# Patient Record
Sex: FEMALE | Race: BLACK OR AFRICAN AMERICAN | NOT HISPANIC OR LATINO | Employment: OTHER | URBAN - METROPOLITAN AREA
[De-identification: names, ages, dates, MRNs, and addresses within clinical notes are randomized per-mention and may not be internally consistent; named-entity substitution may affect disease eponyms.]

---

## 2023-12-26 ENCOUNTER — APPOINTMENT (OUTPATIENT)
Dept: CT IMAGING | Facility: HOSPITAL | Age: 83
DRG: 884 | End: 2023-12-26
Payer: MEDICARE

## 2023-12-26 ENCOUNTER — APPOINTMENT (OUTPATIENT)
Dept: GENERAL RADIOLOGY | Facility: HOSPITAL | Age: 83
DRG: 884 | End: 2023-12-26
Payer: MEDICARE

## 2023-12-26 ENCOUNTER — HOSPITAL ENCOUNTER (INPATIENT)
Facility: HOSPITAL | Age: 83
LOS: 28 days | Discharge: NURSING, REHAB OR LONG-TERM CARE | DRG: 884 | End: 2024-02-01
Attending: EMERGENCY MEDICINE | Admitting: FAMILY MEDICINE
Payer: MEDICARE

## 2023-12-26 DIAGNOSIS — I87.2 VENOUS INSUFFICIENCY: ICD-10-CM

## 2023-12-26 DIAGNOSIS — L12.0 BULLOUS PEMPHIGOID: ICD-10-CM

## 2023-12-26 DIAGNOSIS — R13.10 DYSPHAGIA, UNSPECIFIED TYPE: ICD-10-CM

## 2023-12-26 DIAGNOSIS — N39.0 ACUTE UTI: ICD-10-CM

## 2023-12-26 DIAGNOSIS — R53.1 GENERALIZED WEAKNESS: Primary | ICD-10-CM

## 2023-12-26 LAB
ALBUMIN SERPL-MCNC: 2.6 G/DL (ref 3.5–5.2)
ALBUMIN/GLOB SERPL: 1.1 G/DL
ALP SERPL-CCNC: 174 U/L (ref 39–117)
ALT SERPL W P-5'-P-CCNC: 31 U/L (ref 1–33)
ANION GAP SERPL CALCULATED.3IONS-SCNC: 8 MMOL/L (ref 5–15)
AST SERPL-CCNC: 36 U/L (ref 1–32)
BASOPHILS # BLD AUTO: 0.02 10*3/MM3 (ref 0–0.2)
BASOPHILS NFR BLD AUTO: 0.3 % (ref 0–1.5)
BILIRUB SERPL-MCNC: 0.7 MG/DL (ref 0–1.2)
BUN SERPL-MCNC: 21 MG/DL (ref 8–23)
BUN/CREAT SERPL: 26.3 (ref 7–25)
CALCIUM SPEC-SCNC: 7.9 MG/DL (ref 8.6–10.5)
CHLORIDE SERPL-SCNC: 109 MMOL/L (ref 98–107)
CO2 SERPL-SCNC: 26 MMOL/L (ref 22–29)
CREAT SERPL-MCNC: 0.8 MG/DL (ref 0.57–1)
DEPRECATED RDW RBC AUTO: 60.8 FL (ref 37–54)
EGFRCR SERPLBLD CKD-EPI 2021: 74.1 ML/MIN/1.73
EOSINOPHIL # BLD AUTO: 0.21 10*3/MM3 (ref 0–0.4)
EOSINOPHIL NFR BLD AUTO: 2.7 % (ref 0.3–6.2)
ERYTHROCYTE [DISTWIDTH] IN BLOOD BY AUTOMATED COUNT: 18.1 % (ref 12.3–15.4)
GLOBULIN UR ELPH-MCNC: 2.3 GM/DL
GLUCOSE SERPL-MCNC: 81 MG/DL (ref 65–99)
HCT VFR BLD AUTO: 31.3 % (ref 34–46.6)
HGB BLD-MCNC: 10.3 G/DL (ref 12–15.9)
HOLD SPECIMEN: NORMAL
HOLD SPECIMEN: NORMAL
IMM GRANULOCYTES # BLD AUTO: 0.07 10*3/MM3 (ref 0–0.05)
IMM GRANULOCYTES NFR BLD AUTO: 0.9 % (ref 0–0.5)
LIPASE SERPL-CCNC: 119 U/L (ref 13–60)
LYMPHOCYTES # BLD AUTO: 0.92 10*3/MM3 (ref 0.7–3.1)
LYMPHOCYTES NFR BLD AUTO: 11.8 % (ref 19.6–45.3)
MCH RBC QN AUTO: 31.2 PG (ref 26.6–33)
MCHC RBC AUTO-ENTMCNC: 32.9 G/DL (ref 31.5–35.7)
MCV RBC AUTO: 94.8 FL (ref 79–97)
MONOCYTES # BLD AUTO: 0.34 10*3/MM3 (ref 0.1–0.9)
MONOCYTES NFR BLD AUTO: 4.4 % (ref 5–12)
NEUTROPHILS NFR BLD AUTO: 6.22 10*3/MM3 (ref 1.7–7)
NEUTROPHILS NFR BLD AUTO: 79.9 % (ref 42.7–76)
NRBC BLD AUTO-RTO: 0.8 /100 WBC (ref 0–0.2)
NT-PROBNP SERPL-MCNC: 1494 PG/ML (ref 0–1800)
PLATELET # BLD AUTO: 159 10*3/MM3 (ref 140–450)
PMV BLD AUTO: 9.4 FL (ref 6–12)
POTASSIUM SERPL-SCNC: 4.3 MMOL/L (ref 3.5–5.2)
PROT SERPL-MCNC: 4.9 G/DL (ref 6–8.5)
RBC # BLD AUTO: 3.3 10*6/MM3 (ref 3.77–5.28)
SODIUM SERPL-SCNC: 143 MMOL/L (ref 136–145)
TROPONIN T SERPL HS-MCNC: 69 NG/L
WBC NRBC COR # BLD AUTO: 7.78 10*3/MM3 (ref 3.4–10.8)
WHOLE BLOOD HOLD COAG: NORMAL
WHOLE BLOOD HOLD SPECIMEN: NORMAL

## 2023-12-26 PROCEDURE — 85652 RBC SED RATE AUTOMATED: CPT | Performed by: NURSE PRACTITIONER

## 2023-12-26 PROCEDURE — 71045 X-RAY EXAM CHEST 1 VIEW: CPT

## 2023-12-26 PROCEDURE — 83605 ASSAY OF LACTIC ACID: CPT | Performed by: PHYSICIAN ASSISTANT

## 2023-12-26 PROCEDURE — 75635 CT ANGIO ABDOMINAL ARTERIES: CPT

## 2023-12-26 PROCEDURE — 82746 ASSAY OF FOLIC ACID SERUM: CPT | Performed by: NURSE PRACTITIONER

## 2023-12-26 PROCEDURE — 82728 ASSAY OF FERRITIN: CPT | Performed by: NURSE PRACTITIONER

## 2023-12-26 PROCEDURE — 99285 EMERGENCY DEPT VISIT HI MDM: CPT

## 2023-12-26 PROCEDURE — 84484 ASSAY OF TROPONIN QUANT: CPT | Performed by: EMERGENCY MEDICINE

## 2023-12-26 PROCEDURE — 80053 COMPREHEN METABOLIC PANEL: CPT | Performed by: EMERGENCY MEDICINE

## 2023-12-26 PROCEDURE — 93005 ELECTROCARDIOGRAM TRACING: CPT

## 2023-12-26 PROCEDURE — 25510000001 IOPAMIDOL PER 1 ML: Performed by: EMERGENCY MEDICINE

## 2023-12-26 PROCEDURE — 85025 COMPLETE CBC W/AUTO DIFF WBC: CPT

## 2023-12-26 PROCEDURE — 84145 PROCALCITONIN (PCT): CPT | Performed by: PHYSICIAN ASSISTANT

## 2023-12-26 PROCEDURE — 82607 VITAMIN B-12: CPT | Performed by: NURSE PRACTITIONER

## 2023-12-26 PROCEDURE — 83690 ASSAY OF LIPASE: CPT | Performed by: EMERGENCY MEDICINE

## 2023-12-26 PROCEDURE — P9612 CATHETERIZE FOR URINE SPEC: HCPCS

## 2023-12-26 PROCEDURE — 83880 ASSAY OF NATRIURETIC PEPTIDE: CPT | Performed by: EMERGENCY MEDICINE

## 2023-12-26 PROCEDURE — 93005 ELECTROCARDIOGRAM TRACING: CPT | Performed by: EMERGENCY MEDICINE

## 2023-12-26 PROCEDURE — 36415 COLL VENOUS BLD VENIPUNCTURE: CPT

## 2023-12-26 PROCEDURE — 85045 AUTOMATED RETICULOCYTE COUNT: CPT | Performed by: NURSE PRACTITIONER

## 2023-12-26 PROCEDURE — 84466 ASSAY OF TRANSFERRIN: CPT | Performed by: NURSE PRACTITIONER

## 2023-12-26 PROCEDURE — 83540 ASSAY OF IRON: CPT | Performed by: NURSE PRACTITIONER

## 2023-12-26 PROCEDURE — 86140 C-REACTIVE PROTEIN: CPT | Performed by: NURSE PRACTITIONER

## 2023-12-26 PROCEDURE — 84443 ASSAY THYROID STIM HORMONE: CPT | Performed by: NURSE PRACTITIONER

## 2023-12-26 RX ORDER — METOPROLOL TARTRATE 100 MG/1
100 TABLET ORAL 2 TIMES DAILY
COMMUNITY
End: 2024-02-01 | Stop reason: HOSPADM

## 2023-12-26 RX ORDER — SODIUM CHLORIDE 0.9 % (FLUSH) 0.9 %
10 SYRINGE (ML) INJECTION AS NEEDED
Status: DISCONTINUED | OUTPATIENT
Start: 2023-12-26 | End: 2023-12-28

## 2023-12-26 RX ORDER — AMIODARONE HYDROCHLORIDE 200 MG/1
200 TABLET ORAL DAILY
COMMUNITY
End: 2024-02-01 | Stop reason: HOSPADM

## 2023-12-26 RX ORDER — INSULIN GLARGINE 100 [IU]/ML
10 INJECTION, SOLUTION SUBCUTANEOUS DAILY
COMMUNITY
End: 2024-02-01 | Stop reason: HOSPADM

## 2023-12-26 RX ORDER — ATORVASTATIN CALCIUM 80 MG/1
80 TABLET, FILM COATED ORAL DAILY
COMMUNITY
End: 2024-02-01 | Stop reason: HOSPADM

## 2023-12-26 RX ADMIN — IOPAMIDOL 124 ML: 755 INJECTION, SOLUTION INTRAVENOUS at 23:54

## 2023-12-26 NOTE — LETTER
[facility] CASE MAN  Terri[address] SARABJIT [facility] [id number]  [phone]        January 7, 2024      Patient: Omar Mendoza  YOB: 1940  Date of Visit: 12/26/2023      Inpatient referral at [facility].    Attending: Nikia LONDON  Certifying: Dr. Francisca Louis  Referring: Dr. Mark Gutierrez    Thank you,        Mallory Castillo RN

## 2023-12-26 NOTE — LETTER
EMS Transport Request  For use at [facility], [facility], [place], [place], and [place] only   Patient Name: Omar Mendoza : 1940   Weight:99 kg (218 lb 4.1 oz) Pick-up Location: [facility] BLS/ALS: BLS/ALS: BLS   Insurance: [employer] MEDICARE REPLACEMENT Auth End Date: 2024   Pre-Cert #: D/C Summary complete:    Destination: Other [place]   Contact Precautions: None   Equipment (O2, Fluids, etc.): O2, settings Room air   Arrive By Date/Time: 2024 Stretcher/WC: Stretcher   CM Requesting: Hugo Sanchez RN Ext: [phone]   Notes/Medical Necessity: Impaired mobility, endurance, gait and balance     ______________________________________________________________________    *Only 2 patient bags OR 1 carry-on size bag are permitted.  Wheelchairs and walkers CANNOT transported with the patient. Acknowledge: Yes

## 2023-12-26 NOTE — Clinical Note
Level of Care: Telemetry [5]   Diagnosis: UTI (urinary tract infection) [835244]   Admitting Physician: MAGED GIANG [[id number]]   Attending Physician: MAGED GIANG [[id number]]   Bed Request Comments: tele

## 2023-12-27 ENCOUNTER — APPOINTMENT (OUTPATIENT)
Dept: CARDIOLOGY | Facility: HOSPITAL | Age: 83
DRG: 884 | End: 2023-12-27
Payer: MEDICARE

## 2023-12-27 PROBLEM — R60.0 EDEMA OF LOWER EXTREMITY: Status: ACTIVE | Noted: 2023-10-09

## 2023-12-27 PROBLEM — R41.0 DISORIENTATION: Status: ACTIVE | Noted: 2023-12-27

## 2023-12-27 PROBLEM — K75.89 CHOLESTATIC HEPATITIS: Status: ACTIVE | Noted: 2023-08-28

## 2023-12-27 PROBLEM — I10 ESSENTIAL HYPERTENSION: Status: ACTIVE | Noted: 2022-06-21

## 2023-12-27 PROBLEM — E11.10 TYPE 2 DIABETES MELLITUS WITH KETOACIDOSIS, WITHOUT LONG-TERM CURRENT USE OF INSULIN: Status: ACTIVE | Noted: 2023-12-27

## 2023-12-27 PROBLEM — E43 SEVERE MALNUTRITION: Status: ACTIVE | Noted: 2023-12-27

## 2023-12-27 PROBLEM — R33.8 ACUTE URINARY RETENTION: Status: ACTIVE | Noted: 2023-12-27

## 2023-12-27 PROBLEM — K59.00 ACUTE CONSTIPATION: Status: ACTIVE | Noted: 2023-12-27

## 2023-12-27 PROBLEM — L89.300 PRESSURE INJURY OF BUTTOCK, UNSTAGEABLE: Status: ACTIVE | Noted: 2023-12-27

## 2023-12-27 PROBLEM — E78.5 DYSLIPIDEMIA: Status: ACTIVE | Noted: 2022-06-21

## 2023-12-27 PROBLEM — N81.4 UTERINE PROLAPSE: Status: ACTIVE | Noted: 2023-12-27

## 2023-12-27 PROBLEM — J90 PLEURAL EFFUSION: Status: ACTIVE | Noted: 2023-12-27

## 2023-12-27 PROBLEM — R00.1 BRADYCARDIA: Status: ACTIVE | Noted: 2023-12-27

## 2023-12-27 PROBLEM — L12.0 BULLOUS PEMPHIGOID: Status: ACTIVE | Noted: 2023-09-26

## 2023-12-27 PROBLEM — N39.0 UTI (URINARY TRACT INFECTION): Status: ACTIVE | Noted: 2023-12-27

## 2023-12-27 LAB
ALBUMIN SERPL-MCNC: 2.5 G/DL (ref 3.5–5.2)
ALBUMIN/GLOB SERPL: 1.4 G/DL
ALP SERPL-CCNC: 151 U/L (ref 39–117)
ALT SERPL W P-5'-P-CCNC: 28 U/L (ref 1–33)
ANION GAP SERPL CALCULATED.3IONS-SCNC: 8 MMOL/L (ref 5–15)
AST SERPL-CCNC: 28 U/L (ref 1–32)
BACTERIA UR QL AUTO: ABNORMAL /HPF
BASOPHILS # BLD AUTO: 0.01 10*3/MM3 (ref 0–0.2)
BASOPHILS NFR BLD AUTO: 0.1 % (ref 0–1.5)
BH CV ECHO LEFT VENTRICLE GLOBAL LONGITUDINAL STRAIN: -20.8 %
BH CV ECHO MEAS - AI P1/2T: 683.9 MSEC
BH CV ECHO MEAS - AO MAX PG: 7.2 MMHG
BH CV ECHO MEAS - AO MEAN PG: 4 MMHG
BH CV ECHO MEAS - AO ROOT DIAM: 3.3 CM
BH CV ECHO MEAS - AO V2 MAX: 134 CM/SEC
BH CV ECHO MEAS - AO V2 VTI: 30.9 CM
BH CV ECHO MEAS - AVA(I,D): 2.5 CM2
BH CV ECHO MEAS - EDV(CUBED): 35.9 ML
BH CV ECHO MEAS - EDV(MOD-SP2): 54.9 ML
BH CV ECHO MEAS - EDV(MOD-SP4): 60.8 ML
BH CV ECHO MEAS - EF(MOD-BP): 57 %
BH CV ECHO MEAS - EF(MOD-SP2): 51.2 %
BH CV ECHO MEAS - EF(MOD-SP4): 61.3 %
BH CV ECHO MEAS - ESV(CUBED): 13.8 ML
BH CV ECHO MEAS - ESV(MOD-SP2): 26.8 ML
BH CV ECHO MEAS - ESV(MOD-SP4): 23.5 ML
BH CV ECHO MEAS - FS: 27.3 %
BH CV ECHO MEAS - IVS/LVPW: 1 CM
BH CV ECHO MEAS - IVSD: 1.4 CM
BH CV ECHO MEAS - LA DIMENSION: 3.7 CM
BH CV ECHO MEAS - LAT PEAK E' VEL: 9.3 CM/SEC
BH CV ECHO MEAS - LV MASS(C)D: 159.5 GRAMS
BH CV ECHO MEAS - LV MAX PG: 6.8 MMHG
BH CV ECHO MEAS - LV MEAN PG: 3 MMHG
BH CV ECHO MEAS - LV V1 MAX: 130 CM/SEC
BH CV ECHO MEAS - LV V1 VTI: 25 CM
BH CV ECHO MEAS - LVIDD: 3.3 CM
BH CV ECHO MEAS - LVIDS: 2.4 CM
BH CV ECHO MEAS - LVOT AREA: 3.1 CM2
BH CV ECHO MEAS - LVOT DIAM: 2 CM
BH CV ECHO MEAS - LVPWD: 1.4 CM
BH CV ECHO MEAS - MED PEAK E' VEL: 5.2 CM/SEC
BH CV ECHO MEAS - MV A MAX VEL: 81.8 CM/SEC
BH CV ECHO MEAS - MV DEC SLOPE: 182 CM/SEC2
BH CV ECHO MEAS - MV DEC TIME: 0.32 SEC
BH CV ECHO MEAS - MV E MAX VEL: 56.6 CM/SEC
BH CV ECHO MEAS - MV E/A: 0.69
BH CV ECHO MEAS - MV MAX PG: 3.6 MMHG
BH CV ECHO MEAS - MV MEAN PG: 1 MMHG
BH CV ECHO MEAS - MV P1/2T: 92.1 MSEC
BH CV ECHO MEAS - MV V2 VTI: 26.7 CM
BH CV ECHO MEAS - MVA(P1/2T): 2.39 CM2
BH CV ECHO MEAS - MVA(VTI): 2.9 CM2
BH CV ECHO MEAS - PA ACC TIME: 0.11 SEC
BH CV ECHO MEAS - PA V2 MAX: 115 CM/SEC
BH CV ECHO MEAS - PI END-D VEL: 141 CM/SEC
BH CV ECHO MEAS - RAP SYSTOLE: 3 MMHG
BH CV ECHO MEAS - RVSP: 36 MMHG
BH CV ECHO MEAS - SV(LVOT): 78.5 ML
BH CV ECHO MEAS - SV(MOD-SP2): 28.1 ML
BH CV ECHO MEAS - SV(MOD-SP4): 37.3 ML
BH CV ECHO MEAS - TAPSE (>1.6): 1.7 CM
BH CV ECHO MEAS - TR MAX PG: 32.7 MMHG
BH CV ECHO MEAS - TR MAX VEL: 277.5 CM/SEC
BH CV ECHO MEASUREMENTS AVERAGE E/E' RATIO: 7.81
BH CV VAS BP RIGHT ARM: NORMAL MMHG
BH CV XLRA - RV BASE: 3.3 CM
BH CV XLRA - RV LENGTH: 4.6 CM
BH CV XLRA - RV MID: 2.6 CM
BH CV XLRA - TDI S': 11.9 CM/SEC
BILIRUB SERPL-MCNC: 0.6 MG/DL (ref 0–1.2)
BILIRUB UR QL STRIP: NEGATIVE
BUN SERPL-MCNC: 18 MG/DL (ref 8–23)
BUN/CREAT SERPL: 28.1 (ref 7–25)
CA-I SERPL ISE-MCNC: 1.16 MMOL/L (ref 1.12–1.32)
CALCIUM SPEC-SCNC: 7.6 MG/DL (ref 8.6–10.5)
CHLORIDE SERPL-SCNC: 106 MMOL/L (ref 98–107)
CLARITY UR: ABNORMAL
CO2 SERPL-SCNC: 26 MMOL/L (ref 22–29)
COLOR UR: ABNORMAL
CREAT SERPL-MCNC: 0.64 MG/DL (ref 0.57–1)
CRP SERPL-MCNC: 1.87 MG/DL (ref 0–0.5)
D-LACTATE SERPL-SCNC: 1.4 MMOL/L (ref 0.5–2)
D-LACTATE SERPL-SCNC: 1.4 MMOL/L (ref 0.5–2)
D-LACTATE SERPL-SCNC: 2.1 MMOL/L (ref 0.5–2)
DEPRECATED RDW RBC AUTO: 58.6 FL (ref 37–54)
EGFRCR SERPLBLD CKD-EPI 2021: 88.9 ML/MIN/1.73
EOSINOPHIL # BLD AUTO: 0.19 10*3/MM3 (ref 0–0.4)
EOSINOPHIL NFR BLD AUTO: 2.6 % (ref 0.3–6.2)
ERYTHROCYTE [DISTWIDTH] IN BLOOD BY AUTOMATED COUNT: 18.5 % (ref 12.3–15.4)
ERYTHROCYTE [SEDIMENTATION RATE] IN BLOOD: 17 MM/HR (ref 0–30)
FERRITIN SERPL-MCNC: 462.2 NG/ML (ref 13–150)
FOLATE SERPL-MCNC: 10.3 NG/ML (ref 4.78–24.2)
GEN 5 2HR TROPONIN T REFLEX: 67 NG/L
GLOBULIN UR ELPH-MCNC: 1.8 GM/DL
GLUCOSE BLDC GLUCOMTR-MCNC: 128 MG/DL (ref 70–130)
GLUCOSE BLDC GLUCOMTR-MCNC: 220 MG/DL (ref 70–130)
GLUCOSE BLDC GLUCOMTR-MCNC: 236 MG/DL (ref 70–130)
GLUCOSE BLDC GLUCOMTR-MCNC: 51 MG/DL (ref 70–130)
GLUCOSE BLDC GLUCOMTR-MCNC: 95 MG/DL (ref 70–130)
GLUCOSE SERPL-MCNC: 151 MG/DL (ref 65–99)
GLUCOSE UR STRIP-MCNC: ABNORMAL MG/DL
HBA1C MFR BLD: 12.1 % (ref 4.8–5.6)
HCT VFR BLD AUTO: 31.4 % (ref 34–46.6)
HGB BLD-MCNC: 10.5 G/DL (ref 12–15.9)
HGB UR QL STRIP.AUTO: ABNORMAL
HOLD SPECIMEN: NORMAL
HYALINE CASTS UR QL AUTO: ABNORMAL /LPF
IMM GRANULOCYTES # BLD AUTO: 0.04 10*3/MM3 (ref 0–0.05)
IMM GRANULOCYTES NFR BLD AUTO: 0.6 % (ref 0–0.5)
IRON 24H UR-MRATE: 53 MCG/DL (ref 37–145)
IRON 24H UR-MRATE: 53 MCG/DL (ref 37–145)
IRON SATN MFR SERPL: 28 % (ref 20–50)
KETONES UR QL STRIP: NEGATIVE
LEFT ATRIUM VOLUME INDEX: 11.8 ML/M2
LEUKOCYTE ESTERASE UR QL STRIP.AUTO: ABNORMAL
LYMPHOCYTES # BLD AUTO: 0.57 10*3/MM3 (ref 0.7–3.1)
LYMPHOCYTES NFR BLD AUTO: 7.9 % (ref 19.6–45.3)
MAGNESIUM SERPL-MCNC: 1.7 MG/DL (ref 1.6–2.4)
MCH RBC QN AUTO: 30.7 PG (ref 26.6–33)
MCHC RBC AUTO-ENTMCNC: 33.4 G/DL (ref 31.5–35.7)
MCV RBC AUTO: 91.8 FL (ref 79–97)
MONOCYTES # BLD AUTO: 0.15 10*3/MM3 (ref 0.1–0.9)
MONOCYTES NFR BLD AUTO: 2.1 % (ref 5–12)
NEUTROPHILS NFR BLD AUTO: 6.29 10*3/MM3 (ref 1.7–7)
NEUTROPHILS NFR BLD AUTO: 86.7 % (ref 42.7–76)
NITRITE UR QL STRIP: NEGATIVE
NRBC BLD AUTO-RTO: 0.6 /100 WBC (ref 0–0.2)
PH UR STRIP.AUTO: 5.5 [PH] (ref 5–8)
PHOSPHATE SERPL-MCNC: 3.5 MG/DL (ref 2.5–4.5)
PLATELET # BLD AUTO: 137 10*3/MM3 (ref 140–450)
PMV BLD AUTO: 9.2 FL (ref 6–12)
POTASSIUM SERPL-SCNC: 4.1 MMOL/L (ref 3.5–5.2)
PROCALCITONIN SERPL-MCNC: 0.1 NG/ML (ref 0–0.25)
PROT SERPL-MCNC: 4.3 G/DL (ref 6–8.5)
PROT UR QL STRIP: ABNORMAL
QT INTERVAL: 502 MS
QTC INTERVAL: 462 MS
RBC # BLD AUTO: 3.42 10*6/MM3 (ref 3.77–5.28)
RBC # UR STRIP: ABNORMAL /HPF
REF LAB TEST METHOD: ABNORMAL
RETICS # AUTO: 0.14 10*6/MM3 (ref 0.02–0.13)
RETICS/RBC NFR AUTO: 4.13 % (ref 0.7–1.9)
SODIUM SERPL-SCNC: 140 MMOL/L (ref 136–145)
SP GR UR STRIP: >=1.03 (ref 1–1.03)
SQUAMOUS #/AREA URNS HPF: ABNORMAL /HPF
TIBC SERPL-MCNC: 188 MCG/DL (ref 298–536)
TRANSFERRIN SERPL-MCNC: 126 MG/DL (ref 200–360)
TROPONIN T DELTA: -2 NG/L
TSH SERPL DL<=0.05 MIU/L-ACNC: 3.96 UIU/ML (ref 0.27–4.2)
UROBILINOGEN UR QL STRIP: ABNORMAL
VIT B12 BLD-MCNC: 1253 PG/ML (ref 211–946)
WBC # UR STRIP: ABNORMAL /HPF
WBC NRBC COR # BLD AUTO: 7.25 10*3/MM3 (ref 3.4–10.8)
YEAST URNS QL MICRO: ABNORMAL /HPF

## 2023-12-27 PROCEDURE — 81001 URINALYSIS AUTO W/SCOPE: CPT | Performed by: PHYSICIAN ASSISTANT

## 2023-12-27 PROCEDURE — 87086 URINE CULTURE/COLONY COUNT: CPT | Performed by: NURSE PRACTITIONER

## 2023-12-27 PROCEDURE — 63710000001 CASTOR OIL-BALSAM PERU OINTMENT 56.7 G TUBE: Performed by: INTERNAL MEDICINE

## 2023-12-27 PROCEDURE — 82948 REAGENT STRIP/BLOOD GLUCOSE: CPT

## 2023-12-27 PROCEDURE — G0378 HOSPITAL OBSERVATION PER HR: HCPCS

## 2023-12-27 PROCEDURE — 97166 OT EVAL MOD COMPLEX 45 MIN: CPT

## 2023-12-27 PROCEDURE — 25010000002 HEPARIN (PORCINE) PER 1000 UNITS: Performed by: NURSE PRACTITIONER

## 2023-12-27 PROCEDURE — 83036 HEMOGLOBIN GLYCOSYLATED A1C: CPT | Performed by: NURSE PRACTITIONER

## 2023-12-27 PROCEDURE — 83735 ASSAY OF MAGNESIUM: CPT | Performed by: NURSE PRACTITIONER

## 2023-12-27 PROCEDURE — 97162 PT EVAL MOD COMPLEX 30 MIN: CPT

## 2023-12-27 PROCEDURE — 93356 MYOCRD STRAIN IMG SPCKL TRCK: CPT | Performed by: INTERNAL MEDICINE

## 2023-12-27 PROCEDURE — 85025 COMPLETE CBC W/AUTO DIFF WBC: CPT | Performed by: NURSE PRACTITIONER

## 2023-12-27 PROCEDURE — 82330 ASSAY OF CALCIUM: CPT | Performed by: NURSE PRACTITIONER

## 2023-12-27 PROCEDURE — 84100 ASSAY OF PHOSPHORUS: CPT | Performed by: NURSE PRACTITIONER

## 2023-12-27 PROCEDURE — C1751 CATH, INF, PER/CENT/MIDLINE: HCPCS

## 2023-12-27 PROCEDURE — 93306 TTE W/DOPPLER COMPLETE: CPT | Performed by: INTERNAL MEDICINE

## 2023-12-27 PROCEDURE — 63710000001 INSULIN LISPRO (HUMAN) PER 5 UNITS: Performed by: NURSE PRACTITIONER

## 2023-12-27 PROCEDURE — A9270 NON-COVERED ITEM OR SERVICE: HCPCS | Performed by: NURSE PRACTITIONER

## 2023-12-27 PROCEDURE — 63710000001 HYDROXYZINE 25 MG TABLET: Performed by: INTERNAL MEDICINE

## 2023-12-27 PROCEDURE — 93306 TTE W/DOPPLER COMPLETE: CPT

## 2023-12-27 PROCEDURE — 93356 MYOCRD STRAIN IMG SPCKL TRCK: CPT

## 2023-12-27 PROCEDURE — 63710000001 ASPIRIN 81 MG CHEWABLE TABLET: Performed by: NURSE PRACTITIONER

## 2023-12-27 PROCEDURE — 87040 BLOOD CULTURE FOR BACTERIA: CPT | Performed by: NURSE PRACTITIONER

## 2023-12-27 PROCEDURE — 80053 COMPREHEN METABOLIC PANEL: CPT | Performed by: NURSE PRACTITIONER

## 2023-12-27 PROCEDURE — 63710000001 ATORVASTATIN 40 MG TABLET: Performed by: NURSE PRACTITIONER

## 2023-12-27 PROCEDURE — 83605 ASSAY OF LACTIC ACID: CPT | Performed by: NURSE PRACTITIONER

## 2023-12-27 PROCEDURE — 63710000001 SENNOSIDES-DOCUSATE 8.6-50 MG TABLET: Performed by: NURSE PRACTITIONER

## 2023-12-27 PROCEDURE — C1894 INTRO/SHEATH, NON-LASER: HCPCS

## 2023-12-27 PROCEDURE — 25010000002 CEFTRIAXONE PER 250 MG: Performed by: NURSE PRACTITIONER

## 2023-12-27 PROCEDURE — 99223 1ST HOSP IP/OBS HIGH 75: CPT | Performed by: INTERNAL MEDICINE

## 2023-12-27 PROCEDURE — A9270 NON-COVERED ITEM OR SERVICE: HCPCS | Performed by: INTERNAL MEDICINE

## 2023-12-27 PROCEDURE — 83605 ASSAY OF LACTIC ACID: CPT | Performed by: PHYSICIAN ASSISTANT

## 2023-12-27 RX ORDER — HEPARIN SODIUM 5000 [USP'U]/ML
5000 INJECTION, SOLUTION INTRAVENOUS; SUBCUTANEOUS EVERY 12 HOURS SCHEDULED
Status: DISCONTINUED | OUTPATIENT
Start: 2023-12-27 | End: 2024-01-07

## 2023-12-27 RX ORDER — INSULIN LISPRO 100 [IU]/ML
2-9 INJECTION, SOLUTION INTRAVENOUS; SUBCUTANEOUS
Status: DISCONTINUED | OUTPATIENT
Start: 2023-12-27 | End: 2024-01-07

## 2023-12-27 RX ORDER — SODIUM CHLORIDE 0.9 % (FLUSH) 0.9 %
10 SYRINGE (ML) INJECTION AS NEEDED
Status: DISCONTINUED | OUTPATIENT
Start: 2023-12-27 | End: 2024-02-01 | Stop reason: HOSPADM

## 2023-12-27 RX ORDER — DEXTROSE MONOHYDRATE 25 G/50ML
25 INJECTION, SOLUTION INTRAVENOUS
Status: DISCONTINUED | OUTPATIENT
Start: 2023-12-27 | End: 2024-02-01 | Stop reason: HOSPADM

## 2023-12-27 RX ORDER — ACETAMINOPHEN 650 MG/1
650 SUPPOSITORY RECTAL EVERY 4 HOURS PRN
Status: DISCONTINUED | OUTPATIENT
Start: 2023-12-27 | End: 2024-02-01 | Stop reason: HOSPADM

## 2023-12-27 RX ORDER — ATORVASTATIN CALCIUM 40 MG/1
80 TABLET, FILM COATED ORAL DAILY
Status: DISCONTINUED | OUTPATIENT
Start: 2023-12-27 | End: 2024-01-03 | Stop reason: SDUPTHER

## 2023-12-27 RX ORDER — METOPROLOL TARTRATE 50 MG/1
50 TABLET, FILM COATED ORAL 2 TIMES DAILY
Status: DISCONTINUED | OUTPATIENT
Start: 2023-12-27 | End: 2023-12-31

## 2023-12-27 RX ORDER — SODIUM CHLORIDE 0.9 % (FLUSH) 0.9 %
20 SYRINGE (ML) INJECTION AS NEEDED
Status: DISCONTINUED | OUTPATIENT
Start: 2023-12-27 | End: 2023-12-28

## 2023-12-27 RX ORDER — ASPIRIN 81 MG/1
81 TABLET, CHEWABLE ORAL DAILY
Status: DISCONTINUED | OUTPATIENT
Start: 2023-12-27 | End: 2024-01-07

## 2023-12-27 RX ORDER — SODIUM CHLORIDE 0.9 % (FLUSH) 0.9 %
10 SYRINGE (ML) INJECTION EVERY 12 HOURS SCHEDULED
Status: DISCONTINUED | OUTPATIENT
Start: 2023-12-27 | End: 2023-12-28

## 2023-12-27 RX ORDER — SODIUM CHLORIDE 0.9 % (FLUSH) 0.9 %
10 SYRINGE (ML) INJECTION EVERY 12 HOURS SCHEDULED
Status: DISCONTINUED | OUTPATIENT
Start: 2023-12-27 | End: 2024-01-17

## 2023-12-27 RX ORDER — SODIUM CHLORIDE 9 MG/ML
100 INJECTION, SOLUTION INTRAVENOUS CONTINUOUS
Status: CANCELLED | OUTPATIENT
Start: 2023-12-27 | End: 2023-12-27

## 2023-12-27 RX ORDER — ACETAMINOPHEN 325 MG/1
650 TABLET ORAL EVERY 4 HOURS PRN
Status: DISCONTINUED | OUTPATIENT
Start: 2023-12-27 | End: 2024-02-01 | Stop reason: HOSPADM

## 2023-12-27 RX ORDER — SODIUM CHLORIDE 0.9 % (FLUSH) 0.9 %
10 SYRINGE (ML) INJECTION AS NEEDED
Status: DISCONTINUED | OUTPATIENT
Start: 2023-12-27 | End: 2023-12-28

## 2023-12-27 RX ORDER — AMOXICILLIN 250 MG
2 CAPSULE ORAL 2 TIMES DAILY
Status: DISCONTINUED | OUTPATIENT
Start: 2023-12-27 | End: 2023-12-29

## 2023-12-27 RX ORDER — ACETAMINOPHEN 160 MG/5ML
650 SOLUTION ORAL EVERY 4 HOURS PRN
Status: DISCONTINUED | OUTPATIENT
Start: 2023-12-27 | End: 2024-02-01 | Stop reason: HOSPADM

## 2023-12-27 RX ORDER — NITROGLYCERIN 0.4 MG/1
0.4 TABLET SUBLINGUAL
Status: DISCONTINUED | OUTPATIENT
Start: 2023-12-27 | End: 2024-02-01 | Stop reason: HOSPADM

## 2023-12-27 RX ORDER — ONDANSETRON 2 MG/ML
4 INJECTION INTRAMUSCULAR; INTRAVENOUS EVERY 6 HOURS PRN
Status: DISCONTINUED | OUTPATIENT
Start: 2023-12-27 | End: 2024-02-01 | Stop reason: HOSPADM

## 2023-12-27 RX ORDER — NICOTINE POLACRILEX 4 MG
15 LOZENGE BUCCAL
Status: DISCONTINUED | OUTPATIENT
Start: 2023-12-27 | End: 2024-02-01 | Stop reason: HOSPADM

## 2023-12-27 RX ORDER — SPIRONOLACTONE 50 MG/1
50 TABLET, FILM COATED ORAL DAILY
COMMUNITY
Start: 2023-10-09 | End: 2024-02-01 | Stop reason: HOSPADM

## 2023-12-27 RX ORDER — BISACODYL 5 MG/1
5 TABLET, DELAYED RELEASE ORAL DAILY PRN
Status: DISCONTINUED | OUTPATIENT
Start: 2023-12-27 | End: 2023-12-29

## 2023-12-27 RX ORDER — SODIUM CHLORIDE 9 MG/ML
40 INJECTION, SOLUTION INTRAVENOUS AS NEEDED
Status: DISCONTINUED | OUTPATIENT
Start: 2023-12-27 | End: 2024-01-09

## 2023-12-27 RX ORDER — ASPIRIN 81 MG/1
81 TABLET, CHEWABLE ORAL DAILY
COMMUNITY
End: 2024-02-01 | Stop reason: HOSPADM

## 2023-12-27 RX ORDER — CASTOR OIL AND BALSAM, PERU 788; 87 MG/G; MG/G
1 OINTMENT TOPICAL EVERY 12 HOURS SCHEDULED
Status: DISCONTINUED | OUTPATIENT
Start: 2023-12-27 | End: 2024-01-07

## 2023-12-27 RX ORDER — HYDROXYZINE HYDROCHLORIDE 25 MG/1
25 TABLET, FILM COATED ORAL 3 TIMES DAILY PRN
Status: DISCONTINUED | OUTPATIENT
Start: 2023-12-27 | End: 2024-01-30

## 2023-12-27 RX ORDER — POLYETHYLENE GLYCOL 3350 17 G/17G
17 POWDER, FOR SOLUTION ORAL DAILY PRN
Status: DISCONTINUED | OUTPATIENT
Start: 2023-12-27 | End: 2023-12-29

## 2023-12-27 RX ORDER — IBUPROFEN 600 MG/1
1 TABLET ORAL
Status: DISCONTINUED | OUTPATIENT
Start: 2023-12-27 | End: 2024-02-01 | Stop reason: HOSPADM

## 2023-12-27 RX ORDER — TRIAMCINOLONE ACETONIDE 1 MG/G
1 CREAM TOPICAL 2 TIMES DAILY
COMMUNITY
End: 2024-02-01 | Stop reason: HOSPADM

## 2023-12-27 RX ORDER — BISACODYL 10 MG
10 SUPPOSITORY, RECTAL RECTAL DAILY PRN
Status: DISCONTINUED | OUTPATIENT
Start: 2023-12-27 | End: 2023-12-29

## 2023-12-27 RX ORDER — AMLODIPINE BESYLATE 5 MG/1
5 TABLET ORAL DAILY
COMMUNITY
Start: 2023-09-05 | End: 2024-02-01 | Stop reason: HOSPADM

## 2023-12-27 RX ADMIN — Medication 10 ML: at 21:40

## 2023-12-27 RX ADMIN — HYDROXYZINE HYDROCHLORIDE 25 MG: 25 TABLET, FILM COATED ORAL at 12:25

## 2023-12-27 RX ADMIN — SODIUM CHLORIDE 1000 MG: 900 INJECTION INTRAVENOUS at 04:34

## 2023-12-27 RX ADMIN — DEXTROSE MONOHYDRATE 25 G: 25 INJECTION, SOLUTION INTRAVENOUS at 08:21

## 2023-12-27 RX ADMIN — SENNOSIDES AND DOCUSATE SODIUM 2 TABLET: 8.6; 5 TABLET ORAL at 08:34

## 2023-12-27 RX ADMIN — INSULIN LISPRO 4 UNITS: 100 INJECTION, SOLUTION INTRAVENOUS; SUBCUTANEOUS at 21:32

## 2023-12-27 RX ADMIN — ASPIRIN 81 MG: 81 TABLET, CHEWABLE ORAL at 08:34

## 2023-12-27 RX ADMIN — INSULIN LISPRO 3 UNITS: 100 INJECTION, SOLUTION INTRAVENOUS; SUBCUTANEOUS at 17:18

## 2023-12-27 RX ADMIN — HEPARIN SODIUM 5000 UNITS: 5000 INJECTION INTRAVENOUS; SUBCUTANEOUS at 21:33

## 2023-12-27 RX ADMIN — CASTOR OIL AND BALSAM, PERU 1 APPLICATION: 788; 87 OINTMENT TOPICAL at 21:32

## 2023-12-27 RX ADMIN — ATORVASTATIN CALCIUM 80 MG: 40 TABLET, FILM COATED ORAL at 08:34

## 2023-12-27 RX ADMIN — DOXYCYCLINE 100 MG: 100 INJECTION, POWDER, LYOPHILIZED, FOR SOLUTION INTRAVENOUS at 21:40

## 2023-12-27 RX ADMIN — HEPARIN SODIUM 5000 UNITS: 5000 INJECTION INTRAVENOUS; SUBCUTANEOUS at 08:34

## 2023-12-27 RX ADMIN — SENNOSIDES AND DOCUSATE SODIUM 2 TABLET: 8.6; 5 TABLET ORAL at 21:35

## 2023-12-27 NOTE — THERAPY EVALUATION
Patient Name: Omar Mendoza  : 1940    MRN: [id number]                              Today's Date: 2023       Admit Date: 2023    Visit Dx:     ICD-10-CM ICD-9-CM   1. Generalized weakness  R53.1 780.79   2. Acute UTI  N39.0 599.0   3. Bullous pemphigoid  L12.0 694.5   4. Venous insufficiency  I87.2 459.81     Patient Active Problem List   Diagnosis    UTI (urinary tract infection)    Bullous pemphigoid    Essential hypertension    Dyslipidemia    Edema of lower extremity    Cholestatic hepatitis    Acute constipation    Acute urinary retention    Uterine prolapse    Bradycardia    Pressure injury of buttock, unstageable    Type 2 diabetes mellitus with ketoacidosis, without long-term current use of insulin    Disorientation    Pleural effusion    Severe malnutrition     Past Medical History:   Diagnosis Date    CAD (coronary artery disease)     Dementia     Diabetes mellitus     Hyperlipemia     Hypertension      History reviewed. No pertinent surgical history.   General Information       Row Name 23 1118          Physical Therapy Time and Intention    Document Type evaluation  -MIA     Mode of Treatment physical therapy  -       Row Name 23 1118          General Information    Patient Profile Reviewed yes  -KE     Prior Level of Function independent:;all household mobility;community mobility;ADL's  Prior to Thanksgiving pt ind for mobility w/ SPC and ADLs. Per family pt experienced a functional decline since then and has been a facility resident of [facility] rehab facility.  -MIA     Existing Precautions/Restrictions fall;other (see comments)  poor skin integrity  -MIA     Barriers to Rehab medically complex;previous functional deficit;cognitive status  -       Row Name 23 1118          Living Environment    People in Home spouse  -       Row Name 23 1118          Home Main Entrance    Number of Stairs, Main Entrance one  ramp has been ordered  -       Row Name  12/27/23 1118          Stairs Within Home, Primary    Number of Stairs, Within Home, Primary twelve  -KE     Stair Railings, Within Home, Primary railing on right side (ascending)  -KE     Stairs Comment, Within Home, Primary steps to basement, pt not required to access  -       Row Name 12/27/23 1118          Cognition    Orientation Status (Cognition) oriented to;person;verbal cues/prompts needed for orientation;time;disoriented to;place  -       Row Name 12/27/23 1118          Safety Issues, Functional Mobility    Safety Issues Affecting Function (Mobility) ability to follow commands;awareness of need for assistance;friction/shear risk;insight into deficits/self-awareness;problem-solving;safety precaution awareness;safety precautions follow-through/compliance;sequencing abilities  -KE     Impairments Affecting Function (Mobility) balance;cognition;endurance/activity tolerance;postural/trunk control;strength  -KE     Cognitive Impairments, Mobility Safety/Performance attention;awareness, need for assistance;insight into deficits/self-awareness;problem-solving/reasoning;safety precaution awareness;safety precaution follow-through;sequencing abilities  -KE               User Key  (r) = Recorded By, (t) = Taken By, (c) = Cosigned By      Initials Name Provider Type    Petty Beatty, PT Physical Therapist                   Mobility       Row Name 12/27/23 1123          Bed Mobility    Bed Mobility supine-sit;sit-supine;rolling right  -KE     Supine-Sit [place] (Bed Mobility) maximum assist (25% patient effort);2 person assist  -KE     Sit-Supine [place] (Bed Mobility) dependent (less than 25% patient effort);2 person assist  -KE     Assistive Device (Bed Mobility) bed rails;draw sheet  -     Comment, (Bed Mobility) Pt requiring increased time and effort, VCs for sequencing  -KE       Row Name 12/27/23 1123          Transfers    Comment, (Transfers) x1 STS from EOB, attempted bed-chair transfer  however deemed unsafe d/t patient's poor command following and weakness; Pt taking steps toward HOB, demo difficulty weight shifting and unable to fully clear feet to take steps. pt w/ difficulty achieving full upright posture  -KE       Row Name 12/27/23 1123          Sit-Stand Transfer    Sit-Stand [place] (Transfers) moderate assist (50% patient effort);2 person assist  -KE     Assistive Device (Sit-Stand Transfers) walker, front-wheeled  -KE     Comment, (Sit-Stand Transfer) x1 STS from EOB w/ ModAx2. Pt demo poor UE placement, VCs for correction. tactile cues req for completing stand, Pt slow with initiation of STS  -KE               User Key  (r) = Recorded By, (t) = Taken By, (c) = Cosigned By      Initials Name Provider Type    Petty Beatty, PT Physical Therapist                   Obj/Interventions       Row Name 12/27/23 1129          Range of Motion Comprehensive    General Range of Motion bilateral lower extremity ROM WFL  -       Row Name 12/27/23 1129          Strength Comprehensive (MMT)    General Manual Muscle Testing (MMT) Assessment lower extremity strength deficits identified  -KE     Comment, General Manual Muscle Testing (MMT) Assessment R Hip flexion 2/5, R ankle DF and knee extensors grossly 3/5; L LE grossly 3/5  -KE       Row Name 12/27/23 1129          Balance    Balance Assessment sitting static balance;sitting dynamic balance;sit to stand dynamic balance;standing static balance;standing dynamic balance  -KE     Static Sitting Balance minimal assist  -KE     Dynamic Sitting Balance minimal assist  -KE     Position, Sitting Balance unsupported;sitting edge of bed  -KE     Sit to Stand Dynamic Balance moderate assist;2-person assist  -KE     Static Standing Balance minimal assist;2-person assist  -KE     Dynamic Standing Balance moderate assist;2-person assist  -KE     Position/Device Used, Standing Balance supported;walker, front-wheeled  -KE     Balance Interventions  sitting;standing;sit to stand;supported;static;dynamic  -KE     Comment, Balance No overt LOB  -KE       Row Name 12/27/23 1129          Sensory Assessment (Somatosensory)    Sensory Assessment (Somatosensory) unable/difficult to assess  poor command following  -KE               User Key  (r) = Recorded By, (t) = Taken By, (c) = Cosigned By      Initials Name Provider Type    Petty Beatty, PT Physical Therapist                   Goals/Plan       Row Name 12/27/23 1138          Bed Mobility Goal 1 (PT)    Activity/Assistive Device (Bed Mobility Goal 1, PT) sit to supine/supine to sit  -KE     [place] Level/Cues Needed (Bed Mobility Goal 1, PT) moderate assist (50-74% patient effort)  -KE     Time Frame (Bed Mobility Goal 1, PT) long term goal (LTG);10 days  -KE     Progress/Outcomes (Bed Mobility Goal 1, PT) new goal  -       Row Name 12/27/23 1138          Transfer Goal 1 (PT)    Activity/Assistive Device (Transfer Goal 1, PT) bed-to-chair/chair-to-bed  -KE     [place] Level/Cues Needed (Transfer Goal 1, PT) minimum assist (75% or more patient effort)  -KE     Time Frame (Transfer Goal 1, PT) long term goal (LTG);10 days  -KE     Progress/Outcome (Transfer Goal 1, PT) new goal  -       Row Name 12/27/23 1138          Gait Training Goal 1 (PT)    Activity/Assistive Device (Gait Training Goal 1, PT) gait (walking locomotion);assistive device use  -KE     [place] Level (Gait Training Goal 1, PT) minimum assist (75% or more patient effort)  -KE     Distance (Gait Training Goal 1, PT) 25  -KE     Time Frame (Gait Training Goal 1, PT) long term goal (LTG);10 days  -KE     Progress/Outcome (Gait Training Goal 1, PT) new goal  -       Row Name 12/27/23 1138          Therapy Assessment/Plan (PT)    Planned Therapy Interventions (PT) balance training;bed mobility training;gait training;home exercise program;patient/family education;neuromuscular re-education;postural re-education;ROM (range of  motion);stair training;strengthening;stretching;transfer training  -               User Key  (r) = Recorded By, (t) = Taken By, (c) = Cosigned By      Initials Name Provider Type    Petty Beatty, PT Physical Therapist                   Clinical Impression       Row Name 12/27/23 1133          Pain    Pretreatment Pain Rating 0/10 - no pain  -KE     Posttreatment Pain Rating 0/10 - no pain  -KE     Pain Intervention(s) Ambulation/increased activity;Repositioned  -       Row Name 12/27/23 1133          Plan of Care Review    Plan of Care Reviewed With patient;family  -     Outcome Evaluation Pt presents with decreased endurance, balance deficits, and generalized weakness leading to impairments in functional mobility below baseline. Req ModAx2 for steps to HOB w/ FWW. Pt will benefit from skilled IP PT to address impairments and return to PLOF. PT rec SNF at d/c for best functional outcome  -       Row Name 12/27/23 1133          Therapy Assessment/Plan (PT)    Rehab Potential (PT) good, to achieve stated therapy goals  -     Criteria for Skilled Interventions Met (PT) yes;meets criteria;skilled treatment is necessary  -     Therapy Frequency (PT) daily  -       Row Name 12/27/23 1133          Vital Signs    Pre Systolic BP Rehab 109  -KE     Pre Treatment Diastolic BP 63  -KE     Pretreatment Heart Rate (beats/min) 52  -KE     Posttreatment Heart Rate (beats/min) 51  -KE     Pre SpO2 (%) 98  -KE     O2 Delivery Pre Treatment room air  -KE     O2 Delivery Intra Treatment room air  -KE     Post SpO2 (%) 96  -KE     O2 Delivery Post Treatment room air  -KE     Pre Patient Position Supine  -KE     Intra Patient Position Standing  -KE     Post Patient Position Supine  -       Row Name 12/27/23 1133          Positioning and Restraints    Pre-Treatment Position in bed  -KE     Post Treatment Position bed  -KE     In Bed notified nsg;supine;call light within reach;encouraged to call for assist;exit  alarm on;with family/caregiver;side rails up x2  -KE               User Key  (r) = Recorded By, (t) = Taken By, (c) = Cosigned By      Initials Name Provider Type    Petty Beatty, PT Physical Therapist                   Outcome Measures       Row Name 12/27/23 1139 12/27/23 0609       How much help from another person do you currently need...    Turning from your back to your side while in flat bed without using bedrails? 2  -KE 1  -SB    Moving from lying on back to sitting on the side of a flat bed without bedrails? 1  -KE 1  -SB    Moving to and from a bed to a chair (including a wheelchair)? 1  -KE 1  -SB    Standing up from a chair using your arms (e.g., wheelchair, bedside chair)? 1  -KE 1  -SB    Climbing 3-5 steps with a railing? 1  -KE 1  -SB    To walk in hospital room? 1  -KE 1  -SB    AM-PAC 6 Clicks Score (PT) 7  -KE 6  -SB    Highest Level of Mobility Goal 2 --> Bed activities/dependent transfer  -KE 2 --> Bed activities/dependent transfer  -SB      Row Name 12/27/23 1139 12/27/23 1134       Functional Assessment    Outcome Measure Options AM-PAC 6 Clicks Basic Mobility (PT)  -KE AM-PAC 6 Clicks Daily Activity (OT)  -AC              User Key  (r) = Recorded By, (t) = Taken By, (c) = Cosigned By      Initials Name Provider Type    Sepideh oPlanco, OT Occupational Therapist    Joyce Soto, RN Registered Nurse    Petty Beatty, PT Physical Therapist                                 Physical Therapy Education       Title: PT OT SLP Therapies (In Progress)       Topic: Physical Therapy (In Progress)       Point: Mobility training (In Progress)       Learning Progress Summary             Patient Acceptance, E, NR by MIA at 12/27/2023 1045                         Point: Home exercise program (Not Started)       Learner Progress:  Not documented in this visit.              Point: Body mechanics (In Progress)       Learning Progress Summary             Patient Acceptance, E, NR by MIA at  12/27/2023 1045                         Point: Precautions (In Progress)       Learning Progress Summary             Patient Acceptance, E, NR by MIA at 12/27/2023 1045                                         User Key       Initials Effective Dates Name Provider Type Discipline    MIA 11/16/23 -  Petty Mckoy, PT Physical Therapist PT                  PT Recommendation and Plan  Planned Therapy Interventions (PT): balance training, bed mobility training, gait training, home exercise program, patient/family education, neuromuscular re-education, postural re-education, ROM (range of motion), stair training, strengthening, stretching, transfer training  Plan of Care Reviewed With: patient, family  Outcome Evaluation: Pt presents with decreased endurance, balance deficits, and generalized weakness leading to impairments in functional mobility below baseline. Req ModAx2 for steps to HOB w/ FWW. Pt will benefit from skilled IP PT to address impairments and return to PLOF. PT rec SNF at d/c for best functional outcome     Time Calculation:   PT Evaluation Complexity  History, PT Evaluation Complexity: 3 or more personal factors and/or comorbidities  Examination of Body Systems (PT Eval Complexity): total of 3 or more elements  Clinical Presentation (PT Evaluation Complexity): evolving  Clinical Decision Making (PT Evaluation Complexity): moderate complexity  Overall Complexity (PT Evaluation Complexity): moderate complexity     PT Charges       Row Name 12/27/23 1141             Time Calculation    Start Time 1045  -KE      PT Received On 12/27/23  -KE      PT - Next Appointment --  -KE      PT Goal Re-Cert Due Date 01/06/24  -KE         Untimed Charges    PT Eval/Re-eval Minutes 52  -KE         Total Minutes    Untimed Charges Total Minutes 52  -KE       Total Minutes 52  -KE                User Key  (r) = Recorded By, (t) = Taken By, (c) = Cosigned By      Initials Name Provider Type    Petty Beatty, PT Physical  Therapist                  Therapy Charges for Today       Code Description Service Date Service Provider Modifiers Qty    [id number] HC PT EVAL MOD COMPLEXITY 4 12/27/2023 Petty Mckoy, PT GP 1            PT G-Codes  Outcome Measure Options: AM-PAC 6 Clicks Basic Mobility (PT)  AM-PAC 6 Clicks Score (PT): 7  PT Discharge Summary  Anticipated Discharge Disposition (PT): skilled nursing facility    Petty Mckoy PT  12/27/2023

## 2023-12-27 NOTE — PLAN OF CARE
Goal Outcome Evaluation:  Plan of Care Reviewed With: patient, family           Outcome Evaluation: Pt presents below baseline with self care/mobility d/t lethargy, decr command following, weakness, decr balance and activity tolerance.  Pt dep LBD, max/dep x2  bed mobility,  mod A x 2 STS  with RW with difficulty achieving full upright position.  OT will follow to advance pt toward PLOF.  Recommend SNF upon d/c.      Anticipated Discharge Disposition (OT): skilled nursing facility

## 2023-12-27 NOTE — NURSING NOTE
"Reason for Wound, Ostomy and Continence (WOC) Nursing Consultation: \"  buttocks; left heel blister   \"  Patient just finished eating breakfast.  Family/support person present.     Wounds noted by WOC: all POA:    Wound Assessment  Wound Type: Pressure Injury Stage 2  Location: right heel  [id number]: 5x7cm  Wound Bed: serous filled blister   Periwound Skin: intact, edematous, another small blood blister on anterior foot <1cm in size  Drainage Characteristics/Odor: none  Drainage Amount: none  Pain: No   Care provided: cleansed with betadine, applied heel boots  Notes: will order modified compression wraps-no arterial occlusion found on CT with runoff  Wound Image:       Wound Assessment  Wound Type: Evolving deep tissue injury  Location: left trochanter  Measurements: 6x3.5cm  Wound Bed: non-blanchable, dry, pink, purple, and red  Wound Edges: Open  Periwound Skin: intact and dry   Drainage Characteristics/Odor: none  Drainage Amount: none  Pain: No   Care provided: cleansed, applied multilayer xeroform  Notes: Will order venelex and foam padding q12  Wound Image:       Wound Assessment  Wound Type: Evolving deep tissue pressure injury  Location: sacral area  Measurements: 8x5cm  Wound Bed: non-blanchable, black eschar, dry, pink, and purple-firm area in center where dark purple area prensent  Wound Edges: Irregular and Jagged  Periwound Skin: intact   Drainage Characteristics/Odor: none  Drainage Amount: none  Pain: Yes   Care provided: cleansed, applied multilayer xeroform  Notes: Will order venelex and foam padding q12  Wound Image:       Wound Assessment  Wound Type: abrasion vs deep tissue pressure injury  Location: left flank  Measurements: 1.5x4cm  Wound Bed: non-blanchable and maroon/purple  Wound Edges: Irregular  Periwound Skin: intact   Drainage Characteristics/Odor: none  Drainage Amount: none  Pain: No   Care provided: leave open to air  Notes: not on bony prominence, unsure origin  Wound Image: "       Recommendation(s) for management of wound:   -Refer to wound care orders for specific instructions on how to treat/manage wound.  -will order modified compression wraps for edematous BLE, should help heal right foot blister quicker  -venelex to left trochanter and sacrum wounds  -Practice pressure injury prevention protocol.    Most recent Chato Scale score:  Sensory Perception: 4-->no impairment  Moisture: 3-->occasionally moist  Activity: 1-->bedfast  Mobility: 1-->completely immobile  Nutrition: 3-->adequate  Friction and Shear: 2-->potential problem  Chato Score: 14 (12/27/23 0410)     Specialty support surface: [employer]   -due to severity and number of pressure injuries    Pressure Injury Prevention Protocol (initiate for Chato Score of 18 or less):   *Keep skin dry, turn q 2 hr, keep heels elevated and offloaded with offloading heel boots.    *Apply z-guard to bottom and bony prominences daily and as needed with incontinence episodes.  *Follow C.A.R.E protocol if medical devices (Bipap, loza, Ng tube, etc) are being used.  *Reduce layers under patient (one sheet as drawsheet and two incontinence pads) to allow waffle or MARÍA to improve microclimate   *Clean skin gently with no-rinse PH-balanced cleanser and soft, disposable cloth (barrier wipes-blue pack).   *Raise knee-gatch before elevating HOB to reduce shearing      WOC Team will continue to follow.  Please re-consult if the wound(s) worsens.

## 2023-12-27 NOTE — ED NOTES
Omar Mendoza    Nursing Report ED to Floor:  Mental status: Alert / confused, a/o x1, normal baseline  Ambulatory status: Non ambulatory  Oxygen Therapy:  Room Air  Cardiac Rhythm: Sinus Ernie  Admitted from: ED  Safety Concerns:  Fall, Infection,Skin break down  Social Issues: na  ED Room #:  12    ED Nurse Phone Extension - [phone] or may call [phone].      HPI:   Chief Complaint   Patient presents with    Foot Swelling     Pt arrived via EMS for bilateral feet swelling, blister noted to bottom of right foot       Past Medical History:  Past Medical History:   Diagnosis Date    CAD (coronary artery disease)     Dementia     Diabetes mellitus     Hyperlipemia     Hypertension         Past Surgical History:  History reviewed. No pertinent surgical history.     Admitting Doctor:   Julee Rogers DO    Consulting Provider(s):  Consults       No orders found for last 30 day(s).             Admitting Diagnosis:   The primary encounter diagnosis was Generalized weakness. Diagnoses of Acute UTI and Bullous pemphigoid were also pertinent to this visit.    Most Recent Vitals:   Vitals:    12/27/23 0130 12/27/23 0200 12/27/23 0230 12/27/23 0300   BP: 101/59 104/64 107/58 120/62   Pulse: (!) 49 (!) 49 (!) 48 (!) 48   Resp: 14 14 14 14   Temp:       SpO2: 96% 90% 96% 100%   Weight:       Height:           Active LDAs/IV Access:   Lines, Drains & Airways       Active LDAs       Name Placement date Placement time Site Days    Peripheral IV 12/26/23 2131 Anterior;Right;Upper Arm 12/26/23 2131  Arm  less than 1    External Urinary Catheter 12/27/23  2336  --  less than 1                    Labs (abnormal labs have a star):   Labs Reviewed   TROPONIN - Abnormal; Notable for the following components:       Result Value    HS Troponin T 69 (*)     All other components within normal limits    Narrative:     High Sensitive Troponin T Reference Range:  <14.0 ng/L- Negative Female for AMI  <22.0 ng/L- Negative Male for AMI  >=14 - Abnormal  Female indicating possible myocardial injury.  >=22 - Abnormal Male indicating possible myocardial injury.   Clinicians would have to utilize clinical acumen, EKG, Troponin, and serial changes to determine if it is an Acute Myocardial Infarction or myocardial injury due to an underlying chronic condition.        COMPREHENSIVE METABOLIC PANEL - Abnormal; Notable for the following components:    Chloride 109 (*)     Calcium 7.9 (*)     Total Protein 4.9 (*)     Albumin 2.6 (*)     AST (SGOT) 36 (*)     Alkaline Phosphatase 174 (*)     BUN/Creatinine Ratio 26.3 (*)     All other components within normal limits    Narrative:     GFR Normal >60  Chronic Kidney Disease <60  Kidney Failure <15    The GFR formula is only valid for adults with stable renal function between ages 18 and 70.   LIPASE - Abnormal; Notable for the following components:    Lipase 119 (*)     All other components within normal limits   CBC WITH AUTO DIFFERENTIAL - Abnormal; Notable for the following components:    RBC 3.30 (*)     Hemoglobin 10.3 (*)     Hematocrit 31.3 (*)     RDW 18.1 (*)     RDW-SD 60.8 (*)     Neutrophil % 79.9 (*)     Lymphocyte % 11.8 (*)     Monocyte % 4.4 (*)     Immature Grans % 0.9 (*)     Immature Grans, Absolute 0.07 (*)     nRBC 0.8 (*)     All other components within normal limits   HIGH SENSITIVITIY TROPONIN T 2HR - Abnormal; Notable for the following components:    HS Troponin T 67 (*)     All other components within normal limits    Narrative:     High Sensitive Troponin T Reference Range:  <14.0 ng/L- Negative Female for AMI  <22.0 ng/L- Negative Male for AMI  >=14 - Abnormal Female indicating possible myocardial injury.  >=22 - Abnormal Male indicating possible myocardial injury.   Clinicians would have to utilize clinical acumen, EKG, Troponin, and serial changes to determine if it is an Acute Myocardial Infarction or myocardial injury due to an underlying chronic condition.        URINALYSIS W/ MICROSCOPIC IF  INDICATED (NO CULTURE) - Abnormal; Notable for the following components:    Color, UA Orange (*)     Appearance, UA Cloudy (*)     Glucose,  mg/dL (Trace) (*)     Blood, UA Large (3+) (*)     Protein,  mg/dL (2+) (*)     Leuk Esterase, UA Moderate (2+) (*)     All other components within normal limits   LACTIC ACID, PLASMA - Abnormal; Notable for the following components:    Lactate 2.1 (*)     All other components within normal limits   URINALYSIS, MICROSCOPIC ONLY - Abnormal; Notable for the following components:    RBC, UA Too Numerous to Count (*)     WBC, UA Too Numerous to Count (*)     All other components within normal limits   C-REACTIVE PROTEIN - Abnormal; Notable for the following components:    C-Reactive Protein 1.87 (*)     All other components within normal limits   BNP (IN-HOUSE) - Normal    Narrative:     This assay is used as an aid in the diagnosis of individuals suspected of having heart failure. It can be used as an aid in the diagnosis of acute decompensated heart failure (ADHF) in patients presenting with signs and symptoms of ADHF to the emergency department (ED). In addition, NT-proBNP of <300 pg/mL indicates ADHF is not likely.    Age Range Result Interpretation  NT-proBNP Concentration (pg/mL:      <50             Positive            >450                   Gray                 300-450                    Negative             <300    50-75           Positive            >900                  Gray                300-900                  Negative            <300      >75             Positive            >1800                  Gray                300-1800                  Negative            <300   PROCALCITONIN - Normal    Narrative:     As a Marker for Sepsis (Non-Neonates):    1. <0.5 ng/mL represents a low risk of severe sepsis and/or septic shock.  2. >2 ng/mL represents a high risk of severe sepsis and/or septic shock.    As a Marker for Lower Respiratory Tract Infections that  "require antibiotic therapy:    PCT on Admission    Antibiotic Therapy       6-12 Hrs later    >0.5                Strongly Recommended  >0.25 - <0.5        Recommended   0.1 - 0.25          Discouraged              Remeasure/reassess PCT  <0.1                Strongly Discouraged     Remeasure/reassess PCT    As 28 day mortality risk marker: \"Change in Procalcitonin Result\" (>80% or <=80%) if Day 0 (or Day 1) and Day 4 values are available. Refer to [email or web address]    Change in PCT <=80%  A decrease of PCT levels below or equal to 80% defines a positive change in PCT test result representing a higher risk for 28-day all-cause mortality of patients diagnosed with severe sepsis for septic shock.    Change in PCT >80%  A decrease of PCT levels of more than 80% defines a negative change in PCT result representing a lower risk for 28-day all-cause mortality of patients diagnosed with severe sepsis or septic shock.      LACTIC ACID, PLASMA - Normal   SEDIMENTATION RATE - Normal   BLOOD CULTURE   BLOOD CULTURE   URINE CULTURE   RAINBOW DRAW    Narrative:     The following orders were created for panel order [facility] Draw.  Procedure                               Abnormality         Status                     ---------                               -----------         ------                     Green Top (Gel)[[id number]]                                  Final result               Lavender Top[[id number]]                                     Final result               Gold Top - SST[[id number]]                                   Final result               Goetz Top[[id number]]                                         Final result               Light Blue Top[[id number]]                                   Final result                 Please view results for these tests on the individual orders.   LACTIC ACID, REFLEX   URINALYSIS W/ CULTURE IF INDICATED   CBC AND DIFFERENTIAL    Narrative:     The following orders " were created for panel order CBC & Differential.  Procedure                               Abnormality         Status                     ---------                               -----------         ------                     CBC Auto Differential[[id number]]        Abnormal            Final result                 Please view results for these tests on the individual orders.   GREEN TOP   LAVENDER TOP   GOLD TOP - SST   GRAY TOP   LIGHT BLUE TOP       Meds Given in ED:   Medications   sodium chloride 0.9 % flush 10 mL (has no administration in time range)   cefTRIAXone (ROCEPHIN) 1,000 mg in sodium chloride 0.9 % 100 mL IVPB (1,000 mg Intravenous New Bag 12/27/23 2372)   doxycycline (VIBRAMYCIN) 100 mg in sodium chloride 0.9 % 100 mL IVPB (has no administration in time range)   iopamidol (ISOVUE-370) 76 % injection 124 mL (124 mL Intravenous Given 12/26/23 9089)

## 2023-12-27 NOTE — ED PROVIDER NOTES
EMERGENCY DEPARTMENT ENCOUNTER    Pt Name: Omar Mendoza  MRN: [id number]  Pt :   1940  Room Number:    Date of encounter:  2023  PCP: Varun Pa MD  ED Provider: LIMA Yang    Historian: Patient    HPI:  Chief Complaint: Generalized weakness, bilateral lower extremity edema    Context: Omar Mendoza is a 83 y.o. female who presents to the ED accompanied by family with complaints of bilateral lower extremity edema and generalized weakness.  Family reports patient has history of bullous pemphigoid and has recent admission to [facility].  Patient was admitted to [facility] as a result of diabetic ketoacidosis secondary to steroid use for treatment of her bullous pemphigoid.  DKA resolved and patient was discharged to [facility] in [facility].  Per family they were extremely unhappy with the care provided at this facility stating that the only thing patient received was food and that there was no active care plan or rehab.  Family reports patient had a Schultz catheter in place and that several times family was concerned about a urinary tract infection and this was not addressed.  Prior to admission to the hospital in [place] it was noted by family that patient was active and ambulatory and able to complete her normal activities of daily living.  Because of the lack of care provided at [facility] in [place] family signed patient out today and she presents to the ER for further evaluation.  HPI     REVIEW OF SYSTEMS  A chief complaint appropriate review of systems was completed and is negative except as noted in the HPI.     PAST MEDICAL HISTORY  Past Medical History:   Diagnosis Date    CAD (coronary artery disease)     Dementia     Diabetes mellitus     Hyperlipemia     Hypertension        PAST SURGICAL HISTORY  History reviewed. No pertinent surgical history.    FAMILY HISTORY  History reviewed. No pertinent family history.    SOCIAL  HISTORY  Social History     Socioeconomic History    Marital status:    Tobacco Use    Smoking status: Never    Smokeless tobacco: Never   Substance and Sexual Activity    Alcohol use: Never       ALLERGIES  Patient has no known allergies.    PHYSICAL EXAM  Physical Exam  Vitals and nursing note reviewed.   Constitutional:       General: She is not in acute distress.     Appearance: Normal appearance. She is ill-appearing.   HENT:      Head: Normocephalic and atraumatic.      Nose: Nose normal.      Mouth/Throat:      Mouth: Mucous membranes are dry.   Eyes:      Extraocular Movements: Extraocular movements intact.   Cardiovascular:      Rate and Rhythm: Bradycardia present.      Pulses: Normal pulses.   Pulmonary:      Effort: Pulmonary effort is normal.   Abdominal:      General: There is no distension.      Palpations: Abdomen is soft.      Tenderness: There is no abdominal tenderness.   Musculoskeletal:         General: Normal range of motion.      Cervical back: Normal range of motion and neck supple.      Right lower leg: Edema present.      Left lower leg: Edema present.      Right ankle: Swelling present.      Left ankle: Swelling present.      Comments: There is significant bilateral lower extremity edema.  Additionally observed are differences in temperature on bilateral lower extremities with feet being cooler than patient legs as she moved proximally.  There is also discoloration present from below the calf to toes.   Skin:     General: Skin is warm and dry.      Capillary Refill: Capillary refill takes 2 to 3 seconds.   Neurological:      General: No focal deficit present.      Mental Status: She is alert.       LAB RESULTS  Results for orders placed or performed during the hospital encounter of 12/26/23   High Sensitivity Troponin T    Specimen: Blood   Result Value Ref Range    HS Troponin T 69 (C) <14 ng/L   Comprehensive Metabolic Panel    Specimen: Blood   Result Value Ref Range    Glucose  81 65 - 99 mg/dL    BUN 21 8 - 23 mg/dL    Creatinine 0.80 0.57 - 1.00 mg/dL    Sodium 143 136 - 145 mmol/L    Potassium 4.3 3.5 - 5.2 mmol/L    Chloride 109 (H) 98 - 107 mmol/L    CO2 26.0 22.0 - 29.0 mmol/L    Calcium 7.9 (L) 8.6 - 10.5 mg/dL    Total Protein 4.9 (L) 6.0 - 8.5 g/dL    Albumin 2.6 (L) 3.5 - 5.2 g/dL    ALT (SGPT) 31 1 - 33 U/L    AST (SGOT) 36 (H) 1 - 32 U/L    Alkaline Phosphatase 174 (H) 39 - 117 U/L    Total Bilirubin 0.7 0.0 - 1.2 mg/dL    Globulin 2.3 gm/dL    A/G Ratio 1.1 g/dL    BUN/Creatinine Ratio 26.3 (H) 7.0 - 25.0    Anion Gap 8.0 5.0 - 15.0 mmol/L    eGFR 74.1 >60.0 mL/min/1.73   Lipase    Specimen: Blood   Result Value Ref Range    Lipase 119 (H) 13 - 60 U/L   BNP    Specimen: Blood   Result Value Ref Range    proBNP 1,494.0 0.0 - 1,800.0 pg/mL   CBC Auto Differential    Specimen: Blood   Result Value Ref Range    WBC 7.78 3.40 - 10.80 10*3/mm3    RBC 3.30 (L) 3.77 - 5.28 10*6/mm3    Hemoglobin 10.3 (L) 12.0 - 15.9 g/dL    Hematocrit 31.3 (L) 34.0 - 46.6 %    MCV 94.8 79.0 - 97.0 fL    MCH 31.2 26.6 - 33.0 pg    MCHC 32.9 31.5 - 35.7 g/dL    RDW 18.1 (H) 12.3 - 15.4 %    RDW-SD 60.8 (H) 37.0 - 54.0 fl    MPV 9.4 6.0 - 12.0 fL    Platelets 159 140 - 450 10*3/mm3    Neutrophil % 79.9 (H) 42.7 - 76.0 %    Lymphocyte % 11.8 (L) 19.6 - 45.3 %    Monocyte % 4.4 (L) 5.0 - 12.0 %    Eosinophil % 2.7 0.3 - 6.2 %    Basophil % 0.3 0.0 - 1.5 %    Immature Grans % 0.9 (H) 0.0 - 0.5 %    Neutrophils, Absolute 6.22 1.70 - 7.00 10*3/mm3    Lymphocytes, Absolute 0.92 0.70 - 3.10 10*3/mm3    Monocytes, Absolute 0.34 0.10 - 0.90 10*3/mm3    Eosinophils, Absolute 0.21 0.00 - 0.40 10*3/mm3    Basophils, Absolute 0.02 0.00 - 0.20 10*3/mm3    Immature Grans, Absolute 0.07 (H) 0.00 - 0.05 10*3/mm3    nRBC 0.8 (H) 0.0 - 0.2 /100 WBC   High Sensitivity Troponin T 2Hr    Specimen: Blood   Result Value Ref Range    HS Troponin T 67 (C) <14 ng/L    Troponin T Delta -2 >=-4 - <+4 ng/L   Urinalysis With  Microscopic If Indicated (No Culture) - Urine, Catheter    Specimen: Urine, Catheter   Result Value Ref Range    Color, UA Orange (A) Yellow, Straw    Appearance, UA Cloudy (A) Clear    pH, UA 5.5 5.0 - 8.0    Specific Gravity, UA >=1.030 1.001 - 1.030    Glucose,  mg/dL (Trace) (A) Negative    Ketones, UA Negative Negative    Bilirubin, UA Negative Negative    Blood, UA Large (3+) (A) Negative    Protein,  mg/dL (2+) (A) Negative    Leuk Esterase, UA Moderate (2+) (A) Negative    Nitrite, UA Negative Negative    Urobilinogen, UA 1.0 E.U./dL 0.2 - 1.0 E.U./dL   Procalcitonin    Specimen: Blood   Result Value Ref Range    Procalcitonin 0.10 0.00 - 0.25 ng/mL   Lactic Acid, Plasma    Specimen: Blood   Result Value Ref Range    Lactate 2.1 (C) 0.5 - 2.0 mmol/L   Urinalysis, Microscopic Only - Urine, Catheter    Specimen: Urine, Catheter   Result Value Ref Range    RBC, UA Too Numerous to Count (A) None Seen, 0-2 /HPF    WBC, UA Too Numerous to Count (A) None Seen, 0-2 /HPF    Bacteria, UA None Seen None Seen, Trace /HPF    Squamous Epithelial Cells, UA 0-2 None Seen, 0-2 /HPF    Yeast, UA Moderate/2+ Yeast None Seen /HPF    Hyaline Casts, UA 7-12 0 - 6 /LPF    Methodology Manual Light Microscopy    Lactic Acid, Plasma    Specimen: Blood   Result Value Ref Range    Lactate 1.4 0.5 - 2.0 mmol/L   Sedimentation Rate    Specimen: Blood   Result Value Ref Range    Sed Rate 17 0 - 30 mm/hr   C-reactive Protein    Specimen: Blood   Result Value Ref Range    C-Reactive Protein 1.87 (H) 0.00 - 0.50 mg/dL   ECG 12 Lead ED Triage Standing Order; Chest Pain   Result Value Ref Range    QT Interval 502 ms    QTC Interval 462 ms   Green Top (Gel)   Result Value Ref Range    Extra Tube Hold for add-ons.    Lavender Top   Result Value Ref Range    Extra Tube hold for add-on    Gold Top - SST   Result Value Ref Range    Extra Tube Hold for add-ons.    Gray Top   Result Value Ref Range    Extra Tube Hold for add-ons.    Light  Blue Top   Result Value Ref Range    Extra Tube Hold for add-ons.        If labs were ordered, I independently reviewed the results and considered them in treating the patient.    RADIOLOGY  CT Angio Abdominal Aorta Bilateral Iliofem Runoff   Final Result   Impression:   No acute abnormality. No evidence of arterial occlusion or significant stenosis. There is delayed runoff on the left perhaps related to venous stasis changes. Please see above discussion for other findings. There is diffuse anasarca and lower extremity    edema.            Electronically Signed: Oni Quesada MD     12/27/2023 1:18 AM EST     Workstation ID: [id number]      XR Chest 1 View   Final Result   Impression:   Bibasilar airspace disease left greater than right which may relate to atelectasis and/or pneumonia with small left pleural effusion.            Electronically Signed: Cyril Reina MD     12/26/2023 9:38 PM EST     Workstation ID: [id number]        [x] Radiologist's Report Reviewed:  I ordered and independently interpreted the above noted radiographic studies.  See radiologist's dictation for official interpretation.      PROCEDURES    Procedures    ECG 12 Lead ED Triage Standing Order; Chest Pain   Preliminary Result   Test Reason : ED Triage Standing Order~   Blood Pressure :   */*   mmHG   Vent. Rate :  51 BPM     Atrial Rate :  51 BPM      P-R Int : 136 ms          QRS Dur : 142 ms       QT Int : 502 ms       P-R-T Axes :  16 -14  -3 degrees      QTc Int : 462 ms      Sinus bradycardia   Left bundle branch block   Abnormal ECG   No previous ECGs available      Referred By: EDMD           Confirmed By:       ECG 12 Lead ED Triage Standing Order; Chest Pain    (Results Pending)       MEDICATIONS GIVEN IN ER    Medications   sodium chloride 0.9 % flush 10 mL (has no administration in time range)   cefTRIAXone (ROCEPHIN) 1,000 mg in sodium chloride 0.9 % 100 mL IVPB (0 mg Intravenous Stopped 12/27/23 9997)   doxycycline (VIBRAMYCIN) 100  mg in sodium chloride 0.9 % 100 mL IVPB (has no administration in time range)   iopamidol (ISOVUE-370) 76 % injection 124 mL (124 mL Intravenous Given 12/26/23 6208)       MEDICAL DECISION MAKING, PROGRESS, and CONSULTS   Medical Decision Making  Problems Addressed:  Acute UTI: complicated acute illness or injury  Bullous pemphigoid: complicated acute illness or injury  Generalized weakness: complicated acute illness or injury    Amount and/or Complexity of Data Reviewed  Labs: ordered.  Radiology: ordered.  ECG/medicine tests: ordered.    Risk  Prescription drug management.  Decision regarding hospitalization.        All labs have been independently reviewed by me.  All radiology studies have been interpreted by me and the radiologist dictating the report.  All EKG's have been independently interpreted by me as well as and overseeing attending physician.    [] Discussed with radiology regarding test interpretation:    Discussion below represents my analysis of pertinent findings related to patient's condition, differential diagnosis, treatment plan and final disposition.    Differential diagnosis:  The differential diagnosis associated with the patient's presentation includes: Infection,  inflammation, ACS, sepsis, viral syndrome, electrolyte abnormality, venous insufficiency, arterial insufficiency, exacerbation of bullous pemphigoid    Additional sources  Discussed/ obtained information from independent historians:   [] Spouse  [] Parent  [x] Family member  [] Friend  [] EMS   [] Other:  External (non-ED) record review:   [] Inpatient record:   [x] Office record: Patient will be office visits with primary care demonstrating constipation, hypertension, hyperlipidemia and bullous pemphigoid   [] Outpatient record:   [] Prior Outpatient labs:   [] Prior Outpatient radiology:   [] Primary Care record:   [] Outside ED record:   [] Other:   Patient's care impacted by:   [x] Diabetes  [x] Hypertension  [x]  Hyperlipidemia  [] Hypothyroidism   [] Coronary Artery Disease   [] COPD   [] Cancer   [] Obesity  [] GERD   [] Tobacco Abuse   [] Substance Abuse    [] Anxiety   [] Depression   [x] Other: Bullous pemphigoid  Care significantly affected by Social Determinants of Health (housing and economic circumstances, unemployment)    [] Yes     [x] No   If yes, Patient's care significantly limited by  Social Determinants of Health including:   [] Inadequate housing   [] Low income   [] Alcoholism and drug addiction in family   [] Problems related to primary support group   [] Unemployment   [] Problems related to employment   [] Other Social Determinants of Health:     Shared decision making:  I reviewed workup performed in ED including labs and imaging. Based on findings, recommendation made for admission. Patient is agreeable to plan of care and hospital admission.      Orders placed during this visit:  Orders Placed This Encounter   Procedures    Blood Culture - Blood,    Blood Culture - Blood,    Urine Culture - Urine,    XR Chest 1 View    CT Angio Abdominal Aorta Bilateral Iliofem Runoff    [place] Draw    High Sensitivity Troponin T    Comprehensive Metabolic Panel    Lipase    BNP    CBC Auto Differential    High Sensitivity Troponin T 2Hr    Urinalysis With Microscopic If Indicated (No Culture) - Urine, Catheter    Procalcitonin    Lactic Acid, Plasma    Urinalysis, Microscopic Only - Urine, Clean Catch    STAT Lactic Acid, Reflex    Lactic Acid, Plasma    Sedimentation Rate    C-reactive Protein    Urinalysis With Culture If Indicated -    NPO Diet NPO Type: Strict NPO    Undress & Gown    Continuous Pulse Oximetry    Code Status and Medical Interventions:    Oxygen Therapy- Nasal Cannula; Titrate 1-6 LPM Per SpO2; 90 - 95%    ECG 12 Lead ED Triage Standing Order; Chest Pain    ECG 12 Lead ED Triage Standing Order; Chest Pain    Adult Transthoracic Echo Complete W/ Cont if Necessary Per Protocol    Insert Peripheral  IV    Initiate Observation Status    ED Bed Request    CBC & Differential    Green Top (Gel)    Lavender Top    Gold Top - SST    Gray Top    Light Blue Top     ED Course:    ED Course as of 12/27/23 0546   Wed Dec 27, 2023   0541 In summary this is a nontoxic ill-appearing 83-year-old female who presents to the ER from rehab after being treated for DKA.  Also with history of bullous pemphigoid with bilateral lower lower extremity edema, differences in color and temperature of lower extremities.  Patient has Schultz catheter though family was not totally sure why this was placed and this was not addressed during her stay in the hospital or in rehab.  She has not had a voiding trial.  Evidence of urinary tract infection on urinalysis.  Lactate within normal limits.  Sed rate within normal limits.  CRP elevated.  Patient with history of coronary artery disease with elevated troponin, no significant delta, no complaints of chest pain.  EKG without evidence of acute ischemic changes.  Chest x-ray personally interpreted myself with official radiology review demonstrated bibasilar airspace disease left greater than right which may relate to atelectasis and/or pneumonia with small left pleural effusion.  With presentation of patient's lower extremities CT angio of the abdominal aorta with bilateral iliofemoral runoff was completed that showed No acute abnormality. No evidence of arterial occlusion or significant stenosis. There is delayed runoff on the left perhaps related to venous stasis changes. There is diffuse anasarca and lower extremity   edema.  Workup reviewed with patient and family at bedside with recommendation made for admission to hospital medicine.  Hospital medicine agreeable to accept patient for admission. [JG]      ED Course User Index  [JG] Wilfredo Carl PA            DIAGNOSIS  Final diagnoses:   Generalized weakness   Acute UTI   Bullous pemphigoid   Venous insufficiency       DISPOSITION    ED  Disposition       ED Disposition   Decision to Admit    Condition   --    Comment   Level of Care: Telemetry [5]   Diagnosis: UTI (urinary tract infection) [482242]   Admitting Physician: MAGED GIANG [[id number]]   Attending Physician: MAGED GIANG [[id number]]   Bed Request Comments: tele                 Please note that portions of this document were completed with voice recognition software.        Wilfredo Carl PA  12/27/23 0546

## 2023-12-27 NOTE — PLAN OF CARE
Goal Outcome Evaluation:  Plan of Care Reviewed With: patient, family           Outcome Evaluation: Pt presents with decreased endurance, balance deficits, and generalized weakness leading to impairments in functional mobility below baseline. Req ModAx2 for steps to HOB w/ FWW. Pt will benefit from skilled IP PT to address impairments and return to PLOF. PT rec SNF at d/c for best functional outcome      Anticipated Discharge Disposition (PT): skilled nursing facility

## 2023-12-27 NOTE — CASE MANAGEMENT/SOCIAL WORK
Discharge Planning Assessment  [facility]     Patient Name: Omar Mendoza  MRN: [id number]  Today's Date: 12/27/2023    Admit Date: 12/26/2023    Plan: rehab   Discharge Needs Assessment       Row Name 12/27/23 0937       Living Environment    People in Home spouse    Name(s) of People in Home Mal    Current Living Arrangements home    Primary Care Provided by spouse/significant other;child(roshan)       Transition Planning    Patient/Family Anticipates Transition to inpatient rehabilitation facility       Discharge Needs Assessment    Equipment Currently Used at Home commode;shower chair    Concerns to be Addressed discharge planning;basic needs                   Discharge Plan       Row Name 12/27/23 0939       Plan    Plan rehab    Patient/Family in Agreement with Plan yes    Plan Comments I spoke with the son of this patient on the phone. He stated that this patient lives in [facility] with her . She needs assistance with all activities of daily living currently. She came from [facility] [place] rehab facility [facility]. She is essentially bedbound, per the son, for the last couple of weeks. PT and OT have been consulted. The son would like for her to go to a rehab facility upon discharge in the [facility]. She will need transport set up. Case management will continue to follow her plan of care and assist with any discharge planning needs.    Final Discharge Disposition Code 03 - skilled nursing facility (SNF)                  Continued Care and Services - Admitted Since 12/26/2023    Coordination has not been started for this encounter.       Expected Discharge Date and Time       Expected Discharge Date Expected Discharge Time    Dec 29, 2023            Demographic Summary       Row Name 12/27/23 0932       General Information    General Information Comments I confirmed that Varun Pa is Ms Mendoza's PCP and she has [employer] Medicare insurance                   Functional Status       Row Name  12/27/23 0934       Functional Status, IADL    Medications completely dependent    Meal Preparation completely dependent    Housekeeping completely dependent    Laundry completely dependent    Shopping completely dependent                   Psychosocial    No documentation.                  Abuse/Neglect    No documentation.                  Legal    No documentation.                  Substance Abuse    No documentation.                  Patient Forms    No documentation.                     Eleonora John RN

## 2023-12-27 NOTE — CONSULTS
Malnutrition Severity Assessment    Patient Name:  Omar Mendoza  YOB: 1940  MRN: [id number]  Admit Date:  12/26/2023    Patient meets criteria for : Severe Malnutrition    Comments:  Pt meets criteria for severe malnutrition in the context of acute illness indicated by po intake </=50% EEN x >/=5 days, moderate muscle wasting and subcutaneous fat loss.     Malnutrition Severity Assessment  Malnutrition Type: Acute Disease or Injury - Related Malnutrition  Malnutrition Type (last 8 hours)       Malnutrition Severity Assessment       Row Name 12/27/23 0807       Malnutrition Severity Assessment    Malnutrition Type Acute Disease or Injury - Related Malnutrition      Row Name 12/27/23 0807       Insufficient Energy Intake     Insufficient Energy Intake Findings Severe    Insufficient Energy Intake  < or equal to 50% of est. energy requirement for > or equal to 5d)      Row Name 12/27/23 0807       Muscle Loss    Loss of Muscle Mass Findings Moderate    Orthodox Region Moderate - slight depression    Clavicle Bone Region Moderate - some protrusion in females, visible in males    Acromion Bone Region Moderate - acromion may slightly protrude    Scapular Bone Region Moderate - mild depression, bones may show slightly    Dorsal Hand Region Moderate - slight depression    Posterior Calf Region Moderate - some roundness, slight firmness      Row Name 12/27/23 0807       Fat Loss    Subcutaneous Fat Loss Findings Moderate    Orbital Region  Moderate -  somewhat hollowness, slightly dark circles    Upper Arm Region Moderate - some fat tissue, not ample      Row Name 12/27/23 0807       Criteria Met (Must meet criteria for severity in at least 2 of these categories: M Wasting, Fat Loss, Fluid, Secondary Signs, Wt. Status, Intake)    Patient meets criteria for  Severe Malnutrition                    Electronically signed by:  Mallory Santoro, MS,RD,LD  12/27/23 08:08 EST

## 2023-12-27 NOTE — H&P
[facility] Medicine Services  HISTORY AND PHYSICAL    Patient Name: Omar Mendoza  : 1940  MRN: [id number]  Primary Care Physician: Varun Pa MD  Date of admission: 2023    Subjective   Subjective     Chief Complaint:  Generalized weakness, skin lesions    HPI:  Omar Mendoza is a 83 y.o. female with hx of CAD, HTN, HLD, T2DM, acute urinary retention (has loza in place), acute constipation (no BM since ), bullous pemphigoid (s/p dupixent 10/23, prednisone / doxycycline 2023), uterine prolapse, ongoing confusion - thought to have dementia but no formal dx and not on medications for dementia who presents to the ED for evaluation of generalized weakness and new bullous pemphigoid blister on right heel. Pt is unable to provide HPI d/t mental status changes / confusion. Daughter is present and assisting with HPI. She makes it known that she has been unhappy with the care provided at [facility] rehab and feels like her mother was neglected there.She brings her to [facility] ED for evaluation of the right heel lesion and altered mental status / generalized weakness.     Review of Systems   Unable to perform ROS: Mental status change        Personal History     Past Medical History:   Diagnosis Date    CAD (coronary artery disease)     Dementia     Diabetes mellitus     Hyperlipemia     Hypertension              History reviewed. No pertinent surgical history.    Family History:  family history is not on file.     Social History:  reports that she has never smoked. She has never used smokeless tobacco. She reports that she does not drink alcohol.  Social History     Social History Narrative    Not on file       Medications:  Insulin Syringe-Needle U-100, amLODIPine, amiodarone, aspirin, atorvastatin, insulin glargine, metoprolol tartrate, nystatin, spironolactone, and triamcinolone    No Known Allergies    Objective   Objective     Vital Signs:   Temp:  [98 °F (36.7 °C)] 98  °F (36.7 °C)  Heart Rate:  [48-53] 48  Resp:  [14-16] 14  BP: (101-120)/(58-71) 120/62    Physical Exam  Constitutional:       General: She is sleeping. She is not in acute distress.     Appearance: She is well-developed. She is ill-appearing. She is not toxic-appearing.   HENT:      Head: Normocephalic and atraumatic.      Nose: Nose normal.      Mouth/Throat:      Mouth: Mucous membranes are dry.      Pharynx: Oropharynx is clear.   Eyes:      Extraocular Movements: Extraocular movements intact.      Conjunctiva/sclera: Conjunctivae normal.      Pupils: Pupils are equal, round, and reactive to light.   Cardiovascular:      Rate and Rhythm: Regular rhythm. Bradycardia present.      Pulses: Normal pulses.      Heart sounds: Normal heart sounds. No murmur heard.  Pulmonary:      Effort: Pulmonary effort is normal.      Breath sounds: Normal breath sounds.   Abdominal:      General: Bowel sounds are normal. There is no distension.      Palpations: Abdomen is soft.      Tenderness: There is no abdominal tenderness. There is no guarding.   Musculoskeletal:         General: Normal range of motion.      Cervical back: Normal range of motion and neck supple.      Right lower le+ Pitting Edema present.      Left lower le+ Pitting Edema present.   Skin:     General: Skin is warm and dry.      Capillary Refill: Capillary refill takes less than 2 seconds.      Comments: See photos:   1. Right heel blister  2. Buttocks pressure ulcer   Neurological:      Mental Status: She is oriented to person, place, and time.      Cranial Nerves: No cranial nerve deficit.   Psychiatric:         Mood and Affect: Mood normal.         Behavior: Behavior normal. Behavior is cooperative.          Right heel      2. Buttocks    Verbal permission to take and place photos in chart obtained from pt daughter who is at bedside.    Result Review:  I have personally reviewed the results from the time of this admission to 2023 04:57 EST and  agree with these findings:  [x]  Laboratory list / accordion  []  Microbiology  [x]  Radiology  [x]  EKG/Telemetry   []  Cardiology/Vascular   []  Pathology  []  Old records  []  Other:  Most notable findings include:     LAB RESULTS:      Lab 12/27/23  0431 12/26/23 2336 12/26/23 2131   WBC  --   --  7.78   HEMOGLOBIN  --   --  10.3*   HEMATOCRIT  --   --  31.3*   PLATELETS  --   --  159   NEUTROS ABS  --   --  6.22   IMMATURE GRANS (ABS)  --   --  0.07*   LYMPHS ABS  --   --  0.92   MONOS ABS  --   --  0.34   EOS ABS  --   --  0.21   MCV  --   --  94.8   CRP  --  1.87*  --    PROCALCITONIN  --   --  0.10   LACTATE 1.4  --  2.1*         Lab 12/26/23 2131   SODIUM 143   POTASSIUM 4.3   CHLORIDE 109*   CO2 26.0   ANION GAP 8.0   BUN 21   CREATININE 0.80   EGFR 74.1   GLUCOSE 81   CALCIUM 7.9*         Lab 12/26/23 2131   TOTAL PROTEIN 4.9*   ALBUMIN 2.6*   GLOBULIN 2.3   ALT (SGPT) 31   AST (SGOT) 36*   BILIRUBIN 0.7   ALK PHOS 174*   LIPASE 119*         Lab 12/26/23 2336 12/26/23 2131   PROBNP  --  1,494.0   HSTROP T 67* 69*                 Brief Urine Lab Results  (Last result in the past 365 days)        Color   Clarity   Blood   Leuk Est   Nitrite   Protein   CREAT   Urine HCG        12/27/23 0040 Orange   Cloudy   Large (3+)   Moderate (2+)   Negative   100 mg/dL (2+)                 Microbiology Results (last 10 days)       ** No results found for the last 240 hours. **            CT Angio Abdominal Aorta Bilateral Iliofem Runoff    Result Date: 12/27/2023  CT ANGIO ABDOMINAL AORTA BILAT ILIOFEM RUNOFF Date of Exam: 12/26/2023 11:36 PM EST Indication: Rash, temperature difference in lower extremities, swelling, include phase as well. Comparison: None available. Technique: CTA of the abdomen, pelvis and both lower extremities was performed after the uneventful intravenous administration of 125 mL Isovue-370. Reconstructed coronal and sagittal images were also obtained. In addition, a 3-D volume rendered  image was created for interpretation. Automated exposure control and iterative reconstruction methods were used. Findings: Non--- vascular: There is a small right pleural effusion. There is bilateral basilar atelectasis. There is cholelithiasis. The arterial enhanced images of the liver, right adrenal gland, bilateral kidneys, pancreas and spleen are normal. There is adrenal adenoma on the left. There is a moderate stool burden with a moderate amount of stool in the rectosigmoid possibly from fecal impaction. There is anasarca. Vascular: There is tortuosity of the thoracic aorta. There is no aneurysmal dilatation or aortic stenosis. The bilateral common iliac, internal and external neck arteries are widely patent. There is a normal right-sided runoff with three-vessel to the ankle. On the left side, there is delayed runoff but the delayed images demonstrate a normal three-vessel runoff to the left ankle despite the slight delay which is of uncertain etiology. Venous stasis would be in the differential for the sluggish flow on the left. There is diffuse lower extremity edema.     Impression: Impression: No acute abnormality. No evidence of arterial occlusion or significant stenosis. There is delayed runoff on the left perhaps related to venous stasis changes. Please see above discussion for other findings. There is diffuse anasarca and lower extremity edema. Electronically Signed: Oni Quesada MD  12/27/2023 1:18 AM EST  Workstation ID: [id number]    XR Chest 1 View    Result Date: 12/26/2023  XR CHEST 1 VW Date of Exam: 12/26/2023 9:10 PM EST Indication: Chest Pain Triage Protocol Comparison: None available. Findings: Patient is rotated to the left. There is bibasilar airspace disease left greater than right with small left pleural effusion. Negative for pneumothorax. Heart size within normal limits for technique. Osseous structures grossly intact.     Impression: Impression: Bibasilar airspace disease left greater  than right which may relate to atelectasis and/or pneumonia with small left pleural effusion. Electronically Signed: Cyril Reina MD  12/26/2023 9:38 PM EST  Workstation ID: [id number]         Assessment & Plan   Assessment & Plan       UTI (urinary tract infection)    Bullous pemphigoid    Essential hypertension    Dyslipidemia    Edema of lower extremity    Acute constipation    Acute urinary retention    Uterine prolapse    Bradycardia    Pressure injury of buttock, unstageable    Type 2 diabetes mellitus with ketoacidosis, without long-term current use of insulin    Disorientation    Pleural effusion    UTI  - start rocephin  - blood cultures x 2  - lactic acid 2.1, repeat pending  - add urine culture   - hx of urinary retention w/ loza in place, d/c loza and bladder scan w/ acute urinary retention standing orders    BLE edema  Decreased pulses LLE  - CTA abd showing small right pleural effusion, bilateral basilar atelectasis  - CTA w/ runoff showing normal right sided runoff; left side delayed runoff possibly r/t venous stasis changes, no evidence of arterial occlusion or significant stenosis  - LLE cool to touch compared to RLE, decreased pulses  - neurovascular checks  - ECHO in am    L pleural effusion  - chest xray w/ bibasilar airspace dz L>R which may r/t atelectasis and or pneumonia w/ small L pleural effusion  - pt receiving rocephin (UTI) and doxycycline (Bullous pemphigoid) which would cover concern for pneumonia  - blood cx's pending  - add mrsa pcr nares  - add s.pneumo and legionella urinary ag  - pulmonary toilet / IS  - procal, esr, crp pending; initial lactic 2.1 w/ repeat 1.4  -- crp 1.87, procal 0.10    Bullous pemphigoid  - s/p dupixent injection 10/2023  - s/p skin bx by dermatology and doxycycline/prednisone  - prednisone stopped d/t elevated glucose, dtr reports some confusion between hospital d/c and admission to rehab (steroids weaned and stopped in hospital then restarted on admission  to rehab)  - has new right heel blister that came up 12/26  - per up to date recommendations, start doxycycline 100 mg BID, hold on steroids for now d/t concern for hyperglycemia     Pressure ulcer, buttocks  - daughter thought this was r/t the bullous pemphigoid, looks more like pressure ulcer  - [facility] consult for evaluation    Disorientation  - daughter concern for altered mental status, has been gradual progression and worse recently  - concern for dementia, has not had neuro evaluation d/t difficulty getting her to appointments w/ recent acute illnesses  - consider neuro evaluation inpatient vs outpatient if mental status not improved  - neuro checks  - check tsh, b12, folate, vitamin d  - PT/OT consult, pt has not been mobile since prior to admission to [facility] 12/14    Acute constipation / urinary retention  - daughter reporting patient has not had BM since d/c from [facility] on 12/19, she was given something to help her use the bathroom prior to d/c but has not gone since  - CTA abd in the ED tonight showing there is a moderate stool burden w/ moderate amt of stool in the rectosigmoid possibly fecal impaction  - soap suds enema x1  - start bowel regimen  - urinary retention likely r/t constipation / uterine prolapse    Uterine prolapse  - significant visible prolapse, daughter asking what can be done for this, possible pessary?  - consider gynecological consult inpatient vs outpatient    Bradycardia  - pt on both amiodarone and metoprolol, dtr reports amiodarone is new since hospitalization at [facility], says pt was started on IV drip for her heart rate, unclear if she had a-fib   - obtain records from [facility]  - currently HR 49, sinus viral  - hold amiodarone  - decrease metoprolol to 50 mg BID w/ hold parameters    T2DM  - has been on levemir since admission to [facility] initially for DKA dtr thinks d/t steroid tx  - hold levemir as pt sleeping, not taking in good PO intake  - fsbg  w/ moderate dose ssi  - check A1c  - may need to add levemir or increase coverage based on glucose w/ if prednisone needed    Anemia  - anemia studies  - occult stool  - compared to labs in EMR, on 12/22/23 H/H 10.4/22.5, platelets 177    L adrenal adenoma  - incidental finding on CTA abdomen       DVT prophylaxis:  Heparin SC BID    CODE STATUS:    Code Status (Patient has no pulse and is not breathing): CPR (Attempt to Resuscitate)  Medical Interventions (Patient has pulse or is breathing): Full Support      Expected Discharge    Expected discharge date/ time has not been documented.      This note has been completed as part of a split-shared workflow.     Signature: Electronically signed by LITA Rivas, 12/27/23, 4:37 AM EST    Total time: 80 minutes        Attending   Admission Attestation       I have performed an independent face-to-face diagnostic evaluation including performing an independent physical examination.  The documented plan of care above was reviewed and developed with the advanced practice clinician (APC).  I have updated the HPI as appropriate.    Brief HPI    This is an 83-year-old female patient with a PMH significant for bullous pemphigoid, CAD, HTN, HLD, diabetes mellitus type 2, acute urinary retention with indwelling Schultz catheter, constipation, uterine prolapse who comes to the ED due to weakness.  Family at bedside provides HPI.  Patient was discharged from [facility] around 2 weeks ago she was treated for steroid induced DKA.  She has been at rehab.  Family was concerned that rehab was not getting therapies and was having progressive decline.  They got the patient from rehab today and brought her to the ED.    Attending Physical Exam:  Temp:  [98 °F (36.7 °C)] 98 °F (36.7 °C)  Heart Rate:  [46-53] 46  Resp:  [1-16] 1  BP: (101-120)/(58-79) 119/69    Constitutional: Asleep, arousable  Eyes: Eyes closed  HENT: NCAT, mucous membranes moist  Neck: Supple, no thyromegaly,  no lymphadenopathy, trachea midline  Respiratory: Clear to auscultation bilaterally, nonlabored respirations   Cardiovascular: RRR, no murmurs, rubs, or gallops, palpable pedal pulses bilaterally  Gastrointestinal: Positive bowel sounds, soft, nontender, nondistended  Musculoskeletal: 3+ pitting bilateral ankle edema, no clubbing or cyanosis to extremities  Psychiatric: Sleepy  Neurologic: Not oriented to person place or time, speech minimal  Skin: Bullous lesions to right foot pictured above, decubitus skin breakdown pictured above      Assessment and Plan:    See assessment and plan documented by APC above and updated/edited by me as appropriate.    Julee Rogers, DO  12/27/23

## 2023-12-27 NOTE — THERAPY EVALUATION
Patient Name: Omar Mendoza  : 1940    MRN: [id number]                              Today's Date: 2023       Admit Date: 2023    Visit Dx:     ICD-10-CM ICD-9-CM   1. Generalized weakness  R53.1 780.79   2. Acute UTI  N39.0 599.0   3. Bullous pemphigoid  L12.0 694.5   4. Venous insufficiency  I87.2 459.81     Patient Active Problem List   Diagnosis    UTI (urinary tract infection)    Bullous pemphigoid    Essential hypertension    Dyslipidemia    Edema of lower extremity    Cholestatic hepatitis    Acute constipation    Acute urinary retention    Uterine prolapse    Bradycardia    Pressure injury of buttock, unstageable    Type 2 diabetes mellitus with ketoacidosis, without long-term current use of insulin    Disorientation    Pleural effusion    Severe malnutrition     Past Medical History:   Diagnosis Date    CAD (coronary artery disease)     Dementia     Diabetes mellitus     Hyperlipemia     Hypertension      History reviewed. No pertinent surgical history.   General Information       Row Name 23 1044          OT Time and Intention    Document Type evaluation  -AC     Mode of Treatment occupational therapy  -       Row Name 23 1044          General Information    Patient Profile Reviewed yes  -AC     Prior Level of Function independent:;all household mobility;ADL's;driving  ind prior to Nov, used SPC to ambulate  -     Existing Precautions/Restrictions fall  confused  -     Barriers to Rehab medically complex  -       Row Name 23 1044          Occupational Profile    Environmental Supports and Barriers (Occupational Profile) SPC, RW, w/c, tub shower with shower seat  -       Row Name 23 1044          Living Environment    People in Home spouse  -       Row Name 23 1044          Home Main Entrance    Number of Stairs, Main Entrance other (see comments);one  entry ramp ordered  -       Row Name 23 1044          Stairs Within Home, Primary     Stairs, Within Home, Primary basement - does not need to access  -     Number of Stairs, Within Home, Primary twelve  -AC     Stair Railings, Within Home, Primary railing on right side (ascending)  -       Row Name 12/27/23 1044          Cognition    Orientation Status (Cognition) oriented to;person;verbal cues/prompts needed for orientation;time;disoriented to;place  -       Row Name 12/27/23 1044          Safety Issues, Functional Mobility    Safety Issues Affecting Function (Mobility) ability to follow commands;at risk behavior observed;awareness of need for assistance;insight into deficits/self-awareness;judgment;positioning of assistive device;problem-solving;safety precautions follow-through/compliance;sequencing abilities  -     Impairments Affecting Function (Mobility) balance;cognition;endurance/activity tolerance;postural/trunk control;strength  -     Cognitive Impairments, Mobility Safety/Performance attention;awareness, need for assistance;insight into deficits/self-awareness;judgment;problem-solving/reasoning;safety precaution awareness;safety precaution follow-through;sequencing abilities  -               User Key  (r) = Recorded By, (t) = Taken By, (c) = Cosigned By      Initials Name Provider Type     Sepideh Feldman OT Occupational Therapist                     Mobility/ADL's       Row Name 12/27/23 1124          Bed Mobility    Bed Mobility supine-sit;sit-supine  -     Supine-Sit Cobb (Bed Mobility) verbal cues;nonverbal cues (demo/gesture);maximum assist (25% patient effort);2 person assist  -     Sit-Supine Cobb (Bed Mobility) verbal cues;nonverbal cues (demo/gesture);maximum assist (25% patient effort);2 person assist  -AC     Bed Mobility, Safety Issues decreased use of arms for pushing/pulling;decreased use of legs for bridging/pushing;impaired trunk control for bed mobility  -     Assistive Device (Bed Mobility) bed rails;draw sheet;head of bed elevated   -       Row Name 12/27/23 1124          Transfers    Transfers sit-stand transfer  -       Row Name 12/27/23 1124          Sit-Stand Transfer    Sit-Stand Barry (Transfers) maximum assist (25% patient effort);2 person assist  -     Assistive Device (Sit-Stand Transfers) walker, front-wheeled  -AC     Comment, (Sit-Stand Transfer) VCs for hand placement, upright posture - unable to achieve full upright posture  -       Row Name 12/27/23 1124          Functional Mobility    Functional Mobility- Ind. Level verbal cues required;nonverbal cues required (demo/gesture);moderate assist (50% patient effort);2 person assist required  -     Functional Mobility- Device walker, front-wheeled  -AC     Functional Mobility-Distance (Feet) 2  -     Functional Mobility- Safety Issues step length decreased;weight-shifting ability decreased  -     Functional Mobility- Comment pt unable to pick feet up - scooted feet across the floor, increased time, effort and encouragement  -       Row Name 12/27/23 1124          Activities of Daily Living    BADL Assessment/Intervention lower body dressing  -       Row Name 12/27/23 1124          Lower Body Dressing Assessment/Training    Barry Level (Lower Body Dressing) doff;don;socks;dependent (less than 25% patient effort)  -     Position (Lower Body Dressing) supine  -               User Key  (r) = Recorded By, (t) = Taken By, (c) = Cosigned By      Initials Name Provider Type     Sepideh Feldman, OT Occupational Therapist                   Obj/Interventions       Row Name 12/27/23 1127          Sensory Assessment (Somatosensory)    Sensory Assessment (Somatosensory) unable/difficult to assess  -[facility] Name 12/27/23 1127          Vision Assessment/Intervention    Visual Impairment/Limitations corrective lenses full-time  able to id # fingers, pt lethargic and eyes closed at times  -       Row Name 12/27/23 1127          Range of Motion Comprehensive     General Range of Motion bilateral upper extremity ROM WNL  -       Row Name 12/27/23 1127          Strength Comprehensive (MMT)    Comment, General Manual Muscle Testing (MMT) Assessment BUE grossly 3+/5  -AC               User Key  (r) = Recorded By, (t) = Taken By, (c) = Cosigned By      Initials Name Provider Type    AC Sepideh Feldman, OT Occupational Therapist                   Goals/Plan       Row Name 12/27/23 1133          Bed Mobility Goal 1 (OT)    Activity/Assistive Device (Bed Mobility Goal 1, OT) supine to sit;sit to supine  -AC     Gregg Level/Cues Needed (Bed Mobility Goal 1, OT) verbal cues required;nonverbal cues (demo/gesture) required;moderate assist (50-74% patient effort)  -AC     Time Frame (Bed Mobility Goal 1, OT) by discharge  -AC     Progress/Outcomes (Bed Mobility Goal 1, OT) new goal;goal ongoing  -       Row Name 12/27/23 1133          Transfer Goal 1 (OT)    Activity/Assistive Device (Transfer Goal 1, OT) bed-to-chair/chair-to-bed;toilet;walker, rolling  -AC     Gregg Level/Cues Needed (Transfer Goal 1, OT) verbal cues required;nonverbal cues (demo/gesture) required;moderate assist (50-74% patient effort)  -AC     Time Frame (Transfer Goal 1, OT) by discharge  -AC     Progress/Outcome (Transfer Goal 1, OT) new goal;goal ongoing  -       Row Name 12/27/23 1133          Grooming Goal 1 (OT)    Activity/Device (Grooming Goal 1, OT) oral care;wash face, hands  -AC     Gregg (Grooming Goal 1, OT) verbal cues required;nonverbal cues (demo/gesture) required;minimum assist (75% or more patient effort)  -AC     Time Frame (Grooming Goal 1, OT) by discharge  -AC     Progress/Outcome (Grooming Goal 1, OT) new goal;goal ongoing  -       Row Name 12/27/23 1133          Self-Feeding Goal 1 (OT)    Activity/Device (Self-Feeding Goal 1, OT) self-feeding skills, all  -AC     Gregg Level/Cues Needed (Self-Feeding Goal 1, OT) verbal cues required;minimum assist (75%  or more patient effort)  -     Time Frame (Self-Feeding Goal 1, OT) by discharge  -     Progress/Outcomes (Self-Feeding Goal 1, OT) new goal;goal ongoing  -       Row Name 12/27/23 1133          Therapy Assessment/Plan (OT)    Planned Therapy Interventions (OT) activity tolerance training;BADL retraining;functional balance retraining;occupation/activity based interventions;patient/caregiver education/training;strengthening exercise;transfer/mobility retraining  -               User Key  (r) = Recorded By, (t) = Taken By, (c) = Cosigned By      Initials Name Provider Type    AC Sepideh Feldman, AR[place]OLD Occupational Therapist                   Clinical Impression       Row Name 12/27/23 1044          Pain Assessment    Pretreatment Pain Rating 0/10 - no pain  -     Posttreatment Pain Rating 0/10 - no pain  -       Row Name 12/27/23 1044          Plan of Care Review    Plan of Care Reviewed With patient;family  -     Outcome Evaluation Pt presents below baseline with self care/mobility d/t lethargy, decr command following, weakness, decr balance and activity tolerance.  Pt dep LBD, max/dep x2  bed mobility,  mod A x 2 STS  with RW with difficulty achieving full upright position.  OT will follow to advance pt toward PLOF.  Recommend SNF upon d/c.  -       Row Name 12/27/23 1044          Therapy Assessment/Plan (OT)    Rehab Potential (OT) good, to achieve stated therapy goals  -     Criteria for Skilled Therapeutic Interventions Met (OT) yes;skilled treatment is necessary  -     Therapy Frequency (OT) daily  -       Row Name 12/27/23 1044          Therapy Plan Review/Discharge Plan (OT)    Anticipated Discharge Disposition (OT) skilled nursing facility  -       Row Name 12/27/23 1044          Vital Signs    Pre Systolic BP Rehab 109  -AC     Pre Treatment Diastolic BP 63  -AC     Pretreatment Heart Rate (beats/min) 50  -AC     Posttreatment Heart Rate (beats/min) 52  -AC     Pre SpO2 (%) 96  -AC     O2  Delivery Pre Treatment room air  -AC     O2 Delivery Intra Treatment room air  -AC     Post SpO2 (%) 99  -AC     O2 Delivery Post Treatment room air  -AC     Pre Patient Position Supine  -AC     Post Patient Position Supine  -AC       Row Name 12/27/23 1044          Positioning and Restraints    Pre-Treatment Position in bed  -AC     Post Treatment Position bed  -AC     In Bed notified nsg;supine;call light within reach;encouraged to call for assist;exit alarm on;with family/caregiver;waffle boots/both  -AC               User Key  (r) = Recorded By, (t) = Taken By, (c) = Cosigned By      Initials Name Provider Type    AC Sepideh Feldman, OT Occupational Therapist                   Outcome Measures       Row Name 12/27/23 1134          How much help from another is currently needed...    Putting on and taking off regular lower body clothing? 1  -AC     Bathing (including washing, rinsing, and drying) 2  -AC     Putting on and taking off regular upper body clothing 2  -AC     Taking care of personal grooming (such as brushing teeth) 2  -AC     Eating meals 2  -AC       Row Name 12/27/23 1139 12/27/23 0609       How much help from another person do you currently need...    Turning from your back to your side while in flat bed without using bedrails? 2  -KE 1  -SB    Moving from lying on back to sitting on the side of a flat bed without bedrails? 1  -KE 1  -SB    Moving to and from a bed to a chair (including a wheelchair)? 1  -KE 1  -SB    Standing up from a chair using your arms (e.g., wheelchair, bedside chair)? 1  -KE 1  -SB    Climbing 3-5 steps with a railing? 1  -KE 1  -SB    To walk in hospital room? 1  -KE 1  -SB    AM-PAC 6 Clicks Score (PT) 7  -KE 6  -SB    Highest Level of Mobility Goal 2 --> Bed activities/dependent transfer  -KE 2 --> Bed activities/dependent transfer  -SB      Row Name 12/27/23 1139 12/27/23 1134       Functional Assessment    Outcome Measure Options AM-PAC 6 Clicks Basic Mobility (PT)   -MIA AM-PAC 6 Clicks Daily Activity (OT)  -              User Key  (r) = Recorded By, (t) = Taken By, (c) = Cosigned By      Initials Name Provider Type     Sepideh Feldman OT Occupational Therapist    Joyce Soto, RN Registered Nurse    Petty Beatty, PT Physical Therapist                    Occupational Therapy Education       Title: PT OT SLP Therapies (In Progress)       Topic: Occupational Therapy (In Progress)       Point: ADL training (In Progress)       Description:   Instruct learner(s) on proper safety adaptation and remediation techniques during self care or transfers.   Instruct in proper use of assistive devices.                  Learning Progress Summary             Patient Acceptance, E, NR by  at 12/27/2023 4327                         Point: Home exercise program (Not Started)       Description:   Instruct learner(s) on appropriate technique for monitoring, assisting and/or progressing therapeutic exercises/activities.                  Learner Progress:  Not documented in this visit.              Point: Precautions (Not Started)       Description:   Instruct learner(s) on prescribed precautions during self-care and functional transfers.                  Learner Progress:  Not documented in this visit.              Point: Body mechanics (Not Started)       Description:   Instruct learner(s) on proper positioning and spine alignment during self-care, functional mobility activities and/or exercises.                  Learner Progress:  Not documented in this visit.                              User Key       Initials Effective Dates Name Provider Type Discipline     02/03/23 -  Sepideh Feldman OT Occupational Therapist OT                  OT Recommendation and Plan  Planned Therapy Interventions (OT): activity tolerance training, BADL retraining, functional balance retraining, occupation/activity based interventions, patient/caregiver education/training, strengthening exercise,  transfer/mobility retraining  Therapy Frequency (OT): daily  Plan of Care Review  Plan of Care Reviewed With: patient, family  Outcome Evaluation: Pt presents below baseline with self care/mobility d/t lethargy, decr command following, weakness, decr balance and activity tolerance.  Pt dep LBD, max/dep x2  bed mobility,  mod A x 2 STS  with RW with difficulty achieving full upright position.  OT will follow to advance pt toward PLOF.  Recommend SNF upon d/c.     Time Calculation:   Evaluation Complexity (OT)  Review Occupational Profile/Medical/Therapy History Complexity: expanded/moderate complexity  Assessment, Occupational Performance/Identification of Deficit Complexity: 3-5 performance deficits  Clinical Decision Making Complexity (OT): detailed assessment/moderate complexity  Overall Complexity of Evaluation (OT): moderate complexity     Time Calculation- OT       Row Name 12/27/23 1044             Time Calculation- OT    OT Start Time 1044  -AC      OT Received On 12/27/23  -AC      OT Goal Re-Cert Due Date 01/06/24  -AC         Untimed Charges    OT Eval/Re-eval Minutes 50  -AC         Total Minutes    Untimed Charges Total Minutes 50  -AC       Total Minutes 50  -AC                User Key  (r) = Recorded By, (t) = Taken By, (c) = Cosigned By      Initials Name Provider Type    AC Sepideh Feldman, OT Occupational Therapist                  Therapy Charges for Today       Code Description Service Date Service Provider Modifiers Qty    87174880071 HC OT EVAL MOD COMPLEXITY 4 12/27/2023 Sepideh Feldman OT GO 1                 Sepideh Feldman OT  12/27/2023

## 2023-12-27 NOTE — CONSULTS
Clinical Nutrition   Nutrition Support Assessment  Reason for Visit: Consult/MSA      Patient Name: Omar Mendoza  YOB: 1940  MRN: [id number]  Date of Encounter: 12/27/23 08:01 EST  Admission date: 12/26/2023    Comments:  Pt meets criteria for severe malnutrition in the context of acute illness indicated by po intake </=50% EEN x >/=5 days, moderate muscle wasting and subcutaneous fat loss.     Ordered Boost GC BID; encourage intake and assist w/feeding if needed.     Nutrition Assessment   Admission Diagnosis:  UTI (urinary tract infection) [N39.0]      Problem List:    UTI (urinary tract infection)    Bullous pemphigoid    Essential hypertension    Dyslipidemia    Edema of lower extremity    Acute constipation    Acute urinary retention    Uterine prolapse    Bradycardia    Pressure injury of buttock, unstageable    Type 2 diabetes mellitus with ketoacidosis, without long-term current use of insulin    Disorientation    Pleural effusion        PMH:   She  has a past medical history of CAD (coronary artery disease), Dementia, Diabetes mellitus, Hyperlipemia, and Hypertension.    PSH:  She  has no past surgical history on file.    Applicable Nutrition Concerns:   Skin:  Oral:  GI:    Applicable Interval History:         Reported/Observed/Food/Nutrition Related History:     Pt unable to contribute to nutrition hx at time of visit, able to obtain from pt's dtr at bedside. Pt has had <25% of usual intake x 7 days while at rehab-states she was served several starchy items that she was not able to eat 2/2 T2DM (w/hx of DKA per dtr). Dtr states suspects pt has overall decline in po intake since 12/8, states normally pt is independent and does her own cooking but has not recently. Pt PAX=[id number], dtr suspects current wt in the 160s, will need measured wt to verify. Pt does not like Premier Protein but may be willing to try Boost, will send. No difficulty chewing/swallowing, pt feeds self and  "JESSEFA.     Anthropometrics     Flowsheet Rows      Flowsheet Row First Filed Value   Admission Height 170.2 cm (67\") Documented at 12/26/2023 2059   Admission Weight 81.6 kg (180 lb) Documented at 12/26/2023 2059          Height: Height: 170.2 cm (67\")  Last Filed Weight: Weight: 81.6 kg (180 lb) (12/26/23 2059)  Method: Weight Method: Estimated  BMI: BMI (Calculated): 28.2  BMI classification: Overweight: 25.0-29.9kg/m2   IBW:  62kg    UBW:  180lbs per dtr report  Weight change:   limited wt data available    Nutrition Focused Physical Exam     Date:     12/27    Patient meets criteria for malnutrition diagnosis, see MSA note.    Current Nutrition Prescription   PO: Diet: Cardiac Diets, Diabetic Diets; Healthy Heart (2-3 Na+); Consistent Carbohydrate; Texture: Regular Texture (IDDSI 7); Fluid Consistency: Thin (IDDSI 0)  Oral Nutrition Supplement:   Intake: Insufficient data    Nutrition Diagnosis   Date:  12/27            Updated:    Problem Malnutrition acute severe   Etiology Clinical status, uncontrolled DM, recent rehab stay   Signs/Symptoms Po intake </=50% EEN x >/=5 days, moderate muscle wasting and subcutaneous fat loss.    Status:     Goal:   General: Nutrition to support treatment  PO: Establish PO, Increase intake  EN/PN: N/A    Nutrition Intervention      Follow treatment progress, Care plan reviewed, Encourage intake, Supplement provided    Boost GC (carla) BID w/meals    Monitoring/Evaluation:   Per protocol, PO intake, Supplement intake, Weight      Mallory Santoro, MS,RD,LD  Time Spent: 30  "

## 2023-12-28 LAB
ANION GAP SERPL CALCULATED.3IONS-SCNC: 7 MMOL/L (ref 5–15)
BACTERIA SPEC AEROBE CULT: ABNORMAL
BASOPHILS # BLD AUTO: 0.01 10*3/MM3 (ref 0–0.2)
BASOPHILS # BLD AUTO: 0.01 10*3/MM3 (ref 0–0.2)
BASOPHILS NFR BLD AUTO: 0.2 % (ref 0–1.5)
BASOPHILS NFR BLD AUTO: 0.2 % (ref 0–1.5)
BUN SERPL-MCNC: 17 MG/DL (ref 8–23)
BUN/CREAT SERPL: 28.8 (ref 7–25)
CALCIUM SPEC-SCNC: 7.6 MG/DL (ref 8.6–10.5)
CHLORIDE SERPL-SCNC: 111 MMOL/L (ref 98–107)
CO2 SERPL-SCNC: 25 MMOL/L (ref 22–29)
CREAT SERPL-MCNC: 0.59 MG/DL (ref 0.57–1)
DEPRECATED RDW RBC AUTO: 59.4 FL (ref 37–54)
DEPRECATED RDW RBC AUTO: 59.7 FL (ref 37–54)
EGFRCR SERPLBLD CKD-EPI 2021: 90.7 ML/MIN/1.73
EOSINOPHIL # BLD AUTO: 0.3 10*3/MM3 (ref 0–0.4)
EOSINOPHIL # BLD AUTO: 0.38 10*3/MM3 (ref 0–0.4)
EOSINOPHIL NFR BLD AUTO: 4.9 % (ref 0.3–6.2)
EOSINOPHIL NFR BLD AUTO: 6 % (ref 0.3–6.2)
ERYTHROCYTE [DISTWIDTH] IN BLOOD BY AUTOMATED COUNT: 18.1 % (ref 12.3–15.4)
ERYTHROCYTE [DISTWIDTH] IN BLOOD BY AUTOMATED COUNT: 18.2 % (ref 12.3–15.4)
GLUCOSE BLDC GLUCOMTR-MCNC: 167 MG/DL (ref 70–130)
GLUCOSE BLDC GLUCOMTR-MCNC: 184 MG/DL (ref 70–130)
GLUCOSE BLDC GLUCOMTR-MCNC: 218 MG/DL (ref 70–130)
GLUCOSE BLDC GLUCOMTR-MCNC: 64 MG/DL (ref 70–130)
GLUCOSE BLDC GLUCOMTR-MCNC: 65 MG/DL (ref 70–130)
GLUCOSE BLDC GLUCOMTR-MCNC: 74 MG/DL (ref 70–130)
GLUCOSE SERPL-MCNC: 173 MG/DL (ref 65–99)
HCT VFR BLD AUTO: 27.1 % (ref 34–46.6)
HCT VFR BLD AUTO: 27.6 % (ref 34–46.6)
HGB BLD-MCNC: 9 G/DL (ref 12–15.9)
HGB BLD-MCNC: 9.1 G/DL (ref 12–15.9)
IMM GRANULOCYTES # BLD AUTO: 0.03 10*3/MM3 (ref 0–0.05)
IMM GRANULOCYTES # BLD AUTO: 0.1 10*3/MM3 (ref 0–0.05)
IMM GRANULOCYTES NFR BLD AUTO: 0.5 % (ref 0–0.5)
IMM GRANULOCYTES NFR BLD AUTO: 1.6 % (ref 0–0.5)
LYMPHOCYTES # BLD AUTO: 0.5 10*3/MM3 (ref 0.7–3.1)
LYMPHOCYTES # BLD AUTO: 0.53 10*3/MM3 (ref 0.7–3.1)
LYMPHOCYTES NFR BLD AUTO: 7.9 % (ref 19.6–45.3)
LYMPHOCYTES NFR BLD AUTO: 8.6 % (ref 19.6–45.3)
MCH RBC QN AUTO: 30.8 PG (ref 26.6–33)
MCH RBC QN AUTO: 31.1 PG (ref 26.6–33)
MCHC RBC AUTO-ENTMCNC: 33 G/DL (ref 31.5–35.7)
MCHC RBC AUTO-ENTMCNC: 33.2 G/DL (ref 31.5–35.7)
MCV RBC AUTO: 92.8 FL (ref 79–97)
MCV RBC AUTO: 94.2 FL (ref 79–97)
MONOCYTES # BLD AUTO: 0.17 10*3/MM3 (ref 0.1–0.9)
MONOCYTES # BLD AUTO: 0.18 10*3/MM3 (ref 0.1–0.9)
MONOCYTES NFR BLD AUTO: 2.8 % (ref 5–12)
MONOCYTES NFR BLD AUTO: 2.9 % (ref 5–12)
NEUTROPHILS NFR BLD AUTO: 5.03 10*3/MM3 (ref 1.7–7)
NEUTROPHILS NFR BLD AUTO: 5.21 10*3/MM3 (ref 1.7–7)
NEUTROPHILS NFR BLD AUTO: 81.9 % (ref 42.7–76)
NEUTROPHILS NFR BLD AUTO: 82.5 % (ref 42.7–76)
NRBC BLD AUTO-RTO: 0.5 /100 WBC (ref 0–0.2)
NRBC BLD AUTO-RTO: 0.7 /100 WBC (ref 0–0.2)
NT-PROBNP SERPL-MCNC: 1039 PG/ML (ref 0–1800)
PLATELET # BLD AUTO: 128 10*3/MM3 (ref 140–450)
PLATELET # BLD AUTO: 134 10*3/MM3 (ref 140–450)
PMV BLD AUTO: 9.1 FL (ref 6–12)
PMV BLD AUTO: 9.5 FL (ref 6–12)
POTASSIUM SERPL-SCNC: 3.9 MMOL/L (ref 3.5–5.2)
RBC # BLD AUTO: 2.92 10*6/MM3 (ref 3.77–5.28)
RBC # BLD AUTO: 2.93 10*6/MM3 (ref 3.77–5.28)
SODIUM SERPL-SCNC: 143 MMOL/L (ref 136–145)
WBC NRBC COR # BLD AUTO: 6.14 10*3/MM3 (ref 3.4–10.8)
WBC NRBC COR # BLD AUTO: 6.31 10*3/MM3 (ref 3.4–10.8)

## 2023-12-28 PROCEDURE — 25010000002 CEFTRIAXONE PER 250 MG: Performed by: NURSE PRACTITIONER

## 2023-12-28 PROCEDURE — 63710000001 METOPROLOL TARTRATE 50 MG TABLET: Performed by: NURSE PRACTITIONER

## 2023-12-28 PROCEDURE — A9270 NON-COVERED ITEM OR SERVICE: HCPCS | Performed by: NURSE PRACTITIONER

## 2023-12-28 PROCEDURE — 85025 COMPLETE CBC W/AUTO DIFF WBC: CPT | Performed by: INTERNAL MEDICINE

## 2023-12-28 PROCEDURE — 63710000001 SENNOSIDES-DOCUSATE 8.6-50 MG TABLET: Performed by: NURSE PRACTITIONER

## 2023-12-28 PROCEDURE — 63710000001 ASPIRIN 81 MG CHEWABLE TABLET: Performed by: NURSE PRACTITIONER

## 2023-12-28 PROCEDURE — 25010000002 HEPARIN (PORCINE) PER 1000 UNITS: Performed by: NURSE PRACTITIONER

## 2023-12-28 PROCEDURE — 63710000001 INSULIN LISPRO (HUMAN) PER 5 UNITS: Performed by: NURSE PRACTITIONER

## 2023-12-28 PROCEDURE — G0378 HOSPITAL OBSERVATION PER HR: HCPCS

## 2023-12-28 PROCEDURE — 63710000001 DOXYCYCLINE 100 MG CAPSULE: Performed by: INTERNAL MEDICINE

## 2023-12-28 PROCEDURE — A9270 NON-COVERED ITEM OR SERVICE: HCPCS | Performed by: INTERNAL MEDICINE

## 2023-12-28 PROCEDURE — 97164 PT RE-EVAL EST PLAN CARE: CPT

## 2023-12-28 PROCEDURE — 83880 ASSAY OF NATRIURETIC PEPTIDE: CPT | Performed by: INTERNAL MEDICINE

## 2023-12-28 PROCEDURE — 29581 APPL MULTLAYER CMPRN SYS LEG: CPT

## 2023-12-28 PROCEDURE — 99232 SBSQ HOSP IP/OBS MODERATE 35: CPT | Performed by: NURSE PRACTITIONER

## 2023-12-28 PROCEDURE — 82948 REAGENT STRIP/BLOOD GLUCOSE: CPT

## 2023-12-28 PROCEDURE — 80048 BASIC METABOLIC PNL TOTAL CA: CPT | Performed by: INTERNAL MEDICINE

## 2023-12-28 PROCEDURE — 63710000001 ATORVASTATIN 40 MG TABLET: Performed by: NURSE PRACTITIONER

## 2023-12-28 RX ORDER — DOXYCYCLINE 100 MG/1
100 CAPSULE ORAL EVERY 12 HOURS SCHEDULED
Status: COMPLETED | OUTPATIENT
Start: 2023-12-28 | End: 2024-01-02

## 2023-12-28 RX ADMIN — SENNOSIDES AND DOCUSATE SODIUM 2 TABLET: 8.6; 5 TABLET ORAL at 20:53

## 2023-12-28 RX ADMIN — SODIUM CHLORIDE 1000 MG: 900 INJECTION INTRAVENOUS at 05:18

## 2023-12-28 RX ADMIN — METOPROLOL TARTRATE 50 MG: 50 TABLET ORAL at 08:36

## 2023-12-28 RX ADMIN — CASTOR OIL AND BALSAM, PERU 1 APPLICATION: 788; 87 OINTMENT TOPICAL at 23:33

## 2023-12-28 RX ADMIN — ASPIRIN 81 MG: 81 TABLET, CHEWABLE ORAL at 08:36

## 2023-12-28 RX ADMIN — Medication 10 ML: at 08:37

## 2023-12-28 RX ADMIN — INSULIN LISPRO 2 UNITS: 100 INJECTION, SOLUTION INTRAVENOUS; SUBCUTANEOUS at 13:08

## 2023-12-28 RX ADMIN — CASTOR OIL AND BALSAM, PERU 1 APPLICATION: 788; 87 OINTMENT TOPICAL at 08:36

## 2023-12-28 RX ADMIN — HEPARIN SODIUM 5000 UNITS: 5000 INJECTION INTRAVENOUS; SUBCUTANEOUS at 08:36

## 2023-12-28 RX ADMIN — DOXYCYCLINE 100 MG: 100 INJECTION, POWDER, LYOPHILIZED, FOR SOLUTION INTRAVENOUS at 05:18

## 2023-12-28 RX ADMIN — INSULIN LISPRO 4 UNITS: 100 INJECTION, SOLUTION INTRAVENOUS; SUBCUTANEOUS at 18:15

## 2023-12-28 RX ADMIN — INSULIN LISPRO 2 UNITS: 100 INJECTION, SOLUTION INTRAVENOUS; SUBCUTANEOUS at 20:52

## 2023-12-28 RX ADMIN — SENNOSIDES AND DOCUSATE SODIUM 2 TABLET: 8.6; 5 TABLET ORAL at 08:36

## 2023-12-28 RX ADMIN — ATORVASTATIN CALCIUM 80 MG: 40 TABLET, FILM COATED ORAL at 08:36

## 2023-12-28 RX ADMIN — METOPROLOL TARTRATE 50 MG: 50 TABLET ORAL at 20:53

## 2023-12-28 RX ADMIN — DOXYCYCLINE 100 MG: 100 CAPSULE ORAL at 20:53

## 2023-12-28 RX ADMIN — Medication 10 ML: at 23:34

## 2023-12-28 RX ADMIN — HEPARIN SODIUM 5000 UNITS: 5000 INJECTION INTRAVENOUS; SUBCUTANEOUS at 20:52

## 2023-12-28 NOTE — PLAN OF CARE
Goal Outcome Evaluation:  Plan of Care Reviewed With: patient, family        Progress: no change  Outcome Evaluation: PT wound care initial evaluation complete. Pt presents with complex RLE blister likely related to dx of bullous pemphigoid and moderate BLE edema. Pt's with large and intact R medial/posterior heel serous filled blister which will likely need to be drained in the next 1-2 treatments d/t the size and amount of serous fluid within the blister. PT applied BLE MLW for light compression (very light compression to the LLE d/t cold foot/toes, however no arterial occlusion noted on CTA). PT plans to f/u with MLW changes in 2-3 days with potential draining of blister if no improvement.

## 2023-12-28 NOTE — THERAPY RE-EVALUATION
Acute Care - Wound/Debridement Initial Evaluation  [facility]     Patient Name: Omar Mendoza  : 1940  MRN: [id number]  Today's Date: 2023                Admit Date: 2023    Visit Dx:    ICD-10-CM ICD-9-CM   1. Generalized weakness  R53.1 780.79   2. Acute UTI  N39.0 599.0   3. Bullous pemphigoid  L12.0 694.5   4. Venous insufficiency  I87.2 459.81     R medial/posterior heel      BLE      Patient Active Problem List   Diagnosis    UTI (urinary tract infection)    Bullous pemphigoid    Essential hypertension    Dyslipidemia    Edema of lower extremity    Cholestatic hepatitis    Acute constipation    Acute urinary retention    Uterine prolapse    Bradycardia    Pressure injury of buttock, unstageable    Type 2 diabetes mellitus with ketoacidosis, without long-term current use of insulin    Disorientation    Pleural effusion    Severe malnutrition        Past Medical History:   Diagnosis Date    CAD (coronary artery disease)     Dementia     Diabetes mellitus     Hyperlipemia     Hypertension         History reviewed. No pertinent surgical history.        Wound 23 2218 Right posterior heel Blisters (Active)   Wound Image   23 1307   Dressing Appearance open to air 23 1307   Base other (see comments) 23 1307   Periwound intact;dry;warm;edematous;swelling;other (see comments) 23 1307   Periwound Temperature warm 23 1307   Periwound Skin Turgor soft 23 1307   Edges other (see comments) 23 1307   Wound Length (cm) 7.5 cm 23 1307   Wound Width (cm) 8.5 cm 23 1307   Wound Surface Area (cm^2) 63.75 cm^2 23 1307   Drainage Amount none 23 1307   Care, Wound cleansed with;soap and water 23 1307   Dressing Care dressing applied;foam;multi-layer wrap 23 1307   Periwound Care cleansed with pH balanced cleanser;dry periwound area maintained 23 1307       Wound 23 0411 Bilateral coccyx Pressure Injury (Active)    Dressing Appearance dry;intact 12/28/23 1200   Closure Adhesive bandage 12/27/23 2121   Base moist;pink;red 12/27/23 2121   Periwound dry 12/27/23 2121   Care, Wound barrier applied 12/27/23 2121   Dressing Care dressing changed 12/28/23 0800       Wound 12/27/23 1035 Left greater trochanter Blisters (Active)   Dressing Appearance dried drainage;intact 12/28/23 1200      Lymphedema       Row Name 12/28/23 1500             Lymphedema Edema Assessment    Ptting Edema Category By severity  -      Pitting Edema Moderate  -         Skin Changes/Observations    Location/Assessment Lower Extremity  -      Lower Extremity Conditions bilateral:;intact;clean;dry;shiny;fragile  -      Lower Extremity Color/Pigment bilateral:;blanchable;erythema  -         Lymphedema Pulses/Capillary Refill    Lymphedema Pulses/Capillary Refill lower extremity pulses;capillary refill  -      Dorsalis Pedis Pulse right:;+2 normal;left:;+1 diminished  -      Capillary Refill lower extremity capillary refill  -      Lower Extremity Capillary Refill right:;left:;less than 3 seconds  -         Lymphedema Measurements    Measurement Type(s) Quick Girth  -      Quick Girth Areas Lower extremities  -         LLE Quick Girth (cm)    Mid foot 23.5 cm  -LH      Smallest ankle 24.2 cm  -      Largest calf 36 cm  -LH         RLE Quick Girth (cm)    Mid foot 22.8 cm  -LH      Smallest ankle 23 cm  -LH      Largest calf 33.8 cm  -      RLE Quick Girth Total 79.6  -         Compression/Skin Care    Compression/Skin Care skin care;wrapping location  -      Skin Care washed/dried;lotion applied  -      Wrapping Location lower extremity  -      Wrapping Location LE bilateral:;foot to knee  -      Wrapping Comments RLE qwick x2, cast padding to secure, BLE size 4/5 compressogrips applied doubled and overlapping for gradient compression.  -                User Key  (r) = Recorded By, (t) = Taken By, (c) = Cosigned By       Initials Name Provider Type     Donovan Love, PT Physical Therapist                    WOUND DEBRIDEMENT                     PT Assessment (last 12 hours)       PT Evaluation and Treatment       Row Name 12/28/23 1307          Physical Therapy Time and Intention    Subjective Information complains of;fatigue;weakness;swelling;pain  -     Document Type evaluation;wound care  -     Mode of Treatment physical therapy;individual therapy  -       Row Name 12/28/23 1307          General Information    Patient Profile Reviewed yes  -     Patient Observations alert;cooperative;agree to therapy  -     Pertinent History of Current Functional Problem Pt with history of bullous pemphigoid with recurring BLE blisters and severe BLE edema.  -     Risks Reviewed patient and family:;increased discomfort  -     Benefits Reviewed patient and family:;increase knowledge;improve skin integrity;improve function  -     Barriers to Rehab medically complex  -       Row Name 12/28/23 1307          Pain    Pain Intervention(s) Repositioned;Elevated  -     Additional Documentation Pain Scale: FACES Pre/Post-Treatment (Group)  -       Row Name 12/28/23 1307          Pain Scale: FACES Pre/Post-Treatment    Pain: FACES Scale, Pretreatment 0-->no hurt  -     Posttreatment Pain Rating 4-->hurts little more  -     Pain Location - Side/Orientation Right  -     Pain Location medial  -     Pain Location - foot  -       Row Name 12/28/23 1307          Cognition    Affect/Mental Status (Cognition) WFL  -     Orientation Status (Cognition) oriented to;person;place  -       Row Name 12/28/23 1307          Wound 12/26/23 2218 Right posterior heel Blisters    Wound - Properties Group Placement Date: 12/26/23  -DN Placement Time: 2218  -DN Present on Original Admission: Y  -DN Side: Right  -DN Orientation: posterior  -DN Location: heel  -DN Primary Wound Type: Blisters  -DN    Wound Image Images linked: 1  -      Dressing Appearance open to air  -     Base other (see comments)  Large, intact serous filled blister  -     Periwound intact;dry;warm;edematous;swelling;other (see comments)  fragile periwound tissue  -     Periwound Temperature warm  -     Periwound Skin Turgor soft  -LH     Edges other (see comments)  intact blister  -LH     Wound Length (cm) 7.5 cm  -LH     Wound Width (cm) 8.5 cm  -     Wound Depth (cm) --  intact blister  -LH     Wound Surface Area (cm^2) 63.75 cm^2  -LH     Drainage Amount none  -     Care, Wound cleansed with;soap and water  -     Dressing Care dressing applied;foam;multi-layer wrap  qwick pad x2, cast padding, MLW  -LH     Periwound Care cleansed with pH balanced cleanser;dry periwound area maintained  -LH     Retired Wound - Properties Group Placement Date: 12/26/23  -DN Placement Time: 2218  -DN Present on Original Admission: Y  -DN Side: Right  -DN Orientation: posterior  -DN Location: heel  -DN Primary Wound Type: Blisters  -DN    Retired Wound - Properties Group Date first assessed: 12/26/23  -DN Time first assessed: 2218  -DN Present on Original Admission: Y  -DN Side: Right  -DN Location: heel  -DN Primary Wound Type: Blisters  -DN      Row Name             Wound 12/27/23 0411 Bilateral coccyx Pressure Injury    Wound - Properties Group Placement Date: 12/27/23  -DN Placement Time: 0411  -DN Present on Original Admission: Y  -DN Side: Bilateral  -DN Location: coccyx  -DN Primary Wound Type: Pressure inj  -DN    Retired Wound - Properties Group Placement Date: 12/27/23  -DN Placement Time: 0411  -DN Present on Original Admission: Y  -DN Side: Bilateral  -DN Location: coccyx  -DN Primary Wound Type: Pressure inj  -DN    Retired Wound - Properties Group Date first assessed: 12/27/23  -DN Time first assessed: 0411  -DN Present on Original Admission: Y  -DN Side: Bilateral  -DN Location: coccyx  -DN Primary Wound Type: Pressure inj  -DN      Row Name             Wound  12/27/23 1035 Left greater trochanter Blisters    Wound - Properties Group Placement Date: 12/27/23  - Placement Time: 1035  -MC Side: Left  -MC Location: greater trochanter  -MC Primary Wound Type: Blisters  -MC    Retired Wound - Properties Group Placement Date: 12/27/23  - Placement Time: 1035  -MC Side: Left  -MC Location: greater trochanter  -MC Primary Wound Type: Blisters  -MC    Retired Wound - Properties Group Date first assessed: 12/27/23  - Time first assessed: 1035  -MC Side: Left  -MC Location: greater trochanter  -MC Primary Wound Type: Blisters  -MC      Row Name 12/28/23 1307          Coping    Observed Emotional State calm;cooperative  -     Verbalized Emotional State acceptance  -     Trust Relationship/Rapport care explained;questions answered  -     Family/Support Persons family  -     Involvement in Care at bedside;attentive to patient  -       Row Name 12/28/23 1307          Plan of Care Review    Plan of Care Reviewed With patient;family  -     Progress no change  -     Outcome Evaluation PT wound care initial evaluation complete. Pt presents with complex RLE blister likely related to dx of bullous pemphigoid and moderate BLE edema. Pt's with large and intact R medial/posterior heel serous filled blister which will likely need to be drained in the next 1-2 treatments d/t the size and amount of serous fluid within the blister. PT applied BLE MLW for light compression (very light compression to the LLE d/t cold foot/toes, however no arterial occlusion noted on CTA). PT plans to f/u with MLW changes in 2-3 days with potential draining of blister if no improvement.  -       Row Name 12/28/23 1304          Positioning and Restraints    Pre-Treatment Position in bed  -     Post Treatment Position bed  -     In Bed supine;call light within reach;encouraged to call for assist  -       Row Name 12/28/23 1306          Therapy Assessment/Plan (PT)    Patient/Family Therapy  Goals Statement (PT) Reduce swelling and heal blister.  -     PT Diagnosis (PT) BLE edema, RLE blister  -     Rehab Potential (PT) good, to achieve stated therapy goals  -     Criteria for Skilled Interventions Met (PT) yes;meets criteria;skilled treatment is necessary  -       Row Name 12/28/23 4537          Therapy Plan Review/Discharge Plan (PT)    Therapy Plan Review (PT) evaluation/treatment results reviewed;care plan/treatment goals reviewed;risks/benefits reviewed;current/potential barriers reviewed;participants voiced agreement with care plan;participants included;patient;son  -       Row Name 12/28/23 1307          Physical Therapy Goals    Wound Care Goal Selection (PT) wound care, PT goal 1;wound care, PT goal 2  -       Row Name 12/28/23 8594          Wound Care Goal 1 (PT)    Wound Care Goal 1 (PT) Demonstrate no remaining fluid within R heel blister to allow for wound healing.  -     Time Frame (Wound Care Goal 1, PT) long term goal (LTG);10 days  -       Row Name 12/28/23 2836          Wound Care Goal 2 (PT)    Wound Care Goal 2 (PT) Demonstrate minimal remaining BLE edema to improve wound healing potential and skin integrity.  -     Time Frame (Wound Care Goal 2, PT) long term goal (LTG);10 days  -               User Key  (r) = Recorded By, (t) = Taken By, (c) = Cosigned By      Initials Name Provider Type    Eva Singleton, RN Registered Nurse     Donovan Love, PT Physical Therapist    Peter Espinoza, RN Registered Nurse                  Physical Therapy Education       Title: PT OT SLP Therapies (In Progress)       Topic: Physical Therapy (In Progress)       Point: Mobility training (In Progress)       Learning Progress Summary             Patient Acceptance, E, NR by MIA at 12/27/2023 1045                         Point: Home exercise program (Not Started)       Learner Progress:  Not documented in this visit.              Point: Body mechanics (In Progress)        Learning Progress Summary             Patient Acceptance, E, NR by MIA at 12/27/2023 1045                         Point: Precautions (In Progress)       Learning Progress Summary             Patient Acceptance, E, NR by MIA at 12/27/2023 1045                                         User Key       Initials Effective Dates Name Provider Type Discipline     11/16/23 -  Petty Mckoy, PT Physical Therapist PT                    Recommendation and Plan  Anticipated Discharge Disposition (PT): skilled nursing facility  Planned Therapy Interventions (PT): wound care, patient/family education  Therapy Frequency (PT): daily  Plan of Care Reviewed With: patient, family   Progress: no change       Progress: no change  Outcome Evaluation: PT wound care initial evaluation complete. Pt presents with complex RLE blister likely related to dx of bullous pemphigoid and moderate BLE edema. Pt's with large and intact R medial/posterior heel serous filled blister which will likely need to be drained in the next 1-2 treatments d/t the size and amount of serous fluid within the blister. PT applied BLE MLW for light compression (very light compression to the LLE d/t cold foot/toes, however no arterial occlusion noted on CTA). PT plans to f/u with MLW changes in 2-3 days with potential draining of blister if no improvement.  Plan of Care Reviewed With: patient, family            Time Calculation   PT Charges       Row Name 12/28/23 1545             Time Calculation    Start Time 1307  -LH      PT Received On --  -      PT Goal Re-Cert Due Date 01/16/23  -         Untimed Charges    PT Eval/Re-eval Minutes 42  -LH      Wound Care 93869 Multilayer comp below knee  -LH      [id number] comp below knee 30  -LH         Total Minutes    Untimed Charges Total Minutes 72  -LH       Total Minutes 72  -LH                User Key  (r) = Recorded By, (t) = Taken By, (c) = Cosigned By      Initials Name Provider Type     Donovan Love  PT Physical Therapist                      Therapy Charges for Today       Code Description Service Date Service Provider Modifiers Qty    86076350725 HC PT RE-EVAL ESTABLISHED PLAN 2 12/28/2023 Donovan Love, PT GP 1    [id number] HC PT MULTI LAYER COMP SYS BELOW KNEE 12/28/2023 Donovan Love, PT GP 1              PT G-Codes  Outcome Measure Options: AM-PAC 6 Clicks Basic Mobility (PT)  AM-PAC 6 Clicks Score (PT): 7       Donovan Love, PT  12/28/2023

## 2023-12-28 NOTE — DISCHARGE PLACEMENT REQUEST
"Omar Mendoza (83 y.o. Female)   To [facility]  From [facility]        Date of Birth   1940    Social Security Number       Address   [address] [address]    Home Phone   [phone]    MRN   [id number]       Tenriism   None    Marital Status                               Admission Date   12/26/23    Admission Type   Emergency    Admitting Provider   Trinh Mendoza MD    Attending Provider   Trinh Mendoza MD    Department, Room/Bed   [address], S576/1       Discharge Date       Discharge Disposition       Discharge Destination                                 Attending Provider: Trinh Mendoza MD    Allergies: No Known Allergies    Isolation: None   Infection: None   Code Status: CPR    Ht: 170.2 cm (67.01\")   Wt: 98.5 kg (217 lb 1.6 oz)    Admission Cmt: None   Principal Problem: UTI (urinary tract infection) [N39.0]                   Active Insurance as of 12/26/2023       Primary Coverage       Payor Plan Insurance Group Employer/Plan Group    ANTHEM MEDICARE REPLACEMENT ANTHEM MEDICARE ADVANTAGE [id number]       Payor Plan Address Payor Plan Phone Number Payor Plan Fax Number Effective Dates    [address] [phone]  1/1/2022 - None Entered    [facility] [address]         Subscriber Name Subscriber Birth Date Member ID       OMAR MENDOZA 1940 [id number]                     Emergency Contacts        (Rel.) Home Phone Work Phone Mobile Phone    TRENTJAIRON (POA)DESTIN (Son) [phone] -- [phone]    KODYDINO WAGNER (Daughter) [phone] -- [phone]    DALLAS MENDOZA (Spouse) [phone] -- [phone]              Insurance Information                  [employer] MEDICARE REPLACEMENT/[employer] MEDICARE ADVANTAGE Phone: [phone]    Subscriber: Omar Mendoza Subscriber#: [id number]    Group#: [id number] Precert#: --             History & Physical        Julee Rogers DO at 12/27/23 0410        "       [facility] Medicine Services  HISTORY AND PHYSICAL    Patient Name: Omar Mendoza  : 1940  MRN: [id number]  Primary Care Physician: Varun Pa MD  Date of admission: 2023    Subjective  Subjective     Chief Complaint:  Generalized weakness, skin lesions    HPI:  Omar Mendoza is a 83 y.o. female with hx of CAD, HTN, HLD, T2DM, acute urinary retention (has loza in place), acute constipation (no BM since ), bullous pemphigoid (s/p dupixent 10/23, prednisone / doxycycline 2023), uterine prolapse, ongoing confusion - thought to have dementia but no formal dx and not on medications for dementia who presents to the ED for evaluation of generalized weakness and new bullous pemphigoid blister on right heel. Pt is unable to provide HPI d/t mental status changes / confusion. Daughter is present and assisting with HPI. She makes it known that she has been unhappy with the care provided at [facility] rehab and feels like her mother was neglected there.She brings her to [facility] ED for evaluation of the right heel lesion and altered mental status / generalized weakness.     Review of Systems   Unable to perform ROS: Mental status change        Personal History     Past Medical History:   Diagnosis Date    CAD (coronary artery disease)     Dementia     Diabetes mellitus     Hyperlipemia     Hypertension              History reviewed. No pertinent surgical history.    Family History:  family history is not on file.     Social History:  reports that she has never smoked. She has never used smokeless tobacco. She reports that she does not drink alcohol.  Social History     Social History Narrative    Not on file       Medications:  Insulin Syringe-Needle U-100, amLODIPine, amiodarone, aspirin, atorvastatin, insulin glargine, metoprolol tartrate, nystatin, spironolactone, and triamcinolone    No Known Allergies    Objective  Objective     Vital Signs:   Temp:  [98 °F (36.7 °C)] 98  °F (36.7 °C)  Heart Rate:  [48-53] 48  Resp:  [14-16] 14  BP: (101-120)/(58-71) 120/62    Physical Exam  Constitutional:       General: She is sleeping. She is not in acute distress.     Appearance: She is well-developed. She is ill-appearing. She is not toxic-appearing.   HENT:      Head: Normocephalic and atraumatic.      Nose: Nose normal.      Mouth/Throat:      Mouth: Mucous membranes are dry.      Pharynx: Oropharynx is clear.   Eyes:      Extraocular Movements: Extraocular movements intact.      Conjunctiva/sclera: Conjunctivae normal.      Pupils: Pupils are equal, round, and reactive to light.   Cardiovascular:      Rate and Rhythm: Regular rhythm. Bradycardia present.      Pulses: Normal pulses.      Heart sounds: Normal heart sounds. No murmur heard.  Pulmonary:      Effort: Pulmonary effort is normal.      Breath sounds: Normal breath sounds.   Abdominal:      General: Bowel sounds are normal. There is no distension.      Palpations: Abdomen is soft.      Tenderness: There is no abdominal tenderness. There is no guarding.   Musculoskeletal:         General: Normal range of motion.      Cervical back: Normal range of motion and neck supple.      Right lower le+ Pitting Edema present.      Left lower le+ Pitting Edema present.   Skin:     General: Skin is warm and dry.      Capillary Refill: Capillary refill takes less than 2 seconds.      Comments: See photos:   1. Right heel blister  2. Buttocks pressure ulcer   Neurological:      Mental Status: She is oriented to person, place, and time.      Cranial Nerves: No cranial nerve deficit.   Psychiatric:         Mood and Affect: Mood normal.         Behavior: Behavior normal. Behavior is cooperative.          Right heel      2. Buttocks    Verbal permission to take and place photos in chart obtained from pt daughter who is at bedside.    Result Review:  I have personally reviewed the results from the time of this admission to 2023 04:57 EST and  agree with these findings:  [x]  Laboratory list / accordion  []  Microbiology  [x]  Radiology  [x]  EKG/Telemetry   []  Cardiology/Vascular   []  Pathology  []  Old records  []  Other:  Most notable findings include:     LAB RESULTS:      Lab 12/27/23  0431 12/26/23 2336 12/26/23 2131   WBC  --   --  7.78   HEMOGLOBIN  --   --  10.3*   HEMATOCRIT  --   --  31.3*   PLATELETS  --   --  159   NEUTROS ABS  --   --  6.22   IMMATURE GRANS (ABS)  --   --  0.07*   LYMPHS ABS  --   --  0.92   MONOS ABS  --   --  0.34   EOS ABS  --   --  0.21   MCV  --   --  94.8   CRP  --  1.87*  --    PROCALCITONIN  --   --  0.10   LACTATE 1.4  --  2.1*         Lab 12/26/23 2131   SODIUM 143   POTASSIUM 4.3   CHLORIDE 109*   CO2 26.0   ANION GAP 8.0   BUN 21   CREATININE 0.80   EGFR 74.1   GLUCOSE 81   CALCIUM 7.9*         Lab 12/26/23 2131   TOTAL PROTEIN 4.9*   ALBUMIN 2.6*   GLOBULIN 2.3   ALT (SGPT) 31   AST (SGOT) 36*   BILIRUBIN 0.7   ALK PHOS 174*   LIPASE 119*         Lab 12/26/23 2336 12/26/23 2131   PROBNP  --  1,494.0   HSTROP T 67* 69*                 Brief Urine Lab Results  (Last result in the past 365 days)        Color   Clarity   Blood   Leuk Est   Nitrite   Protein   CREAT   Urine HCG        12/27/23 0040 Orange   Cloudy   Large (3+)   Moderate (2+)   Negative   100 mg/dL (2+)                 Microbiology Results (last 10 days)       ** No results found for the last 240 hours. **            CT Angio Abdominal Aorta Bilateral Iliofem Runoff    Result Date: 12/27/2023  CT ANGIO ABDOMINAL AORTA BILAT ILIOFEM RUNOFF Date of Exam: 12/26/2023 11:36 PM EST Indication: Rash, temperature difference in lower extremities, swelling, include phase as well. Comparison: None available. Technique: CTA of the abdomen, pelvis and both lower extremities was performed after the uneventful intravenous administration of 125 mL Isovue-370. Reconstructed coronal and sagittal images were also obtained. In addition, a 3-D volume rendered  image was created for interpretation. Automated exposure control and iterative reconstruction methods were used. Findings: Non--- vascular: There is a small right pleural effusion. There is bilateral basilar atelectasis. There is cholelithiasis. The arterial enhanced images of the liver, right adrenal gland, bilateral kidneys, pancreas and spleen are normal. There is adrenal adenoma on the left. There is a moderate stool burden with a moderate amount of stool in the rectosigmoid possibly from fecal impaction. There is anasarca. Vascular: There is tortuosity of the thoracic aorta. There is no aneurysmal dilatation or aortic stenosis. The bilateral common iliac, internal and external neck arteries are widely patent. There is a normal right-sided runoff with three-vessel to the ankle. On the left side, there is delayed runoff but the delayed images demonstrate a normal three-vessel runoff to the left ankle despite the slight delay which is of uncertain etiology. Venous stasis would be in the differential for the sluggish flow on the left. There is diffuse lower extremity edema.     Impression: Impression: No acute abnormality. No evidence of arterial occlusion or significant stenosis. There is delayed runoff on the left perhaps related to venous stasis changes. Please see above discussion for other findings. There is diffuse anasarca and lower extremity edema. Electronically Signed: Oni Quesada MD  12/27/2023 1:18 AM EST  Workstation ID: [id number]    XR Chest 1 View    Result Date: 12/26/2023  XR CHEST 1 VW Date of Exam: 12/26/2023 9:10 PM EST Indication: Chest Pain Triage Protocol Comparison: None available. Findings: Patient is rotated to the left. There is bibasilar airspace disease left greater than right with small left pleural effusion. Negative for pneumothorax. Heart size within normal limits for technique. Osseous structures grossly intact.     Impression: Impression: Bibasilar airspace disease left greater  than right which may relate to atelectasis and/or pneumonia with small left pleural effusion. Electronically Signed: Cyril Reina MD  12/26/2023 9:38 PM EST  Workstation ID: [id number]         Assessment & Plan  Assessment & Plan       UTI (urinary tract infection)    Bullous pemphigoid    Essential hypertension    Dyslipidemia    Edema of lower extremity    Acute constipation    Acute urinary retention    Uterine prolapse    Bradycardia    Pressure injury of buttock, unstageable    Type 2 diabetes mellitus with ketoacidosis, without long-term current use of insulin    Disorientation    Pleural effusion    UTI  - start rocephin  - blood cultures x 2  - lactic acid 2.1, repeat pending  - add urine culture   - hx of urinary retention w/ loza in place, d/c loza and bladder scan w/ acute urinary retention standing orders    BLE edema  Decreased pulses LLE  - CTA abd showing small right pleural effusion, bilateral basilar atelectasis  - CTA w/ runoff showing normal right sided runoff; left side delayed runoff possibly r/t venous stasis changes, no evidence of arterial occlusion or significant stenosis  - LLE cool to touch compared to RLE, decreased pulses  - neurovascular checks  - ECHO in am    L pleural effusion  - chest xray w/ bibasilar airspace dz L>R which may r/t atelectasis and or pneumonia w/ small L pleural effusion  - pt receiving rocephin (UTI) and doxycycline (Bullous pemphigoid) which would cover concern for pneumonia  - blood cx's pending  - add mrsa pcr nares  - add s.pneumo and legionella urinary ag  - pulmonary toilet / IS  - procal, esr, crp pending; initial lactic 2.1 w/ repeat 1.4  -- crp 1.87, procal 0.10    Bullous pemphigoid  - s/p dupixent injection 10/2023  - s/p skin bx by dermatology and doxycycline/prednisone  - prednisone stopped d/t elevated glucose, dtr reports some confusion between hospital d/c and admission to rehab (steroids weaned and stopped in hospital then restarted on admission to  rehab)  - has new right heel blister that came up 12/26  - per up to date recommendations, start doxycycline 100 mg BID, hold on steroids for now d/t concern for hyperglycemia     Pressure ulcer, buttocks  - daughter thought this was r/t the bullous pemphigoid, looks more like pressure ulcer  - [facility] consult for evaluation    Disorientation  - daughter concern for altered mental status, has been gradual progression and worse recently  - concern for dementia, has not had neuro evaluation d/t difficulty getting her to appointments w/ recent acute illnesses  - consider neuro evaluation inpatient vs outpatient if mental status not improved  - neuro checks  - check tsh, b12, folate, vitamin d  - PT/OT consult, pt has not been mobile since prior to admission to [facility] 12/14    Acute constipation / urinary retention  - daughter reporting patient has not had BM since d/c from [facility] on 12/19, she was given something to help her use the bathroom prior to d/c but has not gone since  - CTA abd in the ED tonight showing there is a moderate stool burden w/ moderate amt of stool in the rectosigmoid possibly fecal impaction  - soap suds enema x1  - start bowel regimen  - urinary retention likely r/t constipation / uterine prolapse    Uterine prolapse  - significant visible prolapse, daughter asking what can be done for this, possible pessary?  - consider gynecological consult inpatient vs outpatient    Bradycardia  - pt on both amiodarone and metoprolol, dtr reports amiodarone is new since hospitalization at [facility], says pt was started on IV drip for her heart rate, unclear if she had a-fib   - obtain records from [facility]  - currently HR 49, sinus viral  - hold amiodarone  - decrease metoprolol to 50 mg BID w/ hold parameters    T2DM  - has been on levemir since admission to [facility] initially for DKA dtr thinks d/t steroid tx  - hold levemir as pt sleeping, not taking in good PO intake  - fsbg w/  moderate dose ssi  - check A1c  - may need to add levemir or increase coverage based on glucose w/ if prednisone needed    Anemia  - anemia studies  - occult stool  - compared to labs in EMR, on 12/22/23 H/H 10.4/22.5, platelets 177    L adrenal adenoma  - incidental finding on CTA abdomen       DVT prophylaxis:  Heparin SC BID    CODE STATUS:    Code Status (Patient has no pulse and is not breathing): CPR (Attempt to Resuscitate)  Medical Interventions (Patient has pulse or is breathing): Full Support      Expected Discharge    Expected discharge date/ time has not been documented.      This note has been completed as part of a split-shared workflow.     Signature: Electronically signed by LITA Rivas, 12/27/23, 4:37 AM EST    Total time: 80 minutes        Attending   Admission Attestation       I have performed an independent face-to-face diagnostic evaluation including performing an independent physical examination.  The documented plan of care above was reviewed and developed with the advanced practice clinician (APC).  I have updated the HPI as appropriate.    Brief HPI    This is an 83-year-old female patient with a PMH significant for bullous pemphigoid, CAD, HTN, HLD, diabetes mellitus type 2, acute urinary retention with indwelling Schultz catheter, constipation, uterine prolapse who comes to the ED due to weakness.  Family at bedside provides HPI.  Patient was discharged from [facility] around 2 weeks ago she was treated for steroid induced DKA.  She has been at rehab.  Family was concerned that rehab was not getting therapies and was having progressive decline.  They got the patient from rehab today and brought her to the ED.    Attending Physical Exam:  Temp:  [98 °F (36.7 °C)] 98 °F (36.7 °C)  Heart Rate:  [46-53] 46  Resp:  [1-16] 1  BP: (101-120)/(58-79) 119/69    Constitutional: Asleep, arousable  Eyes: Eyes closed  HENT: NCAT, mucous membranes moist  Neck: Supple, no thyromegaly, no  lymphadenopathy, trachea midline  Respiratory: Clear to auscultation bilaterally, nonlabored respirations   Cardiovascular: RRR, no murmurs, rubs, or gallops, palpable pedal pulses bilaterally  Gastrointestinal: Positive bowel sounds, soft, nontender, nondistended  Musculoskeletal: 3+ pitting bilateral ankle edema, no clubbing or cyanosis to extremities  Psychiatric: Sleepy  Neurologic: Not oriented to person place or time, speech minimal  Skin: Bullous lesions to right foot pictured above, decubitus skin breakdown pictured above      Assessment and Plan:    See assessment and plan documented by APC above and updated/edited by me as appropriate.    Julee Rogers DO  23                    Electronically signed by Julee Rogers DO at 23 0557          Physician Progress Notes (most recent note)        Frantz Martin APRN at 23 1106              Psychiatric Medicine Services  PROGRESS NOTE    Patient Name: Omar Mendoza  : 1940  MRN: [id number]    Date of Admission: 2023  Primary Care Physician: Varun Pa MD    Subjective   Subjective     CC:  Follow up weakness, right foot blister     HPI:  Patient seen resting in bed in no apparent distress. No acute events overnight per nursing. Remains pleasantly confused. No new complaints at this time.     Objective   Objective     Vital Signs:   Temp:  [96.9 °F (36.1 °C)-98.5 °F (36.9 °C)] 97.6 °F (36.4 °C)  Heart Rate:  [54-69] 67  Resp:  [16-18] 18  BP: (105-121)/(58-76) 121/76     Physical Exam:  Constitutional: No acute distress, awake, alert, chronically ill appearing   HENT: NCAT, mucous membranes moist  Respiratory: Clear to auscultation bilaterally, respiratory effort normal   Cardiovascular: RRR, no murmurs, cap refill brisk   Gastrointestinal: Positive bowel sounds, soft, nontender, nondistended  Musculoskeletal: 1+ BLE edema, right foot dressing in place   Psychiatric: flat affect,  cooperative  Neurologic: Oriented x 2, moves all extremities, speech clear  Skin: warm, dry, no visible rash     Results Reviewed:  LAB RESULTS:      Lab 12/27/23  1404 12/27/23  1403 12/27/23  0431 12/26/23 2336 12/26/23 2131   WBC  --  7.25  --   --  7.78   HEMOGLOBIN  --  10.5*  --   --  10.3*   HEMATOCRIT  --  31.4*  --   --  31.3*   PLATELETS  --  137*  --   --  159   NEUTROS ABS  --  6.29  --   --  6.22   IMMATURE GRANS (ABS)  --  0.04  --   --  0.07*   LYMPHS ABS  --  0.57*  --   --  0.92   MONOS ABS  --  0.15  --   --  0.34   EOS ABS  --  0.19  --   --  0.21   MCV  --  91.8  --   --  94.8   SED RATE  --   --   --   --  17   CRP  --   --   --  1.87*  --    PROCALCITONIN  --   --   --   --  0.10   LACTATE 1.4  --  1.4  --  2.1*         Lab 12/27/23  1404 12/27/23  1403 12/26/23 2336 12/26/23 2131   SODIUM  --  140  --  143   POTASSIUM  --  4.1  --  4.3   CHLORIDE  --  106  --  109*   CO2  --  26.0  --  26.0   ANION GAP  --  8.0  --  8.0   BUN  --  18  --  21   CREATININE  --  0.64  --  0.80   EGFR  --  88.9  --  74.1   GLUCOSE  --  151*  --  81   CALCIUM  --  7.6*  --  7.9*   IONIZED CALCIUM 1.16  --   --   --    MAGNESIUM  --  1.7  --   --    PHOSPHORUS  --  3.5  --   --    HEMOGLOBIN A1C  --  12.10*  --   --    TSH  --   --  3.960  --          Lab 12/27/23  1403 12/26/23 2131   TOTAL PROTEIN 4.3* 4.9*   ALBUMIN 2.5* 2.6*   GLOBULIN 1.8 2.3   ALT (SGPT) 28 31   AST (SGOT) 28 36*   BILIRUBIN 0.6 0.7   ALK PHOS 151* 174*   LIPASE  --  119*         Lab 12/26/23  2336 12/26/23 2131   PROBNP  --  1,494.0   HSTROP T 67* 69*             Lab 12/26/23  2336 12/26/23 2131   IRON 53  53  --    IRON SATURATION (TSAT) 28  --    TIBC 188*  --    TRANSFERRIN 126*  --    FERRITIN 462.20*  --    FOLATE  --  10.30   VITAMIN B 12  --  1,253*         Brief Urine Lab Results  (Last result in the past 365 days)        Color   Clarity   Blood   Leuk Est   Nitrite   Protein   CREAT   Urine HCG        12/27/23 0040 Orange    Cloudy   Large (3+)   Moderate (2+)   Negative   100 mg/dL (2+)                   Microbiology Results Abnormal       Procedure Component Value - Date/Time    Blood Culture - Blood, Arm, Right [[id number]]  (Normal) Collected: 12/27/23 0431    Lab Status: Preliminary result Specimen: Blood from Arm, Right Updated: 12/28/23 0515     Blood Culture No growth at 24 hours            Adult Transthoracic Echo Complete W/ Cont if Necessary Per Protocol    Result Date: 12/27/2023    Left ventricular systolic function is normal. Calculated left ventricular EF = 57% Left ventricular ejection fraction appears to be 56 - 60%.   Left ventricular wall thickness is consistent with mild to moderate concentric hypertrophy.   Left ventricular diastolic function was normal.   Estimated right ventricular systolic pressure from tricuspid regurgitation is mildly elevated (35-45 mmHg).   There is a trivial pericardial effusion.   Mild aortic valve regurgitation.     CT Angio Abdominal Aorta Bilateral Iliofem Runoff    Result Date: 12/27/2023  CT ANGIO ABDOMINAL AORTA BILAT ILIOFEM RUNOFF Date of Exam: 12/26/2023 11:36 PM EST Indication: Rash, temperature difference in lower extremities, swelling, include phase as well. Comparison: None available. Technique: CTA of the abdomen, pelvis and both lower extremities was performed after the uneventful intravenous administration of 125 mL Isovue-370. Reconstructed coronal and sagittal images were also obtained. In addition, a 3-D volume rendered image was created for interpretation. Automated exposure control and iterative reconstruction methods were used. Findings: Non--- vascular: There is a small right pleural effusion. There is bilateral basilar atelectasis. There is cholelithiasis. The arterial enhanced images of the liver, right adrenal gland, bilateral kidneys, pancreas and spleen are normal. There is adrenal adenoma on the left. There is a moderate stool burden with a moderate amount of  stool in the rectosigmoid possibly from fecal impaction. There is anasarca. Vascular: There is tortuosity of the thoracic aorta. There is no aneurysmal dilatation or aortic stenosis. The bilateral common iliac, internal and external neck arteries are widely patent. There is a normal right-sided runoff with three-vessel to the ankle. On the left side, there is delayed runoff but the delayed images demonstrate a normal three-vessel runoff to the left ankle despite the slight delay which is of uncertain etiology. Venous stasis would be in the differential for the sluggish flow on the left. There is diffuse lower extremity edema.     Impression: Impression: No acute abnormality. No evidence of arterial occlusion or significant stenosis. There is delayed runoff on the left perhaps related to venous stasis changes. Please see above discussion for other findings. There is diffuse anasarca and lower extremity edema. Electronically Signed: Oni Quesada MD  12/27/2023 1:18 AM EST  Workstation ID: [id number]    XR Chest 1 View    Result Date: 12/26/2023  XR CHEST 1 VW Date of Exam: 12/26/2023 9:10 PM EST Indication: Chest Pain Triage Protocol Comparison: None available. Findings: Patient is rotated to the left. There is bibasilar airspace disease left greater than right with small left pleural effusion. Negative for pneumothorax. Heart size within normal limits for technique. Osseous structures grossly intact.     Impression: Impression: Bibasilar airspace disease left greater than right which may relate to atelectasis and/or pneumonia with small left pleural effusion. Electronically Signed: Cyril Reina MD  12/26/2023 9:38 PM EST  Workstation ID: [id number]     Results for orders placed during the hospital encounter of 12/26/23    Adult Transthoracic Echo Complete W/ Cont if Necessary Per Protocol    Interpretation Summary    Left ventricular systolic function is normal. Calculated left ventricular EF = 57% Left ventricular  ejection fraction appears to be 56 - 60%.    Left ventricular wall thickness is consistent with mild to moderate concentric hypertrophy.    Left ventricular diastolic function was normal.    Estimated right ventricular systolic pressure from tricuspid regurgitation is mildly elevated (35-45 mmHg).    There is a trivial pericardial effusion.    Mild aortic valve regurgitation.      Current medications:  Scheduled Meds:aspirin, 81 mg, Oral, Daily  atorvastatin, 80 mg, Oral, Daily  castor oil-balsam peru, 1 application , Topical, Q12H  cefTRIAXone, 1,000 mg, Intravenous, Q24H  doxycycline, 100 mg, Intravenous, Q12H  heparin (porcine), 5,000 Units, Subcutaneous, Q12H  insulin lispro, 2-9 Units, Subcutaneous, 4x Daily With Meals & Nightly  metoprolol tartrate, 50 mg, Oral, BID  senna-docusate sodium, 2 tablet, Oral, BID  sodium chloride, 10 mL, Intravenous, Q12H      Continuous Infusions:   PRN Meds:.  acetaminophen **OR** acetaminophen **OR** acetaminophen    senna-docusate sodium **AND** polyethylene glycol **AND** bisacodyl **AND** bisacodyl    Calcium Replacement - Follow Nurse / BPA Driven Protocol    dextrose    dextrose    glucagon (human recombinant)    hydrOXYzine    Magnesium Standard Dose Replacement - Follow Nurse / BPA Driven Protocol    nitroglycerin    ondansetron    Phosphorus Replacement - Follow Nurse / BPA Driven Protocol    Potassium Replacement - Follow Nurse / BPA Driven Protocol    sodium chloride    sodium chloride    Assessment & Plan   Assessment & Plan     Active Hospital Problems    Diagnosis  POA    **UTI (urinary tract infection) [N39.0]  Yes    Acute constipation [K59.00]  Unknown    Acute urinary retention [R33.8]  Unknown    Uterine prolapse [N81.4]  Unknown    Bradycardia [R00.1]  Unknown    Pressure injury of buttock, unstageable [L89.300]  Unknown    Type 2 diabetes mellitus with ketoacidosis, without long-term current use of insulin [E11.10]  Unknown    Disorientation [R41.0]  Unknown     Pleural effusion [J90]  Unknown    Severe malnutrition [E43]  Yes    Edema of lower extremity [R60.0]  Yes    Bullous pemphigoid [L12.0]  Yes    Dyslipidemia [E78.5]  Yes    Essential hypertension [I10]  Yes      Resolved Hospital Problems   No resolved problems to display.        Brief Hospital Course to date:  Omar Mendoza is a 83 y.o. female  with history of type 2 diabetes, hypertension, history of uterine prolapse, urinary retention with Schultz catheter in place, history of bullous pemphigoid  (s/p Dupixent 10/2023, prednisone/doxycycline 11/2023) CAD, hyperlipidemia, presumed dementia who presented to the ED with generalized weakness and near bullous pemphigoid blister on the heel.      This patient's problems and plans were partially entered by my partner and updated as appropriate by me 12/28/23.    Acute metabolic encephalopathy, superimposed on suspected dementia  Acute UTI  Generalized weakness  -Follow-up blood and urine cultures, continue IV Rocephin  -Follow-up TSH, B12, folate and vitamin D  -PT/OT  recommended SNF  -CM following for placement      Poor venous access  -PICC in place    Sinus bradycardia  - pt on both amiodarone and metoprolol, dtr reports amiodarone is new since hospitalization at [place].  - hold amiodarone, decreased metoprolol to 50 mg BID w/ hold parameters  -HR currently improved      BLE edema  Decreased pulses LLE  - CTA abd showing small right pleural effusion, bilateral basilar atelectasis  - CTA w/ runoff showing normal right sided runoff; left side delayed runoff possibly r/t venous stasis changes, no evidence of arterial occlusion or significant stenosis  - LLE cool to touch compared to RLE, decreased pulses  - neurovascular checks  -Echo revealed EF 57% with normal diastolic function      Uncontrolled Type 2 diabetes  -Hgb A1C 12.10 on 12/27  -Continue SSI     Bilateral pleural effusion  L>R  Bibasilar airspace disease versus pneumonia  -Patient currently on room air  with good O2 sats  -Follow-up MRSA PCR, cultures, urinary strep and Legionella antigens  -Procalcitonin is low, lactate has improved  -Continue antibiotics, currently on Rocephin and doxycycline Day 2   -Currently on RA      Constipation  Urinary retention  -Last bowel movement reported to be   -Continue aggressive bowel regimen, soapsuds enema x 1  -chronic loza catheter in place     Uterine prolapse  -Gyn consulted      Bullous pemphigoid  -Has developed new blister on right heel  -Continue doxycycline  -Wound care following      Pressure ulcer  -Wound care following      Anemia  -Iron 53, iron sat 28  -Hgb down to 9.1 this AM   -CBC in AM      L adrenal adenoma  - incidental finding on CTA abdomen      Severe malnutrition    Expected Discharge Location and Transportation: SNF pending   Expected Discharge   Expected Discharge Date: 2024; Expected Discharge Time:      DVT prophylaxis:  Medical DVT prophylaxis orders are present.     AM-PAC 6 Clicks Score (PT): 7 (23 [id number])    CODE STATUS:   Code Status and Medical Interventions:   Ordered at: 23 0410     Code Status (Patient has no pulse and is not breathing):    CPR (Attempt to Resuscitate)     Medical Interventions (Patient has pulse or is breathing):    Full Support       LITA Uribe  23      Electronically signed by Frantz Martin APRN at 23 1306          Physical Therapy Notes (most recent note)        Petty Mckoy, PT at 23 1045  Version 2 of 2         Patient Name: Omar Mendoza  : 1940    MRN: [id number]                              Today's Date: 2023       Admit Date: 2023    Visit Dx:     ICD-10-CM ICD-9-CM   1. Generalized weakness  R53.1 780.79   2. Acute UTI  N39.0 599.0   3. Bullous pemphigoid  L12.0 694.5   4. Venous insufficiency  I87.2 459.81     Patient Active Problem List   Diagnosis    UTI (urinary tract infection)    Bullous pemphigoid    Essential hypertension     Dyslipidemia    Edema of lower extremity    Cholestatic hepatitis    Acute constipation    Acute urinary retention    Uterine prolapse    Bradycardia    Pressure injury of buttock, unstageable    Type 2 diabetes mellitus with ketoacidosis, without long-term current use of insulin    Disorientation    Pleural effusion    Severe malnutrition     Past Medical History:   Diagnosis Date    CAD (coronary artery disease)     Dementia     Diabetes mellitus     Hyperlipemia     Hypertension      History reviewed. No pertinent surgical history.   General Information       Row Name 12/27/23 1118          Physical Therapy Time and Intention    Document Type evaluation  -     Mode of Treatment physical therapy  -       Row Name 12/27/23 1118          General Information    Patient Profile Reviewed yes  -KE     Prior Level of Function independent:;all household mobility;community mobility;ADL's  Prior to Thanksgiving pt ind for mobility w/ SPC and ADLs. Per family pt experienced a functional decline since then and has been a facility resident of [facility] rehab facility.  -MIA     Existing Precautions/Restrictions fall;other (see comments)  poor skin integrity  -MIA     Barriers to Rehab medically complex;previous functional deficit;cognitive status  -[facility] Name 12/27/23 1118          Living Environment    People in Home spouse  -[facility] Name 12/27/23 [facility]8          Home Main Entrance    Number of Stairs, Main Entrance one  ramp has been ordered  -[facility] Name 12/27/23 1118          Stairs Within Home, Primary    Number of Stairs, Within Home, Primary twelve  -     Stair Railings, Within Home, Primary railing on right side (ascending)  -     Stairs Comment, Within Home, Primary steps to basement, pt not required to access  -       Row Name 12/27/23 1118          Cognition    Orientation Status (Cognition) oriented to;person;verbal cues/prompts needed for orientation;time;disoriented to;place  -[facility]  Name 12/27/23 1118          Safety Issues, Functional Mobility    Safety Issues Affecting Function (Mobility) ability to follow commands;awareness of need for assistance;friction/shear risk;insight into deficits/self-awareness;problem-solving;safety precaution awareness;safety precautions follow-through/compliance;sequencing abilities  -KE     Impairments Affecting Function (Mobility) balance;cognition;endurance/activity tolerance;postural/trunk control;strength  -KE     Cognitive Impairments, Mobility Safety/Performance attention;awareness, need for assistance;insight into deficits/self-awareness;problem-solving/reasoning;safety precaution awareness;safety precaution follow-through;sequencing abilities  -KE               User Key  (r) = Recorded By, (t) = Taken By, (c) = Cosigned By      Initials Name Provider Type    Petty Beatty, PT Physical Therapist                   Mobility       Row Name 12/27/23 1123          Bed Mobility    Bed Mobility supine-sit;sit-supine;rolling right  -KE     Supine-Sit [place] (Bed Mobility) maximum assist (25% patient effort);2 person assist  -KE     Sit-Supine [place] (Bed Mobility) dependent (less than 25% patient effort);2 person assist  -KE     Assistive Device (Bed Mobility) bed rails;draw sheet  -MIA     Comment, (Bed Mobility) Pt requiring increased time and effort, VCs for sequencing  -KE       Row Name 12/27/23 1123          Transfers    Comment, (Transfers) x1 STS from EOB, attempted bed-chair transfer however deemed unsafe d/t patient's poor command following and weakness; Pt taking steps toward HOB, demo difficulty weight shifting and unable to fully clear feet to take steps. pt w/ difficulty achieving full upright posture  -KE       Row Name 12/27/23 1123          Sit-Stand Transfer    Sit-Stand [place] (Transfers) moderate assist (50% patient effort);2 person assist  -KE     Assistive Device (Sit-Stand Transfers) walker, front-wheeled  -KE      Comment, (Sit-Stand Transfer) x1 STS from EOB w/ ModAx2. Pt demo poor UE placement, VCs for correction. tactile cues req for completing stand, Pt slow with initiation of STS  -KE               User Key  (r) = Recorded By, (t) = Taken By, (c) = Cosigned By      Initials Name Provider Type    Petty Beatty PT Physical Therapist                   Obj/Interventions       Row Name 12/27/23 1129          Range of Motion Comprehensive    General Range of Motion bilateral lower extremity ROM WFL  -KE       Row Name 12/27/23 1129          Strength Comprehensive (MMT)    General Manual Muscle Testing (MMT) Assessment lower extremity strength deficits identified  -KE     Comment, General Manual Muscle Testing (MMT) Assessment R Hip flexion 2/5, R ankle DF and knee extensors grossly 3/5; L LE grossly 3/5  -KE       Row Name 12/27/23 1129          Balance    Balance Assessment sitting static balance;sitting dynamic balance;sit to stand dynamic balance;standing static balance;standing dynamic balance  -KE     Static Sitting Balance minimal assist  -KE     Dynamic Sitting Balance minimal assist  -KE     Position, Sitting Balance unsupported;sitting edge of bed  -KE     Sit to Stand Dynamic Balance moderate assist;2-person assist  -KE     Static Standing Balance minimal assist;2-person assist  -KE     Dynamic Standing Balance moderate assist;2-person assist  -KE     Position/Device Used, Standing Balance supported;walker, front-wheeled  -KE     Balance Interventions sitting;standing;sit to stand;supported;static;dynamic  -KE     Comment, Balance No overt LOB  -KE       Row Name 12/27/23 1129          Sensory Assessment (Somatosensory)    Sensory Assessment (Somatosensory) unable/difficult to assess  poor command following  -KE               User Key  (r) = Recorded By, (t) = Taken By, (c) = Cosigned By      Initials Name Provider Type    Petty Beatty PT Physical Therapist                   Goals/Plan       Row Name  12/27/23 1138          Bed Mobility Goal 1 (PT)    Activity/Assistive Device (Bed Mobility Goal 1, PT) sit to supine/supine to sit  -KE     [place] Level/Cues Needed (Bed Mobility Goal 1, PT) moderate assist (50-74% patient effort)  -KE     Time Frame (Bed Mobility Goal 1, PT) long term goal (LTG);10 days  -KE     Progress/Outcomes (Bed Mobility Goal 1, PT) new goal  -KE       Row Name 12/27/23 1138          Transfer Goal 1 (PT)    Activity/Assistive Device (Transfer Goal 1, PT) bed-to-chair/chair-to-bed  -KE     [place] Level/Cues Needed (Transfer Goal 1, PT) minimum assist (75% or more patient effort)  -KE     Time Frame (Transfer Goal 1, PT) long term goal (LTG);10 days  -KE     Progress/Outcome (Transfer Goal 1, PT) new goal  -KE       Row Name 12/27/23 1138          Gait Training Goal 1 (PT)    Activity/Assistive Device (Gait Training Goal 1, PT) gait (walking locomotion);assistive device use  -KE     [place] Level (Gait Training Goal 1, PT) minimum assist (75% or more patient effort)  -KE     Distance (Gait Training Goal 1, PT) 25  -KE     Time Frame (Gait Training Goal 1, PT) long term goal (LTG);10 days  -KE     Progress/Outcome (Gait Training Goal 1, PT) new goal  -       Row Name 12/27/23 1138          Therapy Assessment/Plan (PT)    Planned Therapy Interventions (PT) balance training;bed mobility training;gait training;home exercise program;patient/family education;neuromuscular re-education;postural re-education;ROM (range of motion);stair training;strengthening;stretching;transfer training  -KE               User Key  (r) = Recorded By, (t) = Taken By, (c) = Cosigned By      Initials Name Provider Type    Petty Beatty, PT Physical Therapist                   Clinical Impression       Row Name 12/27/23 1133          Pain    Pretreatment Pain Rating 0/10 - no pain  -KE     Posttreatment Pain Rating 0/10 - no pain  -KE     Pain Intervention(s) Ambulation/increased  activity;Repositioned  -KE       Row Name 12/27/23 1133          Plan of Care Review    Plan of Care Reviewed With patient;family  -     Outcome Evaluation Pt presents with decreased endurance, balance deficits, and generalized weakness leading to impairments in functional mobility below baseline. Req ModAx2 for steps to HOB w/ FWW. Pt will benefit from skilled IP PT to address impairments and return to PLOF. PT rec SNF at d/c for best functional outcome  -       Row Name 12/27/23 1133          Therapy Assessment/Plan (PT)    Rehab Potential (PT) good, to achieve stated therapy goals  -     Criteria for Skilled Interventions Met (PT) yes;meets criteria;skilled treatment is necessary  -KE     Therapy Frequency (PT) daily  -KE       Row Name 12/27/23 1133          Vital Signs    Pre Systolic BP Rehab 109  -KE     Pre Treatment Diastolic BP 63  -KE     Pretreatment Heart Rate (beats/min) 52  -KE     Posttreatment Heart Rate (beats/min) 51  -KE     Pre SpO2 (%) 98  -KE     O2 Delivery Pre Treatment room air  -KE     O2 Delivery Intra Treatment room air  -KE     Post SpO2 (%) 96  -KE     O2 Delivery Post Treatment room air  -KE     Pre Patient Position Supine  -KE     Intra Patient Position Standing  -KE     Post Patient Position Supine  -KE       Row Name 12/27/23 1133          Positioning and Restraints    Pre-Treatment Position in bed  -KE     Post Treatment Position bed  -KE     In Bed notified nsg;supine;call light within reach;encouraged to call for assist;exit alarm on;with family/caregiver;side rails up x2  -KE               User Key  (r) = Recorded By, (t) = Taken By, (c) = Cosigned By      Initials Name Provider Type    Petty Beatty, PT Physical Therapist                   Outcome Measures       Row Name 12/27/23 1139 12/27/23 0609       How much help from another person do you currently need...    Turning from your back to your side while in flat bed without using bedrails? 2  -KE 1  -SB    Moving  from lying on back to sitting on the side of a flat bed without bedrails? 1  -KE 1  -SB    Moving to and from a bed to a chair (including a wheelchair)? 1  -KE 1  -SB    Standing up from a chair using your arms (e.g., wheelchair, bedside chair)? 1  -KE 1  -SB    Climbing 3-5 steps with a railing? 1  -KE 1  -SB    To walk in hospital room? 1  -KE 1  -SB    AM-PAC 6 Clicks Score (PT) 7  -KE 6  -SB    Highest Level of Mobility Goal 2 --> Bed activities/dependent transfer  -KE 2 --> Bed activities/dependent transfer  -SB      Row Name 12/27/23 1139 12/27/23 1134       Functional Assessment    Outcome Measure Options AM-PAC 6 Clicks Basic Mobility (PT)  -KE AM-PAC 6 Clicks Daily Activity (OT)  -AC              User Key  (r) = Recorded By, (t) = Taken By, (c) = Cosigned By      Initials Name Provider Type    AC Sepideh Feldman OT Occupational Therapist    Joyce Soto, RN Registered Nurse    Petty Beatty, PT Physical Therapist                                 Physical Therapy Education       Title: PT OT SLP Therapies (In Progress)       Topic: Physical Therapy (In Progress)       Point: Mobility training (In Progress)       Learning Progress Summary             Patient Acceptance, E, NR by  at 12/27/2023 1045                         Point: Home exercise program (Not Started)       Learner Progress:  Not documented in this visit.              Point: Body mechanics (In Progress)       Learning Progress Summary             Patient Acceptance, E, NR by  at 12/27/2023 1045                         Point: Precautions (In Progress)       Learning Progress Summary             Patient Acceptance, E, NR by  at 12/27/2023 1045                                         User Key       Initials Effective Dates Name Provider Type Discipline     11/16/23 -  Petty Mckoy, CHELO Physical Therapist PT                  PT Recommendation and Plan  Planned Therapy Interventions (PT): balance training, bed mobility training,  gait training, home exercise program, patient/family education, neuromuscular re-education, postural re-education, ROM (range of motion), stair training, strengthening, stretching, transfer training  Plan of Care Reviewed With: patient, family  Outcome Evaluation: Pt presents with decreased endurance, balance deficits, and generalized weakness leading to impairments in functional mobility below baseline. Req ModAx2 for steps to HOB w/ FWW. Pt will benefit from skilled IP PT to address impairments and return to PLOF. PT rec SNF at d/c for best functional outcome     Time Calculation:   PT Evaluation Complexity  History, PT Evaluation Complexity: 3 or more personal factors and/or comorbidities  Examination of Body Systems (PT Eval Complexity): total of 3 or more elements  Clinical Presentation (PT Evaluation Complexity): evolving  Clinical Decision Making (PT Evaluation Complexity): moderate complexity  Overall Complexity (PT Evaluation Complexity): moderate complexity     PT Charges       Row Name 12/27/23 1141             Time Calculation    Start Time 1045  -KE      PT Received On 12/27/23  -KE      PT - Next Appointment --  -KE      PT Goal Re-Cert Due Date 01/06/24  -KE         Untimed Charges    PT Eval/Re-eval Minutes 52  -KE         Total Minutes    Untimed Charges Total Minutes 52  -KE       Total Minutes 52  -KE                User Key  (r) = Recorded By, (t) = Taken By, (c) = Cosigned By      Initials Name Provider Type    Petty Beatty PT Physical Therapist                  Therapy Charges for Today       Code Description Service Date Service Provider Modifiers Qty    [id number] HC PT EVAL MOD COMPLEXITY 4 12/27/2023 Petty Mckoy PT GP 1            PT G-Codes  Outcome Measure Options: AM-PAC 6 Clicks Basic Mobility (PT)  AM-PAC 6 Clicks Score (PT): 7  PT Discharge Summary  Anticipated Discharge Disposition (PT): skilled nursing facility    Petty Mckoy PT  12/27/2023      Electronically  signed by Petty Mckoy, PT at 23 1151       Petty Mckoy, PT at 23 1045  Version 1 of 2         Patient Name: Omar Mendoza  : 1940    MRN: [id number]                              Today's Date: 2023       Admit Date: 2023    Visit Dx:     ICD-10-CM ICD-9-CM   1. Generalized weakness  R53.1 780.79   2. Acute UTI  N39.0 599.0   3. Bullous pemphigoid  L12.0 694.5   4. Venous insufficiency  I87.2 459.81     Patient Active Problem List   Diagnosis    UTI (urinary tract infection)    Bullous pemphigoid    Essential hypertension    Dyslipidemia    Edema of lower extremity    Cholestatic hepatitis    Acute constipation    Acute urinary retention    Uterine prolapse    Bradycardia    Pressure injury of buttock, unstageable    Type 2 diabetes mellitus with ketoacidosis, without long-term current use of insulin    Disorientation    Pleural effusion    Severe malnutrition     Past Medical History:   Diagnosis Date    CAD (coronary artery disease)     Dementia     Diabetes mellitus     Hyperlipemia     Hypertension      History reviewed. No pertinent surgical history.   General Information       Row Name 23 1118          Physical Therapy Time and Intention    Document Type evaluation  -KE     Mode of Treatment physical therapy  -       Row Name 23 1118          General Information    Patient Profile Reviewed yes  -KE     Prior Level of Function independent:;all household mobility;community mobility;ADL's  Prior to Thanksgiving pt ind for mobility w/ SPC and ADLs. Per family pt experienced a functional decline since then and has been a facility resident of [facility] rehab facility.  -KE     Existing Precautions/Restrictions fall;other (see comments)  poor skin integrity  -KE     Barriers to Rehab medically complex;previous functional deficit;cognitive status  -KE       Row Name 23 1118          Living Environment    People in Home spouse  -MIA       Row Name 23 1118           Home Main Entrance    Number of Stairs, Main Entrance one  ramp has been ordered  -       Row Name 12/27/23 1118          Stairs Within Home, Primary    Number of Stairs, Within Home, Primary twelve  -KE     Stair Railings, Within Home, Primary railing on right side (ascending)  -KE     Stairs Comment, Within Home, Primary steps to basement, pt not required to access  -       Row Name 12/27/23 1118          Cognition    Orientation Status (Cognition) oriented to;person;verbal cues/prompts needed for orientation;time;disoriented to;place  -       Row Name 12/27/23 1118          Safety Issues, Functional Mobility    Safety Issues Affecting Function (Mobility) ability to follow commands;awareness of need for assistance;friction/shear risk;insight into deficits/self-awareness;problem-solving;safety precaution awareness;safety precautions follow-through/compliance;sequencing abilities  -KE     Impairments Affecting Function (Mobility) balance;cognition;endurance/activity tolerance;postural/trunk control;strength  -KE     Cognitive Impairments, Mobility Safety/Performance attention;awareness, need for assistance;insight into deficits/self-awareness;problem-solving/reasoning;safety precaution awareness;safety precaution follow-through;sequencing abilities  -KE               User Key  (r) = Recorded By, (t) = Taken By, (c) = Cosigned By      Initials Name Provider Type    Petty Beatty, PT Physical Therapist                   Mobility       Row Name 12/27/23 1123          Bed Mobility    Bed Mobility supine-sit;sit-supine;rolling right  -KE     Supine-Sit [place] (Bed Mobility) maximum assist (25% patient effort);2 person assist  -KE     Sit-Supine [place] (Bed Mobility) dependent (less than 25% patient effort);2 person assist  -KE     Assistive Device (Bed Mobility) bed rails;draw sheet  -KE     Comment, (Bed Mobility) Pt requiring increased time and effort, VCs for sequencing  -       Row  Name 12/27/23 1123          Transfers    Comment, (Transfers) x1 STS from EOB, attempted bed-chair transfer however deemed unsafe d/t patient's poor command following and weakness; Pt taking steps toward HOB, demo difficulty weight shifting and unable to fully clear feet to take steps. pt w/ difficulty achieving full upright posture  -KE       Row Name 12/27/23 1123          Sit-Stand Transfer    Sit-Stand [place] (Transfers) moderate assist (50% patient effort);2 person assist  -KE     Assistive Device (Sit-Stand Transfers) walker, front-wheeled  -KE     Comment, (Sit-Stand Transfer) x1 STS from EOB w/ ModAx2. Pt demo poor UE placement, VCs for correction. tactile cues req for completing stand, Pt slow with initiation of STS  -KE               User Key  (r) = Recorded By, (t) = Taken By, (c) = Cosigned By      Initials Name Provider Type    Petty Beatty, PT Physical Therapist                   Obj/Interventions       Row Name 12/27/23 1129          Range of Motion Comprehensive    General Range of Motion bilateral lower extremity ROM WFL  -KE       Row Name 12/27/23 1129          Strength Comprehensive (MMT)    General Manual Muscle Testing (MMT) Assessment lower extremity strength deficits identified  -KE     Comment, General Manual Muscle Testing (MMT) Assessment R Hip flexion 2/5, R ankle DF and knee extensors grossly 3/5; L LE grossly 3/5  -KE       Row Name 12/27/23 1129          Balance    Balance Assessment sitting static balance;sitting dynamic balance;sit to stand dynamic balance;standing static balance;standing dynamic balance  -KE     Static Sitting Balance minimal assist  -KE     Dynamic Sitting Balance minimal assist  -KE     Position, Sitting Balance unsupported;sitting edge of bed  -KE     Sit to Stand Dynamic Balance moderate assist;2-person assist  -KE     Static Standing Balance minimal assist;2-person assist  -KE     Dynamic Standing Balance moderate assist;2-person assist  -KE      Position/Device Used, Standing Balance supported;walker, front-wheeled  -KE     Balance Interventions sitting;standing;sit to stand;supported;static;dynamic  -KE     Comment, Balance No overt LOB  -       Row Name 12/27/23 1129          Sensory Assessment (Somatosensory)    Sensory Assessment (Somatosensory) unable/difficult to assess  poor command following  -KE               User Key  (r) = Recorded By, (t) = Taken By, (c) = Cosigned By      Initials Name Provider Type    Petty Beatty, PT Physical Therapist                   Goals/Plan       Row Name 12/27/23 1138          Bed Mobility Goal 1 (PT)    Activity/Assistive Device (Bed Mobility Goal 1, PT) sit to supine/supine to sit  -KE     [place] Level/Cues Needed (Bed Mobility Goal 1, PT) moderate assist (50-74% patient effort)  -KE     Time Frame (Bed Mobility Goal 1, PT) long term goal (LTG);10 days  -KE     Progress/Outcomes (Bed Mobility Goal 1, PT) new goal  -       Row Name 12/27/23 1138          Transfer Goal 1 (PT)    Activity/Assistive Device (Transfer Goal 1, PT) bed-to-chair/chair-to-bed  -KE     [place] Level/Cues Needed (Transfer Goal 1, PT) minimum assist (75% or more patient effort)  -KE     Time Frame (Transfer Goal 1, PT) long term goal (LTG);10 days  -KE     Progress/Outcome (Transfer Goal 1, PT) new goal  -       Row Name 12/27/23 1138          Gait Training Goal 1 (PT)    Activity/Assistive Device (Gait Training Goal 1, PT) gait (walking locomotion);assistive device use  -KE     [place] Level (Gait Training Goal 1, PT) minimum assist (75% or more patient effort)  -KE     Distance (Gait Training Goal 1, PT) 25  -KE     Time Frame (Gait Training Goal 1, PT) long term goal (LTG);10 days  -KE     Progress/Outcome (Gait Training Goal 1, PT) new goal  -       Row Name 12/27/23 1138          Therapy Assessment/Plan (PT)    Planned Therapy Interventions (PT) balance training;bed mobility training;gait training;home  exercise program;patient/family education;neuromuscular re-education;postural re-education;ROM (range of motion);stair training;strengthening;stretching;transfer training  -               User Key  (r) = Recorded By, (t) = Taken By, (c) = Cosigned By      Initials Name Provider Type    Petty Beatty, PT Physical Therapist                   Clinical Impression       Row Name 12/27/23 1133          Pain    Pretreatment Pain Rating 0/10 - no pain  -KE     Posttreatment Pain Rating 0/10 - no pain  -KE     Pain Intervention(s) Ambulation/increased activity;Repositioned  -       Row Name 12/27/23 1133          Plan of Care Review    Plan of Care Reviewed With patient;family  -     Outcome Evaluation Pt presents with decreased endurance, balance deficits, and generalized weakness leading to impairments in functional mobility below baseline. Req ModAx2 for steps to HOB w/ FWW. Pt will benefit from skilled IP PT to address impairments and return to PLOF. PT rec SNF at d/c for best functional outcome  -       Row Name 12/27/23 1133          Therapy Assessment/Plan (PT)    Rehab Potential (PT) good, to achieve stated therapy goals  -     Criteria for Skilled Interventions Met (PT) yes;meets criteria;skilled treatment is necessary  -     Therapy Frequency (PT) daily  -       Row Name 12/27/23 1133          Vital Signs    Pre Systolic BP Rehab 109  -KE     Pre Treatment Diastolic BP 63  -KE     Pretreatment Heart Rate (beats/min) 52  -KE     Posttreatment Heart Rate (beats/min) 51  -KE     Pre SpO2 (%) 98  -KE     O2 Delivery Pre Treatment room air  -KE     O2 Delivery Intra Treatment room air  -KE     Post SpO2 (%) 96  -KE     O2 Delivery Post Treatment room air  -KE     Pre Patient Position Supine  -KE     Intra Patient Position Standing  -KE     Post Patient Position Supine  -KE       Row Name 12/27/23 1133          Positioning and Restraints    Pre-Treatment Position in bed  -KE     Post Treatment  Position bed  -KE     In Bed notified nsg;supine;call light within reach;encouraged to call for assist;exit alarm on;with family/caregiver;side rails up x2  -KE               User Key  (r) = Recorded By, (t) = Taken By, (c) = Cosigned By      Initials Name Provider Type    Petty Beatty, CHELO Physical Therapist                   Outcome Measures       Row Name 12/27/23 1139 12/27/23 0609       How much help from another person do you currently need...    Turning from your back to your side while in flat bed without using bedrails? 2  -KE 1  -SB    Moving from lying on back to sitting on the side of a flat bed without bedrails? 1  -KE 1  -SB    Moving to and from a bed to a chair (including a wheelchair)? 1  -KE 1  -SB    Standing up from a chair using your arms (e.g., wheelchair, bedside chair)? 1  -KE 1  -SB    Climbing 3-5 steps with a railing? 1  -KE 1  -SB    To walk in hospital room? 1  -KE 1  -SB    AM-PAC 6 Clicks Score (PT) 7  -KE 6  -SB    Highest Level of Mobility Goal 2 --> Bed activities/dependent transfer  -KE 2 --> Bed activities/dependent transfer  -SB      Row Name 12/27/23 1139 12/27/23 1134       Functional Assessment    Outcome Measure Options AM-PAC 6 Clicks Basic Mobility (PT)  -KE AM-PAC 6 Clicks Daily Activity (OT)  -AC              User Key  (r) = Recorded By, (t) = Taken By, (c) = Cosigned By      Initials Name Provider Type    Sepideh Polanco OT Occupational Therapist    Joyce Soto, RN Registered Nurse    Petty Beatty, PT Physical Therapist                                 Physical Therapy Education       Title: PT OT SLP Therapies (In Progress)       Topic: Physical Therapy (In Progress)       Point: Mobility training (In Progress)       Learning Progress Summary             Patient Acceptance, E, NR by MIA at 12/27/2023 1045                         Point: Home exercise program (Not Started)       Learner Progress:  Not documented in this visit.              Point: Body  mechanics (In Progress)       Learning Progress Summary             Patient Acceptance, E, NR by MIA at 12/27/2023 1045                         Point: Precautions (In Progress)       Learning Progress Summary             Patient Acceptance, E, NR by MIA at 12/27/2023 1045                                         User Key       Initials Effective Dates Name Provider Type Discipline     11/16/23 -  Petty Mckoy, PT Physical Therapist PT                  PT Recommendation and Plan  Planned Therapy Interventions (PT): balance training, bed mobility training, gait training, home exercise program, patient/family education, neuromuscular re-education, postural re-education, ROM (range of motion), stair training, strengthening, stretching, transfer training  Plan of Care Reviewed With: patient, family  Outcome Evaluation: Pt presents with decreased endurance, balance deficits, and generalized weakness leading to impairments in functional mobility below baseline. Req ModAx2 for steps to HOB w/ FWW. Pt will benefit from skilled IP PT to address impairments and return to PLOF. PT rec SNF at d/c for best functional outcome     Time Calculation:   PT Evaluation Complexity  History, PT Evaluation Complexity: 3 or more personal factors and/or comorbidities  Examination of Body Systems (PT Eval Complexity): total of 3 or more elements  Clinical Presentation (PT Evaluation Complexity): evolving  Clinical Decision Making (PT Evaluation Complexity): moderate complexity  Overall Complexity (PT Evaluation Complexity): moderate complexity     PT Charges       Row Name 12/27/23 1141             Time Calculation    Start Time 1045  -KE      PT Received On 12/27/23  -KE      PT - Next Appointment 01/06/24  -KE         Untimed Charges    PT Eval/Re-eval Minutes 52  -KE         Total Minutes    Untimed Charges Total Minutes 52  -KE       Total Minutes 52  -KE                User Key  (r) = Recorded By, (t) = Taken By, (c) = Cosigned By       Initials Name Provider Type    Petty Beatty, PT Physical Therapist                  Therapy Charges for Today       Code Description Service Date Service Provider Modifiers Qty    56877393205 HC PT EVAL MOD COMPLEXITY 4 2023 Petty Mckoy, PT GP 1            PT G-Lizet  Outcome Measure Options: AM-PAC 6 Clicks Basic Mobility (PT)  AM-PAC 6 Clicks Score (PT): 7  PT Discharge Summary  Anticipated Discharge Disposition (PT): skilled nursing facility    Petty Mckoy PT  2023      Electronically signed by Petty Mckoy, PT at 23 1144          Occupational Therapy Notes (most recent note)        Sepideh Feldman, OT at 23 1044          Patient Name: Omar Mendoza  : 1940    MRN: [id number]                              Today's Date: 2023       Admit Date: 2023    Visit Dx:     ICD-10-CM ICD-9-CM   1. Generalized weakness  R53.1 780.79   2. Acute UTI  N39.0 599.0   3. Bullous pemphigoid  L12.0 694.5   4. Venous insufficiency  I87.2 459.81     Patient Active Problem List   Diagnosis    UTI (urinary tract infection)    Bullous pemphigoid    Essential hypertension    Dyslipidemia    Edema of lower extremity    Cholestatic hepatitis    Acute constipation    Acute urinary retention    Uterine prolapse    Bradycardia    Pressure injury of buttock, unstageable    Type 2 diabetes mellitus with ketoacidosis, without long-term current use of insulin    Disorientation    Pleural effusion    Severe malnutrition     Past Medical History:   Diagnosis Date    CAD (coronary artery disease)     Dementia     Diabetes mellitus     Hyperlipemia     Hypertension      History reviewed. No pertinent surgical history.   General Information       Row Name 23 1044          OT Time and Intention    Document Type evaluation  -AC     Mode of Treatment occupational therapy  -AC       Row Name 23 1044          General Information    Patient Profile Reviewed yes  -AC     Prior  Level of Function independent:;all household mobility;ADL's;driving  ind prior to Nov, used SPC to ambulate  -     Existing Precautions/Restrictions fall  confused  -     Barriers to Rehab medically complex  -       Row Name 12/27/23 1044          Occupational Profile    Environmental Supports and Barriers (Occupational Profile) SPC, RW, w/c, tub shower with shower seat  -       Row Name 12/27/23 1044          Living Environment    People in Home spouse  -       Row Name 12/27/23 1044          Home Main Entrance    Number of Stairs, Main Entrance other (see comments);one  entry ramp ordered  -       Row Name 12/27/23 1044          Stairs Within Home, Primary    Stairs, Within Home, Primary basement - does not need to access  -     Number of Stairs, Within Home, Primary twelve  -     Stair Railings, Within Home, Primary railing on right side (ascending)  -       Row Name 12/27/23 1044          Cognition    Orientation Status (Cognition) oriented to;person;verbal cues/prompts needed for orientation;time;disoriented to;place  -       Row Name 12/27/23 1044          Safety Issues, Functional Mobility    Safety Issues Affecting Function (Mobility) ability to follow commands;at risk behavior observed;awareness of need for assistance;insight into deficits/self-awareness;judgment;positioning of assistive device;problem-solving;safety precautions follow-through/compliance;sequencing abilities  -     Impairments Affecting Function (Mobility) balance;cognition;endurance/activity tolerance;postural/trunk control;strength  -     Cognitive Impairments, Mobility Safety/Performance attention;awareness, need for assistance;insight into deficits/self-awareness;judgment;problem-solving/reasoning;safety precaution awareness;safety precaution follow-through;sequencing abilities  -               User Key  (r) = Recorded By, (t) = Taken By, (c) = Cosigned By      Initials Name Provider Type    Sepideh Polanco  OT Occupational Therapist                     Mobility/ADL's       Row Name 12/27/23 1124          Bed Mobility    Bed Mobility supine-sit;sit-supine  -     Supine-Sit [place] (Bed Mobility) verbal cues;nonverbal cues (demo/gesture);maximum assist (25% patient effort);2 person assist  -     Sit-Supine [place] (Bed Mobility) verbal cues;nonverbal cues (demo/gesture);maximum assist (25% patient effort);2 person assist  -     Bed Mobility, Safety Issues decreased use of arms for pushing/pulling;decreased use of legs for bridging/pushing;impaired trunk control for bed mobility  -     Assistive Device (Bed Mobility) bed rails;draw sheet;head of bed elevated  -       Row Name 12/27/23 1124          Transfers    Transfers sit-stand transfer  -       Row Name 12/27/23 [facility]4          Sit-Stand Transfer    Sit-Stand [place] (Transfers) maximum assist (25% patient effort);2 person assist  -     Assistive Device (Sit-Stand Transfers) walker, front-wheeled  -     Comment, (Sit-Stand Transfer) VCs for hand placement, upright posture - unable to achieve full upright posture  -       Row Name 12/27/23 [facility]4          Functional Mobility    Functional Mobility- Ind. Level verbal cues required;nonverbal cues required (demo/gesture);moderate assist (50% patient effort);2 person assist required  -     Functional Mobility- Device walker, front-wheeled  -     Functional Mobility-Distance (Feet) 2  -     Functional Mobility- Safety Issues step length decreased;weight-shifting ability decreased  -     Functional Mobility- Comment pt unable to pick feet up - scooted feet across the floor, increased time, effort and encouragement  -       Row Name 12/27/23 [facility]4          Activities of Daily Living    BADL Assessment/Intervention lower body dressing  -       Row Name 12/27/23 [facility]4          Lower Body Dressing Assessment/Training    [place] Level (Lower Body Dressing) doff;don;socks;dependent (less  than 25% patient effort)  -AC     Position (Lower Body Dressing) supine  -               User Key  (r) = Recorded By, (t) = Taken By, (c) = Cosigned By      Initials Name Provider Type    Sepideh Polanco, OT Occupational Therapist                   Obj/Interventions       Row Name 12/27/23 1127          Sensory Assessment (Somatosensory)    Sensory Assessment (Somatosensory) unable/difficult to assess  -       Row Name 12/27/23 1127          Vision Assessment/Intervention    Visual Impairment/Limitations corrective lenses full-time  able to id # fingers, pt lethargic and eyes closed at times  -       Row Name 12/27/23 1127          Range of Motion Comprehensive    General Range of Motion bilateral upper extremity ROM WNL  -       Row Name 12/27/23 1127          Strength Comprehensive (MMT)    Comment, General Manual Muscle Testing (MMT) Assessment BUE grossly 3+/5  -               User Key  (r) = Recorded By, (t) = Taken By, (c) = Cosigned By      Initials Name Provider Type    Sepideh Polanco, OT Occupational Therapist                   Goals/Plan       Row Name 12/27/23 1133          Bed Mobility Goal 1 (OT)    Activity/Assistive Device (Bed Mobility Goal 1, OT) supine to sit;sit to supine  -AC     [place] Level/Cues Needed (Bed Mobility Goal 1, OT) verbal cues required;nonverbal cues (demo/gesture) required;moderate assist (50-74% patient effort)  -AC     Time Frame (Bed Mobility Goal 1, OT) by discharge  -AC     Progress/Outcomes (Bed Mobility Goal 1, OT) new goal;goal ongoing  -       Row Name 12/27/23 1133          Transfer Goal 1 (OT)    Activity/Assistive Device (Transfer Goal 1, OT) bed-to-chair/chair-to-bed;toilet;walker, rolling  -AC     [place] Level/Cues Needed (Transfer Goal 1, OT) verbal cues required;nonverbal cues (demo/gesture) required;moderate assist (50-74% patient effort)  -AC     Time Frame (Transfer Goal 1, OT) by discharge  -AC     Progress/Outcome (Transfer Goal 1,  OT) new goal;goal ongoing  -       Row Name 12/27/23 1133          Grooming Goal 1 (OT)    Activity/Device (Grooming Goal 1, OT) oral care;wash face, hands  -AC     [place] (Grooming Goal 1, OT) verbal cues required;nonverbal cues (demo/gesture) required;minimum assist (75% or more patient effort)  -AC     Time Frame (Grooming Goal 1, OT) by discharge  -AC     Progress/Outcome (Grooming Goal 1, OT) new goal;goal ongoing  -       Row Name 12/27/23 1133          Self-Feeding Goal 1 (OT)    Activity/Device (Self-Feeding Goal 1, OT) self-feeding skills, all  -AC     [place] Level/Cues Needed (Self-Feeding Goal 1, OT) verbal cues required;minimum assist (75% or more patient effort)  -AC     Time Frame (Self-Feeding Goal 1, OT) by discharge  -AC     Progress/Outcomes (Self-Feeding Goal 1, OT) new goal;goal ongoing  -       Row Name 12/27/23 1133          Therapy Assessment/Plan (OT)    Planned Therapy Interventions (OT) activity tolerance training;BADL retraining;functional balance retraining;occupation/activity based interventions;patient/caregiver education/training;strengthening exercise;transfer/mobility retraining  -               User Key  (r) = Recorded By, (t) = Taken By, (c) = Cosigned By      Initials Name Provider Type    AC Sepideh Feldman, OT Occupational Therapist                   Clinical Impression       Row Name 12/27/23 1040          Pain Assessment    Pretreatment Pain Rating 0/10 - no pain  -     Posttreatment Pain Rating 0/10 - no pain  -       Row Name 12/27/23 1044          Plan of Care Review    Plan of Care Reviewed With patient;family  -     Outcome Evaluation Pt presents below baseline with self care/mobility d/t lethargy, decr command following, weakness, decr balance and activity tolerance.  Pt dep LBD, max/dep x2  bed mobility,  mod A x 2 STS  with RW with difficulty achieving full upright position.  OT will follow to advance pt toward PLOF.  Recommend SNF upon d/c.   -       Row Name 12/27/23 1044          Therapy Assessment/Plan (OT)    Rehab Potential (OT) good, to achieve stated therapy goals  -     Criteria for Skilled Therapeutic Interventions Met (OT) yes;skilled treatment is necessary  -     Therapy Frequency (OT) daily  -       Row Name 12/27/23 1044          Therapy Plan Review/Discharge Plan (OT)    Anticipated Discharge Disposition (OT) [facility] nursing facility  -       Row Name 12/27/23 1044          Vital Signs    Pre Systolic BP Rehab 109  -AC     Pre Treatment Diastolic BP 63  -AC     Pretreatment Heart Rate (beats/min) 50  -AC     Posttreatment Heart Rate (beats/min) 52  -AC     Pre SpO2 (%) 96  -AC     O2 Delivery Pre Treatment room air  -AC     O2 Delivery Intra Treatment room air  -AC     Post SpO2 (%) 99  -AC     O2 Delivery Post Treatment room air  -AC     Pre Patient Position Supine  -AC     Post Patient Position Supine  -AC       Row Name 12/27/23 1044          Positioning and Restraints    Pre-Treatment Position in bed  -AC     Post Treatment Position bed  -AC     In Bed notified nsg;supine;call light within reach;encouraged to call for assist;exit alarm on;with family/caregiver;waffle boots/both  -AC               User Key  (r) = Recorded By, (t) = Taken By, (c) = Cosigned By      Initials Name Provider Type    AC Sepideh Feldman, OT Occupational Therapist                   Outcome Measures       Row Name 12/27/23 1134          How much help from another is currently needed...    Putting on and taking off regular lower body clothing? 1  -AC     Bathing (including washing, rinsing, and drying) 2  -AC     Putting on and taking off regular upper body clothing 2  -AC     Taking care of personal grooming (such as brushing teeth) 2  -AC     Eating meals 2  -AC       Row Name 12/27/23 1139 12/27/23 0609       How much help from another person do you currently need...    Turning from your back to your side while in flat bed without using bedrails? 2   -KE 1  -SB    Moving from lying on back to sitting on the side of a flat bed without bedrails? 1  -KE 1  -SB    Moving to and from a bed to a chair (including a wheelchair)? 1  -KE 1  -SB    Standing up from a chair using your arms (e.g., wheelchair, bedside chair)? 1  -KE 1  -SB    Climbing 3-5 steps with a railing? 1  -KE 1  -SB    To walk in hospital room? 1  -KE 1  -SB    AM-PAC 6 Clicks Score (PT) 7  -KE 6  -SB    Highest Level of Mobility Goal 2 --> Bed activities/dependent transfer  -KE 2 --> Bed activities/dependent transfer  -SB      Row Name 12/27/23 1139 12/27/23 1134       Functional Assessment    Outcome Measure Options AM-PAC 6 Clicks Basic Mobility (PT)  -KE AM-PAC 6 Clicks Daily Activity (OT)  -AC              User Key  (r) = Recorded By, (t) = Taken By, (c) = Cosigned By      Initials Name Provider Type    Sepideh Polanco OT Occupational Therapist    Joyce Soto, RN Registered Nurse    Petty Beatty, PT Physical Therapist                    Occupational Therapy Education       Title: PT OT SLP Therapies (In Progress)       Topic: Occupational Therapy (In Progress)       Point: ADL training (In Progress)       Description:   Instruct learner(s) on proper safety adaptation and remediation techniques during self care or transfers.   Instruct in proper use of assistive devices.                  Learning Progress Summary             Patient Acceptance, E, NR by  at 12/27/2023 1135                         Point: Home exercise program (Not Started)       Description:   Instruct learner(s) on appropriate technique for monitoring, assisting and/or progressing therapeutic exercises/activities.                  Learner Progress:  Not documented in this visit.              Point: Precautions (Not Started)       Description:   Instruct learner(s) on prescribed precautions during self-care and functional transfers.                  Learner Progress:  Not documented in this visit.               Point: Body mechanics (Not Started)       Description:   Instruct learner(s) on proper positioning and spine alignment during self-care, functional mobility activities and/or exercises.                  Learner Progress:  Not documented in this visit.                              User Key       Initials Effective Dates Name Provider Type Discipline     02/03/23 -  Sepideh Feldman, OT Occupational Therapist OT                  OT Recommendation and Plan  Planned Therapy Interventions (OT): activity tolerance training, BADL retraining, functional balance retraining, occupation/activity based interventions, patient/caregiver education/training, strengthening exercise, transfer/mobility retraining  Therapy Frequency (OT): daily  Plan of Care Review  Plan of Care Reviewed With: patient, family  Outcome Evaluation: Pt presents below baseline with self care/mobility d/t lethargy, decr command following, weakness, decr balance and activity tolerance.  Pt dep LBD, max/dep x2  bed mobility,  mod A x 2 STS  with RW with difficulty achieving full upright position.  OT will follow to advance pt toward PLOF.  Recommend SNF upon d/c.     Time Calculation:   Evaluation Complexity (OT)  Review Occupational Profile/Medical/Therapy History Complexity: expanded/moderate complexity  Assessment, Occupational Performance/Identification of Deficit Complexity: 3-5 performance deficits  Clinical Decision Making Complexity (OT): detailed assessment/moderate complexity  Overall Complexity of Evaluation (OT): moderate complexity     Time Calculation- OT       Row Name 12/27/23 1044             Time Calculation- OT    OT Start Time 1044  -AC      OT Received On 12/27/23  -AC      OT Goal Re-Cert Due Date 01/06/24  -AC         Untimed Charges    OT Eval/Re-eval Minutes 50  -AC         Total Minutes    Untimed Charges Total Minutes 50  -AC       Total Minutes 50  -AC                User Key  (r) = Recorded By, (t) = Taken By, (c) = Cosigned By       Initials Name Provider Type    AC Sepideh Feldman OT Occupational Therapist                  Therapy Charges for Today       Code Description Service Date Service Provider Modifiers Qty    [id number] HC OT EVAL MOD COMPLEXITY 4 12/27/2023 Sepideh Feldman OT GO 1                 Sepideh SARABIA. ARNOLD Feldman  12/27/2023    Electronically signed by Sepideh Feldman OT at 12/27/23 1141

## 2023-12-28 NOTE — CASE MANAGEMENT/SOCIAL WORK
Continued Stay Note  [facility]     Patient Name: Omar Mendoza  MRN: [id number]  Today's Date: 12/28/2023    Admit Date: 12/26/2023    Plan: rehab   Discharge Plan       Row Name 12/28/23 1134       Plan    Plan rehab    Patient/Family in Agreement with Plan yes    Plan Comments I spoke with the son of this patient regarding PT and OT recommending rehab. He stated that he does not want her to return to [facility] rehab of [place], but wants a rehab in [place]. He asked that CM make a referral to [facility], which I did. He also stated that he and the family will be visiting other rehabs in [place] today, and CM will call him tomorrow, Friday, to get more names for referrals. She will need transport arranged. CM will continue to follow.    Final Discharge Disposition Code 03 - skilled nursing facility (SNF)                   Discharge Codes    No documentation.                 Expected Discharge Date and Time       Expected Discharge Date Expected Discharge Time    Jan 1, 2024               Eleonora John RN

## 2023-12-28 NOTE — CONSULTS
"[facility]  GYN History and Physical    Chief Complaint   Patient presents with    Foot Swelling     Pt arrived via EMS for bilateral feet swelling, blister noted to bottom of right foot       Subjective     Patient is a 83 y.o. female  with history of type 2 diabetes, hypertension, urinary retention with Loza catheter in place, history of bullous pemphigoid, CAD, hyperlipidemia, and presumed dementia  admitted for weakness and bullous pemphigoid blister on R heel.      GYN consulted for uterine prolapse.  She denies being treated for prolapse in the past.  She denies a history of prolapse.  Patient denies feeling and pressure or bulge in her vagina.  She has not noticed anything \"hanging\" outside of her vagina.  She denies vaginal bleeding.  She denies vaginal discharge.  She reports a history of 2 vaginal deliveries of \"big babies.\"  She does not have a current GYN.      She endorses 3 weeks of urinary incontinence.  States it is constant. No dysuria or hematuria.  Per notes, patient with urinary retention and has had loza catheter in place.                 Past OB History: .   x 2.         OB History   No obstetric history on file.       Past Medical History: Past Medical History:   Diagnosis Date    CAD (coronary artery disease)     Dementia     Diabetes mellitus     Hyperlipemia     Hypertension       Past Surgical History History reviewed. No pertinent surgical history.   Family History: History reviewed. No pertinent family history.   Social History:  reports that she has never smoked. She has never used smokeless tobacco.   reports no history of alcohol use.   has no history on file for drug use.        General ROS: Pertinent items are noted in HPI, all other systems reviewed and negative    Objective       Vital Signs Range for the last 24 hours  Temperature: Temp:  [97.2 °F (36.2 °C)-98.5 °F (36.9 °C)] 97.2 °F (36.2 °C)   Temp Source: Temp src: Axillary   BP: BP: ()/(58-76) 95/63 "   Pulse: Heart Rate:  [57-69] 67   Respirations: Resp:  [16-18] 18   SPO2: SpO2:  [92 %-97 %] 96 %   O2 Amount (l/min):     O2 Devices Device (Oxygen Therapy): room air   Weight: Weight:  [81.6 kg (179 lb 14.3 oz)-98.5 kg (217 lb 1.6 oz)] 98.5 kg (217 lb 1.6 oz)               Physical Examination:   General appearance - awake and in no acute distress, resting comfortably in bed  Mental status - alert, oriented to person, place, but not time  Chest - no deformities, symmetric air entry  Abdomen - soft, nontender, nondistended, no masses or organomegaly  Pelvic - normal labia majora and minora.  Uterine procidentia.  No excoriations on cervix.  With gentle pressure, the uterus was replaced into the vaginal canal.  Patient tolerated well.  Exam chaperoned by her RN.          Laboratory Results:   Results from last 7 days   Lab Units 12/28/23  1239   WBC 10*3/mm3 6.31  6.14   HEMOGLOBIN g/dL 9.1*  9.0*   HEMATOCRIT % 27.6*  27.1*   PLATELETS 10*3/mm3 134*  128*               Assessment & Plan       UTI (urinary tract infection)    Bullous pemphigoid    Essential hypertension    Dyslipidemia    Edema of lower extremity    Acute constipation    Acute urinary retention    Uterine prolapse    Bradycardia    Pressure injury of buttock, unstageable    Type 2 diabetes mellitus with ketoacidosis, without long-term current use of insulin    Disorientation    Pleural effusion    Severe malnutrition        Assessment:  1.  Uterine procidentia: with gentle pressure, uterus was returned to vaginal canal.     Plan:  1.  Discussed treatment options for uterine procidentia including pessary vs colpocleisis.  R/B/A of both options reviewed with patient.  She states she does not want any intervention.  Discussed risks of bleeding and pain with untreated procidentia.  This could also be the cause of her urinary retention.  She should follow up with GYN as an outpatient to again discuss these options.  Can call [facility] at [phone] to  schedule.   2. Plan of care has been reviewed with patient.  Risks, benefits of treatment plan have been discussed. All questions have been answered.    3. Remainder of cares per primary team    GYN will sign off at this time.  Please call with any further questions.  Thank you for your consult.         Yoselin Zhou MD  12/28/2023  13:05 EST

## 2023-12-28 NOTE — PROGRESS NOTES
[facility] Medicine Services  PROGRESS NOTE    Patient Name: Omar Mendoza  : 1940  MRN: [id number]    Date of Admission: 2023  Primary Care Physician: Varun Pa MD    Subjective   Subjective     CC:  Follow up weakness, right foot blister     HPI:  Patient seen resting in bed in no apparent distress. No acute events overnight per nursing. Remains pleasantly confused. No new complaints at this time.     Objective   Objective     Vital Signs:   Temp:  [96.9 °F (36.1 °C)-98.5 °F (36.9 °C)] 97.6 °F (36.4 °C)  Heart Rate:  [54-69] 67  Resp:  [16-18] 18  BP: (105-121)/(58-76) 121/76     Physical Exam:  Constitutional: No acute distress, awake, alert, chronically ill appearing   HENT: NCAT, mucous membranes moist  Respiratory: Clear to auscultation bilaterally, respiratory effort normal   Cardiovascular: RRR, no murmurs, cap refill brisk   Gastrointestinal: Positive bowel sounds, soft, nontender, nondistended  Musculoskeletal: 1+ BLE edema, right foot dressing in place   Psychiatric: flat affect, cooperative  Neurologic: Oriented x 2, moves all extremities, speech clear  Skin: warm, dry, no visible rash     Results Reviewed:  LAB RESULTS:      Lab 23  1404 23  1403 23  0431 23  2336 23  2131   WBC  --  7.25  --   --  7.78   HEMOGLOBIN  --  10.5*  --   --  10.3*   HEMATOCRIT  --  31.4*  --   --  31.3*   PLATELETS  --  137*  --   --  159   NEUTROS ABS  --  6.29  --   --  6.22   IMMATURE GRANS (ABS)  --  0.04  --   --  0.07*   LYMPHS ABS  --  0.57*  --   --  0.92   MONOS ABS  --  0.15  --   --  0.34   EOS ABS  --  0.19  --   --  0.21   MCV  --  91.8  --   --  94.8   SED RATE  --   --   --   --  17   CRP  --   --   --  1.87*  --    PROCALCITONIN  --   --   --   --  0.10   LACTATE 1.4  --  1.4  --  2.1*         Lab 23  1404 23  1403 23  2336 23  9981   SODIUM  --  140  --  143   POTASSIUM  --  4.1  --  4.3   CHLORIDE  --   106  --  109*   CO2  --  26.0  --  26.0   ANION GAP  --  8.0  --  8.0   BUN  --  18  --  21   CREATININE  --  0.64  --  0.80   EGFR  --  88.9  --  74.1   GLUCOSE  --  151*  --  81   CALCIUM  --  7.6*  --  7.9*   IONIZED CALCIUM 1.16  --   --   --    MAGNESIUM  --  1.7  --   --    PHOSPHORUS  --  3.5  --   --    HEMOGLOBIN A1C  --  12.10*  --   --    TSH  --   --  3.960  --          Lab 12/27/23  1403 12/26/23  2131   TOTAL PROTEIN 4.3* 4.9*   ALBUMIN 2.5* 2.6*   GLOBULIN 1.8 2.3   ALT (SGPT) 28 31   AST (SGOT) 28 36*   BILIRUBIN 0.6 0.7   ALK PHOS 151* 174*   LIPASE  --  119*         Lab 12/26/23  2336 12/26/23 2131   PROBNP  --  1,494.0   HSTROP T 67* 69*             Lab 12/26/23  2336 12/26/23 2131   IRON 53  53  --    IRON SATURATION (TSAT) 28  --    TIBC 188*  --    TRANSFERRIN 126*  --    FERRITIN 462.20*  --    FOLATE  --  10.30   VITAMIN B 12  --  1,253*         Brief Urine Lab Results  (Last result in the past 365 days)        Color   Clarity   Blood   Leuk Est   Nitrite   Protein   CREAT   Urine HCG        12/27/23 0040 Orange   Cloudy   Large (3+)   Moderate (2+)   Negative   100 mg/dL (2+)                   Microbiology Results Abnormal       Procedure Component Value - Date/Time    Blood Culture - Blood, Arm, Right [[id number]]  (Normal) Collected: 12/27/23 0431    Lab Status: Preliminary result Specimen: Blood from Arm, Right Updated: 12/28/23 5257     Blood Culture No growth at 24 hours            Adult Transthoracic Echo Complete W/ Cont if Necessary Per Protocol    Result Date: 12/27/2023    Left ventricular systolic function is normal. Calculated left ventricular EF = 57% Left ventricular ejection fraction appears to be 56 - 60%.   Left ventricular wall thickness is consistent with mild to moderate concentric hypertrophy.   Left ventricular diastolic function was normal.   Estimated right ventricular systolic pressure from tricuspid regurgitation is mildly elevated (35-45 mmHg).   There is a  trivial pericardial effusion.   Mild aortic valve regurgitation.     CT Angio Abdominal Aorta Bilateral Iliofem Runoff    Result Date: 12/27/2023  CT ANGIO ABDOMINAL AORTA BILAT ILIOFEM RUNOFF Date of Exam: 12/26/2023 11:36 PM EST Indication: Rash, temperature difference in lower extremities, swelling, include phase as well. Comparison: None available. Technique: CTA of the abdomen, pelvis and both lower extremities was performed after the uneventful intravenous administration of 125 mL Isovue-370. Reconstructed coronal and sagittal images were also obtained. In addition, a 3-D volume rendered image was created for interpretation. Automated exposure control and iterative reconstruction methods were used. Findings: Non--- vascular: There is a small right pleural effusion. There is bilateral basilar atelectasis. There is cholelithiasis. The arterial enhanced images of the liver, right adrenal gland, bilateral kidneys, pancreas and spleen are normal. There is adrenal adenoma on the left. There is a moderate stool burden with a moderate amount of stool in the rectosigmoid possibly from fecal impaction. There is anasarca. Vascular: There is tortuosity of the thoracic aorta. There is no aneurysmal dilatation or aortic stenosis. The bilateral common iliac, internal and external neck arteries are widely patent. There is a normal right-sided runoff with three-vessel to the ankle. On the left side, there is delayed runoff but the delayed images demonstrate a normal three-vessel runoff to the left ankle despite the slight delay which is of uncertain etiology. Venous stasis would be in the differential for the sluggish flow on the left. There is diffuse lower extremity edema.     Impression: Impression: No acute abnormality. No evidence of arterial occlusion or significant stenosis. There is delayed runoff on the left perhaps related to venous stasis changes. Please see above discussion for other findings. There is diffuse  anasarca and lower extremity edema. Electronically Signed: Oni Quesada MD  12/27/2023 1:18 AM EST  Workstation ID: [id number]    XR Chest 1 View    Result Date: 12/26/2023  XR CHEST 1 VW Date of Exam: 12/26/2023 9:10 PM EST Indication: Chest Pain Triage Protocol Comparison: None available. Findings: Patient is rotated to the left. There is bibasilar airspace disease left greater than right with small left pleural effusion. Negative for pneumothorax. Heart size within normal limits for technique. Osseous structures grossly intact.     Impression: Impression: Bibasilar airspace disease left greater than right which may relate to atelectasis and/or pneumonia with small left pleural effusion. Electronically Signed: Cyril Reina MD  12/26/2023 9:38 PM EST  Workstation ID: [id number]     Results for orders placed during the hospital encounter of 12/26/23    Adult Transthoracic Echo Complete W/ Cont if Necessary Per Protocol    Interpretation Summary    Left ventricular systolic function is normal. Calculated left ventricular EF = 57% Left ventricular ejection fraction appears to be 56 - 60%.    Left ventricular wall thickness is consistent with mild to moderate concentric hypertrophy.    Left ventricular diastolic function was normal.    Estimated right ventricular systolic pressure from tricuspid regurgitation is mildly elevated (35-45 mmHg).    There is a trivial pericardial effusion.    Mild aortic valve regurgitation.      Current medications:  Scheduled Meds:aspirin, 81 mg, Oral, Daily  atorvastatin, 80 mg, Oral, Daily  castor oil-balsam peru, 1 application , Topical, Q12H  cefTRIAXone, 1,000 mg, Intravenous, Q24H  doxycycline, 100 mg, Intravenous, Q12H  heparin (porcine), 5,000 Units, Subcutaneous, Q12H  insulin lispro, 2-9 Units, Subcutaneous, 4x Daily With Meals & Nightly  metoprolol tartrate, 50 mg, Oral, BID  senna-docusate sodium, 2 tablet, Oral, BID  sodium chloride, 10 mL, Intravenous, Q12H      Continuous  Infusions:   PRN Meds:.  acetaminophen **OR** acetaminophen **OR** acetaminophen    senna-docusate sodium **AND** polyethylene glycol **AND** bisacodyl **AND** bisacodyl    Calcium Replacement - Follow Nurse / BPA Driven Protocol    dextrose    dextrose    glucagon (human recombinant)    hydrOXYzine    Magnesium Standard Dose Replacement - Follow Nurse / BPA Driven Protocol    nitroglycerin    ondansetron    Phosphorus Replacement - Follow Nurse / BPA Driven Protocol    Potassium Replacement - Follow Nurse / BPA Driven Protocol    sodium chloride    sodium chloride    Assessment & Plan   Assessment & Plan     Active Hospital Problems    Diagnosis  POA    **UTI (urinary tract infection) [N39.0]  Yes    Acute constipation [K59.00]  Unknown    Acute urinary retention [R33.8]  Unknown    Uterine prolapse [N81.4]  Unknown    Bradycardia [R00.1]  Unknown    Pressure injury of buttock, unstageable [L89.300]  Unknown    Type 2 diabetes mellitus with ketoacidosis, without long-term current use of insulin [E11.10]  Unknown    Disorientation [R41.0]  Unknown    Pleural effusion [J90]  Unknown    Severe malnutrition [E43]  Yes    Edema of lower extremity [R60.0]  Yes    Bullous pemphigoid [L12.0]  Yes    Dyslipidemia [E78.5]  Yes    Essential hypertension [I10]  Yes      Resolved Hospital Problems   No resolved problems to display.        Brief Hospital Course to date:  Omar Mendoza is a 83 y.o. female  with history of type 2 diabetes, hypertension, history of uterine prolapse, urinary retention with Schultz catheter in place, history of bullous pemphigoid  (s/p Dupixent 10/2023, prednisone/doxycycline 11/2023) CAD, hyperlipidemia, presumed dementia who presented to the ED with generalized weakness and near bullous pemphigoid blister on the heel.      This patient's problems and plans were partially entered by my partner and updated as appropriate by me 12/28/23.    Acute metabolic encephalopathy, superimposed on suspected  dementia  Acute UTI  Generalized weakness  -Follow-up blood and urine cultures, continue IV Rocephin  -Follow-up TSH, B12, folate and vitamin D  -PT/OT  recommended SNF  -CM following for placement      Poor venous access  -PICC in place    Sinus bradycardia  - pt on both amiodarone and metoprolol, dtr reports amiodarone is new since hospitalization at [place].  - hold amiodarone, decreased metoprolol to 50 mg BID w/ hold parameters  -HR currently improved      BLE edema  Decreased pulses LLE  - CTA abd showing small right pleural effusion, bilateral basilar atelectasis  - CTA w/ runoff showing normal right sided runoff; left side delayed runoff possibly r/t venous stasis changes, no evidence of arterial occlusion or significant stenosis  - LLE cool to touch compared to RLE, decreased pulses  - neurovascular checks  -Echo revealed EF 57% with normal diastolic function      Uncontrolled Type 2 diabetes  -Hgb A1C 12.10 on 12/27  -Continue SSI     Bilateral pleural effusion  L>R  Bibasilar airspace disease versus pneumonia  -Patient currently on room air with good O2 sats  -Follow-up MRSA PCR, cultures, urinary strep and Legionella antigens  -Procalcitonin is low, lactate has improved  -Continue antibiotics, currently on Rocephin and doxycycline Day 2   -Currently on RA      Constipation  Urinary retention  -Last bowel movement reported to be 12/19  -Continue aggressive bowel regimen, soapsuds enema x 1  -chronic loza catheter in place     Uterine prolapse  -Gyn consulted      Bullous pemphigoid  -Has developed new blister on right heel  -Continue doxycycline  -Wound care following      Pressure ulcer  -Wound care following      Anemia  -Iron 53, iron sat 28  -Hgb down to 9.1 this AM   -CBC in AM      L adrenal adenoma  - incidental finding on CTA abdomen      Severe malnutrition    Expected Discharge Location and Transportation: SNF pending   Expected Discharge   Expected Discharge Date: 1/1/2024; Expected  Discharge Time:      DVT prophylaxis:  Medical DVT prophylaxis orders are present.     AM-PAC 6 Clicks Score (PT): 7 (12/27/23 4956)    CODE STATUS:   Code Status and Medical Interventions:   Ordered at: 12/27/23 0410     Code Status (Patient has no pulse and is not breathing):    CPR (Attempt to Resuscitate)     Medical Interventions (Patient has pulse or is breathing):    Full Support       Frantz Martin, LITA  12/28/23

## 2023-12-28 NOTE — CASE MANAGEMENT/SOCIAL WORK
Continued Stay Note  [facility]     Patient Name: Omar Mendoza  MRN: [id number]  Today's Date: 12/28/2023    Admit Date: 12/26/2023    Plan: rehab   Discharge Plan       Row Name 12/28/23 1514       Plan    Plan rehab    Patient/Family in Agreement with Plan yes    Plan Comments I spoke with the daughter and son of this patient on the phone regarding rehab referrals. They have asked the CM to make referrals to [facility] and [facility], which I did. She will need transport set up at discharge. CM will continue to follow.    Final Discharge Disposition Code 03 - skilled nursing facility (SNF)                   Discharge Codes    No documentation.                 Expected Discharge Date and Time       Expected Discharge Date Expected Discharge Time    Jan 1, 2024               Eleonora John RN

## 2023-12-29 LAB
ANION GAP SERPL CALCULATED.3IONS-SCNC: 6 MMOL/L (ref 5–15)
BUN SERPL-MCNC: 15 MG/DL (ref 8–23)
BUN/CREAT SERPL: 28.3 (ref 7–25)
CALCIUM SPEC-SCNC: 7.6 MG/DL (ref 8.6–10.5)
CHLORIDE SERPL-SCNC: 113 MMOL/L (ref 98–107)
CO2 SERPL-SCNC: 26 MMOL/L (ref 22–29)
CREAT SERPL-MCNC: 0.53 MG/DL (ref 0.57–1)
DEPRECATED RDW RBC AUTO: 59.5 FL (ref 37–54)
EGFRCR SERPLBLD CKD-EPI 2021: 93 ML/MIN/1.73
ERYTHROCYTE [DISTWIDTH] IN BLOOD BY AUTOMATED COUNT: 17.9 % (ref 12.3–15.4)
GLUCOSE BLDC GLUCOMTR-MCNC: 133 MG/DL (ref 70–130)
GLUCOSE BLDC GLUCOMTR-MCNC: 134 MG/DL (ref 70–130)
GLUCOSE BLDC GLUCOMTR-MCNC: 163 MG/DL (ref 70–130)
GLUCOSE BLDC GLUCOMTR-MCNC: 88 MG/DL (ref 70–130)
GLUCOSE SERPL-MCNC: 72 MG/DL (ref 65–99)
HCT VFR BLD AUTO: 25.5 % (ref 34–46.6)
HEMOCCULT STL QL: NEGATIVE
HGB BLD-MCNC: 8.6 G/DL (ref 12–15.9)
MAGNESIUM SERPL-MCNC: 1.6 MG/DL (ref 1.6–2.4)
MCH RBC QN AUTO: 31.5 PG (ref 26.6–33)
MCHC RBC AUTO-ENTMCNC: 33.7 G/DL (ref 31.5–35.7)
MCV RBC AUTO: 93.4 FL (ref 79–97)
PLATELET # BLD AUTO: 120 10*3/MM3 (ref 140–450)
PMV BLD AUTO: 9.2 FL (ref 6–12)
POTASSIUM SERPL-SCNC: 3.8 MMOL/L (ref 3.5–5.2)
PROCALCITONIN SERPL-MCNC: 0.11 NG/ML (ref 0–0.25)
RBC # BLD AUTO: 2.73 10*6/MM3 (ref 3.77–5.28)
SODIUM SERPL-SCNC: 145 MMOL/L (ref 136–145)
WBC NRBC COR # BLD AUTO: 6.17 10*3/MM3 (ref 3.4–10.8)

## 2023-12-29 PROCEDURE — A9270 NON-COVERED ITEM OR SERVICE: HCPCS | Performed by: NURSE PRACTITIONER

## 2023-12-29 PROCEDURE — 63710000001 MAGNESIUM HYDROXIDE 400 MG/5ML SUSPENSION: Performed by: NURSE PRACTITIONER

## 2023-12-29 PROCEDURE — 63710000001 METOPROLOL TARTRATE 50 MG TABLET: Performed by: NURSE PRACTITIONER

## 2023-12-29 PROCEDURE — 25010000002 HEPARIN (PORCINE) PER 1000 UNITS: Performed by: NURSE PRACTITIONER

## 2023-12-29 PROCEDURE — 80048 BASIC METABOLIC PNL TOTAL CA: CPT | Performed by: NURSE PRACTITIONER

## 2023-12-29 PROCEDURE — 63710000001 INSULIN LISPRO (HUMAN) PER 5 UNITS: Performed by: NURSE PRACTITIONER

## 2023-12-29 PROCEDURE — G0378 HOSPITAL OBSERVATION PER HR: HCPCS

## 2023-12-29 PROCEDURE — 63710000001 SENNOSIDES-DOCUSATE 8.6-50 MG TABLET: Performed by: NURSE PRACTITIONER

## 2023-12-29 PROCEDURE — 25010000002 ONDANSETRON PER 1 MG: Performed by: NURSE PRACTITIONER

## 2023-12-29 PROCEDURE — 82948 REAGENT STRIP/BLOOD GLUCOSE: CPT

## 2023-12-29 PROCEDURE — 83735 ASSAY OF MAGNESIUM: CPT | Performed by: NURSE PRACTITIONER

## 2023-12-29 PROCEDURE — 84145 PROCALCITONIN (PCT): CPT

## 2023-12-29 PROCEDURE — 99232 SBSQ HOSP IP/OBS MODERATE 35: CPT | Performed by: NURSE PRACTITIONER

## 2023-12-29 PROCEDURE — 85027 COMPLETE CBC AUTOMATED: CPT | Performed by: NURSE PRACTITIONER

## 2023-12-29 PROCEDURE — A9270 NON-COVERED ITEM OR SERVICE: HCPCS | Performed by: INTERNAL MEDICINE

## 2023-12-29 PROCEDURE — 82272 OCCULT BLD FECES 1-3 TESTS: CPT | Performed by: NURSE PRACTITIONER

## 2023-12-29 PROCEDURE — 25010000002 CEFTRIAXONE PER 250 MG: Performed by: NURSE PRACTITIONER

## 2023-12-29 PROCEDURE — 63710000001 POLYETHYLENE GLYCOL 17 G PACK: Performed by: NURSE PRACTITIONER

## 2023-12-29 PROCEDURE — 63710000001 BISACODYL 10 MG SUPPOSITORY: Performed by: NURSE PRACTITIONER

## 2023-12-29 PROCEDURE — 97530 THERAPEUTIC ACTIVITIES: CPT

## 2023-12-29 PROCEDURE — 63710000001 DOXYCYCLINE 100 MG CAPSULE: Performed by: INTERNAL MEDICINE

## 2023-12-29 RX ORDER — POLYETHYLENE GLYCOL 3350 17 G/17G
17 POWDER, FOR SOLUTION ORAL DAILY
Status: DISCONTINUED | OUTPATIENT
Start: 2023-12-29 | End: 2023-12-31

## 2023-12-29 RX ORDER — BISACODYL 10 MG
10 SUPPOSITORY, RECTAL RECTAL DAILY PRN
Status: DISCONTINUED | OUTPATIENT
Start: 2023-12-29 | End: 2023-12-31

## 2023-12-29 RX ORDER — AMOXICILLIN 250 MG
2 CAPSULE ORAL 2 TIMES DAILY
Status: DISCONTINUED | OUTPATIENT
Start: 2023-12-29 | End: 2023-12-31

## 2023-12-29 RX ORDER — BISACODYL 5 MG/1
5 TABLET, DELAYED RELEASE ORAL DAILY PRN
Status: DISCONTINUED | OUTPATIENT
Start: 2023-12-29 | End: 2023-12-31

## 2023-12-29 RX ORDER — BISACODYL 10 MG
10 SUPPOSITORY, RECTAL RECTAL ONCE
Status: COMPLETED | OUTPATIENT
Start: 2023-12-29 | End: 2023-12-29

## 2023-12-29 RX ADMIN — BISACODYL 10 MG: 10 SUPPOSITORY RECTAL at 16:37

## 2023-12-29 RX ADMIN — ATORVASTATIN CALCIUM 80 MG: 40 TABLET, FILM COATED ORAL at 09:43

## 2023-12-29 RX ADMIN — INSULIN LISPRO 2 UNITS: 100 INJECTION, SOLUTION INTRAVENOUS; SUBCUTANEOUS at 17:39

## 2023-12-29 RX ADMIN — DOXYCYCLINE 100 MG: 100 CAPSULE ORAL at 09:43

## 2023-12-29 RX ADMIN — METOPROLOL TARTRATE 50 MG: 50 TABLET ORAL at 09:43

## 2023-12-29 RX ADMIN — DOXYCYCLINE 100 MG: 100 CAPSULE ORAL at 20:18

## 2023-12-29 RX ADMIN — MAGNESIUM HYDROXIDE 10 ML: 400 SUSPENSION ORAL at 16:37

## 2023-12-29 RX ADMIN — HEPARIN SODIUM 5000 UNITS: 5000 INJECTION INTRAVENOUS; SUBCUTANEOUS at 09:43

## 2023-12-29 RX ADMIN — SENNOSIDES AND DOCUSATE SODIUM 2 TABLET: 8.6; 5 TABLET ORAL at 09:43

## 2023-12-29 RX ADMIN — SODIUM CHLORIDE 1000 MG: 900 INJECTION INTRAVENOUS at 05:02

## 2023-12-29 RX ADMIN — ASPIRIN 81 MG: 81 TABLET, CHEWABLE ORAL at 09:43

## 2023-12-29 RX ADMIN — METOPROLOL TARTRATE 50 MG: 50 TABLET ORAL at 20:18

## 2023-12-29 RX ADMIN — CASTOR OIL AND BALSAM, PERU 1 APPLICATION: 788; 87 OINTMENT TOPICAL at 20:16

## 2023-12-29 RX ADMIN — HEPARIN SODIUM 5000 UNITS: 5000 INJECTION INTRAVENOUS; SUBCUTANEOUS at 20:19

## 2023-12-29 RX ADMIN — CASTOR OIL AND BALSAM, PERU 1 APPLICATION: 788; 87 OINTMENT TOPICAL at 09:44

## 2023-12-29 RX ADMIN — Medication 10 ML: at 20:19

## 2023-12-29 RX ADMIN — SENNOSIDES AND DOCUSATE SODIUM 2 TABLET: 8.6; 5 TABLET ORAL at 20:18

## 2023-12-29 RX ADMIN — POLYETHYLENE GLYCOL 3350 17 G: 17 POWDER, FOR SOLUTION ORAL at 16:37

## 2023-12-29 RX ADMIN — ONDANSETRON HYDROCHLORIDE 4 MG: 2 SOLUTION INTRAMUSCULAR; INTRAVENOUS at 20:27

## 2023-12-29 NOTE — PLAN OF CARE
Goal Outcome Evaluation:  Plan of Care Reviewed With: patient, family           Outcome Evaluation: Pt demo improved foot clearance with steps to HOB this session. Pt continues to be demo decreased activity tolerance, weakness, and balance deficits below baseline. Plan to continue progressing POC as tolerated.      Anticipated Discharge Disposition (PT): skilled nursing facility

## 2023-12-29 NOTE — CASE MANAGEMENT/SOCIAL WORK
Continued Stay Note  [facility]     Patient Name: Omar Mendoza  MRN: [id number]  Today's Date: 12/29/2023    Admit Date: 12/26/2023    Plan: rehab   Discharge Plan       Row Name 12/29/23 1728       Plan    Plan rehab    Patient/Family in Agreement with Plan yes    Plan Comments I spoke w/spouse in room regarding d/c plan. Very interested in rehab @ d/c. Per Eleonora note referrals sent to [facility], [facility], and [facility]. I explained to spouse that Reginaldo requires a pre cert so I will have PT/OT scheduled to see her on Monday/Tuesday for updated notes. Eleonora can call facilities where referrals have been made, if beds available and she is medically ready, could start pre cert. Occult stool pending per Emmanuel LONDON note, if positive will need GI consult. CM will continue to follow.    Final Discharge Disposition Code 03 - skilled nursing facility (SNF)                   Discharge Codes    No documentation.                 Expected Discharge Date and Time       Expected Discharge Date Expected Discharge Time    Jan 1, 2024               Hans Valente RN

## 2023-12-29 NOTE — THERAPY TREATMENT NOTE
Patient Name: Omar Mendoza  : 1940    MRN: [id number]                              Today's Date: 2023       Admit Date: 2023    Visit Dx:     ICD-10-CM ICD-9-CM   1. Generalized weakness  R53.1 780.79   2. Acute UTI  N39.0 599.0   3. Bullous pemphigoid  L12.0 694.5   4. Venous insufficiency  I87.2 459.81     Patient Active Problem List   Diagnosis    UTI (urinary tract infection)    Bullous pemphigoid    Essential hypertension    Dyslipidemia    Edema of lower extremity    Cholestatic hepatitis    Acute constipation    Acute urinary retention    Uterine prolapse    Bradycardia    Pressure injury of buttock, unstageable    Type 2 diabetes mellitus with ketoacidosis, without long-term current use of insulin    Disorientation    Pleural effusion    Severe malnutrition     Past Medical History:   Diagnosis Date    CAD (coronary artery disease)     Dementia     Diabetes mellitus     Hyperlipemia     Hypertension      History reviewed. No pertinent surgical history.   General Information       Row Name 23 [facility]          Physical Therapy Time and Intention    Document Type therapy note (daily note)  -KE     Mode of Treatment physical therapy  -       Row Name 23 1311          General Information    Patient Profile Reviewed yes  -KE     Existing Precautions/Restrictions fall  poor skin integrity  -KE     Barriers to Rehab medically complex;cognitive status  -       Row Name 23 1311          Cognition    Orientation Status (Cognition) oriented to;place;person  -       Row Name 23 1311          Safety Issues, Functional Mobility    Safety Issues Affecting Function (Mobility) ability to follow commands;awareness of need for assistance;friction/shear risk;insight into deficits/self-awareness;problem-solving;safety precaution awareness;safety precautions follow-through/compliance;sequencing abilities;judgment  -KE     Impairments Affecting Function (Mobility)  balance;cognition;endurance/activity tolerance;postural/trunk control;strength;pain  -KE     Cognitive Impairments, Mobility Safety/Performance attention;awareness, need for assistance;insight into deficits/self-awareness;problem-solving/reasoning;safety precaution awareness;safety precaution follow-through;sequencing abilities  -KE               User Key  (r) = Recorded By, (t) = Taken By, (c) = Cosigned By      Initials Name Provider Type    Petty Beatty PT Physical Therapist                   Mobility       Row Name 12/29/23 1313          Bed Mobility    Bed Mobility supine-sit;sit-supine  -KE     Supine-Sit [place] (Bed Mobility) maximum assist (25% patient effort);1 person assist;verbal cues  -MIA     Sit-Supine [place] (Bed Mobility) dependent (less than 25% patient effort);2 person assist;verbal cues  -KE     Assistive Device (Bed Mobility) bed rails;draw sheet;head of bed elevated  -KE     Comment, (Bed Mobility) VCs for sequencing, pt req increased time and effort, assist at trunk and LEs  -MIA       Row Name 12/29/23 1313          Transfers    Comment, (Transfers) x3 STS from EOB, took steps toward HOB in standing. Pt demo improved foot clearance and upright posturing. Cues for weight shifting and sequencing of steps.  -MIA       Row Name 12/29/23 1313          Sit-Stand Transfer    Sit-Stand [place] (Transfers) 2 person assist;moderate assist (50% patient effort)  -MIA     Assistive Device (Sit-Stand Transfers) walker, front-wheeled  -MIA     Comment, (Sit-Stand Transfer) x1 STS from EOB w/ ModAx2 w/ FWW. x2 STS w/ HHA, ModAx2. Cues for UE placement and push from bed.  -MIA               User Key  (r) = Recorded By, (t) = Taken By, (c) = Cosigned By      Initials Name Provider Type    Petty Beatty PT Physical Therapist                   Obj/Interventions       Row Name 12/29/23 1317          Motor Skills    Therapeutic Exercise ankle  -MIA       Row Name 12/29/23 1318           Ankle (Therapeutic Exercise)    Ankle (Therapeutic Exercise) AROM (active range of motion)  -     Ankle AROM (Therapeutic Exercise) bilateral;dorsiflexion;plantarflexion;10 repetitions  -       Row Name 12/29/23 5177          Balance    Balance Assessment sitting static balance;sitting dynamic balance;sit to stand dynamic balance;standing static balance;standing dynamic balance  -KE     Static Sitting Balance contact guard  -KE     Dynamic Sitting Balance minimal assist  -KE     Position, Sitting Balance unsupported;sitting edge of bed  -KE     Sit to Stand Dynamic Balance moderate assist;2-person assist  -KE     Static Standing Balance minimal assist;2-person assist;verbal cues  -KE     Dynamic Standing Balance minimal assist;2-person assist;verbal cues  -KE     Position/Device Used, Standing Balance supported;walker, front-wheeled  -KE     Balance Interventions sitting;standing;sit to stand;supported;static;dynamic  -KE     Comment, Balance No overt LOB, pt demo improved weight shifting abilities  -               User Key  (r) = Recorded By, (t) = Taken By, (c) = Cosigned By      Initials Name Provider Type    Petty Beatty, PT Physical Therapist                   Goals/Plan    No documentation.                  Clinical Impression       Row Name 12/29/23 6629          Pain    Pretreatment Pain Rating --  -KE     Posttreatment Pain Rating --  -KE     Pre/Posttreatment Pain Comment applied lotion, pt stated this gave relief  -     Pain Intervention(s) Ambulation/increased activity;Repositioned;Other (Comment)  -       Row Name 12/29/23 9432          Pain Scale: FACES Pre/Post-Treatment    Pain: FACES Scale, Pretreatment 0-->no hurt  -KE     Posttreatment Pain Rating 4-->hurts little more  -KE     Pain Location - Side/Orientation Bilateral  -     Pain Location lower  -KE     Pain Location - back  -       Row Name 12/29/23 0276          Plan of Care Review    Plan of Care Reviewed With  patient;family  -     Outcome Evaluation Pt demo improved foot clearance with steps to HOB this session. Pt continues to be demo decreased activity tolerance, weakness, and balance deficits below baseline. Plan to continue progressing POC as tolerated.  -       Row Name 12/29/23 1319          Therapy Assessment/Plan (PT)    Rehab Potential (PT) good, to achieve stated therapy goals  -     Criteria for Skilled Interventions Met (PT) yes;meets criteria;skilled treatment is necessary  -KE     Therapy Frequency (PT) daily  -KE       Row Name 12/29/23 1319          Vital Signs    Pre Systolic BP Rehab 123  -KE     Pre Treatment Diastolic BP 78  -KE     Pretreatment Heart Rate (beats/min) 72  -KE     Posttreatment Heart Rate (beats/min) 78  -KE     Pre SpO2 (%) 96  -KE     O2 Delivery Pre Treatment room air  -KE     O2 Delivery Intra Treatment room air  -KE     Post SpO2 (%) 91  -KE     O2 Delivery Post Treatment room air  -KE     Pre Patient Position Supine  -KE     Intra Patient Position Standing  -KE     Post Patient Position Supine  -KE       Row Name 12/29/23 1319          Positioning and Restraints    Pre-Treatment Position in bed  -KE     Post Treatment Position bed  -KE     In Bed notified nsg;supine;call light within reach;encouraged to call for assist;exit alarm on;with family/caregiver;side rails up x2  -KE               User Key  (r) = Recorded By, (t) = Taken By, (c) = Cosigned By      Initials Name Provider Type    Petty Beatty, PT Physical Therapist                   Outcome Measures       Row Name 12/29/23 1752          How much help from another person do you currently need...    Turning from your back to your side while in flat bed without using bedrails? 2  -KE     Moving from lying on back to sitting on the side of a flat bed without bedrails? 1  -KE     Moving to and from a bed to a chair (including a wheelchair)? 1  -KE     Standing up from a chair using your arms (e.g., wheelchair,  bedside chair)? 1  -KE     Climbing 3-5 steps with a railing? 1  -KE     To walk in hospital room? 1  -KE     AM-PAC 6 Clicks Score (PT) 7  -KE     Highest Level of Mobility Goal 2 --> Bed activities/dependent transfer  -MIA       Row Name 12/29/23 1328          Functional Assessment    Outcome Measure Options AM-PAC 6 Clicks Basic Mobility (PT)  -MIA               User Key  (r) = Recorded By, (t) = Taken By, (c) = Cosigned By      Initials Name Provider Type    Petty Beatty PT Physical Therapist                                 Physical Therapy Education       Title: PT OT SLP Therapies (In Progress)       Topic: Physical Therapy (In Progress)       Point: Mobility training (In Progress)       Learning Progress Summary             Patient Acceptance, E, NR by MIA at 12/29/2023 1131    Acceptance, E, NR by MIA at 12/27/2023 1045                         Point: Home exercise program (Not Started)       Learner Progress:  Not documented in this visit.              Point: Body mechanics (In Progress)       Learning Progress Summary             Patient Acceptance, E, NR by MIA at 12/29/2023 1131    Acceptance, E, NR by MIA at 12/27/2023 1045                         Point: Precautions (In Progress)       Learning Progress Summary             Patient Acceptance, E, NR by MIA at 12/29/2023 1131    Acceptance, E, NR by MIA at 12/27/2023 1045                                         User Key       Initials Effective Dates Name Provider Type Julissa BELLAMY 11/16/23 -  Petty Mckoy PT Physical Therapist PT                  PT Recommendation and Plan  Planned Therapy Interventions (PT): balance training, bed mobility training, gait training, home exercise program, patient/family education, neuromuscular re-education, postural re-education, ROM (range of motion), stair training, strengthening, stretching, transfer training  Plan of Care Reviewed With: patient, family  Outcome Evaluation: Pt demo improved foot clearance with  steps to HOB this session. Pt continues to be demo decreased activity tolerance, weakness, and balance deficits below baseline. Plan to continue progressing POC as tolerated.     Time Calculation:   PT Evaluation Complexity  History, PT Evaluation Complexity: 3 or more personal factors and/or comorbidities  Examination of Body Systems (PT Eval Complexity): total of 3 or more elements  Clinical Presentation (PT Evaluation Complexity): evolving  Clinical Decision Making (PT Evaluation Complexity): moderate complexity  Overall Complexity (PT Evaluation Complexity): moderate complexity     PT Charges       Row Name 12/29/23 1329             Time Calculation    Start Time 1131  -KE      PT Received On 12/29/23  -KE      PT Goal Re-Cert Due Date 01/06/24  -KE         Timed Charges    31592 - PT Therapeutic Activity Minutes 31  -KE         Total Minutes    Timed Charges Total Minutes 31  -KE       Total Minutes 31  -KE                User Key  (r) = Recorded By, (t) = Taken By, (c) = Cosigned By      Initials Name Provider Type    Petty Beatty PT Physical Therapist                  Therapy Charges for Today       Code Description Service Date Service Provider Modifiers Qty    95573660160 HC PT THERAPEUTIC ACT EA 15 MIN 12/29/2023 Petty Mckoy PT GP 2            PT G-Codes  Outcome Measure Options: AM-PAC 6 Clicks Basic Mobility (PT)  AM-PAC 6 Clicks Score (PT): 7  PT Discharge Summary  Anticipated Discharge Disposition (PT): skilled nursing facility    Petty Mckoy PT  12/29/2023

## 2023-12-29 NOTE — PROGRESS NOTES
[facility] Medicine Services  PROGRESS NOTE    Patient Name: Omar Mendoza  : 1940  MRN: [id number]    Date of Admission: 2023  Primary Care Physician: Varun Pa MD    Subjective   Subjective     CC:  Follow up weakness, foot blister    HPI:  Patient seen resting in bed in no apparent distress. No acute events overnight per nursing. Patient continues to feel weak. She is agreeable to work with therapy today. Updated patient's daughter at bedside. Noted drop in hemoglobin down to 8.6 this AM. No additional concerns at this time.       Objective   Objective     Vital Signs:   Temp:  [97.7 °F (36.5 °C)-98.7 °F (37.1 °C)] 98.4 °F (36.9 °C)  Heart Rate:  [66-81] 81  Resp:  [18] 18  BP: (107-123)/(65-78) 107/71     Physical Exam:  Constitutional: No acute distress, awake, alert, chronically ill appearing   HENT: NCAT, mucous membranes moist  Respiratory: grossly clear, diminished in bases, respiratory effort normal   Cardiovascular: RRR, no murmurs, cap refill brisk   Gastrointestinal: Positive bowel sounds, soft, nontender, nondistended  Musculoskeletal: 1+  BLE edema, right foot dressing in place  Psychiatric: flat affect, cooperative  Neurologic: Oriented x 2, moves all extremities, speech clear  Skin: warm, dry, no visible rash     Results Reviewed:  LAB RESULTS:      Lab 23  0417 23  1239 23  1404 23  1403 23  0431 23  2336 23  2131   WBC 6.17 6.31  6.14  --  7.25  --   --  7.78   HEMOGLOBIN 8.6* 9.1*  9.0*  --  10.5*  --   --  10.3*   HEMATOCRIT 25.5* 27.6*  27.1*  --  31.4*  --   --  31.3*   PLATELETS 120* 134*  128*  --  137*  --   --  159   NEUTROS ABS  --  5.21  5.03  --  6.29  --   --  6.22   IMMATURE GRANS (ABS)  --  0.03  0.10*  --  0.04  --   --  0.07*   LYMPHS ABS  --  0.50*  0.53*  --  0.57*  --   --  0.92   MONOS ABS  --  0.18  0.17  --  0.15  --   --  0.34   EOS ABS  --  0.38  0.30  --  0.19  --   --   0.21   MCV 93.4 94.2  92.8  --  91.8  --   --  94.8   SED RATE  --   --   --   --   --   --  17   CRP  --   --   --   --   --  1.87*  --    PROCALCITONIN 0.11  --   --   --   --   --  0.10   LACTATE  --   --  1.4  --  1.4  --  2.1*         Lab 12/29/23  0417 12/28/23  1239 12/27/23  1404 12/27/23  1403 12/26/23  2336 12/26/23  2131   SODIUM 145 143  --  140  --  143   POTASSIUM 3.8 3.9  --  4.1  --  4.3   CHLORIDE 113* 111*  --  106  --  109*   CO2 26.0 25.0  --  26.0  --  26.0   ANION GAP 6.0 7.0  --  8.0  --  8.0   BUN 15 17  --  18  --  21   CREATININE 0.53* 0.59  --  0.64  --  0.80   EGFR 93.0 90.7  --  88.9  --  74.1   GLUCOSE 72 173*  --  151*  --  81   CALCIUM 7.6* 7.6*  --  7.6*  --  7.9*   IONIZED CALCIUM  --   --  1.16  --   --   --    MAGNESIUM 1.6  --   --  1.7  --   --    PHOSPHORUS  --   --   --  3.5  --   --    HEMOGLOBIN A1C  --   --   --  12.10*  --   --    TSH  --   --   --   --  3.960  --          Lab 12/27/23  1403 12/26/23 2131   TOTAL PROTEIN 4.3* 4.9*   ALBUMIN 2.5* 2.6*   GLOBULIN 1.8 2.3   ALT (SGPT) 28 31   AST (SGOT) 28 36*   BILIRUBIN 0.6 0.7   ALK PHOS 151* 174*   LIPASE  --  119*         Lab 12/28/23  1239 12/26/23 2336 12/26/23 2131   PROBNP 1,039.0  --  1,494.0   HSTROP T  --  67* 69*             Lab 12/26/23  2336 12/26/23 2131   IRON 53  53  --    IRON SATURATION (TSAT) 28  --    TIBC 188*  --    TRANSFERRIN 126*  --    FERRITIN 462.20*  --    FOLATE  --  10.30   VITAMIN B 12  --  1,253*         Brief Urine Lab Results  (Last result in the past 365 days)        Color   Clarity   Blood   Leuk Est   Nitrite   Protein   CREAT   Urine HCG        12/27/23 0040 Orange   Cloudy   Large (3+)   Moderate (2+)   Negative   100 mg/dL (2+)                   Microbiology Results Abnormal       Procedure Component Value - Date/Time    Blood Culture - Blood, Arm, Right [[id number]]  (Normal) Collected: 12/27/23 0431    Lab Status: Preliminary result Specimen: Blood from Arm, Right Updated:  12/29/23 0515     Blood Culture No growth at 2 days    Blood Culture - Blood, Blood, PICC Line [[id number]]  (Normal) Collected: 12/27/23 1400    Lab Status: Preliminary result Specimen: Blood, PICC Line Updated: 12/28/23 1531     Blood Culture No growth at 24 hours            Adult Transthoracic Echo Complete W/ Cont if Necessary Per Protocol    Result Date: 12/27/2023    Left ventricular systolic function is normal. Calculated left ventricular EF = 57% Left ventricular ejection fraction appears to be 56 - 60%.   Left ventricular wall thickness is consistent with mild to moderate concentric hypertrophy.   Left ventricular diastolic function was normal.   Estimated right ventricular systolic pressure from tricuspid regurgitation is mildly elevated (35-45 mmHg).   There is a trivial pericardial effusion.   Mild aortic valve regurgitation.      Results for orders placed during the hospital encounter of 12/26/23    Adult Transthoracic Echo Complete W/ Cont if Necessary Per Protocol    Interpretation Summary    Left ventricular systolic function is normal. Calculated left ventricular EF = 57% Left ventricular ejection fraction appears to be 56 - 60%.    Left ventricular wall thickness is consistent with mild to moderate concentric hypertrophy.    Left ventricular diastolic function was normal.    Estimated right ventricular systolic pressure from tricuspid regurgitation is mildly elevated (35-45 mmHg).    There is a trivial pericardial effusion.    Mild aortic valve regurgitation.      Current medications:  Scheduled Meds:aspirin, 81 mg, Oral, Daily  atorvastatin, 80 mg, Oral, Daily  bisacodyl, 10 mg, Rectal, Once  castor oil-balsam peru, 1 application , Topical, Q12H  cefTRIAXone, 1,000 mg, Intravenous, Q24H  doxycycline, 100 mg, Oral, Q12H  heparin (porcine), 5,000 Units, Subcutaneous, Q12H  insulin lispro, 2-9 Units, Subcutaneous, 4x Daily With Meals & Nightly  magnesium hydroxide, 10 mL, Oral, Daily  metoprolol  tartrate, 50 mg, Oral, BID  senna-docusate sodium, 2 tablet, Oral, BID   And  polyethylene glycol, 17 g, Oral, Daily  sodium chloride, 10 mL, Intravenous, Q12H      Continuous Infusions:   PRN Meds:.  acetaminophen **OR** acetaminophen **OR** acetaminophen    senna-docusate sodium **AND** polyethylene glycol **AND** bisacodyl **AND** bisacodyl    Calcium Replacement - Follow Nurse / BPA Driven Protocol    dextrose    dextrose    glucagon (human recombinant)    hydrOXYzine    Magnesium Standard Dose Replacement - Follow Nurse / BPA Driven Protocol    nitroglycerin    ondansetron    Phosphorus Replacement - Follow Nurse / BPA Driven Protocol    Potassium Replacement - Follow Nurse / BPA Driven Protocol    sodium chloride    sodium chloride    Assessment & Plan   Assessment & Plan     Active Hospital Problems    Diagnosis  POA    **UTI (urinary tract infection) [N39.0]  Yes    Acute constipation [K59.00]  Unknown    Acute urinary retention [R33.8]  Unknown    Uterine prolapse [N81.4]  Unknown    Bradycardia [R00.1]  Unknown    Pressure injury of buttock, unstageable [L89.300]  Unknown    Type 2 diabetes mellitus with ketoacidosis, without long-term current use of insulin [E11.10]  Unknown    Disorientation [R41.0]  Unknown    Pleural effusion [J90]  Unknown    Severe malnutrition [E43]  Yes    Edema of lower extremity [R60.0]  Yes    Bullous pemphigoid [L12.0]  Yes    Dyslipidemia [E78.5]  Yes    Essential hypertension [I10]  Yes      Resolved Hospital Problems   No resolved problems to display.     Brief Hospital Course to date:  Omar Mendoza is a 83 y.o. female   with history of type 2 diabetes, hypertension, history of uterine prolapse, urinary retention with Schultz catheter in place, history of bullous pemphigoid  (s/p Dupixent 10/2023, prednisone/doxycycline 11/2023) CAD, hyperlipidemia, presumed dementia who presented to the ED with generalized weakness and near bullous pemphigoid blister on the heel.      This  patient's problems and plans were partially entered by my partner and updated as appropriate by me 12/29/23.     Acute metabolic encephalopathy, superimposed on suspected dementia  Generalized weakness  -PT/OT recommended SNF  -CM following for placement     Bilateral pleural effusion  L>R  Bibasilar airspace disease versus pneumonia  -Patient currently on room air with good O2 sats  -Follow-up MRSA PCR, cultures, urinary strep and Legionella antigens  -Procalcitonin is low, lactate has improved  -Continue antibiotics, currently on Rocephin and doxycycline (Day 3)  -Currently on RA      Anemia  -Iron 53, iron sat 28  -Hgb down to 8.6 this AM   -Pt's family reports dark stool for past few weeks  -Fecal occult pending, will plan to consult GI if positive   -CBC in AM     Poor venous access  -PICC in place     Sinus bradycardia  - pt on both amiodarone and metoprolol, dtr reports amiodarone is new since hospitalization at [facility].  - hold amiodarone, decreased metoprolol to 50 mg BID w/ hold parameters  -HR currently improved      BLE edema  Decreased pulses LLE  - CTA abd showing small right pleural effusion, bilateral basilar atelectasis  - CTA w/ runoff showing normal right sided runoff; left side delayed runoff possibly r/t venous stasis changes, no evidence of arterial occlusion or significant stenosis  - LLE cool to touch compared to RLE, decreased pulses  - neurovascular checks  -Echo revealed EF 57% with normal diastolic function      Uncontrolled Type 2 diabetes  -Hgb A1C 12.10 on 12/27  -Continue SSI     Constipation  -Continue aggressive bowel regimen, s/p soapsuds enema x 1  -Milk of mag x1, Miralax daily, suppository x1 today   -chronic loza catheter in place     Uterine prolapse  -Gyn Dr. Zhou has seen, plan for outpatient follow up to discuss treatment options.   -may be contributing to chronic urine retention     Bullous pemphigoid  -Has developed new blister on right heel  -Continue  doxycycline  -Wound care following      Pressure ulcer  -Wound care following      L adrenal adenoma  - incidental finding on CTA abdomen      Severe malnutrition     Expected Discharge Location and Transportation: SNF pending   Expected Discharge   Expected Discharge Date: 1/1/2024; Expected Discharge Time:      DVT prophylaxis:  Medical DVT prophylaxis orders are present.     AM-PAC 6 Clicks Score (PT): 7 (12/29/23 0668)    CODE STATUS:   Code Status and Medical Interventions:   Ordered at: 12/27/23 0410     Code Status (Patient has no pulse and is not breathing):    CPR (Attempt to Resuscitate)     Medical Interventions (Patient has pulse or is breathing):    Full Support       Frantz Martin, APRN  12/29/23

## 2023-12-30 ENCOUNTER — APPOINTMENT (OUTPATIENT)
Dept: GENERAL RADIOLOGY | Facility: HOSPITAL | Age: 83
DRG: 884 | End: 2023-12-30
Payer: MEDICARE

## 2023-12-30 LAB
ANION GAP SERPL CALCULATED.3IONS-SCNC: 10 MMOL/L (ref 5–15)
BUN SERPL-MCNC: 14 MG/DL (ref 8–23)
BUN/CREAT SERPL: 24.1 (ref 7–25)
CALCIUM SPEC-SCNC: 7.3 MG/DL (ref 8.6–10.5)
CHLORIDE SERPL-SCNC: 111 MMOL/L (ref 98–107)
CO2 SERPL-SCNC: 25 MMOL/L (ref 22–29)
CREAT SERPL-MCNC: 0.58 MG/DL (ref 0.57–1)
DEPRECATED RDW RBC AUTO: 60.7 FL (ref 37–54)
EGFRCR SERPLBLD CKD-EPI 2021: 91 ML/MIN/1.73
ERYTHROCYTE [DISTWIDTH] IN BLOOD BY AUTOMATED COUNT: 18.2 % (ref 12.3–15.4)
GLUCOSE BLDC GLUCOMTR-MCNC: 139 MG/DL (ref 70–130)
GLUCOSE BLDC GLUCOMTR-MCNC: 145 MG/DL (ref 70–130)
GLUCOSE BLDC GLUCOMTR-MCNC: 190 MG/DL (ref 70–130)
GLUCOSE SERPL-MCNC: 134 MG/DL (ref 65–99)
HCT VFR BLD AUTO: 24.6 % (ref 34–46.6)
HGB BLD-MCNC: 8.2 G/DL (ref 12–15.9)
MCH RBC QN AUTO: 30.9 PG (ref 26.6–33)
MCHC RBC AUTO-ENTMCNC: 33.3 G/DL (ref 31.5–35.7)
MCV RBC AUTO: 92.8 FL (ref 79–97)
PLATELET # BLD AUTO: 110 10*3/MM3 (ref 140–450)
PMV BLD AUTO: 8.9 FL (ref 6–12)
POTASSIUM SERPL-SCNC: 4 MMOL/L (ref 3.5–5.2)
RBC # BLD AUTO: 2.65 10*6/MM3 (ref 3.77–5.28)
SODIUM SERPL-SCNC: 146 MMOL/L (ref 136–145)
WBC NRBC COR # BLD AUTO: 5.98 10*3/MM3 (ref 3.4–10.8)

## 2023-12-30 PROCEDURE — A9270 NON-COVERED ITEM OR SERVICE: HCPCS | Performed by: NURSE PRACTITIONER

## 2023-12-30 PROCEDURE — 71045 X-RAY EXAM CHEST 1 VIEW: CPT

## 2023-12-30 PROCEDURE — G0378 HOSPITAL OBSERVATION PER HR: HCPCS

## 2023-12-30 PROCEDURE — 80048 BASIC METABOLIC PNL TOTAL CA: CPT | Performed by: NURSE PRACTITIONER

## 2023-12-30 PROCEDURE — 99232 SBSQ HOSP IP/OBS MODERATE 35: CPT | Performed by: NURSE PRACTITIONER

## 2023-12-30 PROCEDURE — 63710000001 ASPIRIN 81 MG CHEWABLE TABLET: Performed by: NURSE PRACTITIONER

## 2023-12-30 PROCEDURE — 85027 COMPLETE CBC AUTOMATED: CPT | Performed by: NURSE PRACTITIONER

## 2023-12-30 PROCEDURE — 82948 REAGENT STRIP/BLOOD GLUCOSE: CPT

## 2023-12-30 PROCEDURE — 63710000001 INSULIN LISPRO (HUMAN) PER 5 UNITS: Performed by: NURSE PRACTITIONER

## 2023-12-30 PROCEDURE — 25010000002 HEPARIN (PORCINE) PER 1000 UNITS: Performed by: NURSE PRACTITIONER

## 2023-12-30 PROCEDURE — 25010000002 CEFTRIAXONE PER 250 MG: Performed by: NURSE PRACTITIONER

## 2023-12-30 PROCEDURE — 63710000001 ATORVASTATIN 40 MG TABLET: Performed by: NURSE PRACTITIONER

## 2023-12-30 PROCEDURE — 92610 EVALUATE SWALLOWING FUNCTION: CPT

## 2023-12-30 RX ADMIN — ASPIRIN 81 MG: 81 TABLET, CHEWABLE ORAL at 09:13

## 2023-12-30 RX ADMIN — MAGNESIUM HYDROXIDE 10 ML: 400 SUSPENSION ORAL at 09:15

## 2023-12-30 RX ADMIN — SODIUM CHLORIDE 1000 MG: 900 INJECTION INTRAVENOUS at 05:29

## 2023-12-30 RX ADMIN — CASTOR OIL AND BALSAM, PERU 1 APPLICATION: 788; 87 OINTMENT TOPICAL at 09:20

## 2023-12-30 RX ADMIN — HEPARIN SODIUM 5000 UNITS: 5000 INJECTION INTRAVENOUS; SUBCUTANEOUS at 20:18

## 2023-12-30 RX ADMIN — HEPARIN SODIUM 5000 UNITS: 5000 INJECTION INTRAVENOUS; SUBCUTANEOUS at 09:13

## 2023-12-30 RX ADMIN — ATORVASTATIN CALCIUM 80 MG: 40 TABLET, FILM COATED ORAL at 09:13

## 2023-12-30 RX ADMIN — DOXYCYCLINE 100 MG: 100 CAPSULE ORAL at 09:13

## 2023-12-30 RX ADMIN — CASTOR OIL AND BALSAM, PERU 1 APPLICATION: 788; 87 OINTMENT TOPICAL at 20:18

## 2023-12-30 RX ADMIN — INSULIN LISPRO 2 UNITS: 100 INJECTION, SOLUTION INTRAVENOUS; SUBCUTANEOUS at 20:17

## 2023-12-30 RX ADMIN — Medication 10 ML: at 09:17

## 2023-12-30 RX ADMIN — DOXYCYCLINE 100 MG: 100 CAPSULE ORAL at 20:17

## 2023-12-30 RX ADMIN — Medication 10 ML: at 20:18

## 2023-12-30 NOTE — PLAN OF CARE
Goal Outcome Evaluation:  Plan of Care Reviewed With: patient            SLP evaluation completed. Will continue to address dysphagia. Please see note for further details and recommendations.        Anticipated Discharge Disposition (SLP): skilled nursing facility

## 2023-12-30 NOTE — PLAN OF CARE
Patient oriented to self only. Had two large bowel movements. Niece alerted nursing staff to patient choking, this occurred while niece was feeding patient. Upon entering room patient O2 sat 85% on 1L NC. Turned patient up to 6L for a brief period and then down to 3L. Patient frequently clearing throat and complaining of feeling food stuck in her throat. Hospitalist notified.    Problem: Adult Inpatient Plan of Care  Goal: Plan of Care Review  Outcome: Ongoing, Progressing  Goal: Patient-Specific Goal (Individualized)  Outcome: Ongoing, Progressing  Goal: Absence of Hospital-Acquired Illness or Injury  Outcome: Ongoing, Progressing  Intervention: Identify and Manage Fall Risk  Recent Flowsheet Documentation  Taken 12/30/2023 1600 by Unique Han RN  Safety Promotion/Fall Prevention:   activity supervised   clutter free environment maintained   assistive device/personal items within reach  Taken 12/30/2023 1219 by Unique Han RN  Safety Promotion/Fall Prevention:   activity supervised   assistive device/personal items within reach   clutter free environment maintained  Taken 12/30/2023 1100 by Unique Han RN  Safety Promotion/Fall Prevention:   activity supervised   assistive device/personal items within reach   clutter free environment maintained  Intervention: Prevent Skin Injury  Recent Flowsheet Documentation  Taken 12/30/2023 1600 by Unique Han RN  Body Position:   left   turned  Skin Protection: adhesive use limited  Taken 12/30/2023 1219 by Unique Han RN  Skin Protection: adhesive use limited  Taken 12/30/2023 1100 by Unique Han RN  Body Position:   left   turned  Skin Protection:   adhesive use limited   incontinence pads utilized   transparent dressing maintained   tubing/devices free from skin contact  Intervention: Prevent and Manage VTE (Venous Thromboembolism) Risk  Recent Flowsheet Documentation  Taken 12/30/2023 1600 by Unique Han RN  Activity Management: activity  encouraged  Taken 12/30/2023 1100 by Unique Han RN  Activity Management: activity encouraged  VTE Prevention/Management: (see mar) other (see comments)  Range of Motion: active ROM (range of motion) encouraged  Intervention: Prevent Infection  Recent Flowsheet Documentation  Taken 12/30/2023 1600 by Unique Han RN  Infection Prevention: environmental surveillance performed  Taken 12/30/2023 1219 by Unique Han RN  Infection Prevention: environmental surveillance performed  Taken 12/30/2023 1100 by Unique Han RN  Infection Prevention: environmental surveillance performed  Goal: Optimal Comfort and Wellbeing  Outcome: Ongoing, Progressing  Goal: Readiness for Transition of Care  Outcome: Ongoing, Progressing   Goal Outcome Evaluation:

## 2023-12-30 NOTE — PROGRESS NOTES
[facility] Medicine Services  PROGRESS NOTE    Patient Name: Omar Mendoza  : 1940  MRN: [id number]    Date of Admission: 2023  Primary Care Physician: Varun Pa MD    Subjective   Subjective     CC:  Follow up weakness, foot blister    HPI:  Patient seen resting in bed in no apparent distress.  No acute events overnight per nursing.  Patient reports she is feeling with same today.  No family currently at bedside.  Hemoglobin down to 8.2 this a.m. fecal occult was negative.  Suspect anemia due to chronic disease.    Objective   Objective     Vital Signs:   Temp:  [97.5 °F (36.4 °C)-98.5 °F (36.9 °C)] 97.6 °F (36.4 °C)  Heart Rate:  [69-81] 69  Resp:  [16-18] 18  BP: (107-116)/(71-89) 107/78  Flow (L/min):  [1] 1     Physical Exam:  Constitutional: No acute distress, awake, alert, chronically ill-appearing  HENT: NCAT, mucous membranes moist  Respiratory: Grossly clear, diminished, respiratory effort normal on 1 L  Cardiovascular: RRR, no murmurs, cap refill brisk   Gastrointestinal: Positive bowel sounds, soft, nontender, nondistended  Musculoskeletal: 1+ BLE edema, right foot dressing in place  Psychiatric: flat affect, cooperative  Neurologic: Oriented x2, moves all extremities, speech clear  Skin: warm, dry, no visible rash     Results Reviewed:  LAB RESULTS:      Lab 23  0655 23  0417 23  1239 23  1404 23  1403 23  0431 23  2336 23  2131   WBC 5.98 6.17 6.31  6.14  --  7.25  --   --  7.78   HEMOGLOBIN 8.2* 8.6* 9.1*  9.0*  --  10.5*  --   --  10.3*   HEMATOCRIT 24.6* 25.5* 27.6*  27.1*  --  31.4*  --   --  31.3*   PLATELETS 110* 120* 134*  128*  --  137*  --   --  159   NEUTROS ABS  --   --  5.21  5.03  --  6.29  --   --  6.22   IMMATURE GRANS (ABS)  --   --  0.03  0.10*  --  0.04  --   --  0.07*   LYMPHS ABS  --   --  0.50*  0.53*  --  0.57*  --   --  0.92   MONOS ABS  --   --  0.18  0.17  --  0.15  --   --   0.34   EOS ABS  --   --  0.38  0.30  --  0.19  --   --  0.21   MCV 92.8 93.4 94.2  92.8  --  91.8  --   --  94.8   SED RATE  --   --   --   --   --   --   --  17   CRP  --   --   --   --   --   --  1.87*  --    PROCALCITONIN  --  0.11  --   --   --   --   --  0.10   LACTATE  --   --   --  1.4  --  1.4  --  2.1*         Lab 12/30/23  0655 12/29/23  0417 12/28/23  1239 12/27/23  1404 12/27/23  1403 12/26/23  2336 12/26/23  2131   SODIUM 146* 145 143  --  140  --  143   POTASSIUM 4.0 3.8 3.9  --  4.1  --  4.3   CHLORIDE 111* 113* 111*  --  106  --  109*   CO2 25.0 26.0 25.0  --  26.0  --  26.0   ANION GAP 10.0 6.0 7.0  --  8.0  --  8.0   BUN 14 15 17  --  18  --  21   CREATININE 0.58 0.53* 0.59  --  0.64  --  0.80   EGFR 91.0 93.0 90.7  --  88.9  --  74.1   GLUCOSE 134* 72 173*  --  151*  --  81   CALCIUM 7.3* 7.6* 7.6*  --  7.6*  --  7.9*   IONIZED CALCIUM  --   --   --  1.16  --   --   --    MAGNESIUM  --  1.6  --   --  1.7  --   --    PHOSPHORUS  --   --   --   --  3.5  --   --    HEMOGLOBIN A1C  --   --   --   --  12.10*  --   --    TSH  --   --   --   --   --  3.960  --          Lab 12/27/23  1403 12/26/23  2131   TOTAL PROTEIN 4.3* 4.9*   ALBUMIN 2.5* 2.6*   GLOBULIN 1.8 2.3   ALT (SGPT) 28 31   AST (SGOT) 28 36*   BILIRUBIN 0.6 0.7   ALK PHOS 151* 174*   LIPASE  --  119*         Lab 12/28/23  1239 12/26/23  2336 12/26/23  2131   PROBNP 1,039.0  --  1,494.0   HSTROP T  --  67* 69*             Lab 12/26/23  2336 12/26/23 2131   IRON 53  53  --    IRON SATURATION (TSAT) 28  --    TIBC 188*  --    TRANSFERRIN 126*  --    FERRITIN 462.20*  --    FOLATE  --  10.30   VITAMIN B 12  --  1,253*         Brief Urine Lab Results  (Last result in the past 365 days)        Color   Clarity   Blood   Leuk Est   Nitrite   Protein   CREAT   Urine HCG        12/27/23 0040 Orange   Cloudy   Large (3+)   Moderate (2+)   Negative   100 mg/dL (2+)                   Microbiology Results Abnormal       Procedure Component Value -  Date/Time    Blood Culture - Blood, Arm, Right [[id number]]  (Normal) Collected: 12/27/23 0431    Lab Status: Preliminary result Specimen: Blood from Arm, Right Updated: 12/30/23 0515     Blood Culture No growth at 3 days    Blood Culture - Blood, Blood, PICC Line [[id number]]  (Normal) Collected: 12/27/23 1400    Lab Status: Preliminary result Specimen: Blood, PICC Line Updated: 12/29/23 1531     Blood Culture No growth at 2 days            No radiology results from the last 24 hrs    Results for orders placed during the hospital encounter of 12/26/23    Adult Transthoracic Echo Complete W/ Cont if Necessary Per Protocol    Interpretation Summary    Left ventricular systolic function is normal. Calculated left ventricular EF = 57% Left ventricular ejection fraction appears to be 56 - 60%.    Left ventricular wall thickness is consistent with mild to moderate concentric hypertrophy.    Left ventricular diastolic function was normal.    Estimated right ventricular systolic pressure from tricuspid regurgitation is mildly elevated (35-45 mmHg).    There is a trivial pericardial effusion.    Mild aortic valve regurgitation.      Current medications:  Scheduled Meds:aspirin, 81 mg, Oral, Daily  atorvastatin, 80 mg, Oral, Daily  castor oil-balsam peru, 1 application , Topical, Q12H  cefTRIAXone, 1,000 mg, Intravenous, Q24H  doxycycline, 100 mg, Oral, Q12H  heparin (porcine), 5,000 Units, Subcutaneous, Q12H  insulin lispro, 2-9 Units, Subcutaneous, 4x Daily With Meals & Nightly  magnesium hydroxide, 10 mL, Oral, Daily  metoprolol tartrate, 50 mg, Oral, BID  senna-docusate sodium, 2 tablet, Oral, BID   And  polyethylene glycol, 17 g, Oral, Daily  sodium chloride, 10 mL, Intravenous, Q12H      Continuous Infusions:   PRN Meds:.  acetaminophen **OR** acetaminophen **OR** acetaminophen    senna-docusate sodium **AND** polyethylene glycol **AND** bisacodyl **AND** bisacodyl    Calcium Replacement - Follow Nurse / BPA Driven  Protocol    dextrose    dextrose    glucagon (human recombinant)    hydrOXYzine    Magnesium Standard Dose Replacement - Follow Nurse / BPA Driven Protocol    nitroglycerin    ondansetron    Phosphorus Replacement - Follow Nurse / BPA Driven Protocol    Potassium Replacement - Follow Nurse / BPA Driven Protocol    sodium chloride    sodium chloride    Assessment & Plan   Assessment & Plan     Active Hospital Problems    Diagnosis  POA    **UTI (urinary tract infection) [N39.0]  Yes    Acute constipation [K59.00]  Unknown    Acute urinary retention [R33.8]  Unknown    Uterine prolapse [N81.4]  Unknown    Bradycardia [R00.1]  Unknown    Pressure injury of buttock, unstageable [L89.300]  Unknown    Type 2 diabetes mellitus with ketoacidosis, without long-term current use of insulin [E11.10]  Unknown    Disorientation [R41.0]  Unknown    Pleural effusion [J90]  Unknown    Severe malnutrition [E43]  Yes    Edema of lower extremity [R60.0]  Yes    Bullous pemphigoid [L12.0]  Yes    Dyslipidemia [E78.5]  Yes    Essential hypertension [I10]  Yes      Resolved Hospital Problems   No resolved problems to display.        Brief Hospital Course to date:  Omar Mendoza is a 83 y.o. female with history of type 2 diabetes, hypertension, history of uterine prolapse, urinary retention with Schultz catheter in place, history of bullous pemphigoid  (s/p Dupixent 10/2023, prednisone/doxycycline 11/2023) CAD, hyperlipidemia, presumed dementia who presented to the ED with generalized weakness and near bullous pemphigoid blister on the heel.      This patient's problems and plans were partially entered by my partner and updated as appropriate by me 12/30/23.     Dysphagia  -concern for aspiration event 12/30 per nursing  -keep NPO until SLP eval   -CXR pending  -on Abx as below     Acute metabolic encephalopathy, superimposed on suspected dementia  Generalized weakness  -PT/OT recommended SNF  -CM following for placement      Bilateral  pleural effusion  L>R  Bibasilar airspace disease versus pneumonia  -Patient currently on room air with good O2 sats  -Follow-up MRSA PCR, cultures, urinary strep and Legionella antigens  -Procalcitonin is low, lactate has improved  -Continue antibiotics, currently on Rocephin and doxycycline (Day 3)  -Currently on RA       Anemia  -Iron 53, iron sat 28  -Hgb down to 8.2 this AM   -fecal occult negative  -suspect anemia of chronic disease  -CBC in AM      Poor venous access  -PICC in place     Sinus bradycardia  - pt on both amiodarone and metoprolol, dtr reports amiodarone is new since hospitalization at [facility].  - hold amiodarone, decreased metoprolol to 50 mg BID w/ hold parameters  -HR currently improved      BLE edema  Decreased pulses LLE  - CTA abd showing small right pleural effusion, bilateral basilar atelectasis  - CTA w/ runoff showing normal right sided runoff; left side delayed runoff possibly r/t venous stasis changes, no evidence of arterial occlusion or significant stenosis  - LLE cool to touch compared to RLE, decreased pulses  - neurovascular checks  -Echo revealed EF 57% with normal diastolic function      Uncontrolled Type 2 diabetes  -Hgb A1C 12.10 on 12/27  -Continue SSI     Constipation  -Continue aggressive bowel regimen, s/p soapsuds enema x 1  -Milk of mag x1, Miralax daily, suppository x1 today   -chronic loza catheter in place     Uterine prolapse  -Gyn Dr. Zhou has seen, plan for outpatient follow up to discuss treatment options.   -may be contributing to chronic urine retention     Bullous pemphigoid  -Has developed new blister on right heel  -Continue doxycycline  -Wound care following      Pressure ulcer  -Wound care following      L adrenal adenoma  - incidental finding on CTA abdomen      Severe malnutrition     Expected Discharge Location and Transportation: SNF pending   Expected Discharge   Expected Discharge Date: 1/1/2024; Expected Discharge Time:       DVT  prophylaxis:  Medical DVT prophylaxis orders are present.     AM-PAC 6 Clicks Score (PT): 7 (12/30/23 1100)    CODE STATUS:   Code Status and Medical Interventions:   Ordered at: 12/27/23 0410     Code Status (Patient has no pulse and is not breathing):    CPR (Attempt to Resuscitate)     Medical Interventions (Patient has pulse or is breathing):    Full Support       LITA Uribe  12/30/23

## 2023-12-30 NOTE — THERAPY EVALUATION
Acute Care - Speech Language Pathology   Swallow Initial Evaluation [facility]  Clinical Swallow Evaluation     Patient Name: Omar Mendoza  : 1940  MRN: [id number]  Today's Date: 2023               Admit Date: 2023    Visit Dx:     ICD-10-CM ICD-9-CM   1. Generalized weakness  R53.1 780.79   2. Acute UTI  N39.0 599.0   3. Bullous pemphigoid  L12.0 694.5   4. Venous insufficiency  I87.2 459.81   5. Dysphagia, unspecified type  R13.10 787.20     Patient Active Problem List   Diagnosis    UTI (urinary tract infection)    Bullous pemphigoid    Essential hypertension    Dyslipidemia    Edema of lower extremity    Cholestatic hepatitis    Acute constipation    Acute urinary retention    Uterine prolapse    Bradycardia    Pressure injury of buttock, unstageable    Type 2 diabetes mellitus with ketoacidosis, without long-term current use of insulin    Disorientation    Pleural effusion    Severe malnutrition     Past Medical History:   Diagnosis Date    CAD (coronary artery disease)     Dementia     Diabetes mellitus     Hyperlipemia     Hypertension      History reviewed. No pertinent surgical history.    SLP Recommendation and Plan  SLP Swallowing Diagnosis: suspected pharyngeal dysphagia (23)  SLP Diet Recommendation: puree, thin liquids (23)  Recommended Precautions and Strategies: upright posture during/after eating, general aspiration precautions (23)  SLP Rec. for Method of Medication Administration: meds whole, meds crushed, with puree (crush as needed or if pt fearful for whole) (23)     Monitor for Signs of Aspiration: yes, notify SLP if any concerns (23)  Recommended Diagnostics: reassess via clinical swallow evaluation (23)  Swallow Criteria for Skilled Therapeutic Interventions Met: demonstrates skilled criteria (23)  Anticipated Discharge Disposition (SLP): skilled nursing facility (23)  Rehab  Potential/Prognosis, Swallowing: re-evaluate goals as necessary (12/30/23 1530)        Oral Care Recommendations: Oral Care BID/PRN, Toothbrush (12/30/23 1530)                                      Oral Care Recommendations: Oral Care BID/PRN, Toothbrush (12/30/23 1530)    Plan of Care Reviewed With: patient      SWALLOW EVALUATION (last 72 hours)       SLP Adult Swallow Evaluation       Row Name 12/30/23 1530                   Rehab Evaluation    Document Type evaluation  -SM        Subjective Information no complaints  -SM        Patient Observations alert;cooperative  -SM        Patient Effort good  -SM           General Information    Patient Profile Reviewed yes  -SM        Pertinent History Of Current Problem Adm with weakness and difficulties r/t blister on heel. Encephalopathy, UTI. Presumed dementia. Consulted after choking/coughing at lunch on broccoli.  -SM        Current Method of Nutrition regular textures;thin liquids;other (see comments)  though trays currently being held  -SM        Plans/Goals Discussed with patient;agreed upon  -SM        Barriers to Rehab none identified  -SM        Patient's Goals for Discharge eat/drink without coughing/choking;declined thickened liquids  -SM           Pain Scale: Numbers Pre/Post-Treatment    Pretreatment Pain Rating 0/10 - no pain  -SM        Posttreatment Pain Rating 0/10 - no pain  -SM           Oral Musculature and Cranial Nerve Assessment    Oral Motor General Assessment WFL  -SM           General Eating/Swallowing Observations    Respiratory Support Currently in Use nasal cannula  -SM           Clinical Swallow Eval    Oral Prep Phase WFL  -SM        Oral Transit WFL  -SM        Oral Residue WFL  -SM        Pharyngeal Phase suspected pharyngeal impairment  -SM        Esophageal Phase unremarkable  though difficult to assess as not taking much solids  -SM           Pharyngeal Phase Concerns    Pharyngeal Phase Concerns throat clear  -SM        Throat Clear  thin;other (see comments)  -        Pharyngeal Phase Concerns, Comment Pt fearful of trying solids. Agreed for small bite of sher cracker. No difficulties. She prefers to trial puree trays. Pt reports no hx dysphagia, no family present to further discuss. Pt with intermittent and very mild throat clearing. Trialed nectar-thick liquids to see if any difference. Pt took one sip, facial grimmace with disgust, and conveyed great dislike. Given significant aversion, not considering thickened at this time. Likely to only add to decreased intake and risk deydration. Will start with general aspiration precautions and adjust as needed if not tolerating.  -           SLP Evaluation Clinical Impression    SLP Swallowing Diagnosis suspected pharyngeal dysphagia  -        Functional Impact risk of aspiration/pneumonia  -        Rehab Potential/Prognosis, Swallowing re-evaluate goals as necessary  -        Swallow Criteria for Skilled Therapeutic Interventions Met demonstrates skilled criteria  -           Recommendations    SLP Diet Recommendation puree;thin liquids  -        Recommended Diagnostics reassess via clinical swallow evaluation  -        Recommended Precautions and Strategies upright posture during/after eating;general aspiration precautions  -        Oral Care Recommendations Oral Care BID/PRN;Toothbrush  -        SLP Rec. for Method of Medication Administration meds whole;meds crushed;with puree  crush as needed or if pt fearful for whole  -        Monitor for Signs of Aspiration yes;notify SLP if any concerns  -        Anticipated Discharge Disposition (SLP) skilled nursing facility  -                  User Key  (r) = Recorded By, (t) = Taken By, (c) = Cosigned By      Initials Name Effective Dates    Haydee Alonso MS CCC-SLP 02/03/23 -                     EDUCATION  The patient has been educated in the following areas:   Dysphagia (Swallowing Impairment) Modified Diet  Instruction.              Time Calculation:    Time Calculation- SLP       Row Name 12/30/23 1623             Time Calculation- SLP    SLP Start Time 1530  -SM      SLP Received On 12/30/23  -SM         Untimed Charges    35431-HH Eval Oral Pharyng Swallow Minutes 45  -SM         Total Minutes    Untimed Charges Total Minutes 45  -SM       Total Minutes 45  -SM                User Key  (r) = Recorded By, (t) = Taken By, (c) = Cosigned By      Initials Name Provider Type    Haydee Alonso MS CCC-SLP Speech and Language Pathologist                    Therapy Charges for Today       Code Description Service Date Service Provider Modifiers Qty    [id number] HC ST EVAL ORAL PHARYNG SWALLOW 3 12/30/2023 Haydee Choi MS CCC-SLP GN 1                 Haydee Choi MS CCC-EDER  12/30/2023

## 2023-12-31 LAB
ANION GAP SERPL CALCULATED.3IONS-SCNC: 11 MMOL/L (ref 5–15)
BUN SERPL-MCNC: 16 MG/DL (ref 8–23)
BUN/CREAT SERPL: 29.1 (ref 7–25)
CALCIUM SPEC-SCNC: 7.6 MG/DL (ref 8.6–10.5)
CHLORIDE SERPL-SCNC: 110 MMOL/L (ref 98–107)
CO2 SERPL-SCNC: 24 MMOL/L (ref 22–29)
CREAT SERPL-MCNC: 0.55 MG/DL (ref 0.57–1)
DEPRECATED RDW RBC AUTO: 63.6 FL (ref 37–54)
EGFRCR SERPLBLD CKD-EPI 2021: 92.2 ML/MIN/1.73
ERYTHROCYTE [DISTWIDTH] IN BLOOD BY AUTOMATED COUNT: 18.6 % (ref 12.3–15.4)
GLUCOSE BLDC GLUCOMTR-MCNC: 184 MG/DL (ref 70–130)
GLUCOSE BLDC GLUCOMTR-MCNC: 192 MG/DL (ref 70–130)
GLUCOSE BLDC GLUCOMTR-MCNC: 203 MG/DL (ref 70–130)
GLUCOSE BLDC GLUCOMTR-MCNC: 223 MG/DL (ref 70–130)
GLUCOSE SERPL-MCNC: 180 MG/DL (ref 65–99)
HCT VFR BLD AUTO: 24.7 % (ref 34–46.6)
HGB BLD-MCNC: 8 G/DL (ref 12–15.9)
MCH RBC QN AUTO: 30.8 PG (ref 26.6–33)
MCHC RBC AUTO-ENTMCNC: 32.4 G/DL (ref 31.5–35.7)
MCV RBC AUTO: 95 FL (ref 79–97)
PLATELET # BLD AUTO: 124 10*3/MM3 (ref 140–450)
PMV BLD AUTO: 9 FL (ref 6–12)
POTASSIUM SERPL-SCNC: 3.7 MMOL/L (ref 3.5–5.2)
RBC # BLD AUTO: 2.6 10*6/MM3 (ref 3.77–5.28)
SODIUM SERPL-SCNC: 145 MMOL/L (ref 136–145)
WBC NRBC COR # BLD AUTO: 4.51 10*3/MM3 (ref 3.4–10.8)

## 2023-12-31 PROCEDURE — 63710000001 INSULIN LISPRO (HUMAN) PER 5 UNITS: Performed by: NURSE PRACTITIONER

## 2023-12-31 PROCEDURE — 82948 REAGENT STRIP/BLOOD GLUCOSE: CPT

## 2023-12-31 PROCEDURE — 25010000002 CEFTRIAXONE PER 250 MG: Performed by: NURSE PRACTITIONER

## 2023-12-31 PROCEDURE — G0378 HOSPITAL OBSERVATION PER HR: HCPCS

## 2023-12-31 PROCEDURE — 99232 SBSQ HOSP IP/OBS MODERATE 35: CPT | Performed by: NURSE PRACTITIONER

## 2023-12-31 PROCEDURE — 92610 EVALUATE SWALLOWING FUNCTION: CPT

## 2023-12-31 PROCEDURE — A9270 NON-COVERED ITEM OR SERVICE: HCPCS | Performed by: NURSE PRACTITIONER

## 2023-12-31 PROCEDURE — 85027 COMPLETE CBC AUTOMATED: CPT | Performed by: NURSE PRACTITIONER

## 2023-12-31 PROCEDURE — 25010000002 HEPARIN (PORCINE) PER 1000 UNITS: Performed by: NURSE PRACTITIONER

## 2023-12-31 PROCEDURE — 63710000001 METOPROLOL TARTRATE 25 MG TABLET: Performed by: NURSE PRACTITIONER

## 2023-12-31 PROCEDURE — 80048 BASIC METABOLIC PNL TOTAL CA: CPT | Performed by: NURSE PRACTITIONER

## 2023-12-31 RX ORDER — POLYETHYLENE GLYCOL 3350 17 G/17G
17 POWDER, FOR SOLUTION ORAL DAILY PRN
Status: DISCONTINUED | OUTPATIENT
Start: 2023-12-31 | End: 2024-01-24

## 2023-12-31 RX ORDER — AMOXICILLIN 250 MG
2 CAPSULE ORAL 2 TIMES DAILY PRN
Status: DISCONTINUED | OUTPATIENT
Start: 2023-12-31 | End: 2024-01-24

## 2023-12-31 RX ORDER — BISACODYL 10 MG
10 SUPPOSITORY, RECTAL RECTAL DAILY PRN
Status: DISCONTINUED | OUTPATIENT
Start: 2023-12-31 | End: 2024-01-24

## 2023-12-31 RX ORDER — BISACODYL 5 MG/1
5 TABLET, DELAYED RELEASE ORAL DAILY PRN
Status: DISCONTINUED | OUTPATIENT
Start: 2023-12-31 | End: 2024-01-24

## 2023-12-31 RX ADMIN — DOXYCYCLINE 100 MG: 100 CAPSULE ORAL at 08:26

## 2023-12-31 RX ADMIN — METOPROLOL TARTRATE 25 MG: 25 TABLET, FILM COATED ORAL at 20:26

## 2023-12-31 RX ADMIN — ATORVASTATIN CALCIUM 80 MG: 40 TABLET, FILM COATED ORAL at 08:25

## 2023-12-31 RX ADMIN — INSULIN LISPRO 2 UNITS: 100 INJECTION, SOLUTION INTRAVENOUS; SUBCUTANEOUS at 07:13

## 2023-12-31 RX ADMIN — MAGNESIUM HYDROXIDE 10 ML: 400 SUSPENSION ORAL at 08:30

## 2023-12-31 RX ADMIN — CASTOR OIL AND BALSAM, PERU 1 APPLICATION: 788; 87 OINTMENT TOPICAL at 20:26

## 2023-12-31 RX ADMIN — Medication 10 ML: at 08:27

## 2023-12-31 RX ADMIN — INSULIN LISPRO 4 UNITS: 100 INJECTION, SOLUTION INTRAVENOUS; SUBCUTANEOUS at 17:37

## 2023-12-31 RX ADMIN — DOXYCYCLINE 100 MG: 100 CAPSULE ORAL at 20:26

## 2023-12-31 RX ADMIN — Medication 10 ML: at 20:26

## 2023-12-31 RX ADMIN — INSULIN LISPRO 2 UNITS: 100 INJECTION, SOLUTION INTRAVENOUS; SUBCUTANEOUS at 13:18

## 2023-12-31 RX ADMIN — CASTOR OIL AND BALSAM, PERU 1 APPLICATION: 788; 87 OINTMENT TOPICAL at 08:27

## 2023-12-31 RX ADMIN — INSULIN LISPRO 3 UNITS: 100 INJECTION, SOLUTION INTRAVENOUS; SUBCUTANEOUS at 20:25

## 2023-12-31 RX ADMIN — ASPIRIN 81 MG: 81 TABLET, CHEWABLE ORAL at 08:26

## 2023-12-31 RX ADMIN — SODIUM CHLORIDE 1000 MG: 900 INJECTION INTRAVENOUS at 05:11

## 2023-12-31 RX ADMIN — HEPARIN SODIUM 5000 UNITS: 5000 INJECTION INTRAVENOUS; SUBCUTANEOUS at 08:26

## 2023-12-31 RX ADMIN — HEPARIN SODIUM 5000 UNITS: 5000 INJECTION INTRAVENOUS; SUBCUTANEOUS at 20:25

## 2023-12-31 NOTE — PLAN OF CARE
Goal Outcome Evaluation:  Plan of Care Reviewed With: patient            SLP re-evaluation completed. Will continue to address dysphagia. Please see note for further details and recommendations.        Anticipated Discharge Disposition (SLP): skilled nursing facility

## 2023-12-31 NOTE — PROGRESS NOTES
[facility] Medicine Services  PROGRESS NOTE    Patient Name: Omar Mendoza  : 1940  MRN: [id number]    Date of Admission: 2023  Primary Care Physician: Varun Pa MD    Subjective   Subjective     CC:  Follow up weakness, foot blister     HPI:  Patient seen resting in bed in no apparent distress. Nursing reports aspiration event on . CXR revealed increased right basilar airspace disease with stable left basilar atelectasis.  She remains stable on room air.  No new complaints at this time.      Objective   Objective     Vital Signs:   Temp:  [98.2 °F (36.8 °C)-98.4 °F (36.9 °C)] 98.3 °F (36.8 °C)  Heart Rate:  [68-78] 78  Resp:  [16-18] 18  BP: ()/(53-69) 119/64  Flow (L/min):  [1-3] 1     Physical Exam:  Constitutional: No acute distress, awake, alert, chronically ill.  HENT: NCAT, mucous membranes moist  Respiratory: Grossly clear, diminished in bases, respiratory effort normal on 1 L  Cardiovascular: RRR, no murmurs, cap refill brisk   Gastrointestinal: Positive bowel sounds, soft, nontender, nondistended  Musculoskeletal: 1+ BLE edema, right foot dressing in place  Psychiatric: Appropriate affect, cooperative  Neurologic: Oriented x 2, moves all extremities, speech clear  Skin: warm, dry, no visible rash       Results Reviewed:  LAB RESULTS:      Lab 23  0551 23  0655 23  0417 23  1239 23  1404 23  1403 23  0431 23  2336 23  2131   WBC 4.51 5.98 6.17 6.31  6.14  --  7.25  --   --  7.78   HEMOGLOBIN 8.0* 8.2* 8.6* 9.1*  9.0*  --  10.5*  --   --  10.3*   HEMATOCRIT 24.7* 24.6* 25.5* 27.6*  27.1*  --  31.4*  --   --  31.3*   PLATELETS 124* 110* 120* 134*  128*  --  137*  --   --  159   NEUTROS ABS  --   --   --  5.21  5.03  --  6.29  --   --  6.22   IMMATURE GRANS (ABS)  --   --   --  0.03  0.10*  --  0.04  --   --  0.07*   LYMPHS ABS  --   --   --  0.50*  0.53*  --  0.57*  --   --  0.92   MONOS  ABS  --   --   --  0.18  0.17  --  0.15  --   --  0.34   EOS ABS  --   --   --  0.38  0.30  --  0.19  --   --  0.21   MCV 95.0 92.8 93.4 94.2  92.8  --  91.8  --   --  94.8   SED RATE  --   --   --   --   --   --   --   --  17   CRP  --   --   --   --   --   --   --  1.87*  --    PROCALCITONIN  --   --  0.11  --   --   --   --   --  0.10   LACTATE  --   --   --   --  1.4  --  1.4  --  2.1*         Lab 12/31/23  0608 12/30/23  0655 12/29/23  0417 12/28/23  1239 12/27/23  1404 12/27/23  1403 12/26/23  2336   SODIUM 145 146* 145 143  --  140  --    POTASSIUM 3.7 4.0 3.8 3.9  --  4.1  --    CHLORIDE 110* 111* 113* 111*  --  106  --    CO2 24.0 25.0 26.0 25.0  --  26.0  --    ANION GAP 11.0 10.0 6.0 7.0  --  8.0  --    BUN 16 14 15 17  --  18  --    CREATININE 0.55* 0.58 0.53* 0.59  --  0.64  --    EGFR 92.2 91.0 93.0 90.7  --  88.9  --    GLUCOSE 180* 134* 72 173*  --  151*  --    CALCIUM 7.6* 7.3* 7.6* 7.6*  --  7.6*  --    IONIZED CALCIUM  --   --   --   --  1.16  --   --    MAGNESIUM  --   --  1.6  --   --  1.7  --    PHOSPHORUS  --   --   --   --   --  3.5  --    HEMOGLOBIN A1C  --   --   --   --   --  12.10*  --    TSH  --   --   --   --   --   --  3.960         Lab 12/27/23  1403 12/26/23  2131   TOTAL PROTEIN 4.3* 4.9*   ALBUMIN 2.5* 2.6*   GLOBULIN 1.8 2.3   ALT (SGPT) 28 31   AST (SGOT) 28 36*   BILIRUBIN 0.6 0.7   ALK PHOS 151* 174*   LIPASE  --  119*         Lab 12/28/23  1239 12/26/23  2336 12/26/23 2131   PROBNP 1,039.0  --  1,494.0   HSTROP T  --  67* 69*             Lab 12/26/23  2336 12/26/23 2131   IRON 53  53  --    IRON SATURATION (TSAT) 28  --    TIBC 188*  --    TRANSFERRIN 126*  --    FERRITIN 462.20*  --    FOLATE  --  10.30   VITAMIN B 12  --  1,253*         Brief Urine Lab Results  (Last result in the past 365 days)        Color   Clarity   Blood   Leuk Est   Nitrite   Protein   CREAT   Urine HCG        12/27/23 0040 Orange   Cloudy   Large (3+)   Moderate (2+)   Negative   100 mg/dL  (2+)                   Microbiology Results Abnormal       Procedure Component Value - Date/Time    Blood Culture - Blood, Arm, Right [[id number]]  (Normal) Collected: 12/27/23 0431    Lab Status: Preliminary result Specimen: Blood from Arm, Right Updated: 12/31/23 0515     Blood Culture No growth at 4 days    Blood Culture - Blood, Blood, PICC Line [[id number]]  (Normal) Collected: 12/27/23 1400    Lab Status: Preliminary result Specimen: Blood, PICC Line Updated: 12/30/23 1531     Blood Culture No growth at 3 days            XR Chest 1 View    Result Date: 12/30/2023  XR CHEST 1 VW Date of Exam: 12/30/2023 1:42 PM EST Indication: aspiration event Comparison: 12/26/2023 Findings: Interval placement of a right-sided PICC line. Heart size and pulmonary vasculature are stable there is some increased airspace disease in the right lung base. There is stable strandy airspace disease in the left lung base. There is mild blunting of both  costophrenic angles     Impression: Impression: Increased right basilar airspace disease that could be atelectasis or aspiration. Stable left basilar atelectasis noted. There are small bilateral pleural effusions Electronically Signed: Lavon Urban  12/30/2023 2:02 PM EST  Workstation ID: [id number]     Results for orders placed during the hospital encounter of 12/26/23    Adult Transthoracic Echo Complete W/ Cont if Necessary Per Protocol    Interpretation Summary    Left ventricular systolic function is normal. Calculated left ventricular EF = 57% Left ventricular ejection fraction appears to be 56 - 60%.    Left ventricular wall thickness is consistent with mild to moderate concentric hypertrophy.    Left ventricular diastolic function was normal.    Estimated right ventricular systolic pressure from tricuspid regurgitation is mildly elevated (35-45 mmHg).    There is a trivial pericardial effusion.    Mild aortic valve regurgitation.      Current medications:  Scheduled Meds:aspirin, 81  mg, Oral, Daily  atorvastatin, 80 mg, Oral, Daily  castor oil-balsam peru, 1 application , Topical, Q12H  doxycycline, 100 mg, Oral, Q12H  heparin (porcine), 5,000 Units, Subcutaneous, Q12H  insulin lispro, 2-9 Units, Subcutaneous, 4x Daily With Meals & Nightly  magnesium hydroxide, 10 mL, Oral, Daily  metoprolol tartrate, 50 mg, Oral, BID  senna-docusate sodium, 2 tablet, Oral, BID   And  polyethylene glycol, 17 g, Oral, Daily  sodium chloride, 10 mL, Intravenous, Q12H      Continuous Infusions:   PRN Meds:.  acetaminophen **OR** acetaminophen **OR** acetaminophen    senna-docusate sodium **AND** polyethylene glycol **AND** bisacodyl **AND** bisacodyl    Calcium Replacement - Follow Nurse / BPA Driven Protocol    dextrose    dextrose    glucagon (human recombinant)    hydrOXYzine    Magnesium Standard Dose Replacement - Follow Nurse / BPA Driven Protocol    nitroglycerin    ondansetron    Phosphorus Replacement - Follow Nurse / BPA Driven Protocol    Potassium Replacement - Follow Nurse / BPA Driven Protocol    sodium chloride    sodium chloride    Assessment & Plan   Assessment & Plan     Active Hospital Problems    Diagnosis  POA    **UTI (urinary tract infection) [N39.0]  Yes    Acute constipation [K59.00]  Unknown    Acute urinary retention [R33.8]  Unknown    Uterine prolapse [N81.4]  Unknown    Bradycardia [R00.1]  Unknown    Pressure injury of buttock, unstageable [L89.300]  Unknown    Type 2 diabetes mellitus with ketoacidosis, without long-term current use of insulin [E11.10]  Unknown    Disorientation [R41.0]  Unknown    Pleural effusion [J90]  Unknown    Severe malnutrition [E43]  Yes    Edema of lower extremity [R60.0]  Yes    Bullous pemphigoid [L12.0]  Yes    Dyslipidemia [E78.5]  Yes    Essential hypertension [I10]  Yes      Resolved Hospital Problems   No resolved problems to display.        Brief Hospital Course to date:  Omar Mendoza is a 83 y.o. female with history of type 2 diabetes,  hypertension, history of uterine prolapse, urinary retention with Schultz catheter in place, history of bullous pemphigoid  (s/p Dupixent 10/2023, prednisone/doxycycline 11/2023) CAD, hyperlipidemia, presumed dementia who presented to the ED with generalized weakness and near bullous pemphigoid blister on the heel.      This patient's problems and plans were partially entered by my partner and updated as appropriate by me 12/31/23.     Dysphagia  -concern for aspiration event 12/30 per nursing  -SLP following      Acute metabolic encephalopathy, superimposed on suspected dementia  Generalized weakness  -PT/OT recommended SNF  -CM following for placement      Bilateral pleural effusion  L>R  Bibasilar airspace disease versus pneumonia  -Patient currently on room air with good O2 sats  -Follow-up MRSA PCR, cultures, urinary strep and Legionella antigens  -Procalcitonin is low, lactate has improved  -concern for aspiration on 12/30  -CXR revealed increased right basilar airspace disease with stable left basilar atelectasis  -Encourage IS, pulmonary toilet  -Continue antibiotics, currently on Rocephin and doxycycline (Day 5)  -Currently on RA       Anemia  -Iron 53, iron sat 28  -Hgb down to 8.0 this AM   -fecal occult negative  -suspect anemia of chronic disease  -CBC in AM      Poor venous access  -PICC in place     Sinus bradycardia  - pt on both amiodarone and metoprolol, dtr reports amiodarone is new since hospitalization at [place].  - hold amiodarone, decreased metoprolol to 50 mg BID w/ hold parameters  -HR currently improved      BLE edema  Decreased pulses LLE  - CTA abd showing small right pleural effusion, bilateral basilar atelectasis  - CTA w/ runoff showing normal right sided runoff; left side delayed runoff possibly r/t venous stasis changes, no evidence of arterial occlusion or significant stenosis  - LLE cool to touch compared to RLE, decreased pulses  - neurovascular checks  -Echo revealed EF 57%  with normal diastolic function      Uncontrolled Type 2 diabetes  -Hgb A1C 12.10 on 12/27  -Continue SSI     Constipation  -Continue aggressive bowel regimen, s/p soapsuds enema x 1  -s/p Milk of mag x1, Miralax daily, suppository x1   -Resolved, +BM 12/30  -chronic loza catheter in place     Uterine prolapse  -Gyn Dr. Zhou has seen, plan for outpatient follow up to discuss treatment options.   -may be contributing to chronic urine retention     Bullous pemphigoid  -Has developed new blister on right heel  -Continue doxycycline  -Wound care following      Pressure ulcer  -Wound care following      L adrenal adenoma  - incidental finding on CTA abdomen      Severe malnutrition     Expected Discharge Location and Transportation: SNF pending   Expected Discharge   Expected Discharge Date: 1/2/2024; Expected Discharge Time:       DVT prophylaxis:  Medical DVT prophylaxis orders are present.     AM-PAC 6 Clicks Score (PT): 7 (12/30/23 2000)    CODE STATUS:   Code Status and Medical Interventions:   Ordered at: 12/27/23 0410     Code Status (Patient has no pulse and is not breathing):    CPR (Attempt to Resuscitate)     Medical Interventions (Patient has pulse or is breathing):    Full Support       Frantz Martin, APRN  12/31/23

## 2023-12-31 NOTE — PLAN OF CARE
Problem: Adult Inpatient Plan of Care  Goal: Plan of Care Review  Outcome: Ongoing, Progressing  Goal: Patient-Specific Goal (Individualized)  Outcome: Ongoing, Progressing  Goal: Absence of Hospital-Acquired Illness or Injury  Outcome: Ongoing, Progressing  Intervention: Identify and Manage Fall Risk  Recent Flowsheet Documentation  Taken 12/31/2023 1600 by Unique Han RN  Safety Promotion/Fall Prevention:   activity supervised   assistive device/personal items within reach   clutter free environment maintained  Taken 12/31/2023 1200 by Unique Han RN  Safety Promotion/Fall Prevention:   activity supervised   assistive device/personal items within reach  Taken 12/31/2023 0800 by Unique Han RN  Safety Promotion/Fall Prevention:   activity supervised   assistive device/personal items within reach  Intervention: Prevent Skin Injury  Recent Flowsheet Documentation  Taken 12/31/2023 1600 by Unique Han RN  Body Position:   right   turned  Skin Protection: adhesive use limited  Taken 12/31/2023 1200 by Unique Han RN  Body Position:   right   turned  Skin Protection: adhesive use limited  Taken 12/31/2023 0800 by Unique Han RN  Body Position: turned  Skin Protection: adhesive use limited  Intervention: Prevent and Manage VTE (Venous Thromboembolism) Risk  Recent Flowsheet Documentation  Taken 12/31/2023 1600 by Unique Han RN  Activity Management: activity encouraged  Taken 12/31/2023 1200 by Unique Han RN  Activity Management: activity encouraged  Taken 12/31/2023 0800 by Unique Han RN  Activity Management: activity encouraged  Intervention: Prevent Infection  Recent Flowsheet Documentation  Taken 12/31/2023 1600 by Unique Han RN  Infection Prevention: environmental surveillance performed  Taken 12/31/2023 1200 by Unique Han RN  Infection Prevention: environmental surveillance performed  Taken 12/31/2023 0800 by Unique Han RN  Infection Prevention:  environmental surveillance performed  Goal: Optimal Comfort and Wellbeing  Outcome: Ongoing, Progressing  Goal: Readiness for Transition of Care  Outcome: Ongoing, Progressing   Goal Outcome Evaluation:

## 2023-12-31 NOTE — THERAPY RE-EVALUATION
Acute Care - Speech Language Pathology   Swallow Re-Evaluation [facility]  Clinical Swallow Evaluation     Patient Name: Omar Mendoza  : 1940  MRN: [id number]  Today's Date: 2023               Admit Date: 2023    Visit Dx:     ICD-10-CM ICD-9-CM   1. Generalized weakness  R53.1 780.79   2. Acute UTI  N39.0 599.0   3. Bullous pemphigoid  L12.0 694.5   4. Venous insufficiency  I87.2 459.81   5. Dysphagia, unspecified type  R13.10 787.20     Patient Active Problem List   Diagnosis    UTI (urinary tract infection)    Bullous pemphigoid    Essential hypertension    Dyslipidemia    Edema of lower extremity    Cholestatic hepatitis    Acute constipation    Acute urinary retention    Uterine prolapse    Bradycardia    Pressure injury of buttock, unstageable    Type 2 diabetes mellitus with ketoacidosis, without long-term current use of insulin    Disorientation    Pleural effusion    Severe malnutrition     Past Medical History:   Diagnosis Date    CAD (coronary artery disease)     Dementia     Diabetes mellitus     Hyperlipemia     Hypertension      History reviewed. No pertinent surgical history.    SLP Recommendation and Plan  SLP Swallowing Diagnosis: suspected pharyngeal dysphagia (23)  SLP Diet Recommendation: soft to chew textures, whole, thin liquids (23)  Recommended Precautions and Strategies: upright posture during/after eating, small bites of food and sips of liquid, general aspiration precautions (23)  SLP Rec. for Method of Medication Administration: meds whole, with puree, as tolerated (cut large pills, as able.) (23)     Monitor for Signs of Aspiration: yes, notify SLP if any concerns (23)  Recommended Diagnostics: reassess via clinical swallow evaluation (23)  Swallow Criteria for Skilled Therapeutic Interventions Met: demonstrates skilled criteria (23)  Anticipated Discharge Disposition (SLP): skilled nursing  facility (12/31/23 1300)  Rehab Potential/Prognosis, Swallowing: re-evaluate goals as necessary (12/31/23 1300)        Oral Care Recommendations: Oral Care BID/PRN, Toothbrush (12/31/23 1300)                                      Oral Care Recommendations: Oral Care BID/PRN, Toothbrush (12/31/23 1300)    Plan of Care Reviewed With: patient      SWALLOW EVALUATION (last 72 hours)       SLP Adult Swallow Evaluation       Row Name 12/31/23 1300 12/30/23 9720                Rehab Evaluation    Document Type re-evaluation  -SM evaluation  -SM       Subjective Information no complaints  -SM no complaints  -SM       Patient Observations alert;cooperative  -SM alert;cooperative  -SM       Patient Effort good  -SM good  -SM          General Information    Patient Profile Reviewed -- yes  -SM       Pertinent History Of Current Problem -- Adm with weakness and difficulties r/t blister on heel. Encephalopathy, UTI. Presumed dementia. Consulted after choking/coughing at lunch on broccoli.  -SM       Current Method of Nutrition pureed;thin liquids  -SM regular textures;thin liquids;other (see comments)  though trays currently being held  -       Plans/Goals Discussed with patient;agreed upon  -SM patient;agreed upon  -       Barriers to Rehab -- none identified  -       Patient's Goals for Discharge declined modified diet  -SM eat/drink without coughing/choking;declined thickened liquids  -SM          Pain Scale: Numbers Pre/Post-Treatment    Pretreatment Pain Rating 0/10 - no pain  -SM 0/10 - no pain  -SM       Posttreatment Pain Rating 0/10 - no pain  -SM 0/10 - no pain  -SM          Oral Musculature and Cranial Nerve Assessment    Oral Motor General Assessment -- WFL  -SM          General Eating/Swallowing Observations    Respiratory Support Currently in Use nasal cannula  -SM nasal cannula  -SM          Clinical Swallow Eval    Oral Prep Phase WFL  -SM WFL  -SM       Oral Transit WFL  -SM WFL  -SM       Oral Residue WFL   -SM WFL  -SM       Pharyngeal Phase suspected pharyngeal impairment  -SM suspected pharyngeal impairment  -SM       Esophageal Phase unremarkable  -SM unremarkable  though difficult to assess as not taking much solids  -SM       Clinical Swallow Evaluation Summary Pt with great dislike of puree, not eating much. Trialed soft solids, no signs of difficulties. Will advance to soft solids, adding gravy to trays. Pt encouraged to take small bites and well chew. RN reports intermittent coughing, seeming while drinking. No s/s aspiration with SLP trials though SLP did observe throat clearing yesterday. Hwoever, highly suspect uise of thickened liquids will only decreased intake and lead to dehydration (and pt admit with UTI). Discussed with APRN. He okayed continuing thin liquids via small sips for now and will talk to pt's dtr tomorrow regarding eval results and ? MBS vs managing clinically.  -SM --          Pharyngeal Phase Concerns    Pharyngeal Phase Concerns -- throat clear  -SM       Throat Clear -- thin;other (see comments)  -SM       Pharyngeal Phase Concerns, Comment -- Pt fearful of trying solids. Agreed for small bite of sher cracker. No difficulties. She prefers to trial puree trays. Pt reports no hx dysphagia, no family present to further discuss. Pt with intermittent and very mild throat clearing. Trialed nectar-thick liquids to see if any difference. Pt took one sip, facial grimmace with disgust, and conveyed great dislike. Given significant aversion, not considering thickened at this time. Likely to only add to decreased intake and risk deydration. Will start with general aspiration precautions and adjust as needed if not tolerating.  -SM          SLP Evaluation Clinical Impression    SLP Swallowing Diagnosis suspected pharyngeal dysphagia  -SM suspected pharyngeal dysphagia  -SM       Functional Impact risk of aspiration/pneumonia  -SM risk of aspiration/pneumonia  -SM       Rehab Potential/Prognosis,  Swallowing re-evaluate goals as necessary  - re-evaluate goals as necessary  -       Swallow Criteria for Skilled Therapeutic Interventions Met demonstrates skilled criteria  - demonstrates skilled criteria  -          Recommendations    SLP Diet Recommendation soft to chew textures;whole;thin liquids  - puree;thin liquids  -       Recommended Diagnostics reassess via clinical swallow evaluation  - reassess via clinical swallow evaluation  -       Recommended Precautions and Strategies upright posture during/after eating;small bites of food and sips of liquid;general aspiration precautions  - upright posture during/after eating;general aspiration precautions  -       Oral Care Recommendations Oral Care BID/PRN;Toothbrush  -SM Oral Care BID/PRN;Toothbrush  -       SLP Rec. for Method of Medication Administration meds whole;with puree;as tolerated  cut large pills, as able.  - meds whole;meds crushed;with puree  crush as needed or if pt fearful for whole  -       Monitor for Signs of Aspiration yes;notify SLP if any concerns  -SM yes;notify SLP if any concerns  -       Anticipated Discharge Disposition (SLP) skilled nursing facility  - skilled nursing facility  -                 User Key  (r) = Recorded By, (t) = Taken By, (c) = Cosigned By      Initials Name Effective Dates     Haydee Choi MS CCC-SLP 02/03/23 -                     EDUCATION  The patient has been educated in the following areas:   Dysphagia (Swallowing Impairment) Modified Diet Instruction.              Time Calculation:    Time Calculation- SLP       Row Name 12/31/23 1446             Time Calculation- SLP    SLP Start Time 1300  -SM      SLP Received On 12/31/23  -         Untimed Charges    08342-HI Eval Oral Pharyng Swallow Minutes 40  -         Total Minutes    Untimed Charges Total Minutes 40  -SM       Total Minutes 40  -SM                User Key  (r) = Recorded By, (t) = Taken By, (c) = Cosigned  By      Initials Name Provider Type    Haydee Alonso, MS CCC-SLP Speech and Language Pathologist                    Therapy Charges for Today       Code Description Service Date Service Provider Modifiers Qty    11380789751 HC ST EVAL ORAL PHARYNG SWALLOW 3 12/30/2023 Haydee Choi, MS CCC-SLP GN 1    [id number] HC ST EVAL ORAL PHARYNG SWALLOW 3 12/31/2023 Haydee Choi, MS DESTINY-SLP GN 1                 Haydee Choi MS CCC-SLP  12/31/2023

## 2023-12-31 NOTE — PLAN OF CARE
Problem: Adult Inpatient Plan of Care  Goal: Plan of Care Review  Outcome: Ongoing, Progressing  Goal: Patient-Specific Goal (Individualized)  Outcome: Ongoing, Progressing  Goal: Absence of Hospital-Acquired Illness or Injury  Outcome: Ongoing, Progressing  Intervention: Identify and Manage Fall Risk  Description: Perform standard risk assessment on admission using a validated tool or comprehensive approach appropriate to the patient; reassess fall risk frequently, with change in status or transfer to another level of care.  Communicate fall injury risk to interprofessional healthcare team.  Determine need for increased observation, equipment and environmental modification, such as low bed, signage and supportive, nonskid footwear.  Adjust safety measures to individual developmental age, stage and identified risk factors.  Reinforce the importance of safety and physical activity with patient and family.  Perform regular intentional rounding to assess need for position change, pain assessment and personal needs, including assistance with toileting.  Recent Flowsheet Documentation  Taken 12/31/2023 0400 by Joyce Palomares, RN  Safety Promotion/Fall Prevention:   activity supervised   assistive device/personal items within reach   clutter free environment maintained   fall prevention program maintained   lighting adjusted   mobility aid in reach   nonskid shoes/slippers when out of bed   room organization consistent   safety round/check completed  Taken 12/31/2023 0200 by Joyce Palomares, RN  Safety Promotion/Fall Prevention:   activity supervised   assistive device/personal items within reach   clutter free environment maintained   fall prevention program maintained   lighting adjusted   mobility aid in reach   nonskid shoes/slippers when out of bed   room organization consistent   safety round/check completed  Taken 12/31/2023 0000 by Joyce Palomares, RN  Safety Promotion/Fall Prevention:   activity supervised    assistive device/personal items within reach   clutter free environment maintained   fall prevention program maintained   lighting adjusted   mobility aid in reach   nonskid shoes/slippers when out of bed   room organization consistent   safety round/check completed  Taken 12/30/2023 2200 by Joyce Palomares RN  Safety Promotion/Fall Prevention:   activity supervised   assistive device/personal items within reach   clutter free environment maintained   fall prevention program maintained   lighting adjusted   mobility aid in reach   nonskid shoes/slippers when out of bed   room organization consistent   safety round/check completed  Taken 12/30/2023 2000 by Joyce Palomares RN  Safety Promotion/Fall Prevention:   activity supervised   assistive device/personal items within reach   clutter free environment maintained   fall prevention program maintained   lighting adjusted   mobility aid in reach   nonskid shoes/slippers when out of bed   room organization consistent   safety round/check completed  Intervention: Prevent Skin Injury  Description: Perform a screening for skin injury risk, such as pressure or moisture associated skin damage on admission and at regular intervals throughout hospital stay.  Keep all areas of skin (especially folds) clean and dry.  Maintain adequate skin hydration.  Relieve and redistribute pressure and protect bony prominences; implement measures based on patient-specific risk factors.  Match turning and repositioning schedule to clinical condition.  Encourage weight shift frequently; assist with reposition if unable to complete independently.  Float heels off bed; avoid pressure on the Achilles tendon.  Keep skin free from extended contact with medical devices.  Encourage functional activity and mobility, as early as tolerated.  Use aids (e.g., slide boards, mechanical lift) during transfer.  Recent Flowsheet Documentation  Taken 12/31/2023 0400 by Joyce Palomares RN  Skin Protection:    adhesive use limited   tubing/devices free from skin contact   transparent dressing maintained   skin-to-skin areas padded   skin-to-device areas padded   incontinence pads utilized   skin sealant/moisture barrier applied  Taken 12/31/2023 0200 by Joyce Palomares RN  Body Position:   turned   supine, legs elevated  Skin Protection:   adhesive use limited   tubing/devices free from skin contact   transparent dressing maintained   skin-to-skin areas padded   skin-to-device areas padded   incontinence pads utilized   skin sealant/moisture barrier applied  Taken 12/31/2023 0000 by Joyce Palomares RN  Body Position:   turned   left  Skin Protection:   adhesive use limited   tubing/devices free from skin contact   transparent dressing maintained   skin-to-skin areas padded   skin-to-device areas padded   incontinence pads utilized   skin sealant/moisture barrier applied  Taken 12/30/2023 2200 by Joyce Palomares RN  Body Position:   turned   right  Skin Protection:   adhesive use limited   tubing/devices free from skin contact   transparent dressing maintained   skin-to-skin areas padded   skin-to-device areas padded   skin sealant/moisture barrier applied   incontinence pads utilized  Taken 12/30/2023 2000 by Joyce Palomares RN  Body Position:   turned   supine, legs elevated  Skin Protection:   adhesive use limited   tubing/devices free from skin contact   transparent dressing maintained   skin-to-skin areas padded   skin-to-device areas padded   incontinence pads utilized   skin sealant/moisture barrier applied  Intervention: Prevent and Manage VTE (Venous Thromboembolism) Risk  Description: Assess for VTE (venous thromboembolism) risk.  Encourage and assist with early ambulation.  Initiate and maintain compression or other therapy, as indicated, based on identified risk in accordance with organizational protocol and provider order.  Encourage both active and passive leg exercises while in bed, if unable to  ambulate.  Recent Flowsheet Documentation  Taken 12/31/2023 0400 by Joyce Palomares RN  Activity Management: activity encouraged  Taken 12/31/2023 0200 by Joyce Palomares RN  Activity Management: activity encouraged  Taken 12/31/2023 0000 by Joyce Palomares RN  Activity Management: activity encouraged  Taken 12/30/2023 2200 by Joyce Palomares RN  Activity Management: activity encouraged  Taken 12/30/2023 2000 by Joyce Palomares RN  Activity Management: activity encouraged  Intervention: Prevent Infection  Description: Maintain skin and mucous membrane integrity; promote hand, oral and pulmonary hygiene.  Optimize fluid balance, nutrition, sleep and glycemic control to maximize infection resistance.  Identify potential sources of infection early to prevent or mitigate progression of infection (e.g., wound, lines, devices).  Evaluate ongoing need for invasive devices; remove promptly when no longer indicated.  Recent Flowsheet Documentation  Taken 12/30/2023 2000 by Joyce Palomares RN  Infection Prevention:   environmental surveillance performed   equipment surfaces disinfected   hand hygiene promoted   personal protective equipment utilized  Goal: Optimal Comfort and Wellbeing  Outcome: Ongoing, Progressing  Intervention: Provide Person-Centered Care  Description: Use a family-focused approach to care.  Develop trust and rapport by proactively providing information, encouraging questions, addressing concerns and offering reassurance.  Acknowledge emotional response to hospitalization.  Recognize and utilize personal coping strategies.  Honor spiritual and cultural preferences.  Recent Flowsheet Documentation  Taken 12/30/2023 2000 by Joyce Palomares RN  Trust Relationship/Rapport:   care explained   choices provided   emotional support provided   empathic listening provided   questions answered   questions encouraged   thoughts/feelings acknowledged  Goal: Readiness for Transition of Care  Outcome: Ongoing,  Progressing     Problem: Skin Injury Risk Increased  Goal: Skin Health and Integrity  Outcome: Ongoing, Progressing  Intervention: Optimize Skin Protection  Description: Perform a full pressure injury risk assessment, as indicated by screening, upon admission to care unit.  Reassess skin (injury risk, full inspection) frequently (e.g., scheduled interval, with change in condition) to provide optimal early detection and prevention.  Maintain adequate tissue perfusion (e.g., encourage fluid balance; avoid crossing legs, constrictive clothing or devices) to promote tissue oxygenation.  Maintain head of bed at lowest degree of elevation tolerated, considering medical condition and other restrictions.  Avoid positioning onto an area that remains reddened.  Minimize incontinence and moisture (e.g., toileting schedule; moisture-wicking pad, diaper or incontinence collection device; skin moisture barrier).  Cleanse skin promptly and gently when soiled utilizing a pH-balanced cleanser.  Relieve and redistribute pressure (e.g., scheduled position changes, weight shifts, use of support surface, medical device repositioning, protective dressing application, use of positioning device, microclimate control, use of pressure-injury-monitor  Encourage increased activity, such as sitting in a chair at the bedside or early mobilization, when able to tolerate.  Recent Flowsheet Documentation  Taken 12/31/2023 0400 by Joyce Palomares RN  Pressure Reduction Techniques:   weight shift assistance provided   frequent weight shift encouraged   heels elevated off bed   positioned off wounds   pressure points protected  Pressure Reduction Devices:   pressure-redistributing mattress utilized   positioning supports utilized   heel offloading device utilized  Skin Protection:   adhesive use limited   tubing/devices free from skin contact   transparent dressing maintained   skin-to-skin areas padded   skin-to-device areas padded   incontinence  pads utilized   skin sealant/moisture barrier applied  Taken 12/31/2023 0200 by Joyce Palomares RN  Pressure Reduction Techniques:   frequent weight shift encouraged   weight shift assistance provided   heels elevated off bed   positioned off wounds   pressure points protected  Head of Bed (HOB) Positioning: HOB at 30-45 degrees  Pressure Reduction Devices:   pressure-redistributing mattress utilized   positioning supports utilized   heel offloading device utilized  Skin Protection:   adhesive use limited   tubing/devices free from skin contact   transparent dressing maintained   skin-to-skin areas padded   skin-to-device areas padded   incontinence pads utilized   skin sealant/moisture barrier applied  Taken 12/31/2023 0000 by Joyce Palomares RN  Pressure Reduction Techniques:   frequent weight shift encouraged   weight shift assistance provided   heels elevated off bed   positioned off wounds   pressure points protected  Head of Bed (HOB) Positioning: HOB at 30-45 degrees  Pressure Reduction Devices:   pressure-redistributing mattress utilized   positioning supports utilized   heel offloading device utilized  Skin Protection:   adhesive use limited   tubing/devices free from skin contact   transparent dressing maintained   skin-to-skin areas padded   skin-to-device areas padded   incontinence pads utilized   skin sealant/moisture barrier applied  Taken 12/30/2023 2200 by Joyce Palomares RN  Pressure Reduction Techniques:   frequent weight shift encouraged   weight shift assistance provided   heels elevated off bed   positioned off wounds   pressure points protected  Head of Bed (HOB) Positioning: HOB at 30-45 degrees  Pressure Reduction Devices:   pressure-redistributing mattress utilized   positioning supports utilized   heel offloading device utilized  Skin Protection:   adhesive use limited   tubing/devices free from skin contact   transparent dressing maintained   skin-to-skin areas padded   skin-to-device  areas padded   skin sealant/moisture barrier applied   incontinence pads utilized  Taken 12/30/2023 2000 by Joyce Palomares RN  Pressure Reduction Techniques:   frequent weight shift encouraged   weight shift assistance provided   heels elevated off bed   positioned off wounds   pressure points protected  Head of Bed (HOB) Positioning: HOB at 30-45 degrees  Pressure Reduction Devices:   pressure-redistributing mattress utilized   positioning supports utilized   heel offloading device utilized  Skin Protection:   adhesive use limited   tubing/devices free from skin contact   transparent dressing maintained   skin-to-skin areas padded   skin-to-device areas padded   incontinence pads utilized   skin sealant/moisture barrier applied     Problem: Asthma Comorbidity  Goal: Maintenance of Asthma Control  Outcome: Ongoing, Progressing  Intervention: Maintain Asthma Symptom Control  Description: Evaluate adherence to self-management (asthma action plan), such as medication, symptom-control, trigger-avoidance and self-monitoring.  Advocate for continuation of home regimen, including medication, method of delivery, schedule and symptom monitoring; acknowledge preferred modality and routine.  Minimize exposure to potential triggers, such as perfume, cleaning chemicals and all types of smoke.  Assess for proper use of inhaled medication and delivery technique; assist or reinstruct if needed.  Evaluate effectiveness of coping skills; encourage expression of feelings, expectations and concerns related to disease management and quality of life; reinforce education to enhance management plan and wellbeing.  Recent Flowsheet Documentation  Taken 12/31/2023 0400 by Joyce Palomares RN  Medication Review/Management: medications reviewed  Taken 12/31/2023 0200 by Joyce Palomares RN  Medication Review/Management: medications reviewed  Taken 12/31/2023 0000 by Joyce Palomares RN  Medication Review/Management: medications reviewed  Taken  12/30/2023 2200 by Joyce Palomares RN  Medication Review/Management: medications reviewed  Taken 12/30/2023 2000 by Joyce Palomares RN  Medication Review/Management: medications reviewed     Problem: Behavioral Health Comorbidity  Goal: Maintenance of Behavioral Health Symptom Control  Outcome: Ongoing, Progressing  Intervention: Maintain Behavioral Health Symptom Control  Description: Confirm mental health diagnosis and current treatment.  Evaluate adherence to previously identified self-management plan.  Advocate continuation of home strategies, including medication.  Evaluate effectiveness of self-management strategies and coping skills.  Communicate with providers to ensure continuity and follow-up at transition.  Recent Flowsheet Documentation  Taken 12/31/2023 0400 by Joyce Palomares RN  Medication Review/Management: medications reviewed  Taken 12/31/2023 0200 by Joyce Palomares RN  Medication Review/Management: medications reviewed  Taken 12/31/2023 0000 by Joyce Palomares RN  Medication Review/Management: medications reviewed  Taken 12/30/2023 2200 by Joyce Palomares RN  Medication Review/Management: medications reviewed  Taken 12/30/2023 2000 by Joyce Palomares RN  Medication Review/Management: medications reviewed     Problem: COPD (Chronic Obstructive Pulmonary Disease) Comorbidity  Goal: Maintenance of COPD Symptom Control  Outcome: Ongoing, Progressing  Intervention: Maintain COPD-Symptom Control  Description: Evaluate adherence to management plan (e.g., medication, trigger avoidance, infection prevention, self-monitoring).  Advocate for continuation of home regimen, including medication, method of delivery, schedule and symptom monitoring.  Anticipate the need for breathing techniques and activity pacing to minimize fatigue and breathlessness.  Assess for proper use of inhaled medication and delivery technique; assist or reinstruct if needed.  Evaluate effectiveness of coping skills; encourage  expression of feelings, expectations and concerns related to disease management and quality of life; reinforce education to enhance management plan and wellbeing.  Recent Flowsheet Documentation  Taken 12/31/2023 0400 by Joyce Palomares RN  Medication Review/Management: medications reviewed  Taken 12/31/2023 0200 by Joyce Palomares RN  Medication Review/Management: medications reviewed  Taken 12/31/2023 0000 by Joyce Palomares RN  Medication Review/Management: medications reviewed  Taken 12/30/2023 2200 by Joyce Palomares RN  Medication Review/Management: medications reviewed  Taken 12/30/2023 2000 by Joyce Palomares RN  Medication Review/Management: medications reviewed     Problem: Diabetes Comorbidity  Goal: Blood Glucose Level Within Targeted Range  Outcome: Ongoing, Progressing     Problem: Heart Failure Comorbidity  Goal: Maintenance of Heart Failure Symptom Control  Outcome: Ongoing, Progressing  Intervention: Maintain Heart Failure-Management  Description: Evaluate adherence to home heart failure self-care regimen (e.g., medication, fluid balance, sodium intake, daily weight, physical activity, telemonitoring, support).  Advocate continuation of home medication and schedule.  Consider pharmacologic therapy administration time and effects (e.g., avoid giving diuretic prior to bedtime or nitrates on empty stomach).  Monitor response to pharmacologic therapy, including weight fluctuations, blood pressure and electrolyte levels.  Monitor for signs and symptoms of anxiety and depression, including severity and duration; if present, provide psychosocial support.  Consider need for heart failure clinic or palliative care consult.  Recent Flowsheet Documentation  Taken 12/31/2023 0400 by Joyce Palomares RN  Medication Review/Management: medications reviewed  Taken 12/31/2023 0200 by Joyce Palomares RN  Medication Review/Management: medications reviewed  Taken 12/31/2023 0000 by Joyce Palomares RN  Medication  Review/Management: medications reviewed  Taken 12/30/2023 2200 by Joyce Palomares RN  Medication Review/Management: medications reviewed  Taken 12/30/2023 2000 by Joyce Palomares RN  Medication Review/Management: medications reviewed     Problem: Hypertension Comorbidity  Goal: Blood Pressure in Desired Range  Outcome: Ongoing, Progressing  Intervention: Maintain Blood Pressure Management  Description: Evaluate adherence to home antihypertensive regimen (e.g., exercise and activity, diet modification, medication).  Provide scheduled antihypertensive medication; consider administration time and effects (e.g., avoid giving diuretic prior to bedtime).  Monitor response to antihypertensive medication therapy (e.g., blood pressure, electrolyte levels, medication effects).  Minimize risk of orthostatic hypotension; encourage caution with position changes, particularly if elderly.  Recent Flowsheet Documentation  Taken 12/31/2023 0400 by Joyce Palomares RN  Medication Review/Management: medications reviewed  Taken 12/31/2023 0200 by Joyce Palomares RN  Medication Review/Management: medications reviewed  Taken 12/31/2023 0000 by Joyce Palomares RN  Medication Review/Management: medications reviewed  Taken 12/30/2023 2200 by Joyce Palomares RN  Medication Review/Management: medications reviewed  Taken 12/30/2023 2000 by Joyce Palomares RN  Medication Review/Management: medications reviewed     Problem: Obstructive Sleep Apnea Risk or Actual Comorbidity Management  Goal: Unobstructed Breathing During Sleep  Outcome: Ongoing, Progressing     Problem: Osteoarthritis Comorbidity  Goal: Maintenance of Osteoarthritis Symptom Control  Outcome: Ongoing, Progressing  Intervention: Maintain Osteoarthritis Symptom Control  Description: Evaluate adherence to self-management plan, such as medication, exercise and weight management.  Advocate for continuation of home regimen, such as medication, physical activity and thermal agents;  monitor response.  Encourage participation in functional activities, such as mobility and ADLs (activities of daily living) to minimize decline associated with inactivity.  Facilitate use of patient-specific assistive devices, equipment or orthoses.  Evaluate effectiveness of coping skills; encourage expression of feelings, expectations and concerns related to disease management and quality of life; reinforce education to enhance management plan and wellbeing.  Recent Flowsheet Documentation  Taken 12/31/2023 0400 by Joyce Palomares RN  Activity Management: activity encouraged  Medication Review/Management: medications reviewed  Taken 12/31/2023 0200 by Joyce Palomares RN  Activity Management: activity encouraged  Medication Review/Management: medications reviewed  Taken 12/31/2023 0000 by Joyce Palomares RN  Activity Management: activity encouraged  Medication Review/Management: medications reviewed  Taken 12/30/2023 2200 by Joyce Palomares RN  Activity Management: activity encouraged  Medication Review/Management: medications reviewed  Taken 12/30/2023 2000 by Joyce Palomares RN  Activity Management: activity encouraged  Medication Review/Management: medications reviewed     Problem: Pain Chronic (Persistent) (Comorbidity Management)  Goal: Acceptable Pain Control and Functional Ability  Outcome: Ongoing, Progressing  Intervention: Manage Persistent Pain  Description: Evaluate pain level, effect of treatment and patient response at regular intervals.  Minimize pain stimuli; coordinate care and adjust environment (e.g., light, noise, unnecessary movement); promote sleep/rest.  Match pharmacologic analgesia to severity and type of pain mechanism (e.g., neuropathic, muscle, inflammatory); consider multimodal approach (e.g., nonopioid, opioid, adjuvant).  Provide medication at regular intervals; titrate to patient response.  Manage breakthrough pain with additional doses; consider rotation or switching  medication.  Monitor for signs of substance tolerance (increased dose to reach desired effect, decreased effect with same dose).  Avoid abrupt withdrawal of medication, especially agents capable of causing physical dependence.  Manage medication-induced effects, such as constipation, nausea, pruritus, urinary retention, somnolence and dizziness.  Provide multimodal treatment interventions, such as physical activity, therapeutic exercise, yoga, TENS (transcutaneous electrical nerve stimulation) and manual therapy.  Train in functional activity modifications, such as body mechanics, posture, ergonomics, energy conservation and activity pacing.  Consider addition of complementary or alternative therapy, such as acupuncture, hypnosis or therapeutic touch.  Recent Flowsheet Documentation  Taken 12/31/2023 0400 by Joyce Palomares RN  Medication Review/Management: medications reviewed  Taken 12/31/2023 0200 by Joyce Palomares RN  Medication Review/Management: medications reviewed  Taken 12/31/2023 0000 by Joyce Palomares RN  Medication Review/Management: medications reviewed  Taken 12/30/2023 2200 by Joyce Palomares RN  Medication Review/Management: medications reviewed  Taken 12/30/2023 2000 by Joyce Palomares RN  Medication Review/Management: medications reviewed  Intervention: Optimize Psychosocial Wellbeing  Description: Facilitate patient’s self-control over pain by providing pain information and allowing choices in treatment.  Consider and address emotional response to pain.  Explore and promote use of coping strategies; address barriers to successful coping.  Evaluate and assist with psychosocial, cultural and spiritual factors impacting pain.  Modify pain perception by using techniques, such as distraction, mindfulness, guided imagery, meditation or music.  Assess and monitor for signs and symptoms of behavioral health concerns, such as unhealthy substance use, depression and suicidal ideation.  Consider referral  for ongoing coping support, such as cognitive behavioral therapy and mindfulness-based stress reduction.  Recent Flowsheet Documentation  Taken 12/30/2023 2000 by Joyce Palomares RN  Diversional Activities: television     Problem: Seizure Disorder Comorbidity  Goal: Maintenance of Seizure Control  Outcome: Ongoing, Progressing     Problem: Fall Injury Risk  Goal: Absence of Fall and Fall-Related Injury  Outcome: Ongoing, Progressing  Intervention: Identify and Manage Contributors  Description: Develop a fall prevention plan with the patient and caregiver/family.  Provide reorientation, appropriate sensory stimulation and routines with changes in mental status to decrease risk of fall.  Promote use of personal vision and auditory aids.  Assess assistance level required for safe and effective self-care; provide support as needed, such as toileting, mobilization. For age 65 and older, implement timed toileting with assistance.  Encourage physical activity, such as performance of mobility and self-care at highest level of patient ability, multicomponent exercise program and provision of appropriate assistive devices.  If fall occurs, assess the severity of injury; implement fall injury protocol. Determine the cause and revise fall injury prevention plan.  Regularly review medication contribution to fall risk; adjust medication administration times to minimize risk of falling.  Consider risk related to polypharmacy and age.  Balance adequate pain management with potential for oversedation.  Recent Flowsheet Documentation  Taken 12/31/2023 0400 by Joyce Palomares RN  Medication Review/Management: medications reviewed  Taken 12/31/2023 0200 by Joyce Palomares RN  Medication Review/Management: medications reviewed  Taken 12/31/2023 0000 by Joyce Palomares RN  Medication Review/Management: medications reviewed  Taken 12/30/2023 2200 by Joyce Palomares RN  Medication Review/Management: medications reviewed  Taken 12/30/2023  2000 by Joyce Palomares, RN  Medication Review/Management: medications reviewed  Intervention: Promote Injury-Free Environment  Description: Provide a safe, barrier-free environment that encourages independent activity.  Keep care area uncluttered and well-lighted.  Determine need for increased observation or monitoring.  Avoid use of devices that minimize mobility, such as restraints or indwelling urinary catheter.  Recent Flowsheet Documentation  Taken 12/31/2023 0400 by Joyce Palomares, RN  Safety Promotion/Fall Prevention:   activity supervised   assistive device/personal items within reach   clutter free environment maintained   fall prevention program maintained   lighting adjusted   mobility aid in reach   nonskid shoes/slippers when out of bed   room organization consistent   safety round/check completed  Taken 12/31/2023 0200 by Joyce Palomares RN  Safety Promotion/Fall Prevention:   activity supervised   assistive device/personal items within reach   clutter free environment maintained   fall prevention program maintained   lighting adjusted   mobility aid in reach   nonskid shoes/slippers when out of bed   room organization consistent   safety round/check completed  Taken 12/31/2023 0000 by Joyce Palomares RN  Safety Promotion/Fall Prevention:   activity supervised   assistive device/personal items within reach   clutter free environment maintained   fall prevention program maintained   lighting adjusted   mobility aid in reach   nonskid shoes/slippers when out of bed   room organization consistent   safety round/check completed  Taken 12/30/2023 2200 by Joyce Palomares, RN  Safety Promotion/Fall Prevention:   activity supervised   assistive device/personal items within reach   clutter free environment maintained   fall prevention program maintained   lighting adjusted   mobility aid in reach   nonskid shoes/slippers when out of bed   room organization consistent   safety round/check completed  Taken  12/30/2023 2000 by Joyce Palomares, RN  Safety Promotion/Fall Prevention:   activity supervised   assistive device/personal items within reach   clutter free environment maintained   fall prevention program maintained   lighting adjusted   mobility aid in reach   nonskid shoes/slippers when out of bed   room organization consistent   safety round/check completed   Goal Outcome Evaluation:

## 2024-01-01 LAB
ANION GAP SERPL CALCULATED.3IONS-SCNC: 7 MMOL/L (ref 5–15)
BACTERIA SPEC AEROBE CULT: NORMAL
BACTERIA SPEC AEROBE CULT: NORMAL
BUN SERPL-MCNC: 14 MG/DL (ref 8–23)
BUN/CREAT SERPL: 27.5 (ref 7–25)
CALCIUM SPEC-SCNC: 7.6 MG/DL (ref 8.6–10.5)
CHLORIDE SERPL-SCNC: 110 MMOL/L (ref 98–107)
CO2 SERPL-SCNC: 27 MMOL/L (ref 22–29)
CREAT SERPL-MCNC: 0.51 MG/DL (ref 0.57–1)
DEPRECATED RDW RBC AUTO: 62.5 FL (ref 37–54)
EGFRCR SERPLBLD CKD-EPI 2021: 93.9 ML/MIN/1.73
ERYTHROCYTE [DISTWIDTH] IN BLOOD BY AUTOMATED COUNT: 18.4 % (ref 12.3–15.4)
GLUCOSE BLDC GLUCOMTR-MCNC: 148 MG/DL (ref 70–130)
GLUCOSE BLDC GLUCOMTR-MCNC: 163 MG/DL (ref 70–130)
GLUCOSE BLDC GLUCOMTR-MCNC: 186 MG/DL (ref 70–130)
GLUCOSE BLDC GLUCOMTR-MCNC: 196 MG/DL (ref 70–130)
GLUCOSE SERPL-MCNC: 172 MG/DL (ref 65–99)
HCT VFR BLD AUTO: 25.3 % (ref 34–46.6)
HGB BLD-MCNC: 8.3 G/DL (ref 12–15.9)
MCH RBC QN AUTO: 31.6 PG (ref 26.6–33)
MCHC RBC AUTO-ENTMCNC: 32.8 G/DL (ref 31.5–35.7)
MCV RBC AUTO: 96.2 FL (ref 79–97)
PLATELET # BLD AUTO: 139 10*3/MM3 (ref 140–450)
PMV BLD AUTO: 9.2 FL (ref 6–12)
POTASSIUM SERPL-SCNC: 3.7 MMOL/L (ref 3.5–5.2)
RBC # BLD AUTO: 2.63 10*6/MM3 (ref 3.77–5.28)
SODIUM SERPL-SCNC: 144 MMOL/L (ref 136–145)
WBC NRBC COR # BLD AUTO: 3.87 10*3/MM3 (ref 3.4–10.8)

## 2024-01-01 PROCEDURE — 97530 THERAPEUTIC ACTIVITIES: CPT

## 2024-01-01 PROCEDURE — 63710000001 INSULIN LISPRO (HUMAN) PER 5 UNITS: Performed by: NURSE PRACTITIONER

## 2024-01-01 PROCEDURE — G0378 HOSPITAL OBSERVATION PER HR: HCPCS

## 2024-01-01 PROCEDURE — 97598 DBRDMT OPN WND ADDL 20CM/<: CPT

## 2024-01-01 PROCEDURE — 80048 BASIC METABOLIC PNL TOTAL CA: CPT | Performed by: NURSE PRACTITIONER

## 2024-01-01 PROCEDURE — 99232 SBSQ HOSP IP/OBS MODERATE 35: CPT | Performed by: NURSE PRACTITIONER

## 2024-01-01 PROCEDURE — 97530 THERAPEUTIC ACTIVITIES: CPT | Performed by: PHYSICAL THERAPIST

## 2024-01-01 PROCEDURE — 25010000002 HEPARIN (PORCINE) PER 1000 UNITS: Performed by: NURSE PRACTITIONER

## 2024-01-01 PROCEDURE — 29581 APPL MULTLAYER CMPRN SYS LEG: CPT

## 2024-01-01 PROCEDURE — 97597 DBRDMT OPN WND 1ST 20 CM/<: CPT

## 2024-01-01 PROCEDURE — 82948 REAGENT STRIP/BLOOD GLUCOSE: CPT

## 2024-01-01 PROCEDURE — 97110 THERAPEUTIC EXERCISES: CPT | Performed by: PHYSICAL THERAPIST

## 2024-01-01 PROCEDURE — 85027 COMPLETE CBC AUTOMATED: CPT | Performed by: NURSE PRACTITIONER

## 2024-01-01 PROCEDURE — 97535 SELF CARE MNGMENT TRAINING: CPT

## 2024-01-01 RX ADMIN — ASPIRIN 81 MG: 81 TABLET, CHEWABLE ORAL at 08:42

## 2024-01-01 RX ADMIN — CASTOR OIL AND BALSAM, PERU 1 APPLICATION: 788; 87 OINTMENT TOPICAL at 20:33

## 2024-01-01 RX ADMIN — HEPARIN SODIUM 5000 UNITS: 5000 INJECTION INTRAVENOUS; SUBCUTANEOUS at 20:33

## 2024-01-01 RX ADMIN — HEPARIN SODIUM 5000 UNITS: 5000 INJECTION INTRAVENOUS; SUBCUTANEOUS at 08:42

## 2024-01-01 RX ADMIN — Medication 10 ML: at 20:33

## 2024-01-01 RX ADMIN — Medication 10 ML: at 08:42

## 2024-01-01 RX ADMIN — DOXYCYCLINE 100 MG: 100 CAPSULE ORAL at 08:42

## 2024-01-01 RX ADMIN — MAGNESIUM HYDROXIDE 10 ML: 400 SUSPENSION ORAL at 09:00

## 2024-01-01 RX ADMIN — METOPROLOL TARTRATE 25 MG: 25 TABLET, FILM COATED ORAL at 20:32

## 2024-01-01 RX ADMIN — METOPROLOL TARTRATE 25 MG: 25 TABLET, FILM COATED ORAL at 08:42

## 2024-01-01 RX ADMIN — ATORVASTATIN CALCIUM 80 MG: 40 TABLET, FILM COATED ORAL at 08:42

## 2024-01-01 RX ADMIN — INSULIN LISPRO 2 UNITS: 100 INJECTION, SOLUTION INTRAVENOUS; SUBCUTANEOUS at 08:42

## 2024-01-01 RX ADMIN — DOXYCYCLINE 100 MG: 100 CAPSULE ORAL at 20:32

## 2024-01-01 RX ADMIN — CASTOR OIL AND BALSAM, PERU 1 APPLICATION: 788; 87 OINTMENT TOPICAL at 08:43

## 2024-01-01 RX ADMIN — INSULIN LISPRO 2 UNITS: 100 INJECTION, SOLUTION INTRAVENOUS; SUBCUTANEOUS at 20:33

## 2024-01-01 RX ADMIN — INSULIN LISPRO 2 UNITS: 100 INJECTION, SOLUTION INTRAVENOUS; SUBCUTANEOUS at 12:28

## 2024-01-01 RX ADMIN — ACETAMINOPHEN 650 MG: 325 TABLET ORAL at 08:42

## 2024-01-01 NOTE — THERAPY WOUND CARE TREATMENT
Acute Care - Wound/Debridement Treatment Note  [facility]     Patient Name: Omar Mendoza  : 1940  MRN: [id number]  Today's Date: 2024                Admit Date: 2023    Visit Dx:    ICD-10-CM ICD-9-CM   1. Generalized weakness  R53.1 780.79   2. Acute UTI  N39.0 599.0   3. Bullous pemphigoid  L12.0 694.5   4. Venous insufficiency  I87.2 459.81   5. Dysphagia, unspecified type  R13.10 787.20     R medial/posterior heel              Patient Active Problem List   Diagnosis    UTI (urinary tract infection)    Bullous pemphigoid    Essential hypertension    Dyslipidemia    Edema of lower extremity    Cholestatic hepatitis    Acute constipation    Acute urinary retention    Uterine prolapse    Bradycardia    Pressure injury of buttock, unstageable    Type 2 diabetes mellitus with ketoacidosis, without long-term current use of insulin    Disorientation    Pleural effusion    Severe malnutrition        Past Medical History:   Diagnosis Date    CAD (coronary artery disease)     Dementia     Diabetes mellitus     Hyperlipemia     Hypertension         History reviewed. No pertinent surgical history.        Wound 23 2218 Right posterior heel Blisters (Active)   Wound Image    24 1522   Dressing Appearance intact;moist drainage;dried drainage 24 1522   Closure None 24 0845   Base moist;pink;red 24 1522   Periwound intact;dry 24 1522   Periwound Temperature warm 24 1522   Periwound Skin Turgor soft 24 1522   Edges open;irregular 24 1522   Wound Length (cm) 7.2 cm 24 1522   Wound Width (cm) 7.1 cm 24 1522   Wound Depth (cm) 0.1 cm 24 1522   Wound Surface Area (cm^2) 51.12 cm^2 24 1522   Wound Volume (cm^3) 5.112 cm^3 24 1522   Drainage Characteristics/Odor serous;serosanguineous 24 1522   Drainage Amount small 24 1522   Care, Wound cleansed with;soap and water;debrided 24 1522   Dressing Care dressing  applied;silver impregnated;low-adherent;foam;multi-layer wrap 01/01/24 1522   Periwound Care cleansed with pH balanced cleanser;dry periwound area maintained 01/01/24 1522       Wound 12/27/23 0411 Bilateral coccyx Pressure Injury (Active)   Dressing Appearance dressing loose 01/01/24 1400   Closure Adhesive bandage 01/01/24 1400   Base moist;pink;red 01/01/24 1400   Periwound dry 01/01/24 1400   Care, Wound cleansed with;soap and water 01/01/24 1400   Dressing Care antimicrobial agent applied;dressing applied;silicone;foam 01/01/24 1400       Wound 12/27/23 1035 Left greater trochanter Blisters (Active)   Dressing Appearance moist drainage 12/31/23 2000   Closure Adhesive bandage 01/01/24 0845   Base yellow 01/01/24 0845   Care, Wound antimicrobial agent applied 12/31/23 2000   Dressing Care dressing changed;antimicrobial agent applied;silicone;foam 12/31/23 2000      Lymphedema       Row Name 01/01/24 1600             Lymphedema Edema Assessment    Ptting Edema Category By severity  -      Pitting Edema Mild  -         Skin Changes/Observations    Location/Assessment Lower Extremity  -      Lower Extremity Conditions bilateral:;intact;clean;dry;shiny;fragile  -      Lower Extremity Color/Pigment bilateral:;blanchable;erythema  -         Lymphedema Pulses/Capillary Refill    Capillary Refill lower extremity capillary refill  -      Lower Extremity Capillary Refill right:;left:;less than 3 seconds  -         Compression/Skin Care    Compression/Skin Care skin care;wrapping location  -      Skin Care washed/dried;lotion applied  -      Wrapping Location lower extremity  -      Wrapping Location LE bilateral:;foot to knee  -      Wrapping Comments RLE mepilex Ag, cast padding to secure, BLE size 4 compressogrips applied doubled distally for gradient compression.  -                User Key  (r) = Recorded By, (t) = Taken By, (c) = Cosigned By      Initials Name Provider Type     Donovan Love  D, PT Physical Therapist                    WOUND DEBRIDEMENT  Total area of Debridement: 50cm2  Debridement Site 1  Location- Site 1: R medial heel  Selective Debridement- Site 1: Wound Surface >20cmsq  Instruments- Site 1: tweezers, scissors  Excised Tissue Description- Site 1: maximum, other (comment) (nonviable ruptured blister debris)  Bleeding- Site 1: scant               PT Assessment (last 12 hours)       PT Evaluation and Treatment       Row Name 01/01/24 1522          Physical Therapy Time and Intention    Subjective Information complains of;weakness;fatigue;pain;swelling  -     Document Type therapy note (daily note);wound care  -     Mode of Treatment physical therapy;individual therapy  -       Row Name 01/01/24 1522          General Information    Patient Observations alert;cooperative;agree to therapy  -       Row Name 01/01/24 1522          Pain    Pain Intervention(s) Repositioned;Elevated  -     Additional Documentation Pain Scale: FACES Pre/Post-Treatment (Group)  -       Row Name 01/01/24 1522          Pain Scale: FACES Pre/Post-Treatment    Pain: FACES Scale, Pretreatment 2-->hurts little bit  -LH     Posttreatment Pain Rating 2-->hurts little bit  -     Pain Location - Side/Orientation Right  -     Pain Location lower  -     Pain Location - extremity  -       Row Name 01/01/24 1522          Cognition    Affect/Mental Status (Cognition) WFL  -LH     Orientation Status (Cognition) oriented to;person;place;disoriented to;time  -       Row Name 01/01/24 1522          Wound 12/26/23 2218 Right posterior heel Blisters    Wound - Properties Group Placement Date: 12/26/23  -DN Placement Time: 2218 -DN Present on Original Admission: Y  -DN Side: Right  -DN Orientation: posterior  -DN Location: heel  -DN Primary Wound Type: Blisters  -DN    Wound Image Images linked: 2  -LH     Dressing Appearance intact;moist drainage;dried drainage  -     Base moist;pink;red  -LH     Periwound  intact;dry  -LH     Periwound Temperature warm  -     Periwound Skin Turgor soft  -LH     Edges open;irregular  -LH     Wound Length (cm) 7.2 cm  -LH     Wound Width (cm) 7.1 cm  -LH     Wound Depth (cm) 0.1 cm  -     Wound Surface Area (cm^2) 51.12 cm^2  -LH     Wound Volume (cm^3) 5.112 cm^3  -LH     Drainage Characteristics/Odor serous;serosanguineous  -LH     Drainage Amount small  -LH     Care, Wound cleansed with;soap and water;debrided  -LH     Dressing Care dressing applied;silver impregnated;low-adherent;foam;multi-layer wrap  Mepilex Ag, cast padding, MLW  -LH     Periwound Care cleansed with pH balanced cleanser;dry periwound area maintained  -LH     Retired Wound - Properties Group Placement Date: 12/26/23  -DN Placement Time: 2218  -DN Present on Original Admission: Y  -DN Side: Right  -DN Orientation: posterior  -DN Location: heel  -DN Primary Wound Type: Blisters  -DN    Retired Wound - Properties Group Date first assessed: 12/26/23  -DN Time first assessed: 2218  -DN Present on Original Admission: Y  -DN Side: Right  -DN Location: heel  -DN Primary Wound Type: Blisters  -DN      Row Name             Wound 12/27/23 0411 Bilateral coccyx Pressure Injury    Wound - Properties Group Placement Date: 12/27/23  -DN Placement Time: 0411  -DN Present on Original Admission: Y  -DN Side: Bilateral  -DN Location: coccyx  -DN Primary Wound Type: Pressure inj  -DN    Retired Wound - Properties Group Placement Date: 12/27/23  -DN Placement Time: 0411  -DN Present on Original Admission: Y  -DN Side: Bilateral  -DN Location: coccyx  -DN Primary Wound Type: Pressure inj  -DN    Retired Wound - Properties Group Date first assessed: 12/27/23  -DN Time first assessed: 0411  -DN Present on Original Admission: Y  -DN Side: Bilateral  -DN Location: coccyx  -DN Primary Wound Type: Pressure inj  -DN      Row Name             Wound 12/27/23 1035 Left greater trochanter Blisters    Wound - Properties Group Placement Date:  12/27/23  - Placement Time: 1035  -MC Side: Left  -MC Location: greater trochanter  -MC Primary Wound Type: Blisters  -MC    Retired Wound - Properties Group Placement Date: 12/27/23  - Placement Time: 1035  -MC Side: Left  -MC Location: greater trochanter  -MC Primary Wound Type: Blisters  -MC    Retired Wound - Properties Group Date first assessed: 12/27/23  - Time first assessed: 1035  -MC Side: Left  -MC Location: greater trochanter  -MC Primary Wound Type: Blisters  -MC      Row Name 01/01/24 1522          Coping    Observed Emotional State cooperative;calm  -     Verbalized Emotional State acceptance  -     Trust Relationship/Rapport care explained;questions answered  -     Family/Support Persons spouse  -     Involvement in Care at bedside;attentive to patient  -       Row Name 01/01/24 1522          Plan of Care Review    Plan of Care Reviewed With patient;spouse  -     Progress improving  -     Outcome Evaluation Pt demonstrating excellent improvements in BLE edema this date. Pt also now with rupturing of R medial/posterior heel blister. Upon debridement of nonviable ruptured blister debris, pt's wound with good, clean pink/red wound base. PT applied Mepilex ag to help manage drainage and reduce bacterial load. PT reapplied MLW to further promote venous return and improve skin integrity. PT plans to f/u with dressings changes and MLW changes in 2-3 days.  -       Row Name 01/01/24 1522          Positioning and Restraints    Pre-Treatment Position in bed  -     Post Treatment Position bed  -     In Bed supine;call light within reach;encouraged to call for assist;with family/caregiver  -               User Key  (r) = Recorded By, (t) = Taken By, (c) = Cosigned By      Initials Name Provider Type    Eva Singleton, RN Registered Nurse     Donovan Love, PT Physical Therapist    Peter Espinoza, RN Registered Nurse                  Physical Therapy Education       Title: PT  OT SLP Therapies (In Progress)       Topic: Physical Therapy (In Progress)       Point: Mobility training (In Progress)       Learning Progress Summary             Patient Acceptance, E, NR by MIA at 12/29/2023 1131    Acceptance, E, NR by MIA at 12/27/2023 1045                         Point: Home exercise program (Not Started)       Learner Progress:  Not documented in this visit.              Point: Body mechanics (In Progress)       Learning Progress Summary             Patient Acceptance, E, NR by MIA at 12/29/2023 1131    Acceptance, E, NR by MIA at 12/27/2023 1045                         Point: Precautions (In Progress)       Learning Progress Summary             Patient Acceptance, E, NR by MIA at 12/29/2023 1131    Acceptance, E, NR by MIA at 12/27/2023 1045                                         User Key       Initials Effective Dates Name Provider Type Discipline    MIA 11/16/23 -  Petty Mckoy, PT Physical Therapist PT                    Recommendation and Plan  Anticipated Discharge Disposition (PT): skilled nursing facility  Planned Therapy Interventions (PT): wound care, patient/family education  Therapy Frequency (PT): daily  Plan of Care Reviewed With: patient, spouse   Progress: improving       Progress: improving  Outcome Evaluation: Pt demonstrating excellent improvements in BLE edema this date. Pt also now with rupturing of R medial/posterior heel blister. Upon debridement of nonviable ruptured blister debris, pt's wound with good, clean pink/red wound base. PT applied Mepilex ag to help manage drainage and reduce bacterial load. PT reapplied MLW to further promote venous return and improve skin integrity. PT plans to f/u with dressings changes and MLW changes in 2-3 days.  Plan of Care Reviewed With: patient, spouse            Time Calculation   PT Charges       Row Name 01/01/24 1635 01/01/24 1420          Time Calculation    Start Time 1522  - 1325  -LM     PT Received On -- 01/01/24  -LM      PT Goal Re-Cert Due Date 01/06/24  -LH 01/06/24  -LM        Timed Charges    15302 - PT Therapeutic Exercise Minutes -- 10  -LM     98566 - PT Therapeutic Activity Minutes -- 20  -LM        Untimed Charges    Wound Care 47612 Selective debridement;24138 Add't debridement ea 20 cm  -LH --     87527-Lhmrumgkio comp below knee 15  -LH --     21125-Hzmatqirw debridement 10  -LH --     97598-Add't debridement ea 20 cm 10  -LH --        Total Minutes    Timed Charges Total Minutes -- 30  -LM     Untimed Charges Total Minutes 35  -LH --      Total Minutes 35  -LH 30  -LM               User Key  (r) = Recorded By, (t) = Taken By, (c) = Cosigned By      Initials Name Provider Type    Sandra Sin, PT Physical Therapist     Donovan Love, PT Physical Therapist                      Therapy Charges for Today       Code Description Service Date Service Provider Modifiers Qty    06035534376 HC TOMMIE DEBRIDE OPEN WOUND UP TO 20CM 1/1/2024 Donovan Love, PT GP 1    46696309034 HC PT TOMMIE DEBRIDE OPEN WOUND EA ADD 20CM 1/1/2024 Donovan Love, PT GP 2    95029907105 HC PT MULTI LAYER COMP SYS BELOW KNEE 1/1/2024 Donovan Love, PT GP 1              PT G-Codes  Outcome Measure Options: AM-PAC 6 Clicks Basic Mobility (PT)  AM-PAC 6 Clicks Score (PT): 10  AM-PAC 6 Clicks Score (OT): 12       Donovan Love PT  1/1/2024

## 2024-01-01 NOTE — PLAN OF CARE
Goal Outcome Evaluation:  Plan of Care Reviewed With:  (APRN/RN)              APRN spoke to pt's dtr. Does not wish for Modified Barium Swallow at this time as in agreement pt not likely to follow/accept modified PO diet. Continue aspiration precautions, SLP to follow for dysphagia tx. Thanks.

## 2024-01-01 NOTE — PLAN OF CARE
Goal Outcome Evaluation:  Plan of Care Reviewed With: patient, spouse        Progress: improving  Outcome Evaluation: Pt. with improvement with sit to supine, self feeding, grooming and sitting balance today, but decline with sit to stand ability.  Limited by RLE pain and weakness with movement. Pt. remains appropriate for skilled OT services to address deficit areas and promote return to prior higher level of independence.  Continue OT POC.      Anticipated Discharge Disposition (OT): skilled nursing facility

## 2024-01-01 NOTE — PLAN OF CARE
Goal Outcome Evaluation:  Plan of Care Reviewed With: patient, spouse        Progress: improving  Outcome Evaluation: Pt demonstrating excellent improvements in BLE edema this date. Pt also now with rupturing of R medial/posterior heel blister. Upon debridement of nonviable ruptured blister debris, pt's wound with good, clean pink/red wound base. PT applied Mepilex ag to help manage drainage and reduce bacterial load. PT reapplied MLW to further promote venous return and improve skin integrity. PT plans to f/u with dressings changes and MLW changes in 2-3 days.

## 2024-01-01 NOTE — PLAN OF CARE
Goal Outcome Evaluation:  Plan of Care Reviewed With: patient        Progress: improving  Outcome Evaluation: Pt demonstrated improvement with bed mobility assist requiring ModAx1 for supine-->sit.  Pt able to stand for ~30 seconds with ModAx2.  Pt continues to be limited by weakness, decreased activity tolerance, and pain.  Continue to recommend SNF for further therapy at d/c.      Anticipated Discharge Disposition (PT): skilled nursing facility

## 2024-01-01 NOTE — PLAN OF CARE
Problem: Adult Inpatient Plan of Care  Goal: Plan of Care Review  Outcome: Ongoing, Progressing  Goal: Patient-Specific Goal (Individualized)  Outcome: Ongoing, Progressing  Goal: Absence of Hospital-Acquired Illness or Injury  Outcome: Ongoing, Progressing  Intervention: Identify and Manage Fall Risk  Description: Perform standard risk assessment on admission using a validated tool or comprehensive approach appropriate to the patient; reassess fall risk frequently, with change in status or transfer to another level of care.  Communicate fall injury risk to interprofessional healthcare team.  Determine need for increased observation, equipment and environmental modification, such as low bed, signage and supportive, nonskid footwear.  Adjust safety measures to individual developmental age, stage and identified risk factors.  Reinforce the importance of safety and physical activity with patient and family.  Perform regular intentional rounding to assess need for position change, pain assessment and personal needs, including assistance with toileting.  Recent Flowsheet Documentation  Taken 1/1/2024 0400 by Joyce Palomares, RN  Safety Promotion/Fall Prevention:   activity supervised   clutter free environment maintained   assistive device/personal items within reach   fall prevention program maintained   lighting adjusted   mobility aid in reach   nonskid shoes/slippers when out of bed   room organization consistent   safety round/check completed  Taken 1/1/2024 0200 by Joyce Palomares, RN  Safety Promotion/Fall Prevention:   activity supervised   assistive device/personal items within reach   clutter free environment maintained   fall prevention program maintained   lighting adjusted   mobility aid in reach   nonskid shoes/slippers when out of bed   room organization consistent   safety round/check completed  Taken 1/1/2024 0000 by Joyce Palomares, RN  Safety Promotion/Fall Prevention:   activity supervised   assistive  device/personal items within reach   clutter free environment maintained   fall prevention program maintained   lighting adjusted   mobility aid in reach   nonskid shoes/slippers when out of bed   room organization consistent   safety round/check completed  Taken 12/31/2023 2200 by Joyce Palomares RN  Safety Promotion/Fall Prevention:   activity supervised   assistive device/personal items within reach   clutter free environment maintained   fall prevention program maintained   lighting adjusted   mobility aid in reach   nonskid shoes/slippers when out of bed   room organization consistent   safety round/check completed  Taken 12/31/2023 2000 by Joyce Palomares RN  Safety Promotion/Fall Prevention:   activity supervised   assistive device/personal items within reach   clutter free environment maintained   fall prevention program maintained   lighting adjusted   mobility aid in reach   nonskid shoes/slippers when out of bed   room organization consistent   safety round/check completed  Intervention: Prevent Skin Injury  Description: Perform a screening for skin injury risk, such as pressure or moisture associated skin damage on admission and at regular intervals throughout hospital stay.  Keep all areas of skin (especially folds) clean and dry.  Maintain adequate skin hydration.  Relieve and redistribute pressure and protect bony prominences; implement measures based on patient-specific risk factors.  Match turning and repositioning schedule to clinical condition.  Encourage weight shift frequently; assist with reposition if unable to complete independently.  Float heels off bed; avoid pressure on the Achilles tendon.  Keep skin free from extended contact with medical devices.  Encourage functional activity and mobility, as early as tolerated.  Use aids (e.g., slide boards, mechanical lift) during transfer.  Recent Flowsheet Documentation  Taken 1/1/2024 0400 by Joyce Palomares RN  Body Position:   turned   supine,  legs elevated  Skin Protection:   adhesive use limited   tubing/devices free from skin contact   transparent dressing maintained   skin-to-skin areas padded   skin-to-device areas padded   incontinence pads utilized   skin sealant/moisture barrier applied  Taken 1/1/2024 0200 by Joyce Palomares RN  Body Position:   turned   left  Skin Protection:   adhesive use limited   tubing/devices free from skin contact   skin-to-skin areas padded   transparent dressing maintained   skin-to-device areas padded   skin sealant/moisture barrier applied   incontinence pads utilized  Taken 1/1/2024 0000 by Joyce Palomares RN  Body Position:   turned   right  Skin Protection:   adhesive use limited   tubing/devices free from skin contact   transparent dressing maintained   skin-to-skin areas padded   skin-to-device areas padded   skin sealant/moisture barrier applied   incontinence pads utilized  Taken 12/31/2023 2200 by Joyce Palomares RN  Body Position:   turned   supine, legs elevated  Skin Protection:   adhesive use limited   tubing/devices free from skin contact   skin-to-device areas padded   skin-to-skin areas padded   transparent dressing maintained   skin sealant/moisture barrier applied   incontinence pads utilized  Taken 12/31/2023 2000 by Joyce Palomares RN  Body Position:   turned   left  Skin Protection:   adhesive use limited   tubing/devices free from skin contact   transparent dressing maintained   skin-to-skin areas padded   skin-to-device areas padded   skin sealant/moisture barrier applied   incontinence pads utilized  Intervention: Prevent and Manage VTE (Venous Thromboembolism) Risk  Description: Assess for VTE (venous thromboembolism) risk.  Encourage and assist with early ambulation.  Initiate and maintain compression or other therapy, as indicated, based on identified risk in accordance with organizational protocol and provider order.  Encourage both active and passive leg exercises while in bed, if unable to  ambulate.  Recent Flowsheet Documentation  Taken 1/1/2024 0400 by Joyce Palomares RN  Activity Management: activity encouraged  Taken 1/1/2024 0200 by Joyce Palomares RN  Activity Management: activity encouraged  Taken 1/1/2024 0000 by Joyce Palomares RN  Activity Management: activity encouraged  Taken 12/31/2023 2200 by Joyce Palomares RN  Activity Management: activity encouraged  Taken 12/31/2023 2000 by Joyce Palomares RN  Activity Management: activity encouraged  VTE Prevention/Management: (see mar) other (see comments)  Intervention: Prevent Infection  Description: Maintain skin and mucous membrane integrity; promote hand, oral and pulmonary hygiene.  Optimize fluid balance, nutrition, sleep and glycemic control to maximize infection resistance.  Identify potential sources of infection early to prevent or mitigate progression of infection (e.g., wound, lines, devices).  Evaluate ongoing need for invasive devices; remove promptly when no longer indicated.  Recent Flowsheet Documentation  Taken 12/31/2023 2000 by Joyce Palomares RN  Infection Prevention:   environmental surveillance performed   equipment surfaces disinfected   hand hygiene promoted   personal protective equipment utilized   rest/sleep promoted  Goal: Optimal Comfort and Wellbeing  Outcome: Ongoing, Progressing  Intervention: Provide Person-Centered Care  Description: Use a family-focused approach to care.  Develop trust and rapport by proactively providing information, encouraging questions, addressing concerns and offering reassurance.  Acknowledge emotional response to hospitalization.  Recognize and utilize personal coping strategies.  Honor spiritual and cultural preferences.  Recent Flowsheet Documentation  Taken 12/31/2023 2000 by Joyce Palomares RN  Trust Relationship/Rapport:   care explained   choices provided   emotional support provided   empathic listening provided   questions answered   questions encouraged   thoughts/feelings  acknowledged  Goal: Readiness for Transition of Care  Outcome: Ongoing, Progressing     Problem: Skin Injury Risk Increased  Goal: Skin Health and Integrity  Outcome: Ongoing, Progressing  Intervention: Optimize Skin Protection  Description: Perform a full pressure injury risk assessment, as indicated by screening, upon admission to care unit.  Reassess skin (injury risk, full inspection) frequently (e.g., scheduled interval, with change in condition) to provide optimal early detection and prevention.  Maintain adequate tissue perfusion (e.g., encourage fluid balance; avoid crossing legs, constrictive clothing or devices) to promote tissue oxygenation.  Maintain head of bed at lowest degree of elevation tolerated, considering medical condition and other restrictions.  Avoid positioning onto an area that remains reddened.  Minimize incontinence and moisture (e.g., toileting schedule; moisture-wicking pad, diaper or incontinence collection device; skin moisture barrier).  Cleanse skin promptly and gently when soiled utilizing a pH-balanced cleanser.  Relieve and redistribute pressure (e.g., scheduled position changes, weight shifts, use of support surface, medical device repositioning, protective dressing application, use of positioning device, microclimate control, use of pressure-injury-monitor  Encourage increased activity, such as sitting in a chair at the bedside or early mobilization, when able to tolerate.  Recent Flowsheet Documentation  Taken 1/1/2024 0400 by Joyce Palomares RN  Pressure Reduction Techniques:   frequent weight shift encouraged   weight shift assistance provided   heels elevated off bed   positioned off wounds   pressure points protected  Head of Bed (HOB) Positioning: HOB at 30-45 degrees  Pressure Reduction Devices:   pressure-redistributing mattress utilized   positioning supports utilized   heel offloading device utilized  Skin Protection:   adhesive use limited   tubing/devices free from  skin contact   transparent dressing maintained   skin-to-skin areas padded   skin-to-device areas padded   incontinence pads utilized   skin sealant/moisture barrier applied  Taken 1/1/2024 0200 by Joyce Palomares RN  Pressure Reduction Techniques:   frequent weight shift encouraged   weight shift assistance provided   heels elevated off bed   positioned off wounds   pressure points protected  Head of Bed (HOB) Positioning: HOB at 30-45 degrees  Pressure Reduction Devices:   pressure-redistributing mattress utilized   positioning supports utilized   heel offloading device utilized  Skin Protection:   adhesive use limited   tubing/devices free from skin contact   skin-to-skin areas padded   transparent dressing maintained   skin-to-device areas padded   skin sealant/moisture barrier applied   incontinence pads utilized  Taken 1/1/2024 0000 by Joyce Palomares RN  Pressure Reduction Techniques:   frequent weight shift encouraged   weight shift assistance provided   heels elevated off bed   positioned off wounds   pressure points protected  Head of Bed (HOB) Positioning: HOB at 30-45 degrees  Pressure Reduction Devices:   pressure-redistributing mattress utilized   positioning supports utilized   heel offloading device utilized  Skin Protection:   adhesive use limited   tubing/devices free from skin contact   transparent dressing maintained   skin-to-skin areas padded   skin-to-device areas padded   skin sealant/moisture barrier applied   incontinence pads utilized  Taken 12/31/2023 2200 by Joyce Palomares RN  Pressure Reduction Techniques:   frequent weight shift encouraged   weight shift assistance provided   heels elevated off bed   positioned off wounds   pressure points protected  Head of Bed (HOB) Positioning: HOB at 30-45 degrees  Pressure Reduction Devices:   pressure-redistributing mattress utilized   positioning supports utilized   heel offloading device utilized  Skin Protection:   adhesive use limited    tubing/devices free from skin contact   skin-to-device areas padded   skin-to-skin areas padded   transparent dressing maintained   skin sealant/moisture barrier applied   incontinence pads utilized  Taken 12/31/2023 2000 by Joyce Palomares RN  Pressure Reduction Techniques:   frequent weight shift encouraged   weight shift assistance provided   heels elevated off bed   positioned off wounds   pressure points protected  Head of Bed (HOB) Positioning: HOB at 30-45 degrees  Pressure Reduction Devices:   pressure-redistributing mattress utilized   positioning supports utilized   heel offloading device utilized  Skin Protection:   adhesive use limited   tubing/devices free from skin contact   transparent dressing maintained   skin-to-skin areas padded   skin-to-device areas padded   skin sealant/moisture barrier applied   incontinence pads utilized     Problem: Asthma Comorbidity  Goal: Maintenance of Asthma Control  Outcome: Ongoing, Progressing  Intervention: Maintain Asthma Symptom Control  Description: Evaluate adherence to self-management (asthma action plan), such as medication, symptom-control, trigger-avoidance and self-monitoring.  Advocate for continuation of home regimen, including medication, method of delivery, schedule and symptom monitoring; acknowledge preferred modality and routine.  Minimize exposure to potential triggers, such as perfume, cleaning chemicals and all types of smoke.  Assess for proper use of inhaled medication and delivery technique; assist or reinstruct if needed.  Evaluate effectiveness of coping skills; encourage expression of feelings, expectations and concerns related to disease management and quality of life; reinforce education to enhance management plan and wellbeing.  Recent Flowsheet Documentation  Taken 1/1/2024 0400 by Joyce Palomares, RN  Medication Review/Management: medications reviewed  Taken 1/1/2024 0200 by Joyce Palomares RN  Medication Review/Management: medications  reviewed  Taken 1/1/2024 0000 by Joyce Palomares RN  Medication Review/Management: medications reviewed  Taken 12/31/2023 2200 by Joyce Palomares RN  Medication Review/Management: medications reviewed  Taken 12/31/2023 2000 by Joyce Palomares RN  Medication Review/Management: medications reviewed     Problem: Behavioral Health Comorbidity  Goal: Maintenance of Behavioral Health Symptom Control  Outcome: Ongoing, Progressing  Intervention: Maintain Behavioral Health Symptom Control  Description: Confirm mental health diagnosis and current treatment.  Evaluate adherence to previously identified self-management plan.  Advocate continuation of home strategies, including medication.  Evaluate effectiveness of self-management strategies and coping skills.  Communicate with providers to ensure continuity and follow-up at transition.  Recent Flowsheet Documentation  Taken 1/1/2024 0400 by Joyce Palomares RN  Medication Review/Management: medications reviewed  Taken 1/1/2024 0200 by Joyce Palomares RN  Medication Review/Management: medications reviewed  Taken 1/1/2024 0000 by Joyce Palomares RN  Medication Review/Management: medications reviewed  Taken 12/31/2023 2200 by Joyce Palomares RN  Medication Review/Management: medications reviewed  Taken 12/31/2023 2000 by Joyce Palomares RN  Medication Review/Management: medications reviewed     Problem: COPD (Chronic Obstructive Pulmonary Disease) Comorbidity  Goal: Maintenance of COPD Symptom Control  Outcome: Ongoing, Progressing  Intervention: Maintain COPD-Symptom Control  Description: Evaluate adherence to management plan (e.g., medication, trigger avoidance, infection prevention, self-monitoring).  Advocate for continuation of home regimen, including medication, method of delivery, schedule and symptom monitoring.  Anticipate the need for breathing techniques and activity pacing to minimize fatigue and breathlessness.  Assess for proper use of inhaled medication and  delivery technique; assist or reinstruct if needed.  Evaluate effectiveness of coping skills; encourage expression of feelings, expectations and concerns related to disease management and quality of life; reinforce education to enhance management plan and wellbeing.  Recent Flowsheet Documentation  Taken 1/1/2024 0400 by Joyce Palomares RN  Medication Review/Management: medications reviewed  Taken 1/1/2024 0200 by Joyce Palomares RN  Medication Review/Management: medications reviewed  Taken 1/1/2024 0000 by Joyce Palomares RN  Medication Review/Management: medications reviewed  Taken 12/31/2023 2200 by Joyce Palomares RN  Medication Review/Management: medications reviewed  Taken 12/31/2023 2000 by Joyce Palomares RN  Medication Review/Management: medications reviewed     Problem: Diabetes Comorbidity  Goal: Blood Glucose Level Within Targeted Range  Outcome: Ongoing, Progressing     Problem: Heart Failure Comorbidity  Goal: Maintenance of Heart Failure Symptom Control  Outcome: Ongoing, Progressing  Intervention: Maintain Heart Failure-Management  Description: Evaluate adherence to home heart failure self-care regimen (e.g., medication, fluid balance, sodium intake, daily weight, physical activity, telemonitoring, support).  Advocate continuation of home medication and schedule.  Consider pharmacologic therapy administration time and effects (e.g., avoid giving diuretic prior to bedtime or nitrates on empty stomach).  Monitor response to pharmacologic therapy, including weight fluctuations, blood pressure and electrolyte levels.  Monitor for signs and symptoms of anxiety and depression, including severity and duration; if present, provide psychosocial support.  Consider need for heart failure clinic or palliative care consult.  Recent Flowsheet Documentation  Taken 1/1/2024 0400 by Joyce Palomares RN  Medication Review/Management: medications reviewed  Taken 1/1/2024 0200 by Joyce Palomares RN  Medication  Review/Management: medications reviewed  Taken 1/1/2024 0000 by Joyce Palomares RN  Medication Review/Management: medications reviewed  Taken 12/31/2023 2200 by Joyce Palomares RN  Medication Review/Management: medications reviewed  Taken 12/31/2023 2000 by Joyce Palomares RN  Medication Review/Management: medications reviewed     Problem: Hypertension Comorbidity  Goal: Blood Pressure in Desired Range  Outcome: Ongoing, Progressing  Intervention: Maintain Blood Pressure Management  Description: Evaluate adherence to home antihypertensive regimen (e.g., exercise and activity, diet modification, medication).  Provide scheduled antihypertensive medication; consider administration time and effects (e.g., avoid giving diuretic prior to bedtime).  Monitor response to antihypertensive medication therapy (e.g., blood pressure, electrolyte levels, medication effects).  Minimize risk of orthostatic hypotension; encourage caution with position changes, particularly if elderly.  Recent Flowsheet Documentation  Taken 1/1/2024 0400 by Joyce Palomares RN  Medication Review/Management: medications reviewed  Taken 1/1/2024 0200 by Joyce Palomares RN  Medication Review/Management: medications reviewed  Taken 1/1/2024 0000 by Joyce Palomares RN  Medication Review/Management: medications reviewed  Taken 12/31/2023 2200 by Joyce Palomares RN  Medication Review/Management: medications reviewed  Taken 12/31/2023 2000 by Joyce Palomares RN  Medication Review/Management: medications reviewed     Problem: Obstructive Sleep Apnea Risk or Actual Comorbidity Management  Goal: Unobstructed Breathing During Sleep  Outcome: Ongoing, Progressing     Problem: Osteoarthritis Comorbidity  Goal: Maintenance of Osteoarthritis Symptom Control  Outcome: Ongoing, Progressing  Intervention: Maintain Osteoarthritis Symptom Control  Description: Evaluate adherence to self-management plan, such as medication, exercise and weight management.  Advocate for  continuation of home regimen, such as medication, physical activity and thermal agents; monitor response.  Encourage participation in functional activities, such as mobility and ADLs (activities of daily living) to minimize decline associated with inactivity.  Facilitate use of patient-specific assistive devices, equipment or orthoses.  Evaluate effectiveness of coping skills; encourage expression of feelings, expectations and concerns related to disease management and quality of life; reinforce education to enhance management plan and wellbeing.  Recent Flowsheet Documentation  Taken 1/1/2024 0400 by Joyce Palomares RN  Activity Management: activity encouraged  Medication Review/Management: medications reviewed  Taken 1/1/2024 0200 by Joyce Palomares RN  Activity Management: activity encouraged  Medication Review/Management: medications reviewed  Taken 1/1/2024 0000 by Joyce Palomares RN  Activity Management: activity encouraged  Medication Review/Management: medications reviewed  Taken 12/31/2023 2200 by Joyce Palomares RN  Activity Management: activity encouraged  Medication Review/Management: medications reviewed  Taken 12/31/2023 2000 by Joyce Palomares RN  Activity Management: activity encouraged  Medication Review/Management: medications reviewed     Problem: Pain Chronic (Persistent) (Comorbidity Management)  Goal: Acceptable Pain Control and Functional Ability  Outcome: Ongoing, Progressing  Intervention: Manage Persistent Pain  Description: Evaluate pain level, effect of treatment and patient response at regular intervals.  Minimize pain stimuli; coordinate care and adjust environment (e.g., light, noise, unnecessary movement); promote sleep/rest.  Match pharmacologic analgesia to severity and type of pain mechanism (e.g., neuropathic, muscle, inflammatory); consider multimodal approach (e.g., nonopioid, opioid, adjuvant).  Provide medication at regular intervals; titrate to patient response.  Manage  breakthrough pain with additional doses; consider rotation or switching medication.  Monitor for signs of substance tolerance (increased dose to reach desired effect, decreased effect with same dose).  Avoid abrupt withdrawal of medication, especially agents capable of causing physical dependence.  Manage medication-induced effects, such as constipation, nausea, pruritus, urinary retention, somnolence and dizziness.  Provide multimodal treatment interventions, such as physical activity, therapeutic exercise, yoga, TENS (transcutaneous electrical nerve stimulation) and manual therapy.  Train in functional activity modifications, such as body mechanics, posture, ergonomics, energy conservation and activity pacing.  Consider addition of complementary or alternative therapy, such as acupuncture, hypnosis or therapeutic touch.  Recent Flowsheet Documentation  Taken 1/1/2024 0400 by Joyce Palomares RN  Medication Review/Management: medications reviewed  Taken 1/1/2024 0200 by Joyce Palomares RN  Medication Review/Management: medications reviewed  Taken 1/1/2024 0000 by Joyce Palomares RN  Medication Review/Management: medications reviewed  Taken 12/31/2023 2200 by Joyce Palomares RN  Medication Review/Management: medications reviewed  Taken 12/31/2023 2000 by Joyce Palomares RN  Medication Review/Management: medications reviewed  Intervention: Optimize Psychosocial Wellbeing  Description: Facilitate patient’s self-control over pain by providing pain information and allowing choices in treatment.  Consider and address emotional response to pain.  Explore and promote use of coping strategies; address barriers to successful coping.  Evaluate and assist with psychosocial, cultural and spiritual factors impacting pain.  Modify pain perception by using techniques, such as distraction, mindfulness, guided imagery, meditation or music.  Assess and monitor for signs and symptoms of behavioral health concerns, such as unhealthy  substance use, depression and suicidal ideation.  Consider referral for ongoing coping support, such as cognitive behavioral therapy and mindfulness-based stress reduction.  Recent Flowsheet Documentation  Taken 12/31/2023 2000 by Joyce Palomares RN  Diversional Activities: television     Problem: Seizure Disorder Comorbidity  Goal: Maintenance of Seizure Control  Outcome: Ongoing, Progressing     Problem: Fall Injury Risk  Goal: Absence of Fall and Fall-Related Injury  Outcome: Ongoing, Progressing  Intervention: Identify and Manage Contributors  Description: Develop a fall prevention plan with the patient and caregiver/family.  Provide reorientation, appropriate sensory stimulation and routines with changes in mental status to decrease risk of fall.  Promote use of personal vision and auditory aids.  Assess assistance level required for safe and effective self-care; provide support as needed, such as toileting, mobilization. For age 65 and older, implement timed toileting with assistance.  Encourage physical activity, such as performance of mobility and self-care at highest level of patient ability, multicomponent exercise program and provision of appropriate assistive devices.  If fall occurs, assess the severity of injury; implement fall injury protocol. Determine the cause and revise fall injury prevention plan.  Regularly review medication contribution to fall risk; adjust medication administration times to minimize risk of falling.  Consider risk related to polypharmacy and age.  Balance adequate pain management with potential for oversedation.  Recent Flowsheet Documentation  Taken 1/1/2024 0400 by Joyce Palomares RN  Medication Review/Management: medications reviewed  Taken 1/1/2024 0200 by Joyce Palomares RN  Medication Review/Management: medications reviewed  Taken 1/1/2024 0000 by Joyce Palomares RN  Medication Review/Management: medications reviewed  Taken 12/31/2023 2200 by Joyce Palomares  RN  Medication Review/Management: medications reviewed  Taken 12/31/2023 2000 by Joyce Palomares RN  Medication Review/Management: medications reviewed  Intervention: Promote Injury-Free Environment  Description: Provide a safe, barrier-free environment that encourages independent activity.  Keep care area uncluttered and well-lighted.  Determine need for increased observation or monitoring.  Avoid use of devices that minimize mobility, such as restraints or indwelling urinary catheter.  Recent Flowsheet Documentation  Taken 1/1/2024 0400 by Joyce Palomares RN  Safety Promotion/Fall Prevention:   activity supervised   clutter free environment maintained   assistive device/personal items within reach   fall prevention program maintained   lighting adjusted   mobility aid in reach   nonskid shoes/slippers when out of bed   room organization consistent   safety round/check completed  Taken 1/1/2024 0200 by Joyce Palomares RN  Safety Promotion/Fall Prevention:   activity supervised   assistive device/personal items within reach   clutter free environment maintained   fall prevention program maintained   lighting adjusted   mobility aid in reach   nonskid shoes/slippers when out of bed   room organization consistent   safety round/check completed  Taken 1/1/2024 0000 by Joyce Palomares RN  Safety Promotion/Fall Prevention:   activity supervised   assistive device/personal items within reach   clutter free environment maintained   fall prevention program maintained   lighting adjusted   mobility aid in reach   nonskid shoes/slippers when out of bed   room organization consistent   safety round/check completed  Taken 12/31/2023 2200 by Joyce Palomares RN  Safety Promotion/Fall Prevention:   activity supervised   assistive device/personal items within reach   clutter free environment maintained   fall prevention program maintained   lighting adjusted   mobility aid in reach   nonskid shoes/slippers when out of bed   room  organization consistent   safety round/check completed  Taken 12/31/2023 2000 by Joyce Palomares, RN  Safety Promotion/Fall Prevention:   activity supervised   assistive device/personal items within reach   clutter free environment maintained   fall prevention program maintained   lighting adjusted   mobility aid in reach   nonskid shoes/slippers when out of bed   room organization consistent   safety round/check completed   Goal Outcome Evaluation:

## 2024-01-01 NOTE — THERAPY TREATMENT NOTE
Patient Name: Omar Mendoza  : 1940    MRN: [id number]                              Today's Date: 2024       Admit Date: 2023    Visit Dx:     ICD-10-CM ICD-9-CM   1. Generalized weakness  R53.1 780.79   2. Acute UTI  N39.0 599.0   3. Bullous pemphigoid  L12.0 694.5   4. Venous insufficiency  I87.2 459.81   5. Dysphagia, unspecified type  R13.10 787.20     Patient Active Problem List   Diagnosis    UTI (urinary tract infection)    Bullous pemphigoid    Essential hypertension    Dyslipidemia    Edema of lower extremity    Cholestatic hepatitis    Acute constipation    Acute urinary retention    Uterine prolapse    Bradycardia    Pressure injury of buttock, unstageable    Type 2 diabetes mellitus with ketoacidosis, without long-term current use of insulin    Disorientation    Pleural effusion    Severe malnutrition     Past Medical History:   Diagnosis Date    CAD (coronary artery disease)     Dementia     Diabetes mellitus     Hyperlipemia     Hypertension      History reviewed. No pertinent surgical history.   General Information       Row Name 24 1357          Physical Therapy Time and Intention    Document Type therapy note (daily note)  -LM     Mode of Treatment individual therapy;physical therapy  -       Row Name 24 [facility]          General Information    Patient Profile Reviewed yes  -LM     Existing Precautions/Restrictions fall;other (see comments)  Decreased skin integrity; RLE pain  -       Row Name 24 1352          Cognition    Orientation Status (Cognition) oriented to;person;place;disoriented to;time  -       Row Name 24 1359          Safety Issues, Functional Mobility    Safety Issues Affecting Function (Mobility) friction/shear risk;insight into deficits/self-awareness;judgment;problem-solving;safety precaution awareness;safety precautions follow-through/compliance;sequencing abilities  -LM     Impairments Affecting Function (Mobility)  balance;cognition;endurance/activity tolerance;postural/trunk control;strength;pain;range of motion (ROM)  -LM               User Key  (r) = Recorded By, (t) = Taken By, (c) = Cosigned By      Initials Name Provider Type    Sandra Sin PT Physical Therapist                   Mobility       Row Name 01/01/24 1405          Bed Mobility    Bed Mobility scooting/bridging  -LM     Scooting/Bridging [place] (Bed Mobility) dependent (less than 25% patient effort);2 person assist;verbal cues  -LM     Supine-Sit [place] (Bed Mobility) moderate assist (50% patient effort);1 person assist;verbal cues  -LM     Sit-Supine [place] (Bed Mobility) moderate assist (50% patient effort);2 person assist;verbal cues  -LM     Assistive Device (Bed Mobility) bed rails;draw sheet;head of bed elevated  -LM     Comment, (Bed Mobility) Vc's for sequencing.  -LM       Row Name 01/01/24 1405          Bed-Chair Transfer    Bed-Chair [place] (Transfers) not tested  -LM       Row Name 01/01/24 1405          Sit-Stand Transfer    Sit-Stand [place] (Transfers) moderate assist (50% patient effort);2 person assist;verbal cues  -LM     Assistive Device (Sit-Stand Transfers) other (see comments)  BUE Support  -LM     Comment, (Sit-Stand Transfer) Stood from EOB.  B feet blocked.  Cues to lean forward.  Pt able to stand ~30 seconds before needing to sit d/t fatigue.  -LM       Row Name 01/01/24 1405          Gait/Stairs (Locomotion)    [place] Level (Gait) not tested  -LM               User Key  (r) = Recorded By, (t) = Taken By, (c) = Cosigned By      Initials Name Provider Type    Sandra Sin PT Physical Therapist                   Obj/Interventions       Row Name 01/01/24 1416          Motor Skills    Therapeutic Exercise hip;knee;ankle  -       Row Name 01/01/24 1416          Hip (Therapeutic Exercise)    Hip (Therapeutic Exercise) AAROM (active assistive range of motion);isometric exercises  -     Hip  AAROM (Therapeutic Exercise) bilateral;flexion;aBduction;aDduction;supine;10 repetitions  -LM     Hip Isometrics (Therapeutic Exercise) bilateral;gluteal sets;supine;10 repetitions  -LM       Row Name 01/01/24 1416          Knee (Therapeutic Exercise)    Knee (Therapeutic Exercise) AAROM (active assistive range of motion);isometric exercises  -LM     Knee AAROM (Therapeutic Exercise) bilateral;flexion;extension;supine;10 repetitions  -LM     Knee Isometrics (Therapeutic Exercise) bilateral;quad sets;supine;10 repetitions  -LM       Row Name 01/01/24 1416          Ankle (Therapeutic Exercise)    Ankle (Therapeutic Exercise) AROM (active range of motion);AAROM (active assistive range of motion)  -LM     Ankle AROM (Therapeutic Exercise) left;dorsiflexion;plantarflexion;supine;10 repetitions  -LM     Ankle AAROM (Therapeutic Exercise) right;dorsiflexion;plantarflexion;supine;10 repetitions  -LM       Row Name 01/01/24 1416          Balance    Static Sitting Balance standby assist  -LM     Position, Sitting Balance unsupported;sitting edge of bed  -LM               User Key  (r) = Recorded By, (t) = Taken By, (c) = Cosigned By      Initials Name Provider Type    LM Sandra Gage, PT Physical Therapist                   Goals/Plan    No documentation.                  Clinical Impression       Row Name 01/01/24 1418          Pain    Pretreatment Pain Rating 0/10 - no pain  -LM     Posttreatment Pain Rating 0/10 - no pain  -LM       Row Name 01/01/24 1418          Plan of Care Review    Plan of Care Reviewed With patient  -LM     Progress improving  -     Outcome Evaluation Pt demonstrated improvement with bed mobility assist requiring ModAx1 for supine-->sit.  Pt able to stand for ~30 seconds with ModAx2.  Pt continues to be limited by weakness, decreased activity tolerance, and pain.  Continue to recommend SNF for further therapy at d/c.  -       Row Name 01/01/24 1418          Vital Signs    Pre Systolic BP Rehab  121  -LM     Pre Treatment Diastolic BP 70  -LM     Pretreatment Heart Rate (beats/min) 66  -LM     Posttreatment Heart Rate (beats/min) 65  -LM     Pre SpO2 (%) 96  -LM     O2 Delivery Pre Treatment room air  -LM     Post SpO2 (%) 95  -LM     O2 Delivery Post Treatment room air  -LM     Pre Patient Position Supine  -LM     Post Patient Position Supine  -LM       Row Name 01/01/24 1418          Positioning and Restraints    Pre-Treatment Position in bed  -LM     Post Treatment Position bed  -LM     In Bed supine;call light within reach;encouraged to call for assist;exit alarm on;heels elevated;notified nsg  -LM               User Key  (r) = Recorded By, (t) = Taken By, (c) = Cosigned By      Initials Name Provider Type    Sandra Sin, [place] Physical Therapist                   Outcome Measures       Row Name 01/01/24 1420          How much help from another person do you currently need...    Turning from your back to your side while in flat bed without using bedrails? 2  -LM     Moving from lying on back to sitting on the side of a flat bed without bedrails? 2  -LM     Moving to and from a bed to a chair (including a wheelchair)? 2  -LM     Standing up from a chair using your arms (e.g., wheelchair, bedside chair)? 2  -LM     Climbing 3-5 steps with a railing? 1  -LM     To walk in hospital room? 1  -LM     AM-PAC 6 Clicks Score (PT) 10  -LM     Highest Level of Mobility Goal 4 --> Transfer to chair/commode  -LM       Row Name 01/01/24 1420 01/01/24 0919       Functional Assessment    Outcome Measure Options AM-PAC 6 Clicks Basic Mobility (PT)  -LM AM-PAC 6 Clicks Daily Activity (OT)  -DILIP              User Key  (r) = Recorded By, (t) = Taken By, (c) = Cosigned By      Initials Name Provider Type    Rayna Zamora, OT Occupational Therapist    Sandra Sin, [place] Physical Therapist                                 Physical Therapy Education       Title: PT OT SLP Therapies (In Progress)       Topic: Physical  Therapy (In Progress)       Point: Mobility training (In Progress)       Learning Progress Summary             Patient Acceptance, E, NR by MIA at 12/29/2023 1131    Acceptance, E, NR by MIA at 12/27/2023 1045                         Point: Home exercise program (Not Started)       Learner Progress:  Not documented in this visit.              Point: Body mechanics (In Progress)       Learning Progress Summary             Patient Acceptance, E, NR by MIA at 12/29/2023 1131    Acceptance, E, NR by MIA at 12/27/2023 1045                         Point: Precautions (In Progress)       Learning Progress Summary             Patient Acceptance, E, NR by MIA at 12/29/2023 1131    Acceptance, E, NR by MIA at 12/27/2023 1045                                         User Key       Initials Effective Dates Name Provider Type Discipline     11/16/23 -  Petty Mckoy, [place] Physical Therapist PT                  PT Recommendation and Plan     Plan of Care Reviewed With: patient  Progress: improving  Outcome Evaluation: Pt demonstrated improvement with bed mobility assist requiring ModAx1 for supine-->sit.  Pt able to stand for ~30 seconds with ModAx2.  Pt continues to be limited by weakness, decreased activity tolerance, and pain.  Continue to recommend SNF for further therapy at d/c.     Time Calculation:         PT Charges       Row Name 01/01/24 1420             Time Calculation    Start Time 1325  -LM      PT Received On 01/01/24  -LM      PT Goal Re-Cert Due Date 01/06/24  -LM         Timed Charges    01107 - PT Therapeutic Exercise Minutes 10  -LM      19440 - PT Therapeutic Activity Minutes 20  -LM         Total Minutes    Timed Charges Total Minutes 30  -LM       Total Minutes 30  -LM                User Key  (r) = Recorded By, (t) = Taken By, (c) = Cosigned By      Initials Name Provider Type     Sandra Gage PT Physical Therapist                  Therapy Charges for Today       Code Description Service Date Service  Provider Modifiers Qty    52704740075 HC PT THERAPEUTIC ACT EA 15 MIN 1/1/2024 Sandra Gage, PT GP 1    97936775650 HC PT THER PROC EA 15 MIN 1/1/2024 Sandra Gage, PT GP 1            PT G-Codes  Outcome Measure Options: AM-PAC 6 Clicks Basic Mobility (PT)  AM-PAC 6 Clicks Score (PT): 10  AM-PAC 6 Clicks Score (OT): 12  PT Discharge Summary  Anticipated Discharge Disposition (PT): skilled nursing facility    Sandra Gage, PT  1/1/2024

## 2024-01-01 NOTE — THERAPY TREATMENT NOTE
Patient Name: Omar Mendoza  : 1940    MRN: [id number]                              Today's Date: 2024       Admit Date: 2023    Visit Dx:     ICD-10-CM ICD-9-CM   1. Generalized weakness  R53.1 780.79   2. Acute UTI  N39.0 599.0   3. Bullous pemphigoid  L12.0 694.5   4. Venous insufficiency  I87.2 459.81   5. Dysphagia, unspecified type  R13.10 787.20     Patient Active Problem List   Diagnosis    UTI (urinary tract infection)    Bullous pemphigoid    Essential hypertension    Dyslipidemia    Edema of lower extremity    Cholestatic hepatitis    Acute constipation    Acute urinary retention    Uterine prolapse    Bradycardia    Pressure injury of buttock, unstageable    Type 2 diabetes mellitus with ketoacidosis, without long-term current use of insulin    Disorientation    Pleural effusion    Severe malnutrition     Past Medical History:   Diagnosis Date    CAD (coronary artery disease)     Dementia     Diabetes mellitus     Hyperlipemia     Hypertension      History reviewed. No pertinent surgical history.   General Information       Row Name 24 0910          OT Time and Intention    Document Type therapy note (daily note)  -     Mode of Treatment individual therapy;occupational therapy  -       Row Name 24 0910          General Information    Patient Profile Reviewed yes  -DILIP     Existing Precautions/Restrictions fall  limited skin integrity, RLE pain and limited control  -DILIP     Barriers to Rehab medically complex;cognitive status;previous functional deficit  -       Row Name 24 0910          Living Environment    People in Home spouse  -       Row Name 24 0910          Cognition    Orientation Status (Cognition) oriented to;place;person;disoriented to;time  -       Row Name 24 0910          Safety Issues, Functional Mobility    Safety Issues Affecting Function (Mobility) insight into deficits/self-awareness;safety precaution awareness;safety precautions  follow-through/compliance;sequencing abilities  -     Impairments Affecting Function (Mobility) balance;cognition;endurance/activity tolerance;postural/trunk control;strength;pain;range of motion (ROM)  -     Cognitive Impairments, Mobility Safety/Performance insight into deficits/self-awareness;safety precaution awareness;safety precaution follow-through;sequencing abilities  -DILIP               User Key  (r) = Recorded By, (t) = Taken By, (c) = Cosigned By      Initials Name Provider Type    Rayna Zamora, OT Occupational Therapist                     Mobility/ADL's       Row Name 01/01/24 0911          Bed Mobility    Supine-Sit Hughes (Bed Mobility) maximum assist (25% patient effort);2 person assist;verbal cues;nonverbal cues (demo/gesture)  -DILIP     Sit-Supine Hughes (Bed Mobility) moderate assist (50% patient effort);2 person assist;verbal cues;nonverbal cues (demo/gesture)  -     Bed Mobility, Safety Issues decreased use of arms for pushing/pulling;decreased use of legs for bridging/pushing;impaired trunk control for bed mobility  -     Assistive Device (Bed Mobility) bed rails;draw sheet;head of bed elevated  -     Comment, (Bed Mobility) pt. needed verbal and tactile cues to sequence, pt. appeared with much more motivation to assit with sit to supine  -       Row Name 01/01/24 0911          Transfers    Comment, (Transfers) attempted sit to stand x 2, but unable to clear buttock from bed with maximal asisst of 2 persons, alerted nursing and recommended pt. be in a lift room  -       Row Name 01/01/24 0911          Activities of Daily Living    BADL Assessment/Intervention feeding;grooming  -       Row Name 01/01/24 0911          Lower Body Dressing Assessment/Training    Hughes Level (Lower Body Dressing) don;socks;dependent (less than 25% patient effort)  -     Position (Lower Body Dressing) supine  -       Row Name 01/01/24 0911          Self-Feeding  Assessment/Training    Howe Level (Feeding) prepare tray/open items;dependent (less than 25% patient effort);scoop food and bring to mouth;liquids to mouth;set up;supervision  -DILIP     Position (Self-Feeding) sitting up in bed  -DILIP       Row Name 01/01/24 0911          Grooming Assessment/Training    Howe Level (Grooming) hair care, combing/brushing;oral care regimen;minimum assist (75% patient effort);wash face, hands;set up  -DILIP     Position (Grooming) edge of bed sitting;sitting up in bed  -DILIP               User Key  (r) = Recorded By, (t) = Taken By, (c) = Cosigned By      Initials Name Provider Type    Rayna Zamora OT Occupational Therapist                   Obj/Interventions       Row Name 01/01/24 0914          Balance    Static Sitting Balance standby assist  -DILIP     Dynamic Sitting Balance standby assist  hair brushing  -DILIP     Position, Sitting Balance unsupported;sitting edge of bed  -DILIP     Static Standing Balance --  unable to complete  -DILIP     Balance Interventions occupation based/functional task  -DILIP     Comment, Balance pt. was able to sit EOB grossly 6-8 minutes SBA  -DILIP               User Key  (r) = Recorded By, (t) = Taken By, (c) = Cosigned By      Initials Name Provider Type    Rayna Zamora, OT Occupational Therapist                   Goals/Plan       Row Name 01/01/24 0918          Bed Mobility Goal 1 (OT)    Progress/Outcomes (Bed Mobility Goal 1, OT) continuing progress toward goal  -       Row Name 01/01/24 0918          Transfer Goal 1 (OT)    Progress/Outcome (Transfer Goal 1, OT) progress slower than expected  -       Row Name 01/01/24 0918          Grooming Goal 1 (OT)    Progress/Outcome (Grooming Goal 1, OT) goal partially met;goal ongoing  -       Row Name 01/01/24 0918          Self-Feeding Goal 1 (OT)    Progress/Outcomes (Self-Feeding Goal 1, OT) goal met  -DILIP               User Key  (r) = Recorded By, (t) = Taken By, (c) = Cosigned By       Initials Name Provider Type    DILIP Rayna Wall, OT Occupational Therapist                   Clinical Impression       Row Name 01/01/24 0915          Pain Assessment    Pretreatment Pain Rating 6/10  -DILIP     Posttreatment Pain Rating 0/10 - no pain  -DILIP     Pain Location - Side/Orientation Right  -DILIP     Pain Location lower  -DILIP     Pain Location - extremity  -DILIP     Pre/Posttreatment Pain Comment pain on arrival and relieved with waffle boot removal, but with any mvmt. of RLE pt. with pain until at rest again, pt. requested pain meds, nurse alerted, pt. tending to keep RLE in ER with inability to self correct  -DILIP     Pain Intervention(s) Medication (See MAR);Repositioned  -       Row Name 01/01/24 0915          Plan of Care Review    Plan of Care Reviewed With patient;spouse  -     Progress improving  -     Outcome Evaluation Pt. with improvement with sit to supine, self feeding, grooming and sitting balance today, but decline with sit to stand ability.  Limited by RLE pain and weakness with movement. Pt. remains appropriate for skilled OT services to address deficit areas and promote return to prior higher level of independence.  Continue OT POC.  -       Row Name 01/01/24 0915          Therapy Assessment/Plan (OT)    Therapy Frequency (OT) daily  -       Row Name 01/01/24 0915          Therapy Plan Review/Discharge Plan (OT)    Anticipated Discharge Disposition (OT) skilled nursing facility  -       Row Name 01/01/24 0915          Vital Signs    Pre Systolic BP Rehab 128  -DILIP     Pre Treatment Diastolic BP 60  -DILIP     Pretreatment Heart Rate (beats/min) 78  -DILIP     Posttreatment Heart Rate (beats/min) 83  -DILIP     Pre SpO2 (%) 93  -DILIP     O2 Delivery Pre Treatment room air  -DILIP     O2 Delivery Intra Treatment room air  -DILIP     Post SpO2 (%) 92  -DILIP     O2 Delivery Post Treatment room air  -DILIP     Pre Patient Position Supine  -DILIP     Intra Patient Position Sitting  -DILIP     Post Patient Position  Supine  -DILIP       Row Name 01/01/24 0915          Positioning and Restraints    Pre-Treatment Position in bed  -DILIP     Post Treatment Position bed  -DILIP     In Bed notified nsg;sitting;call light within reach;encouraged to call for assist;exit alarm on;side rails up x2;legs elevated;heels elevated  -DILIP               User Key  (r) = Recorded By, (t) = Taken By, (c) = Cosigned By      Initials Name Provider Type    Rayna Zamora OT Occupational Therapist                   Outcome Measures       Row Name 01/01/24 0919          How much help from another is currently needed...    Putting on and taking off regular lower body clothing? 1  -DILIP     Bathing (including washing, rinsing, and drying) 2  -DILIP     Toileting (which includes using toilet bed pan or urinal) 1  -DILIP     Putting on and taking off regular upper body clothing 2  -DILIP     Taking care of personal grooming (such as brushing teeth) 3  -DILIP     Eating meals 3  -DILIP     AM-PAC 6 Clicks Score (OT) 12  -DILIP       Row Name 01/01/24 0919          Functional Assessment    Outcome Measure Options AM-PAC 6 Clicks Daily Activity (OT)  -DILIP               User Key  (r) = Recorded By, (t) = Taken By, (c) = Cosigned By      Initials Name Provider Type    Rayna Zamora OT Occupational Therapist                    Occupational Therapy Education       Title: PT OT SLP Therapies (In Progress)       Topic: Occupational Therapy (In Progress)       Point: ADL training (Done)       Description:   Instruct learner(s) on proper safety adaptation and remediation techniques during self care or transfers.   Instruct in proper use of assistive devices.                  Learning Progress Summary             Patient Acceptance, E, VU,NR by DILIP at 1/1/2024 0920    Comment: oriented to date, need to move and position RLE, ADL progression, sequencing bed mobililty    Acceptance, E, NR by AC at 12/27/2023 1135   Family Acceptance, E, VU,NR by DILIP at 1/1/2024 0920    Comment: oriented to  date, need to move and position RLE, ADL progression, sequencing bed mobililty                         Point: Home exercise program (Not Started)       Description:   Instruct learner(s) on appropriate technique for monitoring, assisting and/or progressing therapeutic exercises/activities.                  Learner Progress:  Not documented in this visit.              Point: Precautions (Done)       Description:   Instruct learner(s) on prescribed precautions during self-care and functional transfers.                  Learning Progress Summary             Patient Acceptance, E, VU,NR by DILIP at 1/1/2024 0920    Comment: oriented to date, need to move and position RLE, ADL progression, sequencing bed mobililty   Family Acceptance, E, VU,NR by DILIP at 1/1/2024 0920    Comment: oriented to date, need to move and position RLE, ADL progression, sequencing bed mobililty                         Point: Body mechanics (Done)       Description:   Instruct learner(s) on proper positioning and spine alignment during self-care, functional mobility activities and/or exercises.                  Learning Progress Summary             Patient Acceptance, E, VU,NR by DILIP at 1/1/2024 0920    Comment: oriented to date, need to move and position RLE, ADL progression, sequencing bed mobililty   Family Acceptance, E, VU,NR by DILIP at 1/1/2024 0920    Comment: oriented to date, need to move and position RLE, ADL progression, sequencing bed mobililty                                         User Key       Initials Effective Dates Name Provider Type Discipline    DILIP 07/11/23 -  Rayna Wall, OT Occupational Therapist OT     02/03/23 -  Sepideh Feldman OT Occupational Therapist OT                  OT Recommendation and Plan  Therapy Frequency (OT): daily  Plan of Care Review  Plan of Care Reviewed With: patient, spouse  Progress: improving  Outcome Evaluation: Pt. with improvement with sit to supine, self feeding, grooming and sitting balance  today, but decline with sit to stand ability.  Limited by RLE pain and weakness with movement. Pt. remains appropriate for skilled OT services to address deficit areas and promote return to prior higher level of independence.  Continue OT POC.     Time Calculation:         Time Calculation- OT       Row Name 01/01/24 0920             Time Calculation- OT    OT Start Time 0752  -DILIP      OT Received On 01/01/24  -DILIP      OT Goal Re-Cert Due Date 01/06/24  -DILIP         Timed Charges    36225 - OT Therapeutic Activity Minutes 10  -DILIP      08607 - OT Self Care/Mgmt Minutes 20  -DILIP         Total Minutes    Timed Charges Total Minutes 30  -DILIP       Total Minutes 30  -DILIP                User Key  (r) = Recorded By, (t) = Taken By, (c) = Cosigned By      Initials Name Provider Type    Rayna Zamora OT Occupational Therapist                  Therapy Charges for Today       Code Description Service Date Service Provider Modifiers Qty    55423363112 HC OT THERAPEUTIC ACT EA 15 MIN 1/1/2024 Rayna Wall OT GO 1    44427040175 HC OT SELF CARE/MGMT/TRAIN EA 15 MIN 1/1/2024 Rayna Wall OT GO 1                 Rayna Wall OT  1/1/2024

## 2024-01-01 NOTE — PROGRESS NOTES
[facility] Medicine Services  PROGRESS NOTE    Patient Name: Omar Mendoza  : 1940  MRN: [id number]    Date of Admission: 2023  Primary Care Physician: Varun Pa MD    Subjective   Subjective     CC:  Follow up weakness, foot blister     HPI:  Patient seen resting in bed with eyes closed in no apparent distress. No acute events overnight per nursing. Family at bedside. Appears to be sleeping.      Objective   Objective     Vital Signs:   Temp:  [98.1 °F (36.7 °C)-98.3 °F (36.8 °C)] 98.3 °F (36.8 °C)  Heart Rate:  [77] 77  Resp:  [16-18] 16  BP: (104-128)/(64-69) 128/68     Physical Exam:  Constitutional: No acute distress, awake, alert, chronically ill appearing   HENT: NCAT, mucous membranes moist  Respiratory: grossly clear, diminished bases, respiratory effort normal on RA  Cardiovascular: RRR, no murmurs, cap refill brisk   Gastrointestinal: Positive bowel sounds, soft, nontender, nondistended  Musculoskeletal: 1+ BLE edema, right foot dressing in place    Psychiatric: Appropriate affect, cooperative  Neurologic: Oriented x 2, moves all extremities, speech clear  Skin: warm, dry, no visible rash       Results Reviewed:  LAB RESULTS:      Lab 24  0723 23  0551 23  0655 23  0417 23  1239 23  1404 23  1403 23  0431 23  2336 23  2131   WBC 3.87 4.51 5.98 6.17 6.31  6.14  --  7.25  --   --  7.78   HEMOGLOBIN 8.3* 8.0* 8.2* 8.6* 9.1*  9.0*  --  10.5*  --   --  10.3*   HEMATOCRIT 25.3* 24.7* 24.6* 25.5* 27.6*  27.1*  --  31.4*  --   --  31.3*   PLATELETS 139* 124* 110* 120* 134*  128*  --  137*  --   --  159   NEUTROS ABS  --   --   --   --  5.21  5.03  --  6.29  --   --  6.22   IMMATURE GRANS (ABS)  --   --   --   --  0.03  0.10*  --  0.04  --   --  0.07*   LYMPHS ABS  --   --   --   --  0.50*  0.53*  --  0.57*  --   --  0.92   MONOS ABS  --   --   --   --  0.18  0.17  --  0.15  --   --  0.34   EOS ABS   --   --   --   --  0.38  0.30  --  0.19  --   --  0.21   MCV 96.2 95.0 92.8 93.4 94.2  92.8  --  91.8  --   --  94.8   SED RATE  --   --   --   --   --   --   --   --   --  17   CRP  --   --   --   --   --   --   --   --  1.87*  --    PROCALCITONIN  --   --   --  0.11  --   --   --   --   --  0.10   LACTATE  --   --   --   --   --  1.4  --  1.4  --  2.1*         Lab 01/01/24  0723 12/31/23  0608 12/30/23  0655 12/29/23  0417 12/28/23  1239 12/27/23  1404 12/27/23  1403 12/26/23  2336   SODIUM 144 145 146* 145 143  --  140  --    POTASSIUM 3.7 3.7 4.0 3.8 3.9  --  4.1  --    CHLORIDE 110* 110* 111* 113* 111*  --  106  --    CO2 27.0 24.0 25.0 26.0 25.0  --  26.0  --    ANION GAP 7.0 11.0 10.0 6.0 7.0  --  8.0  --    BUN 14 16 14 15 17  --  18  --    CREATININE 0.51* 0.55* 0.58 0.53* 0.59  --  0.64  --    EGFR 93.9 92.2 91.0 93.0 90.7  --  88.9  --    GLUCOSE 172* 180* 134* 72 173*  --  151*  --    CALCIUM 7.6* 7.6* 7.3* 7.6* 7.6*  --  7.6*  --    IONIZED CALCIUM  --   --   --   --   --  1.16  --   --    MAGNESIUM  --   --   --  1.6  --   --  1.7  --    PHOSPHORUS  --   --   --   --   --   --  3.5  --    HEMOGLOBIN A1C  --   --   --   --   --   --  12.10*  --    TSH  --   --   --   --   --   --   --  3.960         Lab 12/27/23  1403 12/26/23  2131   TOTAL PROTEIN 4.3* 4.9*   ALBUMIN 2.5* 2.6*   GLOBULIN 1.8 2.3   ALT (SGPT) 28 31   AST (SGOT) 28 36*   BILIRUBIN 0.6 0.7   ALK PHOS 151* 174*   LIPASE  --  119*         Lab 12/28/23  1239 12/26/23  2336 12/26/23  2131   PROBNP 1,039.0  --  1,494.0   HSTROP T  --  67* 69*             Lab 12/26/23  2336 12/26/23  2131   IRON 53  53  --    IRON SATURATION (TSAT) 28  --    TIBC 188*  --    TRANSFERRIN 126*  --    FERRITIN 462.20*  --    FOLATE  --  10.30   VITAMIN B 12  --  1,253*         Brief Urine Lab Results  (Last result in the past 365 days)        Color   Clarity   Blood   Leuk Est   Nitrite   Protein   CREAT   Urine HCG        12/27/23 0040 Orange   Cloudy   Large  (3+)   Moderate (2+)   Negative   100 mg/dL (2+)                   Microbiology Results Abnormal       Procedure Component Value - Date/Time    Blood Culture - Blood, Arm, Right [[id number]]  (Normal) Collected: 12/27/23 0431    Lab Status: Final result Specimen: Blood from Arm, Right Updated: 01/01/24 0515     Blood Culture No growth at 5 days    Blood Culture - Blood, Blood, PICC Line [[id number]]  (Normal) Collected: 12/27/23 1400    Lab Status: Preliminary result Specimen: Blood, PICC Line Updated: 12/31/23 1531     Blood Culture No growth at 4 days            XR Chest 1 View    Result Date: 12/30/2023  XR CHEST 1 VW Date of Exam: 12/30/2023 1:42 PM EST Indication: aspiration event Comparison: 12/26/2023 Findings: Interval placement of a right-sided PICC line. Heart size and pulmonary vasculature are stable there is some increased airspace disease in the right lung base. There is stable strandy airspace disease in the left lung base. There is mild blunting of both  costophrenic angles     Impression: Impression: Increased right basilar airspace disease that could be atelectasis or aspiration. Stable left basilar atelectasis noted. There are small bilateral pleural effusions Electronically Signed: Lavon Urban  12/30/2023 2:02 PM EST  Workstation ID: [id number]     Results for orders placed during the hospital encounter of 12/26/23    Adult Transthoracic Echo Complete W/ Cont if Necessary Per Protocol    Interpretation Summary    Left ventricular systolic function is normal. Calculated left ventricular EF = 57% Left ventricular ejection fraction appears to be 56 - 60%.    Left ventricular wall thickness is consistent with mild to moderate concentric hypertrophy.    Left ventricular diastolic function was normal.    Estimated right ventricular systolic pressure from tricuspid regurgitation is mildly elevated (35-45 mmHg).    There is a trivial pericardial effusion.    Mild aortic valve regurgitation.      Current  medications:  Scheduled Meds:aspirin, 81 mg, Oral, Daily  atorvastatin, 80 mg, Oral, Daily  castor oil-balsam peru, 1 application , Topical, Q12H  doxycycline, 100 mg, Oral, Q12H  heparin (porcine), 5,000 Units, Subcutaneous, Q12H  insulin lispro, 2-9 Units, Subcutaneous, 4x Daily With Meals & Nightly  magnesium hydroxide, 10 mL, Oral, Daily  metoprolol tartrate, 25 mg, Oral, BID  sodium chloride, 10 mL, Intravenous, Q12H      Continuous Infusions:   PRN Meds:.  acetaminophen **OR** acetaminophen **OR** acetaminophen    senna-docusate sodium **AND** polyethylene glycol **AND** bisacodyl **AND** bisacodyl    Calcium Replacement - Follow Nurse / BPA Driven Protocol    dextrose    dextrose    glucagon (human recombinant)    hydrOXYzine    Magnesium Standard Dose Replacement - Follow Nurse / BPA Driven Protocol    nitroglycerin    ondansetron    Phosphorus Replacement - Follow Nurse / BPA Driven Protocol    Potassium Replacement - Follow Nurse / BPA Driven Protocol    sodium chloride    sodium chloride    Assessment & Plan   Assessment & Plan     Active Hospital Problems    Diagnosis  POA    **UTI (urinary tract infection) [N39.0]  Yes    Acute constipation [K59.00]  Unknown    Acute urinary retention [R33.8]  Unknown    Uterine prolapse [N81.4]  Unknown    Bradycardia [R00.1]  Unknown    Pressure injury of buttock, unstageable [L89.300]  Unknown    Type 2 diabetes mellitus with ketoacidosis, without long-term current use of insulin [E11.10]  Unknown    Disorientation [R41.0]  Unknown    Pleural effusion [J90]  Unknown    Severe malnutrition [E43]  Yes    Edema of lower extremity [R60.0]  Yes    Bullous pemphigoid [L12.0]  Yes    Dyslipidemia [E78.5]  Yes    Essential hypertension [I10]  Yes      Resolved Hospital Problems   No resolved problems to display.        Brief Hospital Course to date:  Omar Mendoza is a 83 y.o. female with history of type 2 diabetes, hypertension, history of uterine prolapse, urinary retention  with Schultz catheter in place, history of bullous pemphigoid  (s/p Dupixent 10/2023, prednisone/doxycycline 11/2023) CAD, hyperlipidemia, presumed dementia who presented to the ED with generalized weakness and near bullous pemphigoid blister on the heel.      This patient's problems and plans were partially entered by my partner and updated as appropriate by me 01/01/24.     Dysphagia  -concern for aspiration event 12/30 per nursing  -SLP following      Acute metabolic encephalopathy, superimposed on suspected dementia  Generalized weakness  -PT/OT recommended SNF  -CM following for placement      Bilateral pleural effusion  L>R  Bibasilar airspace disease versus pneumonia  -Patient currently on room air with good O2 sats  -Follow-up MRSA PCR, cultures, urinary strep and Legionella antigens  -Procalcitonin is low, lactate has improved  -concern for aspiration on 12/30  -CXR revealed increased right basilar airspace disease with stable left basilar atelectasis  -Encourage IS, pulmonary toilet  -Continue antibiotics, currently on Rocephin and doxycycline (Day 5)  -Currently on RA       Anemia  -Iron 53, iron sat 28  -Hgb up to 8.3 this AM   -fecal occult negative  -suspect anemia of chronic disease  -CBC in AM      Poor venous access  -PICC in place     Sinus bradycardia  - pt on both amiodarone and metoprolol, dtr reports amiodarone is new since hospitalization at [facility].  - hold amiodarone, decreased metoprolol to 50 mg BID w/ hold parameters  -HR currently improved      BLE edema  Decreased pulses LLE  - CTA abd showing small right pleural effusion, bilateral basilar atelectasis  - CTA w/ runoff showing normal right sided runoff; left side delayed runoff possibly r/t venous stasis changes, no evidence of arterial occlusion or significant stenosis  - LLE cool to touch compared to RLE, decreased pulses  - neurovascular checks  -Echo revealed EF 57% with normal diastolic function      Uncontrolled Type 2  diabetes  -Hgb A1C 12.10 on 12/27  -Continue SSI     Constipation  -Continue aggressive bowel regimen, s/p soapsuds enema x 1  -s/p Milk of mag x1, Miralax daily, suppository x1   -Resolved, +BM 12/30  -chronic loza catheter in place     Uterine prolapse  -Gyn Dr. Zhou has seen, plan for outpatient follow up to discuss treatment options.   -may be contributing to chronic urine retention     Bullous pemphigoid  -Has developed new blister on right heel  -Continue doxycycline  -Wound care following      Pressure ulcer  -Wound care following      L adrenal adenoma  - incidental finding on CTA abdomen      Severe malnutrition     Expected Discharge Location and Transportation: SNF pending   Expected Discharge   Expected Discharge Date: 1/2/2024; Expected Discharge Time:       DVT prophylaxis:  Medical DVT prophylaxis orders are present.     AM-PAC 6 Clicks Score (PT): 6 (12/31/23 2000)    CODE STATUS:   Code Status and Medical Interventions:   Ordered at: 12/27/23 0410     Code Status (Patient has no pulse and is not breathing):    CPR (Attempt to Resuscitate)     Medical Interventions (Patient has pulse or is breathing):    Full Support       Frantz Martin, APRN  01/01/24

## 2024-01-02 LAB
ANION GAP SERPL CALCULATED.3IONS-SCNC: 7 MMOL/L (ref 5–15)
BUN SERPL-MCNC: 13 MG/DL (ref 8–23)
BUN/CREAT SERPL: 27.1 (ref 7–25)
CALCIUM SPEC-SCNC: 7.5 MG/DL (ref 8.6–10.5)
CHLORIDE SERPL-SCNC: 110 MMOL/L (ref 98–107)
CO2 SERPL-SCNC: 28 MMOL/L (ref 22–29)
CREAT SERPL-MCNC: 0.48 MG/DL (ref 0.57–1)
DEPRECATED RDW RBC AUTO: 62.1 FL (ref 37–54)
EGFRCR SERPLBLD CKD-EPI 2021: 95.3 ML/MIN/1.73
ERYTHROCYTE [DISTWIDTH] IN BLOOD BY AUTOMATED COUNT: 18.3 % (ref 12.3–15.4)
GLUCOSE BLDC GLUCOMTR-MCNC: 135 MG/DL (ref 70–130)
GLUCOSE BLDC GLUCOMTR-MCNC: 146 MG/DL (ref 70–130)
GLUCOSE BLDC GLUCOMTR-MCNC: 178 MG/DL (ref 70–130)
GLUCOSE BLDC GLUCOMTR-MCNC: 178 MG/DL (ref 70–130)
GLUCOSE SERPL-MCNC: 148 MG/DL (ref 65–99)
HCT VFR BLD AUTO: 24.7 % (ref 34–46.6)
HGB BLD-MCNC: 8.2 G/DL (ref 12–15.9)
MCH RBC QN AUTO: 31.3 PG (ref 26.6–33)
MCHC RBC AUTO-ENTMCNC: 33.2 G/DL (ref 31.5–35.7)
MCV RBC AUTO: 94.3 FL (ref 79–97)
PLATELET # BLD AUTO: 141 10*3/MM3 (ref 140–450)
PMV BLD AUTO: 9.5 FL (ref 6–12)
POTASSIUM SERPL-SCNC: 3.4 MMOL/L (ref 3.5–5.2)
POTASSIUM SERPL-SCNC: 3.9 MMOL/L (ref 3.5–5.2)
RBC # BLD AUTO: 2.62 10*6/MM3 (ref 3.77–5.28)
SODIUM SERPL-SCNC: 145 MMOL/L (ref 136–145)
WBC NRBC COR # BLD AUTO: 4.89 10*3/MM3 (ref 3.4–10.8)

## 2024-01-02 PROCEDURE — 25010000002 HEPARIN (PORCINE) PER 1000 UNITS: Performed by: NURSE PRACTITIONER

## 2024-01-02 PROCEDURE — 82948 REAGENT STRIP/BLOOD GLUCOSE: CPT

## 2024-01-02 PROCEDURE — 80048 BASIC METABOLIC PNL TOTAL CA: CPT | Performed by: NURSE PRACTITIONER

## 2024-01-02 PROCEDURE — 84132 ASSAY OF SERUM POTASSIUM: CPT | Performed by: INTERNAL MEDICINE

## 2024-01-02 PROCEDURE — G0378 HOSPITAL OBSERVATION PER HR: HCPCS

## 2024-01-02 PROCEDURE — 99232 SBSQ HOSP IP/OBS MODERATE 35: CPT | Performed by: INTERNAL MEDICINE

## 2024-01-02 PROCEDURE — 85027 COMPLETE CBC AUTOMATED: CPT | Performed by: NURSE PRACTITIONER

## 2024-01-02 PROCEDURE — 63710000001 INSULIN LISPRO (HUMAN) PER 5 UNITS: Performed by: NURSE PRACTITIONER

## 2024-01-02 RX ORDER — POTASSIUM CHLORIDE 20 MEQ/1
40 TABLET, EXTENDED RELEASE ORAL EVERY 4 HOURS
Status: COMPLETED | OUTPATIENT
Start: 2024-01-02 | End: 2024-01-02

## 2024-01-02 RX ADMIN — METOPROLOL TARTRATE 25 MG: 25 TABLET, FILM COATED ORAL at 08:38

## 2024-01-02 RX ADMIN — HEPARIN SODIUM 5000 UNITS: 5000 INJECTION INTRAVENOUS; SUBCUTANEOUS at 08:38

## 2024-01-02 RX ADMIN — CASTOR OIL AND BALSAM, PERU 1 APPLICATION: 788; 87 OINTMENT TOPICAL at 09:25

## 2024-01-02 RX ADMIN — Medication 10 ML: at 20:25

## 2024-01-02 RX ADMIN — POTASSIUM CHLORIDE 40 MEQ: 1500 TABLET, EXTENDED RELEASE ORAL at 12:32

## 2024-01-02 RX ADMIN — DOXYCYCLINE 100 MG: 100 CAPSULE ORAL at 20:23

## 2024-01-02 RX ADMIN — DOXYCYCLINE 100 MG: 100 CAPSULE ORAL at 08:38

## 2024-01-02 RX ADMIN — HEPARIN SODIUM 5000 UNITS: 5000 INJECTION INTRAVENOUS; SUBCUTANEOUS at 20:24

## 2024-01-02 RX ADMIN — POTASSIUM CHLORIDE 40 MEQ: 1500 TABLET, EXTENDED RELEASE ORAL at 08:38

## 2024-01-02 RX ADMIN — MAGNESIUM HYDROXIDE 10 ML: 400 SUSPENSION ORAL at 08:38

## 2024-01-02 RX ADMIN — INSULIN LISPRO 2 UNITS: 100 INJECTION, SOLUTION INTRAVENOUS; SUBCUTANEOUS at 12:32

## 2024-01-02 RX ADMIN — CASTOR OIL AND BALSAM, PERU 1 APPLICATION: 788; 87 OINTMENT TOPICAL at 20:24

## 2024-01-02 RX ADMIN — METOPROLOL TARTRATE 25 MG: 25 TABLET, FILM COATED ORAL at 20:23

## 2024-01-02 RX ADMIN — ATORVASTATIN CALCIUM 80 MG: 40 TABLET, FILM COATED ORAL at 08:38

## 2024-01-02 RX ADMIN — ASPIRIN 81 MG: 81 TABLET, CHEWABLE ORAL at 08:38

## 2024-01-02 RX ADMIN — INSULIN LISPRO 2 UNITS: 100 INJECTION, SOLUTION INTRAVENOUS; SUBCUTANEOUS at 20:24

## 2024-01-02 RX ADMIN — Medication 10 ML: at 08:38

## 2024-01-02 NOTE — PLAN OF CARE
Problem: Adult Inpatient Plan of Care  Goal: Plan of Care Review  Outcome: Ongoing, Progressing  Goal: Patient-Specific Goal (Individualized)  Outcome: Ongoing, Progressing  Goal: Absence of Hospital-Acquired Illness or Injury  Outcome: Ongoing, Progressing  Intervention: Identify and Manage Fall Risk  Description: Perform standard risk assessment on admission using a validated tool or comprehensive approach appropriate to the patient; reassess fall risk frequently, with change in status or transfer to another level of care.  Communicate fall injury risk to interprofessional healthcare team.  Determine need for increased observation, equipment and environmental modification, such as low bed, signage and supportive, nonskid footwear.  Adjust safety measures to individual developmental age, stage and identified risk factors.  Reinforce the importance of safety and physical activity with patient and family.  Perform regular intentional rounding to assess need for position change, pain assessment and personal needs, including assistance with toileting.  Recent Flowsheet Documentation  Taken 1/2/2024 0400 by Joyce Palomares, RN  Safety Promotion/Fall Prevention:   activity supervised   assistive device/personal items within reach   clutter free environment maintained   fall prevention program maintained   lighting adjusted   mobility aid in reach   nonskid shoes/slippers when out of bed   room organization consistent   safety round/check completed  Taken 1/2/2024 0200 by Joyce Palomares, RN  Safety Promotion/Fall Prevention:   activity supervised   assistive device/personal items within reach   clutter free environment maintained   fall prevention program maintained   lighting adjusted   mobility aid in reach   nonskid shoes/slippers when out of bed   room organization consistent   safety round/check completed  Taken 1/2/2024 0000 by Joyce Palomares, RN  Safety Promotion/Fall Prevention:   activity supervised   assistive  device/personal items within reach   clutter free environment maintained   fall prevention program maintained   lighting adjusted   mobility aid in reach   nonskid shoes/slippers when out of bed   room organization consistent   safety round/check completed  Taken 1/1/2024 2200 by Joyce Palomares RN  Safety Promotion/Fall Prevention:   activity supervised   assistive device/personal items within reach   clutter free environment maintained   fall prevention program maintained   lighting adjusted   mobility aid in reach   nonskid shoes/slippers when out of bed   room organization consistent   safety round/check completed  Taken 1/1/2024 2000 by Joyce Palomares RN  Safety Promotion/Fall Prevention:   activity supervised   assistive device/personal items within reach   clutter free environment maintained   fall prevention program maintained   lighting adjusted   mobility aid in reach   nonskid shoes/slippers when out of bed   room organization consistent   safety round/check completed  Intervention: Prevent Skin Injury  Description: Perform a screening for skin injury risk, such as pressure or moisture associated skin damage on admission and at regular intervals throughout hospital stay.  Keep all areas of skin (especially folds) clean and dry.  Maintain adequate skin hydration.  Relieve and redistribute pressure and protect bony prominences; implement measures based on patient-specific risk factors.  Match turning and repositioning schedule to clinical condition.  Encourage weight shift frequently; assist with reposition if unable to complete independently.  Float heels off bed; avoid pressure on the Achilles tendon.  Keep skin free from extended contact with medical devices.  Encourage functional activity and mobility, as early as tolerated.  Use aids (e.g., slide boards, mechanical lift) during transfer.  Recent Flowsheet Documentation  Taken 1/2/2024 0400 by Joyce Palomares RN  Body Position:   turned   right  Skin  Protection:   adhesive use limited   tubing/devices free from skin contact   transparent dressing maintained   skin-to-skin areas padded   skin-to-device areas padded   incontinence pads utilized   skin sealant/moisture barrier applied  Taken 1/2/2024 0200 by Joyce Palomares RN  Body Position:   turned   left  Skin Protection:   adhesive use limited   tubing/devices free from skin contact   transparent dressing maintained   skin-to-skin areas padded   skin-to-device areas padded   incontinence pads utilized  Taken 1/2/2024 0000 by Joyce Palomares RN  Body Position:   turned   supine, legs elevated  Skin Protection:   adhesive use limited   tubing/devices free from skin contact   transparent dressing maintained   skin-to-skin areas padded   skin-to-device areas padded   incontinence pads utilized   skin sealant/moisture barrier applied  Taken 1/1/2024 2200 by Joyce Palomares RN  Body Position:   turned   right  Skin Protection:   adhesive use limited   tubing/devices free from skin contact   transparent dressing maintained   skin-to-skin areas padded   skin-to-device areas padded   incontinence pads utilized   skin sealant/moisture barrier applied   silicone foam dressing in place  Taken 1/1/2024 2000 by Joyce Palomares RN  Body Position:   turned   left  Intervention: Prevent and Manage VTE (Venous Thromboembolism) Risk  Description: Assess for VTE (venous thromboembolism) risk.  Encourage and assist with early ambulation.  Initiate and maintain compression or other therapy, as indicated, based on identified risk in accordance with organizational protocol and provider order.  Encourage both active and passive leg exercises while in bed, if unable to ambulate.  Recent Flowsheet Documentation  Taken 1/2/2024 0400 by Joyce Palomares RN  Activity Management: activity encouraged  Taken 1/2/2024 0200 by Joyce Palomares RN  Activity Management: activity encouraged  Taken 1/2/2024 0000 by Joyce Palomares RN  Activity  Management: activity encouraged  Taken 1/1/2024 2200 by Joyce Palomares RN  Activity Management: activity encouraged  Taken 1/1/2024 2000 by Joyce Palomares RN  Activity Management: activity encouraged  Intervention: Prevent Infection  Description: Maintain skin and mucous membrane integrity; promote hand, oral and pulmonary hygiene.  Optimize fluid balance, nutrition, sleep and glycemic control to maximize infection resistance.  Identify potential sources of infection early to prevent or mitigate progression of infection (e.g., wound, lines, devices).  Evaluate ongoing need for invasive devices; remove promptly when no longer indicated.  Recent Flowsheet Documentation  Taken 1/1/2024 2000 by Joyce Palomares RN  Infection Prevention:   environmental surveillance performed   equipment surfaces disinfected   hand hygiene promoted   personal protective equipment utilized  Goal: Optimal Comfort and Wellbeing  Outcome: Ongoing, Progressing  Intervention: Monitor Pain and Promote Comfort  Description: Assess pain level, treatment efficacy and patient response at regular intervals using a consistent pain scale.  Consider the presence and impact of preexisting chronic pain.  Encourage patient and caregiver involvement in pain assessment, interventions and safety measures.  Recent Flowsheet Documentation  Taken 1/1/2024 2000 by Joyce Palomares RN  Pain Management Interventions: quiet environment facilitated  Intervention: Provide Person-Centered Care  Description: Use a family-focused approach to care.  Develop trust and rapport by proactively providing information, encouraging questions, addressing concerns and offering reassurance.  Acknowledge emotional response to hospitalization.  Recognize and utilize personal coping strategies.  Honor spiritual and cultural preferences.  Recent Flowsheet Documentation  Taken 1/1/2024 2000 by Joyce Palomares RN  Trust Relationship/Rapport:   care explained   choices provided    emotional support provided   empathic listening provided   questions answered   questions encouraged   thoughts/feelings acknowledged  Goal: Readiness for Transition of Care  Outcome: Ongoing, Progressing     Problem: Skin Injury Risk Increased  Goal: Skin Health and Integrity  Outcome: Ongoing, Progressing  Intervention: Optimize Skin Protection  Description: Perform a full pressure injury risk assessment, as indicated by screening, upon admission to care unit.  Reassess skin (injury risk, full inspection) frequently (e.g., scheduled interval, with change in condition) to provide optimal early detection and prevention.  Maintain adequate tissue perfusion (e.g., encourage fluid balance; avoid crossing legs, constrictive clothing or devices) to promote tissue oxygenation.  Maintain head of bed at lowest degree of elevation tolerated, considering medical condition and other restrictions.  Avoid positioning onto an area that remains reddened.  Minimize incontinence and moisture (e.g., toileting schedule; moisture-wicking pad, diaper or incontinence collection device; skin moisture barrier).  Cleanse skin promptly and gently when soiled utilizing a pH-balanced cleanser.  Relieve and redistribute pressure (e.g., scheduled position changes, weight shifts, use of support surface, medical device repositioning, protective dressing application, use of positioning device, microclimate control, use of pressure-injury-monitor  Encourage increased activity, such as sitting in a chair at the bedside or early mobilization, when able to tolerate.  Recent Flowsheet Documentation  Taken 1/2/2024 0400 by Joyce Palomares RN  Pressure Reduction Techniques:   frequent weight shift encouraged   weight shift assistance provided   heels elevated off bed   positioned off wounds   pressure points protected  Head of Bed (HOB) Positioning: HOB at 30-45 degrees  Pressure Reduction Devices:   pressure-redistributing mattress utilized   positioning  supports utilized   heel offloading device utilized  Skin Protection:   adhesive use limited   tubing/devices free from skin contact   transparent dressing maintained   skin-to-skin areas padded   skin-to-device areas padded   incontinence pads utilized   skin sealant/moisture barrier applied  Taken 1/2/2024 0200 by Joyce Palomares RN  Pressure Reduction Techniques:   frequent weight shift encouraged   weight shift assistance provided   heels elevated off bed   positioned off wounds   pressure points protected  Head of Bed (HOB) Positioning: HOB at 30-45 degrees  Pressure Reduction Devices:   positioning supports utilized   pressure-redistributing mattress utilized   heel offloading device utilized   foam padding utilized  Skin Protection:   adhesive use limited   tubing/devices free from skin contact   transparent dressing maintained   skin-to-skin areas padded   skin-to-device areas padded   incontinence pads utilized  Taken 1/2/2024 0000 by Joyce Palomares RN  Pressure Reduction Techniques:   frequent weight shift encouraged   weight shift assistance provided   heels elevated off bed   positioned off wounds   pressure points protected  Head of Bed (HOB) Positioning: HOB at 30-45 degrees  Pressure Reduction Devices:   pressure-redistributing mattress utilized   positioning supports utilized   heel offloading device utilized  Skin Protection:   adhesive use limited   tubing/devices free from skin contact   transparent dressing maintained   skin-to-skin areas padded   skin-to-device areas padded   incontinence pads utilized   skin sealant/moisture barrier applied  Taken 1/1/2024 2200 by Joyce Palomares RN  Pressure Reduction Techniques:   frequent weight shift encouraged   weight shift assistance provided   heels elevated off bed   positioned off wounds   pressure points protected  Head of Bed (HOB) Positioning: HOB at 30-45 degrees  Pressure Reduction Devices:   pressure-redistributing mattress utilized    positioning supports utilized   heel offloading device utilized  Skin Protection:   adhesive use limited   tubing/devices free from skin contact   transparent dressing maintained   skin-to-skin areas padded   skin-to-device areas padded   incontinence pads utilized   skin sealant/moisture barrier applied   silicone foam dressing in place  Taken 1/1/2024 2000 by Joyce Palomares RN  Pressure Reduction Techniques:   frequent weight shift encouraged   heels elevated off bed   weight shift assistance provided  Head of Bed (HOB) Positioning: HOB at 30-45 degrees  Pressure Reduction Devices:   pressure-redistributing mattress utilized   positioning supports utilized   heel offloading device utilized     Problem: Asthma Comorbidity  Goal: Maintenance of Asthma Control  Outcome: Ongoing, Progressing  Intervention: Maintain Asthma Symptom Control  Description: Evaluate adherence to self-management (asthma action plan), such as medication, symptom-control, trigger-avoidance and self-monitoring.  Advocate for continuation of home regimen, including medication, method of delivery, schedule and symptom monitoring; acknowledge preferred modality and routine.  Minimize exposure to potential triggers, such as perfume, cleaning chemicals and all types of smoke.  Assess for proper use of inhaled medication and delivery technique; assist or reinstruct if needed.  Evaluate effectiveness of coping skills; encourage expression of feelings, expectations and concerns related to disease management and quality of life; reinforce education to enhance management plan and wellbeing.  Recent Flowsheet Documentation  Taken 1/2/2024 0400 by Joyce Palomares RN  Medication Review/Management: medications reviewed  Taken 1/2/2024 0200 by Joyce Palomares RN  Medication Review/Management: medications reviewed  Taken 1/2/2024 0000 by Joyce Palomares, RN  Medication Review/Management: medications reviewed  Taken 1/1/2024 2200 by Joyce Palomares  RN  Medication Review/Management: medications reviewed  Taken 1/1/2024 2000 by Joyce Palomares RN  Medication Review/Management: medications reviewed     Problem: Behavioral Health Comorbidity  Goal: Maintenance of Behavioral Health Symptom Control  Outcome: Ongoing, Progressing  Intervention: Maintain Behavioral Health Symptom Control  Description: Confirm mental health diagnosis and current treatment.  Evaluate adherence to previously identified self-management plan.  Advocate continuation of home strategies, including medication.  Evaluate effectiveness of self-management strategies and coping skills.  Communicate with providers to ensure continuity and follow-up at transition.  Recent Flowsheet Documentation  Taken 1/2/2024 0400 by Joyce Palomares RN  Medication Review/Management: medications reviewed  Taken 1/2/2024 0200 by Joyce Palomares RN  Medication Review/Management: medications reviewed  Taken 1/2/2024 0000 by Joyce Palomares RN  Medication Review/Management: medications reviewed  Taken 1/1/2024 2200 by Joyce Palomares RN  Medication Review/Management: medications reviewed  Taken 1/1/2024 2000 by Joyce Palomares RN  Medication Review/Management: medications reviewed     Problem: COPD (Chronic Obstructive Pulmonary Disease) Comorbidity  Goal: Maintenance of COPD Symptom Control  Outcome: Ongoing, Progressing  Intervention: Maintain COPD-Symptom Control  Description: Evaluate adherence to management plan (e.g., medication, trigger avoidance, infection prevention, self-monitoring).  Advocate for continuation of home regimen, including medication, method of delivery, schedule and symptom monitoring.  Anticipate the need for breathing techniques and activity pacing to minimize fatigue and breathlessness.  Assess for proper use of inhaled medication and delivery technique; assist or reinstruct if needed.  Evaluate effectiveness of coping skills; encourage expression of feelings, expectations and concerns  related to disease management and quality of life; reinforce education to enhance management plan and wellbeing.  Recent Flowsheet Documentation  Taken 1/2/2024 0400 by Joyce Palomares RN  Medication Review/Management: medications reviewed  Taken 1/2/2024 0200 by Joyce Palomares RN  Medication Review/Management: medications reviewed  Taken 1/2/2024 0000 by Joyce Palomares RN  Medication Review/Management: medications reviewed  Taken 1/1/2024 2200 by Joyce Palomares RN  Medication Review/Management: medications reviewed  Taken 1/1/2024 2000 by Joyce Palomares RN  Medication Review/Management: medications reviewed     Problem: Diabetes Comorbidity  Goal: Blood Glucose Level Within Targeted Range  Outcome: Ongoing, Progressing     Problem: Heart Failure Comorbidity  Goal: Maintenance of Heart Failure Symptom Control  Outcome: Ongoing, Progressing  Intervention: Maintain Heart Failure-Management  Description: Evaluate adherence to home heart failure self-care regimen (e.g., medication, fluid balance, sodium intake, daily weight, physical activity, telemonitoring, support).  Advocate continuation of home medication and schedule.  Consider pharmacologic therapy administration time and effects (e.g., avoid giving diuretic prior to bedtime or nitrates on empty stomach).  Monitor response to pharmacologic therapy, including weight fluctuations, blood pressure and electrolyte levels.  Monitor for signs and symptoms of anxiety and depression, including severity and duration; if present, provide psychosocial support.  Consider need for heart failure clinic or palliative care consult.  Recent Flowsheet Documentation  Taken 1/2/2024 0400 by Joyce Palomares RN  Medication Review/Management: medications reviewed  Taken 1/2/2024 0200 by Joyce Palomares RN  Medication Review/Management: medications reviewed  Taken 1/2/2024 0000 by Joyce Palomares RN  Medication Review/Management: medications reviewed  Taken 1/1/2024 2200 by Marielle  RICHARD Cook  Medication Review/Management: medications reviewed  Taken 1/1/2024 2000 by Joyce Palomares RN  Medication Review/Management: medications reviewed     Problem: Hypertension Comorbidity  Goal: Blood Pressure in Desired Range  Outcome: Ongoing, Progressing  Intervention: Maintain Blood Pressure Management  Description: Evaluate adherence to home antihypertensive regimen (e.g., exercise and activity, diet modification, medication).  Provide scheduled antihypertensive medication; consider administration time and effects (e.g., avoid giving diuretic prior to bedtime).  Monitor response to antihypertensive medication therapy (e.g., blood pressure, electrolyte levels, medication effects).  Minimize risk of orthostatic hypotension; encourage caution with position changes, particularly if elderly.  Recent Flowsheet Documentation  Taken 1/2/2024 0400 by Joyce Palomares RN  Medication Review/Management: medications reviewed  Taken 1/2/2024 0200 by Joyce Palomares RN  Medication Review/Management: medications reviewed  Taken 1/2/2024 0000 by Joyce Palomares RN  Medication Review/Management: medications reviewed  Taken 1/1/2024 2200 by Joyce Palomares RN  Medication Review/Management: medications reviewed  Taken 1/1/2024 2000 by Joyce Palomares RN  Medication Review/Management: medications reviewed     Problem: Obstructive Sleep Apnea Risk or Actual Comorbidity Management  Goal: Unobstructed Breathing During Sleep  Outcome: Ongoing, Progressing     Problem: Osteoarthritis Comorbidity  Goal: Maintenance of Osteoarthritis Symptom Control  Outcome: Ongoing, Progressing  Intervention: Maintain Osteoarthritis Symptom Control  Description: Evaluate adherence to self-management plan, such as medication, exercise and weight management.  Advocate for continuation of home regimen, such as medication, physical activity and thermal agents; monitor response.  Encourage participation in functional activities, such as mobility and  ADLs (activities of daily living) to minimize decline associated with inactivity.  Facilitate use of patient-specific assistive devices, equipment or orthoses.  Evaluate effectiveness of coping skills; encourage expression of feelings, expectations and concerns related to disease management and quality of life; reinforce education to enhance management plan and wellbeing.  Recent Flowsheet Documentation  Taken 1/2/2024 0400 by Joyce Palomares RN  Activity Management: activity encouraged  Medication Review/Management: medications reviewed  Taken 1/2/2024 0200 by Joyce Palomares RN  Activity Management: activity encouraged  Medication Review/Management: medications reviewed  Taken 1/2/2024 0000 by Joyce Palomares RN  Activity Management: activity encouraged  Medication Review/Management: medications reviewed  Taken 1/1/2024 2200 by Joyce Palomares RN  Activity Management: activity encouraged  Medication Review/Management: medications reviewed  Taken 1/1/2024 2000 by Joyce Palomares RN  Activity Management: activity encouraged  Medication Review/Management: medications reviewed     Problem: Pain Chronic (Persistent) (Comorbidity Management)  Goal: Acceptable Pain Control and Functional Ability  Outcome: Ongoing, Progressing  Intervention: Manage Persistent Pain  Description: Evaluate pain level, effect of treatment and patient response at regular intervals.  Minimize pain stimuli; coordinate care and adjust environment (e.g., light, noise, unnecessary movement); promote sleep/rest.  Match pharmacologic analgesia to severity and type of pain mechanism (e.g., neuropathic, muscle, inflammatory); consider multimodal approach (e.g., nonopioid, opioid, adjuvant).  Provide medication at regular intervals; titrate to patient response.  Manage breakthrough pain with additional doses; consider rotation or switching medication.  Monitor for signs of substance tolerance (increased dose to reach desired effect, decreased effect  with same dose).  Avoid abrupt withdrawal of medication, especially agents capable of causing physical dependence.  Manage medication-induced effects, such as constipation, nausea, pruritus, urinary retention, somnolence and dizziness.  Provide multimodal treatment interventions, such as physical activity, therapeutic exercise, yoga, TENS (transcutaneous electrical nerve stimulation) and manual therapy.  Train in functional activity modifications, such as body mechanics, posture, ergonomics, energy conservation and activity pacing.  Consider addition of complementary or alternative therapy, such as acupuncture, hypnosis or therapeutic touch.  Recent Flowsheet Documentation  Taken 1/2/2024 0400 by Joyce Palomares RN  Medication Review/Management: medications reviewed  Taken 1/2/2024 0200 by Joyce Palomares RN  Medication Review/Management: medications reviewed  Taken 1/2/2024 0000 by Joyce Palomares RN  Medication Review/Management: medications reviewed  Taken 1/1/2024 2200 by Joyce Palomares RN  Medication Review/Management: medications reviewed  Taken 1/1/2024 2000 by Joyce Palomares RN  Medication Review/Management: medications reviewed  Intervention: Develop Pain Management Plan  Description: Acknowledge patient as the expert in pain self-management.  Use a consistent, validated tool for pain assessment; include function and quality of life.  Evaluate risk for opioid use and dependence.  Set pain management goals; determine acceptable level of discomfort to allow for maximal functioning and quality of life.  Determine [id number] pain management plan, including both pharmacologic and nonpharmacologic measures.  Identify and integrate past successful treatment measures, if able.  Encourage patient and caregiver involvement in pain assessment, interventions and safety measures.  Re-evaluate plan regularly.  Recent Flowsheet Documentation  Taken 1/1/2024 2000 by Joyce Palomares RN  Pain Management  Interventions: quiet environment facilitated  Intervention: Optimize Psychosocial Wellbeing  Description: Facilitate patient’s self-control over pain by providing pain information and allowing choices in treatment.  Consider and address emotional response to pain.  Explore and promote use of coping strategies; address barriers to successful coping.  Evaluate and assist with psychosocial, cultural and spiritual factors impacting pain.  Modify pain perception by using techniques, such as distraction, mindfulness, guided imagery, meditation or music.  Assess and monitor for signs and symptoms of behavioral health concerns, such as unhealthy substance use, depression and suicidal ideation.  Consider referral for ongoing coping support, such as cognitive behavioral therapy and mindfulness-based stress reduction.  Recent Flowsheet Documentation  Taken 1/1/2024 2000 by Joyce Palomares RN  Diversional Activities: television     Problem: Seizure Disorder Comorbidity  Goal: Maintenance of Seizure Control  Outcome: Ongoing, Progressing     Problem: Fall Injury Risk  Goal: Absence of Fall and Fall-Related Injury  Outcome: Ongoing, Progressing  Intervention: Identify and Manage Contributors  Description: Develop a fall prevention plan with the patient and caregiver/family.  Provide reorientation, appropriate sensory stimulation and routines with changes in mental status to decrease risk of fall.  Promote use of personal vision and auditory aids.  Assess assistance level required for safe and effective self-care; provide support as needed, such as toileting, mobilization. For age 65 and older, implement timed toileting with assistance.  Encourage physical activity, such as performance of mobility and self-care at highest level of patient ability, multicomponent exercise program and provision of appropriate assistive devices.  If fall occurs, assess the severity of injury; implement fall injury protocol. Determine the cause and  revise fall injury prevention plan.  Regularly review medication contribution to fall risk; adjust medication administration times to minimize risk of falling.  Consider risk related to polypharmacy and age.  Balance adequate pain management with potential for oversedation.  Recent Flowsheet Documentation  Taken 1/2/2024 0400 by Joyce Palomares RN  Medication Review/Management: medications reviewed  Taken 1/2/2024 0200 by Joyce Palomares RN  Medication Review/Management: medications reviewed  Taken 1/2/2024 0000 by Jocye Palomares RN  Medication Review/Management: medications reviewed  Taken 1/1/2024 2200 by Joyce Palomares RN  Medication Review/Management: medications reviewed  Taken 1/1/2024 2000 by Joyce Palomares RN  Medication Review/Management: medications reviewed  Intervention: Promote Injury-Free Environment  Description: Provide a safe, barrier-free environment that encourages independent activity.  Keep care area uncluttered and well-lighted.  Determine need for increased observation or monitoring.  Avoid use of devices that minimize mobility, such as restraints or indwelling urinary catheter.  Recent Flowsheet Documentation  Taken 1/2/2024 0400 by Joyce Palomares RN  Safety Promotion/Fall Prevention:   activity supervised   assistive device/personal items within reach   clutter free environment maintained   fall prevention program maintained   lighting adjusted   mobility aid in reach   nonskid shoes/slippers when out of bed   room organization consistent   safety round/check completed  Taken 1/2/2024 0200 by Joyce Palomares RN  Safety Promotion/Fall Prevention:   activity supervised   assistive device/personal items within reach   clutter free environment maintained   fall prevention program maintained   lighting adjusted   mobility aid in reach   nonskid shoes/slippers when out of bed   room organization consistent   safety round/check completed  Taken 1/2/2024 0000 by Joyce Palomares RN  Safety  Promotion/Fall Prevention:   activity supervised   assistive device/personal items within reach   clutter free environment maintained   fall prevention program maintained   lighting adjusted   mobility aid in reach   nonskid shoes/slippers when out of bed   room organization consistent   safety round/check completed  Taken 1/1/2024 2200 by Joyce Palomares, RN  Safety Promotion/Fall Prevention:   activity supervised   assistive device/personal items within reach   clutter free environment maintained   fall prevention program maintained   lighting adjusted   mobility aid in reach   nonskid shoes/slippers when out of bed   room organization consistent   safety round/check completed  Taken 1/1/2024 2000 by Joyce Palomares, RN  Safety Promotion/Fall Prevention:   activity supervised   assistive device/personal items within reach   clutter free environment maintained   fall prevention program maintained   lighting adjusted   mobility aid in reach   nonskid shoes/slippers when out of bed   room organization consistent   safety round/check completed   Goal Outcome Evaluation:

## 2024-01-02 NOTE — PROGRESS NOTES
[facility] Medicine Services  PROGRESS NOTE    Patient Name: Omar Mednoza  : 1940  MRN: [id number]    Date of Admission: 2023  Primary Care Physician: Varun Pa MD    Subjective   Subjective     CC: f/u weakness    HPI: Up in bed with  in room. Says her legs are wrapped too tightly but otherwise has no complaints.       Objective   Objective     Vital Signs:   Temp:  [97.8 °F (36.6 °C)-98 °F (36.7 °C)] 98 °F (36.7 °C)  Heart Rate:  [62-78] 63  Resp:  [16] 16  BP: (113-120)/(71-81) 115/71     Physical Exam:  Constitutional: No acute distress, awake, alert  HENT: NCAT, mucous membranes moist  Respiratory: Clear to auscultation bilaterally, respiratory effort normal   Cardiovascular: RRR, no murmurs, rubs, or gallops  Gastrointestinal: Positive bowel sounds, soft, nontender, nondistended  Musculoskeletal: No bilateral ankle edema  Psychiatric: Appropriate affect, cooperative  Neurologic: Oriented x 3, strength symmetric in all extremities, Cranial Nerves grossly intact to confrontation, speech clear  Skin: No rashes     Results Reviewed:  LAB RESULTS:      Lab 24  0608 24  0723 23  0551 23  0655 23  0417 23  1239 23  1404 23  1403 23  0431 23  2336 23  2131   WBC 4.89 3.87 4.51 5.98 6.17 6.31  6.14  --  7.25  --   --  7.78   HEMOGLOBIN 8.2* 8.3* 8.0* 8.2* 8.6* 9.1*  9.0*  --  10.5*  --   --  10.3*   HEMATOCRIT 24.7* 25.3* 24.7* 24.6* 25.5* 27.6*  27.1*  --  31.4*  --   --  31.3*   PLATELETS 141 139* 124* 110* 120* 134*  128*  --  137*  --   --  159   NEUTROS ABS  --   --   --   --   --  5.21  5.03  --  6.29  --   --  6.22   IMMATURE GRANS (ABS)  --   --   --   --   --  0.03  0.10*  --  0.04  --   --  0.07*   LYMPHS ABS  --   --   --   --   --  0.50*  0.53*  --  0.57*  --   --  0.92   MONOS ABS  --   --   --   --   --  0.18  0.17  --  0.15  --   --  0.34   EOS ABS  --   --   --   --   --   0.38  0.30  --  0.19  --   --  0.21   MCV 94.3 96.2 95.0 92.8 93.4 94.2  92.8  --  91.8  --   --  94.8   SED RATE  --   --   --   --   --   --   --   --   --   --  17   CRP  --   --   --   --   --   --   --   --   --  1.87*  --    PROCALCITONIN  --   --   --   --  0.11  --   --   --   --   --  0.10   LACTATE  --   --   --   --   --   --  1.4  --  1.4  --  2.1*         Lab 01/02/24  0608 01/01/24  0723 12/31/23  0608 12/30/23  0655 12/29/23  0417 12/28/23  1239 12/27/23  1404 12/27/23  1403 12/26/23  2336   SODIUM 145 144 145 146* 145   < >  --  140  --    POTASSIUM 3.4* 3.7 3.7 4.0 3.8   < >  --  4.1  --    CHLORIDE 110* 110* 110* 111* 113*   < >  --  106  --    CO2 28.0 27.0 24.0 25.0 26.0   < >  --  26.0  --    ANION GAP 7.0 7.0 11.0 10.0 6.0   < >  --  8.0  --    BUN 13 14 16 14 15   < >  --  18  --    CREATININE 0.48* 0.51* 0.55* 0.58 0.53*   < >  --  0.64  --    EGFR 95.3 93.9 92.2 91.0 93.0   < >  --  88.9  --    GLUCOSE 148* 172* 180* 134* 72   < >  --  151*  --    CALCIUM 7.5* 7.6* 7.6* 7.3* 7.6*   < >  --  7.6*  --    IONIZED CALCIUM  --   --   --   --   --   --  1.16  --   --    MAGNESIUM  --   --   --   --  1.6  --   --  1.7  --    PHOSPHORUS  --   --   --   --   --   --   --  3.5  --    HEMOGLOBIN A1C  --   --   --   --   --   --   --  12.10*  --    TSH  --   --   --   --   --   --   --   --  3.960    < > = values in this interval not displayed.         Lab 12/27/23  1403 12/26/23  2131   TOTAL PROTEIN 4.3* 4.9*   ALBUMIN 2.5* 2.6*   GLOBULIN 1.8 2.3   ALT (SGPT) 28 31   AST (SGOT) 28 36*   BILIRUBIN 0.6 0.7   ALK PHOS 151* 174*   LIPASE  --  119*         Lab 12/28/23  1239 12/26/23  2336 12/26/23  2131   PROBNP 1,039.0  --  1,494.0   HSTROP T  --  67* 69*             Lab 12/26/23  2336 12/26/23 2131   IRON 53  53  --    IRON SATURATION (TSAT) 28  --    TIBC 188*  --    TRANSFERRIN 126*  --    FERRITIN 462.20*  --    FOLATE  --  10.30   VITAMIN B 12  --  1,253*         Brief Urine Lab Results  (Last  result in the past 365 days)        Color   Clarity   Blood   Leuk Est   Nitrite   Protein   CREAT   Urine HCG        12/27/23 0040 Orange   Cloudy   Large (3+)   Moderate (2+)   Negative   100 mg/dL (2+)                   Microbiology Results Abnormal       Procedure Component Value - Date/Time    Blood Culture - Blood, Blood, PICC Line [[id number]]  (Normal) Collected: 12/27/23 1400    Lab Status: Final result Specimen: Blood, PICC Line Updated: 01/01/24 1531     Blood Culture No growth at 5 days    Blood Culture - Blood, Arm, Right [[id number]]  (Normal) Collected: 12/27/23 0431    Lab Status: Final result Specimen: Blood from Arm, Right Updated: 01/01/24 0515     Blood Culture No growth at 5 days            No radiology results from the last 24 hrs    Results for orders placed during the hospital encounter of 12/26/23    Adult Transthoracic Echo Complete W/ Cont if Necessary Per Protocol    Interpretation Summary    Left ventricular systolic function is normal. Calculated left ventricular EF = 57% Left ventricular ejection fraction appears to be 56 - 60%.    Left ventricular wall thickness is consistent with mild to moderate concentric hypertrophy.    Left ventricular diastolic function was normal.    Estimated right ventricular systolic pressure from tricuspid regurgitation is mildly elevated (35-45 mmHg).    There is a trivial pericardial effusion.    Mild aortic valve regurgitation.      Current medications:  Scheduled Meds:aspirin, 81 mg, Oral, Daily  atorvastatin, 80 mg, Oral, Daily  castor oil-balsam peru, 1 application , Topical, Q12H  doxycycline, 100 mg, Oral, Q12H  heparin (porcine), 5,000 Units, Subcutaneous, Q12H  insulin lispro, 2-9 Units, Subcutaneous, 4x Daily With Meals & Nightly  magnesium hydroxide, 10 mL, Oral, Daily  metoprolol tartrate, 25 mg, Oral, BID  potassium chloride ER, 40 mEq, Oral, Q4H  sodium chloride, 10 mL, Intravenous, Q12H      Continuous Infusions:   PRN Meds:.  acetaminophen  "**OR** acetaminophen **OR** acetaminophen    senna-docusate sodium **AND** polyethylene glycol **AND** bisacodyl **AND** bisacodyl    Calcium Replacement - Follow Nurse / BPA Driven Protocol    dextrose    dextrose    glucagon (human recombinant)    hydrOXYzine    Magnesium Standard Dose Replacement - Follow Nurse / BPA Driven Protocol    nitroglycerin    ondansetron    Phosphorus Replacement - Follow Nurse / BPA Driven Protocol    Potassium Replacement - Follow Nurse / BPA Driven Protocol    sodium chloride    sodium chloride    Assessment & Plan   Assessment & Plan     Active Hospital Problems    Diagnosis  POA    **UTI (urinary tract infection) [N39.0]  Yes    Acute constipation [K59.00]  Unknown    Acute urinary retention [R33.8]  Unknown    Uterine prolapse [N81.4]  Unknown    Bradycardia [R00.1]  Unknown    Pressure injury of buttock, unstageable [L89.300]  Unknown    Type 2 diabetes mellitus with ketoacidosis, without long-term current use of insulin [E11.10]  Unknown    Disorientation [R41.0]  Unknown    Pleural effusion [J90]  Unknown    Severe malnutrition [E43]  Yes    Edema of lower extremity [R60.0]  Yes    Bullous pemphigoid [L12.0]  Yes    Dyslipidemia [E78.5]  Yes    Essential hypertension [I10]  Yes      Resolved Hospital Problems   No resolved problems to display.        Brief Hospital Course to date:  Omar Mendoza is a 83 y.o. female with history of type 2 diabetes, hypertension, history of uterine prolapse, urinary retention with Schultz catheter in place, history of bullous pemphigoid  (s/p Dupixent 10/2023, prednisone/doxycycline 11/2023) CAD, hyperlipidemia, presumed dementia who presented to the ED with generalized weakness and near bullous pemphigoid blister on the heel.      Dysphagia  -Nursing with concern for aspiration event 12/30 however patient herself says she \"just got choked.\" Does not wish for modified diet.   -SLP following      Acute metabolic encephalopathy, superimposed on " suspected dementia  Generalized weakness  -PT/OT recommended SNF.  -CM following for placement      Bilateral pleural effusion  L>R  Bibasilar airspace disease versus pneumonia  -Patient currently on room air with good O2 sats  -Follow-up MRSA PCR, cultures, urinary strep and Legionella antigens  -Procalcitonin is low, lactate has improved  -concern for aspiration on 12/30  -CXR revealed increased right basilar airspace disease with stable left basilar atelectasis  -Encourage IS, pulmonary toilet  -Continue antibiotics, currently on Rocephin and doxycycline (Day 5)  -Currently on RA       Anemia  -Iron 53, iron sat 28  -Hgb up to 8.3 this AM   -fecal occult negative  -suspect anemia of chronic disease  -CBC in AM      Poor venous access  -PICC in place     Sinus bradycardia  - pt on both amiodarone and metoprolol, dtr reports amiodarone is new since hospitalization at [place].  - hold amiodarone, decreased metoprolol to 50 mg BID w/ hold parameters  -HR currently improved      BLE edema  Decreased pulses LLE  - CTA abd showing small right pleural effusion, bilateral basilar atelectasis  - CTA w/ runoff showing normal right sided runoff; left side delayed runoff possibly r/t venous stasis changes, no evidence of arterial occlusion or significant stenosis  - LLE cool to touch compared to RLE, decreased pulses  - neurovascular checks  -Echo revealed EF 57% with normal diastolic function      Uncontrolled Type 2 diabetes  -Hgb A1C 12.10 on 12/27  -Continue SSI     Constipation  -Continue aggressive bowel regimen, s/p soapsuds enema x 1  -s/p Milk of mag x1, Miralax daily, suppository x1   -Resolved, +BM 12/30  -chronic loza catheter in place     Uterine prolapse  -Gyn Dr. Zhou has seen, plan for outpatient follow up to discuss treatment options.   -may be contributing to chronic urine retention     Bullous pemphigoid  -Has developed new blister on right heel  -Continue doxycycline  -Wound care following       Pressure ulcer  -Wound care following      L adrenal adenoma  - incidental finding on CTA abdomen      Severe malnutrition    Expected Discharge Location and Transportation:   Expected Discharge   Expected Discharge Date: 1/5/2024; Expected Discharge Time:      DVT prophylaxis:  Medical DVT prophylaxis orders are present.     AM-PAC 6 Clicks Score (PT): 7 (01/02/24 0800)    CODE STATUS:   Code Status and Medical Interventions:   Ordered at: 12/27/23 0410     Code Status (Patient has no pulse and is not breathing):    CPR (Attempt to Resuscitate)     Medical Interventions (Patient has pulse or is breathing):    Full Support       Ludy Todd II, DO  01/02/24

## 2024-01-02 NOTE — CASE MANAGEMENT/SOCIAL WORK
Continued Stay Note  [facility]     Patient Name: Omar Mendoza  MRN: [id number]  Today's Date: 1/2/2024    Admit Date: 12/26/2023    Plan: rehab   Discharge Plan       Row Name 01/02/24 1141       Plan    Plan rehab    Patient/Family in Agreement with Plan yes    Plan Comments CM called and left  for [facility] and [facility]. [facility] liaison will call me back when available today on referrals sent last week for rehab. This patient will need transport arranged. CM to follow.    1450  CM spoke with the son and daughter of this patient on the phone. Per PT notes, she is standing for 30 seconds and not able to ambulate. [facility] did not approve her and thought LTC would be an option. [facility] did not approved her because she is too low level of care currently. I told them I would make more referrals if they wanted, but rehab may not seem likely. I encouraged them to go see LTC facilities and put her name on a waiting list, and that I would leave a Resource List at the bedside in the event that she will be discharged without placement secured. CM to update them as needed.    Final Discharge Disposition Code 03 - skilled nursing facility (SNF)                   Discharge Codes    No documentation.                 Expected Discharge Date and Time       Expected Discharge Date Expected Discharge Time    Jan 5, 2024               Eleonora John RN

## 2024-01-03 ENCOUNTER — APPOINTMENT (OUTPATIENT)
Dept: CT IMAGING | Facility: HOSPITAL | Age: 84
DRG: 884 | End: 2024-01-03
Payer: MEDICARE

## 2024-01-03 ENCOUNTER — APPOINTMENT (OUTPATIENT)
Dept: MRI IMAGING | Facility: HOSPITAL | Age: 84
DRG: 884 | End: 2024-01-03
Payer: MEDICARE

## 2024-01-03 LAB
AMMONIA BLD-SCNC: 17 UMOL/L (ref 11–51)
D-LACTATE SERPL-SCNC: 2.7 MMOL/L (ref 0.5–2)
ERYTHROCYTE [SEDIMENTATION RATE] IN BLOOD: 14 MM/HR (ref 0–30)
GLUCOSE BLDC GLUCOMTR-MCNC: 142 MG/DL (ref 70–130)
GLUCOSE BLDC GLUCOMTR-MCNC: 150 MG/DL (ref 70–130)

## 2024-01-03 PROCEDURE — 25010000002 HEPARIN (PORCINE) PER 1000 UNITS: Performed by: NURSE PRACTITIONER

## 2024-01-03 PROCEDURE — 97530 THERAPEUTIC ACTIVITIES: CPT

## 2024-01-03 PROCEDURE — 99222 1ST HOSP IP/OBS MODERATE 55: CPT | Performed by: NURSE PRACTITIONER

## 2024-01-03 PROCEDURE — 70496 CT ANGIOGRAPHY HEAD: CPT

## 2024-01-03 PROCEDURE — 0042T HC CT CEREBRAL PERFUSION W/WO CONTRAST: CPT

## 2024-01-03 PROCEDURE — 85025 COMPLETE CBC W/AUTO DIFF WBC: CPT | Performed by: NURSE PRACTITIONER

## 2024-01-03 PROCEDURE — 85652 RBC SED RATE AUTOMATED: CPT | Performed by: NURSE PRACTITIONER

## 2024-01-03 PROCEDURE — 82140 ASSAY OF AMMONIA: CPT | Performed by: NURSE PRACTITIONER

## 2024-01-03 PROCEDURE — 99232 SBSQ HOSP IP/OBS MODERATE 35: CPT | Performed by: INTERNAL MEDICINE

## 2024-01-03 PROCEDURE — 25510000001 IOPAMIDOL PER 1 ML: Performed by: INTERNAL MEDICINE

## 2024-01-03 PROCEDURE — G0378 HOSPITAL OBSERVATION PER HR: HCPCS

## 2024-01-03 PROCEDURE — 70450 CT HEAD/BRAIN W/O DYE: CPT

## 2024-01-03 PROCEDURE — 85007 BL SMEAR W/DIFF WBC COUNT: CPT | Performed by: NURSE PRACTITIONER

## 2024-01-03 PROCEDURE — 92526 ORAL FUNCTION THERAPY: CPT | Performed by: SPEECH-LANGUAGE PATHOLOGIST

## 2024-01-03 PROCEDURE — 83605 ASSAY OF LACTIC ACID: CPT | Performed by: NURSE PRACTITIONER

## 2024-01-03 PROCEDURE — 82948 REAGENT STRIP/BLOOD GLUCOSE: CPT

## 2024-01-03 PROCEDURE — 63710000001 INSULIN LISPRO (HUMAN) PER 5 UNITS: Performed by: NURSE PRACTITIONER

## 2024-01-03 PROCEDURE — 70498 CT ANGIOGRAPHY NECK: CPT

## 2024-01-03 RX ORDER — SODIUM CHLORIDE 9 MG/ML
40 INJECTION, SOLUTION INTRAVENOUS AS NEEDED
Status: DISCONTINUED | OUTPATIENT
Start: 2024-01-03 | End: 2024-01-09

## 2024-01-03 RX ORDER — SODIUM CHLORIDE 0.9 % (FLUSH) 0.9 %
10 SYRINGE (ML) INJECTION AS NEEDED
Status: DISCONTINUED | OUTPATIENT
Start: 2024-01-03 | End: 2024-01-09

## 2024-01-03 RX ORDER — ATORVASTATIN CALCIUM 40 MG/1
80 TABLET, FILM COATED ORAL NIGHTLY
Status: DISCONTINUED | OUTPATIENT
Start: 2024-01-04 | End: 2024-01-07

## 2024-01-03 RX ORDER — SODIUM CHLORIDE 0.9 % (FLUSH) 0.9 %
10 SYRINGE (ML) INJECTION EVERY 12 HOURS SCHEDULED
Status: DISCONTINUED | OUTPATIENT
Start: 2024-01-03 | End: 2024-01-09

## 2024-01-03 RX ORDER — ZIPRASIDONE MESYLATE 20 MG/ML
10 INJECTION, POWDER, LYOPHILIZED, FOR SOLUTION INTRAMUSCULAR EVERY 6 HOURS PRN
Status: DISCONTINUED | OUTPATIENT
Start: 2024-01-03 | End: 2024-02-01 | Stop reason: HOSPADM

## 2024-01-03 RX ORDER — QUETIAPINE FUMARATE 25 MG/1
25 TABLET, FILM COATED ORAL EVERY 12 HOURS SCHEDULED
Status: DISCONTINUED | OUTPATIENT
Start: 2024-01-03 | End: 2024-01-04

## 2024-01-03 RX ADMIN — Medication 10 ML: at 08:52

## 2024-01-03 RX ADMIN — QUETIAPINE FUMARATE 25 MG: 25 TABLET ORAL at 14:38

## 2024-01-03 RX ADMIN — MAGNESIUM HYDROXIDE 10 ML: 400 SUSPENSION ORAL at 08:52

## 2024-01-03 RX ADMIN — CASTOR OIL AND BALSAM, PERU 1 APPLICATION: 788; 87 OINTMENT TOPICAL at 08:52

## 2024-01-03 RX ADMIN — ATORVASTATIN CALCIUM 80 MG: 40 TABLET, FILM COATED ORAL at 08:51

## 2024-01-03 RX ADMIN — IOPAMIDOL 115 ML: 755 INJECTION, SOLUTION INTRAVENOUS at 19:37

## 2024-01-03 RX ADMIN — METOPROLOL TARTRATE 25 MG: 25 TABLET, FILM COATED ORAL at 08:51

## 2024-01-03 RX ADMIN — INSULIN LISPRO 2 UNITS: 100 INJECTION, SOLUTION INTRAVENOUS; SUBCUTANEOUS at 08:51

## 2024-01-03 RX ADMIN — HEPARIN SODIUM 5000 UNITS: 5000 INJECTION INTRAVENOUS; SUBCUTANEOUS at 08:51

## 2024-01-03 RX ADMIN — ASPIRIN 81 MG: 81 TABLET, CHEWABLE ORAL at 08:51

## 2024-01-03 NOTE — PLAN OF CARE
Goal Outcome Evaluation:           Progress: no change     SLP re-evaluation completed. Will sign-off for dysphagia at this time. Please see note for further details and recommendations.      Anticipated Discharge Disposition (SLP): skilled nursing facility, No further SLP services warranted

## 2024-01-03 NOTE — CASE MANAGEMENT/SOCIAL WORK
Continued Stay Note  [facility]     Patient Name: Omar Mendoza  MRN: [id number]  Today's Date: 1/3/2024    Admit Date: 12/26/2023    Plan: rehab   Discharge Plan       Row Name 01/03/24 0940       Plan    Plan rehab    Patient/Family in Agreement with Plan other (see comments)    Plan Comments CM spoke with the son of this patient yesterday regarding discharge planning. PT and OT are recommending rehab. The family is also wanting skilled rehab to transition into LTC. CM gave the son a list of places in [place] that offer both skilled and LTC. He asked that I make referrals to [facility], [facility] and [facility], which I did. CM needs to touch base with [facility] and [facility] as well. Updates are as follows: [facility] has LTC and patient was placed on waiting list as there are no beds available currently. [facility] has no beds currently, but patient placed on waiting list. [facility] LTC bed available and will look at referral that was faxed. CM to follow up on referrals and update son as appropriate.    Final Discharge Disposition Code 03 - skilled nursing facility (SNF)                   Discharge Codes    No documentation.                 Expected Discharge Date and Time       Expected Discharge Date Expected Discharge Time    Jan 5, 2024               Eleonora John RN

## 2024-01-03 NOTE — PLAN OF CARE
Goal Outcome Evaluation:  Plan of Care Reviewed With: patient, daughter           Outcome Evaluation: Pt setup/supervision with self feeding , dep LBD, progressed to being able to complete 3 STS with mod A x 2 given cues for upright posture given UE support and then used RW. Pt progressing, but limited by confusion, weakness, decr balance and activity tolerance. Recommend SNF upon d/c.      Anticipated Discharge Disposition (OT): skilled nursing facility

## 2024-01-03 NOTE — THERAPY TREATMENT NOTE
Patient Name: Omar Mendoza  : 1940    MRN: [id number]                              Today's Date: 1/3/2024       Admit Date: 2023    Visit Dx:     ICD-10-CM ICD-9-CM   1. Generalized weakness  R53.1 780.79   2. Acute UTI  N39.0 599.0   3. Bullous pemphigoid  L12.0 694.5   4. Venous insufficiency  I87.2 459.81   5. Dysphagia, unspecified type  R13.10 787.20     Patient Active Problem List   Diagnosis    UTI (urinary tract infection)    Bullous pemphigoid    Essential hypertension    Dyslipidemia    Edema of lower extremity    Cholestatic hepatitis    Acute constipation    Acute urinary retention    Uterine prolapse    Bradycardia    Pressure injury of buttock, unstageable    Type 2 diabetes mellitus with ketoacidosis, without long-term current use of insulin    Disorientation    Pleural effusion    Severe malnutrition     Past Medical History:   Diagnosis Date    CAD (coronary artery disease)     Dementia     Diabetes mellitus     Hyperlipemia     Hypertension      History reviewed. No pertinent surgical history.   General Information       Row Name 24 1507          Physical Therapy Time and Intention    Document Type therapy note (daily note)  -AE     Mode of Treatment physical therapy  -AE       Row Name 24 1507          General Information    Patient Profile Reviewed yes  -AE     Existing Precautions/Restrictions fall;other (see comments)  decreased skin integrity, RLE pain  -AE     Barriers to Rehab medically complex;previous functional deficit;cognitive status  -AE       Row Name 24 1507          Cognition    Orientation Status (Cognition) oriented to;person  -AE       Row Name 24 1507          Safety Issues, Functional Mobility    Safety Issues Affecting Function (Mobility) awareness of need for assistance;insight into deficits/self-awareness;safety precaution awareness;safety precautions follow-through/compliance;sequencing abilities  -AE     Impairments Affecting Function  (Mobility) balance;cognition;endurance/activity tolerance;postural/trunk control;strength  -AE     Cognitive Impairments, Mobility Safety/Performance awareness, need for assistance;insight into deficits/self-awareness;safety precaution awareness;safety precaution follow-through;sequencing abilities;problem-solving/reasoning  -AE               User Key  (r) = Recorded By, (t) = Taken By, (c) = Cosigned By      Initials Name Provider Type    AE Domo Desir, PT Physical Therapist                   Mobility       Row Name 01/03/24 1508          Bed Mobility    Bed Mobility supine-sit;sit-supine;scooting/bridging  -AE     Scooting/Bridging [place] (Bed Mobility) dependent (less than 25% patient effort);2 person assist  -AE     Supine-Sit [place] (Bed Mobility) maximum assist (25% patient effort);2 person assist;verbal cues  -AE     Sit-Supine [place] (Bed Mobility) dependent (less than 25% patient effort);2 person assist  -AE     Assistive Device (Bed Mobility) bed rails;draw sheet;head of bed elevated  -AE     Comment, (Bed Mobility) VCs for hand placement and sequencing. Pt gives good effort but limited by generalized weakness.  -AE       Row Name 01/03/24 1508          Transfers    Comment, (Transfers) VCs for hand placement and sequencing. STS x3 from EOB, pt unable to complete full upright standing and demo posterior lean.  -AE       Row Name 01/03/24 1508          Sit-Stand Transfer    Sit-Stand [place] (Transfers) moderate assist (50% patient effort);2 person assist;verbal cues  -AE     Assistive Device (Sit-Stand Transfers) other (see comments)  BUE support x1 stand, RW for x2 stands  -AE     Comment, (Sit-Stand Transfer) feet blocked  -AE       Row Name 01/03/24 1508          Gait/Stairs (Locomotion)    [place] Level (Gait) unable to assess  -AE               User Key  (r) = Recorded By, (t) = Taken By, (c) = Cosigned By      Initials Name Provider Type    AE Doom Desir, PT  Physical Therapist                   Obj/Interventions       Row Name 01/03/24 1516          Motor Skills    Therapeutic Exercise knee  -AE       Row Name 01/03/24 1516          Knee (Therapeutic Exercise)    Knee (Therapeutic Exercise) strengthening exercise  -AE     Knee Strengthening (Therapeutic Exercise) LAQ (long arc quad);5 repetitions;3 repetitions  -AE       Row Name 01/03/24 1516          Balance    Balance Assessment sitting static balance;sitting dynamic balance;sit to stand dynamic balance;standing static balance;standing dynamic balance  -AE     Static Sitting Balance standby assist  -AE     Dynamic Sitting Balance contact guard  -AE     Position, Sitting Balance unsupported;sitting edge of bed  -AE     Sit to Stand Dynamic Balance moderate assist;2-person assist;verbal cues  -AE     Static Standing Balance moderate assist;2-person assist;verbal cues  -AE     Dynamic Standing Balance moderate assist;2-person assist;verbal cues  -AE     Position/Device Used, Standing Balance supported  -AE               User Key  (r) = Recorded By, (t) = Taken By, (c) = Cosigned By      Initials Name Provider Type    AE Domo Desir, PT Physical Therapist                   Goals/Plan    No documentation.                  Clinical Impression       Row Name 01/03/24 1517          Pain Scale: FACES Pre/Post-Treatment    Pain: FACES Scale, Pretreatment 0-->no hurt  -AE     Posttreatment Pain Rating 0-->no hurt  -AE       Row Name 01/03/24 1517          Plan of Care Review    Plan of Care Reviewed With patient;daughter  -AE     Progress no change  -AE     Outcome Evaluation Pt continues to present with decreased functional mobility and generalized weakness limiting all mobility. Pt completed x3 STS from EOB with mod A x2 with BUE support and RW. Unable to sequence steps to complete side steps to HOB effectively. Continue to progress per pt tolerance.  -AE       Row Name 01/03/24 1517          Vital Signs    Pre Systolic  BP Rehab 121  -AE     Pre Treatment Diastolic BP 67  -AE     Pretreatment Heart Rate (beats/min) 63  -AE     O2 Delivery Pre Treatment room air  -AE     O2 Delivery Intra Treatment room air  -AE     O2 Delivery Post Treatment room air  -AE     Pre Patient Position Supine  -AE     Intra Patient Position Standing  -AE     Post Patient Position Supine  -AE       Row Name 01/03/24 1517          Positioning and Restraints    Pre-Treatment Position in bed  -AE     Post Treatment Position bed  -AE     In Bed notified nsg;fowlers;call light within reach;encouraged to call for assist;with family/caregiver;with OT  -AE               User Key  (r) = Recorded By, (t) = Taken By, (c) = Cosigned By      Initials Name Provider Type    AE Domo Desir, [place] Physical Therapist                   Outcome Measures       Row Name 01/03/24 1522          How much help from another person do you currently need...    Turning from your back to your side while in flat bed without using bedrails? 2  -AE     Moving from lying on back to sitting on the side of a flat bed without bedrails? 2  -AE     Moving to and from a bed to a chair (including a wheelchair)? 1  -AE     Standing up from a chair using your arms (e.g., wheelchair, bedside chair)? 2  -AE     Climbing 3-5 steps with a railing? 1  -AE     To walk in hospital room? 1  -AE     AM-PAC 6 Clicks Score (PT) 9  -AE     Highest Level of Mobility Goal 3 --> Sit at edge of bed  -AE       Row Name 01/03/24 1522 01/03/24 1434       Functional Assessment    Outcome Measure Options AM-PAC 6 Clicks Basic Mobility (PT)  -AE AM-PAC 6 Clicks Daily Activity (OT)  -AC              User Key  (r) = Recorded By, (t) = Taken By, (c) = Cosigned By      Initials Name Provider Type    Sepideh Polanco OT Occupational Therapist    AE Domo Desir PT Physical Therapist                                 Physical Therapy Education       Title: PT OT SLP Therapies (In Progress)       Topic: Physical Therapy  (In Progress)       Point: Mobility training (In Progress)       Learning Progress Summary             Patient Acceptance, E, NR by AE at 1/3/2024 1337    Acceptance, E, NR by KE at 12/29/2023 1131    Acceptance, E, NR by KE at 12/27/2023 1045                         Point: Home exercise program (Not Started)       Learner Progress:  Not documented in this visit.              Point: Body mechanics (In Progress)       Learning Progress Summary             Patient Acceptance, E, NR by AE at 1/3/2024 1337    Acceptance, E, NR by KE at 12/29/2023 1131    Acceptance, E, NR by KE at 12/27/2023 1045                         Point: Precautions (In Progress)       Learning Progress Summary             Patient Acceptance, E, NR by AE at 1/3/2024 1337    Acceptance, E, NR by KE at 12/29/2023 1131    Acceptance, E, NR by KE at 12/27/2023 1045                                         User Key       Initials Effective Dates Name Provider Type Discipline    AE 09/21/21 -  Domo Desir, PT Physical Therapist PT    KE 11/16/23 -  Petty Mckoy, PT Physical Therapist PT                  PT Recommendation and Plan     Plan of Care Reviewed With: patient, daughter  Progress: no change  Outcome Evaluation: Pt continues to present with decreased functional mobility and generalized weakness limiting all mobility. Pt completed x3 STS from EOB with mod A x2 with BUE support and RW. Unable to sequence steps to complete side steps to HOB effectively. Continue to progress per pt tolerance.     Time Calculation:         PT Charges       Row Name 01/03/24 1522             Time Calculation    Start Time 1337  -AE      PT Received On 01/03/24  -AE      PT Goal Re-Cert Due Date 01/06/24  -AE         Timed Charges    96272 - PT Therapeutic Activity Minutes 15  -AE         Total Minutes    Timed Charges Total Minutes 15  -AE       Total Minutes 15  -AE                User Key  (r) = Recorded By, (t) = Taken By, (c) = Cosigned By      Initials  Name Provider Type    AE Domo Desir, PT Physical Therapist                  Therapy Charges for Today       Code Description Service Date Service Provider Modifiers Qty    12806950019 HC PT THERAPEUTIC ACT EA 15 MIN 1/3/2024 Domo Desir PT GP 1            PT G-Codes  Outcome Measure Options: AM-PAC 6 Clicks Basic Mobility (PT)  AM-PAC 6 Clicks Score (PT): 9  AM-PAC 6 Clicks Score (OT): 12  PT Discharge Summary  Anticipated Discharge Disposition (PT): skilled nursing facility    Domo Desir PT  1/3/2024

## 2024-01-03 NOTE — THERAPY TREATMENT NOTE
Acute Care - Speech Language Pathology   Swallow Treatment Note [facility]     Patient Name: Omar Mendoza  : 1940  MRN: [id number]  Today's Date: 1/3/2024               Admit Date: 2023    Visit Dx:     ICD-10-CM ICD-9-CM   1. Generalized weakness  R53.1 780.79   2. Acute UTI  N39.0 599.0   3. Bullous pemphigoid  L12.0 694.5   4. Venous insufficiency  I87.2 459.81   5. Dysphagia, unspecified type  R13.10 787.20     Patient Active Problem List   Diagnosis    UTI (urinary tract infection)    Bullous pemphigoid    Essential hypertension    Dyslipidemia    Edema of lower extremity    Cholestatic hepatitis    Acute constipation    Acute urinary retention    Uterine prolapse    Bradycardia    Pressure injury of buttock, unstageable    Type 2 diabetes mellitus with ketoacidosis, without long-term current use of insulin    Disorientation    Pleural effusion    Severe malnutrition     Past Medical History:   Diagnosis Date    CAD (coronary artery disease)     Dementia     Diabetes mellitus     Hyperlipemia     Hypertension      History reviewed. No pertinent surgical history.    SLP Recommendation and Plan     SLP Diet Recommendation: soft to chew textures, whole, thin liquids (24)  Recommended Precautions and Strategies: upright posture during/after eating, small bites of food and sips of liquid, general aspiration precautions (24)  SLP Rec. for Method of Medication Administration: meds whole, with puree, as tolerated (24)     Monitor for Signs of Aspiration: yes, notify SLP if any concerns (24)        Anticipated Discharge Disposition (SLP): skilled nursing facility, No further SLP services warranted (24)     Therapy Frequency (Swallow): evaluation only (24)     Oral Care Recommendations: Oral Care BID/PRN, Toothbrush (24)        Daily Summary of Progress (SLP): progress toward functional goals as expected (24)                Treatment Assessment (SLP): continued, toleration of diet, no clinical signs of, suspected, aspiration (01/03/24 1415)  Treatment Assessment Comments (SLP): Pt seen for diet tolerance/adjustment this date. Pt observed w/ po lunch tray. Pt able to self feed. No glaring overt s/s of aspiration. Pt is noted w/ impulsive self feeding habits so pt is at general risk. Reviewed aspiration precautions with pt and she verbalized understanding. Pt reports she still does not want any diet modification or instrumental evaluation. Will sign off at this time, no further f/u warranted unless there is a change in status (01/03/24 1415)  Plan for Continued Treatment (SLP): patient/family has declined further intervention (01/03/24 1415)       Oral Care Recommendations: Oral Care BID/PRN, Toothbrush (01/03/24 1415)    Progress: no change      SWALLOW EVALUATION (last 72 hours)       SLP Adult Swallow Evaluation       Row Name 01/03/24 1415       Rehab Evaluation    Document Type therapy note (daily note)  -CJ    Subjective Information no complaints  -CJ    Patient Observations alert;cooperative  -CJ    Patient/Family/Caregiver Comments/Observations daughter present  -CJ    Patient Effort good  -CJ    Symptoms Noted During/After Treatment none  -CJ       Pain    Additional Documentation Pain Scale: FACES Pre/Post-Treatment (Group)  -CJ       Pain Scale: FACES Pre/Post-Treatment    Pain: FACES Scale, Pretreatment 0-->no hurt  -CJ    Posttreatment Pain Rating 0-->no hurt  -CJ       SLP Treatment Clinical Impressions    Treatment Assessment (SLP) continued;toleration of diet;no clinical signs of;suspected;aspiration  -CJ    Treatment Assessment Comments (SLP) Pt seen for diet tolerance/adjustment this date. Pt observed w/ po lunch tray. Pt able to self feed. No glaring overt s/s of aspiration. Pt is noted w/ impulsive self feeding habits so pt is at general risk. Reviewed aspiration precautions with pt and she verbalized  understanding. Pt reports she still does not want any diet modification or instrumental evaluation. Will sign off at this time, no further f/u warranted unless there is a change in status  -    Daily Summary of Progress (SLP) progress toward functional goals as expected  -    Plan for Continued Treatment (SLP) patient/family has declined further intervention  -    Care Plan Review evaluation/treatment results reviewed;care plan/treatment goals reviewed  -    Care Plan Review, Other Participant(s) daughter  -       Recommendations    Therapy Frequency (Swallow) evaluation only  -    SLP Diet Recommendation soft to chew textures;whole;thin liquids  -    Recommended Precautions and Strategies upright posture during/after eating;small bites of food and sips of liquid;general aspiration precautions  -    Oral Care Recommendations Oral Care BID/PRN;Toothbrush  -    SLP Rec. for Method of Medication Administration meds whole;with puree;as tolerated  -    Monitor for Signs of Aspiration yes;notify SLP if any concerns  -    Anticipated Discharge Disposition (SLP) skilled nursing facility;No further SLP services warranted  -              User Key  (r) = Recorded By, (t) = Taken By, (c) = Cosigned By      Initials Name Effective Dates    Karen Rogers MS CCC-SLP 07/11/23 -                     EDUCATION  The patient has been educated in the following areas:   Dysphagia (Swallowing Impairment) Oral Care/Hydration.              Time Calculation:    Time Calculation- SLP       Row Name 01/03/24 1455             Time Calculation- [facility]    SLP Start Time 1415  -      SLP Received On 01/03/24  -         Untimed Charges    21081-MV Treatment Swallow Minutes 39  -CJ         Total Minutes    Untimed Charges Total Minutes 39  -CJ       Total Minutes 39  -                User Key  (r) = Recorded By, (t) = Taken By, (c) = Cosigned By      Initials Name Provider Type    Karen Rogers MS CCC-SLP  Speech and Language Pathologist                    Therapy Charges for Today       Code Description Service Date Service Provider Modifiers Qty    82552976963 HC ST TREATMENT SWALLOW 3 1/3/2024 Karen Park, MS CCC-SLP GN 1                 Karen Park MS CCC-SLP  1/3/2024

## 2024-01-03 NOTE — PROGRESS NOTES
[facility] Medicine Services  PROGRESS NOTE    Patient Name: Omar Mendoza  : 1940  MRN: [id number]    Date of Admission: 2023  Primary Care Physician: Varun Pa MD    Subjective   Subjective     CC: f/u weakness    HPI: Up in bed. No family in room. Tearful, says she is tired of being here.      Objective   Objective     Vital Signs:   Temp:  [96.9 °F (36.1 °C)-97.3 °F (36.3 °C)] 97.3 °F (36.3 °C)  Heart Rate:  [65-71] 66  Resp:  [18] 18  BP: (118-121)/(67-74) 121/67     Physical Exam:  Constitutional: No acute distress, awake, alert, lying in bed  HENT: NCAT, mucous membranes moist  Respiratory: Clear to auscultation bilaterally, respiratory effort normal   Cardiovascular: RRR, no murmurs, rubs, or gallops  Gastrointestinal: Positive bowel sounds, soft, nontender, nondistended  Musculoskeletal: No bilateral ankle edema  Psychiatric: Appropriate affect, cooperative  Neurologic: Oriented x 3, strength symmetric in all extremities, Cranial Nerves grossly intact to confrontation, speech clear  Skin: No rashes     Results Reviewed:  LAB RESULTS:      Lab 24  0608 24  0723 23  0551 23  0655 23  0417 23  1239 23  1404 23  1403   WBC 4.89 3.87 4.51 5.98 6.17 6.31  6.14  --  7.25   HEMOGLOBIN 8.2* 8.3* 8.0* 8.2* 8.6* 9.1*  9.0*  --  10.5*   HEMATOCRIT 24.7* 25.3* 24.7* 24.6* 25.5* 27.6*  27.1*  --  31.4*   PLATELETS 141 139* 124* 110* 120* 134*  128*  --  137*   NEUTROS ABS  --   --   --   --   --  5.21  5.03  --  6.29   IMMATURE GRANS (ABS)  --   --   --   --   --  0.03  0.10*  --  0.04   LYMPHS ABS  --   --   --   --   --  0.50*  0.53*  --  0.57*   MONOS ABS  --   --   --   --   --  0.18  0.17  --  0.15   EOS ABS  --   --   --   --   --  0.38  0.30  --  0.19   MCV 94.3 96.2 95.0 92.8 93.4 94.2  92.8  --  91.8   PROCALCITONIN  --   --   --   --  0.11  --   --   --    LACTATE  --   --   --   --   --   --  1.4  --           Lab 01/02/24  1523 01/02/24  0608 01/01/24  0723 12/31/23  0608 12/30/23  0655 12/29/23  0417 12/28/23  1239 12/27/23  1404 12/27/23  1403   SODIUM  --  145 144 145 146* 145   < >  --  140   POTASSIUM 3.9 3.4* 3.7 3.7 4.0 3.8   < >  --  4.1   CHLORIDE  --  110* 110* 110* 111* 113*   < >  --  106   CO2  --  28.0 27.0 24.0 25.0 26.0   < >  --  26.0   ANION GAP  --  7.0 7.0 11.0 10.0 6.0   < >  --  8.0   BUN  --  13 14 16 14 15   < >  --  18   CREATININE  --  0.48* 0.51* 0.55* 0.58 0.53*   < >  --  0.64   EGFR  --  95.3 93.9 92.2 91.0 93.0   < >  --  88.9   GLUCOSE  --  148* 172* 180* 134* 72   < >  --  151*   CALCIUM  --  7.5* 7.6* 7.6* 7.3* 7.6*   < >  --  7.6*   IONIZED CALCIUM  --   --   --   --   --   --   --  1.16  --    MAGNESIUM  --   --   --   --   --  1.6  --   --  1.7   PHOSPHORUS  --   --   --   --   --   --   --   --  3.5   HEMOGLOBIN A1C  --   --   --   --   --   --   --   --  12.10*    < > = values in this interval not displayed.         Lab 12/27/23  1403   TOTAL PROTEIN 4.3*   ALBUMIN 2.5*   GLOBULIN 1.8   ALT (SGPT) 28   AST (SGOT) 28   BILIRUBIN 0.6   ALK PHOS 151*         Lab 12/28/23  1239   PROBNP 1,039.0                 Brief Urine Lab Results  (Last result in the past 365 days)        Color   Clarity   Blood   Leuk Est   Nitrite   Protein   CREAT   Urine HCG        12/27/23 0040 Orange   Cloudy   Large (3+)   Moderate (2+)   Negative   100 mg/dL (2+)                   Microbiology Results Abnormal       Procedure Component Value - Date/Time    Blood Culture - Blood, Blood, PICC Line [[id number]]  (Normal) Collected: 12/27/23 1400    Lab Status: Final result Specimen: Blood, PICC Line Updated: 01/01/24 1531     Blood Culture No growth at 5 days    Blood Culture - Blood, Arm, Right [[id number]]  (Normal) Collected: 12/27/23 0431    Lab Status: Final result Specimen: Blood from Arm, Right Updated: 01/01/24 0515     Blood Culture No growth at 5 days            No radiology results from the  last 24 hrs    Results for orders placed during the hospital encounter of 12/26/23    Adult Transthoracic Echo Complete W/ Cont if Necessary Per Protocol    Interpretation Summary    Left ventricular systolic function is normal. Calculated left ventricular EF = 57% Left ventricular ejection fraction appears to be 56 - 60%.    Left ventricular wall thickness is consistent with mild to moderate concentric hypertrophy.    Left ventricular diastolic function was normal.    Estimated right ventricular systolic pressure from tricuspid regurgitation is mildly elevated (35-45 mmHg).    There is a trivial pericardial effusion.    Mild aortic valve regurgitation.      Current medications:  Scheduled Meds:aspirin, 81 mg, Oral, Daily  atorvastatin, 80 mg, Oral, Daily  castor oil-balsam peru, 1 application , Topical, Q12H  heparin (porcine), 5,000 Units, Subcutaneous, Q12H  insulin lispro, 2-9 Units, Subcutaneous, 4x Daily With Meals & Nightly  magnesium hydroxide, 10 mL, Oral, Daily  metoprolol tartrate, 25 mg, Oral, BID  sodium chloride, 10 mL, Intravenous, Q12H      Continuous Infusions:   PRN Meds:.  acetaminophen **OR** acetaminophen **OR** acetaminophen    senna-docusate sodium **AND** polyethylene glycol **AND** bisacodyl **AND** bisacodyl    Calcium Replacement - Follow Nurse / BPA Driven Protocol    dextrose    dextrose    glucagon (human recombinant)    hydrOXYzine    Magnesium Standard Dose Replacement - Follow Nurse / BPA Driven Protocol    nitroglycerin    ondansetron    Phosphorus Replacement - Follow Nurse / BPA Driven Protocol    Potassium Replacement - Follow Nurse / BPA Driven Protocol    sodium chloride    sodium chloride    Assessment & Plan   Assessment & Plan     Active Hospital Problems    Diagnosis  POA    **UTI (urinary tract infection) [N39.0]  Yes    Acute constipation [K59.00]  Unknown    Acute urinary retention [R33.8]  Unknown    Uterine prolapse [N81.4]  Unknown    Bradycardia [R00.1]  Unknown     "Pressure injury of buttock, unstageable [L89.300]  Unknown    Type 2 diabetes mellitus with ketoacidosis, without long-term current use of insulin [E11.10]  Unknown    Disorientation [R41.0]  Unknown    Pleural effusion [J90]  Unknown    Severe malnutrition [E43]  Yes    Edema of lower extremity [R60.0]  Yes    Bullous pemphigoid [L12.0]  Yes    Dyslipidemia [E78.5]  Yes    Essential hypertension [I10]  Yes      Resolved Hospital Problems   No resolved problems to display.        Brief Hospital Course to date:  Omar Mendoza is a 83 y.o. female with history of type 2 diabetes, hypertension, history of uterine prolapse, urinary retention with Schultz catheter in place, history of bullous pemphigoid  (s/p Dupixent 10/2023, prednisone/doxycycline 11/2023) CAD, hyperlipidemia, presumed dementia who presented to the ED with generalized weakness and near bullous pemphigoid blister on the heel.      Dysphagia  -Nursing with concern for aspiration event 12/30 however patient herself says she \"just got choked.\" Does not wish for modified diet.   -SLP following, continue modified diet.     Acute metabolic encephalopathy, superimposed on suspected dementia  Generalized weakness  -PT/OT recommended SNF.  -CM following for placement      Bilateral pleural effusion  L>R  Bibasilar airspace disease versus pneumonia  -Patient currently on room air with good O2 sats  -Follow-up MRSA PCR, cultures, urinary strep and Legionella antigens  -Procalcitonin is low, lactate has improved  -Concern for aspiration on 12/30  -CXR revealed increased right basilar airspace disease with stable left basilar atelectasis  -Encourage IS, pulmonary toilet  -Continue antibiotics, currently on Rocephin and doxycycline (Day 5)  -Currently on RA       Anemia  -Iron 53, iron sat 28  -Hgb up to 8.3 this AM   -fecal occult negative  -suspect anemia of chronic disease  -CBC in AM      Poor venous access  -PICC in place     Sinus bradycardia  - pt on both " amiodarone and metoprolol, dtr reports amiodarone is new since hospitalization at [place].  - hold amiodarone, decreased metoprolol to 50 mg BID w/ hold parameters  -HR currently improved      BLE edema  Decreased pulses LLE  - CTA abd showing small right pleural effusion, bilateral basilar atelectasis  - CTA w/ runoff showing normal right sided runoff; left side delayed runoff possibly r/t venous stasis changes, no evidence of arterial occlusion or significant stenosis  - LLE cool to touch compared to RLE, decreased pulses  - neurovascular checks  -Echo revealed EF 57% with normal diastolic function      Uncontrolled Type 2 diabetes  -Hgb A1C 12.10 on 12/27  -Continue SSI     Constipation  -Continue aggressive bowel regimen, s/p soapsuds enema x 1  -s/p Milk of mag x1, Miralax daily, suppository x1   -Resolved, +BM 12/30  -chronic loza catheter in place     Uterine prolapse  -Gyn Dr. Zhou has seen, plan for outpatient follow up to discuss treatment options.   -may be contributing to chronic urine retention     Bullous pemphigoid  -Has developed new blister on right heel  -Continue doxycycline  -Wound care following      Pressure ulcer  -Wound care following      L adrenal adenoma  - incidental finding on CTA abdomen      Severe malnutrition    Expected Discharge Location and Transportation:   Expected Discharge   Expected Discharge Date: 1/5/2024; Expected Discharge Time:      DVT prophylaxis:  Medical DVT prophylaxis orders are present.     AM-PAC 6 Clicks Score (PT): 8 (01/03/24 0300)    CODE STATUS:   Code Status and Medical Interventions:   Ordered at: 12/27/23 0410     Code Status (Patient has no pulse and is not breathing):    CPR (Attempt to Resuscitate)     Medical Interventions (Patient has pulse or is breathing):    Full Support       Ludy Todd II, DO  01/03/24

## 2024-01-03 NOTE — DISCHARGE PLACEMENT REQUEST
"Omar Mendoza (83 y.o. Female)   To [facility] at [facility]  From [place]        Date of Birth   1940    Social Security Number       Address   [address] [facility] [address]    Home Phone   [phone]    MRN   [id number]       Taoism   None    Marital Status                               Admission Date   12/26/23    Admission Type   Emergency    Admitting Provider   Ludy Todd II, DO    Attending Provider   Ludy Todd II, DO    Department, Room/Bed   [address], S576/1       Discharge Date       Discharge Disposition       Discharge Destination                                 Attending Provider: Ludy Todd II, DO    Allergies: No Known Allergies    Isolation: None   Infection: None   Code Status: CPR    Ht: 170.2 cm (67.01\")   Wt: 101 kg (222 lb)    Admission Cmt: None   Principal Problem: UTI (urinary tract infection) [N39.0]                   Active Insurance as of 12/26/2023       Primary Coverage       Payor Plan Insurance Group Employer/Plan Group    ezeep MEDICARE REPLACEMENT ANTHEM MEDICARE ADVANTAGE [id number]       Payor Plan Address Payor Plan Phone Number Payor Plan Fax Number Effective Dates    [address] [phone]  1/1/2022 - None Entered    [facility] [id number]         Subscriber Name Subscriber Birth Date Member ID       OMAR MENDOZA 1940 [id number]                     Emergency Contacts        (Rel.) Home Phone Work Phone Mobile Phone    TRENTJAIRON (POA)DESTIN (Son) [phone] -- [phone]    KODYDINO WAGNER (Daughter) [phone] -- [phone]    DALLAS MENDOZA (Spouse) [phone] -- [phone]              Insurance Information                  ezeep MEDICARE REPLACEMENT/ezeep MEDICARE ADVANTAGE Phone: [phone]    Subscriber: Omar Mendoza Subscriber#: [id number]    Group#: [id number] Precert#: --             History & Physical        Julee Rogers DO at 12/27/23 0410  "             [facility]  HISTORY AND PHYSICAL    Patient Name: Omar Mendoza  : 1940  MRN: [id number]  Primary Care Physician: Varun Pa MD  Date of admission: 2023    Subjective  Subjective     Chief Complaint:  Generalized weakness, skin lesions    HPI:  Omar Mendoza is a 83 y.o. female with hx of CAD, HTN, HLD, T2DM, acute urinary retention (has loza in place), acute constipation (no BM since ), bullous pemphigoid (s/p dupixent 10/23, prednisone / doxycycline 2023), uterine prolapse, ongoing confusion - thought to have dementia but no formal dx and not on medications for dementia who presents to the ED for evaluation of generalized weakness and new bullous pemphigoid blister on right heel. Pt is unable to provide HPI d/t mental status changes / confusion. Daughter is present and assisting with HPI. She makes it known that she has been unhappy with the care provided at [facility] rehab and feels like her mother was neglected there.She brings her to [facility] ED for evaluation of the right heel lesion and altered mental status / generalized weakness.     Review of Systems   Unable to perform ROS: Mental status change        Personal History     Past Medical History:   Diagnosis Date    CAD (coronary artery disease)     Dementia     Diabetes mellitus     Hyperlipemia     Hypertension              History reviewed. No pertinent surgical history.    Family History:  family history is not on file.     Social History:  reports that she has never smoked. She has never used smokeless tobacco. She reports that she does not drink alcohol.  Social History     Social History Narrative    Not on file       Medications:  Insulin Syringe-Needle U-100, amLODIPine, amiodarone, aspirin, atorvastatin, insulin glargine, metoprolol tartrate, nystatin, spironolactone, and triamcinolone    No Known Allergies    Objective  Objective     Vital Signs:   Temp:  [98 °F (36.7  °C)] 98 °F (36.7 °C)  Heart Rate:  [48-53] 48  Resp:  [14-16] 14  BP: (101-120)/(58-71) 120/62    Physical Exam  Constitutional:       General: She is sleeping. She is not in acute distress.     Appearance: She is well-developed. She is ill-appearing. She is not toxic-appearing.   HENT:      Head: Normocephalic and atraumatic.      Nose: Nose normal.      Mouth/Throat:      Mouth: Mucous membranes are dry.      Pharynx: Oropharynx is clear.   Eyes:      Extraocular Movements: Extraocular movements intact.      Conjunctiva/sclera: Conjunctivae normal.      Pupils: Pupils are equal, round, and reactive to light.   Cardiovascular:      Rate and Rhythm: Regular rhythm. Bradycardia present.      Pulses: Normal pulses.      Heart sounds: Normal heart sounds. No murmur heard.  Pulmonary:      Effort: Pulmonary effort is normal.      Breath sounds: Normal breath sounds.   Abdominal:      General: Bowel sounds are normal. There is no distension.      Palpations: Abdomen is soft.      Tenderness: There is no abdominal tenderness. There is no guarding.   Musculoskeletal:         General: Normal range of motion.      Cervical back: Normal range of motion and neck supple.      Right lower le+ Pitting Edema present.      Left lower le+ Pitting Edema present.   Skin:     General: Skin is warm and dry.      Capillary Refill: Capillary refill takes less than 2 seconds.      Comments: See photos:   1. Right heel blister  2. Buttocks pressure ulcer   Neurological:      Mental Status: She is oriented to person, place, and time.      Cranial Nerves: No cranial nerve deficit.   Psychiatric:         Mood and Affect: Mood normal.         Behavior: Behavior normal. Behavior is cooperative.          Right heel      2. Buttocks    Verbal permission to take and place photos in chart obtained from pt daughter who is at bedside.    Result Review:  I have personally reviewed the results from the time of this admission to 2023 04:57  EST and agree with these findings:  [x]  Laboratory list / accordion  []  Microbiology  [x]  Radiology  [x]  EKG/Telemetry   []  Cardiology/Vascular   []  Pathology  []  Old records  []  Other:  Most notable findings include:     LAB RESULTS:      Lab 12/27/23  0431 12/26/23 2336 12/26/23 2131   WBC  --   --  7.78   HEMOGLOBIN  --   --  10.3*   HEMATOCRIT  --   --  31.3*   PLATELETS  --   --  159   NEUTROS ABS  --   --  6.22   IMMATURE GRANS (ABS)  --   --  0.07*   LYMPHS ABS  --   --  0.92   MONOS ABS  --   --  0.34   EOS ABS  --   --  0.21   MCV  --   --  94.8   CRP  --  1.87*  --    PROCALCITONIN  --   --  0.10   LACTATE 1.4  --  2.1*         Lab 12/26/23 2131   SODIUM 143   POTASSIUM 4.3   CHLORIDE 109*   CO2 26.0   ANION GAP 8.0   BUN 21   CREATININE 0.80   EGFR 74.1   GLUCOSE 81   CALCIUM 7.9*         Lab 12/26/23 2131   TOTAL PROTEIN 4.9*   ALBUMIN 2.6*   GLOBULIN 2.3   ALT (SGPT) 31   AST (SGOT) 36*   BILIRUBIN 0.7   ALK PHOS 174*   LIPASE 119*         Lab 12/26/23  2336 12/26/23 2131   PROBNP  --  1,494.0   HSTROP T 67* 69*                 Brief Urine Lab Results  (Last result in the past 365 days)        Color   Clarity   Blood   Leuk Est   Nitrite   Protein   CREAT   Urine HCG        12/27/23 0040 Orange   Cloudy   Large (3+)   Moderate (2+)   Negative   100 mg/dL (2+)                 Microbiology Results (last 10 days)       ** No results found for the last 240 hours. **            CT Angio Abdominal Aorta Bilateral Iliofem Runoff    Result Date: 12/27/2023  CT ANGIO ABDOMINAL AORTA BILAT ILIOFEM RUNOFF Date of Exam: 12/26/2023 11:36 PM EST Indication: Rash, temperature difference in lower extremities, swelling, include phase as well. Comparison: None available. Technique: CTA of the abdomen, pelvis and both lower extremities was performed after the uneventful intravenous administration of 125 mL Isovue-370. Reconstructed coronal and sagittal images were also obtained. In addition, a 3-D volume  rendered image was created for interpretation. Automated exposure control and iterative reconstruction methods were used. Findings: Non--- vascular: There is a small right pleural effusion. There is bilateral basilar atelectasis. There is cholelithiasis. The arterial enhanced images of the liver, right adrenal gland, bilateral kidneys, pancreas and spleen are normal. There is adrenal adenoma on the left. There is a moderate stool burden with a moderate amount of stool in the rectosigmoid possibly from fecal impaction. There is anasarca. Vascular: There is tortuosity of the thoracic aorta. There is no aneurysmal dilatation or aortic stenosis. The bilateral common iliac, internal and external neck arteries are widely patent. There is a normal right-sided runoff with three-vessel to the ankle. On the left side, there is delayed runoff but the delayed images demonstrate a normal three-vessel runoff to the left ankle despite the slight delay which is of uncertain etiology. Venous stasis would be in the differential for the sluggish flow on the left. There is diffuse lower extremity edema.     Impression: Impression: No acute abnormality. No evidence of arterial occlusion or significant stenosis. There is delayed runoff on the left perhaps related to venous stasis changes. Please see above discussion for other findings. There is diffuse anasarca and lower extremity edema. Electronically Signed: Oni Quesada MD  12/27/2023 1:18 AM EST  Workstation ID: [id number]    XR Chest 1 View    Result Date: 12/26/2023  XR CHEST 1 VW Date of Exam: 12/26/2023 9:10 PM EST Indication: Chest Pain Triage Protocol Comparison: None available. Findings: Patient is rotated to the left. There is bibasilar airspace disease left greater than right with small left pleural effusion. Negative for pneumothorax. Heart size within normal limits for technique. Osseous structures grossly intact.     Impression: Impression: Bibasilar airspace disease left  greater than right which may relate to atelectasis and/or pneumonia with small left pleural effusion. Electronically Signed: Cyril Reina MD  12/26/2023 9:38 PM EST  Workstation ID: [id number]         Assessment & Plan  Assessment & Plan       UTI (urinary tract infection)    Bullous pemphigoid    Essential hypertension    Dyslipidemia    Edema of lower extremity    Acute constipation    Acute urinary retention    Uterine prolapse    Bradycardia    Pressure injury of buttock, unstageable    Type 2 diabetes mellitus with ketoacidosis, without long-term current use of insulin    Disorientation    Pleural effusion    UTI  - start rocephin  - blood cultures x 2  - lactic acid 2.1, repeat pending  - add urine culture   - hx of urinary retention w/ loza in place, d/c loza and bladder scan w/ acute urinary retention standing orders    BLE edema  Decreased pulses LLE  - CTA abd showing small right pleural effusion, bilateral basilar atelectasis  - CTA w/ runoff showing normal right sided runoff; left side delayed runoff possibly r/t venous stasis changes, no evidence of arterial occlusion or significant stenosis  - LLE cool to touch compared to RLE, decreased pulses  - neurovascular checks  - ECHO in am    L pleural effusion  - chest xray w/ bibasilar airspace dz L>R which may r/t atelectasis and or pneumonia w/ small L pleural effusion  - pt receiving rocephin (UTI) and doxycycline (Bullous pemphigoid) which would cover concern for pneumonia  - blood cx's pending  - add mrsa pcr nares  - add s.pneumo and legionella urinary ag  - pulmonary toilet / IS  - procal, esr, crp pending; initial lactic 2.1 w/ repeat 1.4  -- crp 1.87, procal 0.10    Bullous pemphigoid  - s/p dupixent injection 10/2023  - s/p skin bx by dermatology and doxycycline/prednisone  - prednisone stopped d/t elevated glucose, dtr reports some confusion between hospital d/c and admission to rehab (steroids weaned and stopped in hospital then restarted on  admission to rehab)  - has new right heel blister that came up 12/26  - per up to date recommendations, start doxycycline 100 mg BID, hold on steroids for now d/t concern for hyperglycemia     Pressure ulcer, buttocks  - daughter thought this was r/t the bullous pemphigoid, looks more like pressure ulcer  - [facility] consult for evaluation    Disorientation  - daughter concern for altered mental status, has been gradual progression and worse recently  - concern for dementia, has not had neuro evaluation d/t difficulty getting her to appointments w/ recent acute illnesses  - consider neuro evaluation inpatient vs outpatient if mental status not improved  - neuro checks  - check tsh, b12, folate, vitamin d  - PT/OT consult, pt has not been mobile since prior to admission to [facility] 12/14    Acute constipation / urinary retention  - daughter reporting patient has not had BM since d/c from [facility] on 12/19, she was given something to help her use the bathroom prior to d/c but has not gone since  - CTA abd in the ED tonight showing there is a moderate stool burden w/ moderate amt of stool in the rectosigmoid possibly fecal impaction  - soap suds enema x1  - start bowel regimen  - urinary retention likely r/t constipation / uterine prolapse    Uterine prolapse  - significant visible prolapse, daughter asking what can be done for this, possible pessary?  - consider gynecological consult inpatient vs outpatient    Bradycardia  - pt on both amiodarone and metoprolol, dtr reports amiodarone is new since hospitalization at [facility], says pt was started on IV drip for her heart rate, unclear if she had a-fib   - obtain records from [facility]  - currently HR 49, sinus viral  - hold amiodarone  - decrease metoprolol to 50 mg BID w/ hold parameters    T2DM  - has been on levemir since admission to [facility] initially for DKA dtr thinks d/t steroid tx  - hold levemir as pt sleeping, not taking in good PO  intake  - fsbg w/ moderate dose ssi  - check A1c  - may need to add levemir or increase coverage based on glucose w/ if prednisone needed    Anemia  - anemia studies  - occult stool  - compared to labs in EMR, on 12/22/23 H/H 10.4/22.5, platelets 177    L adrenal adenoma  - incidental finding on CTA abdomen       DVT prophylaxis:  Heparin SC BID    CODE STATUS:    Code Status (Patient has no pulse and is not breathing): CPR (Attempt to Resuscitate)  Medical Interventions (Patient has pulse or is breathing): Full Support      Expected Discharge    Expected discharge date/ time has not been documented.      This note has been completed as part of a split-shared workflow.     Signature: Electronically signed by Sepideh Delgadillo, LITA, 12/27/23, 4:37 AM EST    Total time: 80 minutes        Attending   Admission Attestation       I have performed an independent face-to-face diagnostic evaluation including performing an independent physical examination.  The documented plan of care above was reviewed and developed with the advanced practice clinician (APC).  I have updated the HPI as appropriate.    Brief HPI    This is an 83-year-old female patient with a PMH significant for bullous pemphigoid, CAD, HTN, HLD, diabetes mellitus type 2, acute urinary retention with indwelling Schultz catheter, constipation, uterine prolapse who comes to the ED due to weakness.  Family at bedside provides HPI.  Patient was discharged from [facility] around 2 weeks ago she was treated for steroid induced DKA.  She has been at rehab.  Family was concerned that rehab was not getting therapies and was having progressive decline.  They got the patient from rehab today and brought her to the ED.    Attending Physical Exam:  Temp:  [98 °F (36.7 °C)] 98 °F (36.7 °C)  Heart Rate:  [46-53] 46  Resp:  [1-16] 1  BP: (101-120)/(58-79) 119/69    Constitutional: Asleep, arousable  Eyes: Eyes closed  HENT: NCAT, mucous membranes moist  Neck: Supple, no  thyromegaly, no lymphadenopathy, trachea midline  Respiratory: Clear to auscultation bilaterally, nonlabored respirations   Cardiovascular: RRR, no murmurs, rubs, or gallops, palpable pedal pulses bilaterally  Gastrointestinal: Positive bowel sounds, soft, nontender, nondistended  Musculoskeletal: 3+ pitting bilateral ankle edema, no clubbing or cyanosis to extremities  Psychiatric: Sleepy  Neurologic: Not oriented to person place or time, speech minimal  Skin: Bullous lesions to right foot pictured above, decubitus skin breakdown pictured above      Assessment and Plan:    See assessment and plan documented by APC above and updated/edited by me as appropriate.    Julee Rogers DO  23                    Electronically signed by Julee Rogers DO at 23 0557          Physician Progress Notes (most recent note)        Ludy Todd II, DO at 24 0906              [facility]  PROGRESS NOTE    Patient Name: Oamr Mendoza  : 1940  MRN: [id number]    Date of Admission: 2023  Primary Care Physician: Varun Pa MD    Subjective   Subjective     CC: f/u weakness    HPI: Up in bed with  in room. Says her legs are wrapped too tightly but otherwise has no complaints.       Objective   Objective     Vital Signs:   Temp:  [97.8 °F (36.6 °C)-98 °F (36.7 °C)] 98 °F (36.7 °C)  Heart Rate:  [62-78] 63  Resp:  [16] 16  BP: (113-120)/(71-81) 115/71     Physical Exam:  Constitutional: No acute distress, awake, alert  HENT: NCAT, mucous membranes moist  Respiratory: Clear to auscultation bilaterally, respiratory effort normal   Cardiovascular: RRR, no murmurs, rubs, or gallops  Gastrointestinal: Positive bowel sounds, soft, nontender, nondistended  Musculoskeletal: No bilateral ankle edema  Psychiatric: Appropriate affect, cooperative  Neurologic: Oriented x 3, strength symmetric in all extremities, Cranial Nerves grossly intact to confrontation,  speech clear  Skin: No rashes     Results Reviewed:  LAB RESULTS:      Lab 01/02/24  0608 01/01/24  0723 12/31/23  0551 12/30/23  0655 12/29/23  0417 12/28/23  1239 12/27/23  1404 12/27/23  1403 12/27/23  0431 12/26/23  2336 12/26/23  2131   WBC 4.89 3.87 4.51 5.98 6.17 6.31  6.14  --  7.25  --   --  7.78   HEMOGLOBIN 8.2* 8.3* 8.0* 8.2* 8.6* 9.1*  9.0*  --  10.5*  --   --  10.3*   HEMATOCRIT 24.7* 25.3* 24.7* 24.6* 25.5* 27.6*  27.1*  --  31.4*  --   --  31.3*   PLATELETS 141 139* 124* 110* 120* 134*  128*  --  137*  --   --  159   NEUTROS ABS  --   --   --   --   --  5.21  5.03  --  6.29  --   --  6.22   IMMATURE GRANS (ABS)  --   --   --   --   --  0.03  0.10*  --  0.04  --   --  0.07*   LYMPHS ABS  --   --   --   --   --  0.50*  0.53*  --  0.57*  --   --  0.92   MONOS ABS  --   --   --   --   --  0.18  0.17  --  0.15  --   --  0.34   EOS ABS  --   --   --   --   --  0.38  0.30  --  0.19  --   --  0.21   MCV 94.3 96.2 95.0 92.8 93.4 94.2  92.8  --  91.8  --   --  94.8   SED RATE  --   --   --   --   --   --   --   --   --   --  17   CRP  --   --   --   --   --   --   --   --   --  1.87*  --    PROCALCITONIN  --   --   --   --  0.11  --   --   --   --   --  0.10   LACTATE  --   --   --   --   --   --  1.4  --  1.4  --  2.1*         Lab 01/02/24  0608 01/01/24  0723 12/31/23  0608 12/30/23  0655 12/29/23  0417 12/28/23  1239 12/27/23  1404 12/27/23  1403 12/26/23  2336   SODIUM 145 144 145 146* 145   < >  --  140  --    POTASSIUM 3.4* 3.7 3.7 4.0 3.8   < >  --  4.1  --    CHLORIDE 110* 110* 110* 111* 113*   < >  --  106  --    CO2 28.0 27.0 24.0 25.0 26.0   < >  --  26.0  --    ANION GAP 7.0 7.0 11.0 10.0 6.0   < >  --  8.0  --    BUN 13 14 16 14 15   < >  --  18  --    CREATININE 0.48* 0.51* 0.55* 0.58 0.53*   < >  --  0.64  --    EGFR 95.3 93.9 92.2 91.0 93.0   < >  --  88.9  --    GLUCOSE 148* 172* 180* 134* 72   < >  --  151*  --    CALCIUM 7.5* 7.6* 7.6* 7.3* 7.6*   < >  --  7.6*  --    IONIZED  CALCIUM  --   --   --   --   --   --  1.16  --   --    MAGNESIUM  --   --   --   --  1.6  --   --  1.7  --    PHOSPHORUS  --   --   --   --   --   --   --  3.5  --    HEMOGLOBIN A1C  --   --   --   --   --   --   --  12.10*  --    TSH  --   --   --   --   --   --   --   --  3.960    < > = values in this interval not displayed.         Lab 12/27/23  1403 12/26/23  2131   TOTAL PROTEIN 4.3* 4.9*   ALBUMIN 2.5* 2.6*   GLOBULIN 1.8 2.3   ALT (SGPT) 28 31   AST (SGOT) 28 36*   BILIRUBIN 0.6 0.7   ALK PHOS 151* 174*   LIPASE  --  119*         Lab 12/28/23  1239 12/26/23  2336 12/26/23  2131   PROBNP 1,039.0  --  1,494.0   HSTROP T  --  67* 69*             Lab 12/26/23  2336 12/26/23  2131   IRON 53  53  --    IRON SATURATION (TSAT) 28  --    TIBC 188*  --    TRANSFERRIN 126*  --    FERRITIN 462.20*  --    FOLATE  --  10.30   VITAMIN B 12  --  1,253*         Brief Urine Lab Results  (Last result in the past 365 days)        Color   Clarity   Blood   Leuk Est   Nitrite   Protein   CREAT   Urine HCG        12/27/23 0040 Orange   Cloudy   Large (3+)   Moderate (2+)   Negative   100 mg/dL (2+)                   Microbiology Results Abnormal       Procedure Component Value - Date/Time    Blood Culture - Blood, Blood, PICC Line [[id number]]  (Normal) Collected: 12/27/23 1400    Lab Status: Final result Specimen: Blood, PICC Line Updated: 01/01/24 1531     Blood Culture No growth at 5 days    Blood Culture - Blood, Arm, Right [[id number]]  (Normal) Collected: 12/27/23 0431    Lab Status: Final result Specimen: Blood from Arm, Right Updated: 01/01/24 0515     Blood Culture No growth at 5 days            No radiology results from the last 24 hrs    Results for orders placed during the hospital encounter of 12/26/23    Adult Transthoracic Echo Complete W/ Cont if Necessary Per Protocol    Interpretation Summary    Left ventricular systolic function is normal. Calculated left ventricular EF = 57% Left ventricular ejection fraction  appears to be 56 - 60%.    Left ventricular wall thickness is consistent with mild to moderate concentric hypertrophy.    Left ventricular diastolic function was normal.    Estimated right ventricular systolic pressure from tricuspid regurgitation is mildly elevated (35-45 mmHg).    There is a trivial pericardial effusion.    Mild aortic valve regurgitation.      Current medications:  Scheduled Meds:aspirin, 81 mg, Oral, Daily  atorvastatin, 80 mg, Oral, Daily  castor oil-balsam peru, 1 application , Topical, Q12H  doxycycline, 100 mg, Oral, Q12H  heparin (porcine), 5,000 Units, Subcutaneous, Q12H  insulin lispro, 2-9 Units, Subcutaneous, 4x Daily With Meals & Nightly  magnesium hydroxide, 10 mL, Oral, Daily  metoprolol tartrate, 25 mg, Oral, BID  potassium chloride ER, 40 mEq, Oral, Q4H  sodium chloride, 10 mL, Intravenous, Q12H      Continuous Infusions:   PRN Meds:.  acetaminophen **OR** acetaminophen **OR** acetaminophen    senna-docusate sodium **AND** polyethylene glycol **AND** bisacodyl **AND** bisacodyl    Calcium Replacement - Follow Nurse / BPA Driven Protocol    dextrose    dextrose    glucagon (human recombinant)    hydrOXYzine    Magnesium Standard Dose Replacement - Follow Nurse / BPA Driven Protocol    nitroglycerin    ondansetron    Phosphorus Replacement - Follow Nurse / BPA Driven Protocol    Potassium Replacement - Follow Nurse / BPA Driven Protocol    sodium chloride    sodium chloride    Assessment & Plan   Assessment & Plan     Active Hospital Problems    Diagnosis  POA    **UTI (urinary tract infection) [N39.0]  Yes    Acute constipation [K59.00]  Unknown    Acute urinary retention [R33.8]  Unknown    Uterine prolapse [N81.4]  Unknown    Bradycardia [R00.1]  Unknown    Pressure injury of buttock, unstageable [L89.300]  Unknown    Type 2 diabetes mellitus with ketoacidosis, without long-term current use of insulin [E11.10]  Unknown    Disorientation [R41.0]  Unknown    Pleural effusion [J90]   "Unknown    Severe malnutrition [E43]  Yes    Edema of lower extremity [R60.0]  Yes    Bullous pemphigoid [L12.0]  Yes    Dyslipidemia [E78.5]  Yes    Essential hypertension [I10]  Yes      Resolved Hospital Problems   No resolved problems to display.        Brief Hospital Course to date:  Omar Mendoza is a 83 y.o. female with history of type 2 diabetes, hypertension, history of uterine prolapse, urinary retention with Schultz catheter in place, history of bullous pemphigoid  (s/p Dupixent 10/2023, prednisone/doxycycline 11/2023) CAD, hyperlipidemia, presumed dementia who presented to the ED with generalized weakness and near bullous pemphigoid blister on the heel.      Dysphagia  -Nursing with concern for aspiration event 12/30 however patient herself says she \"just got choked.\" Does not wish for modified diet.   -SLP following      Acute metabolic encephalopathy, superimposed on suspected dementia  Generalized weakness  -PT/OT recommended SNF.  -CM following for placement      Bilateral pleural effusion  L>R  Bibasilar airspace disease versus pneumonia  -Patient currently on room air with good O2 sats  -Follow-up MRSA PCR, cultures, urinary strep and Legionella antigens  -Procalcitonin is low, lactate has improved  -concern for aspiration on 12/30  -CXR revealed increased right basilar airspace disease with stable left basilar atelectasis  -Encourage IS, pulmonary toilet  -Continue antibiotics, currently on Rocephin and doxycycline (Day 5)  -Currently on RA       Anemia  -Iron 53, iron sat 28  -Hgb up to 8.3 this AM   -fecal occult negative  -suspect anemia of chronic disease  -CBC in AM      Poor venous access  -PICC in place     Sinus bradycardia  - pt on both amiodarone and metoprolol, dtr reports amiodarone is new since hospitalization at [place].  - hold amiodarone, decreased metoprolol to 50 mg BID w/ hold parameters  -HR currently improved      BLE edema  Decreased pulses LLE  - CTA abd showing small right " pleural effusion, bilateral basilar atelectasis  - CTA w/ runoff showing normal right sided runoff; left side delayed runoff possibly r/t venous stasis changes, no evidence of arterial occlusion or significant stenosis  - LLE cool to touch compared to RLE, decreased pulses  - neurovascular checks  -Echo revealed EF 57% with normal diastolic function      Uncontrolled Type 2 diabetes  -Hgb A1C 12.10 on   -Continue SSI     Constipation  -Continue aggressive bowel regimen, s/p soapsuds enema x 1  -s/p Milk of mag x1, Miralax daily, suppository x1   -Resolved, +BM   -chronic loza catheter in place     Uterine prolapse  -Gyn Dr. Zhou has seen, plan for outpatient follow up to discuss treatment options.   -may be contributing to chronic urine retention     Bullous pemphigoid  -Has developed new blister on right heel  -Continue doxycycline  -Wound care following      Pressure ulcer  -Wound care following      L adrenal adenoma  - incidental finding on CTA abdomen      Severe malnutrition    Expected Discharge Location and Transportation:   Expected Discharge   Expected Discharge Date: 2024; Expected Discharge Time:      DVT prophylaxis:  Medical DVT prophylaxis orders are present.     AM-PAC 6 Clicks Score (PT): 7 (24 0800)    CODE STATUS:   Code Status and Medical Interventions:   Ordered at: 23 0410     Code Status (Patient has no pulse and is not breathing):    CPR (Attempt to Resuscitate)     Medical Interventions (Patient has pulse or is breathing):    Full Support       Ludy Todd II, DO  24       Electronically signed by Ludy Todd II, DO at 24 1129          Physical Therapy Notes (most recent note)        Donovan Love, PT at 24 1522  Version 1 of 1         Acute Care - Wound/Debridement Treatment Note  [facility]     Patient Name: Omar Mendoza  : 1940  MRN: [id number]  Today's Date: 2024                Admit Date: 2023    Visit  Dx:    ICD-10-CM ICD-9-CM   1. Generalized weakness  R53.1 780.79   2. Acute UTI  N39.0 599.0   3. Bullous pemphigoid  L12.0 694.5   4. Venous insufficiency  I87.2 459.81   5. Dysphagia, unspecified type  R13.10 787.20     R medial/posterior heel              Patient Active Problem List   Diagnosis    UTI (urinary tract infection)    Bullous pemphigoid    Essential hypertension    Dyslipidemia    Edema of lower extremity    Cholestatic hepatitis    Acute constipation    Acute urinary retention    Uterine prolapse    Bradycardia    Pressure injury of buttock, unstageable    Type 2 diabetes mellitus with ketoacidosis, without long-term current use of insulin    Disorientation    Pleural effusion    Severe malnutrition        Past Medical History:   Diagnosis Date    CAD (coronary artery disease)     Dementia     Diabetes mellitus     Hyperlipemia     Hypertension         History reviewed. No pertinent surgical history.        Wound 12/26/23 2218 Right posterior heel Blisters (Active)   Wound Image    01/01/24 1522   Dressing Appearance intact;moist drainage;dried drainage 01/01/24 1522   Closure None 01/01/24 0845   Base moist;pink;red 01/01/24 1522   Periwound intact;dry 01/01/24 1522   Periwound Temperature warm 01/01/24 1522   Periwound Skin Turgor soft 01/01/24 1522   Edges open;irregular 01/01/24 1522   Wound Length (cm) 7.2 cm 01/01/24 1522   Wound Width (cm) 7.1 cm 01/01/24 1522   Wound Depth (cm) 0.1 cm 01/01/24 1522   Wound Surface Area (cm^2) 51.12 cm^2 01/01/24 1522   Wound Volume (cm^3) 5.112 cm^3 01/01/24 1522   Drainage Characteristics/Odor serous;serosanguineous 01/01/24 1522   Drainage Amount small 01/01/24 1522   Care, Wound cleansed with;soap and water;debrided 01/01/24 1522   Dressing Care dressing applied;silver impregnated;low-adherent;foam;multi-layer wrap 01/01/24 1522   Periwound Care cleansed with pH balanced cleanser;dry periwound area maintained 01/01/24 1522       Wound 12/27/23 0411  Bilateral coccyx Pressure Injury (Active)   Dressing Appearance dressing loose 01/01/24 1400   Closure Adhesive bandage 01/01/24 1400   Base moist;pink;red 01/01/24 1400   Periwound dry 01/01/24 1400   Care, Wound cleansed with;soap and water 01/01/24 1400   Dressing Care antimicrobial agent applied;dressing applied;silicone;foam 01/01/24 1400       Wound 12/27/23 1035 Left greater trochanter Blisters (Active)   Dressing Appearance moist drainage 12/31/23 2000   Closure Adhesive bandage 01/01/24 0845   Base yellow 01/01/24 0845   Care, Wound antimicrobial agent applied 12/31/23 2000   Dressing Care dressing changed;antimicrobial agent applied;silicone;foam 12/31/23 2000      Lymphedema       Row Name 01/01/24 1600             Lymphedema Edema Assessment    Ptting Edema Category By severity  -      Pitting Edema Mild  -         Skin Changes/Observations    Location/Assessment Lower Extremity  -      Lower Extremity Conditions bilateral:;intact;clean;dry;shiny;fragile  -      Lower Extremity Color/Pigment bilateral:;blanchable;erythema  -         Lymphedema Pulses/Capillary Refill    Capillary Refill lower extremity capillary refill  -      Lower Extremity Capillary Refill right:;left:;less than 3 seconds  -         Compression/Skin Care    Compression/Skin Care skin care;wrapping location  -      Skin Care washed/dried;lotion applied  -      Wrapping Location lower extremity  -      Wrapping Location LE bilateral:;foot to knee  -      Wrapping Comments RLE mepilex Ag, cast padding to secure, BLE size 4 compressogrips applied doubled distally for gradient compression.  -                User Key  (r) = Recorded By, (t) = Taken By, (c) = Cosigned By      Initials Name Provider Type     Donovan Love, PT Physical Therapist                    WOUND DEBRIDEMENT  Total area of Debridement: 50cm2  Debridement Site 1  Location- Site 1: R medial heel  Selective Debridement- Site 1: Wound Surface  >20cmsq  Instruments- Site 1: tweezers, scissors  Excised Tissue Description- Site 1: maximum, other (comment) (nonviable ruptured blister debris)  Bleeding- Site 1: scant               PT Assessment (last 12 hours)       PT Evaluation and Treatment       Row Name 01/01/24 1522          Physical Therapy Time and Intention    Subjective Information complains of;weakness;fatigue;pain;swelling  -     Document Type therapy note (daily note);wound care  -     Mode of Treatment physical therapy;individual therapy  -[facility] Name 01/01/24 1522          General Information    Patient Observations alert;cooperative;agree to therapy  -[facility] Name 01/01/24 1522          Pain    Pain Intervention(s) Repositioned;Elevated  -     Additional Documentation Pain Scale: FACES Pre/Post-Treatment (Group)  -[facility] Name 01/01/24 1522          Pain Scale: FACES Pre/Post-Treatment    Pain: FACES Scale, Pretreatment 2-->hurts little bit  -LH     Posttreatment Pain Rating 2-->hurts little bit  -     Pain Location - Side/Orientation Right  -     Pain Location lower  -     Pain Location - extremity  -[facility] Name 01/01/24 1522          Cognition    Affect/Mental Status (Cognition) WFL  -LH     Orientation Status (Cognition) oriented to;person;place;disoriented to;time  -       Row Name 01/01/24 1522          Wound 12/26/23 2218 Right posterior heel Blisters    Wound - Properties Group Placement Date: 12/26/23  -DN Placement Time: 2218  -DN Present on Original Admission: Y  -DN Side: Right  -DN Orientation: posterior  -DN Location: heel  -DN Primary Wound Type: Blisters  -DN    Wound Image Images linked: 2  -LH     Dressing Appearance intact;moist drainage;dried drainage  -     Base moist;pink;red  -     Periwound intact;dry  -     Periwound Temperature warm  -     Periwound Skin Turgor soft  -     Edges open;irregular  -     Wound Length (cm) 7.2 cm  -LH     Wound Width (cm) 7.1 cm  -     Wound  Depth (cm) 0.1 cm  -     Wound Surface Area (cm^2) 51.12 cm^2  -LH     Wound Volume (cm^3) 5.112 cm^3  -LH     Drainage Characteristics/Odor serous;serosanguineous  -LH     Drainage Amount small  -LH     Care, Wound cleansed with;soap and water;debrided  -     Dressing Care dressing applied;silver impregnated;low-adherent;foam;multi-layer wrap  Mepilex Ag, cast padding, MLW  -LH     Periwound Care cleansed with pH balanced cleanser;dry periwound area maintained  -LH     Retired Wound - Properties Group Placement Date: 12/26/23  -DN Placement Time: 2218  -DN Present on Original Admission: Y  -DN Side: Right  -DN Orientation: posterior  -DN Location: heel  -DN Primary Wound Type: Blisters  -DN    Retired Wound - Properties Group Date first assessed: 12/26/23  -DN Time first assessed: 2218  -DN Present on Original Admission: Y  -DN Side: Right  -DN Location: heel  -DN Primary Wound Type: Blisters  -DN      Row Name             Wound 12/27/23 0411 Bilateral coccyx Pressure Injury    Wound - Properties Group Placement Date: 12/27/23  -DN Placement Time: 0411  -DN Present on Original Admission: Y  -DN Side: Bilateral  -DN Location: coccyx  -DN Primary Wound Type: Pressure inj  -DN    Retired Wound - Properties Group Placement Date: 12/27/23  -DN Placement Time: 0411  -DN Present on Original Admission: Y  -DN Side: Bilateral  -DN Location: coccyx  -DN Primary Wound Type: Pressure inj  -DN    Retired Wound - Properties Group Date first assessed: 12/27/23  -DN Time first assessed: 0411  -DN Present on Original Admission: Y  -DN Side: Bilateral  -DN Location: coccyx  -DN Primary Wound Type: Pressure inj  -DN      Row Name             Wound 12/27/23 1035 Left greater trochanter Blisters    Wound - Properties Group Placement Date: 12/27/23  -MC Placement Time: 1035  -MC Side: Left  -MC Location: greater trochanter  -MC Primary Wound Type: Blisters  -MC    Retired Wound - Properties Group Placement Date: 12/27/23  -  Placement Time: 1035  -MC Side: Left  - Location: greater trochanter  -MC Primary Wound Type: Blisters  -MC    Retired Wound - Properties Group Date first assessed: 12/27/23  - Time first assessed: 1035  -MC Side: Left  -MC Location: greater trochanter  -MC Primary Wound Type: Blisters  -MC      Row Name 01/01/24 1522          Coping    Observed Emotional State cooperative;calm  -     Verbalized Emotional State acceptance  -     Trust Relationship/Rapport care explained;questions answered  -     Family/Support Persons spouse  -     Involvement in Care at bedside;attentive to patient  -       Row Name 01/01/24 1522          Plan of Care Review    Plan of Care Reviewed With patient;spouse  -     Progress improving  -     Outcome Evaluation Pt demonstrating excellent improvements in BLE edema this date. Pt also now with rupturing of R medial/posterior heel blister. Upon debridement of nonviable ruptured blister debris, pt's wound with good, clean pink/red wound base. PT applied Mepilex ag to help manage drainage and reduce bacterial load. PT reapplied MLW to further promote venous return and improve skin integrity. PT plans to f/u with dressings changes and MLW changes in 2-3 days.  -       Row Name 01/01/24 1522          Positioning and Restraints    Pre-Treatment Position in bed  -     Post Treatment Position bed  -     In Bed supine;call light within reach;encouraged to call for assist;with family/caregiver  -               User Key  (r) = Recorded By, (t) = Taken By, (c) = Cosigned By      Initials Name Provider Type     Eva Myles, RN Registered Nurse     Donovan Love, PT Physical Therapist    Peter Espinoza, RN Registered Nurse                  Physical Therapy Education       Title: PT OT SLP Therapies (In Progress)       Topic: Physical Therapy (In Progress)       Point: Mobility training (In Progress)       Learning Progress Summary             Patient Acceptance, E, NR  by MIA at 12/29/2023 1131    Acceptance, E, NR by MIA at 12/27/2023 1045                         Point: Home exercise program (Not Started)       Learner Progress:  Not documented in this visit.              Point: Body mechanics (In Progress)       Learning Progress Summary             Patient Acceptance, E, NR by MIA at 12/29/2023 1131    Acceptance, E, NR by MIA at 12/27/2023 1045                         Point: Precautions (In Progress)       Learning Progress Summary             Patient Acceptance, E, NR by MIA at 12/29/2023 1131    Acceptance, E, NR by MIA at 12/27/2023 1045                                         User Key       Initials Effective Dates Name Provider Type Discipline     11/16/23 -  Petty Mckoy, PT Physical Therapist PT                    Recommendation and Plan  Anticipated Discharge Disposition (PT): skilled nursing facility  Planned Therapy Interventions (PT): wound care, patient/family education  Therapy Frequency (PT): daily  Plan of Care Reviewed With: patient, spouse   Progress: improving       Progress: improving  Outcome Evaluation: Pt demonstrating excellent improvements in BLE edema this date. Pt also now with rupturing of R medial/posterior heel blister. Upon debridement of nonviable ruptured blister debris, pt's wound with good, clean pink/red wound base. PT applied Mepilex ag to help manage drainage and reduce bacterial load. PT reapplied MLW to further promote venous return and improve skin integrity. PT plans to f/u with dressings changes and MLW changes in 2-3 days.  Plan of Care Reviewed With: patient, spouse            Time Calculation   PT Charges       Row Name 01/01/24 1635 01/01/24 1420          Time Calculation    Start Time 1522  -LH 1325  -LM     PT Received On -- 01/01/24  -LM     PT Goal Re-Cert Due Date 01/06/24  - 01/06/24  -LM        Timed Charges    25873 - PT Therapeutic Exercise Minutes -- 10  -LM     28810 - PT Therapeutic Activity Minutes -- 20  -LM         Untimed Charges    Wound Care 90683 Selective debridement;76686 Add't debridement ea 20 cm  -LH --     10178-Uqrutylljq comp below knee 15  -LH --     25112-Erwddjevc debridement 10  -LH --     97598-Add't debridement ea 20 cm 10  -LH --        Total Minutes    Timed Charges Total Minutes -- 30  -LM     Untimed Charges Total Minutes 35  -LH --      Total Minutes 35  -LH 30  -LM               User Key  (r) = Recorded By, (t) = Taken By, (c) = Cosigned By      Initials Name Provider Type    LM Sandra Gage, PT Physical Therapist    LH Donovan Love, PT Physical Therapist                      Therapy Charges for Today       Code Description Service Date Service Provider Modifiers Qty    44120973666 HC TOMMIE DEBRIDE OPEN WOUND UP TO 20CM 2024 Donovan Love, PT GP 1    75539488492 HC PT TOMMIE DEBRIDE OPEN WOUND EA ADD 20CM 2024 Donovan Love, PT GP 2    68087955258 HC PT MULTI LAYER COMP SYS BELOW KNEE 2024 Donovan Love, PT GP 1              PT G-Codes  Outcome Measure Options: AM-PAC 6 Clicks Basic Mobility (PT)  AM-PAC 6 Clicks Score (PT): 10  AM-PAC 6 Clicks Score (OT): 12       Donovan Love PT  2024      Electronically signed by Donovan Love, PT at 24 1636          Occupational Therapy Notes (most recent note)        Rayna Wall, OT at 24 3342          Patient Name: Omar Mendoza  : 1940    MRN: [id number]                              Today's Date: 2024       Admit Date: 2023    Visit Dx:     ICD-10-CM ICD-9-CM   1. Generalized weakness  R53.1 780.79   2. Acute UTI  N39.0 599.0   3. Bullous pemphigoid  L12.0 694.5   4. Venous insufficiency  I87.2 459.81   5. Dysphagia, unspecified type  R13.10 787.20     Patient Active Problem List   Diagnosis    UTI (urinary tract infection)    Bullous pemphigoid    Essential hypertension    Dyslipidemia    Edema of lower extremity    Cholestatic hepatitis    Acute constipation    Acute urinary retention    Uterine  prolapse    Bradycardia    Pressure injury of buttock, unstageable    Type 2 diabetes mellitus with ketoacidosis, without long-term current use of insulin    Disorientation    Pleural effusion    Severe malnutrition     Past Medical History:   Diagnosis Date    CAD (coronary artery disease)     Dementia     Diabetes mellitus     Hyperlipemia     Hypertension      History reviewed. No pertinent surgical history.   General Information       Row Name 01/01/24 0910          OT Time and Intention    Document Type therapy note (daily note)  -DILIP     Mode of Treatment individual therapy;occupational therapy  -       Row Name 01/01/24 0910          General Information    Patient Profile Reviewed yes  -DILIP     Existing Precautions/Restrictions fall  limited skin integrity, RLE pain and limited control  -     Barriers to Rehab medically complex;cognitive status;previous functional deficit  -       Row Name 01/01/24 0910          Living Environment    People in Home spouse  -       Row Name 01/01/24 0910          Cognition    Orientation Status (Cognition) oriented to;place;person;disoriented to;time  -       Row Name 01/01/24 0910          Safety Issues, Functional Mobility    Safety Issues Affecting Function (Mobility) insight into deficits/self-awareness;safety precaution awareness;safety precautions follow-through/compliance;sequencing abilities  -     Impairments Affecting Function (Mobility) balance;cognition;endurance/activity tolerance;postural/trunk control;strength;pain;range of motion (ROM)  -     Cognitive Impairments, Mobility Safety/Performance insight into deficits/self-awareness;safety precaution awareness;safety precaution follow-through;sequencing abilities  -               User Key  (r) = Recorded By, (t) = Taken By, (c) = Cosigned By      Initials Name Provider Type    DILIP Rayna Wall, OT Occupational Therapist                     Mobility/ADL's       Row Name 01/01/24 0911          Bed  Mobility    Supine-Sit [place] (Bed Mobility) maximum assist (25% patient effort);2 person assist;verbal cues;nonverbal cues (demo/gesture)  -DILIP     Sit-Supine [place] (Bed Mobility) moderate assist (50% patient effort);2 person assist;verbal cues;nonverbal cues (demo/gesture)  -DILIP     Bed Mobility, Safety Issues decreased use of arms for pushing/pulling;decreased use of legs for bridging/pushing;impaired trunk control for bed mobility  -DILIP     Assistive Device (Bed Mobility) bed rails;draw sheet;head of bed elevated  -DILIP     Comment, (Bed Mobility) pt. needed verbal and tactile cues to sequence, pt. appeared with much more motivation to assit with sit to supine  -DILIP       Row Name 01/01/24 0911          Transfers    Comment, (Transfers) attempted sit to stand x 2, but unable to clear buttock from bed with maximal asisst of 2 persons, alerted nursing and recommended pt. be in a lift room  -       Row Name 01/01/24 0911          Activities of Daily Living    BADL Assessment/Intervention feeding;grooming  -       Row Name 01/01/24 0911          Lower Body Dressing Assessment/Training    [place] Level (Lower Body Dressing) don;socks;dependent (less than 25% patient effort)  -DILIP     Position (Lower Body Dressing) supine  -DILIP       Row Name 01/01/24 0911          Self-Feeding Assessment/Training    [place] Level (Feeding) prepare tray/open items;dependent (less than 25% patient effort);scoop food and bring to mouth;liquids to mouth;set up;supervision  -DILIP     Position (Self-Feeding) sitting up in bed  -DILIP       Row Name 01/01/24 0911          Grooming Assessment/Training    [place] Level (Grooming) hair care, combing/brushing;oral care regimen;minimum assist (75% patient effort);wash face, hands;set up  -DILIP     Position (Grooming) edge of bed sitting;sitting up in bed  -DILIP               User Key  (r) = Recorded By, (t) = Taken By, (c) = Cosigned By      Initials Name Provider Type    DILIP Wall  Rayna Mckeon OT Occupational Therapist                   Obj/Interventions       Row Name 01/01/24 0914          Balance    Static Sitting Balance standby assist  -DILIP     Dynamic Sitting Balance standby assist  hair brushing  -DILIP     Position, Sitting Balance unsupported;sitting edge of bed  -DILIP     Static Standing Balance --  unable to complete  -DILIP     Balance Interventions occupation based/functional task  -DILIP     Comment, Balance pt. was able to sit EOB grossly 6-8 minutes SBA  -DILIP               User Key  (r) = Recorded By, (t) = Taken By, (c) = Cosigned By      Initials Name Provider Type    Rayna Zamora, OT Occupational Therapist                   Goals/Plan       Row Name 01/01/24 0918          Bed Mobility Goal 1 (OT)    Progress/Outcomes (Bed Mobility Goal 1, OT) continuing progress toward goal  -DILIP       Row Name 01/01/24 0918          Transfer Goal 1 (OT)    Progress/Outcome (Transfer Goal 1, OT) progress slower than expected  -DILIP       Row Name 01/01/24 0918          Grooming Goal 1 (OT)    Progress/Outcome (Grooming Goal 1, OT) goal partially met;goal ongoing  -DILIP       Row Name 01/01/24 0918          Self-Feeding Goal 1 (OT)    Progress/Outcomes (Self-Feeding Goal 1, OT) goal met  -DILIP               User Key  (r) = Recorded By, (t) = Taken By, (c) = Cosigned By      Initials Name Provider Type    Rayna Zamora, OT Occupational Therapist                   Clinical Impression       Row Name 01/01/24 0915          Pain Assessment    Pretreatment Pain Rating 6/10  -DILIP     Posttreatment Pain Rating 0/10 - no pain  -DILIP     Pain Location - Side/Orientation Right  -DILIP     Pain Location lower  -DILIP     Pain Location - extremity  -DILIP     Pre/Posttreatment Pain Comment pain on arrival and relieved with waffle boot removal, but with any mvmt. of RLE pt. with pain until at rest again, pt. requested pain meds, nurse alerted, pt. tending to keep RLE in ER with inability to self correct  -DILIP     Pain  Intervention(s) Medication (See MAR);Repositioned  -DILIP       Row Name 01/01/24 0915          Plan of Care Review    Plan of Care Reviewed With patient;spouse  -DILIP     Progress improving  -DILIP     Outcome Evaluation Pt. with improvement with sit to supine, self feeding, grooming and sitting balance today, but decline with sit to stand ability.  Limited by RLE pain and weakness with movement. Pt. remains appropriate for skilled OT services to address deficit areas and promote return to prior higher level of independence.  Continue OT POC.  -DILIP       Row Name 01/01/24 0915          Therapy Assessment/Plan (OT)    Therapy Frequency (OT) daily  -DILIP       Row Name 01/01/24 0915          Therapy Plan Review/Discharge Plan (OT)    Anticipated Discharge Disposition (OT) skilled nursing facility  -       Row Name 01/01/24 0915          Vital Signs    Pre Systolic BP Rehab 128  -DILIP     Pre Treatment Diastolic BP 60  -DILIP     Pretreatment Heart Rate (beats/min) 78  -DILIP     Posttreatment Heart Rate (beats/min) 83  -DILIP     Pre SpO2 (%) 93  -DILIP     O2 Delivery Pre Treatment room air  -DILIP     O2 Delivery Intra Treatment room air  -DILIP     Post SpO2 (%) 92  -DILIP     O2 Delivery Post Treatment room air  -DILIP     Pre Patient Position Supine  -DILIP     Intra Patient Position Sitting  -DILIP     Post Patient Position Supine  -DILIP       Row Name 01/01/24 0915          Positioning and Restraints    Pre-Treatment Position in bed  -DILIP     Post Treatment Position bed  -DILIP     In Bed notified nsg;sitting;call light within reach;encouraged to call for assist;exit alarm on;side rails up x2;legs elevated;heels elevated  -DILIP               User Key  (r) = Recorded By, (t) = Taken By, (c) = Cosigned By      Initials Name Provider Type    Rayna Zamora, OT Occupational Therapist                   Outcome Measures       Row Name 01/01/24 0919          How much help from another is currently needed...    Putting on and taking off regular lower body  clothing? 1  -DILIP     Bathing (including washing, rinsing, and drying) 2  -DILIP     Toileting (which includes using toilet bed pan or urinal) 1  -DILIP     Putting on and taking off regular upper body clothing 2  -DILIP     Taking care of personal grooming (such as brushing teeth) 3  -DILIP     Eating meals 3  -DILIP     AM-PAC 6 Clicks Score (OT) 12  -DILIP       Row Name 01/01/24 0919          Functional Assessment    Outcome Measure Options AM-PAC 6 Clicks Daily Activity (OT)  -DILIP               User Key  (r) = Recorded By, (t) = Taken By, (c) = Cosigned By      Initials Name Provider Type    Rayna Zamora, OT Occupational Therapist                    Occupational Therapy Education       Title: PT OT SLP Therapies (In Progress)       Topic: Occupational Therapy (In Progress)       Point: ADL training (Done)       Description:   Instruct learner(s) on proper safety adaptation and remediation techniques during self care or transfers.   Instruct in proper use of assistive devices.                  Learning Progress Summary             Patient Acceptance, E, VU,NR by DILIP at 1/1/2024 0920    Comment: oriented to date, need to move and position RLE, ADL progression, sequencing bed mobililty    Acceptance, E, NR by AC at 12/27/2023 1135   Family Acceptance, E, VU,NR by DILIP at 1/1/2024 0920    Comment: oriented to date, need to move and position RLE, ADL progression, sequencing bed mobililty                         Point: Home exercise program (Not Started)       Description:   Instruct learner(s) on appropriate technique for monitoring, assisting and/or progressing therapeutic exercises/activities.                  Learner Progress:  Not documented in this visit.              Point: Precautions (Done)       Description:   Instruct learner(s) on prescribed precautions during self-care and functional transfers.                  Learning Progress Summary             Patient Acceptance, E, VU,NR by DILIP at 1/1/2024 0920    Comment: oriented  to date, need to move and position RLE, ADL progression, sequencing bed mobililty   Family Acceptance, E, VU,NR by DILIP at 1/1/2024 0920    Comment: oriented to date, need to move and position RLE, ADL progression, sequencing bed mobililty                         Point: Body mechanics (Done)       Description:   Instruct learner(s) on proper positioning and spine alignment during self-care, functional mobility activities and/or exercises.                  Learning Progress Summary             Patient Acceptance, E, VU,NR by DILIP at 1/1/2024 0920    Comment: oriented to date, need to move and position RLE, ADL progression, sequencing bed mobililty   Family Acceptance, E, VU,NR by DILIP at 1/1/2024 0920    Comment: oriented to date, need to move and position RLE, ADL progression, sequencing bed mobililty                                         User Key       Initials Effective Dates Name Provider Type Discipline     07/11/23 -  Rayna Wall, OT Occupational Therapist OT    AC 02/03/23 -  Sepideh Feldman, OT Occupational Therapist OT                  OT Recommendation and Plan  Therapy Frequency (OT): daily  Plan of Care Review  Plan of Care Reviewed With: patient, spouse  Progress: improving  Outcome Evaluation: Pt. with improvement with sit to supine, self feeding, grooming and sitting balance today, but decline with sit to stand ability.  Limited by RLE pain and weakness with movement. Pt. remains appropriate for skilled OT services to address deficit areas and promote return to prior higher level of independence.  Continue OT POC.     Time Calculation:         Time Calculation- OT       Row Name 01/01/24 0920             Time Calculation- OT    OT Start Time 0752  -DILIP      OT Received On 01/01/24  -DILIP      OT Goal Re-Cert Due Date 01/06/24  -DILIP         Timed Charges    06407 - OT Therapeutic Activity Minutes 10  -DILIP      87240 - OT Self Care/Mgmt Minutes 20  -DILIP         Total Minutes    Timed Charges Total Minutes 30   -DILIP       Total Minutes 30  -DILIP                User Key  (r) = Recorded By, (t) = Taken By, (c) = Cosigned By      Initials Name Provider Type    Rayna Zamora OT Occupational Therapist                  Therapy Charges for Today       Code Description Service Date Service Provider Modifiers Qty    51711703556  OT THERAPEUTIC ACT EA 15 MIN 1/1/2024 Rayna Wall OT GO 1    [id number]  OT SELF CARE/MGMT/TRAIN EA 15 MIN 1/1/2024 Rayna Wall OT GO 1                 Rayna Wall OT  1/1/2024    Electronically signed by Rayna Wall OT at 01/01/24 0921

## 2024-01-03 NOTE — PLAN OF CARE
Problem: Adult Inpatient Plan of Care  Goal: Plan of Care Review  Outcome: Ongoing, Progressing  Flowsheets (Taken 1/3/2024 0317)  Progress: improving  Goal: Patient-Specific Goal (Individualized)  Outcome: Ongoing, Progressing  Goal: Absence of Hospital-Acquired Illness or Injury  Outcome: Ongoing, Progressing  Intervention: Identify and Manage Fall Risk  Recent Flowsheet Documentation  Taken 1/3/2024 0200 by Robbie Dunn RN  Safety Promotion/Fall Prevention:   nonskid shoes/slippers when out of bed   safety round/check completed  Taken 1/3/2024 0000 by Robbie Dunn RN  Safety Promotion/Fall Prevention:   nonskid shoes/slippers when out of bed   safety round/check completed  Taken 1/2/2024 2200 by Robbie Dunn RN  Safety Promotion/Fall Prevention: safety round/check completed  Taken 1/2/2024 2000 by Robbie Dunn RN  Safety Promotion/Fall Prevention:   safety round/check completed   assistive device/personal items within reach  Intervention: Prevent Infection  Recent Flowsheet Documentation  Taken 1/3/2024 0200 by Robbie Dunn RN  Infection Prevention: environmental surveillance performed  Taken 1/2/2024 2000 by Robbie Dunn RN  Infection Prevention: environmental surveillance performed  Goal: Optimal Comfort and Wellbeing  Outcome: Ongoing, Progressing  Goal: Readiness for Transition of Care  Outcome: Ongoing, Progressing   Goal Outcome Evaluation:           Progress: improving

## 2024-01-03 NOTE — PLAN OF CARE
Goal Outcome Evaluation:  Plan of Care Reviewed With: patient, daughter        Progress: no change  Outcome Evaluation: Pt continues to present with decreased functional mobility and generalized weakness limiting all mobility. Pt completed x3 STS from EOB with mod A x2 with BUE support and RW. Unable to sequence steps to complete side steps to HOB effectively. Continue to progress per pt tolerance.      Anticipated Discharge Disposition (PT): skilled nursing facility

## 2024-01-03 NOTE — DISCHARGE PLACEMENT REQUEST
"Omar Mendoza (83 y.o. Female)   To Amber at [facility]  From [place]        Date of Birth   1940    Social Security Number       Address   [address] [address]    Home Phone   [phone]    MRN   [id number]       Rastafari   None    Marital Status                               Admission Date   12/26/23    Admission Type   Emergency    Admitting Provider   Ludy Todd II, DO    Attending Provider   Ludy Todd II, DO    Department, Room/Bed   [facility] 5H, S576/1       Discharge Date       Discharge Disposition       Discharge Destination                                 Attending Provider: Ludy Todd II, DO    Allergies: No Known Allergies    Isolation: None   Infection: None   Code Status: CPR    Ht: 170.2 cm (67.01\")   Wt: 101 kg (222 lb)    Admission Cmt: None   Principal Problem: UTI (urinary tract infection) [N39.0]                   Active Insurance as of 12/26/2023       Primary Coverage       Payor Plan Insurance Group Employer/Plan Group    ANTHEM MEDICARE REPLACEMENT ANTHEM MEDICARE ADVANTAGE [id number]       Payor Plan Address Payor Plan Phone Number Payor Plan Fax Number Effective Dates    [address] [phone]  1/1/2022 - None Entered    [facility] [address]         Subscriber Name Subscriber Birth Date Member ID       OMAR MENDOZA 1940 [id number]                     Emergency Contacts        (Rel.) Home Phone Work Phone Mobile Phone    JO (POA)DESTIN (Son) [phone] -- [phone]    DINO GATES (Daughter) [phone] -- [phone]    JODALLAS (Spouse) [phone] -- [phone]              Insurance Information                  ANTHEM MEDICARE REPLACEMENT/[employer] MEDICARE ADVANTAGE Phone: [phone]    Subscriber: Omar Mendoza Subscriber#: [id number]    Group#: [id number] Precert#: --             History & Physical        Julee Rogers DO at 12/27/23 0410        "       [facility] Medicine Services  HISTORY AND PHYSICAL    Patient Name: Omar Mendoza  : 1940  MRN: [id number]  Primary Care Physician: Varun Pa MD  Date of admission: 2023    Subjective  Subjective     Chief Complaint:  Generalized weakness, skin lesions    HPI:  Omar Mendoza is a 83 y.o. female with hx of CAD, HTN, HLD, T2DM, acute urinary retention (has loza in place), acute constipation (no BM since ), bullous pemphigoid (s/p dupixent 10/23, prednisone / doxycycline 2023), uterine prolapse, ongoing confusion - thought to have dementia but no formal dx and not on medications for dementia who presents to the ED for evaluation of generalized weakness and new bullous pemphigoid blister on right heel. Pt is unable to provide HPI d/t mental status changes / confusion. Daughter is present and assisting with HPI. She makes it known that she has been unhappy with the care provided at [facility] rehab and feels like her mother was neglected there.She brings her to [facility] ED for evaluation of the right heel lesion and altered mental status / generalized weakness.     Review of Systems   Unable to perform ROS: Mental status change        Personal History     Past Medical History:   Diagnosis Date    CAD (coronary artery disease)     Dementia     Diabetes mellitus     Hyperlipemia     Hypertension              History reviewed. No pertinent surgical history.    Family History:  family history is not on file.     Social History:  reports that she has never smoked. She has never used smokeless tobacco. She reports that she does not drink alcohol.  Social History     Social History Narrative    Not on file       Medications:  Insulin Syringe-Needle U-100, amLODIPine, amiodarone, aspirin, atorvastatin, insulin glargine, metoprolol tartrate, nystatin, spironolactone, and triamcinolone    No Known Allergies    Objective  Objective     Vital Signs:   Temp:  [98 °F (36.7 °C)] 98  °F (36.7 °C)  Heart Rate:  [48-53] 48  Resp:  [14-16] 14  BP: (101-120)/(58-71) 120/62    Physical Exam  Constitutional:       General: She is sleeping. She is not in acute distress.     Appearance: She is well-developed. She is ill-appearing. She is not toxic-appearing.   HENT:      Head: Normocephalic and atraumatic.      Nose: Nose normal.      Mouth/Throat:      Mouth: Mucous membranes are dry.      Pharynx: Oropharynx is clear.   Eyes:      Extraocular Movements: Extraocular movements intact.      Conjunctiva/sclera: Conjunctivae normal.      Pupils: Pupils are equal, round, and reactive to light.   Cardiovascular:      Rate and Rhythm: Regular rhythm. Bradycardia present.      Pulses: Normal pulses.      Heart sounds: Normal heart sounds. No murmur heard.  Pulmonary:      Effort: Pulmonary effort is normal.      Breath sounds: Normal breath sounds.   Abdominal:      General: Bowel sounds are normal. There is no distension.      Palpations: Abdomen is soft.      Tenderness: There is no abdominal tenderness. There is no guarding.   Musculoskeletal:         General: Normal range of motion.      Cervical back: Normal range of motion and neck supple.      Right lower le+ Pitting Edema present.      Left lower le+ Pitting Edema present.   Skin:     General: Skin is warm and dry.      Capillary Refill: Capillary refill takes less than 2 seconds.      Comments: See photos:   1. Right heel blister  2. Buttocks pressure ulcer   Neurological:      Mental Status: She is oriented to person, place, and time.      Cranial Nerves: No cranial nerve deficit.   Psychiatric:         Mood and Affect: Mood normal.         Behavior: Behavior normal. Behavior is cooperative.          Right heel      2. Buttocks    Verbal permission to take and place photos in chart obtained from pt daughter who is at bedside.    Result Review:  I have personally reviewed the results from the time of this admission to 2023 04:57 EST and  agree with these findings:  [x]  Laboratory list / accordion  []  Microbiology  [x]  Radiology  [x]  EKG/Telemetry   []  Cardiology/Vascular   []  Pathology  []  Old records  []  Other:  Most notable findings include:     LAB RESULTS:      Lab 12/27/23  0431 12/26/23 2336 12/26/23 2131   WBC  --   --  7.78   HEMOGLOBIN  --   --  10.3*   HEMATOCRIT  --   --  31.3*   PLATELETS  --   --  159   NEUTROS ABS  --   --  6.22   IMMATURE GRANS (ABS)  --   --  0.07*   LYMPHS ABS  --   --  0.92   MONOS ABS  --   --  0.34   EOS ABS  --   --  0.21   MCV  --   --  94.8   CRP  --  1.87*  --    PROCALCITONIN  --   --  0.10   LACTATE 1.4  --  2.1*         Lab 12/26/23 2131   SODIUM 143   POTASSIUM 4.3   CHLORIDE 109*   CO2 26.0   ANION GAP 8.0   BUN 21   CREATININE 0.80   EGFR 74.1   GLUCOSE 81   CALCIUM 7.9*         Lab 12/26/23 2131   TOTAL PROTEIN 4.9*   ALBUMIN 2.6*   GLOBULIN 2.3   ALT (SGPT) 31   AST (SGOT) 36*   BILIRUBIN 0.7   ALK PHOS 174*   LIPASE 119*         Lab 12/26/23 2336 12/26/23 2131   PROBNP  --  1,494.0   HSTROP T 67* 69*                 Brief Urine Lab Results  (Last result in the past 365 days)        Color   Clarity   Blood   Leuk Est   Nitrite   Protein   CREAT   Urine HCG        12/27/23 0040 Orange   Cloudy   Large (3+)   Moderate (2+)   Negative   100 mg/dL (2+)                 Microbiology Results (last 10 days)       ** No results found for the last 240 hours. **            CT Angio Abdominal Aorta Bilateral Iliofem Runoff    Result Date: 12/27/2023  CT ANGIO ABDOMINAL AORTA BILAT ILIOFEM RUNOFF Date of Exam: 12/26/2023 11:36 PM EST Indication: Rash, temperature difference in lower extremities, swelling, include phase as well. Comparison: None available. Technique: CTA of the abdomen, pelvis and both lower extremities was performed after the uneventful intravenous administration of 125 mL Isovue-370. Reconstructed coronal and sagittal images were also obtained. In addition, a 3-D volume rendered  image was created for interpretation. Automated exposure control and iterative reconstruction methods were used. Findings: Non--- vascular: There is a small right pleural effusion. There is bilateral basilar atelectasis. There is cholelithiasis. The arterial enhanced images of the liver, right adrenal gland, bilateral kidneys, pancreas and spleen are normal. There is adrenal adenoma on the left. There is a moderate stool burden with a moderate amount of stool in the rectosigmoid possibly from fecal impaction. There is anasarca. Vascular: There is tortuosity of the thoracic aorta. There is no aneurysmal dilatation or aortic stenosis. The bilateral common iliac, internal and external neck arteries are widely patent. There is a normal right-sided runoff with three-vessel to the ankle. On the left side, there is delayed runoff but the delayed images demonstrate a normal three-vessel runoff to the left ankle despite the slight delay which is of uncertain etiology. Venous stasis would be in the differential for the sluggish flow on the left. There is diffuse lower extremity edema.     Impression: Impression: No acute abnormality. No evidence of arterial occlusion or significant stenosis. There is delayed runoff on the left perhaps related to venous stasis changes. Please see above discussion for other findings. There is diffuse anasarca and lower extremity edema. Electronically Signed: Oni Quesada MD  12/27/2023 1:18 AM EST  Workstation ID: [id number]    XR Chest 1 View    Result Date: 12/26/2023  XR CHEST 1 VW Date of Exam: 12/26/2023 9:10 PM EST Indication: Chest Pain Triage Protocol Comparison: None available. Findings: Patient is rotated to the left. There is bibasilar airspace disease left greater than right with small left pleural effusion. Negative for pneumothorax. Heart size within normal limits for technique. Osseous structures grossly intact.     Impression: Impression: Bibasilar airspace disease left greater  than right which may relate to atelectasis and/or pneumonia with small left pleural effusion. Electronically Signed: Cyril Reina MD  12/26/2023 9:38 PM EST  Workstation ID: [id number]         Assessment & Plan  Assessment & Plan       UTI (urinary tract infection)    Bullous pemphigoid    Essential hypertension    Dyslipidemia    Edema of lower extremity    Acute constipation    Acute urinary retention    Uterine prolapse    Bradycardia    Pressure injury of buttock, unstageable    Type 2 diabetes mellitus with ketoacidosis, without long-term current use of insulin    Disorientation    Pleural effusion    UTI  - start rocephin  - blood cultures x 2  - lactic acid 2.1, repeat pending  - add urine culture   - hx of urinary retention w/ loza in place, d/c loza and bladder scan w/ acute urinary retention standing orders    BLE edema  Decreased pulses LLE  - CTA abd showing small right pleural effusion, bilateral basilar atelectasis  - CTA w/ runoff showing normal right sided runoff; left side delayed runoff possibly r/t venous stasis changes, no evidence of arterial occlusion or significant stenosis  - LLE cool to touch compared to RLE, decreased pulses  - neurovascular checks  - ECHO in am    L pleural effusion  - chest xray w/ bibasilar airspace dz L>R which may r/t atelectasis and or pneumonia w/ small L pleural effusion  - pt receiving rocephin (UTI) and doxycycline (Bullous pemphigoid) which would cover concern for pneumonia  - blood cx's pending  - add mrsa pcr nares  - add s.pneumo and legionella urinary ag  - pulmonary toilet / IS  - procal, esr, crp pending; initial lactic 2.1 w/ repeat 1.4  -- crp 1.87, procal 0.10    Bullous pemphigoid  - s/p dupixent injection 10/2023  - s/p skin bx by dermatology and doxycycline/prednisone  - prednisone stopped d/t elevated glucose, dtr reports some confusion between hospital d/c and admission to rehab (steroids weaned and stopped in hospital then restarted on admission to  rehab)  - has new right heel blister that came up 12/26  - per up to date recommendations, start doxycycline 100 mg BID, hold on steroids for now d/t concern for hyperglycemia     Pressure ulcer, buttocks  - daughter thought this was r/t the bullous pemphigoid, looks more like pressure ulcer  - [facility] consult for evaluation    Disorientation  - daughter concern for altered mental status, has been gradual progression and worse recently  - concern for dementia, has not had neuro evaluation d/t difficulty getting her to appointments w/ recent acute illnesses  - consider neuro evaluation inpatient vs outpatient if mental status not improved  - neuro checks  - check tsh, b12, folate, vitamin d  - PT/OT consult, pt has not been mobile since prior to admission to [facility] 12/14    Acute constipation / urinary retention  - daughter reporting patient has not had BM since d/c from [facility] on 12/19, she was given something to help her use the bathroom prior to d/c but has not gone since  - CTA abd in the ED tonight showing there is a moderate stool burden w/ moderate amt of stool in the rectosigmoid possibly fecal impaction  - soap suds enema x1  - start bowel regimen  - urinary retention likely r/t constipation / uterine prolapse    Uterine prolapse  - significant visible prolapse, daughter asking what can be done for this, possible pessary?  - consider gynecological consult inpatient vs outpatient    Bradycardia  - pt on both amiodarone and metoprolol, dtr reports amiodarone is new since hospitalization at [facility], says pt was started on IV drip for her heart rate, unclear if she had a-fib   - obtain records from [facility]  - currently HR 49, sinus viral  - hold amiodarone  - decrease metoprolol to 50 mg BID w/ hold parameters    T2DM  - has been on levemir since admission to [facility] initially for DKA dtr thinks d/t steroid tx  - hold levemir as pt sleeping, not taking in good PO intake  - fsbg w/  moderate dose ssi  - check A1c  - may need to add levemir or increase coverage based on glucose w/ if prednisone needed    Anemia  - anemia studies  - occult stool  - compared to labs in EMR, on 12/22/23 H/H 10.4/22.5, platelets 177    L adrenal adenoma  - incidental finding on CTA abdomen       DVT prophylaxis:  Heparin SC BID    CODE STATUS:    Code Status (Patient has no pulse and is not breathing): CPR (Attempt to Resuscitate)  Medical Interventions (Patient has pulse or is breathing): Full Support      Expected Discharge    Expected discharge date/ time has not been documented.      This note has been completed as part of a split-shared workflow.     Signature: Electronically signed by LITA Rivas, 12/27/23, 4:37 AM EST    Total time: 80 minutes        Attending   Admission Attestation       I have performed an independent face-to-face diagnostic evaluation including performing an independent physical examination.  The documented plan of care above was reviewed and developed with the advanced practice clinician (APC).  I have updated the HPI as appropriate.    Brief HPI    This is an 83-year-old female patient with a PMH significant for bullous pemphigoid, CAD, HTN, HLD, diabetes mellitus type 2, acute urinary retention with indwelling Schultz catheter, constipation, uterine prolapse who comes to the ED due to weakness.  Family at bedside provides HPI.  Patient was discharged from [facility] around 2 weeks ago she was treated for steroid induced DKA.  She has been at rehab.  Family was concerned that rehab was not getting therapies and was having progressive decline.  They got the patient from rehab today and brought her to the ED.    Attending Physical Exam:  Temp:  [98 °F (36.7 °C)] 98 °F (36.7 °C)  Heart Rate:  [46-53] 46  Resp:  [1-16] 1  BP: (101-120)/(58-79) 119/69    Constitutional: Asleep, arousable  Eyes: Eyes closed  HENT: NCAT, mucous membranes moist  Neck: Supple, no thyromegaly, no  lymphadenopathy, trachea midline  Respiratory: Clear to auscultation bilaterally, nonlabored respirations   Cardiovascular: RRR, no murmurs, rubs, or gallops, palpable pedal pulses bilaterally  Gastrointestinal: Positive bowel sounds, soft, nontender, nondistended  Musculoskeletal: 3+ pitting bilateral ankle edema, no clubbing or cyanosis to extremities  Psychiatric: Sleepy  Neurologic: Not oriented to person place or time, speech minimal  Skin: Bullous lesions to right foot pictured above, decubitus skin breakdown pictured above      Assessment and Plan:    See assessment and plan documented by APC above and updated/edited by me as appropriate.    Julee Rogers DO  23                    Electronically signed by Julee Rogers DO at 23 0557          Physician Progress Notes (most recent note)        Ludy Todd II, DO at 24 0919              [facility] Medicine Services  PROGRESS NOTE    Patient Name: Omar Mendoza  : 1940  MRN: [id number]    Date of Admission: 2023  Primary Care Physician: Varun Pa MD    Subjective   Subjective     CC: f/u weakness    HPI: Up in bed. No family in room. Tearful, says she is tired of being here.      Objective   Objective     Vital Signs:   Temp:  [96.9 °F (36.1 °C)-97.3 °F (36.3 °C)] 97.3 °F (36.3 °C)  Heart Rate:  [65-71] 66  Resp:  [18] 18  BP: (118-121)/(67-74) 121/67     Physical Exam:  Constitutional: No acute distress, awake, alert, lying in bed  HENT: NCAT, mucous membranes moist  Respiratory: Clear to auscultation bilaterally, respiratory effort normal   Cardiovascular: RRR, no murmurs, rubs, or gallops  Gastrointestinal: Positive bowel sounds, soft, nontender, nondistended  Musculoskeletal: No bilateral ankle edema  Psychiatric: Appropriate affect, cooperative  Neurologic: Oriented x 3, strength symmetric in all extremities, Cranial Nerves grossly intact to confrontation, speech clear  Skin: No rashes      Results Reviewed:  LAB RESULTS:      Lab 01/02/24  0608 01/01/24  0723 12/31/23  0551 12/30/23  0655 12/29/23  0417 12/28/23  1239 12/27/23  1404 12/27/23  1403   WBC 4.89 3.87 4.51 5.98 6.17 6.31  6.14  --  7.25   HEMOGLOBIN 8.2* 8.3* 8.0* 8.2* 8.6* 9.1*  9.0*  --  10.5*   HEMATOCRIT 24.7* 25.3* 24.7* 24.6* 25.5* 27.6*  27.1*  --  31.4*   PLATELETS 141 139* 124* 110* 120* 134*  128*  --  137*   NEUTROS ABS  --   --   --   --   --  5.21  5.03  --  6.29   IMMATURE GRANS (ABS)  --   --   --   --   --  0.03  0.10*  --  0.04   LYMPHS ABS  --   --   --   --   --  0.50*  0.53*  --  0.57*   MONOS ABS  --   --   --   --   --  0.18  0.17  --  0.15   EOS ABS  --   --   --   --   --  0.38  0.30  --  0.19   MCV 94.3 96.2 95.0 92.8 93.4 94.2  92.8  --  91.8   PROCALCITONIN  --   --   --   --  0.11  --   --   --    LACTATE  --   --   --   --   --   --  1.4  --          Lab 01/02/24  1523 01/02/24  0608 01/01/24  0723 12/31/23  0608 12/30/23  0655 12/29/23  0417 12/28/23  1239 12/27/23  1404 12/27/23  1403   SODIUM  --  145 144 145 146* 145   < >  --  140   POTASSIUM 3.9 3.4* 3.7 3.7 4.0 3.8   < >  --  4.1   CHLORIDE  --  110* 110* 110* 111* 113*   < >  --  106   CO2  --  28.0 27.0 24.0 25.0 26.0   < >  --  26.0   ANION GAP  --  7.0 7.0 11.0 10.0 6.0   < >  --  8.0   BUN  --  13 14 16 14 15   < >  --  18   CREATININE  --  0.48* 0.51* 0.55* 0.58 0.53*   < >  --  0.64   EGFR  --  95.3 93.9 92.2 91.0 93.0   < >  --  88.9   GLUCOSE  --  148* 172* 180* 134* 72   < >  --  151*   CALCIUM  --  7.5* 7.6* 7.6* 7.3* 7.6*   < >  --  7.6*   IONIZED CALCIUM  --   --   --   --   --   --   --  1.16  --    MAGNESIUM  --   --   --   --   --  1.6  --   --  1.7   PHOSPHORUS  --   --   --   --   --   --   --   --  3.5   HEMOGLOBIN A1C  --   --   --   --   --   --   --   --  12.10*    < > = values in this interval not displayed.         Lab 12/27/23  1403   TOTAL PROTEIN 4.3*   ALBUMIN 2.5*   GLOBULIN 1.8   ALT (SGPT) 28   AST (SGOT) 28    BILIRUBIN 0.6   ALK PHOS 151*         Lab 12/28/23  1239   PROBNP 1,039.0                 Brief Urine Lab Results  (Last result in the past 365 days)        Color   Clarity   Blood   Leuk Est   Nitrite   Protein   CREAT   Urine HCG        12/27/23 0040 Orange   Cloudy   Large (3+)   Moderate (2+)   Negative   100 mg/dL (2+)                   Microbiology Results Abnormal       Procedure Component Value - Date/Time    Blood Culture - Blood, Blood, PICC Line [[id number]]  (Normal) Collected: 12/27/23 1400    Lab Status: Final result Specimen: Blood, PICC Line Updated: 01/01/24 1531     Blood Culture No growth at 5 days    Blood Culture - Blood, Arm, Right [[id number]]  (Normal) Collected: 12/27/23 0431    Lab Status: Final result Specimen: Blood from Arm, Right Updated: 01/01/24 0515     Blood Culture No growth at 5 days            No radiology results from the last 24 hrs    Results for orders placed during the hospital encounter of 12/26/23    Adult Transthoracic Echo Complete W/ Cont if Necessary Per Protocol    Interpretation Summary    Left ventricular systolic function is normal. Calculated left ventricular EF = 57% Left ventricular ejection fraction appears to be 56 - 60%.    Left ventricular wall thickness is consistent with mild to moderate concentric hypertrophy.    Left ventricular diastolic function was normal.    Estimated right ventricular systolic pressure from tricuspid regurgitation is mildly elevated (35-45 mmHg).    There is a trivial pericardial effusion.    Mild aortic valve regurgitation.      Current medications:  Scheduled Meds:aspirin, 81 mg, Oral, Daily  atorvastatin, 80 mg, Oral, Daily  castor oil-balsam peru, 1 application , Topical, Q12H  heparin (porcine), 5,000 Units, Subcutaneous, Q12H  insulin lispro, 2-9 Units, Subcutaneous, 4x Daily With Meals & Nightly  magnesium hydroxide, 10 mL, Oral, Daily  metoprolol tartrate, 25 mg, Oral, BID  sodium chloride, 10 mL, Intravenous,  "Q12H      Continuous Infusions:   PRN Meds:.  acetaminophen **OR** acetaminophen **OR** acetaminophen    senna-docusate sodium **AND** polyethylene glycol **AND** bisacodyl **AND** bisacodyl    Calcium Replacement - Follow Nurse / BPA Driven Protocol    dextrose    dextrose    glucagon (human recombinant)    hydrOXYzine    Magnesium Standard Dose Replacement - Follow Nurse / BPA Driven Protocol    nitroglycerin    ondansetron    Phosphorus Replacement - Follow Nurse / BPA Driven Protocol    Potassium Replacement - Follow Nurse / BPA Driven Protocol    sodium chloride    sodium chloride    Assessment & Plan   Assessment & Plan     Active Hospital Problems    Diagnosis  POA    **UTI (urinary tract infection) [N39.0]  Yes    Acute constipation [K59.00]  Unknown    Acute urinary retention [R33.8]  Unknown    Uterine prolapse [N81.4]  Unknown    Bradycardia [R00.1]  Unknown    Pressure injury of buttock, unstageable [L89.300]  Unknown    Type 2 diabetes mellitus with ketoacidosis, without long-term current use of insulin [E11.10]  Unknown    Disorientation [R41.0]  Unknown    Pleural effusion [J90]  Unknown    Severe malnutrition [E43]  Yes    Edema of lower extremity [R60.0]  Yes    Bullous pemphigoid [L12.0]  Yes    Dyslipidemia [E78.5]  Yes    Essential hypertension [I10]  Yes      Resolved Hospital Problems   No resolved problems to display.        Brief Hospital Course to date:  Omar Mendoza is a 83 y.o. female with history of type 2 diabetes, hypertension, history of uterine prolapse, urinary retention with Schultz catheter in place, history of bullous pemphigoid  (s/p Dupixent 10/2023, prednisone/doxycycline 11/2023) CAD, hyperlipidemia, presumed dementia who presented to the ED with generalized weakness and near bullous pemphigoid blister on the heel.      Dysphagia  -Nursing with concern for aspiration event 12/30 however patient herself says she \"just got choked.\" Does not wish for modified diet.   -SLP " following, continue modified diet.     Acute metabolic encephalopathy, superimposed on suspected dementia  Generalized weakness  -PT/OT recommended SNF.  -CM following for placement      Bilateral pleural effusion  L>R  Bibasilar airspace disease versus pneumonia  -Patient currently on room air with good O2 sats  -Follow-up MRSA PCR, cultures, urinary strep and Legionella antigens  -Procalcitonin is low, lactate has improved  -Concern for aspiration on 12/30  -CXR revealed increased right basilar airspace disease with stable left basilar atelectasis  -Encourage IS, pulmonary toilet  -Continue antibiotics, currently on Rocephin and doxycycline (Day 5)  -Currently on RA       Anemia  -Iron 53, iron sat 28  -Hgb up to 8.3 this AM   -fecal occult negative  -suspect anemia of chronic disease  -CBC in AM      Poor venous access  -PICC in place     Sinus bradycardia  - pt on both amiodarone and metoprolol, dtr reports amiodarone is new since hospitalization at [place].  - hold amiodarone, decreased metoprolol to 50 mg BID w/ hold parameters  -HR currently improved      BLE edema  Decreased pulses LLE  - CTA abd showing small right pleural effusion, bilateral basilar atelectasis  - CTA w/ runoff showing normal right sided runoff; left side delayed runoff possibly r/t venous stasis changes, no evidence of arterial occlusion or significant stenosis  - LLE cool to touch compared to RLE, decreased pulses  - neurovascular checks  -Echo revealed EF 57% with normal diastolic function      Uncontrolled Type 2 diabetes  -Hgb A1C 12.10 on 12/27  -Continue SSI     Constipation  -Continue aggressive bowel regimen, s/p soapsuds enema x 1  -s/p Milk of mag x1, Miralax daily, suppository x1   -Resolved, +BM 12/30  -chronic loza catheter in place     Uterine prolapse  -Gyn Dr. Zhou has seen, plan for outpatient follow up to discuss treatment options.   -may be contributing to chronic urine retention     Bullous pemphigoid  -Has  developed new blister on right heel  -Continue doxycycline  -Wound care following      Pressure ulcer  -Wound care following      L adrenal adenoma  - incidental finding on CTA abdomen      Severe malnutrition    Expected Discharge Location and Transportation:   Expected Discharge   Expected Discharge Date: 2024; Expected Discharge Time:      DVT prophylaxis:  Medical DVT prophylaxis orders are present.     AM-PAC 6 Clicks Score (PT): 8 (24 0300)    CODE STATUS:   Code Status and Medical Interventions:   Ordered at: 23 0410     Code Status (Patient has no pulse and is not breathing):    CPR (Attempt to Resuscitate)     Medical Interventions (Patient has pulse or is breathing):    Full Support       Ludy Todd II, DO  24       Electronically signed by Ludy Todd II, DO at 24 1213          Physical Therapy Notes (most recent note)        Donovan Love, PT at 24 1522  Version 1 of 1         Acute Care - Wound/Debridement Treatment Note  [facility]     Patient Name: Omar Mendoza  : 1940  MRN: [id number]  Today's Date: 2024                Admit Date: 2023    Visit Dx:    ICD-10-CM ICD-9-CM   1. Generalized weakness  R53.1 780.79   2. Acute UTI  N39.0 599.0   3. Bullous pemphigoid  L12.0 694.5   4. Venous insufficiency  I87.2 459.81   5. Dysphagia, unspecified type  R13.10 787.20     R medial/posterior heel              Patient Active Problem List   Diagnosis    UTI (urinary tract infection)    Bullous pemphigoid    Essential hypertension    Dyslipidemia    Edema of lower extremity    Cholestatic hepatitis    Acute constipation    Acute urinary retention    Uterine prolapse    Bradycardia    Pressure injury of buttock, unstageable    Type 2 diabetes mellitus with ketoacidosis, without long-term current use of insulin    Disorientation    Pleural effusion    Severe malnutrition        Past Medical History:   Diagnosis Date    CAD (coronary artery  disease)     Dementia     Diabetes mellitus     Hyperlipemia     Hypertension         History reviewed. No pertinent surgical history.        Wound 12/26/23 2218 Right posterior heel Blisters (Active)   Wound Image    01/01/24 1522   Dressing Appearance intact;moist drainage;dried drainage 01/01/24 1522   Closure None 01/01/24 0845   Base moist;pink;red 01/01/24 1522   Periwound intact;dry 01/01/24 1522   Periwound Temperature warm 01/01/24 1522   Periwound Skin Turgor soft 01/01/24 1522   Edges open;irregular 01/01/24 1522   Wound Length (cm) 7.2 cm 01/01/24 1522   Wound Width (cm) 7.1 cm 01/01/24 1522   Wound Depth (cm) 0.1 cm 01/01/24 1522   Wound Surface Area (cm^2) 51.12 cm^2 01/01/24 1522   Wound Volume (cm^3) 5.112 cm^3 01/01/24 1522   Drainage Characteristics/Odor serous;serosanguineous 01/01/24 1522   Drainage Amount small 01/01/24 1522   Care, Wound cleansed with;soap and water;debrided 01/01/24 1522   Dressing Care dressing applied;silver impregnated;low-adherent;foam;multi-layer wrap 01/01/24 1522   Periwound Care cleansed with pH balanced cleanser;dry periwound area maintained 01/01/24 1522       Wound 12/27/23 0411 Bilateral coccyx Pressure Injury (Active)   Dressing Appearance dressing loose 01/01/24 1400   Closure Adhesive bandage 01/01/24 1400   Base moist;pink;red 01/01/24 1400   Periwound dry 01/01/24 1400   Care, Wound cleansed with;soap and water 01/01/24 1400   Dressing Care antimicrobial agent applied;dressing applied;silicone;foam 01/01/24 1400       Wound 12/27/23 1035 Left greater trochanter Blisters (Active)   Dressing Appearance moist drainage 12/31/23 2000   Closure Adhesive bandage 01/01/24 0845   Base yellow 01/01/24 0845   Care, Wound antimicrobial agent applied 12/31/23 2000   Dressing Care dressing changed;antimicrobial agent applied;silicone;foam 12/31/23 2000      Lymphedema       Row Name 01/01/24 1600             Lymphedema Edema Assessment    Ptting Edema Category By severity   -      Pitting Edema Mild  -         Skin Changes/Observations    Location/Assessment Lower Extremity  -      Lower Extremity Conditions bilateral:;intact;clean;dry;shiny;fragile  -      Lower Extremity Color/Pigment bilateral:;blanchable;erythema  -         Lymphedema Pulses/Capillary Refill    Capillary Refill lower extremity capillary refill  -      Lower Extremity Capillary Refill right:;left:;less than 3 seconds  -         Compression/Skin Care    Compression/Skin Care skin care;wrapping location  -      Skin Care washed/dried;lotion applied  -      Wrapping Location lower extremity  -      Wrapping Location LE bilateral:;foot to knee  -      Wrapping Comments RLE mepilex Ag, cast padding to secure, BLE size 4 compressogrips applied doubled distally for gradient compression.  -                User Key  (r) = Recorded By, (t) = Taken By, (c) = Cosigned By      Initials Name Provider Type     Donovan Love, PT Physical Therapist                    WOUND DEBRIDEMENT  Total area of Debridement: 50cm2  Debridement Site 1  Location- Site 1: R medial heel  Selective Debridement- Site 1: Wound Surface >20cmsq  Instruments- Site 1: tweezers, scissors  Excised Tissue Description- Site 1: maximum, other (comment) (nonviable ruptured blister debris)  Bleeding- Site 1: scant               PT Assessment (last 12 hours)       PT Evaluation and Treatment       Row Name 01/01/24 1522          Physical Therapy Time and Intention    Subjective Information complains of;weakness;fatigue;pain;swelling  -     Document Type therapy note (daily note);wound care  -     Mode of Treatment physical therapy;individual therapy  -       Row Name 01/01/24 1522          General Information    Patient Observations alert;cooperative;agree to therapy  -       Row Name 01/01/24 1522          Pain    Pain Intervention(s) Repositioned;Elevated  -     Additional Documentation Pain Scale: FACES Pre/Post-Treatment  (Group)  -       Row Name 01/01/24 1522          Pain Scale: FACES Pre/Post-Treatment    Pain: FACES Scale, Pretreatment 2-->hurts little bit  -LH     Posttreatment Pain Rating 2-->hurts little bit  -LH     Pain Location - Side/Orientation Right  -     Pain Location lower  -     Pain Location - extremity  -       Row Name 01/01/24 1522          Cognition    Affect/Mental Status (Cognition) WFL  -     Orientation Status (Cognition) oriented to;person;place;disoriented to;time  -       Row Name 01/01/24 1522          Wound 12/26/23 2218 Right posterior heel Blisters    Wound - Properties Group Placement Date: 12/26/23  -DN Placement Time: 2218  -DN Present on Original Admission: Y  -DN Side: Right  -DN Orientation: posterior  -DN Location: heel  -DN Primary Wound Type: Blisters  -DN    Wound Image Images linked: 2  -LH     Dressing Appearance intact;moist drainage;dried drainage  -     Base moist;pink;red  -     Periwound intact;dry  -     Periwound Temperature warm  -     Periwound Skin Turgor soft  -     Edges open;irregular  -     Wound Length (cm) 7.2 cm  -     Wound Width (cm) 7.1 cm  -LH     Wound Depth (cm) 0.1 cm  -     Wound Surface Area (cm^2) 51.12 cm^2  -LH     Wound Volume (cm^3) 5.112 cm^3  -LH     Drainage Characteristics/Odor serous;serosanguineous  -     Drainage Amount small  -LH     Care, Wound cleansed with;soap and water;debrided  -     Dressing Care dressing applied;silver impregnated;low-adherent;foam;multi-layer wrap  Mepilex Ag, cast padding, MLW  -LH     Periwound Care cleansed with pH balanced cleanser;dry periwound area maintained  -LH     Retired Wound - Properties Group Placement Date: 12/26/23  -DN Placement Time: 2218 -DN Present on Original Admission: Y  -DN Side: Right  -DN Orientation: posterior  -DN Location: heel  -DN Primary Wound Type: Blisters  -DN    Retired Wound - Properties Group Date first assessed: 12/26/23  -DN Time first assessed: 2218   -DN Present on Original Admission: Y  -DN Side: Right  -DN Location: heel  -DN Primary Wound Type: Blisters  -DN      Row Name             Wound 12/27/23 0411 Bilateral coccyx Pressure Injury    Wound - Properties Group Placement Date: 12/27/23  -DN Placement Time: 0411  -DN Present on Original Admission: Y  -DN Side: Bilateral  -DN Location: coccyx  -DN Primary Wound Type: Pressure inj  -DN    Retired Wound - Properties Group Placement Date: 12/27/23  -DN Placement Time: 0411  -DN Present on Original Admission: Y  -DN Side: Bilateral  -DN Location: coccyx  -DN Primary Wound Type: Pressure inj  -DN    Retired Wound - Properties Group Date first assessed: 12/27/23  -DN Time first assessed: 0411  -DN Present on Original Admission: Y  -DN Side: Bilateral  -DN Location: coccyx  -DN Primary Wound Type: Pressure inj  -DN      Row Name             Wound 12/27/23 1035 Left greater trochanter Blisters    Wound - Properties Group Placement Date: 12/27/23  -MC Placement Time: 1035  -MC Side: Left  -MC Location: greater trochanter  -MC Primary Wound Type: Blisters  -MC    Retired Wound - Properties Group Placement Date: 12/27/23  -MC Placement Time: 1035  -MC Side: Left  -MC Location: greater trochanter  -MC Primary Wound Type: Blisters  -MC    Retired Wound - Properties Group Date first assessed: 12/27/23  - Time first assessed: 1035  -MC Side: Left  -MC Location: greater trochanter  -MC Primary Wound Type: Blisters  -MC      Row Name 01/01/24 1522          Coping    Observed Emotional State cooperative;calm  -     Verbalized Emotional State acceptance  -     Trust Relationship/Rapport care explained;questions answered  -     Family/Support Persons spouse  -     Involvement in Care at bedside;attentive to patient  -       Row Name 01/01/24 1522          Plan of Care Review    Plan of Care Reviewed With patient;spouse  -     Progress improving  -     Outcome Evaluation Pt demonstrating excellent improvements in  BLE edema this date. Pt also now with rupturing of R medial/posterior heel blister. Upon debridement of nonviable ruptured blister debris, pt's wound with good, clean pink/red wound base. PT applied Mepilex ag to help manage drainage and reduce bacterial load. PT reapplied MLW to further promote venous return and improve skin integrity. PT plans to f/u with dressings changes and MLW changes in 2-3 days.  -       Row Name 01/01/24 1522          Positioning and Restraints    Pre-Treatment Position in bed  -     Post Treatment Position bed  -     In Bed supine;call light within reach;encouraged to call for assist;with family/caregiver  -               User Key  (r) = Recorded By, (t) = Taken By, (c) = Cosigned By      Initials Name Provider Type    Eva Singleton, RN Registered Nurse     Donovan Love, PT Physical Therapist    Peter Espinoza, RN Registered Nurse                  Physical Therapy Education       Title: PT OT SLP Therapies (In Progress)       Topic: Physical Therapy (In Progress)       Point: Mobility training (In Progress)       Learning Progress Summary             Patient Acceptance, E, NR by  at 12/29/2023 1131    Acceptance, E, NR by  at 12/27/2023 1045                         Point: Home exercise program (Not Started)       Learner Progress:  Not documented in this visit.              Point: Body mechanics (In Progress)       Learning Progress Summary             Patient Acceptance, E, NR by MIA at 12/29/2023 1131    Acceptance, E, NR by  at 12/27/2023 1045                         Point: Precautions (In Progress)       Learning Progress Summary             Patient Acceptance, E, NR by MIA at 12/29/2023 1131    Acceptance, E, NR by  at 12/27/2023 1045                                         User Key       Initials Effective Dates Name Provider Type Discipline     11/16/23 -  Petty Mckoy, PT Physical Therapist PT                    Recommendation and Plan  Anticipated  Discharge Disposition (PT): skilled nursing facility  Planned Therapy Interventions (PT): wound care, patient/family education  Therapy Frequency (PT): daily  Plan of Care Reviewed With: patient, spouse   Progress: improving       Progress: improving  Outcome Evaluation: Pt demonstrating excellent improvements in BLE edema this date. Pt also now with rupturing of R medial/posterior heel blister. Upon debridement of nonviable ruptured blister debris, pt's wound with good, clean pink/red wound base. PT applied Mepilex ag to help manage drainage and reduce bacterial load. PT reapplied MLW to further promote venous return and improve skin integrity. PT plans to f/u with dressings changes and MLW changes in 2-3 days.  Plan of Care Reviewed With: patient, spouse            Time Calculation   PT Charges       Row Name 01/01/24 1635 01/01/24 1420          Time Calculation    Start Time 1522  -LH 1325  -LM     PT Received On -- 01/01/24  -LM     PT Goal Re-Cert Due Date 01/06/24  -LH 01/06/24  -LM        Timed Charges    87782 - PT Therapeutic Exercise Minutes -- 10  -LM     84919 - PT Therapeutic Activity Minutes -- 20  -LM        Untimed Charges    Wound Care 26691 Selective debridement;61617 Add't debridement ea 20 cm  -LH --     69963-Vdmooxagtn comp below knee 15  -LH --     89916-Vfqtdoxit debridement 10  -LH --     97598-Add't debridement ea 20 cm 10  -LH --        Total Minutes    Timed Charges Total Minutes -- 30  -LM     Untimed Charges Total Minutes 35  -LH --      Total Minutes 35  -LH 30  -LM               User Key  (r) = Recorded By, (t) = Taken By, (c) = Cosigned By      Initials Name Provider Type    LM Sandra Gage, PT Physical Therapist    LH Donovan Love, PT Physical Therapist                      Therapy Charges for Today       Code Description Service Date Service Provider Modifiers Qty    22995607551 HC TOMMIE DEBRIDE OPEN WOUND UP TO 20CM 1/1/2024 Donovan Love, PT GP 1    73877054036 HC PT TOMMIE  DEBRIDE OPEN WOUND EA ADD 20CM 2024 Donovan Love, PT GP 2    33271761180 HC PT MULTI LAYER COMP SYS BELOW KNEE 2024 Donovan Love, PT GP 1              PT G-Codes  Outcome Measure Options: AM-PAC 6 Clicks Basic Mobility (PT)  AM-PAC 6 Clicks Score (PT): 10  AM-PAC 6 Clicks Score (OT): 12       Donovan Love PT  2024      Electronically signed by Donovan Love, PT at 24 1636          Occupational Therapy Notes (most recent note)        Rayna Wall, OT at 24 7532          Patient Name: Omar Mendoza  : 1940    MRN: [id number]                              Today's Date: 2024       Admit Date: 2023    Visit Dx:     ICD-10-CM ICD-9-CM   1. Generalized weakness  R53.1 780.79   2. Acute UTI  N39.0 599.0   3. Bullous pemphigoid  L12.0 694.5   4. Venous insufficiency  I87.2 459.81   5. Dysphagia, unspecified type  R13.10 787.20     Patient Active Problem List   Diagnosis    UTI (urinary tract infection)    Bullous pemphigoid    Essential hypertension    Dyslipidemia    Edema of lower extremity    Cholestatic hepatitis    Acute constipation    Acute urinary retention    Uterine prolapse    Bradycardia    Pressure injury of buttock, unstageable    Type 2 diabetes mellitus with ketoacidosis, without long-term current use of insulin    Disorientation    Pleural effusion    Severe malnutrition     Past Medical History:   Diagnosis Date    CAD (coronary artery disease)     Dementia     Diabetes mellitus     Hyperlipemia     Hypertension      History reviewed. No pertinent surgical history.   General Information       Row Name 24 0910          OT Time and Intention    Document Type therapy note (daily note)  -DILIP     Mode of Treatment individual therapy;occupational therapy  -DILIP       Row Name 24 0910          General Information    Patient Profile Reviewed yes  -DILIP     Existing Precautions/Restrictions fall  limited skin integrity, RLE pain and limited control   -     Barriers to Rehab medically complex;cognitive status;previous functional deficit  -       Row Name 01/01/24 0910          Living Environment    People in Home spouse  -       Row Name 01/01/24 0910          Cognition    Orientation Status (Cognition) oriented to;place;person;disoriented to;time  -       Row Name 01/01/24 0910          Safety Issues, Functional Mobility    Safety Issues Affecting Function (Mobility) insight into deficits/self-awareness;safety precaution awareness;safety precautions follow-through/compliance;sequencing abilities  -     Impairments Affecting Function (Mobility) balance;cognition;endurance/activity tolerance;postural/trunk control;strength;pain;range of motion (ROM)  -     Cognitive Impairments, Mobility Safety/Performance insight into deficits/self-awareness;safety precaution awareness;safety precaution follow-through;sequencing abilities  -               User Key  (r) = Recorded By, (t) = Taken By, (c) = Cosigned By      Initials Name Provider Type    DILIP Rayna Wall, OT Occupational Therapist                     Mobility/ADL's       Row Name 01/01/24 0911          Bed Mobility    Supine-Sit [place] (Bed Mobility) maximum assist (25% patient effort);2 person assist;verbal cues;nonverbal cues (demo/gesture)  -     Sit-Supine [place] (Bed Mobility) moderate assist (50% patient effort);2 person assist;verbal cues;nonverbal cues (demo/gesture)  -     Bed Mobility, Safety Issues decreased use of arms for pushing/pulling;decreased use of legs for bridging/pushing;impaired trunk control for bed mobility  -     Assistive Device (Bed Mobility) bed rails;draw sheet;head of bed elevated  -     Comment, (Bed Mobility) pt. needed verbal and tactile cues to sequence, pt. appeared with much more motivation to assit with sit to supine  -       Row Name 01/01/24 0911          Transfers    Comment, (Transfers) attempted sit to stand x 2, but unable to clear  buttock from bed with maximal asisst of 2 persons, alerted nursing and recommended pt. be in a lift room  -DILIP       Row Name 01/01/24 0911          Activities of Daily Living    BADL Assessment/Intervention feeding;grooming  -DILIP       Row Name 01/01/24 0911          Lower Body Dressing Assessment/Training    [place] Level (Lower Body Dressing) don;socks;dependent (less than 25% patient effort)  -DILIP     Position (Lower Body Dressing) supine  -DILIP       Row Name 01/01/24 0911          Self-Feeding Assessment/Training    [place] Level (Feeding) prepare tray/open items;dependent (less than 25% patient effort);scoop food and bring to mouth;liquids to mouth;set up;supervision  -DILIP     Position (Self-Feeding) sitting up in bed  -DILIP       Row Name 01/01/24 0911          Grooming Assessment/Training    [place] Level (Grooming) hair care, combing/brushing;oral care regimen;minimum assist (75% patient effort);wash face, hands;set up  -DILIP     Position (Grooming) edge of bed sitting;sitting up in bed  -DILIP               User Key  (r) = Recorded By, (t) = Taken By, (c) = Cosigned By      Initials Name Provider Type    Rayna Zamora, OT Occupational Therapist                   Obj/Interventions       Row Name 01/01/24 0914          Balance    Static Sitting Balance standby assist  -DILIP     Dynamic Sitting Balance standby assist  hair brushing  -DILIP     Position, Sitting Balance unsupported;sitting edge of bed  -DILIP     Static Standing Balance --  unable to complete  -DILIP     Balance Interventions occupation based/functional task  -DILIP     Comment, Balance pt. was able to sit EOB grossly 6-8 minutes SBA  -DILIP               User Key  (r) = Recorded By, (t) = Taken By, (c) = Cosigned By      Initials Name Provider Type    Rayna Zamora, OT Occupational Therapist                   Goals/Plan       Row Name 01/01/24 0918          Bed Mobility Goal 1 (OT)    Progress/Outcomes (Bed Mobility Goal 1, OT) continuing  progress toward goal  -       Row Name 01/01/24 0918          Transfer Goal 1 (OT)    Progress/Outcome (Transfer Goal 1, OT) progress slower than expected  -       Row Name 01/01/24 0918          Grooming Goal 1 (OT)    Progress/Outcome (Grooming Goal 1, OT) goal partially met;goal ongoing  -DILIP       Row Name 01/01/24 0918          Self-Feeding Goal 1 (OT)    Progress/Outcomes (Self-Feeding Goal 1, OT) goal met  -               User Key  (r) = Recorded By, (t) = Taken By, (c) = Cosigned By      Initials Name Provider Type    Rayna Zamora, OT Occupational Therapist                   Clinical Impression       Row Name 01/01/24 0915          Pain Assessment    Pretreatment Pain Rating 6/10  -     Posttreatment Pain Rating 0/10 - no pain  -     Pain Location - Side/Orientation Right  -     Pain Location lower  -     Pain Location - extremity  -     Pre/Posttreatment Pain Comment pain on arrival and relieved with waffle boot removal, but with any mvmt. of RLE pt. with pain until at rest again, pt. requested pain meds, nurse alerted, pt. tending to keep RLE in ER with inability to self correct  -     Pain Intervention(s) Medication (See MAR);Repositioned  -       Row Name 01/01/24 0915          Plan of Care Review    Plan of Care Reviewed With patient;spouse  -     Progress improving  -     Outcome Evaluation Pt. with improvement with sit to supine, self feeding, grooming and sitting balance today, but decline with sit to stand ability.  Limited by RLE pain and weakness with movement. Pt. remains appropriate for skilled OT services to address deficit areas and promote return to prior higher level of independence.  Continue OT POC.  -       Row Name 01/01/24 0915          Therapy Assessment/Plan (OT)    Therapy Frequency (OT) daily  -       Row Name 01/01/24 0915          Therapy Plan Review/Discharge Plan (OT)    Anticipated Discharge Disposition (OT) skilled nursing facility  -        Row Name 01/01/24 0915          Vital Signs    Pre Systolic BP Rehab 128  -DILIP     Pre Treatment Diastolic BP 60  -DILIP     Pretreatment Heart Rate (beats/min) 78  -DILIP     Posttreatment Heart Rate (beats/min) 83  -DILIP     Pre SpO2 (%) 93  -DILIP     O2 Delivery Pre Treatment room air  -DILIP     O2 Delivery Intra Treatment room air  -DILIP     Post SpO2 (%) 92  -DILIP     O2 Delivery Post Treatment room air  -DILIP     Pre Patient Position Supine  -DILIP     Intra Patient Position Sitting  -DILIP     Post Patient Position Supine  -DILIP       Row Name 01/01/24 0915          Positioning and Restraints    Pre-Treatment Position in bed  -DILIP     Post Treatment Position bed  -DILIP     In Bed notified nsg;sitting;call light within reach;encouraged to call for assist;exit alarm on;side rails up x2;legs elevated;heels elevated  -DILIP               User Key  (r) = Recorded By, (t) = Taken By, (c) = Cosigned By      Initials Name Provider Type    Rayna Zamora OT Occupational Therapist                   Outcome Measures       Row Name 01/01/24 0919          How much help from another is currently needed...    Putting on and taking off regular lower body clothing? 1  -DILIP     Bathing (including washing, rinsing, and drying) 2  -DILIP     Toileting (which includes using toilet bed pan or urinal) 1  -DILIP     Putting on and taking off regular upper body clothing 2  -DILIP     Taking care of personal grooming (such as brushing teeth) 3  -DILIP     Eating meals 3  -DILIP     AM-PAC 6 Clicks Score (OT) 12  -DILIP       Row Name 01/01/24 0919          Functional Assessment    Outcome Measure Options AM-PAC 6 Clicks Daily Activity (OT)  -DILIP               User Key  (r) = Recorded By, (t) = Taken By, (c) = Cosigned By      Initials Name Provider Type    Rayna Zamora OT Occupational Therapist                    Occupational Therapy Education       Title: PT OT SLP Therapies (In Progress)       Topic: Occupational Therapy (In Progress)       Point: ADL training (Done)        Description:   Instruct learner(s) on proper safety adaptation and remediation techniques during self care or transfers.   Instruct in proper use of assistive devices.                  Learning Progress Summary             Patient Acceptance, E, VU,NR by DILIP at 1/1/2024 0920    Comment: oriented to date, need to move and position RLE, ADL progression, sequencing bed mobililty    Acceptance, E, NR by AC at 12/27/2023 1135   Family Acceptance, E, VU,NR by DILIP at 1/1/2024 0920    Comment: oriented to date, need to move and position RLE, ADL progression, sequencing bed mobililty                         Point: Home exercise program (Not Started)       Description:   Instruct learner(s) on appropriate technique for monitoring, assisting and/or progressing therapeutic exercises/activities.                  Learner Progress:  Not documented in this visit.              Point: Precautions (Done)       Description:   Instruct learner(s) on prescribed precautions during self-care and functional transfers.                  Learning Progress Summary             Patient Acceptance, E, VU,NR by DILIP at 1/1/2024 0920    Comment: oriented to date, need to move and position RLE, ADL progression, sequencing bed mobililty   Family Acceptance, E, VU,NR by DILIP at 1/1/2024 0920    Comment: oriented to date, need to move and position RLE, ADL progression, sequencing bed mobililty                         Point: Body mechanics (Done)       Description:   Instruct learner(s) on proper positioning and spine alignment during self-care, functional mobility activities and/or exercises.                  Learning Progress Summary             Patient Acceptance, E, VU,NR by DILIP at 1/1/2024 0920    Comment: oriented to date, need to move and position RLE, ADL progression, sequencing bed mobililty   Family Acceptance, E, VU,NR by DILIP at 1/1/2024 0920    Comment: oriented to date, need to move and position RLE, ADL progression, sequencing bed mobililty                                          User Key       Initials Effective Dates Name Provider Type Discipline    DILIP 07/11/23 -  Rayna Wall OT Occupational Therapist OT    AC 02/03/23 -  Sepideh Feldman OT Occupational Therapist OT                  OT Recommendation and Plan  Therapy Frequency (OT): daily  Plan of Care Review  Plan of Care Reviewed With: patient, spouse  Progress: improving  Outcome Evaluation: Pt. with improvement with sit to supine, self feeding, grooming and sitting balance today, but decline with sit to stand ability.  Limited by RLE pain and weakness with movement. Pt. remains appropriate for skilled OT services to address deficit areas and promote return to prior higher level of independence.  Continue OT POC.     Time Calculation:         Time Calculation- OT       Row Name 01/01/24 0920             Time Calculation- OT    OT Start Time 0752  -DILIP      OT Received On 01/01/24  -DILIP      OT Goal Re-Cert Due Date 01/06/24  -DILIP         Timed Charges    39907 - OT Therapeutic Activity Minutes 10  -DILIP      47999 - OT Self Care/Mgmt Minutes 20  -DILIP         Total Minutes    Timed Charges Total Minutes 30  -DILIP       Total Minutes 30  -DILIP                User Key  (r) = Recorded By, (t) = Taken By, (c) = Cosigned By      Initials Name Provider Type    DILIP Rayna Wall, OT Occupational Therapist                  Therapy Charges for Today       Code Description Service Date Service Provider Modifiers Qty    59574023024 HC OT THERAPEUTIC ACT EA 15 MIN 1/1/2024 Rayna Wall OT GO 1    19027239533 HC OT SELF CARE/MGMT/TRAIN EA 15 MIN 1/1/2024 Rayna Wall OT GO 1                 Rayna Wall OT  1/1/2024    Electronically signed by Rayna Wall OT at 01/01/24 0921

## 2024-01-03 NOTE — NURSING NOTE
Patient pulled PICC line out. Occlusive dressing was applied along with pressure and patient was laid flat for 30 minutes. Provider notified.

## 2024-01-03 NOTE — THERAPY TREATMENT NOTE
Patient Name: Omar Mendoza  : 1940    MRN: [id number]                              Today's Date: 1/3/2024       Admit Date: 2023    Visit Dx:     ICD-10-CM ICD-9-CM   1. Generalized weakness  R53.1 780.79   2. Acute UTI  N39.0 599.0   3. Bullous pemphigoid  L12.0 694.5   4. Venous insufficiency  I87.2 459.81   5. Dysphagia, unspecified type  R13.10 787.20     Patient Active Problem List   Diagnosis    UTI (urinary tract infection)    Bullous pemphigoid    Essential hypertension    Dyslipidemia    Edema of lower extremity    Cholestatic hepatitis    Acute constipation    Acute urinary retention    Uterine prolapse    Bradycardia    Pressure injury of buttock, unstageable    Type 2 diabetes mellitus with ketoacidosis, without long-term current use of insulin    Disorientation    Pleural effusion    Severe malnutrition     Past Medical History:   Diagnosis Date    CAD (coronary artery disease)     Dementia     Diabetes mellitus     Hyperlipemia     Hypertension      History reviewed. No pertinent surgical history.   General Information       Row Name 24 1339          OT Time and Intention    Document Type therapy note (daily note)  -     Mode of Treatment occupational therapy  -       Row Name 24 1330          General Information    Patient Profile Reviewed yes  -AC     Existing Precautions/Restrictions fall;other (see comments)  decreased skin integrity, RLE pain  -     Barriers to Rehab medically complex;previous functional deficit;cognitive status  -       Row Name 24 0492          Cognition    Orientation Status (Cognition) oriented to;person  -       Row Name 24 1338          Safety Issues, Functional Mobility    Safety Issues Affecting Function (Mobility) ability to follow commands;awareness of need for assistance;friction/shear risk;insight into deficits/self-awareness;judgment;problem-solving;safety precaution awareness;safety precautions  follow-through/compliance;sequencing abilities  -     Impairments Affecting Function (Mobility) balance;cognition;endurance/activity tolerance;postural/trunk control;strength  -     Cognitive Impairments, Mobility Safety/Performance awareness, need for assistance;insight into deficits/self-awareness;judgment;problem-solving/reasoning;safety precaution awareness;safety precaution follow-through;sequencing abilities  -               User Key  (r) = Recorded By, (t) = Taken By, (c) = Cosigned By      Initials Name Provider Type    AC Sepideh Feldman, OT Occupational Therapist                   Lymphedema       Row Name 01/01/24 1600             Lymphedema Edema Assessment    Ptting Edema Category By severity  -      Pitting Edema Mild  -      Recorded by [] Donovan Love, PT              Skin Changes/Observations    Location/Assessment Lower Extremity  -      Lower Extremity Conditions bilateral:;intact;clean;dry;shiny;fragile  -      Lower Extremity Color/Pigment bilateral:;blanchable;erythema  -LH      Recorded by [] Donovan Love, PT              Lymphedema Pulses/Capillary Refill    Capillary Refill lower extremity capillary refill  -      Lower Extremity Capillary Refill right:;left:;less than 3 seconds  -LH      Recorded by [] Donovan Love, PT              Compression/Skin Care    Compression/Skin Care skin care;wrapping location  -      Skin Care washed/dried;lotion applied  -      Wrapping Location lower extremity  -      Wrapping Location LE bilateral:;foot to knee  -      Wrapping Comments RLE mepilex Ag, cast padding to secure, BLE size 4 compressogrips applied doubled distally for gradient compression.  -      Recorded by [] Donovan Love, PT                User Key  (r) = Recorded By, (t) = Taken By, (c) = Cosigned By      Initials Name Effective Dates     Donovan Love, PT 09/21/21 -                    Mobility/ADL's       Row Name 01/03/24 2712           Bed Mobility    Bed Mobility supine-sit;sit-supine;scooting/bridging  -     Scooting/Bridging [place] (Bed Mobility) dependent (less than 25% patient effort);2 person assist;verbal cues  -AC     Supine-Sit [place] (Bed Mobility) verbal cues;maximum assist (25% patient effort);2 person assist  -AC     Sit-Supine [place] (Bed Mobility) verbal cues;dependent (less than 25% patient effort);2 person assist  -AC     Bed Mobility, Safety Issues cognitive deficits limit understanding;decreased use of arms for pushing/pulling;decreased use of legs for bridging/pushing;impaired trunk control for bed mobility  -     Assistive Device (Bed Mobility) bed rails;draw sheet;head of bed elevated  -     Comment, (Bed Mobility) Verbal/tactile cues to sequence  -       Row Name 01/03/24 1339          Transfers    Transfers sit-stand transfer  -       Row Name 01/03/24 1339          Sit-Stand Transfer    Sit-Stand [place] (Transfers) moderate assist (50% patient effort);2 person assist;verbal cues  -     Assistive Device (Sit-Stand Transfers) other (see comments)  first stand with BUE support, 2nd/3rd stand with RW  -     Comment, (Sit-Stand Transfer) feet blocked, VCs for upright posture as pt fwd flexed  -       Row Name 01/03/24 1339          Functional Mobility    Functional Mobility- Ind. Level verbal cues required;nonverbal cues required (demo/gesture);moderate assist (50% patient effort);2 person assist required  -     Functional Mobility- Device walker, front-wheeled  -     Functional Mobility-Distance (Feet) 3  -AC     Functional Mobility- Safety Issues sequencing ability decreased  -     Functional Mobility- Comment pt with difficulty command following to side step L foot to L and would step L foot to R  -       Row Name 01/03/24 1339          Activities of Daily Living    BADL Assessment/Intervention lower body dressing;feeding  -       Row Name 01/03/24 1339          Lower Body  Dressing Assessment/Training    [place] Level (Lower Body Dressing) don;socks;dependent (less than 25% patient effort)  -     Position (Lower Body Dressing) supine  -       Row Name 01/03/24 1339          Self-Feeding Assessment/Training    [place] Level (Feeding) prepare tray/open items;dependent (less than 25% patient effort);scoop food and bring to mouth;liquids to mouth;set up;supervision  -     Position (Self-Feeding) sitting up in bed  -               User Key  (r) = Recorded By, (t) = Taken By, (c) = Cosigned By      Initials Name Provider Type     Sepideh Feldman, OT Occupational Therapist                   Obj/Interventions       Row Name 01/03/24 1427          Shoulder (Therapeutic Exercise)    Shoulder (Therapeutic Exercise) AROM (active range of motion)  -     Shoulder AROM (Therapeutic Exercise) bilateral;flexion;extension;10 repetitions  -       Row Name 01/03/24 1427          Elbow/Forearm (Therapeutic Exercise)    Elbow/Forearm (Therapeutic Exercise) AROM (active range of motion)  -     Elbow/Forearm AROM (Therapeutic Exercise) bilateral;flexion;extension;10 repetitions  -       Row Name 01/03/24 1427          Motor Skills    Therapeutic Exercise elbow/forearm;shoulder  -       Row Name 01/03/24 1427          Balance    Balance Assessment sitting static balance;sitting dynamic balance;standing static balance;standing dynamic balance  -     Static Sitting Balance standby assist  -     Dynamic Sitting Balance standby assist  -     Position, Sitting Balance unsupported;sitting edge of bed  -     Static Standing Balance verbal cues;moderate assist;2-person assist  -     Dynamic Standing Balance verbal cues;moderate assist;2-person assist  -     Position/Device Used, Standing Balance supported  -               User Key  (r) = Recorded By, (t) = Taken By, (c) = Cosigned By      Initials Name Provider Type    Sepideh Polanco, OT Occupational Therapist                    Goals/Plan    No documentation.                  Clinical Impression       Row Name 01/03/24 6844          Pain Assessment    Additional Documentation Pain Scale: FACES Pre/Post-Treatment (Group)  -       Row Name 01/03/24 1532          Pain Scale: FACES Pre/Post-Treatment    Pain: FACES Scale, Pretreatment 0-->no hurt  -AC     Posttreatment Pain Rating 0-->no hurt  -AC       Row Name 01/03/24 8954          Plan of Care Review    Plan of Care Reviewed With patient;daughter  -     Outcome Evaluation Pt setup/supervision with self feeding , dep LBD, progressed to being able to complete 3 STS with mod A x 2 given cues for upright posture given UE support and then used RW. Pt progressing, but limited by confusion, weakness, decr balance and activity tolerance. Recommend SNF upon d/c.  -       Row Name 01/03/24 0437          Vital Signs    Pre Systolic BP Rehab 121  -AC     Pre Treatment Diastolic BP 67  -AC     Pretreatment Heart Rate (beats/min) 64  -AC     Posttreatment Heart Rate (beats/min) 72  -AC     Pre Patient Position Supine  -AC     Post Patient Position Supine  -AC       Row Name 01/03/24 9414          Positioning and Restraints    Pre-Treatment Position in bed  -AC     Post Treatment Position bed  -AC     In Bed notified nsg;supine;call light within reach;encouraged to call for assist;exit alarm on;with family/caregiver  wedge under L side  -AC               User Key  (r) = Recorded By, (t) = Taken By, (c) = Cosigned By      Initials Name Provider Type    AC Sepideh Feldman, OT Occupational Therapist                   Outcome Measures       Row Name 01/03/24 9933          How much help from another is currently needed...    Putting on and taking off regular lower body clothing? 1  -AC     Bathing (including washing, rinsing, and drying) 2  -AC     Toileting (which includes using toilet bed pan or urinal) 1  -AC     Putting on and taking off regular upper body clothing 2  -AC     Taking care of  personal grooming (such as brushing teeth) 3  -AC     Eating meals 3  -AC     AM-PAC 6 Clicks Score (OT) 12  -AC       Row Name 01/03/24 0300          How much help from another person do you currently need...    Turning from your back to your side while in flat bed without using bedrails? 2  -AS     Moving from lying on back to sitting on the side of a flat bed without bedrails? 2  -AS     Moving to and from a bed to a chair (including a wheelchair)? 1  -AS     Standing up from a chair using your arms (e.g., wheelchair, bedside chair)? 1  -AS     Climbing 3-5 steps with a railing? 1  -AS     To walk in hospital room? 1  -AS     AM-PAC 6 Clicks Score (PT) 8  -AS     Highest Level of Mobility Goal 3 --> Sit at edge of bed  -AS       Row Name 01/03/24 1434          Functional Assessment    Outcome Measure Options AM-PAC 6 Clicks Daily Activity (OT)  -               User Key  (r) = Recorded By, (t) = Taken By, (c) = Cosigned By      Initials Name Provider Type    AC Sepideh Feldman, OT Occupational Therapist    AS Robbie Dunn, RN Registered Nurse                    Occupational Therapy Education       Title: PT OT SLP Therapies (In Progress)       Topic: Occupational Therapy (In Progress)       Point: ADL training (In Progress)       Description:   Instruct learner(s) on proper safety adaptation and remediation techniques during self care or transfers.   Instruct in proper use of assistive devices.                  Learning Progress Summary             Patient Acceptance, E, NR by  at 1/3/2024 1435    Acceptance, E, VU,NR by DILIP at 1/1/2024 0920    Comment: oriented to date, need to move and position RLE, ADL progression, sequencing bed mobililty    Acceptance, E, NR by  at 12/27/2023 1135   Family Acceptance, E, VU,NR by DILIP at 1/1/2024 0920    Comment: oriented to date, need to move and position RLE, ADL progression, sequencing bed mobililty                         Point: Home exercise program (Not Started)        Description:   Instruct learner(s) on appropriate technique for monitoring, assisting and/or progressing therapeutic exercises/activities.                  Learner Progress:  Not documented in this visit.              Point: Precautions (Done)       Description:   Instruct learner(s) on prescribed precautions during self-care and functional transfers.                  Learning Progress Summary             Patient Acceptance, E, VU,NR by DILIP at 1/1/2024 0920    Comment: oriented to date, need to move and position RLE, ADL progression, sequencing bed mobililty   Family Acceptance, E, VU,NR by DILIP at 1/1/2024 0920    Comment: oriented to date, need to move and position RLE, ADL progression, sequencing bed mobililty                         Point: Body mechanics (Done)       Description:   Instruct learner(s) on proper positioning and spine alignment during self-care, functional mobility activities and/or exercises.                  Learning Progress Summary             Patient Acceptance, E, VU,NR by DILIP at 1/1/2024 0920    Comment: oriented to date, need to move and position RLE, ADL progression, sequencing bed mobililty   Family Acceptance, E, VU,NR by DILIP at 1/1/2024 0920    Comment: oriented to date, need to move and position RLE, ADL progression, sequencing bed mobililty                                         User Key       Initials Effective Dates Name Provider Type Discipline     07/11/23 -  Rayna Wall, OT Occupational Therapist OT     02/03/23 -  Sepideh Feldman OT Occupational Therapist OT                  OT Recommendation and Plan  Planned Therapy Interventions (OT): activity tolerance training, BADL retraining, functional balance retraining, occupation/activity based interventions, patient/caregiver education/training, strengthening exercise, transfer/mobility retraining  Therapy Frequency (OT): daily  Plan of Care Review  Plan of Care Reviewed With: patient, daughter  Outcome Evaluation: Pt  setup/supervision with self feeding , dep LBD, progressed to being able to complete 3 STS with mod A x 2 given cues for upright posture given UE support and then used RW. Pt progressing, but limited by confusion, weakness, decr balance and activity tolerance. Recommend SNF upon d/c.     Time Calculation:   Evaluation Complexity (OT)  Review Occupational Profile/Medical/Therapy History Complexity: expanded/moderate complexity  Assessment, Occupational Performance/Identification of Deficit Complexity: 3-5 performance deficits  Clinical Decision Making Complexity (OT): detailed assessment/moderate complexity  Overall Complexity of Evaluation (OT): moderate complexity     Time Calculation- OT       Row Name 01/03/24 1339             Time Calculation- OT    OT Start Time 1339  -AC      OT Received On 01/03/24  -AC      OT Goal Re-Cert Due Date 01/06/24  -AC         Timed Charges    50880 - OT Therapeutic Activity Minutes 10  -AC      81438 - OT Self Care/Mgmt Minutes 5  -AC         Total Minutes    Timed Charges Total Minutes 15  -AC       Total Minutes 15  -AC                User Key  (r) = Recorded By, (t) = Taken By, (c) = Cosigned By      Initials Name Provider Type    AC Sepideh Feldman OT Occupational Therapist                  Therapy Charges for Today       Code Description Service Date Service Provider Modifiers Qty    13001613205 HC OT THERAPEUTIC ACT EA 15 MIN 1/3/2024 Sepideh Feldman OT GO 1                 Sepideh Feldman OT  1/3/2024

## 2024-01-03 NOTE — DISCHARGE PLACEMENT REQUEST
"Omar Mendoza (83 y.o. Female)   To [facility] at [facility]  From [facility]       Date of Birth   1940    Social Security Number       Address   [address] [place] [address]    Home Phone   [phone]    MRN   [id number]       [employer]   None    Marital Status                               Admission Date   12/26/23    Admission Type   Emergency    Admitting Provider   Ludy Todd II, DO    Attending Provider   Ludy Todd II, DO    Department, Room/Bed   [facility] 5H, S576/1       Discharge Date       Discharge Disposition       Discharge Destination                                 Attending Provider: Ludy Todd II, DO    Allergies: No Known Allergies    Isolation: None   Infection: None   Code Status: CPR    Ht: 170.2 cm (67.01\")   Wt: 101 kg (222 lb)    Admission Cmt: None   Principal Problem: UTI (urinary tract infection) [N39.0]                   Active Insurance as of 12/26/2023       Primary Coverage       Payor Plan Insurance Group Employer/Plan Group    [employer] MEDICARE REPLACEMENT ANTHEM MEDICARE ADVANTAGE [id number]       Payor Plan Address Payor Plan Phone Number Payor Plan Fax Number Effective Dates    [address] [phone]  1/1/2022 - None Entered    [facility] [id number]         Subscriber Name Subscriber Birth Date Member ID       OMAR MENDOZA 1940 [id number]                     Emergency Contacts        (Rel.) Home Phone Work Phone Mobile Phone    TRENTJAIRON (POA)DESTIN (Son) [phone] -- [phone]    DINO GATES (Daughter) [phone] -- [phone]    DALLAS MENDOZA (Spouse) [phone] -- [phone]              Insurance Information                  [employer] MEDICARE REPLACEMENT/ANTHEM MEDICARE ADVANTAGE Phone: [phone]    Subscriber: Omar Mendoza Subscriber#: [id number]    Group#: [id number] Precert#: --             History & Physical        Julee Rogers DO at 12/27/23 0410        "       [facility] Medicine Services  HISTORY AND PHYSICAL    Patient Name: Omar Mendoza  : 1940  MRN: [id number]  Primary Care Physician: Varun Pa MD  Date of admission: 2023    Subjective  Subjective     Chief Complaint:  Generalized weakness, skin lesions    HPI:  Omar Mendoza is a 83 y.o. female with hx of CAD, HTN, HLD, T2DM, acute urinary retention (has loza in place), acute constipation (no BM since ), bullous pemphigoid (s/p dupixent 10/23, prednisone / doxycycline 2023), uterine prolapse, ongoing confusion - thought to have dementia but no formal dx and not on medications for dementia who presents to the ED for evaluation of generalized weakness and new bullous pemphigoid blister on right heel. Pt is unable to provide HPI d/t mental status changes / confusion. Daughter is present and assisting with HPI. She makes it known that she has been unhappy with the care provided at [facility] rehab and feels like her mother was neglected there.She brings her to [facility] ED for evaluation of the right heel lesion and altered mental status / generalized weakness.     Review of Systems   Unable to perform ROS: Mental status change        Personal History     Past Medical History:   Diagnosis Date    CAD (coronary artery disease)     Dementia     Diabetes mellitus     Hyperlipemia     Hypertension              History reviewed. No pertinent surgical history.    Family History:  family history is not on file.     Social History:  reports that she has never smoked. She has never used smokeless tobacco. She reports that she does not drink alcohol.  Social History     Social History Narrative    Not on file       Medications:  Insulin Syringe-Needle U-100, amLODIPine, amiodarone, aspirin, atorvastatin, insulin glargine, metoprolol tartrate, nystatin, spironolactone, and triamcinolone    No Known Allergies    Objective  Objective     Vital Signs:   Temp:  [98 °F (36.7 °C)] 98  °F (36.7 °C)  Heart Rate:  [48-53] 48  Resp:  [14-16] 14  BP: (101-120)/(58-71) 120/62    Physical Exam  Constitutional:       General: She is sleeping. She is not in acute distress.     Appearance: She is well-developed. She is ill-appearing. She is not toxic-appearing.   HENT:      Head: Normocephalic and atraumatic.      Nose: Nose normal.      Mouth/Throat:      Mouth: Mucous membranes are dry.      Pharynx: Oropharynx is clear.   Eyes:      Extraocular Movements: Extraocular movements intact.      Conjunctiva/sclera: Conjunctivae normal.      Pupils: Pupils are equal, round, and reactive to light.   Cardiovascular:      Rate and Rhythm: Regular rhythm. Bradycardia present.      Pulses: Normal pulses.      Heart sounds: Normal heart sounds. No murmur heard.  Pulmonary:      Effort: Pulmonary effort is normal.      Breath sounds: Normal breath sounds.   Abdominal:      General: Bowel sounds are normal. There is no distension.      Palpations: Abdomen is soft.      Tenderness: There is no abdominal tenderness. There is no guarding.   Musculoskeletal:         General: Normal range of motion.      Cervical back: Normal range of motion and neck supple.      Right lower le+ Pitting Edema present.      Left lower le+ Pitting Edema present.   Skin:     General: Skin is warm and dry.      Capillary Refill: Capillary refill takes less than 2 seconds.      Comments: See photos:   1. Right heel blister  2. Buttocks pressure ulcer   Neurological:      Mental Status: She is oriented to person, place, and time.      Cranial Nerves: No cranial nerve deficit.   Psychiatric:         Mood and Affect: Mood normal.         Behavior: Behavior normal. Behavior is cooperative.          Right heel      2. Buttocks    Verbal permission to take and place photos in chart obtained from pt daughter who is at bedside.    Result Review:  I have personally reviewed the results from the time of this admission to 2023 04:57 EST and  agree with these findings:  [x]  Laboratory list / accordion  []  Microbiology  [x]  Radiology  [x]  EKG/Telemetry   []  Cardiology/Vascular   []  Pathology  []  Old records  []  Other:  Most notable findings include:     LAB RESULTS:      Lab 12/27/23  0431 12/26/23 2336 12/26/23 2131   WBC  --   --  7.78   HEMOGLOBIN  --   --  10.3*   HEMATOCRIT  --   --  31.3*   PLATELETS  --   --  159   NEUTROS ABS  --   --  6.22   IMMATURE GRANS (ABS)  --   --  0.07*   LYMPHS ABS  --   --  0.92   MONOS ABS  --   --  0.34   EOS ABS  --   --  0.21   MCV  --   --  94.8   CRP  --  1.87*  --    PROCALCITONIN  --   --  0.10   LACTATE 1.4  --  2.1*         Lab 12/26/23 2131   SODIUM 143   POTASSIUM 4.3   CHLORIDE 109*   CO2 26.0   ANION GAP 8.0   BUN 21   CREATININE 0.80   EGFR 74.1   GLUCOSE 81   CALCIUM 7.9*         Lab 12/26/23 2131   TOTAL PROTEIN 4.9*   ALBUMIN 2.6*   GLOBULIN 2.3   ALT (SGPT) 31   AST (SGOT) 36*   BILIRUBIN 0.7   ALK PHOS 174*   LIPASE 119*         Lab 12/26/23 2336 12/26/23 2131   PROBNP  --  1,494.0   HSTROP T 67* 69*                 Brief Urine Lab Results  (Last result in the past 365 days)        Color   Clarity   Blood   Leuk Est   Nitrite   Protein   CREAT   Urine HCG        12/27/23 0040 Orange   Cloudy   Large (3+)   Moderate (2+)   Negative   100 mg/dL (2+)                 Microbiology Results (last 10 days)       ** No results found for the last 240 hours. **            CT Angio Abdominal Aorta Bilateral Iliofem Runoff    Result Date: 12/27/2023  CT ANGIO ABDOMINAL AORTA BILAT ILIOFEM RUNOFF Date of Exam: 12/26/2023 11:36 PM EST Indication: Rash, temperature difference in lower extremities, swelling, include phase as well. Comparison: None available. Technique: CTA of the abdomen, pelvis and both lower extremities was performed after the uneventful intravenous administration of 125 mL Isovue-370. Reconstructed coronal and sagittal images were also obtained. In addition, a 3-D volume rendered  image was created for interpretation. Automated exposure control and iterative reconstruction methods were used. Findings: Non--- vascular: There is a small right pleural effusion. There is bilateral basilar atelectasis. There is cholelithiasis. The arterial enhanced images of the liver, right adrenal gland, bilateral kidneys, pancreas and spleen are normal. There is adrenal adenoma on the left. There is a moderate stool burden with a moderate amount of stool in the rectosigmoid possibly from fecal impaction. There is anasarca. Vascular: There is tortuosity of the thoracic aorta. There is no aneurysmal dilatation or aortic stenosis. The bilateral common iliac, internal and external neck arteries are widely patent. There is a normal right-sided runoff with three-vessel to the ankle. On the left side, there is delayed runoff but the delayed images demonstrate a normal three-vessel runoff to the left ankle despite the slight delay which is of uncertain etiology. Venous stasis would be in the differential for the sluggish flow on the left. There is diffuse lower extremity edema.     Impression: Impression: No acute abnormality. No evidence of arterial occlusion or significant stenosis. There is delayed runoff on the left perhaps related to venous stasis changes. Please see above discussion for other findings. There is diffuse anasarca and lower extremity edema. Electronically Signed: Oni Quesada MD  12/27/2023 1:18 AM EST  Workstation ID: [id number]    XR Chest 1 View    Result Date: 12/26/2023  XR CHEST 1 VW Date of Exam: 12/26/2023 9:10 PM EST Indication: Chest Pain Triage Protocol Comparison: None available. Findings: Patient is rotated to the left. There is bibasilar airspace disease left greater than right with small left pleural effusion. Negative for pneumothorax. Heart size within normal limits for technique. Osseous structures grossly intact.     Impression: Impression: Bibasilar airspace disease left greater  than right which may relate to atelectasis and/or pneumonia with small left pleural effusion. Electronically Signed: Cyril Reina MD  12/26/2023 9:38 PM EST  Workstation ID: [id number]         Assessment & Plan  Assessment & Plan       UTI (urinary tract infection)    Bullous pemphigoid    Essential hypertension    Dyslipidemia    Edema of lower extremity    Acute constipation    Acute urinary retention    Uterine prolapse    Bradycardia    Pressure injury of buttock, unstageable    Type 2 diabetes mellitus with ketoacidosis, without long-term current use of insulin    Disorientation    Pleural effusion    UTI  - start rocephin  - blood cultures x 2  - lactic acid 2.1, repeat pending  - add urine culture   - hx of urinary retention w/ loza in place, d/c loza and bladder scan w/ acute urinary retention standing orders    BLE edema  Decreased pulses LLE  - CTA abd showing small right pleural effusion, bilateral basilar atelectasis  - CTA w/ runoff showing normal right sided runoff; left side delayed runoff possibly r/t venous stasis changes, no evidence of arterial occlusion or significant stenosis  - LLE cool to touch compared to RLE, decreased pulses  - neurovascular checks  - ECHO in am    L pleural effusion  - chest xray w/ bibasilar airspace dz L>R which may r/t atelectasis and or pneumonia w/ small L pleural effusion  - pt receiving rocephin (UTI) and doxycycline (Bullous pemphigoid) which would cover concern for pneumonia  - blood cx's pending  - add mrsa pcr nares  - add s.pneumo and legionella urinary ag  - pulmonary toilet / IS  - procal, esr, crp pending; initial lactic 2.1 w/ repeat 1.4  -- crp 1.87, procal 0.10    Bullous pemphigoid  - s/p dupixent injection 10/2023  - s/p skin bx by dermatology and doxycycline/prednisone  - prednisone stopped d/t elevated glucose, dtr reports some confusion between hospital d/c and admission to rehab (steroids weaned and stopped in hospital then restarted on admission to  rehab)  - has new right heel blister that came up 12/26  - per up to date recommendations, start doxycycline 100 mg BID, hold on steroids for now d/t concern for hyperglycemia     Pressure ulcer, buttocks  - daughter thought this was r/t the bullous pemphigoid, looks more like pressure ulcer  - [facility] consult for evaluation    Disorientation  - daughter concern for altered mental status, has been gradual progression and worse recently  - concern for dementia, has not had neuro evaluation d/t difficulty getting her to appointments w/ recent acute illnesses  - consider neuro evaluation inpatient vs outpatient if mental status not improved  - neuro checks  - check tsh, b12, folate, vitamin d  - PT/OT consult, pt has not been mobile since prior to admission to [facility] 12/14    Acute constipation / urinary retention  - daughter reporting patient has not had BM since d/c from [facility] on 12/19, she was given something to help her use the bathroom prior to d/c but has not gone since  - CTA abd in the ED tonight showing there is a moderate stool burden w/ moderate amt of stool in the rectosigmoid possibly fecal impaction  - soap suds enema x1  - start bowel regimen  - urinary retention likely r/t constipation / uterine prolapse    Uterine prolapse  - significant visible prolapse, daughter asking what can be done for this, possible pessary?  - consider gynecological consult inpatient vs outpatient    Bradycardia  - pt on both amiodarone and metoprolol, dtr reports amiodarone is new since hospitalization at [facility], says pt was started on IV drip for her heart rate, unclear if she had a-fib   - obtain records from [facility]  - currently HR 49, sinus viral  - hold amiodarone  - decrease metoprolol to 50 mg BID w/ hold parameters    T2DM  - has been on levemir since admission to [facility] initially for DKA dtr thinks d/t steroid tx  - hold levemir as pt sleeping, not taking in good PO intake  - fsbg w/  moderate dose ssi  - check A1c  - may need to add levemir or increase coverage based on glucose w/ if prednisone needed    Anemia  - anemia studies  - occult stool  - compared to labs in EMR, on 12/22/23 H/H 10.4/22.5, platelets 177    L adrenal adenoma  - incidental finding on CTA abdomen       DVT prophylaxis:  Heparin SC BID    CODE STATUS:    Code Status (Patient has no pulse and is not breathing): CPR (Attempt to Resuscitate)  Medical Interventions (Patient has pulse or is breathing): Full Support      Expected Discharge    Expected discharge date/ time has not been documented.      This note has been completed as part of a split-shared workflow.     Signature: Electronically signed by LITA Rivas, 12/27/23, 4:37 AM EST    Total time: 80 minutes        Attending   Admission Attestation       I have performed an independent face-to-face diagnostic evaluation including performing an independent physical examination.  The documented plan of care above was reviewed and developed with the advanced practice clinician (APC).  I have updated the HPI as appropriate.    Brief HPI    This is an 83-year-old female patient with a PMH significant for bullous pemphigoid, CAD, HTN, HLD, diabetes mellitus type 2, acute urinary retention with indwelling Schultz catheter, constipation, uterine prolapse who comes to the ED due to weakness.  Family at bedside provides HPI.  Patient was discharged from [facility] around 2 weeks ago she was treated for steroid induced DKA.  She has been at rehab.  Family was concerned that rehab was not getting therapies and was having progressive decline.  They got the patient from rehab today and brought her to the ED.    Attending Physical Exam:  Temp:  [98 °F (36.7 °C)] 98 °F (36.7 °C)  Heart Rate:  [46-53] 46  Resp:  [1-16] 1  BP: (101-120)/(58-79) 119/69    Constitutional: Asleep, arousable  Eyes: Eyes closed  HENT: NCAT, mucous membranes moist  Neck: Supple, no thyromegaly, no  lymphadenopathy, trachea midline  Respiratory: Clear to auscultation bilaterally, nonlabored respirations   Cardiovascular: RRR, no murmurs, rubs, or gallops, palpable pedal pulses bilaterally  Gastrointestinal: Positive bowel sounds, soft, nontender, nondistended  Musculoskeletal: 3+ pitting bilateral ankle edema, no clubbing or cyanosis to extremities  Psychiatric: Sleepy  Neurologic: Not oriented to person place or time, speech minimal  Skin: Bullous lesions to right foot pictured above, decubitus skin breakdown pictured above      Assessment and Plan:    See assessment and plan documented by APC above and updated/edited by me as appropriate.    Julee Rogers DO  23                    Electronically signed by Julee Rogers DO at 23 [phone]          Physician Progress Notes (most recent note)        Ludy Todd II, DO at 24 [phone]              [facility] Medicine Services  PROGRESS NOTE    Patient Name: Omar Mendoza  : 1940  MRN: [id number]    Date of Admission: 2023  Primary Care Physician: Varun Pa MD    Subjective   Subjective     CC: f/u weakness    HPI: Up in bed with  in room. Says her legs are wrapped too tightly but otherwise has no complaints.       Objective   Objective     Vital Signs:   Temp:  [97.8 °F (36.6 °C)-98 °F (36.7 °C)] 98 °F (36.7 °C)  Heart Rate:  [62-78] 63  Resp:  [16] 16  BP: (113-120)/(71-81) 115/71     Physical Exam:  Constitutional: No acute distress, awake, alert  HENT: NCAT, mucous membranes moist  Respiratory: Clear to auscultation bilaterally, respiratory effort normal   Cardiovascular: RRR, no murmurs, rubs, or gallops  Gastrointestinal: Positive bowel sounds, soft, nontender, nondistended  Musculoskeletal: No bilateral ankle edema  Psychiatric: Appropriate affect, cooperative  Neurologic: Oriented x 3, strength symmetric in all extremities, Cranial Nerves grossly intact to confrontation, speech  clear  Skin: No rashes     Results Reviewed:  LAB RESULTS:      Lab 01/02/24  0608 01/01/24  0723 12/31/23  0551 12/30/23  0655 12/29/23  0417 12/28/23  1239 12/27/23  1404 12/27/23  1403 12/27/23  0431 12/26/23  2336 12/26/23  2131   WBC 4.89 3.87 4.51 5.98 6.17 6.31  6.14  --  7.25  --   --  7.78   HEMOGLOBIN 8.2* 8.3* 8.0* 8.2* 8.6* 9.1*  9.0*  --  10.5*  --   --  10.3*   HEMATOCRIT 24.7* 25.3* 24.7* 24.6* 25.5* 27.6*  27.1*  --  31.4*  --   --  31.3*   PLATELETS 141 139* 124* 110* 120* 134*  128*  --  137*  --   --  159   NEUTROS ABS  --   --   --   --   --  5.21  5.03  --  6.29  --   --  6.22   IMMATURE GRANS (ABS)  --   --   --   --   --  0.03  0.10*  --  0.04  --   --  0.07*   LYMPHS ABS  --   --   --   --   --  0.50*  0.53*  --  0.57*  --   --  0.92   MONOS ABS  --   --   --   --   --  0.18  0.17  --  0.15  --   --  0.34   EOS ABS  --   --   --   --   --  0.38  0.30  --  0.19  --   --  0.21   MCV 94.3 96.2 95.0 92.8 93.4 94.2  92.8  --  91.8  --   --  94.8   SED RATE  --   --   --   --   --   --   --   --   --   --  17   CRP  --   --   --   --   --   --   --   --   --  1.87*  --    PROCALCITONIN  --   --   --   --  0.11  --   --   --   --   --  0.10   LACTATE  --   --   --   --   --   --  1.4  --  1.4  --  2.1*         Lab 01/02/24  0608 01/01/24  0723 12/31/23  0608 12/30/23  0655 12/29/23  0417 12/28/23  1239 12/27/23  1404 12/27/23  1403 12/26/23  2336   SODIUM 145 144 145 146* 145   < >  --  140  --    POTASSIUM 3.4* 3.7 3.7 4.0 3.8   < >  --  4.1  --    CHLORIDE 110* 110* 110* 111* 113*   < >  --  106  --    CO2 28.0 27.0 24.0 25.0 26.0   < >  --  26.0  --    ANION GAP 7.0 7.0 11.0 10.0 6.0   < >  --  8.0  --    BUN 13 14 16 14 15   < >  --  18  --    CREATININE 0.48* 0.51* 0.55* 0.58 0.53*   < >  --  0.64  --    EGFR 95.3 93.9 92.2 91.0 93.0   < >  --  88.9  --    GLUCOSE 148* 172* 180* 134* 72   < >  --  151*  --    CALCIUM 7.5* 7.6* 7.6* 7.3* 7.6*   < >  --  7.6*  --    IONIZED CALCIUM  --    --   --   --   --   --  1.16  --   --    MAGNESIUM  --   --   --   --  1.6  --   --  1.7  --    PHOSPHORUS  --   --   --   --   --   --   --  3.5  --    HEMOGLOBIN A1C  --   --   --   --   --   --   --  12.10*  --    TSH  --   --   --   --   --   --   --   --  3.960    < > = values in this interval not displayed.         Lab 12/27/23  1403 12/26/23  2131   TOTAL PROTEIN 4.3* 4.9*   ALBUMIN 2.5* 2.6*   GLOBULIN 1.8 2.3   ALT (SGPT) 28 31   AST (SGOT) 28 36*   BILIRUBIN 0.6 0.7   ALK PHOS 151* 174*   LIPASE  --  119*         Lab 12/28/23  1239 12/26/23  2336 12/26/23  2131   PROBNP 1,039.0  --  1,494.0   HSTROP T  --  67* 69*             Lab 12/26/23  2336 12/26/23  2131   IRON 53  53  --    IRON SATURATION (TSAT) 28  --    TIBC 188*  --    TRANSFERRIN 126*  --    FERRITIN 462.20*  --    FOLATE  --  10.30   VITAMIN B 12  --  1,253*         Brief Urine Lab Results  (Last result in the past 365 days)        Color   Clarity   Blood   Leuk Est   Nitrite   Protein   CREAT   Urine HCG        12/27/23 0040 Orange   Cloudy   Large (3+)   Moderate (2+)   Negative   100 mg/dL (2+)                   Microbiology Results Abnormal       Procedure Component Value - Date/Time    Blood Culture - Blood, Blood, PICC Line [[id number]]  (Normal) Collected: 12/27/23 1400    Lab Status: Final result Specimen: Blood, PICC Line Updated: 01/01/24 1531     Blood Culture No growth at 5 days    Blood Culture - Blood, Arm, Right [[id number]]  (Normal) Collected: 12/27/23 0431    Lab Status: Final result Specimen: Blood from Arm, Right Updated: 01/01/24 0515     Blood Culture No growth at 5 days            No radiology results from the last 24 hrs    Results for orders placed during the hospital encounter of 12/26/23    Adult Transthoracic Echo Complete W/ Cont if Necessary Per Protocol    Interpretation Summary    Left ventricular systolic function is normal. Calculated left ventricular EF = 57% Left ventricular ejection fraction appears to be 56  - 60%.    Left ventricular wall thickness is consistent with mild to moderate concentric hypertrophy.    Left ventricular diastolic function was normal.    Estimated right ventricular systolic pressure from tricuspid regurgitation is mildly elevated (35-45 mmHg).    There is a trivial pericardial effusion.    Mild aortic valve regurgitation.      Current medications:  Scheduled Meds:aspirin, 81 mg, Oral, Daily  atorvastatin, 80 mg, Oral, Daily  castor oil-balsam peru, 1 application , Topical, Q12H  doxycycline, 100 mg, Oral, Q12H  heparin (porcine), 5,000 Units, Subcutaneous, Q12H  insulin lispro, 2-9 Units, Subcutaneous, 4x Daily With Meals & Nightly  magnesium hydroxide, 10 mL, Oral, Daily  metoprolol tartrate, 25 mg, Oral, BID  potassium chloride ER, 40 mEq, Oral, Q4H  sodium chloride, 10 mL, Intravenous, Q12H      Continuous Infusions:   PRN Meds:.  acetaminophen **OR** acetaminophen **OR** acetaminophen    senna-docusate sodium **AND** polyethylene glycol **AND** bisacodyl **AND** bisacodyl    Calcium Replacement - Follow Nurse / BPA Driven Protocol    dextrose    dextrose    glucagon (human recombinant)    hydrOXYzine    Magnesium Standard Dose Replacement - Follow Nurse / BPA Driven Protocol    nitroglycerin    ondansetron    Phosphorus Replacement - Follow Nurse / BPA Driven Protocol    Potassium Replacement - Follow Nurse / BPA Driven Protocol    sodium chloride    sodium chloride    Assessment & Plan   Assessment & Plan     Active Hospital Problems    Diagnosis  POA    **UTI (urinary tract infection) [N39.0]  Yes    Acute constipation [K59.00]  Unknown    Acute urinary retention [R33.8]  Unknown    Uterine prolapse [N81.4]  Unknown    Bradycardia [R00.1]  Unknown    Pressure injury of buttock, unstageable [L89.300]  Unknown    Type 2 diabetes mellitus with ketoacidosis, without long-term current use of insulin [E11.10]  Unknown    Disorientation [R41.0]  Unknown    Pleural effusion [J90]  Unknown    Severe  "malnutrition [E43]  Yes    Edema of lower extremity [R60.0]  Yes    Bullous pemphigoid [L12.0]  Yes    Dyslipidemia [E78.5]  Yes    Essential hypertension [I10]  Yes      Resolved Hospital Problems   No resolved problems to display.        Brief Hospital Course to date:  Omar Mendoza is a 83 y.o. female with history of type 2 diabetes, hypertension, history of uterine prolapse, urinary retention with Schultz catheter in place, history of bullous pemphigoid  (s/p Dupixent 10/2023, prednisone/doxycycline 11/2023) CAD, hyperlipidemia, presumed dementia who presented to the ED with generalized weakness and near bullous pemphigoid blister on the heel.      Dysphagia  -Nursing with concern for aspiration event 12/30 however patient herself says she \"just got choked.\" Does not wish for modified diet.   -SLP following      Acute metabolic encephalopathy, superimposed on suspected dementia  Generalized weakness  -PT/OT recommended SNF.  -CM following for placement      Bilateral pleural effusion  L>R  Bibasilar airspace disease versus pneumonia  -Patient currently on room air with good O2 sats  -Follow-up MRSA PCR, cultures, urinary strep and Legionella antigens  -Procalcitonin is low, lactate has improved  -concern for aspiration on 12/30  -CXR revealed increased right basilar airspace disease with stable left basilar atelectasis  -Encourage IS, pulmonary toilet  -Continue antibiotics, currently on Rocephin and doxycycline (Day 5)  -Currently on RA       Anemia  -Iron 53, iron sat 28  -Hgb up to 8.3 this AM   -fecal occult negative  -suspect anemia of chronic disease  -CBC in AM      Poor venous access  -PICC in place     Sinus bradycardia  - pt on both amiodarone and metoprolol, dtr reports amiodarone is new since hospitalization at [facility].  - hold amiodarone, decreased metoprolol to 50 mg BID w/ hold parameters  -HR currently improved      BLE edema  Decreased pulses LLE  - CTA abd showing small right pleural effusion, " bilateral basilar atelectasis  - CTA w/ runoff showing normal right sided runoff; left side delayed runoff possibly r/t venous stasis changes, no evidence of arterial occlusion or significant stenosis  - LLE cool to touch compared to RLE, decreased pulses  - neurovascular checks  -Echo revealed EF 57% with normal diastolic function      Uncontrolled Type 2 diabetes  -Hgb A1C 12.10 on   -Continue SSI     Constipation  -Continue aggressive bowel regimen, s/p soapsuds enema x 1  -s/p Milk of mag x1, Miralax daily, suppository x1   -Resolved, +BM   -chronic loza catheter in place     Uterine prolapse  -Gyn Dr. Zhou has seen, plan for outpatient follow up to discuss treatment options.   -may be contributing to chronic urine retention     Bullous pemphigoid  -Has developed new blister on right heel  -Continue doxycycline  -Wound care following      Pressure ulcer  -Wound care following      L adrenal adenoma  - incidental finding on CTA abdomen      Severe malnutrition    Expected Discharge Location and Transportation:   Expected Discharge   Expected Discharge Date: 2024; Expected Discharge Time:      DVT prophylaxis:  Medical DVT prophylaxis orders are present.     AM-PAC 6 Clicks Score (PT): 7 (24 0800)    CODE STATUS:   Code Status and Medical Interventions:   Ordered at: 23 0410     Code Status (Patient has no pulse and is not breathing):    CPR (Attempt to Resuscitate)     Medical Interventions (Patient has pulse or is breathing):    Full Support       Ludy Todd II, DO  24       Electronically signed by Ludy Todd II, DO at 24 1129          Physical Therapy Notes (most recent note)        Donovan Love, PT at 24 1522  Version 1 of 1         Acute Care - Wound/Debridement Treatment Note  [facility]     Patient Name: Omar Mendoza  : 1940  MRN: [id number]  Today's Date: 2024                Admit Date: 2023    Visit Dx:    ICD-10-CM  ICD-9-CM   1. Generalized weakness  R53.1 780.79   2. Acute UTI  N39.0 599.0   3. Bullous pemphigoid  L12.0 694.5   4. Venous insufficiency  I87.2 459.81   5. Dysphagia, unspecified type  R13.10 787.20     R medial/posterior heel              Patient Active Problem List   Diagnosis    UTI (urinary tract infection)    Bullous pemphigoid    Essential hypertension    Dyslipidemia    Edema of lower extremity    Cholestatic hepatitis    Acute constipation    Acute urinary retention    Uterine prolapse    Bradycardia    Pressure injury of buttock, unstageable    Type 2 diabetes mellitus with ketoacidosis, without long-term current use of insulin    Disorientation    Pleural effusion    Severe malnutrition        Past Medical History:   Diagnosis Date    CAD (coronary artery disease)     Dementia     Diabetes mellitus     Hyperlipemia     Hypertension         History reviewed. No pertinent surgical history.        Wound 12/26/23 2218 Right posterior heel Blisters (Active)   Wound Image    01/01/24 1522   Dressing Appearance intact;moist drainage;dried drainage 01/01/24 1522   Closure None 01/01/24 0845   Base moist;pink;red 01/01/24 1522   Periwound intact;dry 01/01/24 1522   Periwound Temperature warm 01/01/24 1522   Periwound Skin Turgor soft 01/01/24 1522   Edges open;irregular 01/01/24 1522   Wound Length (cm) 7.2 cm 01/01/24 1522   Wound Width (cm) 7.1 cm 01/01/24 1522   Wound Depth (cm) 0.1 cm 01/01/24 1522   Wound Surface Area (cm^2) 51.12 cm^2 01/01/24 1522   Wound Volume (cm^3) 5.112 cm^3 01/01/24 1522   Drainage Characteristics/Odor serous;serosanguineous 01/01/24 1522   Drainage Amount small 01/01/24 1522   Care, Wound cleansed with;soap and water;debrided 01/01/24 1522   Dressing Care dressing applied;silver impregnated;low-adherent;foam;multi-layer wrap 01/01/24 1522   Periwound Care cleansed with pH balanced cleanser;dry periwound area maintained 01/01/24 1522       Wound 12/27/23 0411 Bilateral coccyx  Pressure Injury (Active)   Dressing Appearance dressing loose 01/01/24 1400   Closure Adhesive bandage 01/01/24 1400   Base moist;pink;red 01/01/24 1400   Periwound dry 01/01/24 1400   Care, Wound cleansed with;soap and water 01/01/24 1400   Dressing Care antimicrobial agent applied;dressing applied;silicone;foam 01/01/24 1400       Wound 12/27/23 1035 Left greater trochanter Blisters (Active)   Dressing Appearance moist drainage 12/31/23 2000   Closure Adhesive bandage 01/01/24 0845   Base yellow 01/01/24 0845   Care, Wound antimicrobial agent applied 12/31/23 2000   Dressing Care dressing changed;antimicrobial agent applied;silicone;foam 12/31/23 2000      Lymphedema       Row Name 01/01/24 1600             Lymphedema Edema Assessment    Ptting Edema Category By severity  -      Pitting Edema Mild  -         Skin Changes/Observations    Location/Assessment Lower Extremity  -      Lower Extremity Conditions bilateral:;intact;clean;dry;shiny;fragile  -      Lower Extremity Color/Pigment bilateral:;blanchable;erythema  -         Lymphedema Pulses/Capillary Refill    Capillary Refill lower extremity capillary refill  -      Lower Extremity Capillary Refill right:;left:;less than 3 seconds  -         Compression/Skin Care    Compression/Skin Care skin care;wrapping location  -      Skin Care washed/dried;lotion applied  -      Wrapping Location lower extremity  -      Wrapping Location LE bilateral:;foot to knee  -      Wrapping Comments RLE mepilex Ag, cast padding to secure, BLE size 4 compressogrips applied doubled distally for gradient compression.  -                User Key  (r) = Recorded By, (t) = Taken By, (c) = Cosigned By      Initials Name Provider Type     Donovan Love, PT Physical Therapist                    WOUND DEBRIDEMENT  Total area of Debridement: 50cm2  Debridement Site 1  Location- Site 1: R medial heel  Selective Debridement- Site 1: Wound Surface  >20cmsq  Instruments- Site 1: tweezers, scissors  Excised Tissue Description- Site 1: maximum, other (comment) (nonviable ruptured blister debris)  Bleeding- Site 1: scant               PT Assessment (last 12 hours)       PT Evaluation and Treatment       Row Name 01/01/24 1522          Physical Therapy Time and Intention    Subjective Information complains of;weakness;fatigue;pain;swelling  -     Document Type therapy note (daily note);wound care  -     Mode of Treatment physical therapy;individual therapy  -[facility] Name 01/01/24 1522          General Information    Patient Observations alert;cooperative;agree to therapy  -[facility] Name 01/01/24 1522          Pain    Pain Intervention(s) Repositioned;Elevated  -     Additional Documentation Pain Scale: FACES Pre/Post-Treatment (Group)  -[facility] Name 01/01/24 1522          Pain Scale: FACES Pre/Post-Treatment    Pain: FACES Scale, Pretreatment 2-->hurts little bit  -LH     Posttreatment Pain Rating 2-->hurts little bit  -     Pain Location - Side/Orientation Right  -     Pain Location lower  -     Pain Location - extremity  -[facility] Name 01/01/24 1522          Cognition    Affect/Mental Status (Cognition) WFL  -LH     Orientation Status (Cognition) oriented to;person;place;disoriented to;time  -       Row Name 01/01/24 1522          Wound 12/26/23 2218 Right posterior heel Blisters    Wound - Properties Group Placement Date: 12/26/23  -DN Placement Time: 2218  -DN Present on Original Admission: Y  -DN Side: Right  -DN Orientation: posterior  -DN Location: heel  -DN Primary Wound Type: Blisters  -DN    Wound Image Images linked: 2  -LH     Dressing Appearance intact;moist drainage;dried drainage  -     Base moist;pink;red  -     Periwound intact;dry  -     Periwound Temperature warm  -     Periwound Skin Turgor soft  -     Edges open;irregular  -     Wound Length (cm) 7.2 cm  -LH     Wound Width (cm) 7.1 cm  -     Wound  Depth (cm) 0.1 cm  -     Wound Surface Area (cm^2) 51.12 cm^2  -LH     Wound Volume (cm^3) 5.112 cm^3  -LH     Drainage Characteristics/Odor serous;serosanguineous  -LH     Drainage Amount small  -LH     Care, Wound cleansed with;soap and water;debrided  -     Dressing Care dressing applied;silver impregnated;low-adherent;foam;multi-layer wrap  Mepilex Ag, cast padding, MLW  -LH     Periwound Care cleansed with pH balanced cleanser;dry periwound area maintained  -LH     Retired Wound - Properties Group Placement Date: 12/26/23  -DN Placement Time: 2218  -DN Present on Original Admission: Y  -DN Side: Right  -DN Orientation: posterior  -DN Location: heel  -DN Primary Wound Type: Blisters  -DN    Retired Wound - Properties Group Date first assessed: 12/26/23  -DN Time first assessed: 2218  -DN Present on Original Admission: Y  -DN Side: Right  -DN Location: heel  -DN Primary Wound Type: Blisters  -DN      Row Name             Wound 12/27/23 0411 Bilateral coccyx Pressure Injury    Wound - Properties Group Placement Date: 12/27/23  -DN Placement Time: 0411  -DN Present on Original Admission: Y  -DN Side: Bilateral  -DN Location: coccyx  -DN Primary Wound Type: Pressure inj  -DN    Retired Wound - Properties Group Placement Date: 12/27/23  -DN Placement Time: 0411  -DN Present on Original Admission: Y  -DN Side: Bilateral  -DN Location: coccyx  -DN Primary Wound Type: Pressure inj  -DN    Retired Wound - Properties Group Date first assessed: 12/27/23  -DN Time first assessed: 0411  -DN Present on Original Admission: Y  -DN Side: Bilateral  -DN Location: coccyx  -DN Primary Wound Type: Pressure inj  -DN      Row Name             Wound 12/27/23 1035 Left greater trochanter Blisters    Wound - Properties Group Placement Date: 12/27/23  -MC Placement Time: 1035  -MC Side: Left  -MC Location: greater trochanter  -MC Primary Wound Type: Blisters  -MC    Retired Wound - Properties Group Placement Date: 12/27/23  -  Placement Time: 1035  -MC Side: Left  - Location: greater trochanter  -MC Primary Wound Type: Blisters  -MC    Retired Wound - Properties Group Date first assessed: 12/27/23  - Time first assessed: 1035  -MC Side: Left  -MC Location: greater trochanter  -MC Primary Wound Type: Blisters  -MC      Row Name 01/01/24 1522          Coping    Observed Emotional State cooperative;calm  -     Verbalized Emotional State acceptance  -     Trust Relationship/Rapport care explained;questions answered  -     Family/Support Persons spouse  -     Involvement in Care at bedside;attentive to patient  -       Row Name 01/01/24 1522          Plan of Care Review    Plan of Care Reviewed With patient;spouse  -     Progress improving  -     Outcome Evaluation Pt demonstrating excellent improvements in BLE edema this date. Pt also now with rupturing of R medial/posterior heel blister. Upon debridement of nonviable ruptured blister debris, pt's wound with good, clean pink/red wound base. PT applied Mepilex ag to help manage drainage and reduce bacterial load. PT reapplied MLW to further promote venous return and improve skin integrity. PT plans to f/u with dressings changes and MLW changes in 2-3 days.  -       Row Name 01/01/24 1522          Positioning and Restraints    Pre-Treatment Position in bed  -     Post Treatment Position bed  -     In Bed supine;call light within reach;encouraged to call for assist;with family/caregiver  -               User Key  (r) = Recorded By, (t) = Taken By, (c) = Cosigned By      Initials Name Provider Type     Eva Myles, RN Registered Nurse     Donovan Love, PT Physical Therapist    Peter Espinoza, RN Registered Nurse                  Physical Therapy Education       Title: PT OT SLP Therapies (In Progress)       Topic: Physical Therapy (In Progress)       Point: Mobility training (In Progress)       Learning Progress Summary             Patient Acceptance, E, NR  by MIA at 12/29/2023 1131    Acceptance, E, NR by MIA at 12/27/2023 1045                         Point: Home exercise program (Not Started)       Learner Progress:  Not documented in this visit.              Point: Body mechanics (In Progress)       Learning Progress Summary             Patient Acceptance, E, NR by MIA at 12/29/2023 1131    Acceptance, E, NR by MIA at 12/27/2023 1045                         Point: Precautions (In Progress)       Learning Progress Summary             Patient Acceptance, E, NR by MIA at 12/29/2023 1131    Acceptance, E, NR by MIA at 12/27/2023 1045                                         User Key       Initials Effective Dates Name Provider Type Discipline     11/16/23 -  Petty Mckoy, PT Physical Therapist PT                    Recommendation and Plan  Anticipated Discharge Disposition (PT): skilled nursing facility  Planned Therapy Interventions (PT): wound care, patient/family education  Therapy Frequency (PT): daily  Plan of Care Reviewed With: patient, spouse   Progress: improving       Progress: improving  Outcome Evaluation: Pt demonstrating excellent improvements in BLE edema this date. Pt also now with rupturing of R medial/posterior heel blister. Upon debridement of nonviable ruptured blister debris, pt's wound with good, clean pink/red wound base. PT applied Mepilex ag to help manage drainage and reduce bacterial load. PT reapplied MLW to further promote venous return and improve skin integrity. PT plans to f/u with dressings changes and MLW changes in 2-3 days.  Plan of Care Reviewed With: patient, spouse            Time Calculation   PT Charges       Row Name 01/01/24 1635 01/01/24 1420          Time Calculation    Start Time 1522  -LH 1325  -LM     PT Received On -- 01/01/24  -LM     PT Goal Re-Cert Due Date 01/06/24  - 01/06/24  -LM        Timed Charges    46004 - PT Therapeutic Exercise Minutes -- 10  -LM     55212 - PT Therapeutic Activity Minutes -- 20  -LM         Untimed Charges    Wound Care 75651 Selective debridement;69027 Add't debridement ea 20 cm  -LH --     36608-Gshwsisgud comp below knee 15  -LH --     97513-Pnuklsydv debridement 10  -LH --     97598-Add't debridement ea 20 cm 10  -LH --        Total Minutes    Timed Charges Total Minutes -- 30  -LM     Untimed Charges Total Minutes 35  -LH --      Total Minutes 35  -LH 30  -LM               User Key  (r) = Recorded By, (t) = Taken By, (c) = Cosigned By      Initials Name Provider Type    LM Sandra Gage, PT Physical Therapist    LH Donovan Love, PT Physical Therapist                      Therapy Charges for Today       Code Description Service Date Service Provider Modifiers Qty    84852093449 HC TOMMIE DEBRIDE OPEN WOUND UP TO 20CM 2024 Donovan Love, PT GP 1    32853252160 HC PT TOMMIE DEBRIDE OPEN WOUND EA ADD 20CM 2024 Donovan Love, PT GP 2    12397348013 HC PT MULTI LAYER COMP SYS BELOW KNEE 2024 Donovan Love, PT GP 1              PT G-Codes  Outcome Measure Options: AM-PAC 6 Clicks Basic Mobility (PT)  AM-PAC 6 Clicks Score (PT): 10  AM-PAC 6 Clicks Score (OT): 12       Donovan Love PT  2024      Electronically signed by Donovan Love, PT at 24 1636          Occupational Therapy Notes (most recent note)        Rayna aWll, OT at 24 5292          Patient Name: Omar Mendoza  : 1940    MRN: [id number]                              Today's Date: 2024       Admit Date: 2023    Visit Dx:     ICD-10-CM ICD-9-CM   1. Generalized weakness  R53.1 780.79   2. Acute UTI  N39.0 599.0   3. Bullous pemphigoid  L12.0 694.5   4. Venous insufficiency  I87.2 459.81   5. Dysphagia, unspecified type  R13.10 787.20     Patient Active Problem List   Diagnosis    UTI (urinary tract infection)    Bullous pemphigoid    Essential hypertension    Dyslipidemia    Edema of lower extremity    Cholestatic hepatitis    Acute constipation    Acute urinary retention    Uterine  prolapse    Bradycardia    Pressure injury of buttock, unstageable    Type 2 diabetes mellitus with ketoacidosis, without long-term current use of insulin    Disorientation    Pleural effusion    Severe malnutrition     Past Medical History:   Diagnosis Date    CAD (coronary artery disease)     Dementia     Diabetes mellitus     Hyperlipemia     Hypertension      History reviewed. No pertinent surgical history.   General Information       Row Name 01/01/24 0910          OT Time and Intention    Document Type therapy note (daily note)  -DILIP     Mode of Treatment individual therapy;occupational therapy  -       Row Name 01/01/24 0910          General Information    Patient Profile Reviewed yes  -DILIP     Existing Precautions/Restrictions fall  limited skin integrity, RLE pain and limited control  -     Barriers to Rehab medically complex;cognitive status;previous functional deficit  -       Row Name 01/01/24 0910          Living Environment    People in Home spouse  -       Row Name 01/01/24 0910          Cognition    Orientation Status (Cognition) oriented to;place;person;disoriented to;time  -       Row Name 01/01/24 0910          Safety Issues, Functional Mobility    Safety Issues Affecting Function (Mobility) insight into deficits/self-awareness;safety precaution awareness;safety precautions follow-through/compliance;sequencing abilities  -     Impairments Affecting Function (Mobility) balance;cognition;endurance/activity tolerance;postural/trunk control;strength;pain;range of motion (ROM)  -     Cognitive Impairments, Mobility Safety/Performance insight into deficits/self-awareness;safety precaution awareness;safety precaution follow-through;sequencing abilities  -               User Key  (r) = Recorded By, (t) = Taken By, (c) = Cosigned By      Initials Name Provider Type    DILIP Rayna Wall, OT Occupational Therapist                     Mobility/ADL's       Row Name 01/01/24 0911          Bed  Mobility    Supine-Sit [place] (Bed Mobility) maximum assist (25% patient effort);2 person assist;verbal cues;nonverbal cues (demo/gesture)  -DILIP     Sit-Supine [place] (Bed Mobility) moderate assist (50% patient effort);2 person assist;verbal cues;nonverbal cues (demo/gesture)  -DILIP     Bed Mobility, Safety Issues decreased use of arms for pushing/pulling;decreased use of legs for bridging/pushing;impaired trunk control for bed mobility  -DILIP     Assistive Device (Bed Mobility) bed rails;draw sheet;head of bed elevated  -DILIP     Comment, (Bed Mobility) pt. needed verbal and tactile cues to sequence, pt. appeared with much more motivation to assit with sit to supine  -DILIP       Row Name 01/01/24 0911          Transfers    Comment, (Transfers) attempted sit to stand x 2, but unable to clear buttock from bed with maximal asisst of 2 persons, alerted nursing and recommended pt. be in a lift room  -       Row Name 01/01/24 0911          Activities of Daily Living    BADL Assessment/Intervention feeding;grooming  -       Row Name 01/01/24 0911          Lower Body Dressing Assessment/Training    [place] Level (Lower Body Dressing) don;socks;dependent (less than 25% patient effort)  -DILIP     Position (Lower Body Dressing) supine  -DILIP       Row Name 01/01/24 0911          Self-Feeding Assessment/Training    [place] Level (Feeding) prepare tray/open items;dependent (less than 25% patient effort);scoop food and bring to mouth;liquids to mouth;set up;supervision  -DILIP     Position (Self-Feeding) sitting up in bed  -DILIP       Row Name 01/01/24 0911          Grooming Assessment/Training    [place] Level (Grooming) hair care, combing/brushing;oral care regimen;minimum assist (75% patient effort);wash face, hands;set up  -DILIP     Position (Grooming) edge of bed sitting;sitting up in bed  -DILIP               User Key  (r) = Recorded By, (t) = Taken By, (c) = Cosigned By      Initials Name Provider Type    DILIP Wall  Rayna Mckeon OT Occupational Therapist                   Obj/Interventions       Row Name 01/01/24 0914          Balance    Static Sitting Balance standby assist  -DILIP     Dynamic Sitting Balance standby assist  hair brushing  -DILIP     Position, Sitting Balance unsupported;sitting edge of bed  -DILIP     Static Standing Balance --  unable to complete  -DILIP     Balance Interventions occupation based/functional task  -DILIP     Comment, Balance pt. was able to sit EOB grossly 6-8 minutes SBA  -DILIP               User Key  (r) = Recorded By, (t) = Taken By, (c) = Cosigned By      Initials Name Provider Type    Rayna Zamora, OT Occupational Therapist                   Goals/Plan       Row Name 01/01/24 0918          Bed Mobility Goal 1 (OT)    Progress/Outcomes (Bed Mobility Goal 1, OT) continuing progress toward goal  -DILIP       Row Name 01/01/24 0918          Transfer Goal 1 (OT)    Progress/Outcome (Transfer Goal 1, OT) progress slower than expected  -DILIP       Row Name 01/01/24 0918          Grooming Goal 1 (OT)    Progress/Outcome (Grooming Goal 1, OT) goal partially met;goal ongoing  -DILIP       Row Name 01/01/24 0918          Self-Feeding Goal 1 (OT)    Progress/Outcomes (Self-Feeding Goal 1, OT) goal met  -DILIP               User Key  (r) = Recorded By, (t) = Taken By, (c) = Cosigned By      Initials Name Provider Type    Rayna Zamora, OT Occupational Therapist                   Clinical Impression       Row Name 01/01/24 0915          Pain Assessment    Pretreatment Pain Rating 6/10  -DILIP     Posttreatment Pain Rating 0/10 - no pain  -DILIP     Pain Location - Side/Orientation Right  -DILIP     Pain Location lower  -DILIP     Pain Location - extremity  -DILIP     Pre/Posttreatment Pain Comment pain on arrival and relieved with waffle boot removal, but with any mvmt. of RLE pt. with pain until at rest again, pt. requested pain meds, nurse alerted, pt. tending to keep RLE in ER with inability to self correct  -DILIP     Pain  Intervention(s) Medication (See MAR);Repositioned  -DILIP       Row Name 01/01/24 0915          Plan of Care Review    Plan of Care Reviewed With patient;spouse  -DILIP     Progress improving  -DILIP     Outcome Evaluation Pt. with improvement with sit to supine, self feeding, grooming and sitting balance today, but decline with sit to stand ability.  Limited by RLE pain and weakness with movement. Pt. remains appropriate for skilled OT services to address deficit areas and promote return to prior higher level of independence.  Continue OT POC.  -DILIP       Row Name 01/01/24 0915          Therapy Assessment/Plan (OT)    Therapy Frequency (OT) daily  -DILIP       Row Name 01/01/24 0915          Therapy Plan Review/Discharge Plan (OT)    Anticipated Discharge Disposition (OT) skilled nursing facility  -       Row Name 01/01/24 0915          Vital Signs    Pre Systolic BP Rehab 128  -DILIP     Pre Treatment Diastolic BP 60  -DILIP     Pretreatment Heart Rate (beats/min) 78  -DILIP     Posttreatment Heart Rate (beats/min) 83  -DILIP     Pre SpO2 (%) 93  -DILIP     O2 Delivery Pre Treatment room air  -DILIP     O2 Delivery Intra Treatment room air  -DILIP     Post SpO2 (%) 92  -DILIP     O2 Delivery Post Treatment room air  -DILIP     Pre Patient Position Supine  -DILIP     Intra Patient Position Sitting  -DILIP     Post Patient Position Supine  -DILIP       Row Name 01/01/24 0915          Positioning and Restraints    Pre-Treatment Position in bed  -DILPI     Post Treatment Position bed  -DILIP     In Bed notified nsg;sitting;call light within reach;encouraged to call for assist;exit alarm on;side rails up x2;legs elevated;heels elevated  -DILIP               User Key  (r) = Recorded By, (t) = Taken By, (c) = Cosigned By      Initials Name Provider Type    Rayna Zamora, OT Occupational Therapist                   Outcome Measures       Row Name 01/01/24 0919          How much help from another is currently needed...    Putting on and taking off regular lower body  clothing? 1  -DILIP     Bathing (including washing, rinsing, and drying) 2  -DILIP     Toileting (which includes using toilet bed pan or urinal) 1  -DILIP     Putting on and taking off regular upper body clothing 2  -DILIP     Taking care of personal grooming (such as brushing teeth) 3  -DILIP     Eating meals 3  -DILIP     AM-PAC 6 Clicks Score (OT) 12  -DILIP       Row Name 01/01/24 0919          Functional Assessment    Outcome Measure Options AM-PAC 6 Clicks Daily Activity (OT)  -DILIP               User Key  (r) = Recorded By, (t) = Taken By, (c) = Cosigned By      Initials Name Provider Type    Rayna Zamora, OT Occupational Therapist                    Occupational Therapy Education       Title: PT OT SLP Therapies (In Progress)       Topic: Occupational Therapy (In Progress)       Point: ADL training (Done)       Description:   Instruct learner(s) on proper safety adaptation and remediation techniques during self care or transfers.   Instruct in proper use of assistive devices.                  Learning Progress Summary             Patient Acceptance, E, VU,NR by DILIP at 1/1/2024 0920    Comment: oriented to date, need to move and position RLE, ADL progression, sequencing bed mobililty    Acceptance, E, NR by AC at 12/27/2023 1135   Family Acceptance, E, VU,NR by DILIP at 1/1/2024 0920    Comment: oriented to date, need to move and position RLE, ADL progression, sequencing bed mobililty                         Point: Home exercise program (Not Started)       Description:   Instruct learner(s) on appropriate technique for monitoring, assisting and/or progressing therapeutic exercises/activities.                  Learner Progress:  Not documented in this visit.              Point: Precautions (Done)       Description:   Instruct learner(s) on prescribed precautions during self-care and functional transfers.                  Learning Progress Summary             Patient Acceptance, E, VU,NR by DILIP at 1/1/2024 0920    Comment: oriented  to date, need to move and position RLE, ADL progression, sequencing bed mobililty   Family Acceptance, E, VU,NR by DILIP at 1/1/2024 0920    Comment: oriented to date, need to move and position RLE, ADL progression, sequencing bed mobililty                         Point: Body mechanics (Done)       Description:   Instruct learner(s) on proper positioning and spine alignment during self-care, functional mobility activities and/or exercises.                  Learning Progress Summary             Patient Acceptance, E, VU,NR by DILIP at 1/1/2024 0920    Comment: oriented to date, need to move and position RLE, ADL progression, sequencing bed mobililty   Family Acceptance, E, VU,NR by DILIP at 1/1/2024 0920    Comment: oriented to date, need to move and position RLE, ADL progression, sequencing bed mobililty                                         User Key       Initials Effective Dates Name Provider Type Discipline     07/11/23 -  Rayna Wall, OT Occupational Therapist OT    AC 02/03/23 -  Sepideh Feldman, OT Occupational Therapist OT                  OT Recommendation and Plan  Therapy Frequency (OT): daily  Plan of Care Review  Plan of Care Reviewed With: patient, spouse  Progress: improving  Outcome Evaluation: Pt. with improvement with sit to supine, self feeding, grooming and sitting balance today, but decline with sit to stand ability.  Limited by RLE pain and weakness with movement. Pt. remains appropriate for skilled OT services to address deficit areas and promote return to prior higher level of independence.  Continue OT POC.     Time Calculation:         Time Calculation- OT       Row Name 01/01/24 0920             Time Calculation- OT    OT Start Time 0752  -DILIP      OT Received On 01/01/24  -DILIP      OT Goal Re-Cert Due Date 01/06/24  -DILIP         Timed Charges    39421 - OT Therapeutic Activity Minutes 10  -DILIP      28179 - OT Self Care/Mgmt Minutes 20  -DILIP         Total Minutes    Timed Charges Total Minutes 30   -DILIP       Total Minutes 30  -DILIP                User Key  (r) = Recorded By, (t) = Taken By, (c) = Cosigned By      Initials Name Provider Type    Rayna Zamora OT Occupational Therapist                  Therapy Charges for Today       Code Description Service Date Service Provider Modifiers Qty    [id number]  OT THERAPEUTIC ACT EA 15 MIN 1/1/2024 Rayna Wall OT GO 1    [id number]  OT SELF CARE/MGMT/TRAIN EA 15 MIN 1/1/2024 Rayna Wall OT GO 1                 Rayna Wall OT  1/1/2024    Electronically signed by Rayna Wall OT at 01/01/24 0921

## 2024-01-03 NOTE — CASE MANAGEMENT/SOCIAL WORK
Continued Stay Note  [facility]     Patient Name: Omar Mendoza  MRN: [id number]  Today's Date: 1/3/2024    Admit Date: 12/26/2023    Plan: rehab   Discharge Plan       Row Name 01/03/24 1241       Plan    Plan rehab    Patient/Family in Agreement with Plan yes    Plan Comments I spoke with the son and daughter of this patient about a skilled rehab bed offer with the potential for LTC at [facility] in [place]. They would like to discuss this with the family and will get back with the CM. She will need transport arranged. CM to follow.    Final Discharge Disposition Code 03 - skilled nursing facility (SNF)                   Discharge Codes    No documentation.                 Expected Discharge Date and Time       Expected Discharge Date Expected Discharge Time    Jan 5, 2024               Eleonora John RN

## 2024-01-04 ENCOUNTER — APPOINTMENT (OUTPATIENT)
Dept: CARDIOLOGY | Facility: HOSPITAL | Age: 84
DRG: 884 | End: 2024-01-04
Payer: MEDICARE

## 2024-01-04 ENCOUNTER — APPOINTMENT (OUTPATIENT)
Dept: MRI IMAGING | Facility: HOSPITAL | Age: 84
DRG: 884 | End: 2024-01-04
Payer: MEDICARE

## 2024-01-04 ENCOUNTER — APPOINTMENT (OUTPATIENT)
Dept: GENERAL RADIOLOGY | Facility: HOSPITAL | Age: 84
DRG: 884 | End: 2024-01-04
Payer: MEDICARE

## 2024-01-04 ENCOUNTER — APPOINTMENT (OUTPATIENT)
Dept: NEUROLOGY | Facility: HOSPITAL | Age: 84
DRG: 884 | End: 2024-01-04
Payer: MEDICARE

## 2024-01-04 LAB
BACTERIA UR QL AUTO: ABNORMAL /HPF
BASOPHILS # BLD MANUAL: 0 10*3/MM3 (ref 0–0.2)
BASOPHILS NFR BLD MANUAL: 0 % (ref 0–1.5)
BILIRUB UR QL STRIP: NEGATIVE
CHOLEST SERPL-MCNC: 94 MG/DL (ref 0–200)
CLARITY UR: CLEAR
COLOR UR: YELLOW
DEPRECATED RDW RBC AUTO: 60.6 FL (ref 37–54)
EOSINOPHIL # BLD MANUAL: 2.16 10*3/MM3 (ref 0–0.4)
EOSINOPHIL NFR BLD MANUAL: 34 % (ref 0.3–6.2)
ERYTHROCYTE [DISTWIDTH] IN BLOOD BY AUTOMATED COUNT: 17.7 % (ref 12.3–15.4)
GLUCOSE BLDC GLUCOMTR-MCNC: 130 MG/DL (ref 70–130)
GLUCOSE BLDC GLUCOMTR-MCNC: 135 MG/DL (ref 70–130)
GLUCOSE BLDC GLUCOMTR-MCNC: 138 MG/DL (ref 70–130)
GLUCOSE UR STRIP-MCNC: NEGATIVE MG/DL
HBA1C MFR BLD: 10.7 % (ref 4.8–5.6)
HCT VFR BLD AUTO: 27.8 % (ref 34–46.6)
HDLC SERPL-MCNC: 33 MG/DL (ref 40–60)
HGB BLD-MCNC: 9.2 G/DL (ref 12–15.9)
HGB UR QL STRIP.AUTO: NEGATIVE
HYALINE CASTS UR QL AUTO: ABNORMAL /LPF
KETONES UR QL STRIP: NEGATIVE
LDLC SERPL CALC-MCNC: 39 MG/DL (ref 0–100)
LDLC/HDLC SERPL: 1.12 {RATIO}
LEUKOCYTE ESTERASE UR QL STRIP.AUTO: ABNORMAL
LYMPHOCYTES # BLD MANUAL: 0.57 10*3/MM3 (ref 0.7–3.1)
LYMPHOCYTES NFR BLD MANUAL: 2 % (ref 5–12)
MCH RBC QN AUTO: 31.9 PG (ref 26.6–33)
MCHC RBC AUTO-ENTMCNC: 33.1 G/DL (ref 31.5–35.7)
MCV RBC AUTO: 96.5 FL (ref 79–97)
MONOCYTES # BLD: 0.13 10*3/MM3 (ref 0.1–0.9)
NEUTROPHILS # BLD AUTO: 3.49 10*3/MM3 (ref 1.7–7)
NEUTROPHILS NFR BLD MANUAL: 55 % (ref 42.7–76)
NITRITE UR QL STRIP: NEGATIVE
PH UR STRIP.AUTO: 7.5 [PH] (ref 5–8)
PLAT MORPH BLD: NORMAL
PLATELET # BLD AUTO: 181 10*3/MM3 (ref 140–450)
PMV BLD AUTO: 9.9 FL (ref 6–12)
PROT UR QL STRIP: NEGATIVE
RBC # BLD AUTO: 2.88 10*6/MM3 (ref 3.77–5.28)
RBC # UR STRIP: ABNORMAL /HPF
RBC MORPH BLD: NORMAL
REF LAB TEST METHOD: ABNORMAL
SARS-COV-2 AG RESP QL IA.RAPID: NORMAL
SCAN SLIDE: NORMAL
SP GR UR STRIP: 1.02 (ref 1–1.03)
SQUAMOUS #/AREA URNS HPF: ABNORMAL /HPF
TRIGL SERPL-MCNC: 120 MG/DL (ref 0–150)
UROBILINOGEN UR QL STRIP: ABNORMAL
VARIANT LYMPHS NFR BLD MANUAL: 9 % (ref 19.6–45.3)
VLDLC SERPL-MCNC: 22 MG/DL (ref 5–40)
WBC # UR STRIP: ABNORMAL /HPF
WBC MORPH BLD: NORMAL
WBC NRBC COR # BLD AUTO: 6.34 10*3/MM3 (ref 3.4–10.8)
YEAST URNS QL MICRO: ABNORMAL /HPF

## 2024-01-04 PROCEDURE — 87086 URINE CULTURE/COLONY COUNT: CPT | Performed by: NURSE PRACTITIONER

## 2024-01-04 PROCEDURE — 83036 HEMOGLOBIN GLYCOSYLATED A1C: CPT | Performed by: NURSE PRACTITIONER

## 2024-01-04 PROCEDURE — 92610 EVALUATE SWALLOWING FUNCTION: CPT | Performed by: SPEECH-LANGUAGE PATHOLOGIST

## 2024-01-04 PROCEDURE — 25810000003 LACTATED RINGERS PER 1000 ML: Performed by: STUDENT IN AN ORGANIZED HEALTH CARE EDUCATION/TRAINING PROGRAM

## 2024-01-04 PROCEDURE — 29581 APPL MULTLAYER CMPRN SYS LEG: CPT

## 2024-01-04 PROCEDURE — 87426 SARSCOV CORONAVIRUS AG IA: CPT | Performed by: NURSE PRACTITIONER

## 2024-01-04 PROCEDURE — 81001 URINALYSIS AUTO W/SCOPE: CPT | Performed by: NURSE PRACTITIONER

## 2024-01-04 PROCEDURE — 99232 SBSQ HOSP IP/OBS MODERATE 35: CPT | Performed by: STUDENT IN AN ORGANIZED HEALTH CARE EDUCATION/TRAINING PROGRAM

## 2024-01-04 PROCEDURE — 99233 SBSQ HOSP IP/OBS HIGH 50: CPT | Performed by: PSYCHIATRY & NEUROLOGY

## 2024-01-04 PROCEDURE — 82948 REAGENT STRIP/BLOOD GLUCOSE: CPT

## 2024-01-04 PROCEDURE — 71045 X-RAY EXAM CHEST 1 VIEW: CPT

## 2024-01-04 PROCEDURE — 80061 LIPID PANEL: CPT | Performed by: NURSE PRACTITIONER

## 2024-01-04 RX ORDER — RIVASTIGMINE 4.6 MG/24H
1 PATCH, EXTENDED RELEASE TRANSDERMAL DAILY
Status: DISCONTINUED | OUTPATIENT
Start: 2024-01-04 | End: 2024-01-05

## 2024-01-04 RX ORDER — CHOLECALCIFEROL (VITAMIN D3) 125 MCG
5 CAPSULE ORAL NIGHTLY
Status: DISCONTINUED | OUTPATIENT
Start: 2024-01-04 | End: 2024-01-04

## 2024-01-04 RX ORDER — MIRTAZAPINE 15 MG/1
15 TABLET, FILM COATED ORAL NIGHTLY
Status: DISCONTINUED | OUTPATIENT
Start: 2024-01-04 | End: 2024-01-04

## 2024-01-04 RX ORDER — CHOLECALCIFEROL (VITAMIN D3) 125 MCG
5 CAPSULE ORAL NIGHTLY
Status: DISCONTINUED | OUTPATIENT
Start: 2024-01-04 | End: 2024-01-07

## 2024-01-04 RX ORDER — SODIUM CHLORIDE, SODIUM LACTATE, POTASSIUM CHLORIDE, CALCIUM CHLORIDE 600; 310; 30; 20 MG/100ML; MG/100ML; MG/100ML; MG/100ML
75 INJECTION, SOLUTION INTRAVENOUS CONTINUOUS
Status: ACTIVE | OUTPATIENT
Start: 2024-01-04 | End: 2024-01-05

## 2024-01-04 RX ORDER — MIRTAZAPINE 15 MG/1
15 TABLET, FILM COATED ORAL NIGHTLY
Status: DISCONTINUED | OUTPATIENT
Start: 2024-01-04 | End: 2024-01-07

## 2024-01-04 RX ADMIN — RIVASTIGMINE 1 PATCH: 4.6 PATCH TRANSDERMAL at 18:26

## 2024-01-04 RX ADMIN — Medication 10 ML: at 22:13

## 2024-01-04 RX ADMIN — SODIUM CHLORIDE, POTASSIUM CHLORIDE, SODIUM LACTATE AND CALCIUM CHLORIDE 75 ML/HR: 600; 310; 30; 20 INJECTION, SOLUTION INTRAVENOUS at 18:25

## 2024-01-04 RX ADMIN — Medication 10 ML: at 00:00

## 2024-01-04 RX ADMIN — CASTOR OIL AND BALSAM, PERU 1 APPLICATION: 788; 87 OINTMENT TOPICAL at 22:05

## 2024-01-04 RX ADMIN — CASTOR OIL AND BALSAM, PERU 1 APPLICATION: 788; 87 OINTMENT TOPICAL at 09:00

## 2024-01-04 NOTE — PROGRESS NOTES
[facility] Medicine Services  PROGRESS NOTE    Patient Name: Omar Mendoza  : 1940  MRN: [id number]    Date of Admission: 2023  Primary Care Physician: Varun Pa MD    Subjective   Subjective     CC:  Weakness    HPI:  Patient resting comfortably but rousable, and then becomes agitated and uncooperative.  at bedside, reports he noted significant decline after family member passed away months ago.     Alert called overnight due to agitation and concern for altered speech, no acute findings on imaging.    Objective   Objective     Vital Signs:   Temp:  [97.5 °F (36.4 °C)-97.9 °F (36.6 °C)] 97.6 °F (36.4 °C)  Heart Rate:  [66-99] 66  Resp:  [18] 18  BP: (123-138)/(74-95) 138/85     Physical Exam:  General appearance: Resting, rousable, disoriented, no acute distress   Cardiovascular: RRR, no murmurs or rubs, radial pulse full 2/4 BL   Respiratory: CTAB, no crackles or wheezes   Abdomen: soft, non-tender, no organomegaly, bowel sounds normoactive    Neuro/CNS: AMS, encephalopathic    Results Reviewed:  LAB RESULTS:      Lab 24  2321 24  0608 24  0723 23  0551 23  0655 23  0417   WBC 6.34 4.89 3.87 4.51 5.98 6.17   HEMOGLOBIN 9.2* 8.2* 8.3* 8.0* 8.2* 8.6*   HEMATOCRIT 27.8* 24.7* 25.3* 24.7* 24.6* 25.5*   PLATELETS 181 141 139* 124* 110* 120*   NEUTROS ABS 3.49  --   --   --   --   --    EOS ABS 2.16*  --   --   --   --   --    MCV 96.5 94.3 96.2 95.0 92.8 93.4   SED RATE 14  --   --   --   --   --    PROCALCITONIN  --   --   --   --   --  0.11   LACTATE 2.7*  --   --   --   --   --          Lab 24  0412 24  1523 24  0608 24  0723 23  0608 23  0655 23  0417   SODIUM  --   --  145 144 145 146* 145   POTASSIUM  --  3.9 3.4* 3.7 3.7 4.0 3.8   CHLORIDE  --   --  110* 110* 110* 111* 113*   CO2  --   --  28.0 27.0 24.0 25.0 26.0   ANION GAP  --   --  7.0 7.0 11.0 10.0 6.0   BUN  --   --  13 14  16 14 15   CREATININE  --   --  0.48* 0.51* 0.55* 0.58 0.53*   EGFR  --   --  95.3 93.9 92.2 91.0 93.0   GLUCOSE  --   --  148* 172* 180* 134* 72   CALCIUM  --   --  7.5* 7.6* 7.6* 7.3* 7.6*   MAGNESIUM  --   --   --   --   --   --  1.6   HEMOGLOBIN A1C 10.70*  --   --   --   --   --   --                  Lab 01/04/24  0412   CHOLESTEROL 94   LDL CHOL 39   HDL CHOL 33*   TRIGLYCERIDES 120             Brief Urine Lab Results  (Last result in the past 365 days)        Color   Clarity   Blood   Leuk Est   Nitrite   Protein   CREAT   Urine HCG        01/04/24 0053 Yellow   Clear   Negative   Trace   Negative   Negative                   Microbiology Results Abnormal       Procedure Component Value - Date/Time    COVID-19 RAPID AG,VERITOR,COR/JAMARI/PAD/NUPUR/ASHA/LAG/BRITTANY/ IN-HOUSE,DRY SWAB, 1 HR TAT - Swab, Nasal Cavity [[id number]]  (Normal) Collected: 01/04/24 0004    Lab Status: Final result Specimen: Swab from Nasal Cavity Updated: 01/04/24 0022     COVID19 Presumptive Negative    Narrative:      Fact sheets for providers: https://www.fda.gov/media/562143/download    Fact sheets for patients: https://www.fda.gov/media/928106/download    Blood Culture - Blood, Blood, PICC Line [[id number]]  (Normal) Collected: 12/27/23 1400    Lab Status: Final result Specimen: Blood, PICC Line Updated: 01/01/24 1531     Blood Culture No growth at 5 days    Blood Culture - Blood, Arm, Right [[id number]]  (Normal) Collected: 12/27/23 0431    Lab Status: Final result Specimen: Blood from Arm, Right Updated: 01/01/24 0515     Blood Culture No growth at 5 days            CT Angiogram Head w AI Analysis of LVO    Result Date: 1/3/2024  CT ANGIOGRAM HEAD W AI ANALYSIS OF LVO, CT ANGIOGRAM NECK Date of Exam: 1/3/2024 7:32 PM EST Indication: Stroke, follow up. Comparison: None available. Technique: CTA of the head and neck was performed after the uneventful intravenous administration of 100 cc Isovue-370. Reconstructed coronal and sagittal images  were also obtained. In addition, a 3-D volume rendered image was created for interpretation.  Automated exposure control and iterative reconstruction methods were used. Findings: Contrast opacification: Excellent Aortic Arch: Unremarkable Right: Innominate: Patent Subclavian: Patent Common Carotid: Patent External Carotid:Patent Internal Carotid: Patent Intracranial ICA: Patent MCA:Patent BETO: Patent PCA: Patent Vertebral: Patent Left: Subclavian: Patent Common Carotid: Patent External Carotid:Patent Internal Carotid: Patent Intracranial ICA: Patent MCA:Patent BETO: Patent PCA: Patent Vertebral: Patent Basilar: Patent Soft Tissue: Unremarkable Lungs: Mild dependent atelectasis. Otherwise the visualized lungs are unremarkable. Bones: No acute osseous abnormality.     Impression: Impression: No acute arterial abnormality of the head and neck. Electronically Signed: Jonnathan Skelton DO  1/3/2024 8:01 PM EST  Workstation ID: [id number]    CT Angiogram Neck    Result Date: 1/3/2024  CT ANGIOGRAM HEAD W AI ANALYSIS OF LVO, CT ANGIOGRAM NECK Date of Exam: 1/3/2024 7:32 PM EST Indication: Stroke, follow up. Comparison: None available. Technique: CTA of the head and neck was performed after the uneventful intravenous administration of 100 cc Isovue-370. Reconstructed coronal and sagittal images were also obtained. In addition, a 3-D volume rendered image was created for interpretation.  Automated exposure control and iterative reconstruction methods were used. Findings: Contrast opacification: Excellent Aortic Arch: Unremarkable Right: Innominate: Patent Subclavian: Patent Common Carotid: Patent External Carotid:Patent Internal Carotid: Patent Intracranial ICA: Patent MCA:Patent BETO: Patent PCA: Patent Vertebral: Patent Left: Subclavian: Patent Common Carotid: Patent External Carotid:Patent Internal Carotid: Patent Intracranial ICA: Patent MCA:Patent BETO: Patent PCA: Patent Vertebral: Patent Basilar: Patent Soft Tissue:  Unremarkable Lungs: Mild dependent atelectasis. Otherwise the visualized lungs are unremarkable. Bones: No acute osseous abnormality.     Impression: Impression: No acute arterial abnormality of the head and neck. Electronically Signed: Jonnathan Skelton DO  1/3/2024 8:01 PM EST  Workstation ID: [id number]    CT CEREBRAL PERFUSION WITH & WITHOUT CONTRAST    Result Date: 1/3/2024  CT CEREBRAL PERFUSION W WO CONTRAST Date of Exam: 1/3/2024 7:32 PM EST Indication: Neuro deficit, acute, stroke suspected.  Comparison: Same day head CT Technique: Axial CT images of the brain were obtained prior to and after the administration of 100 cc Isovue 370. Core blood volume, core blood flow, mean transit time, and Tmax images were obtained utilizing the Rapid software protocol. A limited CT angiogram of the head was also performed to measure the blood vessel density. The radiation dose reduction device was turned on for each scan per the ALARA (As Low as Reasonably Achievable) protocol. Findings: Adequate perfusion images were obtained. Adequate ROIs obtained. Study is mildly degraded by motion, most significantly in the z-axis. CBF less than 30%: 0 mL. Tmax greater than 6 seconds: 0 mL Mismatch volume: 0 mL Mismatch ratio: None     Impression: Impression: Mildly motion degraded study. No evidence of an acute infarct or ischemia Electronically Signed: Jonnathan Skelton DO  1/3/2024 7:45 PM EST  Workstation ID: [id number]    CT Head Without Contrast Stroke Protocol    Result Date: 1/3/2024  CT HEAD WO CONTRAST STROKE PROTOCOL Date of Exam: 1/3/2024 7:22 PM EST Indication: Neuro deficit, acute, stroke suspected. Comparison: None available. Technique: Axial CT images were obtained of the head without contrast administration.  Reconstructed coronal images were also obtained. Automated exposure control and iterative construction methods were used. Scan Time: 7:28 p.m. Results discussed with stroke team at 7:39 p.m. Findings: Motion  degraded study. No large territory infarct. There is no evidence of hemorrhage. No mass effect, edema or midline shift Mild to moderate periventricular and subcortical white matter hypodensities, nonspecific but most likely represents chronic small vessel ischemic changes. No extra-axial fluid collection. Prominent ventricular system secondary to chronic parenchymal volume loss. Disconjugate gaze, nonspecific. The visualized paranasal sinuses and mastoid air cells are clear. The visualized soft tissues are unremarkable. No acute osseous abnormality.     Impression: Impression: No definite acute intracranial hemorrhage or large territorial infarct. Motion degraded study. Electronically Signed: Jonnathan Skelton DO  1/3/2024 7:40 PM EST  Workstation ID: [id number]     Results for orders placed during the hospital encounter of 12/26/23    Adult Transthoracic Echo Complete W/ Cont if Necessary Per Protocol    Interpretation Summary    Left ventricular systolic function is normal. Calculated left ventricular EF = 57% Left ventricular ejection fraction appears to be 56 - 60%.    Left ventricular wall thickness is consistent with mild to moderate concentric hypertrophy.    Left ventricular diastolic function was normal.    Estimated right ventricular systolic pressure from tricuspid regurgitation is mildly elevated (35-45 mmHg).    There is a trivial pericardial effusion.    Mild aortic valve regurgitation.      Current medications:  Scheduled Meds:aspirin, 81 mg, Oral, Daily  atorvastatin, 80 mg, Oral, Nightly  castor oil-balsam peru, 1 application , Topical, Q12H  heparin (porcine), 5,000 Units, Subcutaneous, Q12H  insulin lispro, 2-9 Units, Subcutaneous, 4x Daily With Meals & Nightly  magnesium hydroxide, 10 mL, Oral, Daily  metoprolol tartrate, 25 mg, Oral, BID  QUEtiapine, 25 mg, Oral, Q12H  sodium chloride, 10 mL, Intravenous, Q12H  sodium chloride, 10 mL, Intravenous, Q12H      Continuous Infusions:   PRN Meds:.   acetaminophen **OR** acetaminophen **OR** acetaminophen    senna-docusate sodium **AND** polyethylene glycol **AND** bisacodyl **AND** bisacodyl    Calcium Replacement - Follow Nurse / BPA Driven Protocol    dextrose    dextrose    glucagon (human recombinant)    hydrOXYzine    Magnesium Standard Dose Replacement - Follow Nurse / BPA Driven Protocol    nitroglycerin    ondansetron    Phosphorus Replacement - Follow Nurse / BPA Driven Protocol    Potassium Replacement - Follow Nurse / BPA Driven Protocol    sodium chloride    sodium chloride    sodium chloride    sodium chloride    ziprasidone    Assessment & Plan   Assessment & Plan     Active Hospital Problems    Diagnosis  POA    **UTI (urinary tract infection) [N39.0]  Yes    Acute constipation [K59.00]  Unknown    Acute urinary retention [R33.8]  Unknown    Uterine prolapse [N81.4]  Unknown    Bradycardia [R00.1]  Unknown    Pressure injury of buttock, unstageable [L89.300]  Unknown    Type 2 diabetes mellitus with ketoacidosis, without long-term current use of insulin [E11.10]  Unknown    Disorientation [R41.0]  Unknown    Pleural effusion [J90]  Unknown    Severe malnutrition [E43]  Yes    Edema of lower extremity [R60.0]  Yes    Bullous pemphigoid [L12.0]  Yes    Dyslipidemia [E78.5]  Yes    Essential hypertension [I10]  Yes      Resolved Hospital Problems   No resolved problems to display.     This patient's assessments and plans were partially entered by my partner and updated as appropriate by me on 1/4/2024     Brief Hospital Course to date:  Omar Mendoza is a 83 y.o. female with history of type 2 diabetes, hypertension, history of uterine prolapse, urinary retention with Schultz catheter in place, history of bullous pemphigoid  (s/p Dupixent 10/2023, prednisone/doxycycline 11/2023) CAD, hyperlipidemia, presumed dementia who presented to the ED with generalized weakness and bullous pemphigoid blister on the heel.      Acute metabolic encephalopathy,  "superimposed on suspected dementia  Generalized weakness  -Stroke alert called overnight but no findings suggestive of acute stroke  -Patient's behaviors are more suggestive of dementia  -Patient could have underlying component of depression to given reports of family members passing months ago with noted behavioral change at that time  -Consult neurology for further evaluation  -Unable to obtain EEG due to patient's lack of cooperation  -Delirium precautions  -Trial mirtazapine once p.o. tolerated    Dysphagia  -Nursing with concern for aspiration event 12/30 however patient herself says she \"just got choked.\" Does not wish for modified diet.   -SLP following, continue modified diet; unable to repeat evaluation today due to lack of cooperation     Bilateral pleural effusion  L>R  Bibasilar airspace disease versus pneumonia  -Patient currently on room air with good O2 sats  -Follow-up MRSA PCR, cultures, urinary strep and Legionella antigens  -Procalcitonin is low, lactate has improved  -Concern for aspiration on 12/30  -CXR revealed increased right basilar airspace disease with stable left basilar atelectasis  -Encourage IS, pulmonary toilet  -Completed 5 days of Rocephin and doxycycline  -Currently on RA       Anemia  -Iron 53, iron sat 28  -fecal occult negative  -suspect anemia of chronic disease, stable with no active signs of bleeding  -CBC in AM      Poor venous access  -Removed PICC overnight      Sinus bradycardia  - pt on both amiodarone and metoprolol, dtr reports amiodarone is new since hospitalization at [place].  - hold amiodarone, decreased metoprolol to 50 mg BID w/ hold parameters  -HR currently improved      BLE edema  Decreased pulses LLE  - CTA abd showing small right pleural effusion, bilateral basilar atelectasis  - CTA w/ runoff showing normal right sided runoff; left side delayed runoff possibly r/t venous stasis changes, no evidence of arterial occlusion or significant stenosis  - LLE " cool to touch compared to RLE, decreased pulses  - neurovascular checks  -Echo revealed EF 57% with normal diastolic function      Uncontrolled Type 2 diabetes  -Hgb A1C 12.10 on 12/27  -Continue SSI     Constipation  -Continue aggressive bowel regimen, s/p soapsuds enema x 1  -s/p Milk of mag x1, Miralax daily, suppository x1   -Continue prn   -chronic loza catheter in place     Uterine prolapse  -Gyn Dr. Zhou has seen, plan for outpatient follow up to discuss treatment options.   -may be contributing to chronic urine retention     Bullous pemphigoid  -Has developed new blister on right heel  -Continue doxycycline  -Wound care following      Pressure ulcer  -Wound care following      L adrenal adenoma  - incidental finding on CTA abdomen      Severe malnutrition     Expected Discharge Location and Transportation: TBD  Expected Discharge   Expected Discharge Date: 1/9/2024; Expected Discharge Time:      DVT prophylaxis:  Medical and mechanical DVT prophylaxis orders are present.     AM-PAC 6 Clicks Score (PT): 9 (01/03/24 1522)    CODE STATUS:   Code Status and Medical Interventions:   Ordered at: 12/27/23 0410     Code Status (Patient has no pulse and is not breathing):    CPR (Attempt to Resuscitate)     Medical Interventions (Patient has pulse or is breathing):    Full Support       Hugo Yee, DO  01/04/24

## 2024-01-04 NOTE — CONSULTS
"Stroke Consult Note    Patient Name: Omar Mendoza   MRN: [id number]  Age: 83 y.o.  Sex: female  : 1940    Primary Care Physician: Varun Pa MD  Referring Physician:      TIME STROKE TEAM CALLED:  EST     TIME PATIENT SEEN:  EST    Handedness: Right   Race:       Chief Complaint/Reason for Consultation: Slurred speech, agitation     HPI:   This is MsMigue Mendoza is a 83-year-old -American female with significant health diagnosis for CAD, hypertension, hyperlipidemia, diabetes type 2, acute urinary retention,bullous pemphigoid (s/p dupixent 10/23, prednisone / doxycycline 2023), uterine prolapse, ongoing confusion - thought to have dementia but no formal dx and not on medications for dementia who is admitted by hospital medicine team since 2023 for evaluation of generalized weakness and new bullous pemphigoid blister on right heel.  Code stroke alerted at  for worsening of slurred speech, combative activity, agitation.  Last known well reported by family between 230 to 4 PM with worsening of symptoms.  Patient's per family was reported to be confused since before [place] with worsening of her symptoms of confusion and agitation.  Patient was taken emergently to CT scanner, CT head without contrast negative for hemorrhage.  CTA CTP in process.  Patient is agitated yelling and screaming help help help.  Per family reported that they are thinking she might have sundowning and dementia however she was never diagnosed with.  Patient is currently on subcu heparin a.m. aspirin 81 mg per reviewing the chart as well as she is on Geodon and Seroquel for intermittent ongoing agitation and combatively.   Patient is noted to be disoriented not following commands, confused agitated pushing and kicking repeatedly saying \"I will show you\".  Patient noted to be able to move all extremities voluntarily no drift or weakness is observed, no facial droop no vision loss " "patient has disconjugate gaze baseline, unsure about baseline slurred speech however per primary RN reports that patient is more slurred than her normal.  No reported tobacco, alcohol, illicit drug or marijuana use.  Patient is a bed ridden per family, dependent.  Last Known Normal Date/Time: between  3018-7747 EST     Review of Systems   Unable to perform ROS: Mental status change   Neurological:  Positive for speech difficulty.   Psychiatric/Behavioral:  Positive for agitation, behavioral problems, confusion and hallucinations. The patient is hyperactive.       Past Medical History:   Diagnosis Date    CAD (coronary artery disease)     Dementia     Diabetes mellitus     Hyperlipemia     Hypertension      History reviewed. No pertinent surgical history.  History reviewed. No pertinent family history.  Social History     Socioeconomic History    Marital status:    Tobacco Use    Smoking status: Never    Smokeless tobacco: Never   Substance and Sexual Activity    Alcohol use: Never    Drug use: Defer    Sexual activity: Defer     No Known Allergies  Prior to Admission medications    Medication Sig Start Date End Date Taking? Authorizing Provider   aspirin 81 MG chewable tablet Chew 1 tablet Daily.   Yes Stacie Alas MD   insulin glargine (LANTUS, SEMGLEE) 100 UNIT/ML injection Inject 10 Units under the skin into the appropriate area as directed Daily.   Yes Stacie Alas MD   Insulin Syringe-Needle U-100 25G X 1\" 1 ML misc Use.   Yes Stacie Alas MD   metoprolol tartrate (LOPRESSOR) 100 MG tablet Take 1 tablet by mouth 2 (Two) Times a Day.   Yes Stacie Alas MD   spironolactone (ALDACTONE) 50 MG tablet Take 1 tablet by mouth Daily. 10/9/23  Yes Stacie Alas MD   amiodarone (PACERONE) 200 MG tablet Take 1 tablet by mouth Daily.    Stacie Alas MD   amLODIPine (NORVASC) 5 MG tablet Take 1 tablet by mouth Daily. 9/5/23   Stacie Alas MD   atorvastatin " (LIPITOR) 80 MG tablet Take 1 tablet by mouth Daily.    ProviderStacie MD   nystatin (MYCOSTATIN) 100,000 unit/mL suspension Swish and swallow 5 mL 4 (Four) Times a Day.    ProviderStacie MD   triamcinolone (KENALOG) 0.1 % cream Apply 1 application  topically to the appropriate area as directed 2 (Two) Times a Day.    ProviderStacie MD         Temp:  [96.9 °F (36.1 °C)-97.5 °F (36.4 °C)] 97.5 °F (36.4 °C)  Heart Rate:  [66-72] 72  Resp:  [18] 18  BP: (118-123)/(67-75) 123/75  Neurological Exam  Mental Status  Awake. Mild dysarthria present. Mixed aphasia present. Unable to follow commands.    Cranial Nerves  CN II: Right visual acuity: Normal. Left visual acuity: Normal.  CN III, IV, VI: Normal lids and orbits bilaterally. Pupils equal round and reactive to light bilaterally.  CN V:  Right: Jaw strength is normal. Normal corneal reflex.  Left: Jaw strength is normal. Normal corneal reflex.  CN VII:  Right: There is no facial weakness.  Left: There is no facial weakness.  CN VIII: Intact hearing bilateral.  CN IX, X:  Right: Palate is normal.  Left: Palate is normal.  CN XII: Tongue midline without atrophy or fasciculations.    Motor  Normal muscle bulk throughout. No fasciculations present. Normal muscle tone. No abnormal involuntary movements.  Strength is equal bilateral upper and lower extremity.    Sensory  Light touch is normal in upper and lower extremities.     Reflexes  Right Plantar: downgoing  Left Plantar: downgoing    Coordination    Unable to assess due to mental status change patient does not follow command baseline.    Gait    Wheelchair-bound.      Physical Exam  Constitutional:       General: She is awake.      Appearance: She is obese. She is ill-appearing.   HENT:      Head: Normocephalic and atraumatic.      Mouth/Throat:      Mouth: Mucous membranes are moist.   Eyes:      General: Lids are normal.      Pupils: Pupils are equal, round, and reactive to light.    Cardiovascular:      Rate and Rhythm: Normal rate and regular rhythm.   Musculoskeletal:         General: Normal range of motion.      Cervical back: Normal range of motion and neck supple.      Right lower leg: Edema present.      Left lower leg: Edema present.   Skin:     General: Skin is warm and dry.      Capillary Refill: Capillary refill takes less than 2 seconds.   Neurological:      Mental Status: She is disoriented.      Cranial Nerves: Dysarthria present.   Psychiatric:      Comments: Agitation patient is dementia with possible sundowning         Acute Stroke Data    Thrombolytic Inclusion / Exclusion Criteria    Time: 19:35 EST  Person Administering Scale: LITA Gr    Inclusion Criteria  [x]   18 years of age or greater   []   Onset of symptoms < 4.5 hours before beginning treatment (stroke onset = time patient was last seen well or without symptoms).   []   Diagnosis of acute ischemic stroke causing measurable disabling deficit (Complete Hemianopia, Any Aphasia, Visual or Sensory Extinction, Any weakness limiting sustained effort against gravity)   []   Any remaining deficit considered potentially disabling in view of patient and practitioner   Exclusion criteria (Do not proceed with Alteplase if any are checked under exclusion criteria)  [x]   Onset unknown or GREATER than 4.5 hours   []   ICH on CT/MRI   []   CT demonstrates hypodensity representing acute or subacute infarct   []   Significant head trauma or prior stroke in the previous 3 months   []   Symptoms suggestive of subarachnoid hemorrhage   []   History of un-ruptured intracranial aneurysm GREATER than 10 mm   []   Recent intracranial or intraspinal surgery within the last 3 months   []   Arterial puncture at a non-compressible site in the previous 7 days   []   Active internal bleeding   []   Acute bleeding tendency   []   Platelet count LESS than 100,000 for known hematological diseases such as leukemia, thrombocytopenia  or chronic cirrhosis   []   Current use of anticoagulant with INR GREATER than 1.7 or PT GREATER than 15 seconds, aPTT GREATER than 40 seconds   []   Heparin received within 48 hours, resulting in abnormally elevated aPTT GREATER than upper limit of normal   []   Current use of direct thrombin inhibitors or direct factor Xa inhibitors in the past 48 hours   []   Elevated blood pressure refractory to treatment (systolic GREATER than 185 mm/Hg or diastolic  GREATER than 110 mm/Hg   []   Suspected infective endocarditis and aortic arch dissection   []   Current use of therapeutic treatment dose of low-molecular-weight heparin (LMWH) within the previous 24 hours   []   Structural GI malignancy or bleed   Relative exclusion for all patients  []   Only minor non-disabling symptoms   []   Pregnancy   []   Seizure at onset with postictal residual neurological impairments   []   Major surgery or previous trauma within past 14 days   []   History of previous spontaneous ICH, intracranial neoplasm, or AV malformation   []   Postpartum (within previous 14 days)   []   Recent GI or urinary tract hemorrhage (within previous 21 days)   []   Recent acute MI (within previous 3 months)   []   History of un-ruptured intracranial aneurysm LESS than 10 mm   []   History of ruptured intracranial aneurysm   []   Blood glucose LESS than 50 mg/dL (2.7 mmol/L)   []   Dural puncture within the last 7 days   []   Known GREATER than 10 cerebral microbleeds   Additional exclusions for patients with symptoms onset between 3 and 4.5 hours.  []   Age > 80.   []   On any anticoagulants regardless of INR  >>> Warfarin (Coumadin), Heparin, Enoxaparin (Lovenox), fondaparinux (Arixtra), bivalirudin (Angiomax), Argatroban, dabigatran (Pradaxa), rivaroxaban (Xarelto), or apixaban (Eliquis)   []   Severe stroke (NIHSS > 25).   []   History of BOTH diabetes and previous ischemic stroke.   []   The risks and benefits have been discussed with the patient or  family related to the administration of IV thrombolytic therapy for stroke symptoms.   []   I have discussed and reviewed the patient's case and imaging with the attending prior to IV thrombolytic therapy.   NA Time IV thrombolytic administered       Hospital Meds:  Scheduled- aspirin, 81 mg, Oral, Daily  [START ON 1/4/2024] atorvastatin, 80 mg, Oral, Nightly  castor oil-balsam peru, 1 application , Topical, Q12H  heparin (porcine), 5,000 Units, Subcutaneous, Q12H  insulin lispro, 2-9 Units, Subcutaneous, 4x Daily With Meals & Nightly  iopamidol, 150 mL, Intravenous, Once in imaging  magnesium hydroxide, 10 mL, Oral, Daily  metoprolol tartrate, 25 mg, Oral, BID  QUEtiapine, 25 mg, Oral, Q12H  sodium chloride, 10 mL, Intravenous, Q12H  sodium chloride, 10 mL, Intravenous, Q12H      Infusions-     PRNs-   acetaminophen **OR** acetaminophen **OR** acetaminophen    senna-docusate sodium **AND** polyethylene glycol **AND** bisacodyl **AND** bisacodyl    Calcium Replacement - Follow Nurse / BPA Driven Protocol    dextrose    dextrose    glucagon (human recombinant)    hydrOXYzine    Magnesium Standard Dose Replacement - Follow Nurse / BPA Driven Protocol    nitroglycerin    ondansetron    Phosphorus Replacement - Follow Nurse / BPA Driven Protocol    Potassium Replacement - Follow Nurse / BPA Driven Protocol    sodium chloride    sodium chloride    sodium chloride    sodium chloride    ziprasidone    Functional Status Prior to Current Stroke/Cristi Score:4    NIH Stroke Scale  Time: 19:35 EST  Person Administering Scale: LITA Gr    Interval: baseline  1a. Level of Consciousness: 0-->Alert, keenly responsive  1b. LOC Questions: 2-->Answers neither question correctly  1c. LOC Commands: 2-->Performs neither task correctly  2. Best Gaze: 0-->Normal  3. Visual: 0-->No visual loss  4. Facial Palsy: 0-->Normal symmetrical movements  5a. Motor Arm, Left: 0-->No drift, limb holds 90 (or 45) degrees for full 10  secs  5b. Motor Arm, Right: 0-->No drift, limb holds 90 (or 45) degrees for full 10 secs  6a. Motor Leg, Left: 0-->No drift, leg holds 30 degree position for full 5 secs  6b. Motor Leg, Right: 0-->No drift, leg holds 30 degree position for full 5 secs  7. Limb Ataxia: 0-->Absent  8. Sensory: 0-->Normal, no sensory loss  9. Best Language: 1-->Mild-to-moderate aphasia, some obvious loss of fluency or facility of comprehension, without significant limitation on ideas expressed or form of expression. Reduction of speech and/or comprehension, however, makes conversation. . . (see row details)  10. Dysarthria: 1-->Mild-to-moderate dysarthria, patient slurs at least some words and, at worst, can be understood with some difficulty  11. Extinction and Inattention (formerly Neglect): 0-->No abnormality    Total (NIH Stroke Scale): 6        Results Reviewed:  I have personally reviewed current lab, radiology, and data and agree with results.  Recent labs reviewed and noted for 1/2/2024 sodium 145  Potassium 3.4  Creatinine 0.48   BUN 13  GFR 95  Recent WBC 2.62  H&H 8.2\24.7  Platelet 141    on 1/3/2024 Glucose 150      Results for orders placed during the hospital encounter of 12/26/23    Adult Transthoracic Echo Complete W/ Cont if Necessary Per Protocol    Interpretation Summary    Left ventricular systolic function is normal. Calculated left ventricular EF = 57% Left ventricular ejection fraction appears to be 56 - 60%.    Left ventricular wall thickness is consistent with mild to moderate concentric hypertrophy.    Left ventricular diastolic function was normal.    Estimated right ventricular systolic pressure from tricuspid regurgitation is mildly elevated (35-45 mmHg).    There is a trivial pericardial effusion.    Mild aortic valve regurgitation.   CT Angiogram Head w AI Analysis of LVO    Result Date: 1/3/2024  Impression: No acute arterial abnormality of the head and neck. Electronically Signed: Jonnathan Skelton DO   1/3/2024 8:01 PM EST  Workstation ID: [id number]    CT Angiogram Neck    Result Date: 1/3/2024  Impression: No acute arterial abnormality of the head and neck. Electronically Signed: Jonnathan Skelton, DO  1/3/2024 8:01 PM EST  Workstation ID: [id number]    CT CEREBRAL PERFUSION WITH & WITHOUT CONTRAST    Result Date: 1/3/2024  Impression: Mildly motion degraded study. No evidence of an acute infarct or ischemia Electronically Signed: Jonnathan Nafisa, DO  1/3/2024 7:45 PM EST  Workstation ID: [id number]    CT Head Without Contrast Stroke Protocol    Result Date: 1/3/2024  Impression: No definite acute intracranial hemorrhage or large territorial infarct. Motion degraded study. Electronically Signed: Jonnathan Jaelynmurali, DO  1/3/2024 7:40 PM EST  Workstation ID: [id number]            Assessment/Plan:    This is 83-year-old -American female, right-handed with significant health diagnosis for hypertension hyperlipidemia currently admitted for UTI, pleural effusion, Bullous pemphigoid, confusion since 12/26/2023, code stroke alerted for worsening of slurred speech and agitation.  Patient is not a candidate for IV thrombolytic therapy secondary to last known well outside the window    Antiplatelet PTA: Aspirin  Anticoagulant PTA: SQ heparin        Worsening of confusion, slurred speech, agitation  -DDx TIA\ischemic stroke, neoplasm, metabolic encephalopathy, possible dementia with behavioral problem, toxic encephalopathy in the setting of infection.    -TIA\ischemic stroke with IV thrombolytic therapy order set in place  -CT head without contrast no hemorrhage, CTA CTP motion 90 degraded secondary to patient's competitivity, and agitation however no perfusion deficits or LVO minimal atherosclerosis noted without flow-limiting.  -MRI ordered and pending  -NIH\neurochecks per protocol  -PT\OT\SLP evaluation  -Case management for final discharge planning  -Continue aspirin 81 mg p.o. versus rectal 300 mg daily, okay for  continuing DVT prophylaxis subcu heparin will defer to medicine team management.  -High intensity statin Lipitor 80 mg p.o. at night and daily for secondary stroke prevention  -Echo ordered and pending  -A1c and lipid profile ordered and pending  -CBC, infectious lab work is recommended to rule out infection, UA, chest x-ray, lactate, ESR, CRP, Pro-Ray, ammonia level.  -Neurology stroke we will continue to follow-up.      Baseline limited mobility bedridden  -PT\OT evaluation  -Case management for final discharge planning      Obesity  -BMI 34.76  -Complicates her aspects of care    Normocytic anemia  -H&H is stable  -No reported signs and symptoms of bleed  -Trending labs.    Agitation  -Patient was reported agitated and combative, per family not sure if patient has dementia with possible sundowning no prior diagnosis.  -DDx possible delirium versus possible dementia  -Continue management per medicine team  -Will recommend to follow-up with general neurology in the morning.    I discussed plan of care with patient family, primary RN.  Neurology stroke we will follow-up on MRI images, if MRI negative will refer to general neurology.  Thank you for letting us participate in patient care.    Julia Avendaño, APRN  January 3, 2024  19:35 EST

## 2024-01-04 NOTE — PAYOR COMM NOTE
"Omar Mendoza (83 y.o. Female)     [id number]     Mae Erickson, RN  Utilization Review  [phone]-[phone]  [phone]      OBS to INPT        Inpatient Admission  Once     Completed     Level of Care: Telemetry  Diagnosis: UTI (urinary tract infection) [840365]  Admitting Physician: HASMUKH GRAVES [[id number]]  Attending Physician: HASMUKH GRAVES [[id number]]  Certification: I Certify That Inpatient Hospital Services Are Medically Necessary For Greater Than 2 Midnights    01/04/24 1416           Date of Birth   1940    Social Security Number       Address   [address] [address]    Home Phone   [phone]    MRN   [id number]       Zoroastrian   None    Marital Status                               Admission Date   12/26/23    Admission Type   Emergency    Admitting Provider   Hasmukh Graves,     Attending Provider   Hasmukh Graves,     Department, Room/Bed   [address], S576/1       Discharge Date       Discharge Disposition       Discharge Destination                                 Attending Provider: Hasmukh Graves DO    Allergies: No Known Allergies    Isolation: None   Infection: None   Code Status: CPR    Ht: 170.2 cm (67.01\")   Wt: 101 kg (223 lb 12.3 oz)    Admission Cmt: None   Principal Problem: UTI (urinary tract infection) [N39.0]                   Active Insurance as of 12/26/2023       Primary Coverage       Payor Plan Insurance Group Employer/Plan Group    ANTHEM MEDICARE REPLACEMENT ANTHEM MEDICARE ADVANTAGE [id number]       Payor Plan Address Payor Plan Phone Number Payor Plan Fax Number Effective Dates    [address] [phone]  1/1/2022 - None Entered    [facility] [address]         Subscriber Name Subscriber Birth Date Member ID       OMAR MENDOZA 1940 [id number]                     Emergency Contacts        (Rel.) Home Phone Work Phone Mobile Phone    JO (POA)DESTIN (Son) [phone] -- " [phone]    DINO GATES (Daughter) [phone] -- [phone]    DALLAS MENDOZA (Spouse) [phone] -- [phone]                 History & Physical        Julee Rogers DO at 23 0410              [facility] Medicine Services  HISTORY AND PHYSICAL    Patient Name: Omar Mendoza  : 1940  MRN: [id number]  Primary Care Physician: Varun Pa MD  Date of admission: 2023    Subjective  Subjective     Chief Complaint:  Generalized weakness, skin lesions    HPI:  Omar Mendoza is a 83 y.o. female with hx of CAD, HTN, HLD, T2DM, acute urinary retention (has loza in place), acute constipation (no BM since ), bullous pemphigoid (s/p dupixent 10/23, prednisone / doxycycline 2023), uterine prolapse, ongoing confusion - thought to have dementia but no formal dx and not on medications for dementia who presents to the ED for evaluation of generalized weakness and new bullous pemphigoid blister on right heel. Pt is unable to provide HPI d/t mental status changes / confusion. Daughter is present and assisting with HPI. She makes it known that she has been unhappy with the care provided at [facility] rehab and feels like her mother was neglected there.She brings her to [facility] ED for evaluation of the right heel lesion and altered mental status / generalized weakness.     Review of Systems   Unable to perform ROS: Mental status change        Personal History     Past Medical History:   Diagnosis Date    CAD (coronary artery disease)     Dementia     Diabetes mellitus     Hyperlipemia     Hypertension              History reviewed. No pertinent surgical history.    Family History:  family history is not on file.     Social History:  reports that she has never smoked. She has never used smokeless tobacco. She reports that she does not drink alcohol.  Social History     Social History Narrative    Not on file       Medications:  Insulin Syringe-Needle U-100, amLODIPine,  amiodarone, aspirin, atorvastatin, insulin glargine, metoprolol tartrate, nystatin, spironolactone, and triamcinolone    No Known Allergies    Objective  Objective     Vital Signs:   Temp:  [98 °F (36.7 °C)] 98 °F (36.7 °C)  Heart Rate:  [48-53] 48  Resp:  [14-16] 14  BP: (101-120)/(58-71) 120/62    Physical Exam  Constitutional:       General: She is sleeping. She is not in acute distress.     Appearance: She is well-developed. She is ill-appearing. She is not toxic-appearing.   HENT:      Head: Normocephalic and atraumatic.      Nose: Nose normal.      Mouth/Throat:      Mouth: Mucous membranes are dry.      Pharynx: Oropharynx is clear.   Eyes:      Extraocular Movements: Extraocular movements intact.      Conjunctiva/sclera: Conjunctivae normal.      Pupils: Pupils are equal, round, and reactive to light.   Cardiovascular:      Rate and Rhythm: Regular rhythm. Bradycardia present.      Pulses: Normal pulses.      Heart sounds: Normal heart sounds. No murmur heard.  Pulmonary:      Effort: Pulmonary effort is normal.      Breath sounds: Normal breath sounds.   Abdominal:      General: Bowel sounds are normal. There is no distension.      Palpations: Abdomen is soft.      Tenderness: There is no abdominal tenderness. There is no guarding.   Musculoskeletal:         General: Normal range of motion.      Cervical back: Normal range of motion and neck supple.      Right lower le+ Pitting Edema present.      Left lower le+ Pitting Edema present.   Skin:     General: Skin is warm and dry.      Capillary Refill: Capillary refill takes less than 2 seconds.      Comments: See photos:   1. Right heel blister  2. Buttocks pressure ulcer   Neurological:      Mental Status: She is oriented to person, place, and time.      Cranial Nerves: No cranial nerve deficit.   Psychiatric:         Mood and Affect: Mood normal.         Behavior: Behavior normal. Behavior is cooperative.          Right heel      2.  Buttocks    Verbal permission to take and place photos in chart obtained from pt daughter who is at bedside.    Result Review:  I have personally reviewed the results from the time of this admission to 12/27/2023 04:57 EST and agree with these findings:  [x]  Laboratory list / accordion  []  Microbiology  [x]  Radiology  [x]  EKG/Telemetry   []  Cardiology/Vascular   []  Pathology  []  Old records  []  Other:  Most notable findings include:     LAB RESULTS:      Lab 12/27/23  0431 12/26/23 2336 12/26/23 2131   WBC  --   --  7.78   HEMOGLOBIN  --   --  10.3*   HEMATOCRIT  --   --  31.3*   PLATELETS  --   --  159   NEUTROS ABS  --   --  6.22   IMMATURE GRANS (ABS)  --   --  0.07*   LYMPHS ABS  --   --  0.92   MONOS ABS  --   --  0.34   EOS ABS  --   --  0.21   MCV  --   --  94.8   CRP  --  1.87*  --    PROCALCITONIN  --   --  0.10   LACTATE 1.4  --  2.1*         Lab 12/26/23 2131   SODIUM 143   POTASSIUM 4.3   CHLORIDE 109*   CO2 26.0   ANION GAP 8.0   BUN 21   CREATININE 0.80   EGFR 74.1   GLUCOSE 81   CALCIUM 7.9*         Lab 12/26/23 2131   TOTAL PROTEIN 4.9*   ALBUMIN 2.6*   GLOBULIN 2.3   ALT (SGPT) 31   AST (SGOT) 36*   BILIRUBIN 0.7   ALK PHOS 174*   LIPASE 119*         Lab 12/26/23 2336 12/26/23 2131   PROBNP  --  1,494.0   HSTROP T 67* 69*                 Brief Urine Lab Results  (Last result in the past 365 days)        Color   Clarity   Blood   Leuk Est   Nitrite   Protein   CREAT   Urine HCG        12/27/23 0040 Orange   Cloudy   Large (3+)   Moderate (2+)   Negative   100 mg/dL (2+)                 Microbiology Results (last 10 days)       ** No results found for the last 240 hours. **            CT Angio Abdominal Aorta Bilateral Iliofem Runoff    Result Date: 12/27/2023  CT ANGIO ABDOMINAL AORTA BILAT ILIOFEM RUNOFF Date of Exam: 12/26/2023 11:36 PM EST Indication: Rash, temperature difference in lower extremities, swelling, include phase as well. Comparison: None available. Technique: CTA of the  abdomen, pelvis and both lower extremities was performed after the uneventful intravenous administration of 125 mL Isovue-370. Reconstructed coronal and sagittal images were also obtained. In addition, a 3-D volume rendered image was created for interpretation. Automated exposure control and iterative reconstruction methods were used. Findings: Non--- vascular: There is a small right pleural effusion. There is bilateral basilar atelectasis. There is cholelithiasis. The arterial enhanced images of the liver, right adrenal gland, bilateral kidneys, pancreas and spleen are normal. There is adrenal adenoma on the left. There is a moderate stool burden with a moderate amount of stool in the rectosigmoid possibly from fecal impaction. There is anasarca. Vascular: There is tortuosity of the thoracic aorta. There is no aneurysmal dilatation or aortic stenosis. The bilateral common iliac, internal and external neck arteries are widely patent. There is a normal right-sided runoff with three-vessel to the ankle. On the left side, there is delayed runoff but the delayed images demonstrate a normal three-vessel runoff to the left ankle despite the slight delay which is of uncertain etiology. Venous stasis would be in the differential for the sluggish flow on the left. There is diffuse lower extremity edema.     Impression: Impression: No acute abnormality. No evidence of arterial occlusion or significant stenosis. There is delayed runoff on the left perhaps related to venous stasis changes. Please see above discussion for other findings. There is diffuse anasarca and lower extremity edema. Electronically Signed: Oni Quesada MD  12/27/2023 1:18 AM EST  Workstation ID: [id number]    XR Chest 1 View    Result Date: 12/26/2023  XR CHEST 1 VW Date of Exam: 12/26/2023 9:10 PM EST Indication: Chest Pain Triage Protocol Comparison: None available. Findings: Patient is rotated to the left. There is bibasilar airspace disease left greater  than right with small left pleural effusion. Negative for pneumothorax. Heart size within normal limits for technique. Osseous structures grossly intact.     Impression: Impression: Bibasilar airspace disease left greater than right which may relate to atelectasis and/or pneumonia with small left pleural effusion. Electronically Signed: Cyril Reina MD  12/26/2023 9:38 PM EST  Workstation ID: [id number]         Assessment & Plan  Assessment & Plan       UTI (urinary tract infection)    Bullous pemphigoid    Essential hypertension    Dyslipidemia    Edema of lower extremity    Acute constipation    Acute urinary retention    Uterine prolapse    Bradycardia    Pressure injury of buttock, unstageable    Type 2 diabetes mellitus with ketoacidosis, without long-term current use of insulin    Disorientation    Pleural effusion    UTI  - start rocephin  - blood cultures x 2  - lactic acid 2.1, repeat pending  - add urine culture   - hx of urinary retention w/ loza in place, d/c loza and bladder scan w/ acute urinary retention standing orders    BLE edema  Decreased pulses LLE  - CTA abd showing small right pleural effusion, bilateral basilar atelectasis  - CTA w/ runoff showing normal right sided runoff; left side delayed runoff possibly r/t venous stasis changes, no evidence of arterial occlusion or significant stenosis  - LLE cool to touch compared to RLE, decreased pulses  - neurovascular checks  - ECHO in am    L pleural effusion  - chest xray w/ bibasilar airspace dz L>R which may r/t atelectasis and or pneumonia w/ small L pleural effusion  - pt receiving rocephin (UTI) and doxycycline (Bullous pemphigoid) which would cover concern for pneumonia  - blood cx's pending  - add mrsa pcr nares  - add s.pneumo and legionella urinary ag  - pulmonary toilet / IS  - procal, esr, crp pending; initial lactic 2.1 w/ repeat 1.4  -- crp 1.87, procal 0.10    Bullous pemphigoid  - s/p dupixent injection 10/2023  - s/p skin bx by  dermatology and doxycycline/prednisone  - prednisone stopped d/t elevated glucose, dtr reports some confusion between hospital d/c and admission to rehab (steroids weaned and stopped in hospital then restarted on admission to rehab)  - has new right heel blister that came up 12/26  - per up to date recommendations, start doxycycline 100 mg BID, hold on steroids for now d/t concern for hyperglycemia     Pressure ulcer, buttocks  - daughter thought this was r/t the bullous pemphigoid, looks more like pressure ulcer  - C consult for evaluation    Disorientation  - daughter concern for altered mental status, has been gradual progression and worse recently  - concern for dementia, has not had neuro evaluation d/t difficulty getting her to appointments w/ recent acute illnesses  - consider neuro evaluation inpatient vs outpatient if mental status not improved  - neuro checks  - check tsh, b12, folate, vitamin d  - PT/OT consult, pt has not been mobile since prior to admission to [facility] 12/14    Acute constipation / urinary retention  - daughter reporting patient has not had BM since d/c from [facility] on 12/19, she was given something to help her use the bathroom prior to d/c but has not gone since  - CTA abd in the ED tonight showing there is a moderate stool burden w/ moderate amt of stool in the rectosigmoid possibly fecal impaction  - soap suds enema x1  - start bowel regimen  - urinary retention likely r/t constipation / uterine prolapse    Uterine prolapse  - significant visible prolapse, daughter asking what can be done for this, possible pessary?  - consider gynecological consult inpatient vs outpatient    Bradycardia  - pt on both amiodarone and metoprolol, dtr reports amiodarone is new since hospitalization at [facility], says pt was started on IV drip for her heart rate, unclear if she had a-fib   - obtain records from [facility]  - currently HR 49, sinus viral  - hold amiodarone  -  decrease metoprolol to 50 mg BID w/ hold parameters    T2DM  - has been on levemir since admission to [place] initially for DKA dtr thinks d/t steroid tx  - hold levemir as pt sleeping, not taking in good PO intake  - fsbg w/ moderate dose ssi  - check A1c  - may need to add levemir or increase coverage based on glucose w/ if prednisone needed    Anemia  - anemia studies  - occult stool  - compared to labs in EMR, on 12/22/23 H/H 10.4/22.5, platelets 177    L adrenal adenoma  - incidental finding on CTA abdomen       DVT prophylaxis:  Heparin SC BID    CODE STATUS:    Code Status (Patient has no pulse and is not breathing): CPR (Attempt to Resuscitate)  Medical Interventions (Patient has pulse or is breathing): Full Support      Expected Discharge    Expected discharge date/ time has not been documented.      This note has been completed as part of a split-shared workflow.     Signature: Electronically signed by LITA Rivas, 12/27/23, 4:37 AM EST    Total time: 80 minutes        Attending   Admission Attestation       I have performed an independent face-to-face diagnostic evaluation including performing an independent physical examination.  The documented plan of care above was reviewed and developed with the advanced practice clinician (APC).  I have updated the HPI as appropriate.    Brief HPI    This is an 83-year-old female patient with a PMH significant for bullous pemphigoid, CAD, HTN, HLD, diabetes mellitus type 2, acute urinary retention with indwelling Schultz catheter, constipation, uterine prolapse who comes to the ED due to weakness.  Family at bedside provides HPI.  Patient was discharged from [facility] around 2 weeks ago she was treated for steroid induced DKA.  She has been at rehab.  Family was concerned that rehab was not getting therapies and was having progressive decline.  They got the patient from rehab today and brought her to the ED.    Attending Physical Exam:  Temp:  [98  °F (36.7 °C)] 98 °F (36.7 °C)  Heart Rate:  [46-53] 46  Resp:  [1-16] 1  BP: (101-120)/(58-79) 119/69    Constitutional: Asleep, arousable  Eyes: Eyes closed  HENT: NCAT, mucous membranes moist  Neck: Supple, no thyromegaly, no lymphadenopathy, trachea midline  Respiratory: Clear to auscultation bilaterally, nonlabored respirations   Cardiovascular: RRR, no murmurs, rubs, or gallops, palpable pedal pulses bilaterally  Gastrointestinal: Positive bowel sounds, soft, nontender, nondistended  Musculoskeletal: 3+ pitting bilateral ankle edema, no clubbing or cyanosis to extremities  Psychiatric: Sleepy  Neurologic: Not oriented to person place or time, speech minimal  Skin: Bullous lesions to right foot pictured above, decubitus skin breakdown pictured above      Assessment and Plan:    See assessment and plan documented by APC above and updated/edited by me as appropriate.    Julee Rogers DO  23                    Electronically signed by Julee Rogers DO at 23 0557          Emergency Department Notes        Wilfredo Carl PA at 23 0529       Attestation signed by Stephanie Bernard MD at 23 0743        SUPERVISE: For this patient encounter, I reviewed the APC's documentation, treatment plan, and medical decision making.  Stephanie Bernard MD 2023 07:43 EST                          EMERGENCY DEPARTMENT ENCOUNTER    Pt Name: Omar Mendoza  MRN: [id number]  Pt :   1940  Room Number:    Date of encounter:  2023  PCP: Varun Pa MD  ED Provider: LIMA Yang    Historian: Patient    HPI:  Chief Complaint: Generalized weakness, bilateral lower extremity edema    Context: Omar Mendoza is a 83 y.o. female who presents to the ED accompanied by family with complaints of bilateral lower extremity edema and generalized weakness.  Family reports patient has history of bullous pemphigoid and has recent admission to [facility].  Patient was admitted to  [facility] as a result of diabetic ketoacidosis secondary to steroid use for treatment of her bullous pemphigoid.  DKA resolved and patient was discharged to [facility] in [facility].  Per family they were extremely unhappy with the care provided at this facility stating that the only thing patient received was food and that there was no active care plan or rehab.  Family reports patient had a Schultz catheter in place and that several times family was concerned about a urinary tract infection and this was not addressed.  Prior to admission to the hospital in [place] it was noted by family that patient was active and ambulatory and able to complete her normal activities of daily living.  Because of the lack of care provided at [facility] in [place] family signed patient out today and she presents to the ER for further evaluation.  HPI     REVIEW OF SYSTEMS  A chief complaint appropriate review of systems was completed and is negative except as noted in the HPI.     PAST MEDICAL HISTORY  Past Medical History:   Diagnosis Date    CAD (coronary artery disease)     Dementia     Diabetes mellitus     Hyperlipemia     Hypertension        PAST SURGICAL HISTORY  History reviewed. No pertinent surgical history.    FAMILY HISTORY  History reviewed. No pertinent family history.    SOCIAL HISTORY  Social History     Socioeconomic History    Marital status:    Tobacco Use    Smoking status: Never    Smokeless tobacco: Never   Substance and Sexual Activity    Alcohol use: Never       ALLERGIES  Patient has no known allergies.    PHYSICAL EXAM  Physical Exam  Vitals and nursing note reviewed.   Constitutional:       General: She is not in acute distress.     Appearance: Normal appearance. She is ill-appearing.   HENT:      Head: Normocephalic and atraumatic.      Nose: Nose normal.      Mouth/Throat:      Mouth: Mucous membranes are dry.   Eyes:      Extraocular Movements: Extraocular movements  intact.   Cardiovascular:      Rate and Rhythm: Bradycardia present.      Pulses: Normal pulses.   Pulmonary:      Effort: Pulmonary effort is normal.   Abdominal:      General: There is no distension.      Palpations: Abdomen is soft.      Tenderness: There is no abdominal tenderness.   Musculoskeletal:         General: Normal range of motion.      Cervical back: Normal range of motion and neck supple.      Right lower leg: Edema present.      Left lower leg: Edema present.      Right ankle: Swelling present.      Left ankle: Swelling present.      Comments: There is significant bilateral lower extremity edema.  Additionally observed are differences in temperature on bilateral lower extremities with feet being cooler than patient legs as she moved proximally.  There is also discoloration present from below the calf to toes.   Skin:     General: Skin is warm and dry.      Capillary Refill: Capillary refill takes 2 to 3 seconds.   Neurological:      General: No focal deficit present.      Mental Status: She is alert.       LAB RESULTS  Results for orders placed or performed during the hospital encounter of 12/26/23   High Sensitivity Troponin T    Specimen: Blood   Result Value Ref Range    HS Troponin T 69 (C) <14 ng/L   Comprehensive Metabolic Panel    Specimen: Blood   Result Value Ref Range    Glucose 81 65 - 99 mg/dL    BUN 21 8 - 23 mg/dL    Creatinine 0.80 0.57 - 1.00 mg/dL    Sodium 143 136 - 145 mmol/L    Potassium 4.3 3.5 - 5.2 mmol/L    Chloride 109 (H) 98 - 107 mmol/L    CO2 26.0 22.0 - 29.0 mmol/L    Calcium 7.9 (L) 8.6 - 10.5 mg/dL    Total Protein 4.9 (L) 6.0 - 8.5 g/dL    Albumin 2.6 (L) 3.5 - 5.2 g/dL    ALT (SGPT) 31 1 - 33 U/L    AST (SGOT) 36 (H) 1 - 32 U/L    Alkaline Phosphatase 174 (H) 39 - 117 U/L    Total Bilirubin 0.7 0.0 - 1.2 mg/dL    Globulin 2.3 gm/dL    A/G Ratio 1.1 g/dL    BUN/Creatinine Ratio 26.3 (H) 7.0 - 25.0    Anion Gap 8.0 5.0 - 15.0 mmol/L    eGFR 74.1 >60.0 mL/min/1.73    Lipase    Specimen: Blood   Result Value Ref Range    Lipase 119 (H) 13 - 60 U/L   BNP    Specimen: Blood   Result Value Ref Range    proBNP 1,494.0 0.0 - 1,800.0 pg/mL   CBC Auto Differential    Specimen: Blood   Result Value Ref Range    WBC 7.78 3.40 - 10.80 10*3/mm3    RBC 3.30 (L) 3.77 - 5.28 10*6/mm3    Hemoglobin 10.3 (L) 12.0 - 15.9 g/dL    Hematocrit 31.3 (L) 34.0 - 46.6 %    MCV 94.8 79.0 - 97.0 fL    MCH 31.2 26.6 - 33.0 pg    MCHC 32.9 31.5 - 35.7 g/dL    RDW 18.1 (H) 12.3 - 15.4 %    RDW-SD 60.8 (H) 37.0 - 54.0 fl    MPV 9.4 6.0 - 12.0 fL    Platelets 159 140 - 450 10*3/mm3    Neutrophil % 79.9 (H) 42.7 - 76.0 %    Lymphocyte % 11.8 (L) 19.6 - 45.3 %    Monocyte % 4.4 (L) 5.0 - 12.0 %    Eosinophil % 2.7 0.3 - 6.2 %    Basophil % 0.3 0.0 - 1.5 %    Immature Grans % 0.9 (H) 0.0 - 0.5 %    Neutrophils, Absolute 6.22 1.70 - 7.00 10*3/mm3    Lymphocytes, Absolute 0.92 0.70 - 3.10 10*3/mm3    Monocytes, Absolute 0.34 0.10 - 0.90 10*3/mm3    Eosinophils, Absolute 0.21 0.00 - 0.40 10*3/mm3    Basophils, Absolute 0.02 0.00 - 0.20 10*3/mm3    Immature Grans, Absolute 0.07 (H) 0.00 - 0.05 10*3/mm3    nRBC 0.8 (H) 0.0 - 0.2 /100 WBC   High Sensitivity Troponin T 2Hr    Specimen: Blood   Result Value Ref Range    HS Troponin T 67 (C) <14 ng/L    Troponin T Delta -2 >=-4 - <+4 ng/L   Urinalysis With Microscopic If Indicated (No Culture) - Urine, Catheter    Specimen: Urine, Catheter   Result Value Ref Range    Color, UA Orange (A) Yellow, Straw    Appearance, UA Cloudy (A) Clear    pH, UA 5.5 5.0 - 8.0    Specific Gravity, UA >=1.030 1.001 - 1.030    Glucose,  mg/dL (Trace) (A) Negative    Ketones, UA Negative Negative    Bilirubin, UA Negative Negative    Blood, UA Large (3+) (A) Negative    Protein,  mg/dL (2+) (A) Negative    Leuk Esterase, UA Moderate (2+) (A) Negative    Nitrite, UA Negative Negative    Urobilinogen, UA 1.0 E.U./dL 0.2 - 1.0 E.U./dL   Procalcitonin    Specimen: Blood   Result  Value Ref Range    Procalcitonin 0.10 0.00 - 0.25 ng/mL   Lactic Acid, Plasma    Specimen: Blood   Result Value Ref Range    Lactate 2.1 (C) 0.5 - 2.0 mmol/L   Urinalysis, Microscopic Only - Urine, Catheter    Specimen: Urine, Catheter   Result Value Ref Range    RBC, UA Too Numerous to Count (A) None Seen, 0-2 /HPF    WBC, UA Too Numerous to Count (A) None Seen, 0-2 /HPF    Bacteria, UA None Seen None Seen, Trace /HPF    Squamous Epithelial Cells, UA 0-2 None Seen, 0-2 /HPF    Yeast, UA Moderate/2+ Yeast None Seen /HPF    Hyaline Casts, UA 7-12 0 - 6 /LPF    Methodology Manual Light Microscopy    Lactic Acid, Plasma    Specimen: Blood   Result Value Ref Range    Lactate 1.4 0.5 - 2.0 mmol/L   Sedimentation Rate    Specimen: Blood   Result Value Ref Range    Sed Rate 17 0 - 30 mm/hr   C-reactive Protein    Specimen: Blood   Result Value Ref Range    C-Reactive Protein 1.87 (H) 0.00 - 0.50 mg/dL   ECG 12 Lead ED Triage Standing Order; Chest Pain   Result Value Ref Range    QT Interval 502 ms    QTC Interval 462 ms   Green Top (Gel)   Result Value Ref Range    Extra Tube Hold for add-ons.    Lavender Top   Result Value Ref Range    Extra Tube hold for add-on    Gold Top - SST   Result Value Ref Range    Extra Tube Hold for add-ons.    Gray Top   Result Value Ref Range    Extra Tube Hold for add-ons.    Light Blue Top   Result Value Ref Range    Extra Tube Hold for add-ons.        If labs were ordered, I independently reviewed the results and considered them in treating the patient.    RADIOLOGY  CT Angio Abdominal Aorta Bilateral Iliofem Runoff   Final Result   Impression:   No acute abnormality. No evidence of arterial occlusion or significant stenosis. There is delayed runoff on the left perhaps related to venous stasis changes. Please see above discussion for other findings. There is diffuse anasarca and lower extremity    edema.            Electronically Signed: Oni Quesada MD     12/27/2023 1:18 AM EST      Workstation ID: [id number]      XR Chest 1 View   Final Result   Impression:   Bibasilar airspace disease left greater than right which may relate to atelectasis and/or pneumonia with small left pleural effusion.            Electronically Signed: Cyril Reina MD     12/26/2023 9:38 PM EST     Workstation ID: [id number]        [x] Radiologist's Report Reviewed:  I ordered and independently interpreted the above noted radiographic studies.  See radiologist's dictation for official interpretation.      PROCEDURES    Procedures    ECG 12 Lead ED Triage Standing Order; Chest Pain   Preliminary Result   Test Reason : ED Triage Standing Order~   Blood Pressure :   */*   mmHG   Vent. Rate :  51 BPM     Atrial Rate :  51 BPM      P-R Int : 136 ms          QRS Dur : 142 ms       QT Int : 502 ms       P-R-T Axes :  16 -14  -3 degrees      QTc Int : 462 ms      Sinus bradycardia   Left bundle branch block   Abnormal ECG   No previous ECGs available      Referred By: EDMD           Confirmed By:       ECG 12 Lead ED Triage Standing Order; Chest Pain    (Results Pending)       MEDICATIONS GIVEN IN ER    Medications   sodium chloride 0.9 % flush 10 mL (has no administration in time range)   cefTRIAXone (ROCEPHIN) 1,000 mg in sodium chloride 0.9 % 100 mL IVPB (0 mg Intravenous Stopped 12/27/23 0504)   doxycycline (VIBRAMYCIN) 100 mg in sodium chloride 0.9 % 100 mL IVPB (has no administration in time range)   iopamidol (ISOVUE-370) 76 % injection 124 mL (124 mL Intravenous Given 12/26/23 5104)       MEDICAL DECISION MAKING, PROGRESS, and CONSULTS   Medical Decision Making  Problems Addressed:  Acute UTI: complicated acute illness or injury  Bullous pemphigoid: complicated acute illness or injury  Generalized weakness: complicated acute illness or injury    Amount and/or Complexity of Data Reviewed  Labs: ordered.  Radiology: ordered.  ECG/medicine tests: ordered.    Risk  Prescription drug management.  Decision regarding  hospitalization.        All labs have been independently reviewed by me.  All radiology studies have been interpreted by me and the radiologist dictating the report.  All EKG's have been independently interpreted by me as well as and overseeing attending physician.    [] Discussed with radiology regarding test interpretation:    Discussion below represents my analysis of pertinent findings related to patient's condition, differential diagnosis, treatment plan and final disposition.    Differential diagnosis:  The differential diagnosis associated with the patient's presentation includes: Infection,  inflammation, ACS, sepsis, viral syndrome, electrolyte abnormality, venous insufficiency, arterial insufficiency, exacerbation of bullous pemphigoid    Additional sources  Discussed/ obtained information from independent historians:   [] Spouse  [] Parent  [x] Family member  [] Friend  [] EMS   [] Other:  External (non-ED) record review:   [] Inpatient record:   [x] Office record: Patient will be office visits with primary care demonstrating constipation, hypertension, hyperlipidemia and bullous pemphigoid   [] Outpatient record:   [] Prior Outpatient labs:   [] Prior Outpatient radiology:   [] Primary Care record:   [] Outside ED record:   [] Other:   Patient's care impacted by:   [x] Diabetes  [x] Hypertension  [x] Hyperlipidemia  [] Hypothyroidism   [] Coronary Artery Disease   [] COPD   [] Cancer   [] Obesity  [] GERD   [] Tobacco Abuse   [] Substance Abuse    [] Anxiety   [] Depression   [x] Other: Bullous pemphigoid  Care significantly affected by Social Determinants of Health (housing and economic circumstances, unemployment)    [] Yes     [x] No   If yes, Patient's care significantly limited by  Social Determinants of Health including:   [] Inadequate housing   [] Low income   [] Alcoholism and drug addiction in family   [] Problems related to primary support group   [] Unemployment   [] Problems related to  employment   [] Other Social Determinants of Health:     Shared decision making:  I reviewed workup performed in ED including labs and imaging. Based on findings, recommendation made for admission. Patient is agreeable to plan of care and hospital admission.      Orders placed during this visit:  Orders Placed This Encounter   Procedures    Blood Culture - Blood,    Blood Culture - Blood,    Urine Culture - Urine,    XR Chest 1 View    CT Angio Abdominal Aorta Bilateral Iliofem Runoff    [place] Draw    High Sensitivity Troponin T    Comprehensive Metabolic Panel    Lipase    BNP    CBC Auto Differential    High Sensitivity Troponin T 2Hr    Urinalysis With Microscopic If Indicated (No Culture) - Urine, Catheter    Procalcitonin    Lactic Acid, Plasma    Urinalysis, Microscopic Only - Urine, Clean Catch    STAT Lactic Acid, Reflex    Lactic Acid, Plasma    Sedimentation Rate    C-reactive Protein    Urinalysis With Culture If Indicated -    NPO Diet NPO Type: Strict NPO    Undress & Gown    Continuous Pulse Oximetry    Code Status and Medical Interventions:    Oxygen Therapy- Nasal Cannula; Titrate 1-6 LPM Per SpO2; 90 - 95%    ECG 12 Lead ED Triage Standing Order; Chest Pain    ECG 12 Lead ED Triage Standing Order; Chest Pain    Adult Transthoracic Echo Complete W/ Cont if Necessary Per Protocol    Insert Peripheral IV    Initiate Observation Status    ED Bed Request    CBC & Differential    Green Top (Gel)    Lavender Top    Gold Top - SST    Gray Top    Light Blue Top     ED Course:    ED Course as of 12/27/23 0546   Wed Dec 27, 2023   0541 In summary this is a nontoxic ill-appearing 83-year-old female who presents to the ER from rehab after being treated for DKA.  Also with history of bullous pemphigoid with bilateral lower lower extremity edema, differences in color and temperature of lower extremities.  Patient has Schultz catheter though family was not totally sure why this was placed and this was not addressed  during her stay in the hospital or in rehab.  She has not had a voiding trial.  Evidence of urinary tract infection on urinalysis.  Lactate within normal limits.  Sed rate within normal limits.  CRP elevated.  Patient with history of coronary artery disease with elevated troponin, no significant delta, no complaints of chest pain.  EKG without evidence of acute ischemic changes.  Chest x-ray personally interpreted myself with official radiology review demonstrated bibasilar airspace disease left greater than right which may relate to atelectasis and/or pneumonia with small left pleural effusion.  With presentation of patient's lower extremities CT angio of the abdominal aorta with bilateral iliofemoral runoff was completed that showed No acute abnormality. No evidence of arterial occlusion or significant stenosis. There is delayed runoff on the left perhaps related to venous stasis changes. There is diffuse anasarca and lower extremity   edema.  Workup reviewed with patient and family at bedside with recommendation made for admission to hospital medicine.  Hospital medicine agreeable to accept patient for admission. [JG]      ED Course User Index  [JG] Wilfredo Carl PA            DIAGNOSIS  Final diagnoses:   Generalized weakness   Acute UTI   Bullous pemphigoid   Venous insufficiency       DISPOSITION    ED Disposition       ED Disposition   Decision to Admit    Condition   --    Comment   Level of Care: Telemetry [5]   Diagnosis: UTI (urinary tract infection) [[id number]]   Admitting Physician: MAGED GIANG [[id number]]   Attending Physician: MAGED GIANG [[id number]]   Bed Request Comments: tele                 Please note that portions of this document were completed with voice recognition software.        Wilfredo Carl PA  12/27/23 0546      Electronically signed by Stephanie Bernard MD at 12/27/23 0737       Peter Pinto, RN at 12/27/23 0514           [facility]    Nursing Report ED to Floor:  Mental status:  Alert / confused, a/o x1, normal baseline  Ambulatory status: Non ambulatory  Oxygen Therapy:  Room Air  Cardiac Rhythm: Sinus Ernie  Admitted from: ED  Safety Concerns:  Fall, Infection,Skin break down  Social Issues: na  ED Room #:  12    ED Nurse Phone Extension - [phone] or may call [phone].      HPI:   Chief Complaint   Patient presents with    Foot Swelling     Pt arrived via EMS for bilateral feet swelling, blister noted to bottom of right foot       Past Medical History:  Past Medical History:   Diagnosis Date    CAD (coronary artery disease)     Dementia     Diabetes mellitus     Hyperlipemia     Hypertension         Past Surgical History:  History reviewed. No pertinent surgical history.     Admitting Doctor:   Julee Rogers DO    Consulting Provider(s):  Consults       No orders found for last 30 day(s).             Admitting Diagnosis:   The primary encounter diagnosis was Generalized weakness. Diagnoses of Acute UTI and Bullous pemphigoid were also pertinent to this visit.    Most Recent Vitals:   Vitals:    12/27/23 0130 12/27/23 0200 12/27/23 0230 12/27/23 0300   BP: 101/59 104/64 107/58 120/62   Pulse: (!) 49 (!) 49 (!) 48 (!) 48   Resp: 14 14 14 14   Temp:       SpO2: 96% 90% 96% 100%   Weight:       Height:           Active LDAs/IV Access:   Lines, Drains & Airways       Active LDAs       Name Placement date Placement time Site Days    Peripheral IV 12/26/23 2131 Anterior;Right;Upper Arm 12/26/23 2131  Arm  less than 1    External Urinary Catheter 12/27/23 0416  --  less than 1                    Labs (abnormal labs have a star):   Labs Reviewed   TROPONIN - Abnormal; Notable for the following components:       Result Value    HS Troponin T 69 (*)     All other components within normal limits    Narrative:     High Sensitive Troponin T Reference Range:  <14.0 ng/L- Negative Female for AMI  <22.0 ng/L- Negative Male for AMI  >=14 - Abnormal Female indicating possible myocardial injury.  >=22 -  Abnormal Male indicating possible myocardial injury.   Clinicians would have to utilize clinical acumen, EKG, Troponin, and serial changes to determine if it is an Acute Myocardial Infarction or myocardial injury due to an underlying chronic condition.        COMPREHENSIVE METABOLIC PANEL - Abnormal; Notable for the following components:    Chloride 109 (*)     Calcium 7.9 (*)     Total Protein 4.9 (*)     Albumin 2.6 (*)     AST (SGOT) 36 (*)     Alkaline Phosphatase 174 (*)     BUN/Creatinine Ratio 26.3 (*)     All other components within normal limits    Narrative:     GFR Normal >60  Chronic Kidney Disease <60  Kidney Failure <15    The GFR formula is only valid for adults with stable renal function between ages 18 and 70.   LIPASE - Abnormal; Notable for the following components:    Lipase 119 (*)     All other components within normal limits   CBC WITH AUTO DIFFERENTIAL - Abnormal; Notable for the following components:    RBC 3.30 (*)     Hemoglobin 10.3 (*)     Hematocrit 31.3 (*)     RDW 18.1 (*)     RDW-SD 60.8 (*)     Neutrophil % 79.9 (*)     Lymphocyte % 11.8 (*)     Monocyte % 4.4 (*)     Immature Grans % 0.9 (*)     Immature Grans, Absolute 0.07 (*)     nRBC 0.8 (*)     All other components within normal limits   HIGH SENSITIVITIY TROPONIN T 2HR - Abnormal; Notable for the following components:    HS Troponin T 67 (*)     All other components within normal limits    Narrative:     High Sensitive Troponin T Reference Range:  <14.0 ng/L- Negative Female for AMI  <22.0 ng/L- Negative Male for AMI  >=14 - Abnormal Female indicating possible myocardial injury.  >=22 - Abnormal Male indicating possible myocardial injury.   Clinicians would have to utilize clinical acumen, EKG, Troponin, and serial changes to determine if it is an Acute Myocardial Infarction or myocardial injury due to an underlying chronic condition.        URINALYSIS W/ MICROSCOPIC IF INDICATED (NO CULTURE) - Abnormal; Notable for the  following components:    Color, UA Orange (*)     Appearance, UA Cloudy (*)     Glucose,  mg/dL (Trace) (*)     Blood, UA Large (3+) (*)     Protein,  mg/dL (2+) (*)     Leuk Esterase, UA Moderate (2+) (*)     All other components within normal limits   LACTIC ACID, PLASMA - Abnormal; Notable for the following components:    Lactate 2.1 (*)     All other components within normal limits   URINALYSIS, MICROSCOPIC ONLY - Abnormal; Notable for the following components:    RBC, UA Too Numerous to Count (*)     WBC, UA Too Numerous to Count (*)     All other components within normal limits   C-REACTIVE PROTEIN - Abnormal; Notable for the following components:    C-Reactive Protein 1.87 (*)     All other components within normal limits   BNP (IN-HOUSE) - Normal    Narrative:     This assay is used as an aid in the diagnosis of individuals suspected of having heart failure. It can be used as an aid in the diagnosis of acute decompensated heart failure (ADHF) in patients presenting with signs and symptoms of ADHF to the emergency department (ED). In addition, NT-proBNP of <300 pg/mL indicates ADHF is not likely.    Age Range Result Interpretation  NT-proBNP Concentration (pg/mL:      <50             Positive            >450                   Gray                 300-450                    Negative             <300    50-75           Positive            >900                  Gray                300-900                  Negative            <300      >75             Positive            >1800                  Gray                300-1800                  Negative            <300   PROCALCITONIN - Normal    Narrative:     As a Marker for Sepsis (Non-Neonates):    1. <0.5 ng/mL represents a low risk of severe sepsis and/or septic shock.  2. >2 ng/mL represents a high risk of severe sepsis and/or septic shock.    As a Marker for Lower Respiratory Tract Infections that require antibiotic therapy:    PCT on Admission     "Antibiotic Therapy       6-12 Hrs later    >0.5                Strongly Recommended  >0.25 - <0.5        Recommended   0.1 - 0.25          Discouraged              Remeasure/reassess PCT  <0.1                Strongly Discouraged     Remeasure/reassess PCT    As 28 day mortality risk marker: \"Change in Procalcitonin Result\" (>80% or <=80%) if Day 0 (or Day 1) and Day 4 values are available. Refer to http://www.[facility]-pct-calculator.com    Change in PCT <=80%  A decrease of PCT levels below or equal to 80% defines a positive change in PCT test result representing a higher risk for 28-day all-cause mortality of patients diagnosed with severe sepsis for septic shock.    Change in PCT >80%  A decrease of PCT levels of more than 80% defines a negative change in PCT result representing a lower risk for 28-day all-cause mortality of patients diagnosed with severe sepsis or septic shock.      LACTIC ACID, PLASMA - Normal   SEDIMENTATION RATE - Normal   BLOOD CULTURE   BLOOD CULTURE   URINE CULTURE   RAINBOW DRAW    Narrative:     The following orders were created for panel order [place] Draw.  Procedure                               Abnormality         Status                     ---------                               -----------         ------                     Green Top (Gel)[[id number]]                                  Final result               Lavender Top[[id number]]                                     Final result               Gold Top - SST[[id number]]                                   Final result               [place] Top[[id number]]                                         Final result               Light Blue Top[[id number]]                                   Final result                 Please view results for these tests on the individual orders.   LACTIC ACID, REFLEX   URINALYSIS W/ CULTURE IF INDICATED   CBC AND DIFFERENTIAL    Narrative:     The following orders were created for panel order CBC & " Differential.  Procedure                               Abnormality         Status                     ---------                               -----------         ------                     CBC Auto Differential[[id number]]        Abnormal            Final result                 Please view results for these tests on the individual orders.   GREEN TOP   LAVENDER TOP   GOLD TOP - SST   GRAY TOP   LIGHT BLUE TOP       Meds Given in ED:   Medications   sodium chloride 0.9 % flush 10 mL (has no administration in time range)   cefTRIAXone (ROCEPHIN) 1,000 mg in sodium chloride 0.9 % 100 mL IVPB (1,000 mg Intravenous New Bag 12/27/23 1314)   doxycycline (VIBRAMYCIN) 100 mg in sodium chloride 0.9 % 100 mL IVPB (has no administration in time range)   iopamidol (ISOVUE-370) 76 % injection 124 mL (124 mL Intravenous Given 12/26/23 1897)              Electronically signed by Peter Pinto, RN at 12/27/23 1421       Vital Signs (last day)       Date/Time Temp Temp src Pulse Resp BP Patient Position SpO2    01/04/24 1055 -- -- 66 -- -- -- 97    01/04/24 1040 97.6 (36.4) Axillary 79 18 138/85 Lying 84    01/04/24 0422 97.7 (36.5) Axillary -- 18 124/74 Lying --    01/03/24 2325 97.9 (36.6) Axillary -- -- 134/95 Lying --    01/03/24 1950 -- -- 88 -- -- -- --    01/03/24 1947 -- -- 94 -- -- -- --    01/03/24 1920 -- -- 99 -- -- -- --    01/03/24 1915 -- -- 98 -- -- -- --    01/03/24 1914 -- -- -- -- -- -- 98    01/03/24 1910 -- -- 85 -- -- -- 94    01/03/24 1907 -- -- -- -- -- -- 88    01/03/24 1905 -- -- 87 -- 138/94 -- --    01/03/24 1521 97.5 (36.4) Oral 72 18 123/75 Lying 90    01/03/24 1114 97.3 (36.3) Oral 66 18 121/67 Lying 95    01/03/24 0746 96.9 (36.1) Oral 66 18 118/74 Lying --          Oxygen Therapy (last day)       Date/Time SpO2 Device (Oxygen Therapy) Flow (L/min) Oxygen Concentration (%) ETCO2 (mmHg)    01/04/24 1300 -- room air -- -- --    01/04/24 1200 -- room air -- -- --    01/04/24 1055 97 room air -- -- --     01/04/24 1040 84 room air -- -- --    01/04/24 0900 -- room air -- -- --    01/03/24 2100 -- room air -- -- --    01/03/24 1914 98 -- -- -- --    01/03/24 1910 94 -- -- -- --    01/03/24 1907 88 -- -- -- --    01/03/24 1521 90 -- -- -- --    01/03/24 1114 95 -- -- -- --    01/03/24 0800 -- room air -- -- --          Lines, Drains & Airways       Active LDAs       Name Placement date Placement time Site Days    Peripheral IV Anterior;Right Forearm --  --  Forearm  --    External Urinary Catheter 12/27/23  0416  --  8                  Current Facility-Administered Medications   Medication Dose Route Frequency Provider Last Rate Last Admin    acetaminophen (TYLENOL) tablet 650 mg  650 mg Oral Q4H PRN Sepideh Delgadillo APRN   650 mg at 01/01/24 0842    Or    acetaminophen (TYLENOL) 160 MG/5ML oral solution 650 mg  650 mg Oral Q4H PRN Sepideh Delgadillo APRN        Or    acetaminophen (TYLENOL) suppository 650 mg  650 mg Rectal Q4H PRN Sepideh Delgadillo APRN        aspirin chewable tablet 81 mg  81 mg Oral Daily Sepideh Delgadillo APRN   81 mg at 01/03/24 0851    atorvastatin (LIPITOR) tablet 80 mg  80 mg Oral Nightly Jarocho May, APRMCKENNA        sennosides-docusate (PERICOLACE) 8.6-50 MG per tablet 2 tablet  2 tablet Oral BID PRN Frantz Martin APRN        And    polyethylene glycol (MIRALAX) packet 17 g  17 g Oral Daily PRN Frantz Martin APRN        And    bisacodyl (DULCOLAX) EC tablet 5 mg  5 mg Oral Daily PRN Frantz Martin APRMCKENNA        And    bisacodyl (DULCOLAX) suppository 10 mg  10 mg Rectal Daily PRN Frantz Martin APRN        Calcium Replacement - Follow Nurse / BPA Driven Protocol   Does not apply PRN Sepideh Delgadillo APRN        castor oil-balsam peru (VENELEX) ointment 1 application   1 application  Topical Q12H Trinh Mendoza MD   1 application  at 01/04/24 0900    dextrose (D50W) (25 g/50 mL) IV injection 25 g  25 g Intravenous Q15 Min PRN Sepideh Delgadillo APRN   25 g at 12/27/23 0821    dextrose  (GLUTOSE) oral gel 15 g  15 g Oral Q15 Min PRN Sepideh Delgadillo APRN        glucagon (GLUCAGEN) injection 1 mg  1 mg Intramuscular Q15 Min PRN Sepideh Delgadillo APRN        heparin (porcine) 5000 UNIT/ML injection 5,000 Units  5,000 Units Subcutaneous Q12H Sepideh Delgadillo APRN   5,000 Units at 01/03/24 0851    hydrOXYzine (ATARAX) tablet 25 mg  25 mg Oral TID PRN Trinh Mendoza MD   25 mg at 12/27/23 1225    Insulin Lispro (humaLOG) injection 2-9 Units  2-9 Units Subcutaneous 4x Daily With Meals & Nightly Sepideh Delgadillo APRN   2 Units at 01/03/24 0851    magnesium hydroxide (MILK OF MAGNESIA) 400 MG/5ML suspension 10 mL  10 mL Oral Daily Frantz Martin APRN   10 mL at 01/03/24 0852    Magnesium Standard Dose Replacement - Follow Nurse / BPA Driven Protocol   Does not apply PRN Sepideh Delgadillo APRN        metoprolol tartrate (LOPRESSOR) tablet 25 mg  25 mg Oral BID Frantz Martin APRN   25 mg at 01/03/24 0851    nitroglycerin (NITROSTAT) SL tablet 0.4 mg  0.4 mg Sublingual Q5 Min PRN Sepideh Delgadillo APRN        ondansetron (ZOFRAN) injection 4 mg  4 mg Intravenous Q6H PRN Sepideh Delgadillo APRN   4 mg at 12/29/23 2027    Phosphorus Replacement - Follow Nurse / BPA Driven Protocol   Does not apply PRN Sepideh Delgadillo APRN        Potassium Replacement - Follow Nurse / BPA Driven Protocol   Does not apply PRN Sepideh Delgadillo APRN        QUEtiapine (SEROquel) tablet 25 mg  25 mg Oral Q12H Ludy Todd II, DO   25 mg at 01/03/24 1438    sodium chloride 0.9 % flush 10 mL  10 mL Intravenous Q12H Trinh Mendoza MD   10 mL at 01/04/24 0000    sodium chloride 0.9 % flush 10 mL  10 mL Intravenous PRN Trinh Mendoza MD        sodium chloride 0.9 % flush 10 mL  10 mL Intravenous Q12H SoilaaniNathalia May, APRN   10 mL at 01/04/24 0000    sodium chloride 0.9 % flush 10 mL  10 mL Intravenous PRN Jarocho May, APRN        sodium chloride 0.9 % infusion 40 mL  40 mL Intravenous PRN Sepideh Delgadillo, APRN        sodium  chloride 0.9 % infusion 40 mL  40 mL Intravenous PRN Julia Avendaño, APRN        ziprasidone (GEODON) injection 10 mg  10 mg Intramuscular Q6H PRN Ludy Todd II, DO         Lab Results (last 48 hours)       Procedure Component Value Units Date/Time    POC Glucose Once [[id number]]  (Abnormal) Collected: 01/04/24 1058    Specimen: Blood Updated: 01/04/24 1059     Glucose 138 mg/dL     POC Glucose Once [[id number]]  (Abnormal) Collected: 01/04/24 0645    Specimen: Blood Updated: 01/04/24 0646     Glucose 135 mg/dL     Lipid Panel [[id number]]  (Abnormal) Collected: 01/04/24 0412    Specimen: Blood Updated: 01/04/24 0515     Total Cholesterol 94 mg/dL      Triglycerides 120 mg/dL      HDL Cholesterol 33 mg/dL      LDL Cholesterol  39 mg/dL      VLDL Cholesterol 22 mg/dL      LDL/HDL Ratio 1.12    Narrative:      Cholesterol Reference Ranges  (U.S. Department of Health and Human Services ATP III Classifications)    Desirable          <200 mg/dL  Borderline High    200-239 mg/dL  High Risk          >240 mg/dL      Triglyceride Reference Ranges  (U.S. Department of Health and Human Services ATP III Classifications)    Normal           <150 mg/dL  Borderline High  150-199 mg/dL  High             200-499 mg/dL  Very High        >500 mg/dL    HDL Reference Ranges  (U.S. Department of Health and Human Services ATP III Classifications)    Low     <40 mg/dl (major risk factor for CHD)  High    >60 mg/dl ('negative' risk factor for CHD)        LDL Reference Ranges  (U.S. Department of Health and Human Services ATP III Classifications)    Optimal          <100 mg/dL  Near Optimal     100-129 mg/dL  Borderline High  130-159 mg/dL  High             160-189 mg/dL  Very High        >189 mg/dL    Hemoglobin A1c [[id number]]  (Abnormal) Collected: 01/04/24 0412    Specimen: Blood Updated: 01/04/24 0510     Hemoglobin A1C 10.70 %     Narrative:      Hemoglobin A1C Ranges:    Increased Risk for Diabetes  5.7% to 6.4%  Diabetes                      >= 6.5%  Diabetic Goal                < 7.0%    CBC & Differential [[id number]]  (Abnormal) Collected: 01/03/24 2321    Specimen: Blood Updated: 01/04/24 0436    Narrative:      The following orders were created for panel order CBC & Differential.  Procedure                               Abnormality         Status                     ---------                               -----------         ------                     CBC Auto Differential[[id number]]        Abnormal            Final result               Scan Slide[[id number]]                                       Final result                 Please view results for these tests on the individual orders.    CBC Auto Differential [[id number]]  (Abnormal) Collected: 01/03/24 2321    Specimen: Blood Updated: 01/04/24 0436     WBC 6.34 10*3/mm3      RBC 2.88 10*6/mm3      Hemoglobin 9.2 g/dL      Hematocrit 27.8 %      MCV 96.5 fL      MCH 31.9 pg      MCHC 33.1 g/dL      RDW 17.7 %      RDW-SD 60.6 fl      MPV 9.9 fL      Platelets 181 10*3/mm3     Narrative:      The previously reported component NRBC is no longer being reported. Previous result was 1.1 /100 WBC (Reference Range: 0.0-0.2 /100 WBC) on 1/4/2024 at 0108 EST.    Scan Slide [[id number]] Collected: 01/03/24 2321    Specimen: Blood Updated: 01/04/24 0436     Scan Slide --     Comment: See Manual Differential Results       Manual Differential [[id number]]  (Abnormal) Collected: 01/03/24 2321    Specimen: Blood Updated: 01/04/24 0436     Neutrophil % 55.0 %      Lymphocyte % 9.0 %      Monocyte % 2.0 %      Eosinophil % 34.0 %      Basophil % 0.0 %      Neutrophils Absolute 3.49 10*3/mm3      Lymphocytes Absolute 0.57 10*3/mm3      Monocytes Absolute 0.13 10*3/mm3      Eosinophils Absolute 2.16 10*3/mm3      Basophils Absolute 0.00 10*3/mm3      RBC Morphology Normal     WBC Morphology Normal     Platelet Morphology Normal    Urinalysis, Microscopic Only - Straight Cath [[id number]]  (Abnormal)  Collected: 01/04/24 0053    Specimen: Urine from Straight Cath Updated: 01/04/24 0146     RBC, UA 0-2 /HPF      WBC, UA 6-10 /HPF      Bacteria, UA Trace /HPF      Squamous Epithelial Cells, UA 0-2 /HPF      Yeast, UA Large/3+ Budding Yeast /HPF      Hyaline Casts, UA 0-6 /LPF      Methodology Manual Light Microscopy    Urine Culture - Urine, Straight Cath [[id number]] Collected: 01/04/24 0053    Specimen: Urine from Straight Cath Updated: 01/04/24 0146    Urinalysis With Culture If Indicated - Straight Cath [[id number]]  (Abnormal) Collected: 01/04/24 0053    Specimen: Urine from Straight Cath Updated: 01/04/24 0108     Color, UA Yellow     Appearance, UA Clear     pH, UA 7.5     Specific Gravity, UA 1.021     Glucose, UA Negative     Ketones, UA Negative     Bilirubin, UA Negative     Blood, UA Negative     Protein, UA Negative     Leuk Esterase, UA Trace     Nitrite, UA Negative     Urobilinogen, UA 1.0 E.U./dL    Narrative:      In absence of clinical symptoms, the presence of pyuria, bacteria, and/or nitrites on the urinalysis result does not correlate with infection.    COVID-19 RAPID AG,VERITOR,COR/JAMARI/PAD/NUPUR/ASHA/LAG/BRITTANY/ IN-HOUSE,DRY SWAB, 1 HR TAT - Swab, Nasal Cavity [[id number]]  (Normal) Collected: 01/04/24 0004    Specimen: Swab from Nasal Cavity Updated: 01/04/24 0022     COVID19 Presumptive Negative    Narrative:      Fact sheets for providers: https://www.fda.gov/media/791989/download    Fact sheets for patients: https://www.fda.gov/media/613184/download    Lactic Acid, Plasma [[id number]]  (Abnormal) Collected: 01/03/24 2321    Specimen: Blood Updated: 01/03/24 2348     Lactate 2.7 mmol/L      Comment: Falsely depressed results may occur on samples drawn from patients receiving N-Acetylcysteine (NAC) or Metamizole.       Ammonia [[id number]]  (Normal) Collected: 01/03/24 2321    Specimen: Blood Updated: 01/03/24 2347     Ammonia 17 umol/L     Sedimentation Rate [[id number]]  (Normal) Collected: 01/03/24  2321    Specimen: Blood Updated: 01/03/24 2342     Sed Rate 14 mm/hr     POC Glucose Once [[id number]]  (Abnormal) Collected: 01/03/24 2330    Specimen: Blood Updated: 01/03/24 2341     Glucose 142 mg/dL     POC Glucose Once [[id number]]  (Abnormal) Collected: 01/03/24 0801    Specimen: Blood Updated: 01/03/24 0808     Glucose 150 mg/dL     POC Glucose Once [[id number]]  (Abnormal) Collected: 01/02/24 2015    Specimen: Blood Updated: 01/02/24 2017     Glucose 178 mg/dL     POC Glucose Once [[id number]]  (Abnormal) Collected: 01/02/24 1638    Specimen: Blood Updated: 01/02/24 1640     Glucose 135 mg/dL     Potassium [[id number]]  (Normal) Collected: 01/02/24 1523    Specimen: Blood Updated: 01/02/24 1555     Potassium 3.9 mmol/L           Imaging Results (Last 48 Hours)       Procedure Component Value Units Date/Time    CT Angiogram Head w AI Analysis of LVO [[id number]] Collected: 01/03/24 1945     Updated: 01/03/24 2004    Narrative:      CT ANGIOGRAM HEAD W AI ANALYSIS OF LVO, CT ANGIOGRAM NECK    Date of Exam: 1/3/2024 7:32 PM EST    Indication: Stroke, follow up.    Comparison: None available.    Technique: CTA of the head and neck was performed after the uneventful intravenous administration of 100 cc Isovue-370. Reconstructed coronal and sagittal images were also obtained. In addition, a 3-D volume rendered image was created for interpretation.   Automated exposure control and iterative reconstruction methods were used.      Findings:    Contrast opacification: Excellent    Aortic Arch: Unremarkable    Right:    Innominate: Patent  Subclavian: Patent  Common Carotid: Patent  External Carotid:Patent  Internal Carotid: Patent    Intracranial ICA: Patent  MCA:Patent  BETO: Patent  PCA: Patent  Vertebral: Patent    Left:    Subclavian: Patent  Common Carotid: Patent  External Carotid:Patent  Internal Carotid: Patent    Intracranial ICA: Patent  MCA:Patent  BETO: Patent  PCA: Patent  Vertebral: Patent      Basilar:  Patent      Soft Tissue: Unremarkable  Lungs: Mild dependent atelectasis. Otherwise the visualized lungs are unremarkable.  Bones: No acute osseous abnormality.      Impression:      Impression:  No acute arterial abnormality of the head and neck.            Electronically Signed: Jonnathan Skelton DO    1/3/2024 8:01 PM EST    Workstation ID: [id number]    CT Angiogram Neck [[id number]] Collected: 01/03/24 1945     Updated: 01/03/24 2004    Narrative:      CT ANGIOGRAM HEAD W AI ANALYSIS OF LVO, CT ANGIOGRAM NECK    Date of Exam: 1/3/2024 7:32 PM EST    Indication: Stroke, follow up.    Comparison: None available.    Technique: CTA of the head and neck was performed after the uneventful intravenous administration of 100 cc Isovue-370. Reconstructed coronal and sagittal images were also obtained. In addition, a 3-D volume rendered image was created for interpretation.   Automated exposure control and iterative reconstruction methods were used.      Findings:    Contrast opacification: Excellent    Aortic Arch: Unremarkable    Right:    Innominate: Patent  Subclavian: Patent  Common Carotid: Patent  External Carotid:Patent  Internal Carotid: Patent    Intracranial ICA: Patent  MCA:Patent  BETO: Patent  PCA: Patent  Vertebral: Patent    Left:    Subclavian: Patent  Common Carotid: Patent  External Carotid:Patent  Internal Carotid: Patent    Intracranial ICA: Patent  MCA:Patent  BETO: Patent  PCA: Patent  Vertebral: Patent      Basilar: Patent      Soft Tissue: Unremarkable  Lungs: Mild dependent atelectasis. Otherwise the visualized lungs are unremarkable.  Bones: No acute osseous abnormality.      Impression:      Impression:  No acute arterial abnormality of the head and neck.            Electronically Signed: Jonnathan Skelton DO    1/3/2024 8:01 PM EST    Workstation ID: [id number]    CT CEREBRAL PERFUSION WITH & WITHOUT CONTRAST [[id number]] Collected: 01/03/24 1943     Updated: 01/03/24 1948    Narrative:      CT  CEREBRAL PERFUSION W WO CONTRAST    Date of Exam: 1/3/2024 7:32 PM EST    Indication: Neuro deficit, acute, stroke suspected.     Comparison: Same day head CT    Technique: Axial CT images of the brain were obtained prior to and after the administration of 100 cc Isovue 370. Core blood volume, core blood flow, mean transit time, and Tmax images were obtained utilizing the Rapid software protocol. A limited CT   angiogram of the head was also performed to measure the blood vessel density.    The radiation dose reduction device was turned on for each scan per the ALARA (As Low as Reasonably Achievable) protocol.      Findings:  Adequate perfusion images were obtained. Adequate ROIs obtained.  Study is mildly degraded by motion, most significantly in the z-axis.    CBF less than 30%: 0 mL.  Tmax greater than 6 seconds: 0 mL    Mismatch volume: 0 mL  Mismatch ratio: None      Impression:      Impression:  Mildly motion degraded study. No evidence of an acute infarct or ischemia        Electronically Signed: Jonnathan Skelton DO    1/3/2024 7:45 PM EST    Workstation ID: [id number]    CT Head Without Contrast Stroke Protocol [[id number]] Collected: 01/03/24 1935     Updated: 01/03/24 1943    Narrative:      CT HEAD WO CONTRAST STROKE PROTOCOL    Date of Exam: 1/3/2024 7:22 PM EST    Indication: Neuro deficit, acute, stroke suspected.    Comparison: None available.    Technique: Axial CT images were obtained of the head without contrast administration.  Reconstructed coronal images were also obtained. Automated exposure control and iterative construction methods were used.    Scan Time: 7:28 p.m.  Results discussed with stroke team at 7:39 p.m.      Findings:  Motion degraded study.    No large territory infarct.    There is no evidence of hemorrhage.  No mass effect, edema or midline shift    Mild to moderate periventricular and subcortical white matter hypodensities, nonspecific but most likely represents chronic small  vessel ischemic changes.    No extra-axial fluid collection.      Prominent ventricular system secondary to chronic parenchymal volume loss.      Disconjugate gaze, nonspecific.  The visualized paranasal sinuses and mastoid air cells are clear.    The visualized soft tissues are unremarkable.  No acute osseous abnormality.      Impression:      Impression:  No definite acute intracranial hemorrhage or large territorial infarct. Motion degraded study.        Electronically Signed: Jonnathan DO Nafisa    1/3/2024 7:40 PM EST    Workstation ID: [id number]          ECG/EMG Results (last 48 hours)       Procedure Component Value Units Date/Time    SCANNED - TELEMETRY   [[id number]] Resulted: 23     Updated: 24 1207             Physician Progress Notes (last 48 hours)        Ludy Todd II,  at 24 0919              [facility] Medicine Services  PROGRESS NOTE    Patient Name: Omar Mendoza  : 1940  MRN: [id number]    Date of Admission: 2023  Primary Care Physician: Varun Pa MD    Subjective   Subjective     CC: f/u weakness    HPI: Up in bed. No family in room. Tearful, says she is tired of being here.      Objective   Objective     Vital Signs:   Temp:  [96.9 °F (36.1 °C)-97.3 °F (36.3 °C)] 97.3 °F (36.3 °C)  Heart Rate:  [65-71] 66  Resp:  [18] 18  BP: (118-121)/(67-74) 121/67     Physical Exam:  Constitutional: No acute distress, awake, alert, lying in bed  HENT: NCAT, mucous membranes moist  Respiratory: Clear to auscultation bilaterally, respiratory effort normal   Cardiovascular: RRR, no murmurs, rubs, or gallops  Gastrointestinal: Positive bowel sounds, soft, nontender, nondistended  Musculoskeletal: No bilateral ankle edema  Psychiatric: Appropriate affect, cooperative  Neurologic: Oriented x 3, strength symmetric in all extremities, Cranial Nerves grossly intact to confrontation, speech clear  Skin: No rashes     Results Reviewed:  LAB  RESULTS:      Lab 01/02/24  0608 01/01/24  0723 12/31/23  0551 12/30/23  0655 12/29/23  0417 12/28/23  1239 12/27/23  1404 12/27/23  1403   WBC 4.89 3.87 4.51 5.98 6.17 6.31  6.14  --  7.25   HEMOGLOBIN 8.2* 8.3* 8.0* 8.2* 8.6* 9.1*  9.0*  --  10.5*   HEMATOCRIT 24.7* 25.3* 24.7* 24.6* 25.5* 27.6*  27.1*  --  31.4*   PLATELETS 141 139* 124* 110* 120* 134*  128*  --  137*   NEUTROS ABS  --   --   --   --   --  5.21  5.03  --  6.29   IMMATURE GRANS (ABS)  --   --   --   --   --  0.03  0.10*  --  0.04   LYMPHS ABS  --   --   --   --   --  0.50*  0.53*  --  0.57*   MONOS ABS  --   --   --   --   --  0.18  0.17  --  0.15   EOS ABS  --   --   --   --   --  0.38  0.30  --  0.19   MCV 94.3 96.2 95.0 92.8 93.4 94.2  92.8  --  91.8   PROCALCITONIN  --   --   --   --  0.11  --   --   --    LACTATE  --   --   --   --   --   --  1.4  --          Lab 01/02/24  1523 01/02/24  0608 01/01/24  0723 12/31/23  0608 12/30/23  0655 12/29/23  0417 12/28/23  1239 12/27/23  1404 12/27/23  1403   SODIUM  --  145 144 145 146* 145   < >  --  140   POTASSIUM 3.9 3.4* 3.7 3.7 4.0 3.8   < >  --  4.1   CHLORIDE  --  110* 110* 110* 111* 113*   < >  --  106   CO2  --  28.0 27.0 24.0 25.0 26.0   < >  --  26.0   ANION GAP  --  7.0 7.0 11.0 10.0 6.0   < >  --  8.0   BUN  --  13 14 16 14 15   < >  --  18   CREATININE  --  0.48* 0.51* 0.55* 0.58 0.53*   < >  --  0.64   EGFR  --  95.3 93.9 92.2 91.0 93.0   < >  --  88.9   GLUCOSE  --  148* 172* 180* 134* 72   < >  --  151*   CALCIUM  --  7.5* 7.6* 7.6* 7.3* 7.6*   < >  --  7.6*   IONIZED CALCIUM  --   --   --   --   --   --   --  1.16  --    MAGNESIUM  --   --   --   --   --  1.6  --   --  1.7   PHOSPHORUS  --   --   --   --   --   --   --   --  3.5   HEMOGLOBIN A1C  --   --   --   --   --   --   --   --  12.10*    < > = values in this interval not displayed.         Lab 12/27/23  1403   TOTAL PROTEIN 4.3*   ALBUMIN 2.5*   GLOBULIN 1.8   ALT (SGPT) 28   AST (SGOT) 28   BILIRUBIN 0.6   ALK PHOS  151*         Lab 12/28/23  1239   PROBNP 1,039.0                 Brief Urine Lab Results  (Last result in the past 365 days)        Color   Clarity   Blood   Leuk Est   Nitrite   Protein   CREAT   Urine HCG        12/27/23 0040 Orange   Cloudy   Large (3+)   Moderate (2+)   Negative   100 mg/dL (2+)                   Microbiology Results Abnormal       Procedure Component Value - Date/Time    Blood Culture - Blood, Blood, PICC Line [[id number]]  (Normal) Collected: 12/27/23 1400    Lab Status: Final result Specimen: Blood, PICC Line Updated: 01/01/24 1531     Blood Culture No growth at 5 days    Blood Culture - Blood, Arm, Right [[id number]]  (Normal) Collected: 12/27/23 0431    Lab Status: Final result Specimen: Blood from Arm, Right Updated: 01/01/24 0515     Blood Culture No growth at 5 days            No radiology results from the last 24 hrs    Results for orders placed during the hospital encounter of 12/26/23    Adult Transthoracic Echo Complete W/ Cont if Necessary Per Protocol    Interpretation Summary    Left ventricular systolic function is normal. Calculated left ventricular EF = 57% Left ventricular ejection fraction appears to be 56 - 60%.    Left ventricular wall thickness is consistent with mild to moderate concentric hypertrophy.    Left ventricular diastolic function was normal.    Estimated right ventricular systolic pressure from tricuspid regurgitation is mildly elevated (35-45 mmHg).    There is a trivial pericardial effusion.    Mild aortic valve regurgitation.      Current medications:  Scheduled Meds:aspirin, 81 mg, Oral, Daily  atorvastatin, 80 mg, Oral, Daily  castor oil-balsam peru, 1 application , Topical, Q12H  heparin (porcine), 5,000 Units, Subcutaneous, Q12H  insulin lispro, 2-9 Units, Subcutaneous, 4x Daily With Meals & Nightly  magnesium hydroxide, 10 mL, Oral, Daily  metoprolol tartrate, 25 mg, Oral, BID  sodium chloride, 10 mL, Intravenous, Q12H      Continuous Infusions:   PRN  "Meds:.  acetaminophen **OR** acetaminophen **OR** acetaminophen    senna-docusate sodium **AND** polyethylene glycol **AND** bisacodyl **AND** bisacodyl    Calcium Replacement - Follow Nurse / BPA Driven Protocol    dextrose    dextrose    glucagon (human recombinant)    hydrOXYzine    Magnesium Standard Dose Replacement - Follow Nurse / BPA Driven Protocol    nitroglycerin    ondansetron    Phosphorus Replacement - Follow Nurse / BPA Driven Protocol    Potassium Replacement - Follow Nurse / BPA Driven Protocol    sodium chloride    sodium chloride    Assessment & Plan   Assessment & Plan     Active Hospital Problems    Diagnosis  POA    **UTI (urinary tract infection) [N39.0]  Yes    Acute constipation [K59.00]  Unknown    Acute urinary retention [R33.8]  Unknown    Uterine prolapse [N81.4]  Unknown    Bradycardia [R00.1]  Unknown    Pressure injury of buttock, unstageable [L89.300]  Unknown    Type 2 diabetes mellitus with ketoacidosis, without long-term current use of insulin [E11.10]  Unknown    Disorientation [R41.0]  Unknown    Pleural effusion [J90]  Unknown    Severe malnutrition [E43]  Yes    Edema of lower extremity [R60.0]  Yes    Bullous pemphigoid [L12.0]  Yes    Dyslipidemia [E78.5]  Yes    Essential hypertension [I10]  Yes      Resolved Hospital Problems   No resolved problems to display.        Brief Hospital Course to date:  Omar Mendoza is a 83 y.o. female with history of type 2 diabetes, hypertension, history of uterine prolapse, urinary retention with Schultz catheter in place, history of bullous pemphigoid  (s/p Dupixent 10/2023, prednisone/doxycycline 11/2023) CAD, hyperlipidemia, presumed dementia who presented to the ED with generalized weakness and near bullous pemphigoid blister on the heel.      Dysphagia  -Nursing with concern for aspiration event 12/30 however patient herself says she \"just got choked.\" Does not wish for modified diet.   -SLP following, continue modified diet.     Acute " metabolic encephalopathy, superimposed on suspected dementia  Generalized weakness  -PT/OT recommended SNF.  -CM following for placement      Bilateral pleural effusion  L>R  Bibasilar airspace disease versus pneumonia  -Patient currently on room air with good O2 sats  -Follow-up MRSA PCR, cultures, urinary strep and Legionella antigens  -Procalcitonin is low, lactate has improved  -Concern for aspiration on 12/30  -CXR revealed increased right basilar airspace disease with stable left basilar atelectasis  -Encourage IS, pulmonary toilet  -Continue antibiotics, currently on Rocephin and doxycycline (Day 5)  -Currently on RA       Anemia  -Iron 53, iron sat 28  -Hgb up to 8.3 this AM   -fecal occult negative  -suspect anemia of chronic disease  -CBC in AM      Poor venous access  -PICC in place     Sinus bradycardia  - pt on both amiodarone and metoprolol, dtr reports amiodarone is new since hospitalization at [place].  - hold amiodarone, decreased metoprolol to 50 mg BID w/ hold parameters  -HR currently improved      BLE edema  Decreased pulses LLE  - CTA abd showing small right pleural effusion, bilateral basilar atelectasis  - CTA w/ runoff showing normal right sided runoff; left side delayed runoff possibly r/t venous stasis changes, no evidence of arterial occlusion or significant stenosis  - LLE cool to touch compared to RLE, decreased pulses  - neurovascular checks  -Echo revealed EF 57% with normal diastolic function      Uncontrolled Type 2 diabetes  -Hgb A1C 12.10 on 12/27  -Continue SSI     Constipation  -Continue aggressive bowel regimen, s/p soapsuds enema x 1  -s/p Milk of mag x1, Miralax daily, suppository x1   -Resolved, +BM 12/30  -chronic loza catheter in place     Uterine prolapse  -Gyn Dr. Zhou has seen, plan for outpatient follow up to discuss treatment options.   -may be contributing to chronic urine retention     Bullous pemphigoid  -Has developed new blister on right heel  -Continue  doxycycline  -Wound care following      Pressure ulcer  -Wound care following      L adrenal adenoma  - incidental finding on CTA abdomen      Severe malnutrition    Expected Discharge Location and Transportation:   Expected Discharge   Expected Discharge Date: 2024; Expected Discharge Time:      DVT prophylaxis:  Medical DVT prophylaxis orders are present.     AM-PAC 6 Clicks Score (PT): 8 (24 0300)    CODE STATUS:   Code Status and Medical Interventions:   Ordered at: 23 0410     Code Status (Patient has no pulse and is not breathing):    CPR (Attempt to Resuscitate)     Medical Interventions (Patient has pulse or is breathing):    Full Support       Ludy Todd II, DO  24       Electronically signed by Ludy Todd II, DO at 24 1213          Consult Notes (last 48 hours)        Julia Avendaño, LITA at 24 1935          Stroke Consult Note    Patient Name: Omar Mendoza   MRN: [id number]  Age: 83 y.o.  Sex: female  : 1940    Primary Care Physician: Varun Pa MD  Referring Physician:      TIME STROKE TEAM CALLED:  EST     TIME PATIENT SEEN:  EST    Handedness: Right   Race:       Chief Complaint/Reason for Consultation: Slurred speech, agitation     HPI:   This is Ms. Omar Mendoza is a 83-year-old -American female with significant health diagnosis for CAD, hypertension, hyperlipidemia, diabetes type 2, acute urinary retention,bullous pemphigoid (s/p dupixent 10/23, prednisone / doxycycline 2023), uterine prolapse, ongoing confusion - thought to have dementia but no formal dx and not on medications for dementia who is admitted by hospital medicine team since 2023 for evaluation of generalized weakness and new bullous pemphigoid blister on right heel.  Code stroke alerted at 190 for worsening of slurred speech, combative activity, agitation.  Last known well reported by family between 230 to 4 PM with worsening  "of symptoms.  Patient's per family was reported to be confused since before [facility] with worsening of her symptoms of confusion and agitation.  Patient was taken emergently to CT scanner, CT head without contrast negative for hemorrhage.  CTA CTP in process.  Patient is agitated yelling and screaming help help help.  Per family reported that they are thinking she might have sundowning and dementia however she was never diagnosed with.  Patient is currently on subcu heparin a.m. aspirin 81 mg per reviewing the chart as well as she is on Geodon and Seroquel for intermittent ongoing agitation and combatively.   Patient is noted to be disoriented not following commands, confused agitated pushing and kicking repeatedly saying \"I will show you\".  Patient noted to be able to move all extremities voluntarily no drift or weakness is observed, no facial droop no vision loss patient has disconjugate gaze baseline, unsure about baseline slurred speech however per primary RN reports that patient is more slurred than her normal.  No reported tobacco, alcohol, illicit drug or marijuana use.  Patient is a bed ridden per family, dependent.  Last Known Normal Date/Time: between  5040-2877 EST     Review of Systems   Unable to perform ROS: Mental status change   Neurological:  Positive for speech difficulty.   Psychiatric/Behavioral:  Positive for agitation, behavioral problems, confusion and hallucinations. The patient is hyperactive.       Past Medical History:   Diagnosis Date    CAD (coronary artery disease)     Dementia     Diabetes mellitus     Hyperlipemia     Hypertension      History reviewed. No pertinent surgical history.  History reviewed. No pertinent family history.  Social History     Socioeconomic History    Marital status:    Tobacco Use    Smoking status: Never    Smokeless tobacco: Never   Substance and Sexual Activity    Alcohol use: Never    Drug use: Defer    Sexual activity: Defer     No Known " "Allergies  Prior to Admission medications    Medication Sig Start Date End Date Taking? Authorizing Provider   aspirin 81 MG chewable tablet Chew 1 tablet Daily.   Yes Stacie Alas MD   insulin glargine (LANTUS, SEMGLEE) 100 UNIT/ML injection Inject 10 Units under the skin into the appropriate area as directed Daily.   Yes Stacie Alas MD   Insulin Syringe-Needle U-100 25G X 1\" 1 ML misc Use.   Yes Stacie Alas MD   metoprolol tartrate (LOPRESSOR) 100 MG tablet Take 1 tablet by mouth 2 (Two) Times a Day.   Yes Stacie Alas MD   spironolactone (ALDACTONE) 50 MG tablet Take 1 tablet by mouth Daily. 10/9/23  Yes Stacie Alas MD   amiodarone (PACERONE) 200 MG tablet Take 1 tablet by mouth Daily.    Stacie Alas MD   amLODIPine (NORVASC) 5 MG tablet Take 1 tablet by mouth Daily. 9/5/23   Stacie Alas MD   atorvastatin (LIPITOR) 80 MG tablet Take 1 tablet by mouth Daily.    Stacie Alas MD   nystatin (MYCOSTATIN) 100,000 unit/mL suspension Swish and swallow 5 mL 4 (Four) Times a Day.    Stacie Alas MD   triamcinolone (KENALOG) 0.1 % cream Apply 1 application  topically to the appropriate area as directed 2 (Two) Times a Day.    Stacie Alas MD         Temp:  [96.9 °F (36.1 °C)-97.5 °F (36.4 °C)] 97.5 °F (36.4 °C)  Heart Rate:  [66-72] 72  Resp:  [18] 18  BP: (118-123)/(67-75) 123/75  Neurological Exam  Mental Status  Awake. Mild dysarthria present. Mixed aphasia present. Unable to follow commands.    Cranial Nerves  CN II: Right visual acuity: Normal. Left visual acuity: Normal.  CN III, IV, VI: Normal lids and orbits bilaterally. Pupils equal round and reactive to light bilaterally.  CN V:  Right: Jaw strength is normal. Normal corneal reflex.  Left: Jaw strength is normal. Normal corneal reflex.  CN VII:  Right: There is no facial weakness.  Left: There is no facial weakness.  CN VIII: Intact hearing bilateral.  CN IX, X:  Right: " Palate is normal.  Left: Palate is normal.  CN XII: Tongue midline without atrophy or fasciculations.    Motor  Normal muscle bulk throughout. No fasciculations present. Normal muscle tone. No abnormal involuntary movements.  Strength is equal bilateral upper and lower extremity.    Sensory  Light touch is normal in upper and lower extremities.     Reflexes  Right Plantar: downgoing  Left Plantar: downgoing    Coordination    Unable to assess due to mental status change patient does not follow command baseline.    Gait    Wheelchair-bound.      Physical Exam  Constitutional:       General: She is awake.      Appearance: She is obese. She is ill-appearing.   HENT:      Head: Normocephalic and atraumatic.      Mouth/Throat:      Mouth: Mucous membranes are moist.   Eyes:      General: Lids are normal.      Pupils: Pupils are equal, round, and reactive to light.   Cardiovascular:      Rate and Rhythm: Normal rate and regular rhythm.   Musculoskeletal:         General: Normal range of motion.      Cervical back: Normal range of motion and neck supple.      Right lower leg: Edema present.      Left lower leg: Edema present.   Skin:     General: Skin is warm and dry.      Capillary Refill: Capillary refill takes less than 2 seconds.   Neurological:      Mental Status: She is disoriented.      Cranial Nerves: Dysarthria present.   Psychiatric:      Comments: Agitation patient is dementia with possible sundowning         Acute Stroke Data    Thrombolytic Inclusion / Exclusion Criteria    Time: 19:35 EST  Person Administering Scale: LITA Gr    Inclusion Criteria  [x]   18 years of age or greater   []   Onset of symptoms < 4.5 hours before beginning treatment (stroke onset = time patient was last seen well or without symptoms).   []   Diagnosis of acute ischemic stroke causing measurable disabling deficit (Complete Hemianopia, Any Aphasia, Visual or Sensory Extinction, Any weakness limiting sustained effort  against gravity)   []   Any remaining deficit considered potentially disabling in view of patient and practitioner   Exclusion criteria (Do not proceed with Alteplase if any are checked under exclusion criteria)  [x]   Onset unknown or GREATER than 4.5 hours   []   ICH on CT/MRI   []   CT demonstrates hypodensity representing acute or subacute infarct   []   Significant head trauma or prior stroke in the previous 3 months   []   Symptoms suggestive of subarachnoid hemorrhage   []   History of un-ruptured intracranial aneurysm GREATER than 10 mm   []   Recent intracranial or intraspinal surgery within the last 3 months   []   Arterial puncture at a non-compressible site in the previous 7 days   []   Active internal bleeding   []   Acute bleeding tendency   []   Platelet count LESS than 100,000 for known hematological diseases such as leukemia, thrombocytopenia or chronic cirrhosis   []   Current use of anticoagulant with INR GREATER than 1.7 or PT GREATER than 15 seconds, aPTT GREATER than 40 seconds   []   Heparin received within 48 hours, resulting in abnormally elevated aPTT GREATER than upper limit of normal   []   Current use of direct thrombin inhibitors or direct factor Xa inhibitors in the past 48 hours   []   Elevated blood pressure refractory to treatment (systolic GREATER than 185 mm/Hg or diastolic  GREATER than 110 mm/Hg   []   Suspected infective endocarditis and aortic arch dissection   []   Current use of therapeutic treatment dose of low-molecular-weight heparin (LMWH) within the previous 24 hours   []   Structural GI malignancy or bleed   Relative exclusion for all patients  []   Only minor non-disabling symptoms   []   Pregnancy   []   Seizure at onset with postictal residual neurological impairments   []   Major surgery or previous trauma within past 14 days   []   History of previous spontaneous ICH, intracranial neoplasm, or AV malformation   []   Postpartum (within previous 14 days)   []    Recent GI or urinary tract hemorrhage (within previous 21 days)   []   Recent acute MI (within previous 3 months)   []   History of un-ruptured intracranial aneurysm LESS than 10 mm   []   History of ruptured intracranial aneurysm   []   Blood glucose LESS than 50 mg/dL (2.7 mmol/L)   []   Dural puncture within the last 7 days   []   Known GREATER than 10 cerebral microbleeds   Additional exclusions for patients with symptoms onset between 3 and 4.5 hours.  []   Age > 80.   []   On any anticoagulants regardless of INR  >>> Warfarin (Coumadin), Heparin, Enoxaparin (Lovenox), fondaparinux (Arixtra), bivalirudin (Angiomax), Argatroban, dabigatran (Pradaxa), rivaroxaban (Xarelto), or apixaban (Eliquis)   []   Severe stroke (NIHSS > 25).   []   History of BOTH diabetes and previous ischemic stroke.   []   The risks and benefits have been discussed with the patient or family related to the administration of IV thrombolytic therapy for stroke symptoms.   []   I have discussed and reviewed the patient's case and imaging with the attending prior to IV thrombolytic therapy.   NA Time IV thrombolytic administered       Hospital Meds:  Scheduled- aspirin, 81 mg, Oral, Daily  [START ON 1/4/2024] atorvastatin, 80 mg, Oral, Nightly  castor oil-balsam peru, 1 application , Topical, Q12H  heparin (porcine), 5,000 Units, Subcutaneous, Q12H  insulin lispro, 2-9 Units, Subcutaneous, 4x Daily With Meals & Nightly  iopamidol, 150 mL, Intravenous, Once in imaging  magnesium hydroxide, 10 mL, Oral, Daily  metoprolol tartrate, 25 mg, Oral, BID  QUEtiapine, 25 mg, Oral, Q12H  sodium chloride, 10 mL, Intravenous, Q12H  sodium chloride, 10 mL, Intravenous, Q12H      Infusions-     PRNs-   acetaminophen **OR** acetaminophen **OR** acetaminophen    senna-docusate sodium **AND** polyethylene glycol **AND** bisacodyl **AND** bisacodyl    Calcium Replacement - Follow Nurse / BPA Driven Protocol    dextrose    dextrose    glucagon (human  recombinant)    hydrOXYzine    Magnesium Standard Dose Replacement - Follow Nurse / BPA Driven Protocol    nitroglycerin    ondansetron    Phosphorus Replacement - Follow Nurse / BPA Driven Protocol    Potassium Replacement - Follow Nurse / BPA Driven Protocol    sodium chloride    sodium chloride    sodium chloride    sodium chloride    ziprasidone    Functional Status Prior to Current Stroke/Orange Score:4    NIH Stroke Scale  Time: 19:35 EST  Person Administering Scale: LITA Gr    Interval: baseline  1a. Level of Consciousness: 0-->Alert, keenly responsive  1b. LOC Questions: 2-->Answers neither question correctly  1c. LOC Commands: 2-->Performs neither task correctly  2. Best Gaze: 0-->Normal  3. Visual: 0-->No visual loss  4. Facial Palsy: 0-->Normal symmetrical movements  5a. Motor Arm, Left: 0-->No drift, limb holds 90 (or 45) degrees for full 10 secs  5b. Motor Arm, Right: 0-->No drift, limb holds 90 (or 45) degrees for full 10 secs  6a. Motor Leg, Left: 0-->No drift, leg holds 30 degree position for full 5 secs  6b. Motor Leg, Right: 0-->No drift, leg holds 30 degree position for full 5 secs  7. Limb Ataxia: 0-->Absent  8. Sensory: 0-->Normal, no sensory loss  9. Best Language: 1-->Mild-to-moderate aphasia, some obvious loss of fluency or facility of comprehension, without significant limitation on ideas expressed or form of expression. Reduction of speech and/or comprehension, however, makes conversation. . . (see row details)  10. Dysarthria: 1-->Mild-to-moderate dysarthria, patient slurs at least some words and, at worst, can be understood with some difficulty  11. Extinction and Inattention (formerly Neglect): 0-->No abnormality    Total (NIH Stroke Scale): 6        Results Reviewed:  I have personally reviewed current lab, radiology, and data and agree with results.  Recent labs reviewed and noted for 1/2/2024 sodium 145  Potassium 3.4  Creatinine 0.48   BUN 13  GFR 95  Recent WBC  2.62  H&H 8.2\24.7  Platelet 141    on 1/3/2024 Glucose 150      Results for orders placed during the hospital encounter of 12/26/23    Adult Transthoracic Echo Complete W/ Cont if Necessary Per Protocol    Interpretation Summary    Left ventricular systolic function is normal. Calculated left ventricular EF = 57% Left ventricular ejection fraction appears to be 56 - 60%.    Left ventricular wall thickness is consistent with mild to moderate concentric hypertrophy.    Left ventricular diastolic function was normal.    Estimated right ventricular systolic pressure from tricuspid regurgitation is mildly elevated (35-45 mmHg).    There is a trivial pericardial effusion.    Mild aortic valve regurgitation.   CT Angiogram Head w AI Analysis of LVO    Result Date: 1/3/2024  Impression: No acute arterial abnormality of the head and neck. Electronically Signed: Jonnathan Skelton DO  1/3/2024 8:01 PM EST  Workstation ID: [id number]    CT Angiogram Neck    Result Date: 1/3/2024  Impression: No acute arterial abnormality of the head and neck. Electronically Signed: Jonnathan Skelton DO  1/3/2024 8:01 PM EST  Workstation ID: [id number]    CT CEREBRAL PERFUSION WITH & WITHOUT CONTRAST    Result Date: 1/3/2024  Impression: Mildly motion degraded study. No evidence of an acute infarct or ischemia Electronically Signed: Jonnathan Skelton DO  1/3/2024 7:45 PM EST  Workstation ID: [id number]    CT Head Without Contrast Stroke Protocol    Result Date: 1/3/2024  Impression: No definite acute intracranial hemorrhage or large territorial infarct. Motion degraded study. Electronically Signed: Jonnathan Skelton DO  1/3/2024 7:40 PM EST  Workstation ID: [id number]            Assessment/Plan:    This is 83-year-old -American female, right-handed with significant health diagnosis for hypertension hyperlipidemia currently admitted for UTI, pleural effusion, Bullous pemphigoid, confusion since 12/26/2023, code stroke alerted for worsening of  slurred speech and agitation.  Patient is not a candidate for IV thrombolytic therapy secondary to last known well outside the window    Antiplatelet PTA: Aspirin  Anticoagulant PTA: SQ heparin        Worsening of confusion, slurred speech, agitation  -DDx TIA\ischemic stroke, neoplasm, metabolic encephalopathy, possible dementia with behavioral problem, toxic encephalopathy in the setting of infection.    -TIA\ischemic stroke with IV thrombolytic therapy order set in place  -CT head without contrast no hemorrhage, CTA CTP motion 90 degraded secondary to patient's competitivity, and agitation however no perfusion deficits or LVO minimal atherosclerosis noted without flow-limiting.  -MRI ordered and pending  -NIH\neurochecks per protocol  -PT\OT\SLP evaluation  -Case management for final discharge planning  -Continue aspirin 81 mg p.o. versus rectal 300 mg daily, okay for continuing DVT prophylaxis subcu heparin will defer to medicine team management.  -High intensity statin Lipitor 80 mg p.o. at night and daily for secondary stroke prevention  -Echo ordered and pending  -A1c and lipid profile ordered and pending  -CBC, infectious lab work is recommended to rule out infection, UA, chest x-ray, lactate, ESR, CRP, Pro-Ray, ammonia level.  -Neurology stroke we will continue to follow-up.      Baseline limited mobility bedridden  -PT\OT evaluation  -Case management for final discharge planning      Obesity  -BMI 34.76  -Complicates her aspects of care    Normocytic anemia  -H&H is stable  -No reported signs and symptoms of bleed  -Trending labs.    Agitation  -Patient was reported agitated and combative, per family not sure if patient has dementia with possible sundowning no prior diagnosis.  -DDx possible delirium versus possible dementia  -Continue management per medicine team  -Will recommend to follow-up with general neurology in the morning.    I discussed plan of care with patient family, primary RN.  Neurology  stroke we will follow-up on MRI images, if MRI negative will refer to general neurology.  Thank you for letting us participate in patient care.    LITA Gr  January 3, 2024  19:35 EST     Electronically signed by Julia Avendaño APRN at 01/03/24 [id number]

## 2024-01-04 NOTE — THERAPY WOUND CARE TREATMENT
Acute Care - Wound/Debridement Treatment Note  [facility]     Patient Name: Omar Mendoza  : 1940  MRN: [id number]  Today's Date: 2024                Admit Date: 2023    Visit Dx:    ICD-10-CM ICD-9-CM   1. Generalized weakness  R53.1 780.79   2. Acute UTI  N39.0 599.0   3. Bullous pemphigoid  L12.0 694.5   4. Venous insufficiency  I87.2 459.81   5. Dysphagia, unspecified type  R13.10 787.20       Patient Active Problem List   Diagnosis    UTI (urinary tract infection)    Bullous pemphigoid    Essential hypertension    Dyslipidemia    Edema of lower extremity    Cholestatic hepatitis    Acute constipation    Acute urinary retention    Uterine prolapse    Bradycardia    Pressure injury of buttock, unstageable    Type 2 diabetes mellitus with ketoacidosis, without long-term current use of insulin    Disorientation    Pleural effusion    Severe malnutrition        Past Medical History:   Diagnosis Date    CAD (coronary artery disease)     Dementia     Diabetes mellitus     Hyperlipemia     Hypertension         History reviewed. No pertinent surgical history.        Wound 23 2218 Right posterior heel Blisters (Active)   Dressing Appearance intact;moist drainage 24 1529   Closure Unable to assess 24 0900   Base moist;pink;red;epithelialization 24 1529   Periwound intact;dry 24 1529   Periwound Temperature warm 24 1529   Periwound Skin Turgor soft 24 1529   Edges open;irregular 24 1529   Drainage Characteristics/Odor serosanguineous 24 1529   Drainage Amount scant 24 1529   Care, Wound cleansed with;soap and water 24 1529   Dressing Care dressing applied;silver impregnated;low-adherent;foam;multi-layer wrap 24 1529   Periwound Care cleansed with pH balanced cleanser;dry periwound area maintained 24 1529       Wound 23 0411 Bilateral coccyx Pressure Injury (Active)   Wound Image   24 1216   Pressure Injury Stage 2  01/04/24 0900   Dressing Appearance moist drainage 01/04/24 0900   Base red;pink 01/04/24 0900   Care, Wound cleansed with;wound cleanser 01/04/24 0900   Dressing Care dressing changed 01/04/24 0900       Wound 12/27/23 1035 Left greater trochanter Blisters (Active)   Wound Image   01/04/24 1213   Dressing Appearance moist drainage 01/04/24 0900   Base yellow 01/04/24 0900   Care, Wound cleansed with;wound cleanser 01/04/24 0900   Dressing Care dressing changed 01/04/24 0900      Lymphedema       Row Name 01/04/24 1600             Lymphedema Edema Assessment    Ptting Edema Category By severity  -      Pitting Edema Mild  -         Skin Changes/Observations    Location/Assessment Lower Extremity  -      Lower Extremity Conditions bilateral:;intact;clean;dry  -      Lower Extremity Color/Pigment bilateral:;normal  -         Lymphedema Pulses/Capillary Refill    Capillary Refill lower extremity capillary refill  -      Lower Extremity Capillary Refill right:;left:;less than 3 seconds  -         Compression/Skin Care    Compression/Skin Care skin care;wrapping location  -      Skin Care washed/dried;lotion applied  -      Wrapping Location lower extremity  -      Wrapping Location LE bilateral:;foot to knee  -      Wrapping Comments RLE mepilex, BLE size 4 compressogrips applied doubled at foot for gradient comression.  -                User Key  (r) = Recorded By, (t) = Taken By, (c) = Cosigned By      Initials Name Provider Type     Donovan Love, PT Physical Therapist                    WOUND DEBRIDEMENT  Total area of Debridement: 50cm2  Debridement Site 1  Location- Site 1: R medial heel  Selective Debridement- Site 1: Wound Surface >20cmsq  Instruments- Site 1: tweezers, scissors  Excised Tissue Description- Site 1: maximum, other (comment) (nonviable ruptured blister debris)  Bleeding- Site 1: scant               PT Assessment (last 12 hours)       PT Evaluation and Treatment        Row Name 01/04/24 1529          Physical Therapy Time and Intention    Subjective Information complains of;weakness;fatigue;pain  -     Document Type therapy note (daily note);wound care  -       Row Name 01/04/24 1529          General Information    Patient Observations lethargic;poorly cooperative  -       Row Name 01/04/24 1529          Pain    Additional Documentation Pain Scale: FACES Pre/Post-Treatment (Group)  -       Row Name 01/04/24 1529          Pain Scale: FACES Pre/Post-Treatment    Pain: FACES Scale, Pretreatment 0-->no hurt  -     Posttreatment Pain Rating 2-->hurts little bit  -     Pain Location - Side/Orientation Bilateral  -     Pain Location lower  -     Pain Location - extremity  -       Row Name 01/04/24 1529          Cognition    Affect/Mental Status (Cognition) confused  -     Behavioral Issues (Cognition) verbal outbursts;uncooperative  -       Row Name 01/04/24 1529          Wound 12/26/23 2218 Right posterior heel Blisters    Wound - Properties Group Placement Date: 12/26/23  -DN Placement Time: 2218 -DN Present on Original Admission: Y  -DN Side: Right  -DN Orientation: posterior  -DN Location: heel  -DN Primary Wound Type: Blisters  -DN    Dressing Appearance intact;moist drainage  -     Base moist;pink;red;epithelialization  Nearly fully re-epithelialized  -     Periwound intact;dry  -     Periwound Temperature warm  -     Periwound Skin Turgor soft  -     Edges open;irregular  -     Drainage Characteristics/Odor serosanguineous  -     Drainage Amount scant  -LH     Care, Wound cleansed with;soap and water  -     Dressing Care dressing applied;silver impregnated;low-adherent;foam;multi-layer wrap  Mepilex Ag, MLW  -     Periwound Care cleansed with pH balanced cleanser;dry periwound area maintained  -     Retired Wound - Properties Group Placement Date: 12/26/23  -DN Placement Time: 2218 -DN Present on Original Admission: Y  -DN Side: Right   -DN Orientation: posterior  -DN Location: heel  -DN Primary Wound Type: Blisters  -DN    Retired Wound - Properties Group Date first assessed: 12/26/23  -DN Time first assessed: 2218  -DN Present on Original Admission: Y  -DN Side: Right  -DN Location: heel  -DN Primary Wound Type: Blisters  -DN      Row Name             Wound 12/27/23 0411 Bilateral coccyx Pressure Injury    Wound - Properties Group Placement Date: 12/27/23  -DN Placement Time: 0411  -DN Present on Original Admission: Y  -DN Side: Bilateral  -DN Location: coccyx  -DN Primary Wound Type: Pressure inj  -DN    Retired Wound - Properties Group Placement Date: 12/27/23  -DN Placement Time: 0411  -DN Present on Original Admission: Y  -DN Side: Bilateral  -DN Location: coccyx  -DN Primary Wound Type: Pressure inj  -DN    Retired Wound - Properties Group Date first assessed: 12/27/23  -DN Time first assessed: 0411  -DN Present on Original Admission: Y  -DN Side: Bilateral  -DN Location: coccyx  -DN Primary Wound Type: Pressure inj  -DN      Row Name             Wound 12/27/23 1035 Left greater trochanter Blisters    Wound - Properties Group Placement Date: 12/27/23  - Placement Time: 1035  -MC Side: Left  -MC Location: greater trochanter  -MC Primary Wound Type: Blisters  -MC    Retired Wound - Properties Group Placement Date: 12/27/23  - Placement Time: 1035  -MC Side: Left  -MC Location: greater trochanter  -MC Primary Wound Type: Blisters  -MC    Retired Wound - Properties Group Date first assessed: 12/27/23  - Time first assessed: 1035  -MC Side: Left  -MC Location: greater trochanter  -MC Primary Wound Type: Blisters  -MC      Row Name 01/04/24 1529          Coping    Observed Emotional State agitated;angry;irritable  -     Verbalized Emotional State anger;frustration  -     Trust Relationship/Rapport care explained  -     Family/Support Persons significant other  -     Involvement in Care at bedside;attentive to patient;interacting with  patient;participating in care  -       Row Name 01/04/24 1529          Plan of Care Review    Plan of Care Reviewed With patient;family  -     Progress improving  -     Outcome Evaluation Pt demonstrating excellent improvements in re-epithelialization of R posterior heel wound with only small open area remaining. Pt also with good maintainence of BLE limb girth with current use of MLW however PT altered application of compressogrips to reduce pressure/tightness d/t pt complaints. PT plans to continue with MLW and dressing changes 2-3x/week.  -       Row Name 01/04/24 1529          Positioning and Restraints    Pre-Treatment Position in bed  -     Post Treatment Position bed  -     In Bed supine;call light within reach;encouraged to call for assist;with family/caregiver  -               User Key  (r) = Recorded By, (t) = Taken By, (c) = Cosigned By      Initials Name Provider Type    Eva Singleton, RN Registered Nurse     Donovan Love, PT Physical Therapist    Peter Espinoza, RN Registered Nurse                  Physical Therapy Education       Title: PT OT SLP Therapies (In Progress)       Topic: Physical Therapy (In Progress)       Point: Mobility training (In Progress)       Learning Progress Summary             Patient Acceptance, E, NR by AE at 1/3/2024 1337    Acceptance, E, NR by KE at 12/29/2023 1131    Acceptance, E, NR by KE at 12/27/2023 1045                         Point: Home exercise program (Not Started)       Learner Progress:  Not documented in this visit.              Point: Body mechanics (In Progress)       Learning Progress Summary             Patient Acceptance, E, NR by AE at 1/3/2024 1337    Acceptance, E, NR by KE at 12/29/2023 1131    Acceptance, E, NR by KE at 12/27/2023 1045                         Point: Precautions (In Progress)       Learning Progress Summary             Patient Acceptance, E, NR by AE at 1/3/2024 1337    Acceptance, E, NR by KE at 12/29/2023  1131    Acceptance, E, NR by MIA at 12/27/2023 1045                                         User Key       Initials Effective Dates Name Provider Type Discipline    AE 09/21/21 -  Domo Desir, PT Physical Therapist PT    MIA 11/16/23 -  Petty Mckoy, CHELO Physical Therapist PT                    Recommendation and Plan  Anticipated Discharge Disposition (PT): skilled nursing facility  Planned Therapy Interventions (PT): wound care, patient/family education  Therapy Frequency (PT): daily  Plan of Care Reviewed With: patient, family   Progress: improving       Progress: improving  Outcome Evaluation: Pt demonstrating excellent improvements in re-epithelialization of R posterior heel wound with only small open area remaining. Pt also with good maintainence of BLE limb girth with current use of MLW however PT altered application of compressogrips to reduce pressure/tightness d/t pt complaints. PT plans to continue with MLW and dressing changes 2-3x/week.  Plan of Care Reviewed With: patient, family            Time Calculation   PT Charges       Row Name 01/04/24 1601             Time Calculation    Start Time 1529  -LH      PT Goal Re-Cert Due Date 01/06/24  -         Untimed Charges    81429-[id number] comp below knee 20  -LH         Total Minutes    Untimed Charges Total Minutes 20  -LH       Total Minutes 20  -LH                User Key  (r) = Recorded By, (t) = Taken By, (c) = Cosigned By      Initials Name Provider Type     Donovan Love, PT Physical Therapist                      Therapy Charges for Today       Code Description Service Date Service Provider Modifiers Qty    75204730150 HC PT MULTI LAYER COMP SYS BELOW KNEE 1/4/2024 Donovan Love, PT GP 1              PT G-Codes  Outcome Measure Options: AM-PAC 6 Clicks Basic Mobility (PT)  AM-PAC 6 Clicks Score (PT): 9  AM-PAC 6 Clicks Score (OT): 12       Donovan Love PT  1/4/2024

## 2024-01-04 NOTE — PLAN OF CARE
Goal Outcome Evaluation:  Plan of Care Reviewed With: patient, family        Progress:  (re-eval)     SLP re-evaluation completed. Will continue to address dysphagia pending pt's POC/GOC. Unable to safely recommend po intake at this time 2/2 participation/cooperation. Please see note for further details and recommendations.      Anticipated Discharge Disposition (SLP): skilled nursing facility

## 2024-01-04 NOTE — PROGRESS NOTES
"Neurology       Patient Care Team:  Varun Pa MD as PCP - General (Internal Medicine)    Chief complaint: Confusion    History: 83-year-old woman seen for Dr. Yee for evaluation of confusion.    She was hospitalized here in  for urinary tract infection and has been having difficulty ever since.    She has been getting confused at night, sleeping poorly and eating erratically.    She was noted by family to have a significant decline in her energy level about 6 months ago and was \"not her usual self\".    2 months ago her brother, who she was very close to,  and she has had a significant decline since then.  She is eating poorly sleeping poorly.  She asked her  to 6 leave the bedroom because getting in and out of bed woke her up and she can go back to sleep.    She has had a change in her gait and has had 1 fall without significant injury.    She is treated for bullous pemphigus.      Past Medical History:   Diagnosis Date    CAD (coronary artery disease)     Dementia     Diabetes mellitus     Hyperlipemia     Hypertension        Vital Signs   Vitals:    24 0422 24 0700 24 1040 24 1055   BP: 124/74  138/85    BP Location: Right arm  Right arm    Patient Position: Lying  Lying    Pulse:   79 66   Resp: 18  18    Temp: 97.7 °F (36.5 °C)  97.6 °F (36.4 °C)    TempSrc: Axillary  Axillary    SpO2:   (!) 84% 97%   Weight:  101 kg (223 lb 12.3 oz)     Height:           Physical Exam:   General: Irritable -American woman    O2 sats were as low as 84 today.    Better the vital signs are fine.                  Neuro: She vehemently declines being interviewed.    She cannot speak but will not follow commands.        Results Review:  CAT scan shows aging changes which are compatible with age.    White blood cell count 6340.  Results from last 7 days   Lab Units 24  2321   WBC 10*3/mm3 6.34   HEMOGLOBIN g/dL 9.2*   HEMATOCRIT % 27.8*   PLATELETS 10*3/mm3 181 "     Results from last 7 days   Lab Units 01/02/24  1523 01/02/24  0608 01/01/24  0723 12/31/23  0608   SODIUM mmol/L  --  145 144 145   POTASSIUM mmol/L 3.9 3.4* 3.7 3.7   CHLORIDE mmol/L  --  110* 110* 110*   CO2 mmol/L  --  28.0 27.0 24.0   BUN mg/dL  --  13 14 16   CREATININE mg/dL  --  0.48* 0.51* 0.55*   CALCIUM mg/dL  --  7.5* 7.6* 7.6*   GLUCOSE mg/dL  --  148* 172* 180*       Imaging Results (Last 24 Hours)       Procedure Component Value Units Date/Time    XR Chest 1 View [[id number]] Resulted: 01/04/24 1537     Updated: 01/04/24 1537    CT Angiogram Head w AI Analysis of LVO [[id number]] Collected: 01/03/24 1945     Updated: 01/03/24 2004    Narrative:      CT ANGIOGRAM HEAD W AI ANALYSIS OF LVO, CT ANGIOGRAM NECK    Date of Exam: 1/3/2024 7:32 PM EST    Indication: Stroke, follow up.    Comparison: None available.    Technique: CTA of the head and neck was performed after the uneventful intravenous administration of 100 cc Isovue-370. Reconstructed coronal and sagittal images were also obtained. In addition, a 3-D volume rendered image was created for interpretation.   Automated exposure control and iterative reconstruction methods were used.      Findings:    Contrast opacification: Excellent    Aortic Arch: Unremarkable    Right:    Innominate: Patent  Subclavian: Patent  Common Carotid: Patent  External Carotid:Patent  Internal Carotid: Patent    Intracranial ICA: Patent  MCA:Patent  BETO: Patent  PCA: Patent  Vertebral: Patent    Left:    Subclavian: Patent  Common Carotid: Patent  External Carotid:Patent  Internal Carotid: Patent    Intracranial ICA: Patent  MCA:Patent  BETO: Patent  PCA: Patent  Vertebral: Patent      Basilar: Patent      Soft Tissue: Unremarkable  Lungs: Mild dependent atelectasis. Otherwise the visualized lungs are unremarkable.  Bones: No acute osseous abnormality.      Impression:      Impression:  No acute arterial abnormality of the head and neck.            Electronically Signed:  Jonnathan Skelton,     1/3/2024 8:01 PM EST    Workstation ID: [id number]    CT Angiogram Neck [[id number]] Collected: 01/03/24 1945     Updated: 01/03/24 2004    Narrative:      CT ANGIOGRAM HEAD W AI ANALYSIS OF LVO, CT ANGIOGRAM NECK    Date of Exam: 1/3/2024 7:32 PM EST    Indication: Stroke, follow up.    Comparison: None available.    Technique: CTA of the head and neck was performed after the uneventful intravenous administration of 100 cc Isovue-370. Reconstructed coronal and sagittal images were also obtained. In addition, a 3-D volume rendered image was created for interpretation.   Automated exposure control and iterative reconstruction methods were used.      Findings:    Contrast opacification: Excellent    Aortic Arch: Unremarkable    Right:    Innominate: Patent  Subclavian: Patent  Common Carotid: Patent  External Carotid:Patent  Internal Carotid: Patent    Intracranial ICA: Patent  MCA:Patent  BETO: Patent  PCA: Patent  Vertebral: Patent    Left:    Subclavian: Patent  Common Carotid: Patent  External Carotid:Patent  Internal Carotid: Patent    Intracranial ICA: Patent  MCA:Patent  BETO: Patent  PCA: Patent  Vertebral: Patent      Basilar: Patent      Soft Tissue: Unremarkable  Lungs: Mild dependent atelectasis. Otherwise the visualized lungs are unremarkable.  Bones: No acute osseous abnormality.      Impression:      Impression:  No acute arterial abnormality of the head and neck.            Electronically Signed: Jonnathan Skelton DO    1/3/2024 8:01 PM EST    Workstation ID: [id number]    CT CEREBRAL PERFUSION WITH & WITHOUT CONTRAST [[id number]] Collected: 01/03/24 1943     Updated: 01/03/24 1948    Narrative:      CT CEREBRAL PERFUSION W WO CONTRAST    Date of Exam: 1/3/2024 7:32 PM EST    Indication: Neuro deficit, acute, stroke suspected.     Comparison: Same day head CT    Technique: Axial CT images of the brain were obtained prior to and after the administration of 100 cc Isovue 370. Core blood  volume, core blood flow, mean transit time, and Tmax images were obtained utilizing the Rapid software protocol. A limited CT   angiogram of the head was also performed to measure the blood vessel density.    The radiation dose reduction device was turned on for each scan per the ALARA (As Low as Reasonably Achievable) protocol.      Findings:  Adequate perfusion images were obtained. Adequate ROIs obtained.  Study is mildly degraded by motion, most significantly in the z-axis.    CBF less than 30%: 0 mL.  Tmax greater than 6 seconds: 0 mL    Mismatch volume: 0 mL  Mismatch ratio: None      Impression:      Impression:  Mildly motion degraded study. No evidence of an acute infarct or ischemia        Electronically Signed: Jonnathan Skelton DO    1/3/2024 7:45 PM EST    Workstation ID: [id number]    CT Head Without Contrast Stroke Protocol [[id number]] Collected: 01/03/24 1935     Updated: 01/03/24 1943    Narrative:      CT HEAD WO CONTRAST STROKE PROTOCOL    Date of Exam: 1/3/2024 7:22 PM EST    Indication: Neuro deficit, acute, stroke suspected.    Comparison: None available.    Technique: Axial CT images were obtained of the head without contrast administration.  Reconstructed coronal images were also obtained. Automated exposure control and iterative construction methods were used.    Scan Time: 7:28 p.m.  Results discussed with stroke team at 7:39 p.m.      Findings:  Motion degraded study.    No large territory infarct.    There is no evidence of hemorrhage.  No mass effect, edema or midline shift    Mild to moderate periventricular and subcortical white matter hypodensities, nonspecific but most likely represents chronic small vessel ischemic changes.    No extra-axial fluid collection.      Prominent ventricular system secondary to chronic parenchymal volume loss.      Disconjugate gaze, nonspecific.  The visualized paranasal sinuses and mastoid air cells are clear.    The visualized soft tissues are  unremarkable.  No acute osseous abnormality.      Impression:      Impression:  No definite acute intracranial hemorrhage or large territorial infarct. Motion degraded study.        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    Melatonin 5 mg at bedtime.    Exelon patch 4.6 mg nightly.    Continue as needed Geodon    Discontinue Seroquel    Comment:  Patient sounds like she has had a mild cognitive impairment versus early dementia.  Prior to her brother's death.    The plus hospitalization seems to have set off a major functional decline.    Hopefully we can get her to take the Remeron and melatonin.  The Exelon patch should forego any oral intake.         I discussed the patients findings and my recommendations with family    Hung Edgar MD  01/04/24  15:43 EST

## 2024-01-04 NOTE — NURSING NOTE
Reason for Wound, Ostomy and Continence (WOC) Nursing Consultation: Follow up to wounds  Patient restrained, in bed, agitated and combative.  Family/support person present-left outside room upon assessment    Wounds noted by WOC: all POA:    Wound Assessment  Wound Type: Pressure Injury Stage 2  Location: right heel-contained within compression dressing per PT wound care.  Compression dressings had slid down patient's calf and foot these were adjusted appropriately.    Wound Assessment  Wound Type: Evolving deep tissue injury  Location: left trochanter  Measurements: 6x3.5cm  Wound Bed: non-blanchable, moist, hints of pink-purple and yellow film (not slough), firmness to area upon palpation  Wound Edges: Open  Periwound Skin: intact and dry   Drainage Characteristics/Odor: none  Drainage Amount: none  Pain: No   Care provided: cleansed, applied venelex and silicone foam dressing  Notes:   Wound Image:       Wound Assessment  Wound Type: Evolving deep tissue pressure injury, moving towards unstageable wound  Location: sacral area  Measurements: not today  Wound Bed: non-blanchable, slough, moist, pink, and purple-firm area in center where dark purple area prensent  Wound Edges: Irregular and Jagged  Periwound Skin: intact   Drainage Characteristics/Odor: none  Drainage Amount: none  Pain: Yes   Care provided: cleansed, applied venelex  Notes: Will order venelex and foam padding q12  Wound Image:       Wound Assessment  Wound Type: abrasion   Location: left flank  Measurements: not today  Wound Bed: pink, blanching area  Wound Edges: Irregular  Periwound Skin: intact   Drainage Characteristics/Odor: none  Drainage Amount: none  Pain: No   Care provided:applied venelex  Notes: not on bony prominence, unsure origin, continue care  Wound Image:       Recommendation(s) for management of wound:   -Refer to wound care orders for specific instructions on how to treat/manage wound.  -will order modified compression wraps for  edematous BLE, should help heal right foot blister quicker  -venelex to left trochanter and sacrum wounds  -Practice pressure injury prevention protocol.    Most recent Chato Scale score:  Sensory Perception: 2-->very limited  Moisture: 2-->very moist  Activity: 1-->bedfast  Mobility: 2-->very limited  Nutrition: 3-->adequate  Friction and Shear: 2-->potential problem  Chato Score: 12 (01/04/24 0900)     Specialty support surface: Low Air Loss (MARÍA) Mattress-dolphin mattress at last visit, patient is however on low-air-loss mattress, rental company likely out of dolphin mattress.       Pressure Injury Prevention Protocol (initiate for Chato Score of 18 or less):   *Keep skin dry, turn q 2 hr, keep heels elevated and offloaded with offloading heel boots.    *Apply z-guard to bottom and bony prominences daily and as needed with incontinence episodes.  *Follow C.A.R.E protocol if medical devices (Bipap, loza, Ng tube, etc) are being used.  *Reduce layers under patient (one sheet as drawsheet and two incontinence pads) to allow waffle or MARÍA to improve microclimate   *Clean skin gently with no-rinse PH-balanced cleanser and soft, disposable cloth (barrier wipes-blue pack).   *Raise knee-gatch before elevating HOB to reduce shearing      WOC Team will continue to follow.  Please re-consult if the wound(s) worsens.

## 2024-01-04 NOTE — CASE MANAGEMENT/SOCIAL WORK
Continued Stay Note  [facility]     Patient Name: Omar Mendoza  MRN: [id number]  Today's Date: 1/4/2024    Admit Date: 12/26/2023    Plan: skilled to LTC?   Discharge Plan       Row Name 01/04/24 0940       Plan    Plan skilled to LTC?    Patient/Family in Agreement with Plan yes    Plan Comments Per notes, code stroke called on  evening of 01/03, I met w/spouse in room, patient is currently in wrist restraints. I explained to spouse that patient will need to be out of wrist restraints for @ least 24 hours to be able to be transferred to a facility w/no prn medications as well. Spouse stated that his son and daughter are the decision makers @ this time, and to inform them as well. I updated Iris w/[facility] via phone. I spoke w/Amber @ [facility], she had already reviewed chart and will f/u on Monday to see if patient improved. [facility] policy for restraints is out for 72 hours before they will accept. I left VM on Gael colin phone to return my call. Was able to speak w/son Gael, and daughter Jessi via phone and updated. Really interested in [facility], just for close proximity..Hans Valente RN      Final Discharge Disposition Code 03 - skilled nursing facility (SNF)                   Discharge Codes    No documentation.                 Expected Discharge Date and Time       Expected Discharge Date Expected Discharge Time    Jan 9, 2024               Hans Valente RN

## 2024-01-04 NOTE — NURSING NOTE
Amita EEG at bedside to do EEG. Unable to perform/complete EEG at this time due to patient's cooperation.

## 2024-01-04 NOTE — THERAPY RE-EVALUATION
Acute Care - Speech Language Pathology   Swallow Re-Evaluation [facility]  Clinical Swallow Evaluation       Patient Name: Omar Mendoza  : 1940  MRN: [id number]  Today's Date: 2024               Admit Date: 2023    Visit Dx:     ICD-10-CM ICD-9-CM   1. Generalized weakness  R53.1 780.79   2. Acute UTI  N39.0 599.0   3. Bullous pemphigoid  L12.0 694.5   4. Venous insufficiency  I87.2 459.81   5. Dysphagia, unspecified type  R13.10 787.20     Patient Active Problem List   Diagnosis    UTI (urinary tract infection)    Bullous pemphigoid    Essential hypertension    Dyslipidemia    Edema of lower extremity    Cholestatic hepatitis    Acute constipation    Acute urinary retention    Uterine prolapse    Bradycardia    Pressure injury of buttock, unstageable    Type 2 diabetes mellitus with ketoacidosis, without long-term current use of insulin    Disorientation    Pleural effusion    Severe malnutrition     Past Medical History:   Diagnosis Date    CAD (coronary artery disease)     Dementia     Diabetes mellitus     Hyperlipemia     Hypertension      History reviewed. No pertinent surgical history.    SLP Recommendation and Plan  SLP Swallowing Diagnosis: unable to assess, unable/unwilling to cooperate (24)  SLP Diet Recommendation: other (see comments) (unable to safely recommend po intake 2/2 pt participation/cooperation; was previously on comfort diet per pt request) (24)  Recommended Precautions and Strategies: general aspiration precautions (24)  SLP Rec. for Method of Medication Administration: meds whole, with puree, as tolerated (24)     Monitor for Signs of Aspiration: yes, notify SLP if any concerns (24)  Recommended Diagnostics: reassess via clinical swallow evaluation, other (see comments), SLE/Cog/Motor Speech Evaluation (unable to complete SLC evaluation 2/2 pt participation) (24)  Swallow Criteria for Skilled Therapeutic  Interventions Met: demonstrates skilled criteria (01/04/24 0855)  Anticipated Discharge Disposition (SLP): skilled nursing facility (01/04/24 0855)  Rehab Potential/Prognosis, Swallowing: re-evaluate goals as necessary (01/04/24 0855)  Therapy Frequency (Swallow): evaluation only (01/03/24 1415)     Oral Care Recommendations: Oral Care BID/PRN, Suction toothbrush (01/04/24 0855)        Daily Summary of Progress (SLP): progress toward functional goals as expected (01/03/24 1415)                 Plan for Continued Treatment (SLP): patient/family has declined further intervention (01/03/24 1415)       Oral Care Recommendations: Oral Care BID/PRN, Suction toothbrush (01/04/24 0855)    Plan of Care Reviewed With: patient, family  Progress:  (re-eval)      SWALLOW EVALUATION (last 72 hours)       SLP Adult Swallow Evaluation       Row Name 01/04/24 0855       Rehab Evaluation    Document Type re-evaluation  -CJ    Subjective Information --    Patient Observations poorly cooperative  -CJ    Patient/Family/Caregiver Comments/Observations  present  -CJ    Patient Effort poor  -CJ    Symptoms Noted During/After Treatment other (see comments)  resistant  -CJ       General Information    Patient Profile Reviewed yes  -CJ    Pertinent History Of Current Problem Pt initially signed off on yesterday from SLP services however code stroke called last pm so reconsulted  -CJ    Current Method of Nutrition NPO  -CJ    Precautions/Limitations, Vision difficult to assess  -CJ    Precautions/Limitations, Hearing difficult to assess  -CJ    Prior Level of Function-Communication other (see comments)  ? dementia  -CJ    Prior Level of Function-Swallowing other (see comments)  pt previously declined further testing and wanted comfort diet  -CJ    Barriers to Rehab resistant to information;uncooperative  -CJ    Patient's Goals for Discharge patient could not state  -CJ    Family Goals for Discharge family did not state  -CJ       Pain     Additional Documentation Pain Scale: FACES Pre/Post-Treatment (Group)  -       Pain Scale: FACES Pre/Post-Treatment    Pain: FACES Scale, Pretreatment 0-->no hurt  -CJ    Posttreatment Pain Rating 0-->no hurt  -       Oral Motor Structure and Function    Secretion Management WNL/WFL  -    Mucosal Quality moist, healthy  -       Oral Musculature and Cranial Nerve Assessment    Oral Motor General Assessment unable to assess  -       General Eating/Swallowing Observations    Respiratory Support Currently in Use nasal cannula  -    Eating/Swallowing Skills fed by SLP;other (see comments)  in restraints  -    Positioning During Eating upright in bed  -    Utensils Used spoon  -    Consistencies Trialed ice chips  -       Clinical Swallow Eval    Clinical Swallow Evaluation Summary Pt seen for repeat evaluation this date. Pt initially accepted x1 ice chip however pt then expelled the ice chip from oral cavity. Refused further po intake and began yelling at SLP to leave the room. Pt unable to be redirected by SLP or family member. Furhter po presentations deferred 2/2 agitation. Unable to recommend any po intake at this point. Pt had previously elected to be on comfort diet w/ no further MBS/FEES to be completed. Was tolerating mech soft whole w/ thins yesterday. Unable to safely recommend po at this time  -       SLP Evaluation Clinical Impression    SLP Swallowing Diagnosis unable to assess;unable/unwilling to cooperate  -    Functional Impact risk of aspiration/pneumonia  -    Rehab Potential/Prognosis, Swallowing re-evaluate goals as necessary  -    Swallow Criteria for Skilled Therapeutic Interventions Met demonstrates skilled criteria  -       SLP Treatment Clinical Impressions    Treatment Assessment (SLP) --    Treatment Assessment Comments (SLP) --    Daily Summary of Progress (SLP) --    Plan for Continued Treatment (SLP) --    Care Plan Review --    Care Plan Review, Other  Participant(s) --       Recommendations    Therapy Frequency (Swallow) --    SLP Diet Recommendation other (see comments)  unable to safely recommend po intake 2/2 pt participation/cooperation; was previously on comfort diet per pt request  -    Recommended Diagnostics reassess via clinical swallow evaluation;other (see comments);SLE/Cog/Motor Speech Evaluation  unable to complete SLC evaluation 2/2 pt participation  -    Recommended Precautions and Strategies general aspiration precautions  -    Oral Care Recommendations Oral Care BID/PRN;Suction toothbrush  -    SLP Rec. for Method of Medication Administration meds whole;with puree;as tolerated  -    Monitor for Signs of Aspiration yes;notify SLP if any concerns  -    Anticipated Discharge Disposition (SLP) [facility] nursing [facility]  -              User Key  (r) = Recorded By, (t) = Taken By, (c) = Cosigned By      Initials Name Effective Dates    Karen Rogers MS CCC-SLP 07/11/23 -                     EDUCATION  The patient has been educated in the following areas:   Dysphagia (Swallowing Impairment) Oral Care/Hydration.              Time Calculation:    Time Calculation- SLP       Row Name 01/04/24 [facility]             Time Calculation- SLP    SLP Start Time 0855  -      SLP Received On 01/04/24  -         Untimed Charges    63314-XW Eval Oral Pharyng Swallow Minutes 45  -         Total Minutes    Untimed Charges Total Minutes 45  -CJ       Total Minutes 45  -                User Key  (r) = Recorded By, (t) = Taken By, (c) = Cosigned By      Initials Name Provider Type    Karen Rogers MS CCC-SLP Speech and Language Pathologist                    Therapy Charges for Today       Code Description Service Date Service Provider Modifiers Qty    54913086531 HC ST TREATMENT SWALLOW 3 1/3/2024 Karen Park MS CCC-SLP GN 1    44434107445 HC ST EVAL ORAL PHARYNG SWALLOW 3 1/4/2024 Karen Park MS CCC-SLP GN 1                  Karen Park, MS CCC-SLP  1/4/2024

## 2024-01-04 NOTE — PLAN OF CARE
Goal Outcome Evaluation:  Plan of Care Reviewed With: patient, family        Progress: improving  Outcome Evaluation: Pt demonstrating excellent improvements in re-epithelialization of R posterior heel wound with only small open area remaining. Pt also with good maintainence of BLE limb girth with current use of MLW however PT altered application of compressogrips to reduce pressure/tightness d/t pt complaints. PT plans to continue with MLW and dressing changes 2-3x/week.

## 2024-01-05 LAB
ANION GAP SERPL CALCULATED.3IONS-SCNC: 12 MMOL/L (ref 5–15)
ANISOCYTOSIS BLD QL: ABNORMAL
BACTERIA SPEC AEROBE CULT: ABNORMAL
BASOPHILS # BLD MANUAL: 0 10*3/MM3 (ref 0–0.2)
BASOPHILS NFR BLD MANUAL: 0 % (ref 0–1.5)
BUN SERPL-MCNC: 6 MG/DL (ref 8–23)
BUN/CREAT SERPL: 14.6 (ref 7–25)
CALCIUM SPEC-SCNC: 7.4 MG/DL (ref 8.6–10.5)
CHLORIDE SERPL-SCNC: 102 MMOL/L (ref 98–107)
CO2 SERPL-SCNC: 25 MMOL/L (ref 22–29)
CREAT SERPL-MCNC: 0.41 MG/DL (ref 0.57–1)
DEPRECATED RDW RBC AUTO: 59.2 FL (ref 37–54)
EGFRCR SERPLBLD CKD-EPI 2021: 99 ML/MIN/1.73
EOSINOPHIL # BLD MANUAL: 3.79 10*3/MM3 (ref 0–0.4)
EOSINOPHIL NFR BLD MANUAL: 45 % (ref 0.3–6.2)
ERYTHROCYTE [DISTWIDTH] IN BLOOD BY AUTOMATED COUNT: 17.3 % (ref 12.3–15.4)
GLUCOSE BLDC GLUCOMTR-MCNC: 137 MG/DL (ref 70–130)
GLUCOSE BLDC GLUCOMTR-MCNC: 175 MG/DL (ref 70–130)
GLUCOSE SERPL-MCNC: 108 MG/DL (ref 65–99)
HCT VFR BLD AUTO: 29.6 % (ref 34–46.6)
HGB BLD-MCNC: 9.7 G/DL (ref 12–15.9)
LYMPHOCYTES # BLD MANUAL: 0.25 10*3/MM3 (ref 0.7–3.1)
LYMPHOCYTES NFR BLD MANUAL: 4 % (ref 5–12)
MCH RBC QN AUTO: 31.4 PG (ref 26.6–33)
MCHC RBC AUTO-ENTMCNC: 32.8 G/DL (ref 31.5–35.7)
MCV RBC AUTO: 95.8 FL (ref 79–97)
MONOCYTES # BLD: 0.34 10*3/MM3 (ref 0.1–0.9)
NEUTROPHILS # BLD AUTO: 4.05 10*3/MM3 (ref 1.7–7)
NEUTROPHILS NFR BLD MANUAL: 37 % (ref 42.7–76)
NEUTS BAND NFR BLD MANUAL: 11 % (ref 0–5)
NRBC SPEC MANUAL: 1 /100 WBC (ref 0–0.2)
PLAT MORPH BLD: NORMAL
PLATELET # BLD AUTO: 195 10*3/MM3 (ref 140–450)
PMV BLD AUTO: 9.6 FL (ref 6–12)
POTASSIUM SERPL-SCNC: 3.3 MMOL/L (ref 3.5–5.2)
RBC # BLD AUTO: 3.09 10*6/MM3 (ref 3.77–5.28)
SODIUM SERPL-SCNC: 139 MMOL/L (ref 136–145)
VARIANT LYMPHS NFR BLD MANUAL: 3 % (ref 19.6–45.3)
WBC MORPH BLD: NORMAL
WBC NRBC COR # BLD AUTO: 8.43 10*3/MM3 (ref 3.4–10.8)

## 2024-01-05 PROCEDURE — 85025 COMPLETE CBC W/AUTO DIFF WBC: CPT | Performed by: STUDENT IN AN ORGANIZED HEALTH CARE EDUCATION/TRAINING PROGRAM

## 2024-01-05 PROCEDURE — 85007 BL SMEAR W/DIFF WBC COUNT: CPT | Performed by: STUDENT IN AN ORGANIZED HEALTH CARE EDUCATION/TRAINING PROGRAM

## 2024-01-05 PROCEDURE — 97530 THERAPEUTIC ACTIVITIES: CPT

## 2024-01-05 PROCEDURE — 97168 OT RE-EVAL EST PLAN CARE: CPT

## 2024-01-05 PROCEDURE — 92610 EVALUATE SWALLOWING FUNCTION: CPT

## 2024-01-05 PROCEDURE — 82948 REAGENT STRIP/BLOOD GLUCOSE: CPT

## 2024-01-05 PROCEDURE — 99232 SBSQ HOSP IP/OBS MODERATE 35: CPT | Performed by: PSYCHIATRY & NEUROLOGY

## 2024-01-05 PROCEDURE — 25010000002 POTASSIUM CHLORIDE 10 MEQ/100ML SOLUTION: Performed by: STUDENT IN AN ORGANIZED HEALTH CARE EDUCATION/TRAINING PROGRAM

## 2024-01-05 PROCEDURE — 80048 BASIC METABOLIC PNL TOTAL CA: CPT | Performed by: STUDENT IN AN ORGANIZED HEALTH CARE EDUCATION/TRAINING PROGRAM

## 2024-01-05 PROCEDURE — 97164 PT RE-EVAL EST PLAN CARE: CPT

## 2024-01-05 PROCEDURE — 99232 SBSQ HOSP IP/OBS MODERATE 35: CPT | Performed by: STUDENT IN AN ORGANIZED HEALTH CARE EDUCATION/TRAINING PROGRAM

## 2024-01-05 RX ORDER — QUETIAPINE FUMARATE 25 MG/1
25 TABLET, FILM COATED ORAL NIGHTLY
Status: DISCONTINUED | OUTPATIENT
Start: 2024-01-05 | End: 2024-01-07

## 2024-01-05 RX ORDER — POTASSIUM CHLORIDE 7.45 MG/ML
10 INJECTION INTRAVENOUS
Status: DISCONTINUED | OUTPATIENT
Start: 2024-01-05 | End: 2024-01-05

## 2024-01-05 RX ORDER — POTASSIUM CHLORIDE 1.5 G/1.58G
40 POWDER, FOR SOLUTION ORAL ONCE
Status: COMPLETED | OUTPATIENT
Start: 2024-01-05 | End: 2024-01-05

## 2024-01-05 RX ORDER — RIVASTIGMINE 9.5 MG/24H
1 PATCH, EXTENDED RELEASE TRANSDERMAL DAILY
Status: DISCONTINUED | OUTPATIENT
Start: 2024-01-05 | End: 2024-02-01 | Stop reason: HOSPADM

## 2024-01-05 RX ADMIN — MIRTAZAPINE 15 MG: 15 TABLET, FILM COATED ORAL at 21:29

## 2024-01-05 RX ADMIN — METOPROLOL TARTRATE 25 MG: 25 TABLET, FILM COATED ORAL at 21:30

## 2024-01-05 RX ADMIN — POTASSIUM CHLORIDE 40 MEQ: 1.5 POWDER, FOR SOLUTION ORAL at 12:26

## 2024-01-05 RX ADMIN — POTASSIUM CHLORIDE 10 MEQ: 7.46 INJECTION, SOLUTION INTRAVENOUS at 09:33

## 2024-01-05 RX ADMIN — RIVASTIGMINE 1 PATCH: 4.6 PATCH TRANSDERMAL at 09:33

## 2024-01-05 RX ADMIN — Medication 5 MG: at 21:30

## 2024-01-05 RX ADMIN — QUETIAPINE FUMARATE 25 MG: 25 TABLET ORAL at 21:29

## 2024-01-05 RX ADMIN — RIVASTIGMINE TRANSDERMAL SYSTEM 1 PATCH: 9.5 PATCH, EXTENDED RELEASE TRANSDERMAL at 14:49

## 2024-01-05 RX ADMIN — CASTOR OIL AND BALSAM, PERU 1 APPLICATION: 788; 87 OINTMENT TOPICAL at 09:34

## 2024-01-05 NOTE — THERAPY RE-EVALUATION
Patient Name: Omar Mendoza  : 1940    MRN: [id number]                              Today's Date: 2024       Admit Date: 2023    Visit Dx:     ICD-10-CM ICD-9-CM   1. Generalized weakness  R53.1 780.79   2. Acute UTI  N39.0 599.0   3. Bullous pemphigoid  L12.0 694.5   4. Venous insufficiency  I87.2 459.81   5. Dysphagia, unspecified type  R13.10 787.20     Patient Active Problem List   Diagnosis    UTI (urinary tract infection)    Bullous pemphigoid    Essential hypertension    Dyslipidemia    Edema of lower extremity    Cholestatic hepatitis    Acute constipation    Acute urinary retention    Uterine prolapse    Bradycardia    Pressure injury of buttock, unstageable    Type 2 diabetes mellitus with ketoacidosis, without long-term current use of insulin    Disorientation    Pleural effusion    Severe malnutrition     Past Medical History:   Diagnosis Date    CAD (coronary artery disease)     Dementia     Diabetes mellitus     Hyperlipemia     Hypertension      History reviewed. No pertinent surgical history.   General Information       Row Name 24 1024          Physical Therapy Time and Intention    Document Type re-evaluation  -KW     Mode of Treatment physical therapy  -       Row Name 24 1024          General Information    Patient Profile Reviewed yes  -KW     Existing Precautions/Restrictions fall;seizures;NPO  agitated, restraints  -       Row Name 24 1024          Cognition    Orientation Status (Cognition) other (see comments);unable/difficult to assess  pt declined to state her name, confused, agitated  -KW               User Key  (r) = Recorded By, (t) = Taken By, (c) = Cosigned By      Initials Name Provider Type    Esther Dye, PT Physical Therapist                   Mobility       Row Name 24 1024          Bed Mobility    Bed Mobility supine-sit;sit-supine  -KW     Supine-Sit [place] (Bed Mobility) verbal cues;nonverbal cues (demo/gesture);2  person assist;moderate assist (50% patient effort)  -KW     Sit-Supine [place] (Bed Mobility) verbal cues;nonverbal cues (demo/gesture);maximum assist (25% patient effort);2 person assist  -KW     Assistive Device (Bed Mobility) bed rails;draw sheet;head of bed elevated  -KW       Row Name 01/05/24 1024          Sit-Stand Transfer    Sit-Stand [place] (Transfers) moderate assist (50% patient effort);2 person assist;verbal cues  -KW     Assistive Device (Sit-Stand Transfers) walker, front-wheeled  -KW               User Key  (r) = Recorded By, (t) = Taken By, (c) = Cosigned By      Initials Name Provider Type    Esther Dye PT Physical Therapist                   Obj/Interventions    No documentation.                  Goals/Plan    No documentation.                  Clinical Impression       Row Name 01/05/24 1024          Pain    Pretreatment Pain Rating 0/10 - no pain  -KW       Row Name 01/05/24 1024          Plan of Care Review    Plan of Care Reviewed With patient  -KW     Progress improving  -KW     Outcome Evaluation Physical therapy treatment complete. with increased agitation and confusion this date. She was able to complete 2 stands at EOB but continnues to have difficulty with weigth shift and steps to HOB.  The patient continues to present below baseline for functional mobility. The patient would continue to benefit from skilled PT to address strength, balance and activity tolerance deficits. Continue to current PT POC  -KW       Row Name 01/05/24 1024          Positioning and Restraints    Pre-Treatment Position in bed  -KW     Post Treatment Position bed  -KW     In Bed supine;call light within reach;encouraged to call for assist;exit alarm on  -KW               User Key  (r) = Recorded By, (t) = Taken By, (c) = Cosigned By      Initials Name Provider Type    Esther Dye PT Physical Therapist                   Outcome Measures       Row Name 01/05/24 1024 01/05/24  0800       How much help from another person do you currently need...    Turning from your back to your side while in flat bed without using bedrails? 3  -KW 3  -SW    Moving from lying on back to sitting on the side of a flat bed without bedrails? 2  -KW 2  -SW    Moving to and from a bed to a chair (including a wheelchair)? 2  -KW 1  -SW    Standing up from a chair using your arms (e.g., wheelchair, bedside chair)? 2  -KW 1  -SW    Climbing 3-5 steps with a railing? 1  -KW 1  -SW    To walk in hospital room? 1  -KW 1  -SW    AM-PAC 6 Clicks Score (PT) 11  -KW 9  -SW    Highest Level of Mobility Goal 4 --> Transfer to chair/commode  -KW 3 --> Sit at edge of bed  -SW      Row Name 01/05/24 1042 01/05/24 1024       Functional Assessment    Outcome Measure Options AM-PAC 6 Clicks Daily Activity (OT)  -AC AM-PAC 6 Clicks Basic Mobility (PT)  -              User Key  (r) = Recorded By, (t) = Taken By, (c) = Cosigned By      Initials Name Provider Type    AC Sepideh Feldman, OT Occupational Therapist    Esther Dye, PT Physical Therapist    Melba Vaca, RN Registered Nurse                                 Physical Therapy Education       Title: PT OT SLP Therapies (In Progress)       Topic: Physical Therapy (In Progress)       Point: Mobility training (In Progress)       Learning Progress Summary             Patient Acceptance, E, NR by AE at 1/3/2024 1337    Acceptance, E, NR by KE at 12/29/2023 1131    Acceptance, E, NR by KE at 12/27/2023 1045                         Point: Home exercise program (Not Started)       Learner Progress:  Not documented in this visit.              Point: Body mechanics (In Progress)       Learning Progress Summary             Patient Acceptance, E, NR by AE at 1/3/2024 1337    Acceptance, E, NR by KE at 12/29/2023 1131    Acceptance, E, NR by KE at 12/27/2023 1045                         Point: Precautions (In Progress)       Learning Progress Summary              Patient Acceptance, E, NR by DENTON at 1/3/2024 1337    Acceptance, E, NR by MIA at 12/29/2023 1131    Acceptance, E, NR by MIA at 12/27/2023 1045                                         User Key       Initials Effective Dates Name Provider Type Discipline    AE 09/21/21 -  Domo Desir, PT Physical Therapist PT    MIA 11/16/23 -  Petty Mckoy, PT Physical Therapist PT                  PT Recommendation and Plan     Plan of Care Reviewed With: patient  Progress: improving  Outcome Evaluation: Physical therapy treatment complete. with increased agitation and confusion this date. She was able to complete 2 stands at EOB but continnues to have difficulty with weigth shift and steps to HOB.  The patient continues to present below baseline for functional mobility. The patient would continue to benefit from skilled PT to address strength, balance and activity tolerance deficits. Continue to current PT POC     Time Calculation:         PT Charges       Row Name 01/05/24 1024             Time Calculation    Start Time 1455  -KW      PT Received On 01/05/24  -KW         Timed Charges    12048 - PT Therapeutic Activity Minutes 13  -KW         Untimed Charges    PT Eval/Re-eval Minutes 31  -KW         Total Minutes    Timed Charges Total Minutes 13  -KW      Untimed Charges Total Minutes 31  -KW       Total Minutes 44  -KW                User Key  (r) = Recorded By, (t) = Taken By, (c) = Cosigned By      Initials Name Provider Type    Esther Dye, [place] Physical Therapist                  Therapy Charges for Today       Code Description Service Date Service Provider Modifiers Qty    85628184444 HC PT RE-EVAL ESTABLISHED PLAN 2 1/5/2024 Esther Best, PT GP 1    23319782105  PT THERAPEUTIC ACT EA 15 MIN 1/5/2024 Esther Best PT GP 1            PT G-Codes  Outcome Measure Options: AM-PAC 6 Clicks Daily Activity (OT)  AM-PAC 6 Clicks Score (PT): 11  AM-PAC 6 Clicks Score (OT): 10  PT Discharge  Summary  Anticipated Discharge Disposition (PT): skilled nursing facility    Esther Best, PT  1/5/2024

## 2024-01-05 NOTE — CASE MANAGEMENT/SOCIAL WORK
Continued Stay Note  [facility]     Patient Name: Omar Mendoza  MRN: [id number]  Today's Date: 1/5/2024    Admit Date: 12/26/2023    Plan: rehab   Discharge Plan       Row Name 01/05/24 1224       Plan    Plan rehab    Patient/Family in Agreement with Plan yes    Plan Comments Per RN in MDRs, this patient remains in right arm restraints. [facility]. Family still wanting skilled to transition to LTC for this patient. CM updated the son and daughter. CM to continue to follow.    1334   CM received message from the RN stating that this patient is now out of restraints. [facility]. She could be eligible for rehab by Monday per [facility]. CM will follow.    Final Discharge Disposition Code 03 - skilled nursing facility (SNF)                   Discharge Codes    No documentation.                 Expected Discharge Date and Time       Expected Discharge Date Expected Discharge Time    Jan 9, 2024               Eleonora John RN

## 2024-01-05 NOTE — PROGRESS NOTES
Neurology       Patient Care Team:  Varun Pa MD as PCP - General (Internal Medicine)    Chief complaint: They are feeding maternal meat    History: Patient is now awake.  She is extremely paranoid complaining that they are feeding her total meet Ms. treating her and trying to kill her.    She has declined mirtazapine last night as well as melatonin.  She slept poorly.    She refused to eat her breakfast because she feels that the hospital is trying to poison her.    The granddaughter was here and is concerned about her memory which has been bad for at least the last 6 months if not longer.    She reiterates her grandfather's, at that he has deteriorated dramatically following the death of her brother.      Past Medical History:   Diagnosis Date    CAD (coronary artery disease)     Dementia     Diabetes mellitus     Hyperlipemia     Hypertension        Vital Signs   Vitals:    01/04/24 1055 01/04/24 2100 01/05/24 0351 01/05/24 0930   BP:  149/85 152/80 133/93   BP Location:  Left arm Left arm Left arm   Patient Position:  Lying Lying Lying   Pulse: 66 66 84 67   Resp:  16 18 16   Temp:   97 °F (36.1 °C)    TempSrc:   Axillary    SpO2: 97%   94%   Weight:       Height:           Physical Exam:   General: Awake and alert              Neuro: Paranoid.  Agitated.    Speech is articulate with no word finding problems.    She has right exotropia which apparently is chronic.    Face is symmetrical.    Moves all extremities well.        Results Review:  Reviewed  Results from last 7 days   Lab Units 01/05/24  0352   WBC 10*3/mm3 8.43   HEMOGLOBIN g/dL 9.7*   HEMATOCRIT % 29.6*   PLATELETS 10*3/mm3 195     Results from last 7 days   Lab Units 01/05/24  0352 01/02/24  1523 01/02/24  0608 01/01/24  0723   SODIUM mmol/L 139  --  145 144   POTASSIUM mmol/L 3.3* 3.9 3.4* 3.7   CHLORIDE mmol/L 102  --  110* 110*   CO2 mmol/L 25.0  --  28.0 27.0   BUN mg/dL 6*  --  13 14   CREATININE mg/dL 0.41*  --  0.48* 0.51*   CALCIUM  mg/dL 7.4*  --  7.5* 7.6*   GLUCOSE mg/dL 108*  --  148* 172*       Imaging Results (Last 24 Hours)       Procedure Component Value Units Date/Time    XR Chest 1 View [[id number]] Collected: 01/04/24 1556     Updated: 01/04/24 1604    Narrative:      XR CHEST 1 VW    COMPARISON: 12/30/2023    HISTORY: Oxygen desaturation. .    FINDINGS:  Technical adequacy: Adequate. Shallow depth of inspiration  Central airways: Unremarkable  Heart: Stable cardiomegaly  Great vessels: Stable unfolded atherosclerotic thoracic aorta  Mediastinum: Unremarkable  Lungs: Bilateral perihilar interstitial infiltrates with peribronchial cuffing and subpleural edema  Pulmonary aliza: As above  Pleura: Unremarkable  Skeletal structures: Degenerative changes  Extra thoracic soft tissues:Unremarkable  Additional comments: The previously seen PICC line is no longer visualized. The fluffy airspace infiltrates on both sides have resolved in the interval.      Impression:      Impression: The findings suggest interstitial pulmonary edema, likely cardiogenic in origin. Clinical correlation.    Electronically Signed: Chandler Alberto MD    1/4/2024 4:01 PM EST    Workstation ID: [id number]            Assessment:  Dementia likely Alzheimer's disease.    Major depression with paranoia    Plan:  Mirtazapine 15 mg now as tolerated.    Increase Exelon to 9.5 mg daily.    Up in a chair twice daily.    Ambulate with assistance.    Remove restraints when the time is around.    Patient does not calm down next hours or so, Geodon 10 mg as previously ordered.    Add Seroquel 25 mg at bedtime.              Comment:  Complex delirium/dementia combination aggravated by untreated depressive illness         I discussed the patients findings and my recommendations with patient, family, nursing staff, and primary care team    Hung Edgar MD  01/05/24  11:41 EST

## 2024-01-05 NOTE — PLAN OF CARE
Pt out of restraints and tolerating well   Problem: Adult Inpatient Plan of Care  Goal: Plan of Care Review  Outcome: Ongoing, Progressing  Goal: Patient-Specific Goal (Individualized)  Outcome: Ongoing, Progressing  Goal: Absence of Hospital-Acquired Illness or Injury  Outcome: Ongoing, Progressing  Intervention: Identify and Manage Fall Risk  Recent Flowsheet Documentation  Taken 1/5/2024 1200 by Melba Gallagher RN  Safety Promotion/Fall Prevention:   activity supervised   assistive device/personal items within reach   clutter free environment maintained   toileting scheduled   safety round/check completed   room organization consistent  Taken 1/5/2024 1000 by Melba Gallagher RN  Safety Promotion/Fall Prevention:   activity supervised   assistive device/personal items within reach   clutter free environment maintained   toileting scheduled   safety round/check completed   room organization consistent  Taken 1/5/2024 0800 by Melba Gallagher RN  Safety Promotion/Fall Prevention:   activity supervised   assistive device/personal items within reach   clutter free environment maintained   toileting scheduled   safety round/check completed   room organization consistent  Intervention: Prevent Skin Injury  Recent Flowsheet Documentation  Taken 1/5/2024 1200 by Melba Gallagher RN  Body Position: weight shifting  Skin Protection:   adhesive use limited   tubing/devices free from skin contact   transparent dressing maintained   skin-to-skin areas padded   skin-to-device areas padded  Taken 1/5/2024 1000 by Melba Gallagher RN  Body Position:   weight shifting   supine  Skin Protection:   adhesive use limited   tubing/devices free from skin contact   transparent dressing maintained   skin-to-skin areas padded   skin-to-device areas padded  Taken 1/5/2024 0800 by Melba Gallagher RN  Body Position:   weight shifting   supine   patient/family refused  Skin Protection:   adhesive use limited   tubing/devices free from  skin contact   skin-to-skin areas padded   transparent dressing maintained   skin-to-device areas padded  Intervention: Prevent and Manage VTE (Venous Thromboembolism) Risk  Recent Flowsheet Documentation  Taken 1/5/2024 1200 by Melba Gallagher RN  Activity Management: up in chair  Taken 1/5/2024 1000 by Melba Gallagher RN  Activity Management: activity encouraged  Taken 1/5/2024 0800 by Melba Gallagher RN  Activity Management: activity encouraged  Goal: Optimal Comfort and Wellbeing  Outcome: Ongoing, Progressing  Intervention: Provide Person-Centered Care  Recent Flowsheet Documentation  Taken 1/5/2024 0800 by Melba Gallagher RN  Trust Relationship/Rapport: care explained  Goal: Readiness for Transition of Care  Outcome: Ongoing, Progressing     Problem: Skin Injury Risk Increased  Goal: Skin Health and Integrity  Outcome: Ongoing, Progressing  Intervention: Optimize Skin Protection  Recent Flowsheet Documentation  Taken 1/5/2024 1200 by Melba Gallagher RN  Pressure Reduction Techniques:   weight shift assistance provided   positioned off wounds   heels elevated off bed  Head of Bed (HOB) Positioning: [facility] elevated  Pressure Reduction Devices:   specialty bed utilized   heel offloading device utilized   chair cushion utilized  Skin Protection:   adhesive use limited   tubing/devices free from skin contact   transparent dressing maintained   skin-to-skin areas padded   skin-to-device areas padded  Taken 1/5/2024 1000 by Melba Gallagher RN  Pressure Reduction Techniques:   weight shift assistance provided   positioned off wounds   heels elevated off bed  Head of Bed (HOB) Positioning: [facility] elevated  Pressure Reduction Devices:   specialty bed utilized   heel offloading device utilized  Skin Protection:   adhesive use limited   tubing/devices free from skin contact   transparent dressing maintained   skin-to-skin areas padded   skin-to-device areas padded  Taken 1/5/2024 0800 by Melba Gallagher  RN  Pressure Reduction Techniques:   weight shift assistance provided   heels elevated off bed  Head of Bed (HOB) Positioning: HOB elevated  Pressure Reduction Devices:   heel offloading device utilized   specialty bed utilized  Skin Protection:   adhesive use limited   tubing/devices free from skin contact   skin-to-skin areas padded   transparent dressing maintained   skin-to-device areas padded     Problem: Asthma Comorbidity  Goal: Maintenance of Asthma Control  Outcome: Ongoing, Progressing     Problem: Behavioral Health Comorbidity  Goal: Maintenance of Behavioral Health Symptom Control  Outcome: Ongoing, Progressing     Problem: COPD (Chronic Obstructive Pulmonary Disease) Comorbidity  Goal: Maintenance of COPD Symptom Control  Outcome: Ongoing, Progressing     Problem: Diabetes Comorbidity  Goal: Blood Glucose Level Within Targeted Range  Outcome: Ongoing, Progressing     Problem: Heart Failure Comorbidity  Goal: Maintenance of Heart Failure Symptom Control  Outcome: Ongoing, Progressing     Problem: Hypertension Comorbidity  Goal: Blood Pressure in Desired Range  Outcome: Ongoing, Progressing     Problem: Obstructive Sleep Apnea Risk or Actual Comorbidity Management  Goal: Unobstructed Breathing During Sleep  Outcome: Ongoing, Progressing     Problem: Osteoarthritis Comorbidity  Goal: Maintenance of Osteoarthritis Symptom Control  Outcome: Ongoing, Progressing  Intervention: Maintain Osteoarthritis Symptom Control  Recent Flowsheet Documentation  Taken 1/5/2024 1200 by Melba Gallagher RN  Activity Management: up in chair  Taken 1/5/2024 1000 by Melba Gallagher RN  Activity Management: activity encouraged  Taken 1/5/2024 0800 by Melba Gallagher RN  Activity Management: activity encouraged     Problem: Pain Chronic (Persistent) (Comorbidity Management)  Goal: Acceptable Pain Control and Functional Ability  Outcome: Ongoing, Progressing  Intervention: Optimize Psychosocial Wellbeing  Recent Flowsheet  Documentation  Taken 1/5/2024 1000 by Melba Gallagher RN  Diversional Activities: television  Taken 1/5/2024 0800 by Melba Gallagher RN  Diversional Activities: television     Problem: Seizure Disorder Comorbidity  Goal: Maintenance of Seizure Control  Outcome: Ongoing, Progressing  Intervention: Maintain Seizure-Symptom Control  Recent Flowsheet Documentation  Taken 1/5/2024 0800 by Melba Gallagher RN  Seizure Precautions: side rails padded     Problem: Fall Injury Risk  Goal: Absence of Fall and Fall-Related Injury  Outcome: Ongoing, Progressing  Intervention: Promote Injury-Free Environment  Recent Flowsheet Documentation  Taken 1/5/2024 1200 by Melba Gallagher RN  Safety Promotion/Fall Prevention:   activity supervised   assistive device/personal items within reach   clutter free environment maintained   toileting scheduled   safety round/check completed   room organization consistent  Taken 1/5/2024 1000 by Melba Gallagher RN  Safety Promotion/Fall Prevention:   activity supervised   assistive device/personal items within reach   clutter free environment maintained   toileting scheduled   safety round/check completed   room organization consistent  Taken 1/5/2024 0800 by Melba Gallagher RN  Safety Promotion/Fall Prevention:   activity supervised   assistive device/personal items within reach   clutter free environment maintained   toileting scheduled   safety round/check completed   room organization consistent     Problem: Restraint, Nonviolent  Goal: Absence of Harm or Injury  Outcome: Ongoing, Progressing  Intervention: Implement Least Restrictive Safety Strategies  Recent Flowsheet Documentation  Taken 1/5/2024 1000 by Melba Gallagehr RN  Medical Device Protection: torso covered  Less Restrictive Alternative:   appropriate expression promoted   bed alarm in use   calming techniques promoted   counseling provided   problem-solving encouraged   safety enhancements provided   sensory stimulation  limited  De-Escalation Techniques:   verbally redirected   reoriented   appropriate behavior reinforced  Diversional Activities: television  Taken 1/5/2024 0800 by Melba Gallagher RN  Medical Device Protection: torso covered  Less Restrictive Alternative:   appropriate expression promoted   bed alarm in use   calming techniques promoted   counseling provided   problem-solving encouraged   safety enhancements provided   sensory stimulation limited  De-Escalation Techniques:   verbally redirected   appropriate behavior reinforced  Diversional Activities: television  Intervention: Protect Dignity, Rights, and Personal Wellbeing  Recent Flowsheet Documentation  Taken 1/5/2024 0800 by Melba Gallagher RN  Trust Relationship/Rapport: care explained  Intervention: Protect Skin and Joint Integrity  Recent Flowsheet Documentation  Taken 1/5/2024 1200 by Melba Gallagher RN  Body Position: weight shifting  Taken 1/5/2024 1000 by Melba Gallagher RN  Body Position:   weight shifting   supine  Taken 1/5/2024 0800 by Melba Gallagher RN  Body Position:   weight shifting   supine   patient/family refused   Goal Outcome Evaluation:

## 2024-01-05 NOTE — THERAPY RE-EVALUATION
Patient Name: Omar Mendoza  : 1940    MRN: [id number]                              Today's Date: 2024       Admit Date: 2023    Visit Dx:     ICD-10-CM ICD-9-CM   1. Generalized weakness  R53.1 780.79   2. Acute UTI  N39.0 599.0   3. Bullous pemphigoid  L12.0 694.5   4. Venous insufficiency  I87.2 459.81   5. Dysphagia, unspecified type  R13.10 787.20     Patient Active Problem List   Diagnosis    UTI (urinary tract infection)    Bullous pemphigoid    Essential hypertension    Dyslipidemia    Edema of lower extremity    Cholestatic hepatitis    Acute constipation    Acute urinary retention    Uterine prolapse    Bradycardia    Pressure injury of buttock, unstageable    Type 2 diabetes mellitus with ketoacidosis, without long-term current use of insulin    Disorientation    Pleural effusion    Severe malnutrition     Past Medical History:   Diagnosis Date    CAD (coronary artery disease)     Dementia     Diabetes mellitus     Hyperlipemia     Hypertension      History reviewed. No pertinent surgical history.   General Information       Row Name 24 1032          OT Time and Intention    Document Type re-evaluation  -     Mode of Treatment occupational therapy  -       Row Name 24 1032          General Information    Patient Profile Reviewed yes  -AC     Prior Level of Function independent:;all household mobility;community mobility;ADL's  prior to Nov  ind , used SPC to ambulate  -     Existing Precautions/Restrictions fall;seizures;NPO  agitated, restraints  -     Barriers to Rehab medically complex;cognitive status  -       Row Name 24 1032          Living Environment    People in Home spouse  -       Row Name 24 1032          Home Main Entrance    Number of Stairs, Main Entrance one  -       Row Name 24 1032          Stairs Within Home, Primary    Stairs, Within Home, Primary basement - does not need to access  -       Row Name 24 1032           Cognition    Orientation Status (Cognition) other (see comments);unable/difficult to assess  pt declined to state her name, confused, agitated  -AC       Row Name 01/05/24 1032          Safety Issues, Functional Mobility    Safety Issues Affecting Function (Mobility) awareness of need for assistance;insight into deficits/self-awareness;judgment;problem-solving;safety precaution awareness;safety precautions follow-through/compliance;sequencing abilities  -AC     Impairments Affecting Function (Mobility) balance;cognition;endurance/activity tolerance;postural/trunk control;strength  -AC     Cognitive Impairments, Mobility Safety/Performance awareness, need for assistance;insight into deficits/self-awareness;judgment;problem-solving/reasoning;sequencing abilities  -AC               User Key  (r) = Recorded By, (t) = Taken By, (c) = Cosigned By      Initials Name Provider Type    AC Sepideh Feldman, OT Occupational Therapist                   Lymphedema       Row Name 01/04/24 1600             Lymphedema Edema Assessment    Ptting Edema Category By severity  -      Pitting Edema Mild  -LH      Recorded by [LH] Donovan Love, PT              Skin Changes/Observations    Location/Assessment Lower Extremity  -      Lower Extremity Conditions bilateral:;intact;clean;dry  -      Lower Extremity Color/Pigment bilateral:;normal  -LH      Recorded by [LH] Donovan Love, PT              Lymphedema Pulses/Capillary Refill    Capillary Refill lower extremity capillary refill  -      Lower Extremity Capillary Refill right:;left:;less than 3 seconds  -LH      Recorded by [LH] Donovan Love, PT              Compression/Skin Care    Compression/Skin Care skin care;wrapping location  -      Skin Care washed/dried;lotion applied  -      Wrapping Location lower extremity  -      Wrapping Location LE bilateral:;foot to knee  -      Wrapping Comments RLE mepilex, BLE size 4 compressogrips applied doubled at foot for  gradient comression.  -      Recorded by [] Donovan Love, PT                User Key  (r) = Recorded By, (t) = Taken By, (c) = Cosigned By      Initials Name Effective Dates     Donovan Love, PT 09/21/21 -                    Mobility/ADL's       Row Name 01/05/24 1035          Bed Mobility    Bed Mobility supine-sit;sit-supine  -AC     Supine-Sit [place] (Bed Mobility) verbal cues;nonverbal cues (demo/gesture);2 person assist;moderate assist (50% patient effort)  -     Sit-Supine [place] (Bed Mobility) verbal cues;nonverbal cues (demo/gesture);maximum assist (25% patient effort);2 person assist  -     Bed Mobility, Safety Issues cognitive deficits limit understanding;decreased use of arms for pushing/pulling;decreased use of legs for bridging/pushing;impaired trunk control for bed mobility  -     Assistive Device (Bed Mobility) bed rails;draw sheet;head of bed elevated  -     Comment, (Bed Mobility) verbal/tactile cues to sequence  -       Row Name 01/05/24 1035          Transfers    Transfers sit-stand transfer  -     Comment, (Transfers) performed 2 STS  -       Row Name 01/05/24 1035          Sit-Stand Transfer    Sit-Stand [place] (Transfers) moderate assist (50% patient effort);2 person assist;verbal cues  -     Assistive Device (Sit-Stand Transfers) walker, front-wheeled  -     Comment, (Sit-Stand Transfer) VCs for hand placement adn upright posture  -       Row Name 01/05/24 1035          Functional Mobility    Functional Mobility- Ind. Level unable to perform  -       Row Name 01/05/24 1035          Activities of Daily Living    BADL Assessment/Intervention lower body dressing;grooming;toileting  -       Row Name 01/05/24 1035          Lower Body Dressing Assessment/Training    [place] Level (Lower Body Dressing) don;doff;socks;dependent (less than 25% patient effort)  -     Position (Lower Body Dressing) supine  -       Row Name 01/05/24 1035           Grooming Assessment/Training    Position (Grooming) edge of bed sitting  -     Comment, (Grooming) pt declined to brush hair/wash face  -       Row Name 01/05/24 1035          Toileting Assessment/Training    [place] Level (Toileting) perform perineal hygiene;maximum assist (25% patient effort)  -     Position (Toileting) supported standing  -               User Key  (r) = Recorded By, (t) = Taken By, (c) = Cosigned By      Initials Name Provider Type    Sepideh Polanco, OT Occupational Therapist                   Obj/Interventions       [facility] Name 01/05/24 1038          Sensory Assessment (Somatosensory)    Sensory Assessment (Somatosensory) unable/difficult to assess  -[facility] Name 01/05/24 1038          Vision Assessment/Intervention    Visual Impairment/Limitations corrective lenses full-time  -[facility] Name 01/05/24 1038          Range of Motion Comprehensive    General Range of Motion bilateral upper extremity ROM WNL  -[facility] Name 01/05/24 1038          Strength Comprehensive (MMT)    Comment, General Manual Muscle Testing (MMT) Assessment BUE grossly 3+/5  -               User Key  (r) = Recorded By, (t) = Taken By, (c) = Cosigned By      Initials Name Provider Type    Sepideh Polanco, OT Occupational Therapist                   Goals/Plan       [facility] Name 01/05/24 1044          Bed Mobility Goal 1 (OT)    Activity/Assistive Device (Bed Mobility Goal 1, OT) sit to supine;supine to sit  -     [place] Level/Cues Needed (Bed Mobility Goal 1, OT) verbal cues required;nonverbal cues (demo/gesture) required;moderate assist (50-74% patient effort)  -     Time Frame (Bed Mobility Goal 1, OT) by discharge  -     Progress/Outcomes (Bed Mobility Goal 1, OT) continuing progress toward goal  -AC       Row Name 01/05/24 1044          Transfer Goal 1 (OT)    Activity/Assistive Device (Transfer Goal 1, OT) bed-to-chair/chair-to-bed;toilet;walker, rolling  -     [place]  Level/Cues Needed (Transfer Goal 1, OT) verbal cues required;nonverbal cues (demo/gesture) required;moderate assist (50-74% patient effort)  -AC     Time Frame (Transfer Goal 1, OT) by discharge  -AC     Progress/Outcome (Transfer Goal 1, OT) progress slower than expected  -AC       Row Name 01/05/24 1044          Grooming Goal 1 (OT)    Activity/Device (Grooming Goal 1, OT) oral care;wash face, hands  -AC     [place] (Grooming Goal 1, OT) verbal cues required;nonverbal cues (demo/gesture) required;minimum assist (75% or more patient effort)  -AC     Time Frame (Grooming Goal 1, OT) by discharge  -AC     Progress/Outcome (Grooming Goal 1, OT) goal ongoing  -AC       Row Name 01/05/24 1044          Self-Feeding Goal 1 (OT)    Strategies/Barriers (Self-Feeding Goal 1, OT) pt currently NPO  -AC     Progress/Outcomes (Self-Feeding Goal 1, OT) goal no longer appropriate  -       Row Name 01/05/24 1046          Therapy Assessment/Plan (OT)    Planned Therapy Interventions (OT) activity tolerance training;BADL retraining;functional balance retraining;occupation/activity based interventions;patient/caregiver education/training;strengthening exercise;transfer/mobility retraining  -               User Key  (r) = Recorded By, (t) = Taken By, (c) = Cosigned By      Initials Name Provider Type    Sepideh Polanco, OT Occupational Therapist                   Clinical Impression       Row Name 01/05/24 1039          Pain Scale: FACES Pre/Post-Treatment    Pain: FACES Scale, Pretreatment 0-->no hurt  -     Posttreatment Pain Rating 0-->no hurt  -       Row Name 01/05/24 1039          Plan of Care Review    Plan of Care Reviewed With patient  -AC     Outcome Evaluation Pt presents below baseline with self care/mobility d/t agitation/confusion, weakness, decr balance and activity tolerance.  Pt declined grooming tasks, dep LBD,  mod a x 2  to complete 2 STS with RW.  OT will follow to advance pt toward increased  independence with self care/mobility.  Recommend SNF upon d/c.  -AC       Row Name 01/05/24 1039          Therapy Assessment/Plan (OT)    Rehab Potential (OT) fair, will monitor progress closely  -AC     Criteria for Skilled Therapeutic Interventions Met (OT) yes;skilled treatment is necessary  -AC     Therapy Frequency (OT) daily  -AC       Row Name 01/05/24 1039          Therapy Plan Review/Discharge Plan (OT)    Anticipated Discharge Disposition (OT) skilled nursing facility  -AC       Row Name 01/05/24 1039          Vital Signs    Pre Systolic BP Rehab 133  -AC     Pre Treatment Diastolic BP 93  -AC     Posttreatment Heart Rate (beats/min) 90  -AC     Intratreatment Resp Rate (breaths/min) 92  -AC     Pre Patient Position Supine  -AC     Post Patient Position Supine  -AC       Row Name 01/05/24 1039          Positioning and Restraints    Pre-Treatment Position in bed  -AC     Post Treatment Position bed  -AC     In Bed notified nsg;supine;call light within reach;encouraged to call for assist;exit alarm on;waffle boots/both  -AC     Restraints released:;reapplied:;soft limb  -AC               User Key  (r) = Recorded By, (t) = Taken By, (c) = Cosigned By      Initials Name Provider Type    AC Sepideh Feldman, OT Occupational Therapist                   Outcome Measures       Row Name 01/05/24 1042          How much help from another is currently needed...    Putting on and taking off regular lower body clothing? 1  -AC     Bathing (including washing, rinsing, and drying) 2  -AC     Toileting (which includes using toilet bed pan or urinal) 1  -AC     Putting on and taking off regular upper body clothing 2  -AC     Taking care of personal grooming (such as brushing teeth) 3  -AC     Eating meals 1  NPO  -AC     AM-PAC 6 Clicks Score (OT) 10  -AC       Row Name 01/05/24 0800          How much help from another person do you currently need...    Turning from your back to your side while in flat bed without using  bedrails? 3  -SW     Moving from lying on back to sitting on the side of a flat bed without bedrails? 2  -SW     Moving to and from a bed to a chair (including a wheelchair)? 1  -SW     Standing up from a chair using your arms (e.g., wheelchair, bedside chair)? 1  -SW     Climbing 3-5 steps with a railing? 1  -SW     To walk in hospital room? 1  -SW     AM-PAC 6 Clicks Score (PT) 9  -SW     Highest Level of Mobility Goal 3 --> Sit at edge of bed  -SW       Row Name 01/05/24 1042          Functional Assessment    Outcome Measure Options AM-PAC 6 Clicks Daily Activity (OT)  -               User Key  (r) = Recorded By, (t) = Taken By, (c) = Cosigned By      Initials Name Provider Type    AC Sepideh Feldman, OT Occupational Therapist    Melba Vaca RN Registered Nurse                    Occupational Therapy Education       Title: PT OT SLP Therapies (In Progress)       Topic: Occupational Therapy (In Progress)       Point: ADL training (In Progress)       Description:   Instruct learner(s) on proper safety adaptation and remediation techniques during self care or transfers.   Instruct in proper use of assistive devices.                  Learning Progress Summary             Patient Nonacceptance, E, NL by  at 1/5/2024 1044    Acceptance, E, NR by  at 1/3/2024 1435    Acceptance, E, VU,NR by DILIP at 1/1/2024 0920    Comment: oriented to date, need to move and position RLE, ADL progression, sequencing bed mobililty    Acceptance, E, NR by  at 12/27/2023 1135   Family Acceptance, E, VU,NR by DILIP at 1/1/2024 0920    Comment: oriented to date, need to move and position RLE, ADL progression, sequencing bed mobililty                         Point: Home exercise program (Not Started)       Description:   Instruct learner(s) on appropriate technique for monitoring, assisting and/or progressing therapeutic exercises/activities.                  Learner Progress:  Not documented in this visit.              Point:  Precautions (Done)       Description:   Instruct learner(s) on prescribed precautions during self-care and functional transfers.                  Learning Progress Summary             Patient Acceptance, E, VU,NR by DILIP at 1/1/2024 0920    Comment: oriented to date, need to move and position RLE, ADL progression, sequencing bed mobililty   Family Acceptance, E, VU,NR by DILIP at 1/1/2024 0920    Comment: oriented to date, need to move and position RLE, ADL progression, sequencing bed mobililty                         Point: Body mechanics (Done)       Description:   Instruct learner(s) on proper positioning and spine alignment during self-care, functional mobility activities and/or exercises.                  Learning Progress Summary             Patient Acceptance, E, VU,NR by DILIP at 1/1/2024 0920    Comment: oriented to date, need to move and position RLE, ADL progression, sequencing bed mobililty   Family Acceptance, E, VU,NR by DILIP at 1/1/2024 0920    Comment: oriented to date, need to move and position RLE, ADL progression, sequencing bed mobililty                                         User Key       Initials Effective Dates Name Provider Type Discipline     07/11/23 -  Rayna Wall, OT Occupational Therapist OT     02/03/23 -  Sepideh Feldman OT Occupational Therapist OT                  OT Recommendation and Plan  Planned Therapy Interventions (OT): activity tolerance training, BADL retraining, functional balance retraining, occupation/activity based interventions, patient/caregiver education/training, strengthening exercise, transfer/mobility retraining  Therapy Frequency (OT): daily  Plan of Care Review  Plan of Care Reviewed With: patient  Outcome Evaluation: Pt presents below baseline with self care/mobility d/t agitation/confusion, weakness, decr balance and activity tolerance.  Pt declined grooming tasks, dep LBD,  mod a x 2  to complete 2 STS with RW.  OT will follow to advance pt toward increased  independence with self care/mobility.  Recommend SNF upon d/c.     Time Calculation:   Evaluation Complexity (OT)  Review Occupational Profile/Medical/Therapy History Complexity: expanded/moderate complexity  Assessment, Occupational Performance/Identification of Deficit Complexity: 3-5 performance deficits  Clinical Decision Making Complexity (OT): detailed assessment/moderate complexity  Overall Complexity of Evaluation (OT): moderate complexity     Time Calculation- OT       Row Name 01/05/24 1001             Time Calculation- OT    OT Start Time 1001  -AC      OT Received On 01/05/24  -AC      OT Goal Re-Cert Due Date 01/15/24  -AC         Timed Charges    50287 - OT Therapeutic Activity Minutes 10  -AC         Untimed Charges    OT Eval/Re-eval Minutes 30  -AC         Total Minutes    Timed Charges Total Minutes 10  -AC      Untimed Charges Total Minutes 30  -AC       Total Minutes 40  -AC                User Key  (r) = Recorded By, (t) = Taken By, (c) = Cosigned By      Initials Name Provider Type    AC Sepideh Feldman, OT Occupational Therapist                  Therapy Charges for Today       Code Description Service Date Service Provider Modifiers Qty    [id number]  OT THERAPEUTIC ACT EA 15 MIN 1/5/2024 Sepideh Feldman, OT GO 1    [id number] HC OT RE-EVAL 2 1/5/2024 Sepideh Feldman, OT GO 1                 Sepideh Feldman OT  1/5/2024

## 2024-01-05 NOTE — PROGRESS NOTES
[facility] Medicine Services  PROGRESS NOTE    Patient Name: Omra Mendoza  : 1940  MRN: [id number]    Date of Admission: 2023  Primary Care Physician: Varun Pa MD    Subjective   Subjective     CC:  Weakness    HPI:  Patient awake and alert this morning but with paranoid delusions.  Will not allow for full evaluation.      Objective   Objective     Vital Signs:   Temp:  [97 °F (36.1 °C)] 97 °F (36.1 °C)  Heart Rate:  [66-84] 67  Resp:  [16-18] 16  BP: (122-152)/(80-93) 122/84     Physical Exam:  General appearance: Awake, alert, anxious and paranoid  Respiratory: Oxygenating well on room air  Neuro/CNS/Psych: Mildly agitated, paranoid, disconjugate gaze, moves all 4 extremities    Results Reviewed:  LAB RESULTS:      Lab 24  0352 24  2321 24  0608 24  0723 23  0551   WBC 8.43 6.34 4.89 3.87 4.51   HEMOGLOBIN 9.7* 9.2* 8.2* 8.3* 8.0*   HEMATOCRIT 29.6* 27.8* 24.7* 25.3* 24.7*   PLATELETS 195 181 141 139* 124*   NEUTROS ABS 4.05 3.49  --   --   --    EOS ABS 3.79* 2.16*  --   --   --    MCV 95.8 96.5 94.3 96.2 95.0   SED RATE  --  14  --   --   --    LACTATE  --  2.7*  --   --   --          Lab 24  0352 24  0412 24  1523 24  0608 24  0723 23  0608 23  0655   SODIUM 139  --   --  145 144 145 146*   POTASSIUM 3.3*  --  3.9 3.4* 3.7 3.7 4.0   CHLORIDE 102  --   --  110* 110* 110* 111*   CO2 25.0  --   --  28.0 27.0 24.0 25.0   ANION GAP 12.0  --   --  7.0 7.0 11.0 10.0   BUN 6*  --   --  13 14 16 14   CREATININE 0.41*  --   --  0.48* 0.51* 0.55* 0.58   EGFR 99.0  --   --  95.3 93.9 92.2 91.0   GLUCOSE 108*  --   --  148* 172* 180* 134*   CALCIUM 7.4*  --   --  7.5* 7.6* 7.6* 7.3*   HEMOGLOBIN A1C  --  10.70*  --   --   --   --   --                  Lab 24  0412   CHOLESTEROL 94   LDL CHOL 39   HDL CHOL 33*   TRIGLYCERIDES 120             Brief Urine Lab Results  (Last result in the past 365  days)        Color   Clarity   Blood   Leuk Est   Nitrite   Protein   CREAT   Urine HCG        01/04/24 0053 Yellow   Clear   Negative   Trace   Negative   Negative                   Microbiology Results Abnormal       Procedure Component Value - Date/Time    COVID-19 RAPID AG,VERITOR,COR/JAMARI/PAD/NUPUR/ASHA/LAG/BRITTANY/ IN-HOUSE,DRY SWAB, 1 HR TAT - Swab, Nasal Cavity [[id number]]  (Normal) Collected: 01/04/24 0004    Lab Status: Final result Specimen: Swab from Nasal Cavity Updated: 01/04/24 0022     COVID19 Presumptive Negative    Narrative:      Fact sheets for providers: https://www.fda.gov/media/610718/download    Fact sheets for patients: https://www.[place].gov/media/509249/download    Blood Culture - Blood, Blood, PICC Line [[id number]]  (Normal) Collected: 12/27/23 1400    Lab Status: Final result Specimen: Blood, PICC Line Updated: 01/01/24 1531     Blood Culture No growth at 5 days    Blood Culture - Blood, Arm, Right [[id number]]  (Normal) Collected: 12/27/23 0431    Lab Status: Final result Specimen: Blood from Arm, Right Updated: 01/01/24 0515     Blood Culture No growth at 5 days            XR Chest 1 View    Result Date: 1/4/2024  XR CHEST 1 VW COMPARISON: 12/30/2023 HISTORY: Oxygen desaturation. . FINDINGS: Technical adequacy: Adequate. Shallow depth of inspiration Central airways: Unremarkable Heart: Stable cardiomegaly Great vessels: Stable unfolded atherosclerotic thoracic aorta Mediastinum: Unremarkable Lungs: Bilateral perihilar interstitial infiltrates with peribronchial cuffing and subpleural edema Pulmonary aliza: As above Pleura: Unremarkable Skeletal structures: Degenerative changes Extra thoracic soft tissues:Unremarkable Additional comments: The previously seen PICC line is no longer visualized. The fluffy airspace infiltrates on both sides have resolved in the interval.     Impression: Impression: The findings suggest interstitial pulmonary edema, likely cardiogenic in origin. Clinical correlation.  Electronically Signed: Chandler Alberto MD  1/4/2024 4:01 PM EST  Workstation ID: [id number]    CT Angiogram Head w AI Analysis of LVO    Result Date: 1/3/2024  CT ANGIOGRAM HEAD W AI ANALYSIS OF LVO, CT ANGIOGRAM NECK Date of Exam: 1/3/2024 7:32 PM EST Indication: Stroke, follow up. Comparison: None available. Technique: CTA of the head and neck was performed after the uneventful intravenous administration of 100 cc Isovue-370. Reconstructed coronal and sagittal images were also obtained. In addition, a 3-D volume rendered image was created for interpretation.  Automated exposure control and iterative reconstruction methods were used. Findings: Contrast opacification: Excellent Aortic Arch: Unremarkable Right: Innominate: Patent Subclavian: Patent Common Carotid: Patent External Carotid:Patent Internal Carotid: Patent Intracranial ICA: Patent MCA:Patent BETO: Patent PCA: Patent Vertebral: Patent Left: Subclavian: Patent Common Carotid: Patent External Carotid:Patent Internal Carotid: Patent Intracranial ICA: Patent MCA:Patent BETO: Patent PCA: Patent Vertebral: Patent Basilar: Patent Soft Tissue: Unremarkable Lungs: Mild dependent atelectasis. Otherwise the visualized lungs are unremarkable. Bones: No acute osseous abnormality.     Impression: Impression: No acute arterial abnormality of the head and neck. Electronically Signed: Jonnathan Skelton DO  1/3/2024 8:01 PM EST  Workstation ID: [id number]    CT Angiogram Neck    Result Date: 1/3/2024  CT ANGIOGRAM HEAD W AI ANALYSIS OF LVO, CT ANGIOGRAM NECK Date of Exam: 1/3/2024 7:32 PM EST Indication: Stroke, follow up. Comparison: None available. Technique: CTA of the head and neck was performed after the uneventful intravenous administration of 100 cc Isovue-370. Reconstructed coronal and sagittal images were also obtained. In addition, a 3-D volume rendered image was created for interpretation.  Automated exposure control and iterative reconstruction methods were used.  Findings: Contrast opacification: Excellent Aortic Arch: Unremarkable Right: Innominate: Patent Subclavian: Patent Common Carotid: Patent External Carotid:Patent Internal Carotid: Patent Intracranial ICA: Patent MCA:Patent BETO: Patent PCA: Patent Vertebral: Patent Left: Subclavian: Patent Common Carotid: Patent External Carotid:Patent Internal Carotid: Patent Intracranial ICA: Patent MCA:Patent BETO: Patent PCA: Patent Vertebral: Patent Basilar: Patent Soft Tissue: Unremarkable Lungs: Mild dependent atelectasis. Otherwise the visualized lungs are unremarkable. Bones: No acute osseous abnormality.     Impression: Impression: No acute arterial abnormality of the head and neck. Electronically Signed: Jonnathan Skelton DO  1/3/2024 8:01 PM EST  Workstation ID: [id number]    CT CEREBRAL PERFUSION WITH & WITHOUT CONTRAST    Result Date: 1/3/2024  CT CEREBRAL PERFUSION W WO CONTRAST Date of Exam: 1/3/2024 7:32 PM EST Indication: Neuro deficit, acute, stroke suspected.  Comparison: Same day head CT Technique: Axial CT images of the brain were obtained prior to and after the administration of 100 cc Isovue 370. Core blood volume, core blood flow, mean transit time, and Tmax images were obtained utilizing the Rapid software protocol. A limited CT angiogram of the head was also performed to measure the blood vessel density. The radiation dose reduction device was turned on for each scan per the ALARA (As Low as Reasonably Achievable) protocol. Findings: Adequate perfusion images were obtained. Adequate ROIs obtained. Study is mildly degraded by motion, most significantly in the z-axis. CBF less than 30%: 0 mL. Tmax greater than 6 seconds: 0 mL Mismatch volume: 0 mL Mismatch ratio: None     Impression: Impression: Mildly motion degraded study. No evidence of an acute infarct or ischemia Electronically Signed: Jonnathan Skelton DO  1/3/2024 7:45 PM EST  Workstation ID: [id number]    CT Head Without Contrast Stroke  Protocol    Result Date: 1/3/2024  CT HEAD WO CONTRAST STROKE PROTOCOL Date of Exam: 1/3/2024 7:22 PM EST Indication: Neuro deficit, acute, stroke suspected. Comparison: None available. Technique: Axial CT images were obtained of the head without contrast administration.  Reconstructed coronal images were also obtained. Automated exposure control and iterative construction methods were used. Scan Time: 7:28 p.m. Results discussed with stroke team at 7:39 p.m. Findings: Motion degraded study. No large territory infarct. There is no evidence of hemorrhage. No mass effect, edema or midline shift Mild to moderate periventricular and subcortical white matter hypodensities, nonspecific but most likely represents chronic small vessel ischemic changes. No extra-axial fluid collection. Prominent ventricular system secondary to chronic parenchymal volume loss. Disconjugate gaze, nonspecific. The visualized paranasal sinuses and mastoid air cells are clear. The visualized soft tissues are unremarkable. No acute osseous abnormality.     Impression: Impression: No definite acute intracranial hemorrhage or large territorial infarct. Motion degraded study. Electronically Signed: Jonnathan Skelton DO  1/3/2024 7:40 PM EST  Workstation ID: [id number]     Results for orders placed during the hospital encounter of 12/26/23    Adult Transthoracic Echo Complete W/ Cont if Necessary Per Protocol    Interpretation Summary    Left ventricular systolic function is normal. Calculated left ventricular EF = 57% Left ventricular ejection fraction appears to be 56 - 60%.    Left ventricular wall thickness is consistent with mild to moderate concentric hypertrophy.    Left ventricular diastolic function was normal.    Estimated right ventricular systolic pressure from tricuspid regurgitation is mildly elevated (35-45 mmHg).    There is a trivial pericardial effusion.    Mild aortic valve regurgitation.      Current medications:  Scheduled  Meds:aspirin, 81 mg, Oral, Daily  atorvastatin, 80 mg, Oral, Nightly  castor oil-balsam peru, 1 application , Topical, Q12H  heparin (porcine), 5,000 Units, Subcutaneous, Q12H  insulin lispro, 2-9 Units, Subcutaneous, 4x Daily With Meals & Nightly  magnesium hydroxide, 10 mL, Oral, Daily  melatonin, 5 mg, Oral, Nightly  metoprolol tartrate, 25 mg, Oral, BID  mirtazapine, 15 mg, Oral, Nightly  QUEtiapine, 25 mg, Oral, Nightly  rivastigmine, 1 patch, Transdermal, Daily  sodium chloride, 10 mL, Intravenous, Q12H  sodium chloride, 10 mL, Intravenous, Q12H      Continuous Infusions:lactated ringers, 75 mL/hr, Last Rate: Stopped (01/04/24 6894)      PRN Meds:.  acetaminophen **OR** acetaminophen **OR** acetaminophen    senna-docusate sodium **AND** polyethylene glycol **AND** bisacodyl **AND** bisacodyl    Calcium Replacement - Follow Nurse / BPA Driven Protocol    dextrose    dextrose    glucagon (human recombinant)    hydrOXYzine    Magnesium Standard Dose Replacement - Follow Nurse / BPA Driven Protocol    nitroglycerin    ondansetron    Phosphorus Replacement - Follow Nurse / BPA Driven Protocol    Potassium Replacement - Follow Nurse / BPA Driven Protocol    sodium chloride    sodium chloride    sodium chloride    sodium chloride    ziprasidone    Assessment & Plan   Assessment & Plan     Active Hospital Problems    Diagnosis  POA    **UTI (urinary tract infection) [N39.0]  Yes    Acute constipation [K59.00]  Unknown    Acute urinary retention [R33.8]  Unknown    Uterine prolapse [N81.4]  Unknown    Bradycardia [R00.1]  Unknown    Pressure injury of buttock, unstageable [L89.300]  Unknown    Type 2 diabetes mellitus with ketoacidosis, without long-term current use of insulin [E11.10]  Unknown    Disorientation [R41.0]  Unknown    Pleural effusion [J90]  Unknown    Severe malnutrition [E43]  Yes    Edema of lower extremity [R60.0]  Yes    Bullous pemphigoid [L12.0]  Yes    Dyslipidemia [E78.5]  Yes    Essential  "hypertension [I10]  Yes      Resolved Hospital Problems   No resolved problems to display.        This patient's assessments and plans were partially entered by my partner and updated as appropriate by me on 1/5/2024     Brief Hospital Course to date:  Omar Mendoza is a 83 y.o. female with history of type 2 diabetes, hypertension, history of uterine prolapse, urinary retention with Schulzt catheter in place, history of bullous pemphigoid  (s/p Dupixent 10/2023, prednisone/doxycycline 11/2023) CAD, hyperlipidemia, presumed dementia who presented to the ED with generalized weakness and bullous pemphigoid blister on the heel.      Acute metabolic encephalopathy  Dementia with suspected Alzheimer's disease  Major depression with paranoia  Generalized weakness  -Stroke alert called overnight 1/3 but no findings suggestive of acute stroke  -Patient's behaviors are more suggestive of dementia  -Patient could have underlying component of depression to given reports of family members passing months ago with noted behavioral change at that time  -Appreciate neurology evaluation, discussed case w Dr. Edgar: Will remove restraints, allow patient to go to chair and ambulate with assistance, increase Exelon to 9.5 mg daily, administer mirtazapine 15 mg as patient allows, Seroquel 25 mg at bedtime  -Geodon 10 mg as needed for uncontrolled agitation, avoid physical restraints     Dysphagia  -Nursing with concern for aspiration event 12/30 however patient herself says she \"just got choked.\" Does not wish for modified diet.   -SLP following, will trial diet today despite lack of cooperation      Bilateral pleural effusion  L>R  Bibasilar airspace disease versus pneumonia  -Patient currently on room air with good O2 sats  -Follow-up MRSA PCR, cultures, urinary strep and Legionella antigens  -Procalcitonin is low, lactate has improved  -Concern for aspiration on 12/30  -CXR revealed increased right basilar airspace disease with stable " left basilar atelectasis  -Encourage IS, pulmonary toilet  -Completed 5 days of Rocephin and doxycycline  -Currently on RA       Anemia  -Iron 53, iron sat 28  -fecal occult negative  -suspect anemia of chronic disease, stable with no active signs of bleeding     Poor venous access  -Removed PICC overnight 1/3     Sinus bradycardia  - pt on both amiodarone and metoprolol, dtr reports amiodarone is new since hospitalization at [facility].  - hold amiodarone, decreased metoprolol to 50 mg BID w/ hold parameters  -HR currently improved      BLE edema  Decreased pulses LLE  - CTA abd showing small right pleural effusion, bilateral basilar atelectasis  - CTA w/ runoff showing normal right sided runoff; left side delayed runoff possibly r/t venous stasis changes, no evidence of arterial occlusion or significant stenosis  -Echo revealed EF 57% with normal diastolic function      Uncontrolled Type 2 diabetes  -Hgb A1C 12.10 on 12/27  -Continue SSI     Constipation  -Continue aggressive bowel regimen, s/p soapsuds enema x 1  -s/p Milk of mag x1, Miralax daily, suppository x1   -Continue prn   -chronic loza catheter in place     Uterine prolapse  -Gyn Dr. Zhou has seen, plan for outpatient follow up to discuss treatment options.   -may be contributing to chronic urine retention     Bullous pemphigoid  -Has developed new blister on right heel  -Completed doxycycline  -Wound care following      Pressure ulcer  -Wound care following      L adrenal adenoma  - incidental finding on CTA abdomen      Severe malnutrition     Expected Discharge Location and Transportation: [facility]  Expected Discharge   Expected Discharge Date: 1/9/2024; Expected Discharge Time:      DVT prophylaxis:  Medical and mechanical DVT prophylaxis orders are present.     AM-PAC 6 Clicks Score (PT): 9 (01/05/24 0800)    CODE STATUS:   Code Status and Medical Interventions:   Ordered at: 12/27/23 0410     Code Status (Patient has no pulse and is not breathing):     CPR (Attempt to Resuscitate)     Medical Interventions (Patient has pulse or is breathing):    Full Support       Hugo Yee, DO  01/05/24

## 2024-01-05 NOTE — THERAPY RE-EVALUATION
Acute Care - Speech Language Pathology   Swallow Re-Evaluation [facility]  Clinical Swallow Evaluation       Patient Name: Omar Mendoza  : 1940  MRN: [id number]  Today's Date: 2024               Admit Date: 2023    Visit Dx:     ICD-10-CM ICD-9-CM   1. Generalized weakness  R53.1 780.79   2. Acute UTI  N39.0 599.0   3. Bullous pemphigoid  L12.0 694.5   4. Venous insufficiency  I87.2 459.81   5. Dysphagia, unspecified type  R13.10 787.20     Patient Active Problem List   Diagnosis    UTI (urinary tract infection)    Bullous pemphigoid    Essential hypertension    Dyslipidemia    Edema of lower extremity    Cholestatic hepatitis    Acute constipation    Acute urinary retention    Uterine prolapse    Bradycardia    Pressure injury of buttock, unstageable    Type 2 diabetes mellitus with ketoacidosis, without long-term current use of insulin    Disorientation    Pleural effusion    Severe malnutrition     Past Medical History:   Diagnosis Date    CAD (coronary artery disease)     Dementia     Diabetes mellitus     Hyperlipemia     Hypertension      History reviewed. No pertinent surgical history.    SLP Recommendation and Plan  SLP Swallowing Diagnosis: unable to assess, unable/unwilling to cooperate (24)  SLP Diet Recommendation: other (see comments) (U/a to safely recommend PO diet @ this time) (24)  Recommended Precautions and Strategies: general aspiration precautions, assist with feeding, 1:1 supervision (24)  SLP Rec. for Method of Medication Administration: meds whole, meds crushed, with puree, as tolerated (24)     Monitor for Signs of Aspiration: yes, notify SLP if any concerns (24)  Recommended Diagnostics: reassess via clinical swallow evaluation, other (see comments), SLE/Cog/Motor Speech Evaluation (U/a to complete SLC eval d/t poor cooperation) (24)  Swallow Criteria for Skilled Therapeutic Interventions Met: demonstrates  skilled criteria (01/05/24 0935)  Anticipated Discharge Disposition (SLP): skilled nursing facility (01/05/24 0935)  Rehab Potential/Prognosis, Swallowing: re-evaluate goals as necessary (01/05/24 0935)     Predicted Duration Therapy Intervention (Days): until discharge (01/05/24 0935)  Oral Care Recommendations: Oral Care BID/PRN, Suction toothbrush (01/05/24 0935)                                      Oral Care Recommendations: Oral Care BID/PRN, Suction toothbrush (01/05/24 0935)           SWALLOW EVALUATION (last 72 hours)       SLP Adult Swallow Evaluation       Row Name 01/05/24 0935 01/04/24 0855 01/03/24 1415             Rehab Evaluation    Document Type re-evaluation  - re-evaluation  -CJ therapy note (daily note)  -CJ      Subjective Information no complaints  - -- no complaints  -CJ      Patient Observations poorly cooperative  - poorly cooperative  -CJ alert;cooperative  -CJ      Patient/Family/Caregiver Comments/Observations No family present  -  present  -CJ daughter present  -CJ      Patient Effort poor  - poor  -CJ good  -CJ      Symptoms Noted During/After Treatment none  -MH other (see comments)  resistant  -CJ none  -CJ         General Information    Patient Profile Reviewed yes  - yes  -CJ --      Pertinent History Of Current Problem See initial eval  - Pt initially signed off on yesterday from SLP services however code stroke called last pm so reconsulted  -CJ --      Current Method of Nutrition NPO  - NPO  -CJ --      Precautions/Limitations, Vision difficult to assess  -MH difficult to assess  -CJ --      Precautions/Limitations, Hearing difficult to assess  -MH difficult to assess  -CJ --      Prior Level of Function-Communication other (see comments)  ?dementia  -MH other (see comments)  ? dementia  -CJ --      Prior Level of Function-Swallowing other (see comments)  Pt previously declined instrumental and opted for comfort diet  -MH other (see comments)  pt previously  declined further testing and wanted comfort diet  - --      Plans/Goals Discussed with patient  - -- --      Barriers to Rehab resistant to information;uncooperative  - resistant to information;uncooperative  - --      Patient's Goals for Discharge patient did not state  - patient could not state  - --      Family Goals for Discharge -- family did not state  - --         Pain    Additional Documentation Pain Scale: FACES Pre/Post-Treatment (Group)  - Pain Scale: FACES Pre/Post-Treatment (Group)  - Pain Scale: FACES Pre/Post-Treatment (Group)  -         Pain Scale: FACES Pre/Post-Treatment    Pain: FACES Scale, Pretreatment 0-->no hurt  - 0-->no hurt  -CJ 0-->no hurt  -CJ      Posttreatment Pain Rating 0-->no hurt  -MH 0-->no hurt  -CJ 0-->no hurt  -CJ         Oral Motor Structure and Function    Secretion Management WNL/WFL  - WNL/WFL  - --      Mucosal Quality moist, healthy  - moist, healthy  - --         Oral Musculature and Cranial Nerve Assessment    Oral Motor General Assessment other (see comments)  Pt did not follow OM commands  - unable to assess  - --         General Eating/Swallowing Observations    Respiratory Support Currently in Use nasal cannula  - nasal cannula  - --      Eating/Swallowing Skills fed by SLP;unable to perform self-feeding;other (see comments)  Pt in BL wrist restraints  - fed by SLP;other (see comments)  in restraints  - --      Positioning During Eating upright in bed  - upright in bed  - --      Utensils Used straw  - spoon  - --      Consistencies Trialed thin liquids  [facility] ice chips  - --         Clinical Swallow Eval    Clinical Swallow Evaluation Summary Evaluation limitied d/t poor acceptance of PO trials & increased agitation w/ PO presentations. Pt declined all spoon/cup presentations. Pt accepted ~1 tsp of thin via straw & expectorated bolus. Further PO presentations deferred 2' agitation/confusion. Given limitied  participation, u/a to safely recommend PO diet @ this time. Spoke to RN/MD, MD aware of poor cooperation and inability to safely recommend PO diet. MD requesting intiaition of PO diet, if cont PO diet, reccomend pureed diet w/ thin liquids, 1:1 assistance/supervision  - Pt seen for repeat evaluation this date. Pt initially accepted x1 ice chip however pt then expelled the ice chip from oral cavity. Refused further po intake and began yelling at SLP to leave the room. Pt unable to be redirected by SLP or family member. Furhter po presentations deferred 2/2 agitation. Unable to recommend any po intake at this point. Pt had previously elected to be on comfort diet w/ no further MBS/FEES to be completed. Was tolerating mech soft whole w/ thins yesterday. Unable to safely recommend po at this time  -CJ --         SLP Evaluation Clinical Impression    SLP Swallowing Diagnosis unable to assess;unable/unwilling to cooperate  - unable to assess;unable/unwilling to cooperate  -CJ --      Functional Impact risk of aspiration/pneumonia;risk of malnutrition;risk of dehydration  - risk of aspiration/pneumonia  -CJ --      Rehab Potential/Prognosis, Swallowing re-evaluate goals as necessary  - re-evaluate goals as necessary  -CJ --      Swallow Criteria for Skilled Therapeutic Interventions Met demonstrates skilled criteria  - demonstrates skilled criteria  - --         SLP Treatment Clinical Impressions    Treatment Assessment (SLP) -- -- continued;toleration of diet;no clinical signs of;suspected;aspiration  -      Treatment Assessment Comments (SLP) -- -- Pt seen for diet tolerance/adjustment this date. Pt observed w/ po lunch tray. Pt able to self feed. No glaring overt s/s of aspiration. Pt is noted w/ impulsive self feeding habits so pt is at general risk. Reviewed aspiration precautions with pt and she verbalized understanding. Pt reports she still does not want any diet modification or instrumental  evaluation. Will sign off at this time, no further f/u warranted unless there is a change in status  -      Daily Summary of Progress (SLP) -- -- progress toward functional goals as expected  -      Plan for Continued Treatment (SLP) -- -- patient/family has declined further intervention  -      Care Plan Review -- -- evaluation/treatment results reviewed;care plan/treatment goals reviewed  -      Care Plan Review, Other Participant(s) -- -- daughter  -         Recommendations    Therapy Frequency (Swallow) -- -- evaluation only  -      Predicted Duration Therapy Intervention (Days) until discharge  - -- --      SLP Diet Recommendation other (see comments)  U/a to safely recommend PO diet @ this time  - other (see comments)  unable to safely recommend po intake 2/2 pt participation/cooperation; was previously on comfort diet per pt request  - soft to chew textures;whole;thin liquids  -      Recommended Diagnostics reassess via clinical swallow evaluation;other (see comments);SLE/Cog/Motor Speech Evaluation  U/a to complete SLC eval d/t poor cooperation  - reassess via clinical swallow evaluation;other (see comments);SLE/Cog/Motor Speech Evaluation  unable to complete SLC evaluation 2/2 pt participation  - --      Recommended Precautions and Strategies general aspiration precautions;assist with feeding;1:1 supervision  - general aspiration precautions  - upright posture during/after eating;small bites of food and sips of liquid;general aspiration precautions  -      Oral Care Recommendations Oral Care BID/PRN;Suction toothbrush  - Oral Care BID/PRN;Suction toothbrush  - Oral Care BID/PRN;Toothbrush  -[facility] Rec. for Method of Medication Administration meds whole;meds crushed;with puree;as tolerated  - meds whole;with puree;as tolerated  - meds whole;with puree;as tolerated  -      Monitor for Signs of Aspiration yes;notify SLP if any concerns  - yes;notify SLP if any  concerns  -CJ yes;notify SLP if any concerns  -CJ      Anticipated Discharge Disposition (SLP) skilled nursing facility  - skilled nursing facility  - skilled nursing facility;No further SLP services warranted  -                User Key  (r) = Recorded By, (t) = Taken By, (c) = Cosigned By      Initials Name Effective Dates    Karen Rogers, MS CCC-SLP 07/11/23 -      Kassi Shane MS CCC-SLP 05/12/23 -                     EDUCATION  The patient has been educated in the following areas:   Dysphagia (Swallowing Impairment).              Time Calculation:    Time Calculation- SLP       Row Name 01/05/24 1154             Time Calculation- SLP    SLP Start Time 0935  -      SLP Received On 01/05/24  -         Untimed Charges    87596-OZ Eval Oral Pharyng Swallow Minutes 41  -         Total Minutes    Untimed Charges Total Minutes 41  -       Total Minutes 41  -MH                User Key  (r) = Recorded By, (t) = Taken By, (c) = Cosigned By      Initials Name Provider Type     Kassi Shane, MS CCC-SLP Speech and Language Pathologist                    Therapy Charges for Today       Code Description Service Date Service Provider Modifiers Qty    [id number] HC ST EVAL ORAL PHARYNG SWALLOW 3 1/5/2024 Kassi Shane, MS DESTINY-SLP GN 1                 Kassi Shane MS CCC-SLP  1/5/2024

## 2024-01-05 NOTE — PLAN OF CARE
Continuing to follow for diabetes education. GCS noted at 13 with confusion. Diabetes education will continue to follow for educational needs as appropriate. Discharge plans for SNF.  CT head without contrast no hemorrhage. Stroke follow up class not scheduled.

## 2024-01-05 NOTE — PLAN OF CARE
Goal Outcome Evaluation:  Plan of Care Reviewed With: patient           Outcome Evaluation: Pt presents below baseline with self care/mobility d/t agitation/confusion, weakness, decr balance and activity tolerance.  Pt declined grooming tasks, dep LBD,  mod a x 2  to complete 2 STS with RW.  OT will follow to advance pt toward increased independence with self care/mobility.  Recommend SNF upon d/c.      Anticipated Discharge Disposition (OT): skilled nursing facility

## 2024-01-05 NOTE — PLAN OF CARE
Goal Outcome Evaluation:      SLP re-evaluation completed. Will continue to address dysphagia. Please see note for further details and recommendations.                 Anticipated Discharge Disposition (SLP): skilled nursing facility

## 2024-01-06 LAB
GLUCOSE BLDC GLUCOMTR-MCNC: 152 MG/DL (ref 70–130)
GLUCOSE BLDC GLUCOMTR-MCNC: 195 MG/DL (ref 70–130)

## 2024-01-06 PROCEDURE — 82948 REAGENT STRIP/BLOOD GLUCOSE: CPT

## 2024-01-06 PROCEDURE — 99232 SBSQ HOSP IP/OBS MODERATE 35: CPT | Performed by: STUDENT IN AN ORGANIZED HEALTH CARE EDUCATION/TRAINING PROGRAM

## 2024-01-06 RX ADMIN — CASTOR OIL AND BALSAM, PERU 1 APPLICATION: 788; 87 OINTMENT TOPICAL at 00:02

## 2024-01-06 NOTE — SIGNIFICANT NOTE
01/06/24 1522   SLP Deferred Reason   SLP Deferred Reason   (In the AM, provider requested pt be allowed to rest. Checked back this PM and RN reports pt is resisting having BP checked.  SLP to follow up tomorrow. RN ok'd.)

## 2024-01-06 NOTE — NURSING NOTE
Throughout shift pt refused vital signs, tele monitoring, ACHS finger stick, all medications, wound care, and Q2 turns/incontinence care . Pt presented very sleepy and would get upset if staff attempted to perform care tasks on patient. MD aware and stated to continue to try but not to further aggravate the patient, in order to keep her out of restraints. Family aware and okay with plan. Both son and daughter aware. Patient's son and POA signed refusal of care form and placed in patient's chart.

## 2024-01-06 NOTE — PLAN OF CARE
Problem: Adult Inpatient Plan of Care  Goal: Plan of Care Review  Outcome: Ongoing, Progressing  Goal: Patient-Specific Goal (Individualized)  Outcome: Ongoing, Progressing  Goal: Absence of Hospital-Acquired Illness or Injury  Outcome: Ongoing, Progressing  Intervention: Identify and Manage Fall Risk  Recent Flowsheet Documentation  Taken 1/6/2024 1200 by Melba Gallagher RN  Safety Promotion/Fall Prevention:   activity supervised   assistive device/personal items within reach   clutter free environment maintained   toileting scheduled   room organization consistent   safety round/check completed  Taken 1/6/2024 1000 by Melba Gallagher RN  Safety Promotion/Fall Prevention:   activity supervised   assistive device/personal items within reach   clutter free environment maintained   toileting scheduled   safety round/check completed   room organization consistent  Taken 1/6/2024 0800 by Melba Gallagher RN  Safety Promotion/Fall Prevention:   activity supervised   assistive device/personal items within reach   clutter free environment maintained   toileting scheduled   safety round/check completed   room organization consistent  Intervention: Prevent Skin Injury  Recent Flowsheet Documentation  Taken 1/6/2024 1200 by Melba Gallagher RN  Body Position:   position changed independently   turned   left  Skin Protection:   adhesive use limited   tubing/devices free from skin contact   transparent dressing maintained   skin-to-skin areas padded   skin-to-device areas padded  Taken 1/6/2024 1000 by Melba Gallagher RN  Body Position:   position changed independently   turned   right  Skin Protection:   adhesive use limited   tubing/devices free from skin contact   transparent dressing maintained   skin-to-skin areas padded   skin-to-device areas padded  Taken 1/6/2024 0800 by Melba Gallagher RN  Body Position:   position changed independently   weight shifting   turned   left  Skin Protection:   adhesive use  limited   tubing/devices free from skin contact   transparent dressing maintained   skin-to-skin areas padded   skin-to-device areas padded  Intervention: Prevent and Manage VTE (Venous Thromboembolism) Risk  Recent Flowsheet Documentation  Taken 1/6/2024 1200 by Melba Gallagher RN  Activity Management: activity encouraged  Taken 1/6/2024 1000 by Melba Gallagher RN  Activity Management: activity encouraged  Taken 1/6/2024 0800 by Melba Gallagher RN  Activity Management: activity encouraged  Goal: Optimal Comfort and Wellbeing  Outcome: Ongoing, Progressing  Intervention: Provide Person-Centered Care  Recent Flowsheet Documentation  Taken 1/6/2024 0800 by Melba Gallagher RN  Trust Relationship/Rapport: care explained  Goal: Readiness for Transition of Care  Outcome: Ongoing, Progressing     Problem: Skin Injury Risk Increased  Goal: Skin Health and Integrity  Outcome: Ongoing, Progressing  Intervention: Optimize Skin Protection  Recent Flowsheet Documentation  Taken 1/6/2024 1200 by Melba Gallagher RN  Pressure Reduction Techniques:   weight shift assistance provided   heels elevated off bed   positioned off wounds  Head of Bed (HOB) Positioning: [facility] elevated  Pressure Reduction Devices:   specialty bed utilized   heel offloading device utilized   positioning supports utilized  Skin Protection:   adhesive use limited   tubing/devices free from skin contact   transparent dressing maintained   skin-to-skin areas padded   skin-to-device areas padded  Taken 1/6/2024 1000 by Melba Gallagher RN  Pressure Reduction Techniques:   weight shift assistance provided   positioned off wounds   heels elevated off bed  Head of Bed (HOB) Positioning: [facility] elevated  Pressure Reduction Devices:   specialty bed utilized   positioning supports utilized   heel offloading device utilized  Skin Protection:   adhesive use limited   tubing/devices free from skin contact   transparent dressing maintained   skin-to-skin areas  padded   skin-to-device areas padded  Taken 1/6/2024 0800 by Melba Gallagher RN  Pressure Reduction Techniques:   weight shift assistance provided   positioned off wounds   heels elevated off bed  Head of Bed (HOB) Positioning: HOB elevated  Pressure Reduction Devices:   specialty bed utilized   positioning supports utilized   heel offloading device utilized  Skin Protection:   adhesive use limited   tubing/devices free from skin contact   transparent dressing maintained   skin-to-skin areas padded   skin-to-device areas padded     Problem: Asthma Comorbidity  Goal: Maintenance of Asthma Control  Outcome: Ongoing, Progressing     Problem: Behavioral Health Comorbidity  Goal: Maintenance of Behavioral Health Symptom Control  Outcome: Ongoing, Progressing     Problem: COPD (Chronic Obstructive Pulmonary Disease) Comorbidity  Goal: Maintenance of COPD Symptom Control  Outcome: Ongoing, Progressing     Problem: Diabetes Comorbidity  Goal: Blood Glucose Level Within Targeted Range  Outcome: Ongoing, Progressing     Problem: Heart Failure Comorbidity  Goal: Maintenance of Heart Failure Symptom Control  Outcome: Ongoing, Progressing     Problem: Hypertension Comorbidity  Goal: Blood Pressure in Desired Range  Outcome: Ongoing, Progressing     Problem: Obstructive Sleep Apnea Risk or Actual Comorbidity Management  Goal: Unobstructed Breathing During Sleep  Outcome: Ongoing, Progressing     Problem: Osteoarthritis Comorbidity  Goal: Maintenance of Osteoarthritis Symptom Control  Outcome: Ongoing, Progressing  Intervention: Maintain Osteoarthritis Symptom Control  Recent Flowsheet Documentation  Taken 1/6/2024 1200 by Melba Gallagher RN  Activity Management: activity encouraged  Taken 1/6/2024 1000 by Melba Gallagher RN  Activity Management: activity encouraged  Taken 1/6/2024 0800 by Melba Gallagher, RN  Activity Management: activity encouraged     Problem: Pain Chronic (Persistent) (Comorbidity Management)  Goal:  Acceptable Pain Control and Functional Ability  Outcome: Ongoing, Progressing     Problem: Seizure Disorder Comorbidity  Goal: Maintenance of Seizure Control  Outcome: Ongoing, Progressing     Problem: Fall Injury Risk  Goal: Absence of Fall and Fall-Related Injury  Outcome: Ongoing, Progressing  Intervention: Promote Injury-Free Environment  Recent Flowsheet Documentation  Taken 1/6/2024 1200 by Melba Gallagher RN  Safety Promotion/Fall Prevention:   activity supervised   assistive device/personal items within reach   clutter free environment maintained   toileting scheduled   room organization consistent   safety round/check completed  Taken 1/6/2024 1000 by Melba Gallagher RN  Safety Promotion/Fall Prevention:   activity supervised   assistive device/personal items within reach   clutter free environment maintained   toileting scheduled   safety round/check completed   room organization consistent  Taken 1/6/2024 0800 by Melba Gallagher RN  Safety Promotion/Fall Prevention:   activity supervised   assistive device/personal items within reach   clutter free environment maintained   toileting scheduled   safety round/check completed   room organization consistent     Problem: Restraint, Nonviolent  Goal: Absence of Harm or Injury  Outcome: Ongoing, Progressing  Intervention: Protect Dignity, Rights, and Personal Wellbeing  Recent Flowsheet Documentation  Taken 1/6/2024 0800 by Melba Gallagher RN  Trust Relationship/Rapport: care explained  Intervention: Protect Skin and Joint Integrity  Recent Flowsheet Documentation  Taken 1/6/2024 1200 by Melba Gallagher RN  Body Position:   position changed independently   turned   left  Taken 1/6/2024 1000 by Melba Gallagher RN  Body Position:   position changed independently   turned   right  Taken 1/6/2024 0800 by Melba Gallagher RN  Body Position:   position changed independently   weight shifting   turned   left   Goal Outcome Evaluation:

## 2024-01-06 NOTE — NURSING NOTE
Pt very sleepy this morning. Attempted to wake pt for morning vitals and medications multiple times. Pt began to become upset about being woken up. Pts daughter stated to leave her alone for now. MD aware and stated this was fine.

## 2024-01-06 NOTE — PROGRESS NOTES
[facility] Medicine Services  PROGRESS NOTE    Patient Name: Omar Mendoza  : 1940  MRN: [id number]    Date of Admission: 2023  Primary Care Physician: Varun Pa MD    Subjective   Subjective     CC:  Weakness     HPI:  Patient lethargic this morning, resting comfortably but rousable.  Answers most questions appropriately but still with some confusion.      Objective   Objective     Vital Signs:   Temp:  [97.3 °F (36.3 °C)] 97.3 °F (36.3 °C)  Heart Rate:  [60-74] 60  Resp:  [18] 18  BP: (111-112)/(53-74) 112/61     Physical Exam:  General appearance: Lethargic, no acute distress  Respiratory: Oxygenating well on room air  Neuro/CNS/Psych: Abnormal judgment and insight, disconjugate gaze, moves all 4 extremities    Results Reviewed:  LAB RESULTS:      Lab 24  0352 24  2321 24  0608 24  0551   WBC 8.43 6.34 4.89 3.87 4.51   HEMOGLOBIN 9.7* 9.2* 8.2* 8.3* 8.0*   HEMATOCRIT 29.6* 27.8* 24.7* 25.3* 24.7*   PLATELETS 195 181 141 139* 124*   NEUTROS ABS 4.05 3.49  --   --   --    EOS ABS 3.79* 2.16*  --   --   --    MCV 95.8 96.5 94.3 96.2 95.0   SED RATE  --  14  --   --   --    LACTATE  --  2.7*  --   --   --          Lab 24  0352 24  0412 24  1523 24  0608 24  0723 23  0608   SODIUM 139  --   --  145 144 145   POTASSIUM 3.3*  --  3.9 3.4* 3.7 3.7   CHLORIDE 102  --   --  110* 110* 110*   CO2 25.0  --   --  28.0 27.0 24.0   ANION GAP 12.0  --   --  7.0 7.0 11.0   BUN 6*  --   --  13 14 16   CREATININE 0.41*  --   --  0.48* 0.51* 0.55*   EGFR 99.0  --   --  95.3 93.9 92.2   GLUCOSE 108*  --   --  148* 172* 180*   CALCIUM 7.4*  --   --  7.5* 7.6* 7.6*   HEMOGLOBIN A1C  --  10.70*  --   --   --   --                  Lab 24  0412   CHOLESTEROL 94   LDL CHOL 39   HDL CHOL 33*   TRIGLYCERIDES 120             Brief Urine Lab Results  (Last result in the past 365 days)        Color   Clarity   Blood    Leuk Est   Nitrite   Protein   CREAT   Urine HCG        01/04/24 0053 Yellow   Clear   Negative   Trace   Negative   Negative                   Microbiology Results Abnormal       Procedure Component Value - Date/Time    COVID-19 RAPID AG,VERITOR,COR/JAMARI/PAD/NUPUR/ASHA/LAG/BRITTANY/ IN-HOUSE,DRY SWAB, 1 HR TAT - Swab, Nasal Cavity [[id number]]  (Normal) Collected: 01/04/24 0004    Lab Status: Final result Specimen: Swab from Nasal Cavity Updated: 01/04/24 0022     COVID19 Presumptive Negative    Narrative:      Fact sheets for providers: https://www.fda.gov/media/304264/download    Fact sheets for patients: https://www.fda.gov/media/214269/download    Blood Culture - Blood, Blood, PICC Line [[id number]]  (Normal) Collected: 12/27/23 1400    Lab Status: Final result Specimen: Blood, PICC Line Updated: 01/01/24 1531     Blood Culture No growth at 5 days    Blood Culture - Blood, Arm, Right [[id number]]  (Normal) Collected: 12/27/23 0431    Lab Status: Final result Specimen: Blood from Arm, Right Updated: 01/01/24 0515     Blood Culture No growth at 5 days            XR Chest 1 View    Result Date: 1/4/2024  XR CHEST 1 VW COMPARISON: 12/30/2023 HISTORY: Oxygen desaturation. . FINDINGS: Technical adequacy: Adequate. Shallow depth of inspiration Central airways: Unremarkable Heart: Stable cardiomegaly Great vessels: Stable unfolded atherosclerotic thoracic aorta Mediastinum: Unremarkable Lungs: Bilateral perihilar interstitial infiltrates with peribronchial cuffing and subpleural edema Pulmonary aliza: As above Pleura: Unremarkable Skeletal structures: Degenerative changes Extra thoracic soft tissues:Unremarkable Additional comments: The previously seen PICC line is no longer visualized. The fluffy airspace infiltrates on both sides have resolved in the interval.     Impression: Impression: The findings suggest interstitial pulmonary edema, likely cardiogenic in origin. Clinical correlation. Electronically Signed: Chandler Alberto MD   1/4/2024 4:01 PM EST  Workstation ID: [id number]     Results for orders placed during the hospital encounter of 12/26/23    Adult Transthoracic Echo Complete W/ Cont if Necessary Per Protocol    Interpretation Summary    Left ventricular systolic function is normal. Calculated left ventricular EF = 57% Left ventricular ejection fraction appears to be 56 - 60%.    Left ventricular wall thickness is consistent with mild to moderate concentric hypertrophy.    Left ventricular diastolic function was normal.    Estimated right ventricular systolic pressure from tricuspid regurgitation is mildly elevated (35-45 mmHg).    There is a trivial pericardial effusion.    Mild aortic valve regurgitation.      Current medications:  Scheduled Meds:aspirin, 81 mg, Oral, Daily  atorvastatin, 80 mg, Oral, Nightly  castor oil-balsam peru, 1 application , Topical, Q12H  heparin (porcine), 5,000 Units, Subcutaneous, Q12H  insulin lispro, 2-9 Units, Subcutaneous, 4x Daily With Meals & Nightly  magnesium hydroxide, 10 mL, Oral, Daily  melatonin, 5 mg, Oral, Nightly  metoprolol tartrate, 25 mg, Oral, BID  mirtazapine, 15 mg, Oral, Nightly  QUEtiapine, 25 mg, Oral, Nightly  rivastigmine, 1 patch, Transdermal, Daily  sodium chloride, 10 mL, Intravenous, Q12H  sodium chloride, 10 mL, Intravenous, Q12H      Continuous Infusions:   PRN Meds:.  acetaminophen **OR** acetaminophen **OR** acetaminophen    senna-docusate sodium **AND** polyethylene glycol **AND** bisacodyl **AND** bisacodyl    Calcium Replacement - Follow Nurse / BPA Driven Protocol    dextrose    dextrose    glucagon (human recombinant)    hydrOXYzine    Magnesium Standard Dose Replacement - Follow Nurse / BPA Driven Protocol    nitroglycerin    ondansetron    Phosphorus Replacement - Follow Nurse / BPA Driven Protocol    Potassium Replacement - Follow Nurse / BPA Driven Protocol    sodium chloride    sodium chloride    sodium chloride    sodium chloride    ziprasidone    Assessment  & Plan   Assessment & Plan     Active Hospital Problems    Diagnosis  POA    **UTI (urinary tract infection) [N39.0]  Yes    Acute constipation [K59.00]  Unknown    Acute urinary retention [R33.8]  Unknown    Uterine prolapse [N81.4]  Unknown    Bradycardia [R00.1]  Unknown    Pressure injury of buttock, unstageable [L89.300]  Unknown    Type 2 diabetes mellitus with ketoacidosis, without long-term current use of insulin [E11.10]  Unknown    Disorientation [R41.0]  Unknown    Pleural effusion [J90]  Unknown    Severe malnutrition [E43]  Yes    Edema of lower extremity [R60.0]  Yes    Bullous pemphigoid [L12.0]  Yes    Dyslipidemia [E78.5]  Yes    Essential hypertension [I10]  Yes      Resolved Hospital Problems   No resolved problems to display.        This patient's assessments and plans were partially entered by my partner and updated as appropriate by me on 1/6/2024     Brief Hospital Course to date:  Omar Mendoza is a 83 y.o. female with history of type 2 diabetes, hypertension, history of uterine prolapse, urinary retention with Schultz catheter in place, history of bullous pemphigoid  (s/p Dupixent 10/2023, prednisone/doxycycline 11/2023) CAD, hyperlipidemia, presumed dementia who presented to the ED with generalized weakness and bullous pemphigoid blister on the heel.      Acute metabolic encephalopathy  Dementia with suspected Alzheimer's disease  Major depression with paranoia  Generalized weakness  -Stroke alert called overnight 1/3 but no findings suggestive of acute stroke  -Patient's behaviors are more suggestive of dementia  -Patient could have underlying component of depression to given reports of family members passing months ago with noted behavioral change at that time  -Appreciate neurology evaluation, discussed case w Dr. Edgar: Will remove restraints, allow patient to go to chair and ambulate with assistance, increase Exelon to 9.5 mg daily, administer mirtazapine 15 mg as patient allows,  "Seroquel 25 mg at bedtime  -Patient improved 1/6 with above regiment, continue   -Geodon 10 mg as needed for uncontrolled agitation, avoid physical restraints     Dysphagia  -Nursing with concern for aspiration event 12/30 however patient herself says she \"just got choked.\" Does not wish for modified diet.   -SLP following  -Aspiration precautions, encourage PO intake      Bilateral pleural effusion  L>R  Bibasilar airspace disease versus pneumonia  -Patient currently on room air with good O2 sats  -Follow-up MRSA PCR, cultures, urinary strep and Legionella antigens  -Procalcitonin is low, lactate has improved  -Concern for aspiration on 12/30  -CXR revealed increased right basilar airspace disease with stable left basilar atelectasis  -Encourage IS, pulmonary toilet  -Completed 5 days of Rocephin and doxycycline  -Currently on RA       Anemia  -Iron 53, iron sat 28  -fecal occult negative  -suspect anemia of chronic disease, stable with no active signs of bleeding     Poor venous access  -Removed PICC overnight 1/3     Sinus bradycardia  - pt on both amiodarone and metoprolol, dtr reports amiodarone is new since hospitalization at [facility].  - hold amiodarone, decreased metoprolol to 50 mg BID w/ hold parameters  -HR currently improved      BLE edema  Decreased pulses LLE  - CTA abd showing small right pleural effusion, bilateral basilar atelectasis  - CTA w/ runoff showing normal right sided runoff; left side delayed runoff possibly r/t venous stasis changes, no evidence of arterial occlusion or significant stenosis  -Echo revealed EF 57% with normal diastolic function      Uncontrolled Type 2 diabetes  -Hgb A1C 12.10 on 12/27  -Continue SSI     Constipation  -Continue aggressive bowel regimen, s/p soapsuds enema x 1  -s/p Milk of mag x1, Miralax daily, suppository x1   -Continue prn   -chronic loza catheter in place     Uterine prolapse  -Gyn Dr. Zhou has seen, plan for outpatient follow up to discuss " treatment options.   -may be contributing to chronic urine retention     Bullous pemphigoid  -Has developed new blister on right heel  -Completed doxycycline  -Wound care following      Pressure ulcer  -Wound care following      L adrenal adenoma  - incidental finding on CTA abdomen      Severe malnutrition        Expected Discharge Location and Transportation: [facility]  Expected Discharge   Expected Discharge Date: 1/9/2024; Expected Discharge Time:      DVT prophylaxis:  Medical and mechanical DVT prophylaxis orders are present.     AM-PAC 6 Clicks Score (PT): 12 (01/06/24 0800)    CODE STATUS:   Code Status and Medical Interventions:   Ordered at: 12/27/23 0410     Code Status (Patient has no pulse and is not breathing):    CPR (Attempt to Resuscitate)     Medical Interventions (Patient has pulse or is breathing):    Full Support       Hugo Yee, DO  01/06/24

## 2024-01-07 PROCEDURE — 92610 EVALUATE SWALLOWING FUNCTION: CPT

## 2024-01-07 PROCEDURE — 99232 SBSQ HOSP IP/OBS MODERATE 35: CPT | Performed by: STUDENT IN AN ORGANIZED HEALTH CARE EDUCATION/TRAINING PROGRAM

## 2024-01-07 PROCEDURE — 97550 CAREGIVER TRAING 1ST 30 MIN: CPT

## 2024-01-07 RX ORDER — HALOPERIDOL 5 MG/ML
0.5 INJECTION INTRAMUSCULAR EVERY 6 HOURS PRN
Status: DISCONTINUED | OUTPATIENT
Start: 2024-01-07 | End: 2024-01-11

## 2024-01-07 RX ORDER — GLYCOPYRROLATE 0.2 MG/ML
0.4 INJECTION INTRAMUSCULAR; INTRAVENOUS EVERY 4 HOURS PRN
Status: DISCONTINUED | OUTPATIENT
Start: 2024-01-07 | End: 2024-02-01 | Stop reason: HOSPADM

## 2024-01-07 RX ORDER — MORPHINE SULFATE 2 MG/ML
2 INJECTION, SOLUTION INTRAMUSCULAR; INTRAVENOUS
Status: ACTIVE | OUTPATIENT
Start: 2024-01-07 | End: 2024-01-17

## 2024-01-07 RX ADMIN — RIVASTIGMINE TRANSDERMAL SYSTEM 1 PATCH: 9.5 PATCH, EXTENDED RELEASE TRANSDERMAL at 11:52

## 2024-01-07 NOTE — PROGRESS NOTES
Continued Stay Note  [facility]     Patient Name: Omar Mendoza  MRN: [id number]  Today's Date: 1/7/2024    Admit Date: 12/26/2023    Plan: rehab   Discharge Plan       Row Name 01/07/24 1421       Plan    Plan Comments Inpatient hospice to meet with patient's son, Gael and patient's daughter at 10am 1/8/2024. Please contact [phone] with concerns/needs.                   Discharge Codes    No documentation.                 Expected Discharge Date and Time       Expected Discharge Date Expected Discharge Time    Jan 9, 2024               Mallory Castillo RN

## 2024-01-07 NOTE — THERAPY WOUND CARE TREATMENT
Acute Care - Wound/Debridement Treatment Note/Discharge  [facility]     Patient Name: Omar Mendoza  : 1940  MRN: [id number]  Today's Date: 2024                    Admit Date: 2023    Visit Dx:    ICD-10-CM ICD-9-CM   1. Generalized weakness  R53.1 780.79   2. Acute UTI  N39.0 599.0   3. Bullous pemphigoid  L12.0 694.5   4. Venous insufficiency  I87.2 459.81   5. Dysphagia, unspecified type  R13.10 787.20       Patient Active Problem List   Diagnosis    UTI (urinary tract infection)    Bullous pemphigoid    Essential hypertension    Dyslipidemia    Edema of lower extremity    Cholestatic hepatitis    Acute constipation    Acute urinary retention    Uterine prolapse    Bradycardia    Pressure injury of buttock, unstageable    Type 2 diabetes mellitus with ketoacidosis, without long-term current use of insulin    Disorientation    Pleural effusion    Severe malnutrition        Past Medical History:   Diagnosis Date    CAD (coronary artery disease)     Dementia     Diabetes mellitus     Hyperlipemia     Hypertension         History reviewed. No pertinent surgical history.            WOUND DEBRIDEMENT                     PT Assessment (last 12 hours)       PT Evaluation and Treatment       Row Name 24 1033          Physical Therapy Time and Intention    Subjective Information no complaints  asleep  -     Document Type wound care;therapy note (daily note);discharge treatment  -     Mode of Treatment physical therapy;individual therapy  -       Row Name 24 1033          General Information    Patient Observations decreased LOC  asleep  -     Patient/Family/Caregiver Comments/Observations Dtr present, confirms that patient does not want any care. Dtr agrees to PT removing compression wraps and changing heel dressing as a discharge treatment  -       Row Name 24 1034          Pain Scale: FACES Pre/Post-Treatment    Pain: FACES Scale, Pretreatment 0-->no hurt  -      "Posttreatment Pain Rating 0-->no hurt  -       Row Name 01/07/24 1033          Cognition    Orientation Status (Cognition) unable/difficult to assess  -       Row Name 01/07/24 1033          Wound 12/26/23 2218 Right posterior heel Blisters    Wound - Properties Group Placement Date: 12/26/23  -DN Placement Time: 2218  -DN Present on Original Admission: Y  -DN Side: Right  -DN Orientation: posterior  -DN Location: heel  -DN Primary Wound Type: Blisters  -DN    Dressing Appearance intact;moist drainage  -     Base epithelialization;pink;red;moist  small open area remaining  -     Periwound intact;dry  -     Periwound Temperature warm  -     Periwound Skin Turgor soft  -     Drainage Characteristics/Odor serosanguineous  -     Drainage Amount scant  -     Care, Wound cleansed with;wound cleanser  -     Dressing Care dressing changed;low-adherent;foam;border dressing  4\" optifoam gentle  -     Periwound Care cleansed with pH balanced cleanser;dry periwound area maintained  -     Retired Wound - Properties Group Placement Date: 12/26/23  -DN Placement Time: 2218  -DN Present on Original Admission: Y  -DN Side: Right  -DN Orientation: posterior  -DN Location: heel  -DN Primary Wound Type: Blisters  -DN    Retired Wound - Properties Group Date first assessed: 12/26/23  -DN Time first assessed: 2218  -DN Present on Original Admission: Y  -DN Side: Right  -DN Location: heel  -DN Primary Wound Type: Blisters  -DN      Row Name             Wound 12/27/23 0411 Bilateral coccyx Pressure Injury    Wound - Properties Group Placement Date: 12/27/23  -DN Placement Time: 0411  -DN Present on Original Admission: Y  -DN Side: Bilateral  -DN Location: coccyx  -DN Primary Wound Type: Pressure inj  -DN    Retired Wound - Properties Group Placement Date: 12/27/23  -DN Placement Time: 0411  -DN Present on Original Admission: Y  -DN Side: Bilateral  -DN Location: coccyx  -DN Primary Wound Type: Pressure inj  -DN    " Retired Wound - Properties Group Date first assessed: 12/27/23  -DN Time first assessed: 0411  -DN Present on Original Admission: Y  -DN Side: Bilateral  -DN Location: coccyx  -DN Primary Wound Type: Pressure inj  -DN      Row Name             Wound 12/27/23 1035 Left greater trochanter Blisters    Wound - Properties Group Placement Date: 12/27/23  -MCA Placement Time: 1035  -MCA Side: Left  -MCA Location: greater trochanter  -MCA Primary Wound Type: Blisters  -MCA    Retired Wound - Properties Group Placement Date: 12/27/23  -MCA Placement Time: 1035  -MCA Side: Left  -MCA Location: greater trochanter  -MCA Primary Wound Type: Blisters  -MCA    Retired Wound - Properties Group Date first assessed: 12/27/23  -MCA Time first assessed: 1035  -MCA Side: Left  -MCA Location: greater trochanter  -MCA Primary Wound Type: Blisters  -MCA      Row Name 01/07/24 1033          Coping    Observed Emotional State calm  -     Verbalized Emotional State --  TRUDY, pt asleep  -       Row Name 01/07/24 1033          Plan of Care Review    Plan of Care Reviewed With daughter  -     Progress no change  -     Outcome Evaluation Pt still with BLE edema, worse in the feet and ankles. The posterior R heel is much improved, with a very small open area remaining. As patient no longer wants to receive wound care services, PT placed a simple optifoam dressing over the R heel, which can stay in place up to 7 days. PT removed MLW, which would need to be maintained and assessed every few days. PT had discussion with daughter about ongoing plan of care, including skin assessment and leg elevation to assist with edema. PT will hold services per patient request. Please re-consult as appropriate.  -       Row Name 01/07/24 1033          Positioning and Restraints    Pre-Treatment Position in bed  -     Post Treatment Position bed  -MC     In Bed supine;call light within reach;encouraged to call for assist;with family/caregiver  -        Row Name 01/07/24 1033          Wound Care Goal 1 (PT)    Wound Care Goal 1 (PT) Demonstrate no remaining fluid within R heel blister to allow for wound healing.  -     Progress/Outcome (Wound Care Goal 1, PT) goal met  -       Row Name 01/07/24 1033          Wound Care Goal 2 (PT)    Wound Care Goal 2 (PT) Demonstrate minimal remaining BLE edema to improve wound healing potential and skin integrity.  -     Progress/Outcome (Wound Care Goal 2, PT) goal partially met  -       Row Name 01/07/24 1033          Discharge Summary (PT)    Additional Documentation Discharge Summary (PT) (Group)  -       Row Name 01/07/24 1033          Discharge Summary (PT)    Reason for Discharge (PT) patient/family request discontinuation of services  -     Outcomes Achieved/Progress Made Upon Discharge (PT) goals partially achieved prior to discharge  -               User Key  (r) = Recorded By, (t) = Taken By, (c) = Cosigned By      Initials Name Provider Type    Alisa Villalba, PT Physical Therapist    Eva Soriano RN Registered Nurse    Peter Espinoza RN Registered Nurse                  Physical Therapy Education       Title: PT OT SLP Therapies (In Progress)       Topic: Physical Therapy (In Progress)       Point: Mobility training (In Progress)       Learning Progress Summary             Patient Acceptance, E, NR by AE at 1/3/2024 1337    Acceptance, E, NR by KE at 12/29/2023 1131    Acceptance, E, NR by KE at 12/27/2023 1045                         Point: Home exercise program (Not Started)       Learner Progress:  Not documented in this visit.              Point: Body mechanics (In Progress)       Learning Progress Summary             Patient Acceptance, E, NR by AE at 1/3/2024 1337    Acceptance, E, NR by KE at 12/29/2023 1131    Acceptance, E, NR by KE at 12/27/2023 1045                         Point: Precautions (In Progress)       Learning Progress Summary             Patient Acceptance, E, NR  by DENTON at 1/3/2024 1337    Acceptance, E, NR by KE at 12/29/2023 1131    Acceptance, E, NR by MIA at 12/27/2023 1045                                         User Key       Initials Effective Dates Name Provider Type Discipline    AE 09/21/21 -  Domo Desir, PT Physical Therapist PT    MIA 11/16/23 -  Petty Mckoy, PT Physical Therapist PT                   PT Rehab Goals       Row Name 01/07/24 1033             Wound Care Goal 1 (PT)    Wound Care Goal 1 (PT) Demonstrate no remaining fluid within R heel blister to allow for wound healing.  -MC      Progress/Outcome (Wound Care Goal 1, PT) goal met  -MC         Wound Care Goal 2 (PT)    Wound Care Goal 2 (PT) Demonstrate minimal remaining BLE edema to improve wound healing potential and skin integrity.  -      Progress/Outcome (Wound Care Goal 2, PT) goal partially met  -                User Key  (r) = Recorded By, (t) = Taken By, (c) = Cosigned By      Initials Name Provider Type Discipline    Alisa Villalba, PT Physical Therapist PT                    Recommendation and Plan  Anticipated Discharge Disposition (PT): skilled nursing facility  Planned Therapy Interventions (PT): wound care, patient/family education  Therapy Frequency (PT): daily    Plan of Care Reviewed With: daughter   Progress: no change  Outcome Evaluation: Pt still with BLE edema, worse in the feet and ankles. The posterior R heel is much improved, with a very small open area remaining. As patient no longer wants to receive wound care services, PT placed a simple optifoam dressing over the R heel, which can stay in place up to 7 days. PT removed MLW, which would need to be maintained and assessed every few days. PT had discussion with daughter about ongoing plan of care, including skin assessment and leg elevation to assist with edema. PT will hold services per patient request. Please re-consult as appropriate.                  Time Calculation   PT Charges       Row Name 01/07/24  1039             Time Calculation    Start Time 0815  -                User Key  (r) = Recorded By, (t) = Taken By, (c) = Cosigned By      Initials Name Provider Type    Alisa Villalba, PT Physical Therapist                          PT G-Codes  Outcome Measure Options: AM-PAC 6 Clicks Daily Activity (OT)  AM-PAC 6 Clicks Score (PT): 12  AM-PAC 6 Clicks Score (OT): 10                Alisa Andrews, CHELO  1/7/2024

## 2024-01-07 NOTE — CONSULTS
Varun Pa MD  Consulting physician: Joselito    Chief Complaint   Patient presents with    Foot Swelling     Pt arrived via EMS for bilateral feet swelling, blister noted to bottom of right foot       Reason for consult: GOC    HPI: Patient is a 83-year-old female with a history of dementia.  Patient brought to hospital from nursing facility due to altered mental status and ultimately was found to have a UTI.  Family states that the patient has actively been declining for the last year in respect to her mentation.  Family reports that she is had a quicker decline over the last couple of months that has been getting progressively and progressively more pronounced.  Over the last 24 to 48 hours the family reports the patient has stopped eating or drinking as well as refusing all medication.  Per chart review the patient does appear to have intermittent episodes of agitation and restlessness.    Pain assesment: No visible signs of pain    Dyspnea: No visible signs of dyspnea    N/V: No visible signs of vomiting    PPS: 10      Past Medical History:   Diagnosis Date    CAD (coronary artery disease)     Dementia     Diabetes mellitus     Hyperlipemia     Hypertension      History reviewed. No pertinent surgical history.  Current Code Status       Date Active Code Status Order ID Comments User Context       1/7/2024 1143 No CPR (Do Not Attempt to Resuscitate) [id number]  Mark Gutierrez, DO Inpatient        Question Answer    Code Status (Patient has no pulse and is not breathing) No CPR (Do Not Attempt to Resuscitate)    Medical Interventions (Patient has pulse or is breathing) Comfort Measures                  Current Facility-Administered Medications   Medication Dose Route Frequency Provider Last Rate Last Admin    acetaminophen (TYLENOL) tablet 650 mg  650 mg Oral Q4H PRN Sepideh Delgadillo APRN   650 mg at 01/01/24 0842    Or    acetaminophen (TYLENOL) 160 MG/5ML oral solution 650 mg  650 mg Oral Q4H PRN  Sepideh Delgadillo APRN        Or    acetaminophen (TYLENOL) suppository 650 mg  650 mg Rectal Q4H PRN Sepideh Delgadillo APRN        sennosides-docusate (PERICOLACE) 8.6-50 MG per tablet 2 tablet  2 tablet Oral BID PRN Frantz Martin APRN        And    polyethylene glycol (MIRALAX) packet 17 g  17 g Oral Daily PRN Frantz Martin APRN        And    bisacodyl (DULCOLAX) EC tablet 5 mg  5 mg Oral Daily PRN Frantz Martin APRN        And    bisacodyl (DULCOLAX) suppository 10 mg  10 mg Rectal Daily PRN Frantz Martin APRN        Calcium Replacement - Follow Nurse / BPA Driven Protocol   Does not apply PRN Sepideh Delgadillo APRN        dextrose (D50W) (25 g/50 mL) IV injection 25 g  25 g Intravenous Q15 Min PRN Sepideh Delgadillo APRN   25 g at 12/27/23 0821    dextrose (GLUTOSE) oral gel 15 g  15 g Oral Q15 Min PRN Sepideh Delgadillo APRN        glucagon (GLUCAGEN) injection 1 mg  1 mg Intramuscular Q15 Min PRN Sepideh Delgadillo APRN        glycopyrrolate (ROBINUL) injection 0.4 mg  0.4 mg Intravenous Q4H PRN Mark Gutierrez, DO        haloperidol lactate (HALDOL) injection 0.5 mg  0.5 mg Intravenous Q6H PRN Mark Gutierrez, DO        hydrOXYzine (ATARAX) tablet 25 mg  25 mg Oral TID PRN Trinh Mendoza MD   25 mg at 12/27/23 1225    Magnesium Standard Dose Replacement - Follow Nurse / BPA Driven Protocol   Does not apply PRN Sepideh Delgadillo APRN        morphine injection 2 mg  2 mg Intravenous Q1H PRN Mark Gutierrez, DO        nitroglycerin (NITROSTAT) SL tablet 0.4 mg  0.4 mg Sublingual Q5 Min PRN Sepideh Delgadillo APRN        ondansetron (ZOFRAN) injection 4 mg  4 mg Intravenous Q6H PRN Sepideh Delgadillo APRN   4 mg at 12/29/23 2027    Phosphorus Replacement - Follow Nurse / BPA Driven Protocol   Does not apply PRN Sepideh Delgadillo, LITA        Potassium Replacement - Follow Nurse / BPA Driven Protocol   Does not apply PRN Sepideh Delgadillo, LITA        rivastigmine (EXELON) 9.5 MG/24HR patch 1 patch  1 patch Transdermal Daily  "Hung Edgar MD   1 patch at 01/05/24 1449    sodium chloride 0.9 % flush 10 mL  10 mL Intravenous Q12H Trinh Mendoza MD   10 mL at 01/04/24 2213    sodium chloride 0.9 % flush 10 mL  10 mL Intravenous PRN Trinh Mendoza MD        sodium chloride 0.9 % flush 10 mL  10 mL Intravenous Q12H Ghanim-Moustafa, May, APRN   10 mL at 01/04/24 2213    sodium chloride 0.9 % flush 10 mL  10 mL Intravenous PRN Ghanim-Moustafa, May, APRN        sodium chloride 0.9 % infusion 40 mL  40 mL Intravenous PRN Sepideh Delgadillo M, APRN        sodium chloride 0.9 % infusion 40 mL  40 mL Intravenous PRN Ghanim-Moustafa, May, APRN        ziprasidone (GEODON) injection 10 mg  10 mg Intramuscular Q6H PRN Ludy Todd II, DO              acetaminophen **OR** acetaminophen **OR** acetaminophen    senna-docusate sodium **AND** polyethylene glycol **AND** bisacodyl **AND** bisacodyl    Calcium Replacement - Follow Nurse / BPA Driven Protocol    dextrose    dextrose    glucagon (human recombinant)    glycopyrrolate    haloperidol lactate    hydrOXYzine    Magnesium Standard Dose Replacement - Follow Nurse / BPA Driven Protocol    Morphine    nitroglycerin    ondansetron    Phosphorus Replacement - Follow Nurse / BPA Driven Protocol    Potassium Replacement - Follow Nurse / BPA Driven Protocol    sodium chloride    sodium chloride    sodium chloride    sodium chloride    ziprasidone  No Known Allergies  History reviewed. No pertinent family history.  Social History     Socioeconomic History    Marital status:    Tobacco Use    Smoking status: Never    Smokeless tobacco: Never   Substance and Sexual Activity    Alcohol use: Never    Drug use: Defer    Sexual activity: Defer     Review of Systems -unable to assess secondary to patient altered mental status      /61 (BP Location: Right arm, Patient Position: Lying)   Pulse 60   Temp 97.3 °F (36.3 °C) (Axillary)   Resp 18   Ht 170.2 cm (67.01\")   Wt 99 kg (218 lb 4.1 oz)   " SpO2 94%   BMI 34.18 kg/m²   No intake or output data in the 24 hours ending 01/07/24 1145  Physical Exam:      General Appearance:  Unresponsive, frail appearing   Head:  Normocephalic, without obvious abnormality, atraumatic   Eyes:  Lids and lashes normal, conjunctivae and sclerae normal, no icterus, no pallor, corneas clear, PERRLA   Ears:  Ears appear intact with no abnormalities noted   Throat:  No oral lesions, no thrush, oral mucosa moist   Neck:  No adenopathy, supple, trachea midline, no thyromegaly, no carotid bruit, no JVD   Back:  No kyphosis present, no scoliosis present, no skin lesions, erythema or scars, no tenderness to percussion, or palpation, range of motion normal   Lungs:  Clear to auscultation,respirations regular, even and unlabored    Heart:  Regular rhythm and normal rate, normal S1 and S2, no murmur, no gallop, no rub, no click   Breast Exam:  Deferred   Abdomen:  Normal bowel sounds, no masses, no organomegaly, soft non-tender, non-distended, no guarding, no rebound tenderness   Genitalia:  Deferred   Extremities:  Moves all extremities well, no edema, no cyanosis, no redness   Pulses:  Pulses palpable and equal bilaterally   Skin:  No bleeding, bruising or rash   Lymph nodes:  No palpable adenopathy   Neurologic:  Cranial nerves 2 - 12 grossly intact, sensation intact, DTR present and equal bilaterally       Results from last 7 days   Lab Units 01/05/24  0352   WBC 10*3/mm3 8.43   HEMOGLOBIN g/dL 9.7*   HEMATOCRIT % 29.6*   PLATELETS 10*3/mm3 195     Results from last 7 days   Lab Units 01/05/24  0352   SODIUM mmol/L 139   POTASSIUM mmol/L 3.3*   CHLORIDE mmol/L 102   CO2 mmol/L 25.0   BUN mg/dL 6*   CREATININE mg/dL 0.41*   CALCIUM mg/dL 7.4*   GLUCOSE mg/dL 108*     Results from last 7 days   Lab Units 01/05/24  0352   SODIUM mmol/L 139   POTASSIUM mmol/L 3.3*   CHLORIDE mmol/L 102   CO2 mmol/L 25.0   BUN mg/dL 6*   CREATININE mg/dL 0.41*   GLUCOSE mg/dL 108*   CALCIUM mg/dL 7.4*      Imaging Results (Last 72 Hours)       Procedure Component Value Units Date/Time    XR Chest 1 View [[id number]] Collected: 01/04/24 1556     Updated: 01/04/24 1604    Narrative:      XR CHEST 1 VW    COMPARISON: 12/30/2023    HISTORY: Oxygen desaturation. .    FINDINGS:  Technical adequacy: Adequate. Shallow depth of inspiration  Central airways: Unremarkable  Heart: Stable cardiomegaly  Great vessels: Stable unfolded atherosclerotic thoracic aorta  Mediastinum: Unremarkable  Lungs: Bilateral perihilar interstitial infiltrates with peribronchial cuffing and subpleural edema  Pulmonary aliza: As above  Pleura: Unremarkable  Skeletal structures: Degenerative changes  Extra thoracic soft tissues:Unremarkable  Additional comments: The previously seen PICC line is no longer visualized. The fluffy airspace infiltrates on both sides have resolved in the interval.      Impression:      Impression: The findings suggest interstitial pulmonary edema, likely cardiogenic in origin. Clinical correlation.    Electronically Signed: Chandler Alberto MD    1/4/2024 4:01 PM EST    Workstation ID: [id number]          Impression: UTI  Dementia  Restlessness  Goals of care   Plan: did have a discussion with to the patient children who are at bedside.  We discussed continue current plan of care versus a comfort/end-of-life plan of care.  Ultimately family understand that patient is declining and appears to be actively dying.  With this in mind and the patient's overall poor quality of life prior to this admission the family would like to proceed with a comfort focused plan of care.  I will stop all none comfort medication and make the patient a DNR/comfort measures.  We did discuss hospice and I will go ahead and consult hospice as the patient may end up qualifying for inpatient hospice.        Mark Gutierrez DO  01/07/24  11:45 EST

## 2024-01-07 NOTE — PLAN OF CARE
[facility]    Plan of Care Note    Patient Name: Omar Mendoza  : 1940  MRN: [id number]  Primary Care Physician:  Varun Pa MD  Date of admission: 2023    Subjective     Reason for consult: Confusion    History of Present Illness   Patient not formally evaluated or examined today due to ongoing agitation and confusion.     Did discuss with patient's children as well as hospitalist Dr. Yee, regarding goals of care.  Patient is now transitioning to comfort care, palliative following.  She may end up qualifying for inpatient hospice.    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 female with history of type 2 diabetes, hypertension, CAD, presumed dementia presenting with generalized weakness.  Patient not formally evaluated today due to ongoing aggression and agitation.  Family transitioning towards comfort care.  Suspect etiology of patient's decline is due to dementia with superimposed major depression with paranoia.  She has been refusing all medications however seems to be compliant with Exelon patch.  Appreciate palliative involvement.     Plan:   -Continue Exelon patch 9.5 mg every 12 hours  -Appreciate palliative involvement, agree with comfort care     General Neurology will see upon request.  Please reach out with any questions     I have discussed the above with the family, hospitalist        Curtis Mancini DO, MS

## 2024-01-07 NOTE — PLAN OF CARE
Goal Outcome Evaluation:  Plan of Care Reviewed With: daughter        Progress: no change  Outcome Evaluation: Pt still with BLE edema, worse in the feet and ankles. The posterior R heel is much improved, with a very small open area remaining. As patient no longer wants to receive wound care services, PT placed a simple optifoam dressing over the R heel, which can stay in place up to 7 days. PT removed MLW, which would need to be maintained and assessed every few days. PT had discussion with daughter about ongoing plan of care, including skin assessment and leg elevation to assist with edema. PT will hold services per patient request. Please re-consult as appropriate.

## 2024-01-07 NOTE — PLAN OF CARE
Goal Outcome Evaluation:              Outcome Evaluation: SLP called to reevaluate and consider tolerance of pt requested food. Pt is requesting grilled cheese/tomato soup.  Pt is currently alert and talking. She is able to self feed. She exhibits no overt patterns of dysphagia. Increased risk but given comfort measures, pt should be allowed PO diet she desires.  No further ST.      Anticipated Discharge Disposition (SLP): No further SLP services warranted

## 2024-01-07 NOTE — DISCHARGE PLACEMENT REQUEST
"Omar Mendoza (83 y.o. Female)       Date of Birth   1940    Social Security Number       Address   [address] [address]    Home Phone   [phone]    MRN   [id number]       Pentecostalism   None    Marital Status                               Admission Date   12/26/23    Admission Type   Emergency    Admitting Provider   Hugo Yee DO    Attending Provider   Hugo Yee DO    Department, Room/Bed   [address], S576/1       Discharge Date       Discharge Disposition       Discharge Destination                                 Attending Provider: Hugo Yee DO    Allergies: No Known Allergies    Isolation: None   Infection: None   Code Status: No CPR    Ht: 170.2 cm (67.01\")   Wt: 99 kg (218 lb 4.1 oz)    Admission Cmt: None   Principal Problem: UTI (urinary tract infection) [N39.0]                   Active Insurance as of 12/26/2023       Primary Coverage       Payor Plan Insurance Group Employer/Plan Group    [employer]HEM MEDICARE REPLACEMENT [employer] MEDICARE ADVANTAGE [id number]       Payor Plan Address Payor Plan Phone Number Payor Plan Fax Number Effective Dates    [address] [phone]  1/1/2022 - None Entered    [facility] [address]         Subscriber Name Subscriber Birth Date Member ID       OMAR MENDOZA 1940 [id number]                     Emergency Contacts        (Rel.) Home Phone Work Phone Mobile Phone    JO (BRIE)DESTIN (Son) [phone] -- [phone]    DINO GATES (Daughter) [phone] -- [phone]    JODALLAS (Spouse) [phone] -- [phone]              Emergency Contact Information       Name Relation Home Work Mobile    JO (BRIE)DESTIN Son [phone]    DINO GATES Daughter [phone]    JODALLAS Spouse [phone]          Insurance Information                  ANTHEM MEDICARE REPLACEMENT/ANTHEM MEDICARE ADVANTAGE Phone: " [phone]    Subscriber: Omar Mendoza Subscriber#: [id number]    Group#: [id number] Precert#: [id number]             History & Physical        SueJulee DO at 23 0410              [facility] Medicine Services  HISTORY AND PHYSICAL    Patient Name: Omar Mendoza  : 1940  MRN: [id number]  Primary Care Physician: Varun Pa MD  Date of admission: 2023    Subjective  Subjective     Chief Complaint:  Generalized weakness, skin lesions    HPI:  Omar Mendoza is a 83 y.o. female with hx of CAD, HTN, HLD, T2DM, acute urinary retention (has loza in place), acute constipation (no BM since ), bullous pemphigoid (s/p dupixent 10/23, prednisone / doxycycline 2023), uterine prolapse, ongoing confusion - thought to have dementia but no formal dx and not on medications for dementia who presents to the ED for evaluation of generalized weakness and new bullous pemphigoid blister on right heel. Pt is unable to provide HPI d/t mental status changes / confusion. Daughter is present and assisting with HPI. She makes it known that she has been unhappy with the care provided at [facility] rehab and feels like her mother was neglected there.She brings her to [facility] ED for evaluation of the right heel lesion and altered mental status / generalized weakness.     Review of Systems   Unable to perform ROS: Mental status change        Personal History     Past Medical History:   Diagnosis Date    CAD (coronary artery disease)     Dementia     Diabetes mellitus     Hyperlipemia     Hypertension              History reviewed. No pertinent surgical history.    Family History:  family history is not on file.     Social History:  reports that she has never smoked. She has never used smokeless tobacco. She reports that she does not drink alcohol.  Social History     Social History Narrative    Not on file       Medications:  Insulin Syringe-Needle U-100, amLODIPine, amiodarone, aspirin,  atorvastatin, insulin glargine, metoprolol tartrate, nystatin, spironolactone, and triamcinolone    No Known Allergies    Objective  Objective     Vital Signs:   Temp:  [98 °F (36.7 °C)] 98 °F (36.7 °C)  Heart Rate:  [48-53] 48  Resp:  [14-16] 14  BP: (101-120)/(58-71) 120/62    Physical Exam  Constitutional:       General: She is sleeping. She is not in acute distress.     Appearance: She is well-developed. She is ill-appearing. She is not toxic-appearing.   HENT:      Head: Normocephalic and atraumatic.      Nose: Nose normal.      Mouth/Throat:      Mouth: Mucous membranes are dry.      Pharynx: Oropharynx is clear.   Eyes:      Extraocular Movements: Extraocular movements intact.      Conjunctiva/sclera: Conjunctivae normal.      Pupils: Pupils are equal, round, and reactive to light.   Cardiovascular:      Rate and Rhythm: Regular rhythm. Bradycardia present.      Pulses: Normal pulses.      Heart sounds: Normal heart sounds. No murmur heard.  Pulmonary:      Effort: Pulmonary effort is normal.      Breath sounds: Normal breath sounds.   Abdominal:      General: Bowel sounds are normal. There is no distension.      Palpations: Abdomen is soft.      Tenderness: There is no abdominal tenderness. There is no guarding.   Musculoskeletal:         General: Normal range of motion.      Cervical back: Normal range of motion and neck supple.      Right lower le+ Pitting Edema present.      Left lower le+ Pitting Edema present.   Skin:     General: Skin is warm and dry.      Capillary Refill: Capillary refill takes less than 2 seconds.      Comments: See photos:   1. Right heel blister  2. Buttocks pressure ulcer   Neurological:      Mental Status: She is oriented to person, place, and time.      Cranial Nerves: No cranial nerve deficit.   Psychiatric:         Mood and Affect: Mood normal.         Behavior: Behavior normal. Behavior is cooperative.          Right heel      2. Buttocks    Verbal permission to take  and place photos in chart obtained from pt daughter who is at bedside.    Result Review:  I have personally reviewed the results from the time of this admission to 12/27/2023 04:57 EST and agree with these findings:  [x]  Laboratory list / accordion  []  Microbiology  [x]  Radiology  [x]  EKG/Telemetry   []  Cardiology/Vascular   []  Pathology  []  Old records  []  Other:  Most notable findings include:     LAB RESULTS:      Lab 12/27/23  0431 12/26/23  2336 12/26/23 2131   WBC  --   --  7.78   HEMOGLOBIN  --   --  10.3*   HEMATOCRIT  --   --  31.3*   PLATELETS  --   --  159   NEUTROS ABS  --   --  6.22   IMMATURE GRANS (ABS)  --   --  0.07*   LYMPHS ABS  --   --  0.92   MONOS ABS  --   --  0.34   EOS ABS  --   --  0.21   MCV  --   --  94.8   CRP  --  1.87*  --    PROCALCITONIN  --   --  0.10   LACTATE 1.4  --  2.1*         Lab 12/26/23  2131   SODIUM 143   POTASSIUM 4.3   CHLORIDE 109*   CO2 26.0   ANION GAP 8.0   BUN 21   CREATININE 0.80   EGFR 74.1   GLUCOSE 81   CALCIUM 7.9*         Lab 12/26/23 2131   TOTAL PROTEIN 4.9*   ALBUMIN 2.6*   GLOBULIN 2.3   ALT (SGPT) 31   AST (SGOT) 36*   BILIRUBIN 0.7   ALK PHOS 174*   LIPASE 119*         Lab 12/26/23  2336 12/26/23 2131   PROBNP  --  1,494.0   HSTROP T 67* 69*                 Brief Urine Lab Results  (Last result in the past 365 days)        Color   Clarity   Blood   Leuk Est   Nitrite   Protein   CREAT   Urine HCG        12/27/23 0040 Orange   Cloudy   Large (3+)   Moderate (2+)   Negative   100 mg/dL (2+)                 Microbiology Results (last 10 days)       ** No results found for the last 240 hours. **            CT Angio Abdominal Aorta Bilateral Iliofem Runoff    Result Date: 12/27/2023  CT ANGIO ABDOMINAL AORTA BILAT ILIOFEM RUNOFF Date of Exam: 12/26/2023 11:36 PM EST Indication: Rash, temperature difference in lower extremities, swelling, include phase as well. Comparison: None available. Technique: CTA of the abdomen, pelvis and both lower  extremities was performed after the uneventful intravenous administration of 125 mL Isovue-370. Reconstructed coronal and sagittal images were also obtained. In addition, a 3-D volume rendered image was created for interpretation. Automated exposure control and iterative reconstruction methods were used. Findings: Non--- vascular: There is a small right pleural effusion. There is bilateral basilar atelectasis. There is cholelithiasis. The arterial enhanced images of the liver, right adrenal gland, bilateral kidneys, pancreas and spleen are normal. There is adrenal adenoma on the left. There is a moderate stool burden with a moderate amount of stool in the rectosigmoid possibly from fecal impaction. There is anasarca. Vascular: There is tortuosity of the thoracic aorta. There is no aneurysmal dilatation or aortic stenosis. The bilateral common iliac, internal and external neck arteries are widely patent. There is a normal right-sided runoff with three-vessel to the ankle. On the left side, there is delayed runoff but the delayed images demonstrate a normal three-vessel runoff to the left ankle despite the slight delay which is of uncertain etiology. Venous stasis would be in the differential for the sluggish flow on the left. There is diffuse lower extremity edema.     Impression: Impression: No acute abnormality. No evidence of arterial occlusion or significant stenosis. There is delayed runoff on the left perhaps related to venous stasis changes. Please see above discussion for other findings. There is diffuse anasarca and lower extremity edema. Electronically Signed: Oni Quesada MD  12/27/2023 1:18 AM EST  Workstation ID: [id number]    XR Chest 1 View    Result Date: 12/26/2023  XR CHEST 1 VW Date of Exam: 12/26/2023 9:10 PM EST Indication: Chest Pain Triage Protocol Comparison: None available. Findings: Patient is rotated to the left. There is bibasilar airspace disease left greater than right with small left  pleural effusion. Negative for pneumothorax. Heart size within normal limits for technique. Osseous structures grossly intact.     Impression: Impression: Bibasilar airspace disease left greater than right which may relate to atelectasis and/or pneumonia with small left pleural effusion. Electronically Signed: Cyril Reina MD  12/26/2023 9:38 PM EST  Workstation ID: [id number]         Assessment & Plan  Assessment & Plan       UTI (urinary tract infection)    Bullous pemphigoid    Essential hypertension    Dyslipidemia    Edema of lower extremity    Acute constipation    Acute urinary retention    Uterine prolapse    Bradycardia    Pressure injury of buttock, unstageable    Type 2 diabetes mellitus with ketoacidosis, without long-term current use of insulin    Disorientation    Pleural effusion    UTI  - start rocephin  - blood cultures x 2  - lactic acid 2.1, repeat pending  - add urine culture   - hx of urinary retention w/ loza in place, d/c loza and bladder scan w/ acute urinary retention standing orders    BLE edema  Decreased pulses LLE  - CTA abd showing small right pleural effusion, bilateral basilar atelectasis  - CTA w/ runoff showing normal right sided runoff; left side delayed runoff possibly r/t venous stasis changes, no evidence of arterial occlusion or significant stenosis  - LLE cool to touch compared to RLE, decreased pulses  - neurovascular checks  - ECHO in am    L pleural effusion  - chest xray w/ bibasilar airspace dz L>R which may r/t atelectasis and or pneumonia w/ small L pleural effusion  - pt receiving rocephin (UTI) and doxycycline (Bullous pemphigoid) which would cover concern for pneumonia  - blood cx's pending  - add mrsa pcr nares  - add s.pneumo and legionella urinary ag  - pulmonary toilet / IS  - procal, esr, crp pending; initial lactic 2.1 w/ repeat 1.4  -- crp 1.87, procal 0.10    Bullous pemphigoid  - s/p dupixent injection 10/2023  - s/p skin bx by dermatology and  doxycycline/prednisone  - prednisone stopped d/t elevated glucose, dtr reports some confusion between hospital d/c and admission to rehab (steroids weaned and stopped in hospital then restarted on admission to rehab)  - has new right heel blister that came up 12/26  - per up to date recommendations, start doxycycline 100 mg BID, hold on steroids for now d/t concern for hyperglycemia     Pressure ulcer, buttocks  - daughter thought this was r/t the bullous pemphigoid, looks more like pressure ulcer  - WOC consult for evaluation    Disorientation  - daughter concern for altered mental status, has been gradual progression and worse recently  - concern for dementia, has not had neuro evaluation d/t difficulty getting her to appointments w/ recent acute illnesses  - consider neuro evaluation inpatient vs outpatient if mental status not improved  - neuro checks  - check tsh, b12, folate, vitamin d  - PT/OT consult, pt has not been mobile since prior to admission to [facility] 12/14    Acute constipation / urinary retention  - daughter reporting patient has not had BM since d/c from [facility] on 12/19, she was given something to help her use the bathroom prior to d/c but has not gone since  - CTA abd in the ED tonight showing there is a moderate stool burden w/ moderate amt of stool in the rectosigmoid possibly fecal impaction  - soap suds enema x1  - start bowel regimen  - urinary retention likely r/t constipation / uterine prolapse    Uterine prolapse  - significant visible prolapse, daughter asking what can be done for this, possible pessary?  - consider gynecological consult inpatient vs outpatient    Bradycardia  - pt on both amiodarone and metoprolol, dtr reports amiodarone is new since hospitalization at [facility], says pt was started on IV drip for her heart rate, unclear if she had a-fib   - obtain records from [facility]  - currently HR 49, sinus viral  - hold amiodarone  - decrease metoprolol  to 50 mg BID w/ hold parameters    T2DM  - has been on levemir since admission to [facility] initially for DKA dtr thinks d/t steroid tx  - hold levemir as pt sleeping, not taking in good PO intake  - fsbg w/ moderate dose ssi  - check A1c  - may need to add levemir or increase coverage based on glucose w/ if prednisone needed    Anemia  - anemia studies  - occult stool  - compared to labs in EMR, on 12/22/23 H/H 10.4/22.5, platelets 177    L adrenal adenoma  - incidental finding on CTA abdomen       DVT prophylaxis:  Heparin SC BID    CODE STATUS:    Code Status (Patient has no pulse and is not breathing): CPR (Attempt to Resuscitate)  Medical Interventions (Patient has pulse or is breathing): Full Support      Expected Discharge    Expected discharge date/ time has not been documented.      This note has been completed as part of a split-shared workflow.     Signature: Electronically signed by LITA Rivas, 12/27/23, 4:37 AM EST    Total time: 80 minutes        Attending   Admission Attestation       I have performed an independent face-to-face diagnostic evaluation including performing an independent physical examination.  The documented plan of care above was reviewed and developed with the advanced practice clinician (APC).  I have updated the HPI as appropriate.    Brief HPI    This is an 83-year-old female patient with a PMH significant for bullous pemphigoid, CAD, HTN, HLD, diabetes mellitus type 2, acute urinary retention with indwelling Schultz catheter, constipation, uterine prolapse who comes to the ED due to weakness.  Family at bedside provides HPI.  Patient was discharged from [facility] around 2 weeks ago she was treated for steroid induced DKA.  She has been at rehab.  Family was concerned that rehab was not getting therapies and was having progressive decline.  They got the patient from rehab today and brought her to the ED.    Attending Physical Exam:  Temp:  [98 °F (36.7 °C)] 98 °F  (36.7 °C)  Heart Rate:  [46-53] 46  Resp:  [1-16] 1  BP: (101-120)/(58-79) 119/69    Constitutional: Asleep, arousable  Eyes: Eyes closed  HENT: NCAT, mucous membranes moist  Neck: Supple, no thyromegaly, no lymphadenopathy, trachea midline  Respiratory: Clear to auscultation bilaterally, nonlabored respirations   Cardiovascular: RRR, no murmurs, rubs, or gallops, palpable pedal pulses bilaterally  Gastrointestinal: Positive bowel sounds, soft, nontender, nondistended  Musculoskeletal: 3+ pitting bilateral ankle edema, no clubbing or cyanosis to extremities  Psychiatric: Sleepy  Neurologic: Not oriented to person place or time, speech minimal  Skin: Bullous lesions to right foot pictured above, decubitus skin breakdown pictured above      Assessment and Plan:    See assessment and plan documented by APC above and updated/edited by me as appropriate.    Julee Rogers DO  12/27/23                    Electronically signed by Julee Rogers DO at 12/27/23 0563

## 2024-01-07 NOTE — THERAPY RE-EVALUATION
Acute Care - Speech Language Pathology   Swallow Re-Evaluation [facility]     Patient Name: Omar Mendoza  : 1940  MRN: [id number]  Today's Date: 2024               Admit Date: 2023    Visit Dx:     ICD-10-CM ICD-9-CM   1. Generalized weakness  R53.1 780.79   2. Acute UTI  N39.0 599.0   3. Bullous pemphigoid  L12.0 694.5   4. Venous insufficiency  I87.2 459.81   5. Dysphagia, unspecified type  R13.10 787.20     Patient Active Problem List   Diagnosis    UTI (urinary tract infection)    Bullous pemphigoid    Essential hypertension    Dyslipidemia    Edema of lower extremity    Cholestatic hepatitis    Acute constipation    Acute urinary retention    Uterine prolapse    Bradycardia    Pressure injury of buttock, unstageable    Type 2 diabetes mellitus with ketoacidosis, without long-term current use of insulin    Disorientation    Pleural effusion    Severe malnutrition     Past Medical History:   Diagnosis Date    CAD (coronary artery disease)     Dementia     Diabetes mellitus     Hyperlipemia     Hypertension      History reviewed. No pertinent surgical history.    SLP Recommendation and Plan  SLP Swallowing Diagnosis: functional oral phase, functional pharyngeal phase (Comfort measures in place) (24 1500)  SLP Diet Recommendation: regular textures, thin liquids, comfort diet, per physician discretion (Diet as desired and as is comfortable/pleasurable given comfort measures.) (24 1500)  Recommended Precautions and Strategies: upright posture during/after eating, small bites of food and sips of liquid, general aspiration precautions, fatigue precautions, other (see comments) (Encourage self feeding) (24 1500)  SLP Rec. for Method of Medication Administration: meds whole, meds crushed, with thin liquids, with puree, as tolerated (24 1500)     Monitor for Signs of Aspiration: notify SLP if any concerns (24 1500)  Recommended Diagnostics: No further SLP services  recommended (01/07/24 1500)  Swallow Criteria for Skilled Therapeutic Interventions Met: no significant expected improvement in functional status (01/07/24 1500)  Anticipated Discharge Disposition (SLP): No further SLP services warranted (01/07/24 1500)     Therapy Frequency (Swallow): evaluation only (01/07/24 1500)     Oral Care Recommendations: Oral Care BID/PRN, Toothbrush (01/07/24 1500)         Given plan for comfort measures, pt should be allowed po she appears to tolerate and desires. Today, she is alert and talking. She is able to self feed. She has specific food requests. She is able to manage regular/thin. Unable to r/o pharyngeal impairment, however given comfort measure- recommend po that is comfortable and pleasurable to pt.  SLP to sign off. Please reconsult speech pathology if there are concerns re: diet recommendations.                             Oral Care Recommendations: Oral Care BID/PRN, Toothbrush (01/07/24 1500)    Outcome Evaluation: SLP called to reevaluate and consider tolerance of pt requested food. Pt is requesting grilled cheese/tomato soup.  Pt is currently alert and talking. She is able to self feed. She exhibits no overt patterns of dysphagia. Increased risk but given comfort measures, pt should be allowed PO diet she desires.  No further ST.      SWALLOW EVALUATION (last 72 hours)       SLP Adult Swallow Evaluation       Row Name 01/07/24 1500 01/05/24 0935                Rehab Evaluation    Document Type re-evaluation  -ML re-evaluation  -       Subjective Information complains of  hospital staff not talking to her  -ML no complaints  -       Patient Observations alert;cooperative  -ML poorly cooperative  -       Patient/Family/Caregiver Comments/Observations Family in room reporting complete change in pt status this afternoon.  -ML No family present  -       Patient Effort excellent  -ML poor  -       Comment Pt desires to eat- she is requesting a grilled cheese sandwich  but per current diet, she is not allowed to have it.  -ML --       Symptoms Noted During/After Treatment none  -ML none  -          General Information    Patient Profile Reviewed yes  -ML yes  -       Pertinent History Of Current Problem See previously documented hx. I have attempted to see pt the last two days and pt has not been appropriate. I was called this afternoon by RN reporting pt with food requests that are not allowed on her current diet. RN felt pt would participate enough for assessment of safety and determination of appropriateness for diet advancement.  -ML See initial eval  -       Current Method of Nutrition pureed;thin liquids  -ML NPO  -       Precautions/Limitations, Vision WFL;for purposes of eval  -ML difficult to assess  -       Precautions/Limitations, Hearing WFL;for purposes of eval  -ML difficult to assess  -       Prior Level of Function-Communication cognitive-linguistic impairment  -ML other (see comments)  ?dementia  -       Prior Level of Function-Swallowing puree;thin liquids  this admission  -ML other (see comments)  Pt previously declined instrumental and opted for comfort diet  -       Plans/Goals Discussed with patient and family;other (see comments)  - patient  -       Barriers to Rehab cognitive status  - resistant to information;uncooperative  -       Patient's Goals for Discharge -- patient did not state  -          Pain    Additional Documentation Pain Scale: FACES Pre/Post-Treatment (Group)  - Pain Scale: FACES Pre/Post-Treatment (Group)  -          Pain Scale: FACES Pre/Post-Treatment    Pain: FACES Scale, Pretreatment 0-->no hurt  -ML 0-->no hurt  -       Posttreatment Pain Rating 0-->no hurt  -ML 0-->no hurt  -          Oral Motor Structure and Function    Secretion Management -- WNL/WFL  -       Mucosal Quality -- moist, healthy  -          Oral Musculature and Cranial Nerve Assessment    Oral Motor General Assessment -- other (see  comments)  Pt did not follow OM commands  -          General Eating/Swallowing Observations    Respiratory Support Currently in Use -- nasal cannula  -       Eating/Swallowing Skills self-fed  -ML fed by SLP;unable to perform self-feeding;other (see comments)  Pt in BL wrist restraints  -       Positioning During Eating upright in bed  -ML upright in bed  -       Utensils Used straw;cup  finger foods  -ML straw  -       Consistencies Trialed regular textures;thin liquids  -ML thin liquids  -          Clinical Swallow Eval    Oral Prep Phase WFL  -ML --       Oral Transit WFL  -ML --       Oral Residue WFL  -ML --       Pharyngeal Phase no overt signs/symptoms of pharyngeal impairment  - --       Clinical Swallow Evaluation Summary Pt awake and talking. Agreeable to evaluation as she understands it is necessary for her to obtain requested grilled cheese/tomato soup.  Pt is able to self feed.  Oral phase appears functional for regular solids. Independent clearance without need for a liquid wash.  Thin liquid trials via straw (limited) and via cup.  Single drinks which pt controlled. Vocal quality clear. No cough/throat clear. Pt appears to tolerate trials. Pt is under hospice care and family wants pt to have foods she is interested in eating.  Currently, she is able to manage regular foods/thin liquids. Her mentation and willingness to cooperate has varied this admission, but given goals of care/pt wishes, recommend diet as desired- regular/thin provided pt can self feed.  IF/when she is unable to self, feed, diet downgrade may be indicated/appropriate.  Nothing further for ST to offer at this time.  -ML Evaluation limitied d/t poor acceptance of PO trials & increased agitation w/ PO presentations. Pt declined all spoon/cup presentations. Pt accepted ~1 tsp of thin via straw & expectorated bolus. Further PO presentations deferred 2' agitation/confusion. Given limitied participation, u/a to safely  recommend PO diet @ this time. Spoke to RN/MD, MD aware of poor cooperation and inability to safely recommend PO diet. MD requesting intiaition of PO diet, if cont PO diet, reccomend pureed diet w/ thin liquids, 1:1 assistance/supervision  -          SLP Evaluation Clinical Impression    SLP Swallowing Diagnosis functional oral phase;functional pharyngeal phase  Comfort measures in place  - unable to assess;unable/unwilling to cooperate  -       Functional Impact risk of aspiration/pneumonia;risk of malnutrition;risk of dehydration  - risk of aspiration/pneumonia;risk of malnutrition;risk of dehydration  -       Rehab Potential/Prognosis, Swallowing -- re-evaluate goals as necessary  -       Swallow Criteria for Skilled Therapeutic Interventions Met no significant expected improvement in functional status  - demonstrates skilled criteria  -          Recommendations    Therapy Frequency (Swallow) evaluation only  - --       Predicted Duration Therapy Intervention (Days) -- until discharge  -       SLP Diet Recommendation regular textures;thin liquids;comfort diet, per physician discretion  Diet as desired and as is comfortable/pleasurable given comfort measures.  - other (see comments)  U/a to safely recommend PO diet @ this time  -       Recommended Diagnostics No further SLP services recommended  - reassess via clinical swallow evaluation;other (see comments);SLE/Cog/Motor Speech Evaluation  U/a to complete SLC eval d/t poor cooperation  -       Recommended Precautions and Strategies upright posture during/after eating;small bites of food and sips of liquid;general aspiration precautions;fatigue precautions;other (see comments)  Encourage self feeding  - general aspiration precautions;assist with feeding;1:1 supervision  -       Oral Care Recommendations Oral Care BID/PRN;Toothbrush  -ML Oral Care BID/PRN;Suction toothbrush  -       SLP Rec. for Method of Medication Administration  meds whole;meds crushed;with thin liquids;with puree;as tolerated  - meds whole;meds crushed;with puree;as tolerated  -       Monitor for Signs of Aspiration notify SLP if any concerns  - yes;notify SLP if any concerns  -       Anticipated Discharge Disposition (SLP) No further SLP services warranted  - skilled nursing facility  -                 User Key  (r) = Recorded By, (t) = Taken By, (c) = Cosigned By      Initials Name Effective Dates    Rae Recinos CCC-SLP 06/16/21 -     Kassi Taylor, MS DESTINY-SLP 05/12/23 -                     EDUCATION  The patient has been educated in the following areas:   Dysphagia (Swallowing Impairment).              Time Calculation:    Time Calculation- SLP       Row Name 01/07/24 1603             Time Calculation- SLP    SLP Start Time 1500  -ML      SLP Received On 01/07/24  -                User Key  (r) = Recorded By, (t) = Taken By, (c) = Cosigned By      Initials Name Provider Type    Rae Recinos CCC-SLP Speech and Language Pathologist                    Therapy Charges for Today       Code Description Service Date Service Provider Modifiers Qty    [id number]  ST EVAL ORAL PHARYNG SWALLOW 3 1/7/2024 Rae Bui CCC-SLP GN 1                 JEAN Mathew  1/7/2024

## 2024-01-07 NOTE — PROGRESS NOTES
[facility] Medicine Services  PROGRESS NOTE    Patient Name: Omar Mendoza  : 1940  MRN: [id number]    Date of Admission: 2023  Primary Care Physician: Varun Pa MD    Subjective   Subjective     CC:  Weakness      HPI:  Patient resting comfortable but not cooperating with exams, diet or medication administration. , son and daughter at bedside.       Objective   Objective     Vital Signs:         Physical Exam:  General appearance: resting comfortably, no acute distress  Respiratory: Oxygenating well on room air  Neuro/CNS/Psych: Abnormal judgment and insight    Results Reviewed:  LAB RESULTS:      Lab 24  0352 24  2321 24  0608 24  0723   WBC 8.43 6.34 4.89 3.87   HEMOGLOBIN 9.7* 9.2* 8.2* 8.3*   HEMATOCRIT 29.6* 27.8* 24.7* 25.3*   PLATELETS 195 181 141 139*   NEUTROS ABS 4.05 3.49  --   --    EOS ABS 3.79* 2.16*  --   --    MCV 95.8 96.5 94.3 96.2   SED RATE  --  14  --   --    LACTATE  --  2.7*  --   --          Lab 24  0352 24  0412 24  1523 24  0608 24  0723   SODIUM 139  --   --  145 144   POTASSIUM 3.3*  --  3.9 3.4* 3.7   CHLORIDE 102  --   --  110* 110*   CO2 25.0  --   --  28.0 27.0   ANION GAP 12.0  --   --  7.0 7.0   BUN 6*  --   --  13 14   CREATININE 0.41*  --   --  0.48* 0.51*   EGFR 99.0  --   --  95.3 93.9   GLUCOSE 108*  --   --  148* 172*   CALCIUM 7.4*  --   --  7.5* 7.6*   HEMOGLOBIN A1C  --  10.70*  --   --   --                  Lab 24   CHOLESTEROL 94   LDL CHOL 39   HDL CHOL 33*   TRIGLYCERIDES 120             Brief Urine Lab Results  (Last result in the past 365 days)        Color   Clarity   Blood   Leuk Est   Nitrite   Protein   CREAT   Urine HCG        24 0053 Yellow   Clear   Negative   Trace   Negative   Negative                   Microbiology Results Abnormal       Procedure Component Value - Date/Time    COVID-19 RAPID  AG,VERITOR,COR/JAMARI/PAD/NUPUR/ASHA/LAG/BRITTANY/ IN-HOUSE,DRY SWAB, 1 HR TAT - Swab, Nasal Cavity [[id number]]  (Normal) Collected: 01/04/24 0004    Lab Status: Final result Specimen: Swab from Nasal Cavity Updated: 01/04/24 0022     COVID19 Presumptive Negative    Narrative:      Fact sheets for providers: https://www.fda.gov/media/144320/download    Fact sheets for patients: https://www.fda.gov/media/207549/download    Blood Culture - Blood, Blood, PICC Line [[id number]]  (Normal) Collected: 12/27/23 1400    Lab Status: Final result Specimen: Blood, PICC Line Updated: 01/01/24 1531     Blood Culture No growth at 5 days    Blood Culture - Blood, Arm, Right [[id number]]  (Normal) Collected: 12/27/23 0431    Lab Status: Final result Specimen: Blood from Arm, Right Updated: 01/01/24 0515     Blood Culture No growth at 5 days            No radiology results from the last 24 hrs    Results for orders placed during the hospital encounter of 12/26/23    Adult Transthoracic Echo Complete W/ Cont if Necessary Per Protocol    Interpretation Summary    Left ventricular systolic function is normal. Calculated left ventricular EF = 57% Left ventricular ejection fraction appears to be 56 - 60%.    Left ventricular wall thickness is consistent with mild to moderate concentric hypertrophy.    Left ventricular diastolic function was normal.    Estimated right ventricular systolic pressure from tricuspid regurgitation is mildly elevated (35-45 mmHg).    There is a trivial pericardial effusion.    Mild aortic valve regurgitation.      Current medications:  Scheduled Meds:rivastigmine, 1 patch, Transdermal, Daily  sodium chloride, 10 mL, Intravenous, Q12H  sodium chloride, 10 mL, Intravenous, Q12H      Continuous Infusions:   PRN Meds:.  acetaminophen **OR** acetaminophen **OR** acetaminophen    senna-docusate sodium **AND** polyethylene glycol **AND** bisacodyl **AND** bisacodyl    Calcium Replacement - Follow Nurse / BPA Driven Protocol     dextrose    dextrose    glucagon (human recombinant)    glycopyrrolate    haloperidol lactate    hydrOXYzine    Magnesium Standard Dose Replacement - Follow Nurse / BPA Driven Protocol    Morphine    nitroglycerin    ondansetron    Phosphorus Replacement - Follow Nurse / BPA Driven Protocol    Potassium Replacement - Follow Nurse / BPA Driven Protocol    sodium chloride    sodium chloride    sodium chloride    sodium chloride    ziprasidone    Assessment & Plan   Assessment & Plan     Active Hospital Problems    Diagnosis  POA    **UTI (urinary tract infection) [N39.0]  Yes    Acute constipation [K59.00]  Unknown    Acute urinary retention [R33.8]  Unknown    Uterine prolapse [N81.4]  Unknown    Bradycardia [R00.1]  Unknown    Pressure injury of buttock, unstageable [L89.300]  Unknown    Type 2 diabetes mellitus with ketoacidosis, without long-term current use of insulin [E11.10]  Unknown    Disorientation [R41.0]  Unknown    Pleural effusion [J90]  Unknown    Severe malnutrition [E43]  Yes    Edema of lower extremity [R60.0]  Yes    Bullous pemphigoid [L12.0]  Yes    Dyslipidemia [E78.5]  Yes    Essential hypertension [I10]  Yes      Resolved Hospital Problems   No resolved problems to display.        This patient's assessments and plans were partially entered by my partner and updated as appropriate by me on 1/7/2024     Brief Hospital Course to date:  Omar Mendoza is a 83 y.o. female with history of type 2 diabetes, hypertension, history of uterine prolapse, urinary retention with Schultz catheter in place, history of bullous pemphigoid  (s/p Dupixent 10/2023, prednisone/doxycycline 11/2023) CAD, hyperlipidemia, presumed dementia who presented to the ED with generalized weakness and bullous pemphigoid blister on the heel.  Patient developed significant encephalopathy, and paranoia during her hospitalization.  Neurology was consulted and determined patient to be suffering from dementia complicated by depression and  "paranoia.  Patient refused to eat and drink.  Goals of care discussion had with family, palliative care consulted, pursuing hospice at this time.     Acute metabolic encephalopathy  Dementia with suspected Alzheimer's disease  Major depression with paranoia  Generalized weakness  -Stroke alert called overnight 1/3 but no findings suggestive of acute stroke  -Patient's behaviors are more suggestive of dementia  -Patient could have underlying component of depression to given reports of family members passing months ago with noted behavioral change at that time  -Appreciate neurology evaluation, discussed case w Dr. Edgar: Will remove restraints, allow patient to go to chair and ambulate with assistance, increase Exelon to 9.5 mg daily, administer mirtazapine 15 mg as patient allows, Seroquel 25 mg at bedtime  -Patient improved 1/6 with above regiment, continue   -Geodon 10 mg as needed for uncontrolled agitation, avoid physical restraints  -Patient without significant improvement she will not to go medications, has only received Exelon patch.  Family pursuing hospice.  Will minimize any interventions on the patient and will not force any medical therapies.  -Please note, son and daughter are decision makers and would like to be contacted before patient's  for continued hospice discussions.      Dysphagia  -Nursing with concern for aspiration event 12/30 however patient herself says she \"just got choked.\" Does not wish for modified diet.   -SLP following  -Aspiration precautions, encourage PO intake      Bilateral pleural effusion  L>R  Bibasilar airspace disease versus pneumonia  -Patient currently on room air with good O2 sats  -Follow-up MRSA PCR, cultures, urinary strep and Legionella antigens  -Procalcitonin is low, lactate has improved  -Concern for aspiration on 12/30  -CXR revealed increased right basilar airspace disease with stable left basilar atelectasis  -Encourage IS, pulmonary toilet  -Completed " 5 days of Rocephin and doxycycline  -Currently on RA       Anemia  -Iron 53, iron sat 28  -fecal occult negative  -suspect anemia of chronic disease, stable with no active signs of bleeding     Poor venous access  -Removed PICC overnight 1/3     Sinus bradycardia  - pt on both amiodarone and metoprolol, dtr reports amiodarone is new since hospitalization at [facility].  - hold amiodarone, decreased metoprolol to 50 mg BID w/ hold parameters  -HR currently improved      BLE edema  Decreased pulses LLE  - CTA abd showing small right pleural effusion, bilateral basilar atelectasis  - CTA w/ runoff showing normal right sided runoff; left side delayed runoff possibly r/t venous stasis changes, no evidence of arterial occlusion or significant stenosis  -Echo revealed EF 57% with normal diastolic function      Uncontrolled Type 2 diabetes  -Hgb A1C 12.10 on 12/27  -Continue SSI     Constipation  -Continue aggressive bowel regimen, s/p soapsuds enema x 1  -s/p Milk of mag x1, Miralax daily, suppository x1   -Continue prn   -chronic loza catheter in place     Uterine prolapse  -Gyn Dr. Zhou has seen, plan for outpatient follow up to discuss treatment options.   -may be contributing to chronic urine retention     Bullous pemphigoid  -Has developed new blister on right heel  -Completed doxycycline  -Wound care following      Pressure ulcer  -Wound care following      L adrenal adenoma  - incidental finding on CTA abdomen      Severe malnutrition     Expected Discharge Location and Transportation: Hospice  Expected Discharge   Expected Discharge Date: 1/9/2024; Expected Discharge Time:      DVT prophylaxis:  Mechanical DVT prophylaxis orders are present.     AM-PAC 6 Clicks Score (PT): 12 (01/06/24 0800)    CODE STATUS:   Code Status and Medical Interventions:   Ordered at: 01/07/24 1143     Code Status (Patient has no pulse and is not breathing):    No CPR (Do Not Attempt to Resuscitate)     Medical Interventions (Patient  has pulse or is breathing):    Comfort Measures       Hugo Yee, DO  01/07/24

## 2024-01-07 NOTE — SIGNIFICANT NOTE
01/07/24 1020   SLP Deferred Reason   SLP Deferred Reason   (Not appropriate today per RN. RN reports Palliative Care suggested but has not been ordered. SLP to continue to attempt to see/follow along and evaluate as pt's condition warrants/allows.)

## 2024-01-08 PROCEDURE — 99232 SBSQ HOSP IP/OBS MODERATE 35: CPT | Performed by: PSYCHIATRY & NEUROLOGY

## 2024-01-08 PROCEDURE — 99232 SBSQ HOSP IP/OBS MODERATE 35: CPT | Performed by: INTERNAL MEDICINE

## 2024-01-08 RX ORDER — CHOLECALCIFEROL (VITAMIN D3) 125 MCG
5 CAPSULE ORAL NIGHTLY
Status: DISCONTINUED | OUTPATIENT
Start: 2024-01-08 | End: 2024-01-08

## 2024-01-08 RX ORDER — MIRTAZAPINE 15 MG/1
15 TABLET, FILM COATED ORAL NIGHTLY
Status: DISCONTINUED | OUTPATIENT
Start: 2024-01-08 | End: 2024-02-01 | Stop reason: HOSPADM

## 2024-01-08 RX ORDER — CHOLECALCIFEROL (VITAMIN D3) 125 MCG
5 CAPSULE ORAL NIGHTLY
Status: DISCONTINUED | OUTPATIENT
Start: 2024-01-08 | End: 2024-01-11

## 2024-01-08 RX ORDER — MIRTAZAPINE 15 MG/1
15 TABLET, FILM COATED ORAL NIGHTLY
Status: DISCONTINUED | OUTPATIENT
Start: 2024-01-08 | End: 2024-01-08

## 2024-01-08 RX ADMIN — RIVASTIGMINE TRANSDERMAL SYSTEM 1 PATCH: 9.5 PATCH, EXTENDED RELEASE TRANSDERMAL at 08:05

## 2024-01-08 RX ADMIN — Medication 5 MG: at 21:35

## 2024-01-08 RX ADMIN — MIRTAZAPINE 15 MG: 15 TABLET, FILM COATED ORAL at 21:35

## 2024-01-08 NOTE — CASE MANAGEMENT/SOCIAL WORK
Continued Stay Note  [facility]     Patient Name: Omar Mendoza  MRN: [id number]  Today's Date: 1/8/2024    Admit Date: 12/26/2023    Plan: undecided   Discharge Plan       Row Name 01/08/24 0840       Plan    Plan undecided    Plan Comments Per Hospice, hospice will have a meeting with the patient's son and daughter today at 1000. Referrals have been made to [facility] in [place] and [facility] for skilled to LTC. She was taken off restraints on 1/5. CM to await information after the hospice family meeting.    1400  Per hospice, this patient does not qualify for inpatient hospice services. The family is now contemplating LTC with hospice vs rehab with hospice afterwards. She is now on comfort measures per Palliative. She will need transport arranged.    Final Discharge Disposition Code 30 - still a patient                   Discharge Codes    No documentation.                 Expected Discharge Date and Time       Expected Discharge Date Expected Discharge Time    Jan 9, 2024               Eleonora John RN

## 2024-01-08 NOTE — PROGRESS NOTES
Neurology       Patient Care Team:  Varun Pa MD as PCP - General (Internal Medicine)    Chief complaint: Dementia    History: The patient has been somewhat more cooperative according to her daughter.  The credit the Exelon for the behavior change.    The mirtazapine and melatonin have been discontinued.    Patient's family is deciding on whether to have her go to [facility] with help.  Past Medical History:   Diagnosis Date    CAD (coronary artery disease)     Dementia     Diabetes mellitus     Hyperlipemia     Hypertension        Vital Signs   Vitals:    01/05/24 2100 01/06/24 0050 01/06/24 0300 01/06/24 1800   BP: 111/74 111/53 112/61    BP Location: Right arm Right arm Right arm    Patient Position: Lying Lying Lying    Pulse: 74 62 60    Resp: 18 18 18    Temp:   97.3 °F (36.3 °C)    TempSrc:   Axillary    SpO2:       Weight:    99 kg (218 lb 4.1 oz)   Height:           Physical Exam:   General: Sleeping              Neuro: Not examined    Results Review:  No new results  Results from last 7 days   Lab Units 01/05/24  0352   WBC 10*3/mm3 8.43   HEMOGLOBIN g/dL 9.7*   HEMATOCRIT % 29.6*   PLATELETS 10*3/mm3 195     Results from last 7 days   Lab Units 01/05/24  0352 01/02/24  1523 01/02/24  0608   SODIUM mmol/L 139  --  145   POTASSIUM mmol/L 3.3* 3.9 3.4*   CHLORIDE mmol/L 102  --  110*   CO2 mmol/L 25.0  --  28.0   BUN mg/dL 6*  --  13   CREATININE mg/dL 0.41*  --  0.48*   CALCIUM mg/dL 7.4*  --  7.5*   GLUCOSE mg/dL 108*  --  148*       Imaging Results (Last 24 Hours)       ** No results found for the last 24 hours. **            Assessment:  Alzheimer's dementia.    Depression.        Plan:  Continue Exelon patch 9.5.    Resume mirtazapine 15 mg at bedtime.    Resume melatonin 5 mg at bedtime.        Comment:  The patient is unlikely to make  progress unless she is on an antidepressant.  This was discussed in person with the patient's  over the phone with the patient's daughter and  son.         I discussed the patients findings and my recommendations with family    Hung Edgar MD  01/08/24  16:05 EST

## 2024-01-08 NOTE — PROGRESS NOTES
Neurology       Patient Care Team:  Varun Pa MD as PCP - General (Internal Medicine)    Chief complaint: Altered mental state    History: The patient is declining oral intake.  The family is opted for hospice and not forcing her to eat.    Plans are currently underway for transition.  Past Medical History:   Diagnosis Date    CAD (coronary artery disease)     Dementia     Diabetes mellitus     Hyperlipemia     Hypertension        Vital Signs   Vitals:    01/05/24 2100 01/06/24 0050 01/06/24 0300 01/06/24 1800   BP: 111/74 111/53 112/61    BP Location: Right arm Right arm Right arm    Patient Position: Lying Lying Lying    Pulse: 74 62 60    Resp: 18 18 18    Temp:   97.3 °F (36.3 °C)    TempSrc:   Axillary    SpO2:       Weight:    99 kg (218 lb 4.1 oz)   Height:           Physical Exam:   General: Sleep              Neuro: Not aroused    Results Review:  No new result  Results from last 7 days   Lab Units 01/05/24  0352   WBC 10*3/mm3 8.43   HEMOGLOBIN g/dL 9.7*   HEMATOCRIT % 29.6*   PLATELETS 10*3/mm3 195     Results from last 7 days   Lab Units 01/05/24  0352 01/02/24  1523 01/02/24  0608   SODIUM mmol/L 139  --  145   POTASSIUM mmol/L 3.3* 3.9 3.4*   CHLORIDE mmol/L 102  --  110*   CO2 mmol/L 25.0  --  28.0   BUN mg/dL 6*  --  13   CREATININE mg/dL 0.41*  --  0.48*   CALCIUM mg/dL 7.4*  --  7.5*   GLUCOSE mg/dL 108*  --  148*       Imaging Results (Last 24 Hours)       ** No results found for the last 24 hours. **            Assessment:  Dementia likely Alzheimer's disease.    Hospital delirium.    Depression.        Plan:  Discharge to home with plans to arrange.  Hopefully the patient will resume some of the medications in hopes to clear her depression which certainly is a significant part of her illness.    That said her overall prognosis with the Alzheimer's is not of a progressive degenerative disease and quite guarded in the intermediate term.    This was discussed with the patient's   and her his cousin.  Attempted to reach the patient's son who is a power of  were unsuccessful on several occasions.    Comment:  Guarded prognosis         I discussed the patients findings and my recommendations with family    Hung Edgar MD  01/08/24  14:48 EST

## 2024-01-08 NOTE — PLAN OF CARE
"Goal Outcome Evaluation:  Plan of Care Reviewed With: patient        Progress: no change  Outcome Evaluation: New palliative consult for assistance with GOC/hospice conversation per Dr. Yee.  Dr. Gutierrez saw pt and discussed goals and adjusted code status to comfort measures on 1/7.  Today during palliative nursing visit,  palliative RN visit, pallIiative RN said , \"good morning\" to pt .  Pt. opened her eyes and yelled , \"Go to hell, You go to hell!\".   Asked pt if she was having any pain and she yelled, \"No, I'm not hurt!\" Asked if she was nauseated and she squalled, \"No, you keep your damn mouth shut!\"  No family at BS during visit.  Palliative brochure left on computer desk.  Inpatient hospice team to meet with family at 1000 but pt does not appear to be inpatient appropriate at this time.  She has not required any prns.  Palliative care to continue to follow for support, POC and ongoing GOC.      Problem: Palliative Care  Goal: Enhanced Quality of Life  Intervention: Promote Advance Care Planning  Flowsheets (Taken 1/8/2024 4507)  Life Transition/Adjustment:   palliative care initiated   palliative care discussed        "

## 2024-01-08 NOTE — PROGRESS NOTES
"    [facility] Medicine Services  PROGRESS NOTE    Patient Name: Omar Mendoza  : 1940  MRN: [id number]    Date of Admission: 2023  Primary Care Physician: Varun Pa MD    Subjective   Subjective     CC:  Weakness      HPI:  Patient resting in bed. Yells \"go to hell\" ;\"get out of my room\"      Objective   Objective     Vital Signs:         Physical Exam:  Constitutional: No acute distress, resting in bed. Was not cooperative w/exam, yelling and screaming so did not perform comprehensive assessment   HENT: NCAT  Musculoskeletal: No bilateral ankle edema      Results Reviewed:  LAB RESULTS:      Lab 24  0352 24  2321 24  0608   WBC 8.43 6.34 4.89   HEMOGLOBIN 9.7* 9.2* 8.2*   HEMATOCRIT 29.6* 27.8* 24.7*   PLATELETS 195 181 141   NEUTROS ABS 4.05 3.49  --    EOS ABS 3.79* 2.16*  --    MCV 95.8 96.5 94.3   SED RATE  --  14  --    LACTATE  --  2.7*  --          Lab 24  0352 24  0412 24  1523 24  0608   SODIUM 139  --   --  145   POTASSIUM 3.3*  --  3.9 3.4*   CHLORIDE 102  --   --  110*   CO2 25.0  --   --  28.0   ANION GAP 12.0  --   --  7.0   BUN 6*  --   --  13   CREATININE 0.41*  --   --  0.48*   EGFR 99.0  --   --  95.3   GLUCOSE 108*  --   --  148*   CALCIUM 7.4*  --   --  7.5*   HEMOGLOBIN A1C  --  10.70*  --   --                  Lab 24  0412   CHOLESTEROL 94   LDL CHOL 39   HDL CHOL 33*   TRIGLYCERIDES 120             Brief Urine Lab Results  (Last result in the past 365 days)        Color   Clarity   Blood   Leuk Est   Nitrite   Protein   CREAT   Urine HCG        24 0053 Yellow   Clear   Negative   Trace   Negative   Negative                   Microbiology Results Abnormal       Procedure Component Value - Date/Time    COVID-19 RAPID AG,VERITOR,COR/JAMARI/PAD/NUPUR/ASHA/LAG/BRITTANY/ IN-HOUSE,DRY SWAB, 1 HR TAT - Swab, Nasal Cavity [[id number]]  (Normal) Collected: 24 0004    Lab Status: Final result Specimen: Swab " from Nasal Cavity Updated: 01/04/24 0022     COVID19 Presumptive Negative    Narrative:      Fact sheets for providers: https://www.fda.gov/media/657487/download    Fact sheets for patients: https://www.fda.gov/media/798712/download    Blood Culture - Blood, Blood, PICC Line [[id number]]  (Normal) Collected: 12/27/23 1400    Lab Status: Final result Specimen: Blood, PICC Line Updated: 01/01/24 1531     Blood Culture No growth at 5 days    Blood Culture - Blood, Arm, Right [[id number]]  (Normal) Collected: 12/27/23 0431    Lab Status: Final result Specimen: Blood from Arm, Right Updated: 01/01/24 0515     Blood Culture No growth at 5 days            No radiology results from the last 24 hrs    Results for orders placed during the hospital encounter of 12/26/23    Adult Transthoracic Echo Complete W/ Cont if Necessary Per Protocol    Interpretation Summary    Left ventricular systolic function is normal. Calculated left ventricular EF = 57% Left ventricular ejection fraction appears to be 56 - 60%.    Left ventricular wall thickness is consistent with mild to moderate concentric hypertrophy.    Left ventricular diastolic function was normal.    Estimated right ventricular systolic pressure from tricuspid regurgitation is mildly elevated (35-45 mmHg).    There is a trivial pericardial effusion.    Mild aortic valve regurgitation.      Current medications:  Scheduled Meds:rivastigmine, 1 patch, Transdermal, Daily  sodium chloride, 10 mL, Intravenous, Q12H  sodium chloride, 10 mL, Intravenous, Q12H      Continuous Infusions:   PRN Meds:.  acetaminophen **OR** acetaminophen **OR** acetaminophen    senna-docusate sodium **AND** polyethylene glycol **AND** bisacodyl **AND** bisacodyl    Calcium Replacement - Follow Nurse / BPA Driven Protocol    dextrose    dextrose    glucagon (human recombinant)    glycopyrrolate    haloperidol lactate    hydrOXYzine    Magnesium Standard Dose Replacement - Follow Nurse / BPA Driven  Protocol    Morphine    nitroglycerin    ondansetron    Phosphorus Replacement - Follow Nurse / BPA Driven Protocol    Potassium Replacement - Follow Nurse / BPA Driven Protocol    sodium chloride    sodium chloride    sodium chloride    sodium chloride    ziprasidone    Assessment & Plan   Assessment & Plan     Active Hospital Problems    Diagnosis  POA    **UTI (urinary tract infection) [N39.0]  Yes    Acute constipation [K59.00]  Unknown    Acute urinary retention [R33.8]  Unknown    Uterine prolapse [N81.4]  Unknown    Bradycardia [R00.1]  Unknown    Pressure injury of buttock, unstageable [L89.300]  Unknown    Type 2 diabetes mellitus with ketoacidosis, without long-term current use of insulin [E11.10]  Unknown    Disorientation [R41.0]  Unknown    Pleural effusion [J90]  Unknown    Severe malnutrition [E43]  Yes    Edema of lower extremity [R60.0]  Yes    Bullous pemphigoid [L12.0]  Yes    Dyslipidemia [E78.5]  Yes    Essential hypertension [I10]  Yes      Resolved Hospital Problems   No resolved problems to display.        This patient's assessments and plans were partially entered by my partner and updated as appropriate by me on 1/7/2024     Brief Hospital Course to date:  Omar Mendoza is a 83 y.o. female with history of type 2 diabetes, hypertension, history of uterine prolapse, urinary retention with Schultz catheter in place, history of bullous pemphigoid  (s/p Dupixent 10/2023, prednisone/doxycycline 11/2023) CAD, hyperlipidemia, presumed dementia who presented to the ED with generalized weakness and bullous pemphigoid blister on the heel.  Patient developed significant encephalopathy, and paranoia during her hospitalization.  Neurology was consulted and determined patient to be suffering from dementia complicated by depression and paranoia.  Patient refused to eat and drink.  Goals of care discussion had with family, palliative care consulted, pursuing hospice at this time.      This patient's hospital  course, problems, and plans entered by my partner were reviewed and updated as appropriate by me on 1/8/2024      Acute metabolic encephalopathy  Dementia with suspected Alzheimer's disease  Major depression with paranoia  Generalized weakness  -Patient without significant improvement she will not to take her medications, has only received Exelon patch.  Family pursuing hospice, minimizing any interventions on the patient and will not force any medical therapies. Son/daughter plan to meet with hospice team today.     Dysphagia  - comfort diet given patient is comfort measures only     Bilateral pleural effusion  L>R  Bibasilar airspace disease versus pneumonia  -Concern for aspiration on 12/30, Completed 5 days of Rocephin and doxycycline     Anemia  -suspect anemia of chronic disease, stable with no active signs of bleeding     Poor venous access  -Removed PICC overnight 1/3     Sinus bradycardia  - hold amiodarone, decreased metoprolol to 50 mg BID w/ hold parameters, patient not taking meds      BLE edema  Decreased pulses LLE  - CTA abd showing small right pleural effusion, bilateral basilar atelectasis  - CTA w/ runoff showing normal right sided runoff; left side delayed runoff possibly r/t venous stasis changes, no evidence of arterial occlusion or significant stenosis  -Echo revealed EF 57% with normal diastolic function      Uncontrolled Type 2 diabetes  -Hgb A1C 12.10 on 12/27     Constipation  -Continue aggressive bowel regimen, s/p soapsuds enema x 1  -s/p Milk of mag x1, Miralax daily, suppository x1   -Continue prn   -chronic loza catheter in place     Uterine prolapse  -Gyn Dr. Zhou followed outpatient  -may be contributing to chronic urine retention     Bullous pemphigoid  -Has developed new blister on right heel  -Completed doxycycline  -Wound care following      Pressure ulcer  -Wound care following      L adrenal adenoma  - incidental finding on CTA abdomen      Severe malnutrition     Expected  Discharge Location and Transportation: Hospice  Expected Discharge   Expected Discharge Date: 1/9/2024; Expected Discharge Time:      DVT prophylaxis:  Mechanical DVT prophylaxis orders are present.     AM-PAC 6 Clicks Score (PT): 6 (01/08/24 0805)    CODE STATUS:   Code Status and Medical Interventions:   Ordered at: 01/07/24 1143     Code Status (Patient has no pulse and is not breathing):    No CPR (Do Not Attempt to Resuscitate)     Medical Interventions (Patient has pulse or is breathing):    Comfort Measures       Majo Collins,   01/08/24

## 2024-01-08 NOTE — CONSULTS
"Hospice visit to bedside with SW. Present are patient, son Gael, and daughter, Jessi. Rn notes patient is awake, alert, oriented to self. She is calm, pleasant, cooperative. She denies pain, dyspnea, anxiety, discomfort. She states most important to her today is going home. She states at night there is \"mumbling\". Rn inquires if patient ate breakfast, she relays yes and it was good. Rn inquires as to patient's grilled cheese and tomato soup last evening. She relays that she enjoyed it and would try something different tonight. RN and SW met with Gael and Jessi in [place]. Actively listened as they relay patient's journey to this point and goal of comfort for their mother. They relay prior to Exelon patch patient not eating and sleeping. Yesterday evening she awoke, more alert, started eating again. Discussed hospice care, services, care provided. Patient has received no prn medications. At present patient does not meet Medicare guidelines for inpatient hospice care. She has no unmanaged symptoms and is not requiring injectable medication for symptom palliation. Hospice will continue to follow.  Family discussing ltc /ltc with hospice. Support and open communication provided. *Inpatient hospice criteria are based on guidelines from Medicare. Inpatient hospice care is short term, symptom driven care delivered in an acute setting. Patients must have unmanaged symptoms (such as pain, dyspnea, anxiety, restlessness, n/v) with unsuccessful symptom management with po medications or inability to take po medications for symptom management and symptoms requiring management with injectable medications. Patients must require a level of care that is unable to be delivered in an alternate care setting such as home or long term care. Non symptomatic end of life decline and/or limited, absence of oral intake are not sole qualifications for inpatient hospice services. * Discussed with SARAHI LONDON. Updated greyson Perez " management, Palliative care, and [facility] staff. Hospice will continue to follow with daily visits.

## 2024-01-09 PROCEDURE — 99231 SBSQ HOSP IP/OBS SF/LOW 25: CPT | Performed by: PSYCHIATRY & NEUROLOGY

## 2024-01-09 PROCEDURE — 99232 SBSQ HOSP IP/OBS MODERATE 35: CPT | Performed by: INTERNAL MEDICINE

## 2024-01-09 RX ADMIN — RIVASTIGMINE TRANSDERMAL SYSTEM 1 PATCH: 9.5 PATCH, EXTENDED RELEASE TRANSDERMAL at 08:14

## 2024-01-09 NOTE — PROGRESS NOTES
"Continued Stay Note  [facility]     Patient Name: Omar Mendoza  MRN: [id number]  Today's Date: 1/9/2024    Admit Date: 12/26/2023    Plan: undecided   Discharge Plan       Row Name 01/09/24 1100       Plan    Plan Comments Hospice visit to bedside. Present for visit are patient and spouse. Patient noted resting upright in bed. Spouse has just finished feeding her breakfast. Spouse is loving and supportive at bedside. Patient noted awake, alert, oriented to self. She relays \"no\" to pain, dyspnea, discomfort. \"Yes\" to comfortable. Relays \"I'm cold\". Warm blanket to patient. Most important to her today is \"getting better and going back home.\" Most important to spouse is \"my family, them being ok and getting what they need.\" Spouse relays patient has eaten all of her breakfast and she did well. Rn notes no prn usage, absence of unmanaged symptoms, ability to take po medications for symptom management. Discussed with SARAHI LONDON. At present patient does not meet Medicare guidelines for inpatient hospice admission. Updated Dr. Collins, Palliative care, case management, [facility] staff. Hospice will continue to follow. Left  for patient's son, Gael, to update. Please call [phone] with needs/concerns.                    Discharge Codes    No documentation.                 Expected Discharge Date and Time       Expected Discharge Date Expected Discharge Time    Jan 9, 2024               Mallory Castillo RN    "

## 2024-01-09 NOTE — PLAN OF CARE
"  Problem: Palliative Care  Goal: Enhanced Quality of Life  Intervention: Optimize Psychosocial Wellbeing  Recent Flowsheet Documentation  Taken 1/9/2024 1300 by Rae Delgado \"Franci\" YASMINE HEBERT  Supportive Measures: (Palliative IDT: MATTHEW Unger MD, DANY LONDON, MINDY Delgado LCSW, KALIE Otero RN, MINDY Gonzalez MDiv, ARTEM Winchester RN, L. Hume RN) --  Taken 1/9/2024 1210 by Rae Delgado \"Franci\" YASMINE HEBERT  Supportive Measures: (symptom assessment)   active listening utilized   positive reinforcement provided   verbalization of feelings encouraged   other (see comments)  Patient awake, calm, engaging, denies pain/nausea/dyspnea; ate well for breakfast per report.  No family present at time of visit.  Does not meet criteria for admission to inpatient hospice at this time.  Family considering options per CM note.  Palliative Care continues to follow for support and assist with plan of care.     "

## 2024-01-09 NOTE — PROGRESS NOTES
Neurology       Patient Care Team:  Varun Pa MD as PCP - General (Internal Medicine)    Chief complaint: Confused    History: Patient doing somewhat better today.    She was cooperative enough to take the mirtazapine and Remeron last night.    She has been behaving nicely particularly with her family and was eating when fed.      Past Medical History:   Diagnosis Date    CAD (coronary artery disease)     Dementia     Diabetes mellitus     Hyperlipemia     Hypertension        Vital Signs   Vitals:    01/06/24 0050 01/06/24 0300 01/06/24 1800 01/09/24 0813   BP: 111/53 112/61  115/72   BP Location: Right arm Right arm     Patient Position: Lying Lying     Pulse: 62 60     Resp: 18 18     Temp:  97.3 °F (36.3 °C)     TempSrc:  Axillary     SpO2:       Weight:   99 kg (218 lb 4.1 oz)    Height:           Physical Exam:   General: Awake              Neuro: Calm.  Not following commands    Results Review:  No new result  Results from last 7 days   Lab Units 01/05/24  0352   WBC 10*3/mm3 8.43   HEMOGLOBIN g/dL 9.7*   HEMATOCRIT % 29.6*   PLATELETS 10*3/mm3 195     Results from last 7 days   Lab Units 01/05/24  0352 01/02/24  1523   SODIUM mmol/L 139  --    POTASSIUM mmol/L 3.3* 3.9   CHLORIDE mmol/L 102  --    CO2 mmol/L 25.0  --    BUN mg/dL 6*  --    CREATININE mg/dL 0.41*  --    CALCIUM mg/dL 7.4*  --    GLUCOSE mg/dL 108*  --        Imaging Results (Last 24 Hours)       ** No results found for the last 24 hours. **            Assessment:  Alzheimer's dementia.    Depression    Plan:  If the patient continues to tolerate the mirtazapine will increase the dose to 30 mg tomorrow night.    Family is working on discharge plans.        Comment:  Improving somewhat.         I discussed the patients findings and my recommendations with nursing staff    Hung Edgar MD  01/09/24  13:31 EST

## 2024-01-09 NOTE — NURSING NOTE
Attempted to turn and reposition patient with PCT. Pt stated she did not want to be repositioned now is she in any pain. Pt's  at bedside providing support.

## 2024-01-09 NOTE — PROGRESS NOTES
[facility] Medicine Services  PROGRESS NOTE    Patient Name: Omar Mendoza  : 1940  MRN: [id number]    Date of Admission: 2023  Primary Care Physician: Varun Pa MD    Subjective   Subjective     CC:  Weakness      HPI:  Patient resting in bed.  at bedside and  feeding her breakfast, more pleasant today.      Objective   Objective     Vital Signs:   BP: (115)/(72) 115/72     Physical Exam:  Constitutional: No acute distress, resting in bed. More cooperative today, no distress  HENT: NCAT  Musculoskeletal: No bilateral ankle edema      Results Reviewed:  LAB RESULTS:      Lab 24  0352 24  2321   WBC 8.43 6.34   HEMOGLOBIN 9.7* 9.2*   HEMATOCRIT 29.6* 27.8*   PLATELETS 195 181   NEUTROS ABS 4.05 3.49   EOS ABS 3.79* 2.16*   MCV 95.8 96.5   SED RATE  --  14   LACTATE  --  2.7*         Lab 24  0352 24  0412 24  1523   SODIUM 139  --   --    POTASSIUM 3.3*  --  3.9   CHLORIDE 102  --   --    CO2 25.0  --   --    ANION GAP 12.0  --   --    BUN 6*  --   --    CREATININE 0.41*  --   --    EGFR 99.0  --   --    GLUCOSE 108*  --   --    CALCIUM 7.4*  --   --    HEMOGLOBIN A1C  --  10.70*  --                  Lab 24  041   CHOLESTEROL 94   LDL CHOL 39   HDL CHOL 33*   TRIGLYCERIDES 120             Brief Urine Lab Results  (Last result in the past 365 days)        Color   Clarity   Blood   Leuk Est   Nitrite   Protein   CREAT   Urine HCG        24 0053 Yellow   Clear   Negative   Trace   Negative   Negative                   Microbiology Results Abnormal       Procedure Component Value - Date/Time    COVID-19 RAPID AG,VERITOR,COR/JAMARI/PAD/NUPUR/ASHA/LAG/BRITTANY/ IN-HOUSE,DRY SWAB, 1 HR TAT - Swab, Nasal Cavity [[id number]]  (Normal) Collected: 24 0004    Lab Status: Final result Specimen: Swab from Nasal Cavity Updated: 24 0022     COVID19 Presumptive Negative    Narrative:      Fact sheets for providers:  https://www.fda.gov/media/723157/download    Fact sheets for patients: https://www.fda.gov/media/405509/download    Blood Culture - Blood, Blood, PICC Line [[id number]]  (Normal) Collected: 12/27/23 1400    Lab Status: Final result Specimen: Blood, PICC Line Updated: 01/01/24 1531     Blood Culture No growth at 5 days    Blood Culture - Blood, Arm, Right [[id number]]  (Normal) Collected: 12/27/23 0431    Lab Status: Final result Specimen: Blood from Arm, Right Updated: 01/01/24 0515     Blood Culture No growth at 5 days            No radiology results from the last 24 hrs    Results for orders placed during the hospital encounter of 12/26/23    Adult Transthoracic Echo Complete W/ Cont if Necessary Per Protocol    Interpretation Summary    Left ventricular systolic function is normal. Calculated left ventricular EF = 57% Left ventricular ejection fraction appears to be 56 - 60%.    Left ventricular wall thickness is consistent with mild to moderate concentric hypertrophy.    Left ventricular diastolic function was normal.    Estimated right ventricular systolic pressure from tricuspid regurgitation is mildly elevated (35-45 mmHg).    There is a trivial pericardial effusion.    Mild aortic valve regurgitation.      Current medications:  Scheduled Meds:melatonin, 5 mg, Oral, Nightly  mirtazapine, 15 mg, Oral, Nightly  rivastigmine, 1 patch, Transdermal, Daily  sodium chloride, 10 mL, Intravenous, Q12H      Continuous Infusions:   PRN Meds:.  acetaminophen **OR** acetaminophen **OR** acetaminophen    senna-docusate sodium **AND** polyethylene glycol **AND** bisacodyl **AND** bisacodyl    Calcium Replacement - Follow Nurse / BPA Driven Protocol    dextrose    dextrose    glucagon (human recombinant)    glycopyrrolate    haloperidol lactate    hydrOXYzine    Magnesium Standard Dose Replacement - Follow Nurse / BPA Driven Protocol    Morphine    nitroglycerin    ondansetron    Phosphorus Replacement - Follow Nurse / BPA  Driven Protocol    Potassium Replacement - Follow Nurse / BPA Driven Protocol    sodium chloride    ziprasidone    Assessment & Plan   Assessment & Plan     Active Hospital Problems    Diagnosis  POA    **UTI (urinary tract infection) [N39.0]  Yes    Acute constipation [K59.00]  Unknown    Acute urinary retention [R33.8]  Unknown    Uterine prolapse [N81.4]  Unknown    Bradycardia [R00.1]  Unknown    Pressure injury of buttock, unstageable [L89.300]  Unknown    Type 2 diabetes mellitus with ketoacidosis, without long-term current use of insulin [E11.10]  Unknown    Disorientation [R41.0]  Unknown    Pleural effusion [J90]  Unknown    Severe malnutrition [E43]  Yes    Edema of lower extremity [R60.0]  Yes    Bullous pemphigoid [L12.0]  Yes    Dyslipidemia [E78.5]  Yes    Essential hypertension [I10]  Yes      Resolved Hospital Problems   No resolved problems to display.        This patient's assessments and plans were partially entered by my partner and updated as appropriate by me on 1/7/2024     Brief Hospital Course to date:  Omar Mendoza is a 83 y.o. female with history of type 2 diabetes, hypertension, history of uterine prolapse, urinary retention with Schultz catheter in place, history of bullous pemphigoid  (s/p Dupixent 10/2023, prednisone/doxycycline 11/2023) CAD, hyperlipidemia, presumed dementia who presented to the ED with generalized weakness and bullous pemphigoid blister on the heel.  Patient developed significant encephalopathy, and paranoia during her hospitalization.  Neurology was consulted and determined patient to be suffering from dementia complicated by depression and paranoia.  Patient refused to eat and drink.  Goals of care discussion had with family, palliative care consulted, pursuing hospice at this time.      Acute metabolic encephalopathy  Dementia with suspected Alzheimer's disease  Major depression with paranoia  Generalized weakness  -Patient without significant improvement,  Family  pursuing hospice, minimizing any interventions on the patient and will not force any medical therapies. Plan will be discharge to LTC with eventual transition to hospice and she currently does not meet inpatient criteria     Dysphagia  - comfort diet given patient is comfort measures only     Bilateral pleural effusion  L>R  Bibasilar airspace disease versus pneumonia  -Concern for aspiration on 12/30, Completed 5 days of Rocephin and doxycycline     Anemia  -suspect anemia of chronic disease, stable with no active signs of bleeding     Poor venous access  -Removed PICC overnight 1/3     Sinus bradycardia  - hold amiodarone, decreased metoprolol to 50 mg BID w/ hold parameters, patient not really taking meds      BLE edema  Decreased pulses LLE  - CTA abd showing small right pleural effusion, bilateral basilar atelectasis  - CTA w/ runoff showing normal right sided runoff; left side delayed runoff possibly r/t venous stasis changes, no evidence of arterial occlusion or significant stenosis  -Echo revealed EF 57% with normal diastolic function      Uncontrolled Type 2 diabetes  -Hgb A1C 12.10 on 12/27     Constipation  -Continue aggressive bowel regimen, s/p soapsuds enema x 1  -s/p Milk of mag x1, Miralax daily, suppository x1   -Continue prn   -chronic loza catheter in place     Uterine prolapse  -Gyn Dr. Zhou followed outpatient  -may be contributing to chronic urine retention     Bullous pemphigoid  -Has developed new blister on right heel  -Completed doxycycline  -Wound care following      Pressure ulcer  -Wound care following      L adrenal adenoma  - incidental finding on CTA abdomen      Severe malnutrition     Expected Discharge Location and Transportation: LTC w/eventual transition to Hospice  Expected Discharge   Expected Discharge Date: 1/9/2024; Expected Discharge Time:      DVT prophylaxis:  No DVT prophylaxis order currently exists.     AM-PAC 6 Clicks Score (PT): 6 (01/09/24 0800)    CODE STATUS:    Code Status and Medical Interventions:   Ordered at: 01/07/24 1143     Code Status (Patient has no pulse and is not breathing):    No CPR (Do Not Attempt to Resuscitate)     Medical Interventions (Patient has pulse or is breathing):    Comfort Measures       Majo Collins, DO  01/09/24

## 2024-01-09 NOTE — CONSULTS
Order noted for diabetes education per stroke protocol. GCS 14, inappropriate for education. Per chart review, CT head negative for stroke, does not qualify for OP stroke/diabetes class. Diabetes ed will follow.

## 2024-01-09 NOTE — PLAN OF CARE
Problem: Adult Inpatient Plan of Care  Goal: Patient-Specific Goal (Individualized)  Outcome: Ongoing, Progressing  Goal: Absence of Hospital-Acquired Illness or Injury  Outcome: Ongoing, Progressing  Intervention: Identify and Manage Fall Risk  Recent Flowsheet Documentation  Taken 1/9/2024 1400 by Melba Gallagher RN  Safety Promotion/Fall Prevention:   activity supervised   assistive device/personal items within reach   clutter free environment maintained   toileting scheduled   safety round/check completed   room organization consistent  Taken 1/9/2024 1200 by Melba Gallagher RN  Safety Promotion/Fall Prevention:   activity supervised   assistive device/personal items within reach   clutter free environment maintained   toileting scheduled   safety round/check completed   room organization consistent  Taken 1/9/2024 1000 by Melba Gallagher RN  Safety Promotion/Fall Prevention:   activity supervised   assistive device/personal items within reach   clutter free environment maintained   toileting scheduled   safety round/check completed   room organization consistent  Taken 1/9/2024 0800 by Melba Gallagher RN  Safety Promotion/Fall Prevention:   activity supervised   assistive device/personal items within reach   clutter free environment maintained   toileting scheduled   safety round/check completed   room organization consistent  Intervention: Prevent Skin Injury  Recent Flowsheet Documentation  Taken 1/9/2024 1400 by Melba Gallagher RN  Body Position: patient/family refused  Skin Protection:   adhesive use limited   tubing/devices free from skin contact   transparent dressing maintained   skin-to-skin areas padded   skin-to-device areas padded  Taken 1/9/2024 1200 by Melba Gallagher RN  Body Position: patient/family refused  Skin Protection:   adhesive use limited   tubing/devices free from skin contact   transparent dressing maintained   skin-to-skin areas padded   skin-to-device areas  padded  Taken 1/9/2024 1000 by Melba Gallagher RN  Body Position: patient/family refused  Skin Protection:   adhesive use limited   tubing/devices free from skin contact   transparent dressing maintained   skin-to-skin areas padded   skin-to-device areas padded  Taken 1/9/2024 0800 by Melba Gallagher RN  Body Position: patient/family refused  Skin Protection:   adhesive use limited   tubing/devices free from skin contact   transparent dressing maintained   skin-to-skin areas padded   skin-to-device areas padded  Intervention: Prevent and Manage VTE (Venous Thromboembolism) Risk  Recent Flowsheet Documentation  Taken 1/9/2024 1400 by Melba Gallagher RN  Activity Management: bedrest  Taken 1/9/2024 1200 by Melba Gallagher RN  Activity Management: bedrest  Taken 1/9/2024 1000 by Melba Gallagher RN  Activity Management: bedrest  Taken 1/9/2024 0800 by Melba Gallagher RN  Activity Management: bedrest  Goal: Optimal Comfort and Wellbeing  Outcome: Ongoing, Progressing  Intervention: Provide Person-Centered Care  Recent Flowsheet Documentation  Taken 1/9/2024 0800 by Melba Gallagher RN  Trust Relationship/Rapport: care explained  Goal: Readiness for Transition of Care  Outcome: Ongoing, Progressing     Problem: Skin Injury Risk Increased  Goal: Skin Health and Integrity  Outcome: Ongoing, Progressing  Intervention: Optimize Skin Protection  Recent Flowsheet Documentation  Taken 1/9/2024 1400 by Melba Gallagher RN  Pressure Reduction Techniques: weight shift assistance provided  Head of Bed (HOB) Positioning: HOB elevated  Pressure Reduction Devices: specialty bed utilized  Skin Protection:   adhesive use limited   tubing/devices free from skin contact   transparent dressing maintained   skin-to-skin areas padded   skin-to-device areas padded  Taken 1/9/2024 1200 by Melba Gallagher RN  Pressure Reduction Techniques:   weight shift assistance provided   positioned off wounds   heels elevated off bed  Head  of Bed ([facility]) Positioning: [facility] elevated  Pressure Reduction Devices: specialty bed utilized  Skin Protection:   adhesive use limited   tubing/devices free from skin contact   transparent dressing maintained   skin-to-skin areas padded   skin-to-device areas padded  Taken 1/9/2024 1000 by Melba Gallagher RN  Pressure Reduction Techniques:   weight shift assistance provided   positioned off wounds   heels elevated off bed  Head of Bed ([facility]) Positioning: [facility] elevated  Pressure Reduction Devices: specialty bed utilized  Skin Protection:   adhesive use limited   tubing/devices free from skin contact   transparent dressing maintained   skin-to-skin areas padded   skin-to-device areas padded  Taken 1/9/2024 0800 by Melba Gallagher RN  Pressure Reduction Techniques:   weight shift assistance provided   positioned off wounds   heels elevated off bed  Head of Bed ([facility]) Positioning: [facility] elevated  Pressure Reduction Devices:   specialty bed utilized   positioning supports utilized   heel offloading device utilized  Skin Protection:   adhesive use limited   tubing/devices free from skin contact   transparent dressing maintained   skin-to-skin areas padded   skin-to-device areas padded     Problem: Asthma Comorbidity  Goal: Maintenance of Asthma Control  Outcome: Ongoing, Progressing     Problem: Behavioral Health Comorbidity  Goal: Maintenance of Behavioral Health Symptom Control  Outcome: Ongoing, Progressing     Problem: COPD (Chronic Obstructive Pulmonary Disease) Comorbidity  Goal: Maintenance of COPD Symptom Control  Outcome: Ongoing, Progressing     Problem: Diabetes Comorbidity  Goal: Blood Glucose Level Within Targeted Range  Outcome: Ongoing, Progressing     Problem: Heart Failure Comorbidity  Goal: Maintenance of Heart Failure Symptom Control  Outcome: Ongoing, Progressing     Problem: Hypertension Comorbidity  Goal: Blood Pressure in Desired Range  Outcome: Ongoing, Progressing   Goal Outcome Evaluation:

## 2024-01-10 PROCEDURE — 99232 SBSQ HOSP IP/OBS MODERATE 35: CPT | Performed by: PSYCHIATRY & NEUROLOGY

## 2024-01-10 PROCEDURE — 97530 THERAPEUTIC ACTIVITIES: CPT

## 2024-01-10 PROCEDURE — 99231 SBSQ HOSP IP/OBS SF/LOW 25: CPT | Performed by: INTERNAL MEDICINE

## 2024-01-10 RX ADMIN — RIVASTIGMINE TRANSDERMAL SYSTEM 1 PATCH: 9.5 PATCH, EXTENDED RELEASE TRANSDERMAL at 07:53

## 2024-01-10 RX ADMIN — Medication 5 MG: at 18:48

## 2024-01-10 RX ADMIN — MIRTAZAPINE 15 MG: 15 TABLET, FILM COATED ORAL at 18:48

## 2024-01-10 NOTE — CONSULTS
Visited patient at bedside. Ms. Mendoza was soundly asleep. Her son and daughter were present in room and both are listed on face sheet as patient contacts. They state she would be going to a SNF at discharge. Discussed her current A1c of 10.7% and the significance of her result. Her daughter states it had been 12.6% and she was glad it was decreasing. Ms. Mendoza takes lantus insulin for glycemic control. Reviewed ADA goals for glucose and A1c levels. She does have a meter at home if she should return there. Patient does not qualify for the follow up stroke class based on the exclusion criteria of  a negative head CT, showing no definite acute intracranial hemorrhage or infarct. Thank you for this referral.

## 2024-01-10 NOTE — PROGRESS NOTES
I visited this patient per palliative care protocol. The patient was resting comfortably, so I did not wake her. Her spouse was awake at the bedside, so I offered care. He seemed to appreciate the visit but expressed no needs.  available for spiritual care as needed.

## 2024-01-10 NOTE — PROGRESS NOTES
Neurology       Patient Care Team:  Varun Pa MD as PCP - General (Internal Medicine)    Chief complaint: Dementia/depression    History: Patient continues to wax and wane.    She is currently being fed by her daughter needing a bit.    She declined medications last night.    Daughter is impressed that the [employer] has made a significant difference.    They are currently working on placement [employer] or similar.  Past Medical History:   Diagnosis Date    CAD (coronary artery disease)     Dementia     Diabetes mellitus     Hyperlipemia     Hypertension        Vital Signs   Vitals:    01/06/24 0050 01/06/24 0300 01/06/24 1800 01/09/24 0813   BP: 111/53 112/61  115/72   BP Location: Right arm Right arm     Patient Position: Lying Lying     Pulse: 62 60     Resp: 18 18     Temp:  97.3 °F (36.3 °C)     TempSrc:  Axillary     SpO2:       Weight:   99 kg (218 lb 4.1 oz)    Height:           Physical Exam:   General: Awake              Neuro: Calm with family members present.  Eating without choking.    Results Review:  Reviewed  Results from last 7 days   Lab Units 01/05/24  0352   WBC 10*3/mm3 8.43   HEMOGLOBIN g/dL 9.7*   HEMATOCRIT % 29.6*   PLATELETS 10*3/mm3 195     Results from last 7 days   Lab Units 01/05/24  0352   SODIUM mmol/L 139   POTASSIUM mmol/L 3.3*   CHLORIDE mmol/L 102   CO2 mmol/L 25.0   BUN mg/dL 6*   CREATININE mg/dL 0.41*   CALCIUM mg/dL 7.4*   GLUCOSE mg/dL 108*       Imaging Results (Last 24 Hours)       ** No results found for the last 24 hours. **            Assessment:  Dementia.    Major depression.        Plan:  Continue efforts to encourage it depressant medication compliance.    Nursing home placement    Comment:  Extremely guarded prognosis discussed with the patient's children and          I discussed the patients findings and my recommendations with family and nursing staff    Hung Edgar MD  01/10/24  13:15 EST

## 2024-01-10 NOTE — PLAN OF CARE
Goal Outcome Evaluation:  Plan of Care Reviewed With: patient, son        Progress: no change  Outcome Evaluation: Progress report completed. Goals reviewed and revised as appropriate to measure functional progress. Pt completed x3 STS from EOB with max A x2 and BUE support. Only able to achieve ~75% upright. BLE ther ex also completed. Continue PT POC. Continue to progress per pt tolerance.      Anticipated Discharge Disposition (PT): skilled nursing facility

## 2024-01-10 NOTE — PROGRESS NOTES
Review of chart with no significant events. Patient sleeping at time of visit, appears comfortable. Son and daughter speaking with Diabetic Educator. Potential plan for STR. Hospice following. GOC established and symptoms well managed. Palliative Care will sign off. Please re-consult if any needs.

## 2024-01-10 NOTE — THERAPY TREATMENT NOTE
Patient Name: Omar Mendoza  : 1940    MRN: [id number]                              Today's Date: 1/10/2024       Admit Date: 2023    Visit Dx:     ICD-10-CM ICD-9-CM   1. Generalized weakness  R53.1 780.79   2. Acute UTI  N39.0 599.0   3. Bullous pemphigoid  L12.0 694.5   4. Venous insufficiency  I87.2 459.81   5. Dysphagia, unspecified type  R13.10 787.20     Patient Active Problem List   Diagnosis    UTI (urinary tract infection)    Bullous pemphigoid    Essential hypertension    Dyslipidemia    Edema of lower extremity    Cholestatic hepatitis    Acute constipation    Acute urinary retention    Uterine prolapse    Bradycardia    Pressure injury of buttock, unstageable    Type 2 diabetes mellitus with ketoacidosis, without long-term current use of insulin    Disorientation    Pleural effusion    Severe malnutrition     Past Medical History:   Diagnosis Date    CAD (coronary artery disease)     Dementia     Diabetes mellitus     Hyperlipemia     Hypertension      History reviewed. No pertinent surgical history.   General Information       Row Name 01/10/24 0921          OT Time and Intention    Document Type therapy note (daily note)  -AC     Mode of Treatment occupational therapy  -AC       Row Name 01/10/24 0921          General Information    Patient Profile Reviewed yes  -AC     Existing Precautions/Restrictions fall;seizures  confused  -AC     Barriers to Rehab medically complex;cognitive status  -AC       Row Name 01/10/24 0921          Cognition    Orientation Status (Cognition) unable/difficult to assess  responds to name, lethargic  -AC       Row Name 01/10/24 0921          Safety Issues, Functional Mobility    Safety Issues Affecting Function (Mobility) awareness of need for assistance;insight into deficits/self-awareness;judgment;problem-solving;safety precaution awareness;safety precautions follow-through/compliance;sequencing abilities  -AC     Impairments Affecting Function (Mobility)  balance;cognition;endurance/activity tolerance;postural/trunk control;strength  -     Cognitive Impairments, Mobility Safety/Performance attention;awareness, need for assistance;insight into deficits/self-awareness;judgment;problem-solving/reasoning;safety precaution awareness;safety precaution follow-through;sequencing abilities  -               User Key  (r) = Recorded By, (t) = Taken By, (c) = Cosigned By      Initials Name Provider Type     Sepideh Feldman OT Occupational Therapist                     Mobility/ADL's       Row Name 01/10/24 1009          Bed Mobility    Bed Mobility rolling left;rolling right;scooting/bridging;supine-sit;sit-supine  -     Rolling Left [place] (Bed Mobility) verbal cues;nonverbal cues (demo/gesture);moderate assist (50% patient effort)  -     Rolling Right [place] (Bed Mobility) verbal cues;nonverbal cues (demo/gesture);moderate assist (50% patient effort)  -     Scooting/Bridging [place] (Bed Mobility) dependent (less than 25% patient effort);2 person assist  -     Supine-Sit [place] (Bed Mobility) verbal cues;nonverbal cues (demo/gesture);dependent (less than 25% patient effort);2 person assist  initially resistant  -     Sit-Supine [place] (Bed Mobility) verbal cues;nonverbal cues (demo/gesture);2 person assist;dependent (less than 25% patient effort)  -     Bed Mobility, Safety Issues cognitive deficits limit understanding;decreased use of arms for pushing/pulling;decreased use of legs for bridging/pushing;impaired trunk control for bed mobility  -     Assistive Device (Bed Mobility) bed rails;draw sheet;head of bed elevated  -     Comment, (Bed Mobility) verbal/tactile cues to sequence  -       Row Name 01/10/24 1009          Transfers    Transfers sit-stand transfer  -       Row Name 01/10/24 1009          Sit-Stand Transfer    Sit-Stand [place] (Transfers) verbal cues;nonverbal cues (demo/gesture);maximum assist (25%  patient effort);2 person assist  -     Assistive Device (Sit-Stand Transfers) other (see comments)  UE support  -     Comment, (Sit-Stand Transfer) Performed 3 STS,  feet and knees blocked, VCs for upright posture,  achieved 75% upright  -       Row Name 01/10/24 1009          Activities of Daily Living    BADL Assessment/Intervention lower body dressing;grooming  -       Row Name 01/10/24 1009          Lower Body Dressing Assessment/Training    [place] Level (Lower Body Dressing) doff;don;socks;dependent (less than 25% patient effort)  -     Position (Lower Body Dressing) supine  -       Row Name 01/10/24 1009          Grooming Assessment/Training    [place] Level (Grooming) wash face, hands;verbal cues;nonverbal cues (demo/gesture);minimum assist (75% patient effort)  -     Position (Grooming) edge of bed sitting  -               User Key  (r) = Recorded By, (t) = Taken By, (c) = Cosigned By      Initials Name Provider Type    Sepideh Polanco, OT Occupational Therapist                   Obj/Interventions       Row Name 01/10/24 1012          Shoulder (Therapeutic Exercise)    Shoulder (Therapeutic Exercise) PROM (passive range of motion)  -     Shoulder AROM (Therapeutic Exercise) bilateral  -     Shoulder PROM (Therapeutic Exercise) bilateral;flexion;extension;10 repetitions  -       Row Name 01/10/24 1012          Elbow/Forearm (Therapeutic Exercise)    Elbow/Forearm (Therapeutic Exercise) PROM (passive range of motion)  -     Elbow/Forearm PROM (Therapeutic Exercise) bilateral;flexion;extension;10 repetitions  -       Row Name 01/10/24 1012          Motor Skills    Therapeutic Exercise shoulder;elbow/forearm  -               User Key  (r) = Recorded By, (t) = Taken By, (c) = Cosigned By      Initials Name Provider Type    Sepideh Polanco, OT Occupational Therapist                   Goals/Plan    No documentation.                  Clinical Impression       Row Name  01/10/24 1014          Pain Assessment    Additional Documentation Pain Scale: FACES Pre/Post-Treatment (Group)  -       Row Name 01/10/24 1014          Pain Scale: FACES Pre/Post-Treatment    Pain: FACES Scale, Pretreatment 0-->no hurt  -     Posttreatment Pain Rating 0-->no hurt  -       Row Name 01/10/24 1014          Plan of Care Review    Plan of Care Reviewed With patient  -     Progress no change  -     Outcome Evaluation Son available and agreeable for pt to continue therapy.  Pt lethargic, but agreeable to participate.  Pt min A to wash face, dep LBD,  max A x 2 to perform 3 STS  achieving 75% upright.  Pt more cooperative today, but limited by lethargy, decr command following, weakness, decreased balance and activity tolerance. Recommend SNF if deemed medically appropriate  -       Row Name 01/10/24 1014          Therapy Plan Review/Discharge Plan (OT)    Anticipated Discharge Disposition (OT) skilled nursing facility  -[facility] Name 01/10/24 1014          Vital Signs    Pre Patient Position Supine  -AC     Post Patient Position Supine  -[facility] Name 01/10/24 1014          Positioning and Restraints    Pre-Treatment Position in bed  -AC     Post Treatment Position bed  -AC     In Bed notified nsg;supine;call light within reach;encouraged to call for assist;exit alarm on;heels elevated  -               User Key  (r) = Recorded By, (t) = Taken By, (c) = Cosigned By      Initials Name Provider Type    AC Sepideh Feldman, OT Occupational Therapist                   Outcome Measures       Row Name 01/10/24 1022          How much help from another is currently needed...    Putting on and taking off regular lower body clothing? 1  -AC     Bathing (including washing, rinsing, and drying) 2  -AC     Toileting (which includes using toilet bed pan or urinal) 1  -AC     Putting on and taking off regular upper body clothing 2  -AC     Taking care of personal grooming (such as brushing teeth) 2   -AC     Eating meals 2  -AC     AM-PAC 6 Clicks Score (OT) 10  -       Row Name 01/10/24 0800          How much help from another person do you currently need...    Turning from your back to your side while in flat bed without using bedrails? 1  -SW     Moving from lying on back to sitting on the side of a flat bed without bedrails? 1  -SW     Moving to and from a bed to a chair (including a wheelchair)? 1  -SW     Standing up from a chair using your arms (e.g., wheelchair, bedside chair)? 1  -SW     Climbing 3-5 steps with a railing? 1  -SW     To walk in hospital room? 1  -SW     AM-PAC 6 Clicks Score (PT) 6  -SW     Highest Level of Mobility Goal 2 --> Bed activities/dependent transfer  -       Row Name 01/10/24 1022          Functional Assessment    Outcome Measure Options AM-PAC 6 Clicks Daily Activity (OT)  -               User Key  (r) = Recorded By, (t) = Taken By, (c) = Cosigned By      Initials Name Provider Type    AC Sepideh Feldman, OT Occupational Therapist    Melba Vaca RN Registered Nurse                    Occupational Therapy Education       Title: PT OT SLP Therapies (In Progress)       Topic: Occupational Therapy (In Progress)       Point: ADL training (In Progress)       Description:   Instruct learner(s) on proper safety adaptation and remediation techniques during self care or transfers.   Instruct in proper use of assistive devices.                  Learning Progress Summary             Patient Acceptance, E, NR by  at 1/10/2024 1023    Nonacceptance, E, NL by  at 1/5/2024 1044    Acceptance, E, NR by  at 1/3/2024 1435    Acceptance, E, VU,NR by  at 1/1/2024 0920    Comment: oriented to date, need to move and position RLE, ADL progression, sequencing bed mobililty    Acceptance, E, NR by  at 12/27/2023 1135   Family Acceptance, E, VU,NR by DLIIP at 1/1/2024 0920    Comment: oriented to date, need to move and position RLE, ADL progression, sequencing bed mobililty                          Point: Home exercise program (Not Started)       Description:   Instruct learner(s) on appropriate technique for monitoring, assisting and/or progressing therapeutic exercises/activities.                  Learner Progress:  Not documented in this visit.              Point: Precautions (Done)       Description:   Instruct learner(s) on prescribed precautions during self-care and functional transfers.                  Learning Progress Summary             Patient Acceptance, E, VU,NR by DILIP at 1/1/2024 0920    Comment: oriented to date, need to move and position RLE, ADL progression, sequencing bed mobililty   Family Acceptance, E, VU,NR by DILIP at 1/1/2024 0920    Comment: oriented to date, need to move and position RLE, ADL progression, sequencing bed mobililty                         Point: Body mechanics (Done)       Description:   Instruct learner(s) on proper positioning and spine alignment during self-care, functional mobility activities and/or exercises.                  Learning Progress Summary             Patient Acceptance, E, VU,NR by DILIP at 1/1/2024 0920    Comment: oriented to date, need to move and position RLE, ADL progression, sequencing bed mobililty   Family Acceptance, E, VU,NR by DILIP at 1/1/2024 0920    Comment: oriented to date, need to move and position RLE, ADL progression, sequencing bed mobililty                                         User Key       Initials Effective Dates Name Provider Type Discipline     07/11/23 -  Rayna Wall, OT Occupational Therapist OT     02/03/23 -  Sepideh Feldman OT Occupational Therapist OT                  OT Recommendation and Plan  Planned Therapy Interventions (OT): activity tolerance training, BADL retraining, functional balance retraining, occupation/activity based interventions, patient/caregiver education/training, strengthening exercise, transfer/mobility retraining  Therapy Frequency (OT): daily  Plan of Care Review  Plan of Care  Reviewed With: patient  Progress: no change  Outcome Evaluation: Son available and agreeable for pt to continue therapy.  Pt lethargic, but agreeable to participate.  Pt min A to wash face, dep LBD,  max A x 2 to perform 3 STS  achieving 75% upright.  Pt more cooperative today, but limited by lethargy, decr command following, weakness, decreased balance and activity tolerance. Recommend SNF if deemed medically appropriate     Time Calculation:   Evaluation Complexity (OT)  Review Occupational Profile/Medical/Therapy History Complexity: expanded/moderate complexity  Assessment, Occupational Performance/Identification of Deficit Complexity: 3-5 performance deficits  Clinical Decision Making Complexity (OT): detailed assessment/moderate complexity  Overall Complexity of Evaluation (OT): moderate complexity     Time Calculation- OT       Row Name 01/10/24 0921             Time Calculation- OT    OT Start Time 0921  -AC      OT Received On 01/10/24  -AC      OT Goal Re-Cert Due Date 01/15/24  -AC         Timed Charges    54583 - OT Therapeutic Exercise Minutes 5  -AC      30884 - OT Therapeutic Activity Minutes 12  -AC         Total Minutes    Timed Charges Total Minutes 17  -AC       Total Minutes 17  -AC                User Key  (r) = Recorded By, (t) = Taken By, (c) = Cosigned By      Initials Name Provider Type    AC Sepideh Feldman, OT Occupational Therapist                  Therapy Charges for Today       Code Description Service Date Service Provider Modifiers Qty    [id number]  OT THERAPEUTIC ACT EA 15 MIN 1/10/2024 Sepideh Feldman OT GO 1                 Sepideh Feldman OT  1/10/2024

## 2024-01-10 NOTE — PLAN OF CARE
Problem: Adult Inpatient Plan of Care  Goal: Patient-Specific Goal (Individualized)  Outcome: Ongoing, Progressing  Goal: Absence of Hospital-Acquired Illness or Injury  Outcome: Ongoing, Progressing  Intervention: Identify and Manage Fall Risk  Recent Flowsheet Documentation  Taken 1/10/2024 1600 by Melba Gallagher RN  Safety Promotion/Fall Prevention:   activity supervised   assistive device/personal items within reach   clutter free environment maintained   toileting scheduled   safety round/check completed   room organization consistent  Taken 1/10/2024 1200 by Melba Gallagher RN  Safety Promotion/Fall Prevention:   activity supervised   assistive device/personal items within reach   clutter free environment maintained   toileting scheduled   room organization consistent   safety round/check completed  Taken 1/10/2024 1000 by Melba Gallagher RN  Safety Promotion/Fall Prevention:   activity supervised   assistive device/personal items within reach   clutter free environment maintained   toileting scheduled   safety round/check completed   room organization consistent  Taken 1/10/2024 0800 by Melba Gallagher RN  Safety Promotion/Fall Prevention:   activity supervised   assistive device/personal items within reach   clutter free environment maintained   toileting scheduled   safety round/check completed   room organization consistent  Intervention: Prevent Skin Injury  Recent Flowsheet Documentation  Taken 1/10/2024 1600 by Melba Gallagher RN  Body Position: patient/family refused  Skin Protection:   adhesive use limited   tubing/devices free from skin contact   transparent dressing maintained   skin-to-device areas padded   skin-to-skin areas padded  Taken 1/10/2024 1200 by Melba Gallagher RN  Body Position: patient/family refused  Skin Protection:   adhesive use limited   tubing/devices free from skin contact   transparent dressing maintained   skin-to-skin areas padded   skin-to-device areas  padded  Taken 1/10/2024 1000 by Melba Gallagher RN  Body Position: patient/family refused  Skin Protection:   adhesive use limited   tubing/devices free from skin contact   transparent dressing maintained   skin-to-skin areas padded   skin-to-device areas padded  Taken 1/10/2024 0800 by Melba Gallagher RN  Body Position: patient/family refused  Skin Protection:   adhesive use limited   tubing/devices free from skin contact   transparent dressing maintained   skin-to-skin areas padded   skin-to-device areas padded  Intervention: Prevent and Manage VTE (Venous Thromboembolism) Risk  Recent Flowsheet Documentation  Taken 1/10/2024 1600 by Melba Gallagher RN  Activity Management: bedrest  Taken 1/10/2024 1200 by Melba Gallagher RN  Activity Management: bedrest  Taken 1/10/2024 1000 by Melba Gallagher RN  Activity Management: bedrest  Taken 1/10/2024 0800 by Melba Gallagher RN  Activity Management: bedrest  Goal: Optimal Comfort and Wellbeing  Outcome: Ongoing, Progressing  Goal: Readiness for Transition of Care  Outcome: Ongoing, Progressing     Problem: Skin Injury Risk Increased  Goal: Skin Health and Integrity  Outcome: Ongoing, Progressing  Intervention: Optimize Skin Protection  Recent Flowsheet Documentation  Taken 1/10/2024 1600 by Melba Gallagher RN  Pressure Reduction Techniques: weight shift assistance provided  Head of Bed (HOB) Positioning: [facility] elevated  Pressure Reduction Devices: specialty bed utilized  Skin Protection:   adhesive use limited   tubing/devices free from skin contact   transparent dressing maintained   skin-to-device areas padded   skin-to-skin areas padded  Taken 1/10/2024 1200 by Melba Gallagher RN  Pressure Reduction Techniques: weight shift assistance provided  Head of Bed (HOB) Positioning: [facility] elevated  Pressure Reduction Devices: specialty bed utilized  Skin Protection:   adhesive use limited   tubing/devices free from skin contact   transparent dressing maintained    skin-to-skin areas padded   skin-to-device areas padded  Taken 1/10/2024 1000 by Melba Gallagher RN  Pressure Reduction Techniques: weight shift assistance provided  Head of Bed ([facility]) Positioning: [facility] elevated  Pressure Reduction Devices: specialty bed utilized  Skin Protection:   adhesive use limited   tubing/devices free from skin contact   transparent dressing maintained   skin-to-skin areas padded   skin-to-device areas padded  Taken 1/10/2024 0800 by Melba Gallagher RN  Pressure Reduction Techniques: weight shift assistance provided  Head of Bed ([facility]) Positioning: [facility] elevated  Pressure Reduction Devices: specialty bed utilized  Skin Protection:   adhesive use limited   tubing/devices free from skin contact   transparent dressing maintained   skin-to-skin areas padded   skin-to-device areas padded     Problem: Asthma Comorbidity  Goal: Maintenance of Asthma Control  Outcome: Ongoing, Progressing     Problem: Behavioral Health Comorbidity  Goal: Maintenance of Behavioral Health Symptom Control  Outcome: Ongoing, Progressing     Problem: COPD (Chronic Obstructive Pulmonary Disease) Comorbidity  Goal: Maintenance of COPD Symptom Control  Outcome: Ongoing, Progressing     Problem: Diabetes Comorbidity  Goal: Blood Glucose Level Within Targeted Range  Outcome: Ongoing, Progressing     Problem: Heart Failure Comorbidity  Goal: Maintenance of Heart Failure Symptom Control  Outcome: Ongoing, Progressing     Problem: Hypertension Comorbidity  Goal: Blood Pressure in Desired Range  Outcome: Ongoing, Progressing     Problem: Fall Injury Risk  Goal: Absence of Fall and Fall-Related Injury  Outcome: Ongoing, Progressing  Intervention: Promote Injury-Free Environment  Recent Flowsheet Documentation  Taken 1/10/2024 1600 by Melba Gallagher, RN  Safety Promotion/Fall Prevention:   activity supervised   assistive device/personal items within reach   clutter free environment maintained   toileting scheduled   safety  round/check completed   room organization consistent  Taken 1/10/2024 1200 by Melba Gallagher RN  Safety Promotion/Fall Prevention:   activity supervised   assistive device/personal items within reach   clutter free environment maintained   toileting scheduled   room organization consistent   safety round/check completed  Taken 1/10/2024 1000 by Melba Gallagher RN  Safety Promotion/Fall Prevention:   activity supervised   assistive device/personal items within reach   clutter free environment maintained   toileting scheduled   safety round/check completed   room organization consistent  Taken 1/10/2024 0800 by Melba Gallagher, RN  Safety Promotion/Fall Prevention:   activity supervised   assistive device/personal items within reach   clutter free environment maintained   toileting scheduled   safety round/check completed   room organization consistent   Goal Outcome Evaluation:

## 2024-01-10 NOTE — NURSING NOTE
Pt resting comfortably with no signs of pain. Did not want to be repositioned nor did she want to eat dinner at the moment. Pts  at bedside.

## 2024-01-10 NOTE — NURSING NOTE
[facility] follow up for POA wounds on left trochanter, sacrum, right heel.     Patient now comfort measures, per RN, patient is now comfort measures and family is declining turning and wound care. Can leave wounds open to air, left trochanter wound may begin to drain. If this occurs, recommend covering to collect drainage or letting drain onto dri-brendan pads. Can use Z guard to protect skin and promote comfort as patient allows.    Will sign off.

## 2024-01-10 NOTE — PROGRESS NOTES
Continued Stay Note  [facility]     Patient Name: Omar Mendoza  MRN: [id number]  Today's Date: 1/10/2024    Admit Date: 12/26/2023    Plan: To be determined   Discharge Plan       Row Name 01/10/24 1143       Plan    Plan To be determined    Plan Comments Visit made to pt, pt appears to be sleeping, pt's son and daughter present. Daughter stated pt's  is unable to care for pt at home so the family is working on placement for the pt. Daughter stated the son is currently talking with medicare to get pt's medicare changed to traditional medicare as the family is looking at pt being able to go to short term rehab. Daughter stated at this time is not interested in hospice, though is agreeable once a discharge plan is made for hospice to make a follow up call. Will continue to follow on the periphery. Please call [phone] if can be of further assistance.      Row Name 01/10/24 0912       Plan    Plan     Patient/Family in Agreement with Plan     Plan Comments     Final Discharge Disposition Code                    Discharge Codes    No documentation.                 Expected Discharge Date and Time       Expected Discharge Date Expected Discharge Time    Freddy 15, 2024               Sarah Farris RN

## 2024-01-10 NOTE — PROGRESS NOTES
[facility] Medicine Services  PROGRESS NOTE    Patient Name: Omar Mendoza  : 1940  MRN: [id number]    Date of Admission: 2023  Primary Care Physician: Varun Pa MD    Subjective   Subjective     CC:  Weakness      HPI:  Patient sleeping in bed. More combative today.      Objective   Objective     Vital Signs:         Physical Exam:  Constitutional: No acute distress, resting in bed. Did not want to be examined  HENT: NCAT  Musculoskeletal: No bilateral ankle edema      Results Reviewed:  LAB RESULTS:      Lab 24  0352 24  2321   WBC 8.43 6.34   HEMOGLOBIN 9.7* 9.2*   HEMATOCRIT 29.6* 27.8*   PLATELETS 195 181   NEUTROS ABS 4.05 3.49   EOS ABS 3.79* 2.16*   MCV 95.8 96.5   SED RATE  --  14   LACTATE  --  2.7*         Lab 24  0352 24  0412   SODIUM 139  --    POTASSIUM 3.3*  --    CHLORIDE 102  --    CO2 25.0  --    ANION GAP 12.0  --    BUN 6*  --    CREATININE 0.41*  --    EGFR 99.0  --    GLUCOSE 108*  --    CALCIUM 7.4*  --    HEMOGLOBIN A1C  --  10.70*                 Lab 24  0412   CHOLESTEROL 94   LDL CHOL 39   HDL CHOL 33*   TRIGLYCERIDES 120             Brief Urine Lab Results  (Last result in the past 365 days)        Color   Clarity   Blood   Leuk Est   Nitrite   Protein   CREAT   Urine HCG        24 0053 Yellow   Clear   Negative   Trace   Negative   Negative                   Microbiology Results Abnormal       Procedure Component Value - Date/Time    COVID-19 RAPID AG,VERITOR,COR/JAMARI/PAD/NUPUR/ASHA/LAG/BRITTANY/ IN-HOUSE,DRY SWAB, 1 HR TAT - Swab, Nasal Cavity [[id number]]  (Normal) Collected: 24 0004    Lab Status: Final result Specimen: Swab from Nasal Cavity Updated: 24 0022     COVID19 Presumptive Negative    Narrative:      Fact sheets for providers: https://www.fda.gov/media/217709/download    Fact sheets for patients: https://www.fda.gov/media/810579/download    Blood Culture - Blood, Blood, PICC Line  [[id number]]  (Normal) Collected: 12/27/23 1400    Lab Status: Final result Specimen: Blood, PICC Line Updated: 01/01/24 1531     Blood Culture No growth at 5 days    Blood Culture - Blood, Arm, Right [[id number]]  (Normal) Collected: 12/27/23 0431    Lab Status: Final result Specimen: Blood from Arm, Right Updated: 01/01/24 0515     Blood Culture No growth at 5 days            No radiology results from the last 24 hrs    Results for orders placed during the hospital encounter of 12/26/23    Adult Transthoracic Echo Complete W/ Cont if Necessary Per Protocol    Interpretation Summary    Left ventricular systolic function is normal. Calculated left ventricular EF = 57% Left ventricular ejection fraction appears to be 56 - 60%.    Left ventricular wall thickness is consistent with mild to moderate concentric hypertrophy.    Left ventricular diastolic function was normal.    Estimated right ventricular systolic pressure from tricuspid regurgitation is mildly elevated (35-45 mmHg).    There is a trivial pericardial effusion.    Mild aortic valve regurgitation.      Current medications:  Scheduled Meds:melatonin, 5 mg, Oral, Nightly  mirtazapine, 15 mg, Oral, Nightly  rivastigmine, 1 patch, Transdermal, Daily  sodium chloride, 10 mL, Intravenous, Q12H      Continuous Infusions:   PRN Meds:.  acetaminophen **OR** acetaminophen **OR** acetaminophen    senna-docusate sodium **AND** polyethylene glycol **AND** bisacodyl **AND** bisacodyl    Calcium Replacement - Follow Nurse / BPA Driven Protocol    dextrose    dextrose    glucagon (human recombinant)    glycopyrrolate    haloperidol lactate    hydrOXYzine    Magnesium Standard Dose Replacement - Follow Nurse / BPA Driven Protocol    Morphine    nitroglycerin    ondansetron    Phosphorus Replacement - Follow Nurse / BPA Driven Protocol    Potassium Replacement - Follow Nurse / BPA Driven Protocol    sodium chloride    ziprasidone    Assessment & Plan   Assessment & Plan      Active Hospital Problems    Diagnosis  POA    **UTI (urinary tract infection) [N39.0]  Yes    Acute constipation [K59.00]  Unknown    Acute urinary retention [R33.8]  Unknown    Uterine prolapse [N81.4]  Unknown    Bradycardia [R00.1]  Unknown    Pressure injury of buttock, unstageable [L89.300]  Unknown    Type 2 diabetes mellitus with ketoacidosis, without long-term current use of insulin [E11.10]  Unknown    Disorientation [R41.0]  Unknown    Pleural effusion [J90]  Unknown    Severe malnutrition [E43]  Yes    Edema of lower extremity [R60.0]  Yes    Bullous pemphigoid [L12.0]  Yes    Dyslipidemia [E78.5]  Yes    Essential hypertension [I10]  Yes      Resolved Hospital Problems   No resolved problems to display.        This patient's assessments and plans were partially entered by my partner and updated as appropriate by me on 1/7/2024     Brief Hospital Course to date:  Omar Mendoza is a 83 y.o. female with history of type 2 diabetes, hypertension, history of uterine prolapse, urinary retention with Schultz catheter in place, history of bullous pemphigoid  (s/p Dupixent 10/2023, prednisone/doxycycline 11/2023) CAD, hyperlipidemia, presumed dementia who presented to the ED with generalized weakness and bullous pemphigoid blister on the heel.  Patient developed significant encephalopathy, and paranoia during her hospitalization.  Neurology was consulted and determined patient to be suffering from dementia complicated by depression and paranoia.  Patient refused to eat and drink.  Goals of care discussion had with family, palliative care consulted, pursuing hospice at this time.      Acute metabolic encephalopathy  Dementia with suspected Alzheimer's disease  Major depression with paranoia  Generalized weakness  - Family pursuing placement and then transition to hospice, desire comfort, minimizing any interventions on the patient and will not force any medical therapies. Plan will be discharge to LTC with  eventual transition to hospice and she currently does not meet inpatient criteria  - neurology adjusting medications, patient is not always agreeable to taking these     Dysphagia  - comfort diet given patient is comfort measures only     Bilateral pleural effusion  L>R  Bibasilar airspace disease versus pneumonia  -Concern for aspiration on 12/30, Completed 5 days of Rocephin and doxycycline     Anemia  -suspect anemia of chronic disease, stable with no active signs of bleeding     Poor venous access  -Removed PICC overnight 1/3     Sinus bradycardia  - hold amiodarone, decreased metoprolol to 50 mg BID w/ hold parameters, patient not really taking meds      BLE edema  Decreased pulses LLE  - CTA abd showing small right pleural effusion, bilateral basilar atelectasis  - CTA w/ runoff showing normal right sided runoff; left side delayed runoff possibly r/t venous stasis changes, no evidence of arterial occlusion or significant stenosis  -Echo revealed EF 57% with normal diastolic function      Uncontrolled Type 2 diabetes  -Hgb A1C 12.10 on 12/27     Constipation  -Continue aggressive bowel regimen, s/p soapsuds enema x 1  -s/p Milk of mag x1, Miralax daily, suppository x1   -Continue prn regimen  -chronic loza catheter in place     Uterine prolapse  -Gyn Dr. Zhou followed outpatient  -may be contributing to chronic urine retention     Bullous pemphigoid  -Has developed new blister on right heel  -Completed doxycycline  -Wound care following      Pressure ulcer  -Wound care following      L adrenal adenoma  - incidental finding on CTA abdomen      Severe malnutrition     Expected Discharge Location and Transportation: LTC w/eventual transition to Hospice, transfer to  if bed available  Expected Discharge   Expected Discharge Date: 1/15/2024; Expected Discharge Time:      DVT prophylaxis:  No DVT prophylaxis order currently exists.     AM-PAC 6 Clicks Score (PT): 6 (01/10/24 0800)    CODE STATUS:   Code Status  and Medical Interventions:   Ordered at: 01/07/24 1143     Code Status (Patient has no pulse and is not breathing):    No CPR (Do Not Attempt to Resuscitate)     Medical Interventions (Patient has pulse or is breathing):    Comfort Measures       Majo Collins, DO  01/10/24

## 2024-01-10 NOTE — THERAPY PROGRESS REPORT/RE-CERT
Patient Name: Omar Mendoza  : 1940    MRN: [id number]                              Today's Date: 1/10/2024       Admit Date: 2023    Visit Dx:     ICD-10-CM ICD-9-CM   1. Generalized weakness  R53.1 780.79   2. Acute UTI  N39.0 599.0   3. Bullous pemphigoid  L12.0 694.5   4. Venous insufficiency  I87.2 459.81   5. Dysphagia, unspecified type  R13.10 787.20     Patient Active Problem List   Diagnosis    UTI (urinary tract infection)    Bullous pemphigoid    Essential hypertension    Dyslipidemia    Edema of lower extremity    Cholestatic hepatitis    Acute constipation    Acute urinary retention    Uterine prolapse    Bradycardia    Pressure injury of buttock, unstageable    Type 2 diabetes mellitus with ketoacidosis, without long-term current use of insulin    Disorientation    Pleural effusion    Severe malnutrition     Past Medical History:   Diagnosis Date    CAD (coronary artery disease)     Dementia     Diabetes mellitus     Hyperlipemia     Hypertension      History reviewed. No pertinent surgical history.   General Information       Row Name 01/10/24 1100          Physical Therapy Time and Intention    Document Type progress note/recertification  -AE     Mode of Treatment physical therapy  -AE       Row Name 01/10/24 1100          General Information    Patient Profile Reviewed yes  -AE     Existing Precautions/Restrictions fall;seizures  confused; lethargic  -AE     Barriers to Rehab medically complex;cognitive status  -AE       Row Name 01/10/24 1100          Cognition    Orientation Status (Cognition) unable/difficult to assess  responded to name  -AE       Row Name 01/10/24 1100          Safety Issues, Functional Mobility    Safety Issues Affecting Function (Mobility) awareness of need for assistance;insight into deficits/self-awareness;safety precaution awareness;safety precautions follow-through/compliance;sequencing abilities  -AE     Impairments Affecting Function (Mobility)  balance;cognition;endurance/activity tolerance;postural/trunk control;strength  -AE     Cognitive Impairments, Mobility Safety/Performance awareness, need for assistance;insight into deficits/self-awareness;safety precaution awareness;safety precaution follow-through;sequencing abilities;attention  -AE               User Key  (r) = Recorded By, (t) = Taken By, (c) = Cosigned By      Initials Name Provider Type    AE Domo Desir, PT Physical Therapist                   Mobility       Row Name 01/10/24 1101          Bed Mobility    Bed Mobility rolling left;rolling right;scooting/bridging;supine-sit;sit-supine  -AE     Rolling Left [place] (Bed Mobility) moderate assist (50% patient effort);verbal cues  -AE     Rolling Right [place] (Bed Mobility) moderate assist (50% patient effort);verbal cues  -AE     Scooting/Bridging [place] (Bed Mobility) dependent (less than 25% patient effort);2 person assist  -AE     Supine-Sit [place] (Bed Mobility) dependent (less than 25% patient effort);2 person assist;verbal cues  -AE     Sit-Supine [place] (Bed Mobility) dependent (less than 25% patient effort);2 person assist;verbal cues  -AE     Assistive Device (Bed Mobility) bed rails;draw sheet;head of bed elevated  -AE     Comment, (Bed Mobility) VCs for hand placement and sequencing. Occasionally requires manual assist for hand placement onto bed rails.  -AE       Row Name 01/10/24 1101          Transfers    Comment, (Transfers) VCs for hand placement and sequencing. Pt c/o dizziness once sittting EOB.  -AE       Row Name 01/10/24 1101          Sit-Stand Transfer    Sit-Stand [place] (Transfers) maximum assist (25% patient effort);2 person assist;verbal cues  -AE     Assistive Device (Sit-Stand Transfers) other (see comments)  BUE support  -AE     Comment, (Sit-Stand Transfer) 3 STS, bilat knees/feet blocked. Only able to achieve ~75% upright  -AE       Row Name 01/10/24 1101           Gait/Stairs (Locomotion)    [place] Level (Gait) unable to assess  -AE               User Key  (r) = Recorded By, (t) = Taken By, (c) = Cosigned By      Initials Name Provider Type    AE Domo Desir PT Physical Therapist                   Obj/Interventions       Row Name 01/10/24 1309          Range of Motion Comprehensive    General Range of Motion lower extremity range of motion deficits identified  -AE     Comment, General Range of Motion --  -AE       Row Name 01/10/24 1309          Strength Comprehensive (MMT)    General Manual Muscle Testing (MMT) Assessment lower extremity strength deficits identified  -AE     Comment, General Manual Muscle Testing (MMT) Assessment BLE grossly 3+/5; generalized weakness  -AE       Row Name 01/10/24 1309          Motor Skills    Therapeutic Exercise knee;ankle;hip  -AE       Row Name 01/10/24 1309          Hip (Therapeutic Exercise)    Hip (Therapeutic Exercise) AROM (active range of motion)  -AE     Hip AROM (Therapeutic Exercise) bilateral;5 repetitions  seated marches  -AE       Row Name 01/10/24 1309          Knee (Therapeutic Exercise)    Knee (Therapeutic Exercise) AROM (active range of motion)  -AE     Knee AROM (Therapeutic Exercise) bilateral;LAQ (long arc quad);5 repetitions;sitting  -AE       Row Name 01/10/24 1309          Ankle (Therapeutic Exercise)    Ankle (Therapeutic Exercise) AROM (active range of motion)  -AE     Ankle AROM (Therapeutic Exercise) bilateral;dorsiflexion;plantarflexion;5 repetitions;sitting  -AE       Row Name 01/10/24 1309          Balance    Balance Assessment sitting static balance;sitting dynamic balance;sit to stand dynamic balance;standing static balance  -AE     Static Sitting Balance contact guard;minimal assist  periods of CGA  -AE     Dynamic Sitting Balance moderate assist  -AE     Position, Sitting Balance unsupported;sitting edge of bed  -AE     Sit to Stand Dynamic Balance maximum assist;2-person assist  -AE      Static Standing Balance maximum assist;2-person assist  -AE     Position/Device Used, Standing Balance supported  -AE               User Key  (r) = Recorded By, (t) = Taken By, (c) = Cosigned By      Initials Name Provider Type    AE Domo Desir, PT Physical Therapist                   Goals/Plan       Row Name 01/10/24 1326          Bed Mobility Goal 1 (PT)    Activity/Assistive Device (Bed Mobility Goal 1, PT) sit to supine/supine to sit  -AE     [place] Level/Cues Needed (Bed Mobility Goal 1, PT) moderate assist (50-74% patient effort)  -AE     Time Frame (Bed Mobility Goal 1, PT) long term goal (LTG);10 days  -AE     Progress/Outcomes (Bed Mobility Goal 1, PT) continuing progress toward goal;progress slower than expected;goal ongoing  -AE       Row Name 01/10/24 1326          Transfer Goal 1 (PT)    Activity/Assistive Device (Transfer Goal 1, PT) bed-to-chair/chair-to-bed  -AE     [place] Level/Cues Needed (Transfer Goal 1, PT) maximum assist (25-49% patient effort)  -AE     Time Frame (Transfer Goal 1, PT) long term goal (LTG);10 days  -AE     Progress/Outcome (Transfer Goal 1, PT) progress slower than expected;goal revised this date  -AE       Row Name 01/10/24 1326          Gait Training Goal 1 (PT)    Activity/Assistive Device (Gait Training Goal 1, PT) gait (walking locomotion);assistive device use  -AE     [place] Level (Gait Training Goal 1, PT) maximum assist (25-49% patient effort)  -AE     Distance (Gait Training Goal 1, PT) 5ft  -AE     Time Frame (Gait Training Goal 1, PT) long term goal (LTG);10 days  -AE     Progress/Outcome (Gait Training Goal 1, PT) progress slower than expected;goal revised this date;goal ongoing  -AE               User Key  (r) = Recorded By, (t) = Taken By, (c) = Cosigned By      Initials Name Provider Type    AE Domo Desir, PT Physical Therapist                   Clinical Impression       Row Name 01/10/24 1322          Pain Scale: FACES  Pre/Post-Treatment    Pain: FACES Scale, Pretreatment 0-->no hurt  -AE     Posttreatment Pain Rating 0-->no hurt  -AE       Row Name 01/10/24 1323          Plan of Care Review    Plan of Care Reviewed With patient;son  -AE     Progress no change  -AE     Outcome Evaluation Progress report completed. Goals reviewed and revised as appropriate to measure functional progress. Pt completed x3 STS from EOB with max A x2 and BUE support. Only able to achieve ~75% upright. BLE ther ex also completed. Continue PT POC. Continue to progress per pt tolerance.  -AE       Row Name 01/10/24 1323          Vital Signs    O2 Delivery Pre Treatment room air  -AE     O2 Delivery Intra Treatment room air  -AE     O2 Delivery Post Treatment room air  -AE     Pre Patient Position Supine  -AE     Intra Patient Position Standing  -AE     Post Patient Position Supine  -AE       Row Name 01/10/24 1323          Positioning and Restraints    Pre-Treatment Position in bed  -AE     Post Treatment Position bed  -AE     In Bed notified nsg;call light within reach;encouraged to call for assist;exit alarm on;legs elevated;heels elevated;with family/caregiver  -AE               User Key  (r) = Recorded By, (t) = Taken By, (c) = Cosigned By      Initials Name Provider Type    Domo Lozano, PT Physical Therapist                   Outcome Measures       Row Name 01/10/24 1329 01/10/24 0800       How much help from another person do you currently need...    Turning from your back to your side while in flat bed without using bedrails? 2  -AE 1  -SW    Moving from lying on back to sitting on the side of a flat bed without bedrails? 1  -AE 1  -SW    Moving to and from a bed to a chair (including a wheelchair)? 1  -AE 1  -SW    Standing up from a chair using your arms (e.g., wheelchair, bedside chair)? 1  -AE 1  -SW    Climbing 3-5 steps with a railing? 1  -AE 1  -SW    To walk in hospital room? 1  -AE 1  -SW    AM-PAC 6 Clicks Score (PT) 7  -AE 6  -SW     Highest Level of Mobility Goal 2 --> Bed activities/dependent transfer  -AE 2 --> Bed activities/dependent transfer  -SW      Row Name 01/10/24 1329 01/10/24 1022       Functional Assessment    Outcome Measure Options AM-PAC 6 Clicks Basic Mobility (PT)  -AE AM-PAC 6 Clicks Daily Activity (OT)  -AC              User Key  (r) = Recorded By, (t) = Taken By, (c) = Cosigned By      Initials Name Provider Type    AC Sepideh Feldman OT Occupational Therapist    Domo Lozano, PT Physical Therapist    Melba Vaca, RN Registered Nurse                                 Physical Therapy Education       Title: PT OT SLP Therapies (In Progress)       Topic: Physical Therapy (In Progress)       Point: Mobility training (In Progress)       Learning Progress Summary             Patient Acceptance, E, NR by AE at 1/10/2024 0919    Acceptance, E, NR by AE at 1/3/2024 1337    Acceptance, E, NR by KE at 12/29/2023 1131    Acceptance, E, NR by KE at 12/27/2023 1045                         Point: Home exercise program (Not Started)       Learner Progress:  Not documented in this visit.              Point: Body mechanics (In Progress)       Learning Progress Summary             Patient Acceptance, E, NR by AE at 1/10/2024 0919    Acceptance, E, NR by AE at 1/3/2024 1337    Acceptance, E, NR by KE at 12/29/2023 1131    Acceptance, E, NR by KE at 12/27/2023 1045                         Point: Precautions (In Progress)       Learning Progress Summary             Patient Acceptance, E, NR by AE at 1/10/2024 0919    Acceptance, E, NR by AE at 1/3/2024 1337    Acceptance, E, NR by KE at 12/29/2023 1131    Acceptance, E, NR by KE at 12/27/2023 1045                                         User Key       Initials Effective Dates Name Provider Type Discipline    AE 09/21/21 -  Domo Desir, PT Physical Therapist PT    MIA 11/16/23 -  Petty Mckoy, CHELO Physical Therapist PT                  PT Recommendation and Plan     Plan  of Care Reviewed With: patient, son  Progress: no change  Outcome Evaluation: Progress report completed. Goals reviewed and revised as appropriate to measure functional progress. Pt completed x3 STS from EOB with max A x2 and BUE support. Only able to achieve ~75% upright. BLE ther ex also completed. Continue PT POC. Continue to progress per pt tolerance.     Time Calculation:         PT Charges       Row Name 01/10/24 1330             Time Calculation    Start Time 0919  -AE      PT Received On 01/10/24  -AE      PT Goal Re-Cert Due Date 01/20/24  -AE         Timed Charges    35665 - PT Therapeutic Activity Minutes 15  -AE         Total Minutes    Timed Charges Total Minutes 15  -AE       Total Minutes 15  -AE                User Key  (r) = Recorded By, (t) = Taken By, (c) = Cosigned By      Initials Name Provider Type    AE Domo Desir PT Physical Therapist                  Therapy Charges for Today       Code Description Service Date Service Provider Modifiers Qty    [id number] HC PT THERAPEUTIC ACT EA 15 MIN 1/10/2024 Domo Desir PT GP 1            PT G-Codes  Outcome Measure Options: AM-PAC 6 Clicks Basic Mobility (PT)  AM-PAC 6 Clicks Score (PT): 7  AM-PAC 6 Clicks Score (OT): 10  PT Discharge Summary  Anticipated Discharge Disposition (PT): skilled nursing facility    Domo Desir PT  1/10/2024

## 2024-01-10 NOTE — PLAN OF CARE
Goal Outcome Evaluation:  Plan of Care Reviewed With: patient        Progress: no change  Outcome Evaluation: Son available and agreeable for pt to continue therapy.  Pt lethargic, but agreeable to participate.  Pt min A to wash face, dep LBD,  max A x 2 to perform 3 STS  achieving 75% upright.  Pt more cooperative today, but limited by lethargy, decr command following, weakness, decreased balance and activity tolerance. Recommend SNF if deemed medically appropriate      Anticipated Discharge Disposition (OT): skilled nursing facility

## 2024-01-10 NOTE — CASE MANAGEMENT/SOCIAL WORK
Continued Stay Note  [facility]     Patient Name: Omar Mendoza  MRN: [id number]  Today's Date: 1/10/2024    Admit Date: 12/26/2023    Plan: undecided   Discharge Plan       Row Name 01/10/24 0912       Plan    Plan undecided    Patient/Family in Agreement with Plan yes    Plan Comments I spoke with the  of this patient at the bedside. He stated that he cannot pay even the first month for LTC. I discussed that I would contact [facility] (spoke with Iris) and [facility] (left ). They will follow. The  understands that she might need to either go home with home health or home with hospice. He is leaning towards home with home health, but asked for hospice's card in order to be able to notify them when he would need their services.  left a message with  hospice to give the  a card for services once the patient is home. She will need  EMS set up. CM will follow.    [address] [place] [address] spoke with the son, Gael, who stated that he is working with [employer] to see if they can switch her insurance to Medicaid. He states that they cannot take her home.  MONICA spoke with Iris, with [facility] in [place], and she is following. MONICA still awaiting call back from [facility] regarding skilled rehab to LTC. MONICA will continue to follow.    Final Discharge Disposition Code 30 - still a patient                   Discharge Codes    No documentation.                 Expected Discharge Date and Time       Expected Discharge Date Expected Discharge Time    Jan 9, 2024               Eleonora John RN

## 2024-01-11 PROCEDURE — 99233 SBSQ HOSP IP/OBS HIGH 50: CPT | Performed by: HOSPITALIST

## 2024-01-11 PROCEDURE — 99232 SBSQ HOSP IP/OBS MODERATE 35: CPT | Performed by: PSYCHIATRY & NEUROLOGY

## 2024-01-11 RX ORDER — CHOLECALCIFEROL (VITAMIN D3) 125 MCG
5 CAPSULE ORAL NIGHTLY PRN
Status: DISCONTINUED | OUTPATIENT
Start: 2024-01-11 | End: 2024-02-01 | Stop reason: HOSPADM

## 2024-01-11 RX ADMIN — RIVASTIGMINE TRANSDERMAL SYSTEM 1 PATCH: 9.5 PATCH, EXTENDED RELEASE TRANSDERMAL at 08:22

## 2024-01-11 RX ADMIN — MIRTAZAPINE 15 MG: 15 TABLET, FILM COATED ORAL at 18:38

## 2024-01-11 NOTE — PROGRESS NOTES
[facility] Medicine Services  PROGRESS NOTE    Patient Name: Omar Mendoza  : 1940  MRN: [id number]    Date of Admission: 2023  Primary Care Physician: Varun Pa MD    Subjective   Subjective     CC:  Weakness      HPI:  Patient resting in bed, awake and alert with  at bedside. Patient asked me for some coffee this morning and was eating her breakfast well. No needs per the family.      Objective   Objective     Vital Signs:   Temp:  [98.3 °F (36.8 °C)] 98.3 °F (36.8 °C)  Resp:  [18] 18  BP: (122)/(89) 122/89     Physical Exam:  Constitutional: No acute distress, awake, alert, asking for coffee  HENT: NCAT, mucous membranes dry  Respiratory: Clear to auscultation bilaterally, respiratory effort normal on RA  Cardiovascular: RRR, no murmurs  Gastrointestinal: Positive bowel sounds, soft, nontender, nondistended  Musculoskeletal: No bilateral ankle edema  Neurologic: LAMA      Results Reviewed:  LAB RESULTS:      Lab 24  0352   WBC 8.43   HEMOGLOBIN 9.7*   HEMATOCRIT 29.6*   PLATELETS 195   NEUTROS ABS 4.05   EOS ABS 3.79*   MCV 95.8         Lab 24  0352   SODIUM 139   POTASSIUM 3.3*   CHLORIDE 102   CO2 25.0   ANION GAP 12.0   BUN 6*   CREATININE 0.41*   EGFR 99.0   GLUCOSE 108*   CALCIUM 7.4*                           Brief Urine Lab Results  (Last result in the past 365 days)        Color   Clarity   Blood   Leuk Est   Nitrite   Protein   CREAT   Urine HCG        24 0053 Yellow   Clear   Negative   Trace   Negative   Negative                   Microbiology Results Abnormal       Procedure Component Value - Date/Time    COVID-19 RAPID AG,VERITOR,COR/JAMARI/PAD/NUPUR/ASHA/LAG/BRITTANY/ IN-HOUSE,DRY SWAB, 1 HR TAT - Swab, Nasal Cavity [[id number]]  (Normal) Collected: 24 0004    Lab Status: Final result Specimen: Swab from Nasal Cavity Updated: 24 0022     COVID19 Presumptive Negative    Narrative:      Fact sheets for providers:  https://www.fda.gov/media/184967/download    Fact sheets for patients: https://www.fda.gov/media/322540/download    Blood Culture - Blood, Blood, PICC Line [[id number]]  (Normal) Collected: 12/27/23 1400    Lab Status: Final result Specimen: Blood, PICC Line Updated: 01/01/24 1531     Blood Culture No growth at 5 days    Blood Culture - Blood, Arm, Right [[id number]]  (Normal) Collected: 12/27/23 0431    Lab Status: Final result Specimen: Blood from Arm, Right Updated: 01/01/24 0515     Blood Culture No growth at 5 days            No radiology results from the last 24 hrs    Results for orders placed during the hospital encounter of 12/26/23    Adult Transthoracic Echo Complete W/ Cont if Necessary Per Protocol    Interpretation Summary    Left ventricular systolic function is normal. Calculated left ventricular EF = 57% Left ventricular ejection fraction appears to be 56 - 60%.    Left ventricular wall thickness is consistent with mild to moderate concentric hypertrophy.    Left ventricular diastolic function was normal.    Estimated right ventricular systolic pressure from tricuspid regurgitation is mildly elevated (35-45 mmHg).    There is a trivial pericardial effusion.    Mild aortic valve regurgitation.      Current medications:  Scheduled Meds:melatonin, 5 mg, Oral, Nightly  mirtazapine, 15 mg, Oral, Nightly  rivastigmine, 1 patch, Transdermal, Daily  sodium chloride, 10 mL, Intravenous, Q12H      Continuous Infusions:   PRN Meds:.  acetaminophen **OR** acetaminophen **OR** acetaminophen    senna-docusate sodium **AND** polyethylene glycol **AND** bisacodyl **AND** bisacodyl    Calcium Replacement - Follow Nurse / BPA Driven Protocol    dextrose    dextrose    glucagon (human recombinant)    glycopyrrolate    haloperidol lactate    hydrOXYzine    Magnesium Standard Dose Replacement - Follow Nurse / BPA Driven Protocol    Morphine    nitroglycerin    ondansetron    Phosphorus Replacement - Follow Nurse / BPA  Driven Protocol    Potassium Replacement - Follow Nurse / BPA Driven Protocol    sodium chloride    ziprasidone    Assessment & Plan   Assessment & Plan     Active Hospital Problems    Diagnosis  POA    **UTI (urinary tract infection) [N39.0]  Yes    Acute constipation [K59.00]  Unknown    Acute urinary retention [R33.8]  Unknown    Uterine prolapse [N81.4]  Unknown    Bradycardia [R00.1]  Unknown    Pressure injury of buttock, unstageable [L89.300]  Unknown    Type 2 diabetes mellitus with ketoacidosis, without long-term current use of insulin [E11.10]  Unknown    Disorientation [R41.0]  Unknown    Pleural effusion [J90]  Unknown    Severe malnutrition [E43]  Yes    Edema of lower extremity [R60.0]  Yes    Bullous pemphigoid [L12.0]  Yes    Dyslipidemia [E78.5]  Yes    Essential hypertension [I10]  Yes      Resolved Hospital Problems   No resolved problems to display.        This patient's assessments and plans were partially entered by my partner and updated as appropriate by me on 1/11/2024     Brief Hospital Course to date:  Omar Mendoza is a 83 y.o. female with history of type 2 diabetes, hypertension, history of uterine prolapse, urinary retention with Schultz catheter in place, history of bullous pemphigoid  (s/p Dupixent 10/2023, prednisone/doxycycline 11/2023) CAD, hyperlipidemia, presumed dementia who presented to the ED with generalized weakness and bullous pemphigoid blister on the heel.  Patient developed significant encephalopathy, and paranoia during her hospitalization.  Neurology was consulted and determined patient to be suffering from dementia complicated by depression and paranoia.  Patient refused to eat and drink.  Goals of care discussion had with family, palliative care consulted, pursuing hospice at this time.    This patient's problems and plans were partially entered by my partner and updated as appropriate by me 01/11/24.    All problems are new to me today.    Acute metabolic  encephalopathy  Dementia with suspected Alzheimer's disease  Major depression with paranoia  Generalized weakness  - Family pursuing placement and then transition to hospice, desire comfort, minimizing any interventions on the patient and will not force any medical therapies. Plan will be discharge to LTC with eventual transition to hospice and she currently does not meet inpatient criteria  - neurology adjusted medications, patient is not always agreeable to taking these     Dysphagia  - comfort diet given patient is comfort measures only     Bilateral pleural effusion  L>R  Bibasilar airspace disease versus pneumonia  -Concern for aspiration on 12/30, Completed 5 days of Rocephin and doxycycline     Anemia  -suspect anemia of chronic disease, stable with no active signs of bleeding     Poor venous access  -Removed PICC overnight 1/3     Sinus bradycardia  - hold amiodarone, decreased metoprolol to 50 mg BID w/ hold parameters, patient not really taking meds      BLE edema  Decreased pulses LLE  - CTA abd showing small right pleural effusion, bilateral basilar atelectasis  - CTA w/ runoff showing normal right sided runoff; left side delayed runoff possibly r/t venous stasis changes, no evidence of arterial occlusion or significant stenosis  -Echo revealed EF 57% with normal diastolic function      Uncontrolled Type 2 diabetes  -Hgb A1C 12.10 on 12/27     Constipation  -Continue aggressive bowel regimen, s/p soapsuds enema x 1  -s/p Milk of mag x1, Miralax daily, suppository x1   -Continue prn regimen  -chronic loza catheter in place     Uterine prolapse  -Gyn Dr. Zhou followed outpatient  -may be contributing to chronic urine retention     Bullous pemphigoid  -Has developed new blister on right heel  -Completed doxycycline  -Wound care following      Pressure ulcer  -Wound care following      L adrenal adenoma  - incidental finding on CTA abdomen      Severe malnutrition     Expected Discharge Location and  Transportation: LTC w/eventual transition to Hospice, transfer to  if bed available  Expected Discharge   Expected Discharge Date: 1/15/2024; Expected Discharge Time:      DVT prophylaxis:  No DVT prophylaxis order currently exists.     AM-PAC 6 Clicks Score (PT): 7 (01/11/24 0800)    CODE STATUS:   Code Status and Medical Interventions:   Ordered at: 01/07/24 1143     Code Status (Patient has no pulse and is not breathing):    No CPR (Do Not Attempt to Resuscitate)     Medical Interventions (Patient has pulse or is breathing):    Comfort Measures       Karon Patiño,   01/11/24

## 2024-01-11 NOTE — PROGRESS NOTES
Nutrition Services    Patient Name:  Omar Mendoza  YOB: 1940  MRN: [id number]  Admit Date:  12/26/2023    Pt now comfort measures only. Please adjust diet per pt wishes/comfort measures. RD will follow in the periphery. Please consult for specific dietary needs or in the event that GOC should change, thank you.    Electronically signed by:  Mallory Santoro MS,RD,LD  01/11/24 07:33 EST

## 2024-01-11 NOTE — PLAN OF CARE
Problem: Adult Inpatient Plan of Care  Goal: Patient-Specific Goal (Individualized)  Outcome: Ongoing, Progressing  Goal: Absence of Hospital-Acquired Illness or Injury  Outcome: Ongoing, Progressing  Intervention: Identify and Manage Fall Risk  Recent Flowsheet Documentation  Taken 1/11/2024 1400 by Melba Gallagher RN  Safety Promotion/Fall Prevention:   activity supervised   assistive device/personal items within reach   clutter free environment maintained   toileting scheduled   safety round/check completed   room organization consistent  Taken 1/11/2024 1200 by Melba Gallagher RN  Safety Promotion/Fall Prevention:   activity supervised   assistive device/personal items within reach   clutter free environment maintained   toileting scheduled   safety round/check completed   room organization consistent  Taken 1/11/2024 1000 by Melba Gallagher RN  Safety Promotion/Fall Prevention:   activity supervised   assistive device/personal items within reach   clutter free environment maintained   toileting scheduled   safety round/check completed   room organization consistent  Taken 1/11/2024 0800 by Melba Gallagher RN  Safety Promotion/Fall Prevention:   activity supervised   assistive device/personal items within reach   clutter free environment maintained   toileting scheduled   safety round/check completed   room organization consistent  Intervention: Prevent Skin Injury  Recent Flowsheet Documentation  Taken 1/11/2024 1400 by Melba Gallagher RN  Body Position:   turned   right  Skin Protection:   adhesive use limited   tubing/devices free from skin contact   transparent dressing maintained   skin-to-skin areas padded   skin-to-device areas padded  Taken 1/11/2024 1200 by Melba Gallagher RN  Body Position: patient/family refused  Skin Protection:   adhesive use limited   tubing/devices free from skin contact   transparent dressing maintained   skin-to-skin areas padded   skin-to-device areas  padded  Taken 1/11/2024 1000 by Melba Gallagher RN  Body Position: patient/family refused  Skin Protection:   adhesive use limited   tubing/devices free from skin contact   transparent dressing maintained   skin-to-skin areas padded   skin-to-device areas padded  Taken 1/11/2024 0800 by Melba Gallagher RN  Body Position: patient/family refused  Skin Protection:   adhesive use limited   tubing/devices free from skin contact   transparent dressing maintained   skin-to-skin areas padded   skin-to-device areas padded  Intervention: Prevent and Manage VTE (Venous Thromboembolism) Risk  Recent Flowsheet Documentation  Taken 1/11/2024 1400 by Melba Gallagher RN  Activity Management: bedrest  Taken 1/11/2024 1200 by Melba Gallagher RN  Activity Management: bedrest  Taken 1/11/2024 1000 by Melba Gallagher RN  Activity Management: bedrest  Taken 1/11/2024 0800 by Melba Gallagher RN  Activity Management: bedrest  Goal: Optimal Comfort and Wellbeing  Outcome: Ongoing, Progressing  Goal: Readiness for Transition of Care  Outcome: Ongoing, Progressing     Problem: Skin Injury Risk Increased  Goal: Skin Health and Integrity  Outcome: Ongoing, Progressing  Intervention: Optimize Skin Protection  Recent Flowsheet Documentation  Taken 1/11/2024 1400 by Melba Gallagher RN  Pressure Reduction Techniques:   weight shift assistance provided   heels elevated off bed  Head of Bed (HOB) Positioning: [facility] elevated  Pressure Reduction Devices:   specialty bed utilized   positioning supports utilized   heel offloading device utilized  Skin Protection:   adhesive use limited   tubing/devices free from skin contact   transparent dressing maintained   skin-to-skin areas padded   skin-to-device areas padded  Taken 1/11/2024 1200 by Melba Gallagher RN  Pressure Reduction Techniques: weight shift assistance provided  Head of Bed (HOB) Positioning: [facility] elevated  Pressure Reduction Devices:   specialty bed utilized   positioning  supports utilized  Skin Protection:   adhesive use limited   tubing/devices free from skin contact   transparent dressing maintained   skin-to-skin areas padded   skin-to-device areas padded  Taken 1/11/2024 1000 by Melba Gallagher RN  Pressure Reduction Techniques: weight shift assistance provided  Head of Bed ([facility] [place] [facility]) Positioning: [facility] [place] elevated  Pressure Reduction Devices: specialty bed utilized  Skin Protection:   adhesive use limited   tubing/devices free from skin contact   transparent dressing maintained   skin-to-skin areas padded   skin-to-device areas padded  Taken 1/11/2024 0800 by Melba Gallagher RN  Pressure Reduction Techniques: weight shift assistance provided  Head of Bed ([facility] [place]) Positioning: [facility] [place] elevated  Pressure Reduction Devices: specialty bed utilized  Skin Protection:   adhesive use limited   tubing/devices free from skin contact   transparent dressing maintained   skin-to-skin areas padded   skin-to-device areas padded     Problem: Asthma Comorbidity  Goal: Maintenance of Asthma Control  Outcome: Ongoing, Progressing     Problem: Behavioral Health Comorbidity  Goal: Maintenance of Behavioral Health Symptom Control  Outcome: Ongoing, Progressing     Problem: COPD (Chronic Obstructive Pulmonary Disease) Comorbidity  Goal: Maintenance of COPD Symptom Control  Outcome: Ongoing, Progressing     Problem: Diabetes Comorbidity  Goal: Blood Glucose Level Within Targeted Range  Outcome: Ongoing, Progressing     Problem: Heart Failure Comorbidity  Goal: Maintenance of Heart Failure Symptom Control  Outcome: Ongoing, Progressing     Problem: Hypertension Comorbidity  Goal: Blood Pressure in Desired Range  Outcome: Ongoing, Progressing     Problem: Fall Injury Risk  Goal: Absence of Fall and Fall-Related Injury  Outcome: Ongoing, Progressing  Intervention: Promote Injury-Free Environment  Recent Flowsheet Documentation  Taken 1/11/2024 1400 by Melba Gallagher RN  Safety Promotion/Fall Prevention:   activity  supervised   assistive device/personal items within reach   clutter free environment maintained   toileting scheduled   safety round/check completed   room organization consistent  Taken 1/11/2024 1200 by Melba Gallagher RN  Safety Promotion/Fall Prevention:   activity supervised   assistive device/personal items within reach   clutter free environment maintained   toileting scheduled   safety round/check completed   room organization consistent  Taken 1/11/2024 1000 by Melba Gallagher RN  Safety Promotion/Fall Prevention:   activity supervised   assistive device/personal items within reach   clutter free environment maintained   toileting scheduled   safety round/check completed   room organization consistent  Taken 1/11/2024 0800 by Melba Gallagher RN  Safety Promotion/Fall Prevention:   activity supervised   assistive device/personal items within reach   clutter free environment maintained   toileting scheduled   safety round/check completed   room organization consistent   Goal Outcome Evaluation:

## 2024-01-11 NOTE — PAYOR COMM NOTE
"[facility]# [id number]   Clinical Update    Utilization Review  Phone [phone]  Fax [phone]    [place] [address]       Omar Mendoza (83 y.o. Female)       Date of Birth   1940    Social Security Number       Address   [address] [place] [address]    Home Phone   [phone]    MRN   [id number]       [employer]   None    Marital Status                               Admission Date   12/26/23    Admission Type   Emergency    Admitting Provider   Karon Patiño DO    Attending Provider   Karon Patiño DO    Department, Room/Bed   [facility] 5H, S576/1       Discharge Date       Discharge Disposition       Discharge Destination                                 Attending Provider: Karon Patiño DO    Allergies: No Known Allergies    Isolation: None   Infection: None   Code Status: No CPR    Ht: 170.2 cm (67.01\")   Wt: 99 kg (218 lb 4.1 oz)    Admission Cmt: None   Principal Problem: UTI (urinary tract infection) [N39.0]                   Active Insurance as of 12/26/2023       Primary Coverage       Payor Plan Insurance Group Employer/Plan Group    ANTHEM MEDICARE REPLACEMENT ANTHEM MEDICARE ADVANTAGE [id number]       Payor Plan Address Payor Plan Phone Number Payor Plan Fax Number Effective Dates    [address] [phone]  1/1/2022 - None Entered    [facility] [address]         Subscriber Name Subscriber Birth Date Member ID       OMAR MENDOZA 1940 [id number]                     Emergency Contacts        (Rel.) Home Phone Work Phone Mobile Phone    JO (POA)DESTIN (Son) [phone] -- [phone]    DINO GATES (Daughter) [phone] -- [phone]    DALLAS MENDOZA (Spouse) [phone] -- [phone]                 Physician Progress Notes (last 72 hours)        Hung Edgar MD at 01/11/24 1057          Neurology       Patient Care Team:  Varun Pa MD as " PCP - General (Internal Medicine)    Chief complaint: Dementia/depression    History: Patient ate some breakfast.  She took her pills last night.    He is sleepy at the moment but wakes and speaks to no one in particular.      Past Medical History:   Diagnosis Date    CAD (coronary artery disease)     Dementia     Diabetes mellitus     Hyperlipemia     Hypertension        Vital Signs   Vitals:    01/06/24 0300 01/06/24 1800 01/09/24 0813 01/10/24 2335   BP: 112/61  115/72 122/89   BP Location: Right arm   Left arm   Patient Position: Lying   Lying   Pulse: 60      Resp: 18   18   Temp: 97.3 °F (36.3 °C)   98.3 °F (36.8 °C)   TempSrc: Axillary   Axillary   SpO2:       Weight:  99 kg (218 lb 4.1 oz)     Height:           Physical Exam:   General: Sleeping              Neuro: Minimally restless when awake    Results Review:  No new results  Results from last 7 days   Lab Units 01/05/24  0352   WBC 10*3/mm3 8.43   HEMOGLOBIN g/dL 9.7*   HEMATOCRIT % 29.6*   PLATELETS 10*3/mm3 195     Results from last 7 days   Lab Units 01/05/24  0352   SODIUM mmol/L 139   POTASSIUM mmol/L 3.3*   CHLORIDE mmol/L 102   CO2 mmol/L 25.0   BUN mg/dL 6*   CREATININE mg/dL 0.41*   CALCIUM mg/dL 7.4*   GLUCOSE mg/dL 108*       Imaging Results (Last 24 Hours)       ** No results found for the last 24 hours. **            Assessment:  Alzheimer's disease.    Depression.        Plan:  Change melatonin to as needed for sleep.    The patient is not too sleepy in the next day or 2 the mirtazapine can be increased to 30 mg.    Comment:  Guarded prognosis discussed with the patient's .    Will see the patient on request.         I discussed the patients findings and my recommendations with family and nursing staff    Hung Edgar MD  01/11/24  10:59 EST          Electronically signed by Hung Edgar MD at 01/11/24 1101       Karon Patiño DO at 01/11/24 1048              [facility]  Services  PROGRESS NOTE    Patient Name: Oamr Mendoza  : 1940  MRN: [id number]    Date of Admission: 2023  Primary Care Physician: Varun Pa MD    Subjective   Subjective     CC:  Weakness      HPI:  Patient resting in bed, awake and alert with  at bedside. Patient asked me for some coffee this morning and was eating her breakfast well. No needs per the family.      Objective   Objective     Vital Signs:   Temp:  [98.3 °F (36.8 °C)] 98.3 °F (36.8 °C)  Resp:  [18] 18  BP: (122)/(89) 122/89     Physical Exam:  Constitutional: No acute distress, awake, alert, asking for coffee  HENT: NCAT, mucous membranes dry  Respiratory: Clear to auscultation bilaterally, respiratory effort normal on RA  Cardiovascular: RRR, no murmurs  Gastrointestinal: Positive bowel sounds, soft, nontender, nondistended  Musculoskeletal: No bilateral ankle edema  Neurologic: LAMA      Results Reviewed:  LAB RESULTS:      Lab 24  0352   WBC 8.43   HEMOGLOBIN 9.7*   HEMATOCRIT 29.6*   PLATELETS 195   NEUTROS ABS 4.05   EOS ABS 3.79*   MCV 95.8         Lab 24  0352   SODIUM 139   POTASSIUM 3.3*   CHLORIDE 102   CO2 25.0   ANION GAP 12.0   BUN 6*   CREATININE 0.41*   EGFR 99.0   GLUCOSE 108*   CALCIUM 7.4*                           Brief Urine Lab Results  (Last result in the past 365 days)        Color   Clarity   Blood   Leuk Est   Nitrite   Protein   CREAT   Urine HCG        24 0053 Yellow   Clear   Negative   Trace   Negative   Negative                   Microbiology Results Abnormal       Procedure Component Value - Date/Time    COVID-19 RAPID AG,VERITOR,COR/JAMARI/PAD/NUPUR/ASHA/LAG/BRITTANY/ IN-HOUSE,DRY SWAB, 1 HR TAT - Swab, Nasal Cavity [[id number]]  (Normal) Collected: 24 0004    Lab Status: Final result Specimen: Swab from Nasal Cavity Updated: 24 0022     COVID19 Presumptive Negative    Narrative:      Fact sheets for providers: https://www.fda.gov/media/024652/download    Fact sheets for  patients: https://www.[employer].gov/media/430782/download    Blood Culture - Blood, Blood, PICC Line [[id number]]  (Normal) Collected: 12/27/23 1400    Lab Status: Final result Specimen: Blood, PICC Line Updated: 01/01/24 1531     Blood Culture No growth at 5 days    Blood Culture - Blood, Arm, Right [[id number]]  (Normal) Collected: 12/27/23 0431    Lab Status: Final result Specimen: Blood from Arm, Right Updated: 01/01/24 0515     Blood Culture No growth at 5 days            No radiology results from the last 24 hrs    Results for orders placed during the hospital encounter of 12/26/23    Adult Transthoracic Echo Complete W/ Cont if Necessary Per Protocol    Interpretation Summary    Left ventricular systolic function is normal. Calculated left ventricular EF = 57% Left ventricular ejection fraction appears to be 56 - 60%.    Left ventricular wall thickness is consistent with mild to moderate concentric hypertrophy.    Left ventricular diastolic function was normal.    Estimated right ventricular systolic pressure from tricuspid regurgitation is mildly elevated (35-45 mmHg).    There is a trivial pericardial effusion.    Mild aortic valve regurgitation.      Current medications:  Scheduled Meds:melatonin, 5 mg, Oral, Nightly  mirtazapine, 15 mg, Oral, Nightly  rivastigmine, 1 patch, Transdermal, Daily  sodium chloride, 10 mL, Intravenous, Q12H      Continuous Infusions:   PRN Meds:.  acetaminophen **OR** acetaminophen **OR** acetaminophen    senna-docusate sodium **AND** polyethylene glycol **AND** bisacodyl **AND** bisacodyl    Calcium Replacement - Follow Nurse / BPA Driven Protocol    dextrose    dextrose    glucagon (human recombinant)    glycopyrrolate    haloperidol lactate    hydrOXYzine    Magnesium Standard Dose Replacement - Follow Nurse / BPA Driven Protocol    Morphine    nitroglycerin    ondansetron    Phosphorus Replacement - Follow Nurse / BPA Driven Protocol    Potassium Replacement - Follow Nurse / BPA  Driven Protocol    sodium chloride    ziprasidone    Assessment & Plan   Assessment & Plan     Active Hospital Problems    Diagnosis  POA    **UTI (urinary tract infection) [N39.0]  Yes    Acute constipation [K59.00]  Unknown    Acute urinary retention [R33.8]  Unknown    Uterine prolapse [N81.4]  Unknown    Bradycardia [R00.1]  Unknown    Pressure injury of buttock, unstageable [L89.300]  Unknown    Type 2 diabetes mellitus with ketoacidosis, without long-term current use of insulin [E11.10]  Unknown    Disorientation [R41.0]  Unknown    Pleural effusion [J90]  Unknown    Severe malnutrition [E43]  Yes    Edema of lower extremity [R60.0]  Yes    Bullous pemphigoid [L12.0]  Yes    Dyslipidemia [E78.5]  Yes    Essential hypertension [I10]  Yes      Resolved Hospital Problems   No resolved problems to display.        This patient's assessments and plans were partially entered by my partner and updated as appropriate by me on 1/11/2024     Brief Hospital Course to date:  Omar Mendoza is a 83 y.o. female with history of type 2 diabetes, hypertension, history of uterine prolapse, urinary retention with Schultz catheter in place, history of bullous pemphigoid  (s/p Dupixent 10/2023, prednisone/doxycycline 11/2023) CAD, hyperlipidemia, presumed dementia who presented to the ED with generalized weakness and bullous pemphigoid blister on the heel.  Patient developed significant encephalopathy, and paranoia during her hospitalization.  Neurology was consulted and determined patient to be suffering from dementia complicated by depression and paranoia.  Patient refused to eat and drink.  Goals of care discussion had with family, palliative care consulted, pursuing hospice at this time.    This patient's problems and plans were partially entered by my partner and updated as appropriate by me 01/11/24.    All problems are new to me today.    Acute metabolic encephalopathy  Dementia with suspected Alzheimer's disease  Major  depression with paranoia  Generalized weakness  - Family pursuing placement and then transition to hospice, desire comfort, minimizing any interventions on the patient and will not force any medical therapies. Plan will be discharge to LTC with eventual transition to hospice and she currently does not meet inpatient criteria  - neurology adjusted medications, patient is not always agreeable to taking these     Dysphagia  - comfort diet given patient is comfort measures only     Bilateral pleural effusion  L>R  Bibasilar airspace disease versus pneumonia  -Concern for aspiration on 12/30, Completed 5 days of Rocephin and doxycycline     Anemia  -suspect anemia of chronic disease, stable with no active signs of bleeding     Poor venous access  -Removed PICC overnight 1/3     Sinus bradycardia  - hold amiodarone, decreased metoprolol to 50 mg BID w/ hold parameters, patient not really taking meds      BLE edema  Decreased pulses LLE  - CTA abd showing small right pleural effusion, bilateral basilar atelectasis  - CTA w/ runoff showing normal right sided runoff; left side delayed runoff possibly r/t venous stasis changes, no evidence of arterial occlusion or significant stenosis  -Echo revealed EF 57% with normal diastolic function      Uncontrolled Type 2 diabetes  -Hgb A1C 12.10 on 12/27     Constipation  -Continue aggressive bowel regimen, s/p soapsuds enema x 1  -s/p Milk of mag x1, Miralax daily, suppository x1   -Continue prn regimen  -chronic loza catheter in place     Uterine prolapse  -Gyn Dr. Zhou followed outpatient  -may be contributing to chronic urine retention     Bullous pemphigoid  -Has developed new blister on right heel  -Completed doxycycline  -Wound care following      Pressure ulcer  -Wound care following      L adrenal adenoma  - incidental finding on CTA abdomen      Severe malnutrition     Expected Discharge Location and Transportation: LTC w/eventual transition to Hospice, transfer to  if  bed available  Expected Discharge   Expected Discharge Date: 1/15/2024; Expected Discharge Time:      DVT prophylaxis:  No DVT prophylaxis order currently exists.     AM-PAC 6 Clicks Score (PT): 7 (01/11/24 0800)    CODE STATUS:   Code Status and Medical Interventions:   Ordered at: 01/07/24 1143     Code Status (Patient has no pulse and is not breathing):    No CPR (Do Not Attempt to Resuscitate)     Medical Interventions (Patient has pulse or is breathing):    Comfort Measures       Karon Patiño DO  01/11/24        Electronically signed by Karon Patiño DO at 01/11/24 1050       Janine Gilliland APRN at 01/10/24 1341          Review of chart with no significant events. Patient sleeping at time of visit, appears comfortable. Son and daughter speaking with Diabetic Educator. Potential plan for STR. Hospice following. GOC established and symptoms well managed. Palliative Care will sign off. Please re-consult if any needs.     Electronically signed by Janine Gilliland APRN at 01/10/24 1344       Hung Edgar MD at 01/10/24 1314          Neurology       Patient Care Team:  Varun Pa MD as PCP - General (Internal Medicine)    Chief complaint: Dementia/depression    History: Patient continues to wax and wane.    She is currently being fed by her daughter needing a bit.    She declined medications last night.    Daughter is impressed that the [employer] has made a significant difference.    They are currently working on [employer] or similar.  Past Medical History:   Diagnosis Date    CAD (coronary artery disease)     Dementia     Diabetes mellitus     Hyperlipemia     Hypertension        Vital Signs   Vitals:    01/06/24 0050 01/06/24 0300 01/06/24 1800 01/09/24 0813   BP: 111/53 112/61  115/72   BP Location: Right arm Right arm     Patient Position: Lying Lying     Pulse: 62 60     Resp: 18 18     Temp:  97.3 °F (36.3 °C)     TempSrc:  Axillary     SpO2:       Weight:   99 kg (218 lb  4.1 oz)    Height:           Physical Exam:   General: Awake              Neuro: Calm with family members present.  Eating without choking.    Results Review:  Reviewed  Results from last 7 days   Lab Units 24  0352   WBC 10*3/mm3 8.43   HEMOGLOBIN g/dL 9.7*   HEMATOCRIT % 29.6*   PLATELETS 10*3/mm3 195     Results from last 7 days   Lab Units 24  0352   SODIUM mmol/L 139   POTASSIUM mmol/L 3.3*   CHLORIDE mmol/L 102   CO2 mmol/L 25.0   BUN mg/dL 6*   CREATININE mg/dL 0.41*   CALCIUM mg/dL 7.4*   GLUCOSE mg/dL 108*       Imaging Results (Last 24 Hours)       ** No results found for the last 24 hours. **            Assessment:  Dementia.    Major depression.        Plan:  Continue efforts to encourage it depressant medication compliance.    Nursing home placement    Comment:  Extremely guarded prognosis discussed with the patient's children and          I discussed the patients findings and my recommendations with family and nursing staff    Hung Edgar MD  01/10/24  13:15 EST          Electronically signed by Hung Edgar MD at 01/10/24 1316       Majo Collins DO at 01/10/24 1155              [facility] Medicine Services  PROGRESS NOTE    Patient Name: Omar Mendoza  : 1940  MRN: [id number]    Date of Admission: 2023  Primary Care Physician: Varun Pa MD    Subjective   Subjective     CC:  Weakness      HPI:  Patient sleeping in bed. More combative today.      Objective   Objective     Vital Signs:         Physical Exam:  Constitutional: No acute distress, resting in bed. Did not want to be examined  HENT: NCAT  Musculoskeletal: No bilateral ankle edema      Results Reviewed:  LAB RESULTS:      Lab 24  0352 24  2321   WBC 8.43 6.34   HEMOGLOBIN 9.7* 9.2*   HEMATOCRIT 29.6* 27.8*   PLATELETS 195 181   NEUTROS ABS 4.05 3.49   EOS ABS 3.79* 2.16*   MCV 95.8 96.5   SED RATE  --  14   LACTATE  --  2.7*         Lab  01/05/24  0352 01/04/24  0412   SODIUM 139  --    POTASSIUM 3.3*  --    CHLORIDE 102  --    CO2 25.0  --    ANION GAP 12.0  --    BUN 6*  --    CREATININE 0.41*  --    EGFR 99.0  --    GLUCOSE 108*  --    CALCIUM 7.4*  --    HEMOGLOBIN A1C  --  10.70*                 Lab 01/04/24  0412   CHOLESTEROL 94   LDL CHOL 39   HDL CHOL 33*   TRIGLYCERIDES 120             Brief Urine Lab Results  (Last result in the past 365 days)        Color   Clarity   Blood   Leuk Est   Nitrite   Protein   CREAT   Urine HCG        01/04/24 0053 Yellow   Clear   Negative   Trace   Negative   Negative                   Microbiology Results Abnormal       Procedure Component Value - Date/Time    COVID-19 RAPID AG,VERITOR,COR/JAMARI/PAD/NUPUR/ASHA/LAG/BRITTANY/ IN-HOUSE,DRY SWAB, 1 HR TAT - Swab, Nasal Cavity [[id number]]  (Normal) Collected: 01/04/24 0004    Lab Status: Final result Specimen: Swab from Nasal Cavity Updated: 01/04/24 0022     COVID19 Presumptive Negative    Narrative:      Fact sheets for providers: https://www.fda.gov/media/856623/download    Fact sheets for patients: https://www.fda.gov/media/168046/download    Blood Culture - Blood, Blood, PICC Line [[id number]]  (Normal) Collected: 12/27/23 1400    Lab Status: Final result Specimen: Blood, PICC Line Updated: 01/01/24 1531     Blood Culture No growth at 5 days    Blood Culture - Blood, Arm, Right [[id number]]  (Normal) Collected: 12/27/23 0431    Lab Status: Final result Specimen: Blood from Arm, Right Updated: 01/01/24 0515     Blood Culture No growth at 5 days            No radiology results from the last 24 hrs    Results for orders placed during the hospital encounter of 12/26/23    Adult Transthoracic Echo Complete W/ Cont if Necessary Per Protocol    Interpretation Summary    Left ventricular systolic function is normal. Calculated left ventricular EF = 57% Left ventricular ejection fraction appears to be 56 - 60%.    Left ventricular wall thickness is consistent with mild to  moderate concentric hypertrophy.    Left ventricular diastolic function was normal.    Estimated right ventricular systolic pressure from tricuspid regurgitation is mildly elevated (35-45 mmHg).    There is a trivial pericardial effusion.    Mild aortic valve regurgitation.      Current medications:  Scheduled Meds:melatonin, 5 mg, Oral, Nightly  mirtazapine, 15 mg, Oral, Nightly  rivastigmine, 1 patch, Transdermal, Daily  sodium chloride, 10 mL, Intravenous, Q12H      Continuous Infusions:   PRN Meds:.  acetaminophen **OR** acetaminophen **OR** acetaminophen    senna-docusate sodium **AND** polyethylene glycol **AND** bisacodyl **AND** bisacodyl    Calcium Replacement - Follow Nurse / BPA Driven Protocol    dextrose    dextrose    glucagon (human recombinant)    glycopyrrolate    haloperidol lactate    hydrOXYzine    Magnesium Standard Dose Replacement - Follow Nurse / BPA Driven Protocol    Morphine    nitroglycerin    ondansetron    Phosphorus Replacement - Follow Nurse / BPA Driven Protocol    Potassium Replacement - Follow Nurse / BPA Driven Protocol    sodium chloride    ziprasidone    Assessment & Plan   Assessment & Plan     Active Hospital Problems    Diagnosis  POA    **UTI (urinary tract infection) [N39.0]  Yes    Acute constipation [K59.00]  Unknown    Acute urinary retention [R33.8]  Unknown    Uterine prolapse [N81.4]  Unknown    Bradycardia [R00.1]  Unknown    Pressure injury of buttock, unstageable [L89.300]  Unknown    Type 2 diabetes mellitus with ketoacidosis, without long-term current use of insulin [E11.10]  Unknown    Disorientation [R41.0]  Unknown    Pleural effusion [J90]  Unknown    Severe malnutrition [E43]  Yes    Edema of lower extremity [R60.0]  Yes    Bullous pemphigoid [L12.0]  Yes    Dyslipidemia [E78.5]  Yes    Essential hypertension [I10]  Yes      Resolved Hospital Problems   No resolved problems to display.        This patient's assessments and plans were partially entered by my  partner and updated as appropriate by me on 1/7/2024     Brief Hospital Course to date:  Omar Mendoza is a 83 y.o. female with history of type 2 diabetes, hypertension, history of uterine prolapse, urinary retention with Schultz catheter in place, history of bullous pemphigoid  (s/p Dupixent 10/2023, prednisone/doxycycline 11/2023) CAD, hyperlipidemia, presumed dementia who presented to the ED with generalized weakness and bullous pemphigoid blister on the heel.  Patient developed significant encephalopathy, and paranoia during her hospitalization.  Neurology was consulted and determined patient to be suffering from dementia complicated by depression and paranoia.  Patient refused to eat and drink.  Goals of care discussion had with family, palliative care consulted, pursuing hospice at this time.      Acute metabolic encephalopathy  Dementia with suspected Alzheimer's disease  Major depression with paranoia  Generalized weakness  - Family pursuing placement and then transition to hospice, desire comfort, minimizing any interventions on the patient and will not force any medical therapies. Plan will be discharge to LTC with eventual transition to hospice and she currently does not meet inpatient criteria  - neurology adjusting medications, patient is not always agreeable to taking these     Dysphagia  - comfort diet given patient is comfort measures only     Bilateral pleural effusion  L>R  Bibasilar airspace disease versus pneumonia  -Concern for aspiration on 12/30, Completed 5 days of Rocephin and doxycycline     Anemia  -suspect anemia of chronic disease, stable with no active signs of bleeding     Poor venous access  -Removed PICC overnight 1/3     Sinus bradycardia  - hold amiodarone, decreased metoprolol to 50 mg BID w/ hold parameters, patient not really taking meds      BLE edema  Decreased pulses LLE  - CTA abd showing small right pleural effusion, bilateral basilar atelectasis  - CTA w/ runoff showing  normal right sided runoff; left side delayed runoff possibly r/t venous stasis changes, no evidence of arterial occlusion or significant stenosis  -Echo revealed EF 57% with normal diastolic function      Uncontrolled Type 2 diabetes  -Hgb A1C 12.10 on 12/27     Constipation  -Continue aggressive bowel regimen, s/p soapsuds enema x 1  -s/p Milk of mag x1, Miralax daily, suppository x1   -Continue prn regimen  -chronic loza catheter in place     Uterine prolapse  -Gyn Dr. Zhou followed outpatient  -may be contributing to chronic urine retention     Bullous pemphigoid  -Has developed new blister on right heel  -Completed doxycycline  -Wound care following      Pressure ulcer  -Wound care following      L adrenal adenoma  - incidental finding on CTA abdomen      Severe malnutrition     Expected Discharge Location and Transportation: LTC w/eventual transition to Hospice, transfer to  if bed available  Expected Discharge   Expected Discharge Date: 1/15/2024; Expected Discharge Time:      DVT prophylaxis:  No DVT prophylaxis order currently exists.     AM-PAC 6 Clicks Score (PT): 6 (01/10/24 0800)    CODE STATUS:   Code Status and Medical Interventions:   Ordered at: 01/07/24 1143     Code Status (Patient has no pulse and is not breathing):    No CPR (Do Not Attempt to Resuscitate)     Medical Interventions (Patient has pulse or is breathing):    Comfort Measures       Majo Collins DO  01/10/24        Electronically signed by Majo Collins DO at 01/10/24 1158       Hung Edgar MD at 01/09/24 1330          Neurology       Patient Care Team:  Varun Pa MD as PCP - General (Internal Medicine)    Chief complaint: Confused    History: Patient doing somewhat better today.    She was cooperative enough to take the mirtazapine and Remeron last night.    She has been behaving nicely particularly with her family and was eating when fed.      Past Medical History:   Diagnosis Date    CAD (coronary  artery disease)     Dementia     Diabetes mellitus     Hyperlipemia     Hypertension        Vital Signs   Vitals:    24 0050 24 0300 24 1800 24 0813   BP: 111/53 112/61  115/72   BP Location: Right arm Right arm     Patient Position: Lying Lying     Pulse: 62 60     Resp: 18 18     Temp:  97.3 °F (36.3 °C)     TempSrc:  Axillary     SpO2:       Weight:   99 kg (218 lb 4.1 oz)    Height:           Physical Exam:   General: Awake              Neuro: Calm.  Not following commands    Results Review:  No new result  Results from last 7 days   Lab Units 24  0352   WBC 10*3/mm3 8.43   HEMOGLOBIN g/dL 9.7*   HEMATOCRIT % 29.6*   PLATELETS 10*3/mm3 195     Results from last 7 days   Lab Units 24  0352 24  1523   SODIUM mmol/L 139  --    POTASSIUM mmol/L 3.3* 3.9   CHLORIDE mmol/L 102  --    CO2 mmol/L 25.0  --    BUN mg/dL 6*  --    CREATININE mg/dL 0.41*  --    CALCIUM mg/dL 7.4*  --    GLUCOSE mg/dL 108*  --        Imaging Results (Last 24 Hours)       ** No results found for the last 24 hours. **            Assessment:  Alzheimer's dementia.    Depression    Plan:  If the patient continues to tolerate the mirtazapine will increase the dose to 30 mg tomorrow night.    Family is working on discharge plans.        Comment:  Improving somewhat.         I discussed the patients findings and my recommendations with nursing staff    Hung Edgar MD  24  13:31 EST          Electronically signed by Hung Edgar MD at 24 1333       Majo Collins DO at 24 1133              [facility]  PROGRESS NOTE    Patient Name: Omar Mendoza  : 1940  MRN: [id number]    Date of Admission: 2023  Primary Care Physician: Varun Pa MD    Subjective   Subjective     CC:  Weakness      HPI:  Patient resting in bed.  at bedside and  feeding her breakfast, more pleasant today.      Objective   Objective      Vital Signs:   BP: (115)/(72) 115/72     Physical Exam:  Constitutional: No acute distress, resting in bed. More cooperative today, no distress  HENT: NCAT  Musculoskeletal: No bilateral ankle edema      Results Reviewed:  LAB RESULTS:      Lab 01/05/24  0352 01/03/24  2321   WBC 8.43 6.34   HEMOGLOBIN 9.7* 9.2*   HEMATOCRIT 29.6* 27.8*   PLATELETS 195 181   NEUTROS ABS 4.05 3.49   EOS ABS 3.79* 2.16*   MCV 95.8 96.5   SED RATE  --  14   LACTATE  --  2.7*         Lab 01/05/24  0352 01/04/24  0412 01/02/24  1523   SODIUM 139  --   --    POTASSIUM 3.3*  --  3.9   CHLORIDE 102  --   --    CO2 25.0  --   --    ANION GAP 12.0  --   --    BUN 6*  --   --    CREATININE 0.41*  --   --    EGFR 99.0  --   --    GLUCOSE 108*  --   --    CALCIUM 7.4*  --   --    HEMOGLOBIN A1C  --  10.70*  --                  Lab 01/04/24  0412   CHOLESTEROL 94   LDL CHOL 39   HDL CHOL 33*   TRIGLYCERIDES 120             Brief Urine Lab Results  (Last result in the past 365 days)        Color   Clarity   Blood   Leuk Est   Nitrite   Protein   CREAT   Urine HCG        01/04/24 0053 Yellow   Clear   Negative   Trace   Negative   Negative                   Microbiology Results Abnormal       Procedure Component Value - Date/Time    COVID-19 RAPID AG,VERITOR,COR/JAMARI/PAD/NUPUR/ASHA/LAG/BRITTANY/ IN-HOUSE,DRY SWAB, 1 HR TAT - Swab, Nasal Cavity [[id number]]  (Normal) Collected: 01/04/24 0004    Lab Status: Final result Specimen: Swab from Nasal Cavity Updated: 01/04/24 0022     COVID19 Presumptive Negative    Narrative:      Fact sheets for providers: https://www.fda.gov/media/521055/download    Fact sheets for patients: https://www.fda.gov/media/920739/download    Blood Culture - Blood, Blood, PICC Line [[id number]]  (Normal) Collected: 12/27/23 1400    Lab Status: Final result Specimen: Blood, PICC Line Updated: 01/01/24 1531     Blood Culture No growth at 5 days    Blood Culture - Blood, Arm, Right [[id number]]  (Normal) Collected: 12/27/23 0431    Lab  Status: Final result Specimen: Blood from Arm, Right Updated: 01/01/24 0515     Blood Culture No growth at 5 days            No radiology results from the last 24 hrs    Results for orders placed during the hospital encounter of 12/26/23    Adult Transthoracic Echo Complete W/ Cont if Necessary Per Protocol    Interpretation Summary    Left ventricular systolic function is normal. Calculated left ventricular EF = 57% Left ventricular ejection fraction appears to be 56 - 60%.    Left ventricular wall thickness is consistent with mild to moderate concentric hypertrophy.    Left ventricular diastolic function was normal.    Estimated right ventricular systolic pressure from tricuspid regurgitation is mildly elevated (35-45 mmHg).    There is a trivial pericardial effusion.    Mild aortic valve regurgitation.      Current medications:  Scheduled Meds:melatonin, 5 mg, Oral, Nightly  mirtazapine, 15 mg, Oral, Nightly  rivastigmine, 1 patch, Transdermal, Daily  sodium chloride, 10 mL, Intravenous, Q12H      Continuous Infusions:   PRN Meds:.  acetaminophen **OR** acetaminophen **OR** acetaminophen    senna-docusate sodium **AND** polyethylene glycol **AND** bisacodyl **AND** bisacodyl    Calcium Replacement - Follow Nurse / BPA Driven Protocol    dextrose    dextrose    glucagon (human recombinant)    glycopyrrolate    haloperidol lactate    hydrOXYzine    Magnesium Standard Dose Replacement - Follow Nurse / BPA Driven Protocol    Morphine    nitroglycerin    ondansetron    Phosphorus Replacement - Follow Nurse / BPA Driven Protocol    Potassium Replacement - Follow Nurse / BPA Driven Protocol    sodium chloride    ziprasidone    Assessment & Plan   Assessment & Plan     Active Hospital Problems    Diagnosis  POA    **UTI (urinary tract infection) [N39.0]  Yes    Acute constipation [K59.00]  Unknown    Acute urinary retention [R33.8]  Unknown    Uterine prolapse [N81.4]  Unknown    Bradycardia [R00.1]  Unknown    Pressure  injury of buttock, unstageable [L89.300]  Unknown    Type 2 diabetes mellitus with ketoacidosis, without long-term current use of insulin [E11.10]  Unknown    Disorientation [R41.0]  Unknown    Pleural effusion [J90]  Unknown    Severe malnutrition [E43]  Yes    Edema of lower extremity [R60.0]  Yes    Bullous pemphigoid [L12.0]  Yes    Dyslipidemia [E78.5]  Yes    Essential hypertension [I10]  Yes      Resolved Hospital Problems   No resolved problems to display.        This patient's assessments and plans were partially entered by my partner and updated as appropriate by me on 1/7/2024     Brief Hospital Course to date:  Omar Mendoza is a 83 y.o. female with history of type 2 diabetes, hypertension, history of uterine prolapse, urinary retention with Schultz catheter in place, history of bullous pemphigoid  (s/p Dupixent 10/2023, prednisone/doxycycline 11/2023) CAD, hyperlipidemia, presumed dementia who presented to the ED with generalized weakness and bullous pemphigoid blister on the heel.  Patient developed significant encephalopathy, and paranoia during her hospitalization.  Neurology was consulted and determined patient to be suffering from dementia complicated by depression and paranoia.  Patient refused to eat and drink.  Goals of care discussion had with family, palliative care consulted, pursuing hospice at this time.      Acute metabolic encephalopathy  Dementia with suspected Alzheimer's disease  Major depression with paranoia  Generalized weakness  -Patient without significant improvement,  Family pursuing hospice, minimizing any interventions on the patient and will not force any medical therapies. Plan will be discharge to LTC with eventual transition to hospice and she currently does not meet inpatient criteria     Dysphagia  - comfort diet given patient is comfort measures only     Bilateral pleural effusion  L>R  Bibasilar airspace disease versus pneumonia  -Concern for aspiration on 12/30,  Completed 5 days of Rocephin and doxycycline     Anemia  -suspect anemia of chronic disease, stable with no active signs of bleeding     Poor venous access  -Removed PICC overnight 1/3     Sinus bradycardia  - hold amiodarone, decreased metoprolol to 50 mg BID w/ hold parameters, patient not really taking meds      BLE edema  Decreased pulses LLE  - CTA abd showing small right pleural effusion, bilateral basilar atelectasis  - CTA w/ runoff showing normal right sided runoff; left side delayed runoff possibly r/t venous stasis changes, no evidence of arterial occlusion or significant stenosis  -Echo revealed EF 57% with normal diastolic function      Uncontrolled Type 2 diabetes  -Hgb A1C 12.10 on 12/27     Constipation  -Continue aggressive bowel regimen, s/p soapsuds enema x 1  -s/p Milk of mag x1, Miralax daily, suppository x1   -Continue prn   -chronic loza catheter in place     Uterine prolapse  -Gyn Dr. Zhou followed outpatient  -may be contributing to chronic urine retention     Bullous pemphigoid  -Has developed new blister on right heel  -Completed doxycycline  -Wound care following      Pressure ulcer  -Wound care following      L adrenal adenoma  - incidental finding on CTA abdomen      Severe malnutrition     Expected Discharge Location and Transportation: LTC w/eventual transition to Hospice  Expected Discharge   Expected Discharge Date: 1/9/2024; Expected Discharge Time:      DVT prophylaxis:  No DVT prophylaxis order currently exists.     AM-PAC 6 Clicks Score (PT): 6 (01/09/24 0800)    CODE STATUS:   Code Status and Medical Interventions:   Ordered at: 01/07/24 1143     Code Status (Patient has no pulse and is not breathing):    No CPR (Do Not Attempt to Resuscitate)     Medical Interventions (Patient has pulse or is breathing):    Comfort Measures       Majo Collins DO  01/09/24        Electronically signed by Majo Collins DO at 01/09/24 8199       Hung Edgar MD at 01/08/24  1605          Neurology       Patient Care Team:  Varun Pa MD as PCP - General (Internal Medicine)    Chief complaint: Dementia    History: The patient has been somewhat more cooperative according to her daughter.  The credit the Exelon for the behavior change.    The mirtazapine and melatonin have been discontinued.    Patient's family is deciding on whether to have her go to [facility] with help.  Past Medical History:   Diagnosis Date    CAD (coronary artery disease)     Dementia     Diabetes mellitus     Hyperlipemia     Hypertension        Vital Signs   Vitals:    01/05/24 2100 01/06/24 0050 01/06/24 0300 01/06/24 1800   BP: 111/74 111/53 112/61    BP Location: Right arm Right arm Right arm    Patient Position: Lying Lying Lying    Pulse: 74 62 60    Resp: 18 18 18    Temp:   97.3 °F (36.3 °C)    TempSrc:   Axillary    SpO2:       Weight:    99 kg (218 lb 4.1 oz)   Height:           Physical Exam:   General: Sleeping              Neuro: Not examined    Results Review:  No new results  Results from last 7 days   Lab Units 01/05/24  0352   WBC 10*3/mm3 8.43   HEMOGLOBIN g/dL 9.7*   HEMATOCRIT % 29.6*   PLATELETS 10*3/mm3 195     Results from last 7 days   Lab Units 01/05/24  0352 01/02/24  1523 01/02/24  0608   SODIUM mmol/L 139  --  145   POTASSIUM mmol/L 3.3* 3.9 3.4*   CHLORIDE mmol/L 102  --  110*   CO2 mmol/L 25.0  --  28.0   BUN mg/dL 6*  --  13   CREATININE mg/dL 0.41*  --  0.48*   CALCIUM mg/dL 7.4*  --  7.5*   GLUCOSE mg/dL 108*  --  148*       Imaging Results (Last 24 Hours)       ** No results found for the last 24 hours. **            Assessment:  Alzheimer's dementia.    Depression.        Plan:  Continue Exelon patch 9.5.    Resume mirtazapine 15 mg at bedtime.    Resume melatonin 5 mg at bedtime.        Comment:  The patient is unlikely to make  progress unless she is on an antidepressant.  This was discussed in person with the patient's  over the phone with the patient's  daughter and son.         I discussed the patients findings and my recommendations with family    Hung Edgar MD  01/08/24  16:05 EST          Electronically signed by Hung Edgar MD at 01/08/24 1608       Hung Edgar MD at 01/08/24 1447          Neurology       Patient Care Team:  Varun Pa MD as PCP - General (Internal Medicine)    Chief complaint: Altered mental state    History: The patient is declining oral intake.  The family is opted for hospice and not forcing her to eat.    Plans are currently underway for transition.  Past Medical History:   Diagnosis Date    CAD (coronary artery disease)     Dementia     Diabetes mellitus     Hyperlipemia     Hypertension        Vital Signs   Vitals:    01/05/24 2100 01/06/24 0050 01/06/24 0300 01/06/24 1800   BP: 111/74 111/53 112/61    BP Location: Right arm Right arm Right arm    Patient Position: Lying Lying Lying    Pulse: 74 62 60    Resp: 18 18 18    Temp:   97.3 °F (36.3 °C)    TempSrc:   Axillary    SpO2:       Weight:    99 kg (218 lb 4.1 oz)   Height:           Physical Exam:   General: Sleep              Neuro: Not aroused    Results Review:  No new result  Results from last 7 days   Lab Units 01/05/24  0352   WBC 10*3/mm3 8.43   HEMOGLOBIN g/dL 9.7*   HEMATOCRIT % 29.6*   PLATELETS 10*3/mm3 195     Results from last 7 days   Lab Units 01/05/24  0352 01/02/24  1523 01/02/24  0608   SODIUM mmol/L 139  --  145   POTASSIUM mmol/L 3.3* 3.9 3.4*   CHLORIDE mmol/L 102  --  110*   CO2 mmol/L 25.0  --  28.0   BUN mg/dL 6*  --  13   CREATININE mg/dL 0.41*  --  0.48*   CALCIUM mg/dL 7.4*  --  7.5*   GLUCOSE mg/dL 108*  --  148*       Imaging Results (Last 24 Hours)       ** No results found for the last 24 hours. **            Assessment:  Dementia likely Alzheimer's disease.    Hospital delirium.    Depression.        Plan:  Discharge to home with plans to arrange.  Hopefully the patient will resume some of the medications in hopes to  clear her depression which certainly is a significant part of her illness.    That said her overall prognosis with the Alzheimer's is not of a progressive degenerative disease and quite guarded in the intermediate term.    This was discussed with the patient's  and her his cousin.  Attempted to reach the patient's son who is a power of  were unsuccessful on several occasions.    Comment:  Guarded prognosis         I discussed the patients findings and my recommendations with family    Hung Edgar MD  01/08/24  14:48 EST          Electronically signed by Hung Edgar MD at 01/08/24 1450       Consult Notes (last 72 hours)  Notes from 01/08/24 1439 through 01/11/24 1439   No notes of this type exist for this encounter.

## 2024-01-11 NOTE — PROGRESS NOTES
Neurology       Patient Care Team:  Varun Pa MD as PCP - General (Internal Medicine)    Chief complaint: Dementia/depression    History: Patient ate some breakfast.  She took her pills last night.    He is sleepy at the moment but wakes and speaks to no one in particular.      Past Medical History:   Diagnosis Date    CAD (coronary artery disease)     Dementia     Diabetes mellitus     Hyperlipemia     Hypertension        Vital Signs   Vitals:    01/06/24 0300 01/06/24 1800 01/09/24 0813 01/10/24 2335   BP: 112/61  115/72 122/89   BP Location: Right arm   Left arm   Patient Position: Lying   Lying   Pulse: 60      Resp: 18   18   Temp: 97.3 °F (36.3 °C)   98.3 °F (36.8 °C)   TempSrc: Axillary   Axillary   SpO2:       Weight:  99 kg (218 lb 4.1 oz)     Height:           Physical Exam:   General: Sleeping              Neuro: Minimally restless when awake    Results Review:  No new results  Results from last 7 days   Lab Units 01/05/24  0352   WBC 10*3/mm3 8.43   HEMOGLOBIN g/dL 9.7*   HEMATOCRIT % 29.6*   PLATELETS 10*3/mm3 195     Results from last 7 days   Lab Units 01/05/24  0352   SODIUM mmol/L 139   POTASSIUM mmol/L 3.3*   CHLORIDE mmol/L 102   CO2 mmol/L 25.0   BUN mg/dL 6*   CREATININE mg/dL 0.41*   CALCIUM mg/dL 7.4*   GLUCOSE mg/dL 108*       Imaging Results (Last 24 Hours)       ** No results found for the last 24 hours. **            Assessment:  Alzheimer's disease.    Depression.        Plan:  Change melatonin to as needed for sleep.    The patient is not too sleepy in the next day or 2 the mirtazapine can be increased to 30 mg.    Comment:  Guarded prognosis discussed with the patient's .    Will see the patient on request.         I discussed the patients findings and my recommendations with family and nursing staff    Hung Edgar MD  01/11/24  10:59 EST

## 2024-01-12 PROCEDURE — 97530 THERAPEUTIC ACTIVITIES: CPT

## 2024-01-12 PROCEDURE — 99231 SBSQ HOSP IP/OBS SF/LOW 25: CPT | Performed by: FAMILY MEDICINE

## 2024-01-12 PROCEDURE — 97110 THERAPEUTIC EXERCISES: CPT

## 2024-01-12 RX ADMIN — RIVASTIGMINE TRANSDERMAL SYSTEM 1 PATCH: 9.5 PATCH, EXTENDED RELEASE TRANSDERMAL at 14:13

## 2024-01-12 RX ADMIN — MIRTAZAPINE 15 MG: 15 TABLET, FILM COATED ORAL at 18:19

## 2024-01-12 NOTE — PROGRESS NOTES
Continued Stay Note  [facility]     Patient Name: Omar Mendoza  MRN: [id number]  Today's Date: 1/12/2024    Admit Date: 12/26/2023    Plan: TBD   Discharge Plan       Row Name 01/12/24 9958       Plan    Plan TBD    Plan Comments Hospice liaison following peripherally while placement is sought. Plan for STR if appropriate. Agreeable to follow up phone contact from hospice after discharge/STR completed.      Row Name 01/12/24 1058                                    Discharge Codes    No documentation.                 Expected Discharge Date and Time       Expected Discharge Date Expected Discharge Time    Freddy 15, 2024               Laquata Hume, RN

## 2024-01-12 NOTE — THERAPY TREATMENT NOTE
Patient Name: Omar Mendoza  : 1940    MRN: [id number]                              Today's Date: 2024       Admit Date: 2023    Visit Dx:     ICD-10-CM ICD-9-CM   1. Generalized weakness  R53.1 780.79   2. Acute UTI  N39.0 599.0   3. Bullous pemphigoid  L12.0 694.5   4. Venous insufficiency  I87.2 459.81   5. Dysphagia, unspecified type  R13.10 787.20     Patient Active Problem List   Diagnosis    UTI (urinary tract infection)    Bullous pemphigoid    Essential hypertension    Dyslipidemia    Edema of lower extremity    Cholestatic hepatitis    Acute constipation    Acute urinary retention    Uterine prolapse    Bradycardia    Pressure injury of buttock, unstageable    Type 2 diabetes mellitus with ketoacidosis, without long-term current use of insulin    Disorientation    Pleural effusion    Severe malnutrition     Past Medical History:   Diagnosis Date    CAD (coronary artery disease)     Dementia     Diabetes mellitus     Hyperlipemia     Hypertension      History reviewed. No pertinent surgical history.   General Information       Row Name 24 1431          OT Time and Intention    Document Type therapy note (daily note)  -CS     Mode of Treatment occupational therapy  -CS       Row Name 24 1431          General Information    Existing Precautions/Restrictions fall;seizures  -CS     Barriers to Rehab medically complex;cognitive status  -CS       Row Name 24 143          Cognition    Orientation Status (Cognition) oriented to;person;disoriented to;place;time  -CS       Row Name 24 1431          Safety Issues, Functional Mobility    Impairments Affecting Function (Mobility) balance;cognition;endurance/activity tolerance;postural/trunk control;strength  -CS     Cognitive Impairments, Mobility Safety/Performance attention;insight into deficits/self-awareness;sequencing abilities  -CS               User Key  (r) = Recorded By, (t) = Taken By, (c) = Cosigned By      Initials  Name Provider Type    CS Eduarda Avelar, ARNOLD Occupational Therapist                     Mobility/ADL's       Row Name 01/12/24 143          Bed Mobility    Bed Mobility rolling left;rolling right  -CS     Rolling Left [place] (Bed Mobility) maximum assist (25% patient effort);2 person assist;verbal cues  -CS     Rolling Right [place] (Bed Mobility) maximum assist (25% patient effort);2 person assist;verbal cues  -CS     Assistive Device (Bed Mobility) bed rails;draw sheet  -       Row Name 01/12/24 [facility]          Transfers    Transfers bed-chair transfer  -CS       Row Name 01/12/24 [facility]          Bed-Chair Transfer    Bed-Chair [place] (Transfers) dependent (less than 25% patient effort);2 person assist;verbal cues  -CS     Assistive Device (Bed-Chair Transfers) lift device  -CS       Row Name 01/12/24 [facility]          Activities of Daily Living    BADL Assessment/Intervention lower body dressing;feeding  -CS       Row Name 01/12/24 [facility]          Lower Body Dressing Assessment/Training    [place] Level (Lower Body Dressing) doff;don;socks;dependent (less than 25% patient effort)  -CS     Position (Lower Body Dressing) supine  -CS       Row Name 01/12/24 [facility]          Self-Feeding Assessment/Training    [place] Level (Feeding) scoop food and bring to mouth;set up  -CS     Position (Self-Feeding) sitting up in bed  -CS               User Key  (r) = Recorded By, (t) = Taken By, (c) = Cosigned By      Initials Name Provider Type    CS Eduarda Avelar, OT Occupational Therapist                   Obj/Interventions    No documentation.                  Goals/Plan    No documentation.                  Clinical Impression       Row Name 01/12/24 [facility]          Pain Assessment    Pretreatment Pain Rating 0/10 - no pain  -CS     Posttreatment Pain Rating 0/10 - no pain  -CS       Row Name 01/12/24 [facility]          Plan of Care Review    Plan of Care Reviewed With patient  -CS     Progress improving  -CS      Outcome Evaluation Pt demo improved independence by performing self-feeding tasks w/ Set-up and tolerated bed/chair t/f w/ mechanical lift. Pt conts to be limited d/t significant generalized weakness and lethargy warranting cont skilled IPOT POC to promote return to PLOF. Recommend pt DC to SNF.  -CS       Row Name 01/12/24 1432          Therapy Plan Review/Discharge Plan (OT)    Anticipated Discharge Disposition (OT) skilled nursing facility  -CS       Row Name 01/12/24 1432          Vital Signs    Pre Patient Position Supine  -CS     Intra Patient Position Supine  -CS     Post Patient Position Sitting  -CS       Row Name 01/12/24 1432          Positioning and Restraints    Pre-Treatment Position in bed  -CS     Post Treatment Position chair  -CS     In Chair notified nsg;reclined;call light within reach;encouraged to call for assist;with PT;on mechanical lift sling;waffle cushion;with family/caregiver  -CS               User Key  (r) = Recorded By, (t) = Taken By, (c) = Cosigned By      Initials Name Provider Type    CS Eduarda Avelar, OT Occupational Therapist                   Outcome Measures       Row Name 01/12/24 1433          How much help from another is currently needed...    Putting on and taking off regular lower body clothing? 1  -CS     Bathing (including washing, rinsing, and drying) 1  -CS     Toileting (which includes using toilet bed pan or urinal) 1  -CS     Putting on and taking off regular upper body clothing 2  -CS     Taking care of personal grooming (such as brushing teeth) 2  -CS     Eating meals 3  -CS     AM-PAC 6 Clicks Score (OT) 10  -CS       Row Name 01/12/24 0800          How much help from another person do you currently need...    Turning from your back to your side while in flat bed without using bedrails? 1  -LG     Moving from lying on back to sitting on the side of a flat bed without bedrails? 1  -LG     Moving to and from a bed to a chair (including a wheelchair)? 1  -LG      Standing up from a chair using your arms (e.g., wheelchair, bedside chair)? 1  -LG     Climbing 3-5 steps with a railing? 1  -LG     To walk in hospital room? 1  -LG     AM-PAC 6 Clicks Score (PT) 6  -     Highest Level of Mobility Goal 2 --> Bed activities/dependent transfer  -       Row Name 01/12/24 1433          Functional Assessment    Outcome Measure Options AM-PAC 6 Clicks Daily Activity (OT)  -CS               User Key  (r) = Recorded By, (t) = Taken By, (c) = Cosigned By      Initials Name Provider Type    LG Afua Gomez RN Registered Nurse    Eduarda Lizarraga OT Occupational Therapist                    Occupational Therapy Education       Title: PT OT SLP Therapies (In Progress)       Topic: Occupational Therapy (In Progress)       Point: ADL training (In Progress)       Description:   Instruct learner(s) on proper safety adaptation and remediation techniques during self care or transfers.   Instruct in proper use of assistive devices.                  Learning Progress Summary             Patient Acceptance, E, NR by  at 1/12/2024 1434    Acceptance, E, NR by  at 1/10/2024 1023    Nonacceptance, E, NL by  at 1/5/2024 1044    Acceptance, E, NR by  at 1/3/2024 1435    Acceptance, E, VU,NR by IDLIP at 1/1/2024 0920    Comment: oriented to date, need to move and position RLE, ADL progression, sequencing bed mobililty    Acceptance, E, NR by  at 12/27/2023 1135   Family Acceptance, E, VU,NR by  at 1/1/2024 0920    Comment: oriented to date, need to move and position RLE, ADL progression, sequencing bed mobililty                         Point: Home exercise program (Not Started)       Description:   Instruct learner(s) on appropriate technique for monitoring, assisting and/or progressing therapeutic exercises/activities.                  Learner Progress:  Not documented in this visit.              Point: Precautions (In Progress)       Description:   Instruct learner(s) on prescribed  precautions during self-care and functional transfers.                  Learning Progress Summary             Patient Acceptance, E, NR by CS at 1/12/2024 1434    Acceptance, E, VU,NR by DILIP at 1/1/2024 0920    Comment: oriented to date, need to move and position RLE, ADL progression, sequencing bed mobililty   Family Acceptance, E, VU,NR by DILIP at 1/1/2024 0920    Comment: oriented to date, need to move and position RLE, ADL progression, sequencing bed mobililty                         Point: Body mechanics (In Progress)       Description:   Instruct learner(s) on proper positioning and spine alignment during self-care, functional mobility activities and/or exercises.                  Learning Progress Summary             Patient Acceptance, E, NR by  at 1/12/2024 1434    Acceptance, E, VU,NR by DILIP at 1/1/2024 0920    Comment: oriented to date, need to move and position RLE, ADL progression, sequencing bed mobililty   Family Acceptance, E, VU,NR by DILIP at 1/1/2024 0920    Comment: oriented to date, need to move and position RLE, ADL progression, sequencing bed mobililty                                         User Key       Initials Effective Dates Name Provider Type Discipline     07/11/23 -  Rayna Wall, OT Occupational Therapist OT     02/03/23 -  Sepideh Feldman OT Occupational Therapist OT     09/02/21 -  Eduarda Avelar, OT Occupational Therapist OT                  OT Recommendation and Plan     Plan of Care Review  Plan of Care Reviewed With: patient  Progress: improving  Outcome Evaluation: Pt demo improved independence by performing self-feeding tasks w/ Set-up and tolerated bed/chair t/f w/ mechanical lift. Pt conts to be limited d/t significant generalized weakness and lethargy warranting cont skilled IPOT POC to promote return to PLOF. Recommend pt DC to SNF.     Time Calculation:         Time Calculation- OT       Row Name 01/12/24 6747             Time Calculation- OT    OT Start Time 6541   -CS      OT Received On 01/12/24  -CS      OT Goal Re-Cert Due Date 01/15/24  -CS         Timed Charges    23982 - OT Therapeutic Activity Minutes 5  -CS      16241 - OT Self Care/Mgmt Minutes 5  -CS         Total Minutes    Timed Charges Total Minutes 10  -CS       Total Minutes 10  -CS                User Key  (r) = Recorded By, (t) = Taken By, (c) = Cosigned By      Initials Name Provider Type    CS Eduarda Avelar OT Occupational Therapist                  Therapy Charges for Today       Code Description Service Date Service Provider Modifiers Qty    24957225198  OT THERAPEUTIC ACT EA 15 MIN 1/12/2024 Eduarda Avelar OT GO 1                 Eduarda Avelar OT  1/12/2024

## 2024-01-12 NOTE — PLAN OF CARE
Goal Outcome Evaluation:  Plan of Care Reviewed With: patient, spouse        Progress: improving  Outcome Evaluation: Pt limited by increased fatigue this date but overall gave good effort with therapy. Will continue to progress as able to address functional limitations and promote return to PLOF. PT rec SNF at d/c.      Anticipated Discharge Disposition (PT): skilled nursing facility

## 2024-01-12 NOTE — CASE MANAGEMENT/SOCIAL WORK
Continued Stay Note  [facility]     Patient Name: Omar Mendoza  MRN: [id number]  Today's Date: 1/12/2024    Admit Date: 12/26/2023    Plan: Seeking a rehab to LTC with transport provided.   Discharge Plan       Row Name 01/12/24 1058       Plan    Plan Seeking a rehab to LTC with transport provided.    Plan Comments [facility] bedside. Patient was sleeping peacefully. [facility] followed up on referrals.  [facility] with April at [facility] [phone] who is reviewing patient. [facility] spoke with [employer] rep who explains they are unsure of she has a been at [facility]; checking availability. [facility] spoke with patients Son BRIE Mayo who explains, Patients Medicare sure switch to traditional Medicare on 2/1 and they are working on applying for Medicaid. Patient to work with PT/OT [facility] will send out updated referrals. Awaiting bed offer. Plan is rehab to LTC with transport provided.                   Discharge Codes    No documentation.                 Expected Discharge Date and Time       Expected Discharge Date Expected Discharge Time    Freddy 15, 2024               MARIOLA Cheek (Kay)

## 2024-01-12 NOTE — PROGRESS NOTES
Continued Stay Note  [facility]     Patient Name: Omar Mendoza  MRN: [id number]  Today's Date: 1/11/2024    Admit Date: 12/26/2023    Plan: Placement   Discharge Plan       Row Name 01/11/24 1919       Plan    Plan Placement    Plan Comments Visit made to pt, pt sitting up in bed eating dinner,  at the bedside.  and pt denied any needs Will continue to follow while placement is found. Please call [phone] if can be of further assistance.                   Discharge Codes    No documentation.                 Expected Discharge Date and Time       Expected Discharge Date Expected Discharge Time    Freddy 15, 2024               Sarah Farris RN

## 2024-01-12 NOTE — THERAPY TREATMENT NOTE
Patient Name: Omar Mendoza  : 1940    MRN: [id number]                              Today's Date: 2024       Admit Date: 2023    Visit Dx:     ICD-10-CM ICD-9-CM   1. Generalized weakness  R53.1 780.79   2. Acute UTI  N39.0 599.0   3. Bullous pemphigoid  L12.0 694.5   4. Venous insufficiency  I87.2 459.81   5. Dysphagia, unspecified type  R13.10 787.20     Patient Active Problem List   Diagnosis    UTI (urinary tract infection)    Bullous pemphigoid    Essential hypertension    Dyslipidemia    Edema of lower extremity    Cholestatic hepatitis    Acute constipation    Acute urinary retention    Uterine prolapse    Bradycardia    Pressure injury of buttock, unstageable    Type 2 diabetes mellitus with ketoacidosis, without long-term current use of insulin    Disorientation    Pleural effusion    Severe malnutrition     Past Medical History:   Diagnosis Date    CAD (coronary artery disease)     Dementia     Diabetes mellitus     Hyperlipemia     Hypertension      History reviewed. No pertinent surgical history.   General Information       Row Name 24 1509          Physical Therapy Time and Intention    Document Type therapy note (daily note)  -ES     Mode of Treatment physical therapy  -ES       Row Name 24 1509          General Information    Patient Profile Reviewed yes  -ES     Existing Precautions/Restrictions fall;seizures  -ES     Barriers to Rehab medically complex;cognitive status  -ES       Row Name 24 1509          Cognition    Orientation Status (Cognition) oriented to;person;disoriented to;place;time  -ES       Row Name 24 1509          Safety Issues, Functional Mobility    Safety Issues Affecting Function (Mobility) awareness of need for assistance;insight into deficits/self-awareness;safety precaution awareness;safety precautions follow-through/compliance;sequencing abilities  -ES     Impairments Affecting Function (Mobility) balance;cognition;endurance/activity  tolerance;postural/trunk control;strength  -ES     Cognitive Impairments, Mobility Safety/Performance awareness, need for assistance;insight into deficits/self-awareness;problem-solving/reasoning;safety precaution awareness;safety precaution follow-through;sequencing abilities  -ES               User Key  (r) = Recorded By, (t) = Taken By, (c) = Cosigned By      Initials Name Provider Type    Carly Carson, CHELO Physical Therapist                   Mobility       Row Name 01/12/24 1510          Bed Mobility    Comment, (Bed Mobility) Received [facility]  -ES       Row Name 01/12/24 1510          Sit-Stand Transfer    Sit-Stand [place] (Transfers) unable to assess  -ES     Comment, (Sit-Stand Transfer) Unable to assess due to increased fatigue  -ES       Row Name 01/12/24 1510          Gait/Stairs (Locomotion)    Comment, (Gait/Stairs) Unable to assess due to increased fatigue  -ES               User Key  (r) = Recorded By, (t) = Taken By, (c) = Cosigned By      Initials Name Provider Type    Carly Carson PT Physical Therapist                   Obj/Interventions       Row Name 01/12/24 1510          Motor Skills    Therapeutic Exercise ankle;hip;knee  -ES       Row Name 01/12/24 1510          Hip (Therapeutic Exercise)    Hip (Therapeutic Exercise) strengthening exercise  -ES     Hip Strengthening (Therapeutic Exercise) bilateral;flexion;aBduction;aDduction;10 repetitions  -ES       Row Name 01/12/24 1510          Knee (Therapeutic Exercise)    Knee (Therapeutic Exercise) strengthening exercise  -ES     Knee Strengthening (Therapeutic Exercise) bilateral;heel slides;marching while seated;LAQ (long arc quad);10 repetitions  -ES       Row Name 01/12/24 1510          Ankle (Therapeutic Exercise)    Ankle (Therapeutic Exercise) AROM (active range of motion)  -ES     Ankle AROM (Therapeutic Exercise) bilateral;dorsiflexion;plantarflexion;10 repetitions  -ES       Row Name 01/12/24 1510          Balance     Balance Assessment sitting static balance;sitting dynamic balance  -ES     Static Sitting Balance contact guard  -ES     Dynamic Sitting Balance moderate assist;verbal cues  -ES     Position, Sitting Balance supported;sitting in chair  -ES     Position/Device Used, Standing Balance supported  -ES     Balance Interventions sitting;supported;dynamic;static;occupation based/functional task  -ES     Comment, Balance no LOB  -ES               User Key  (r) = Recorded By, (t) = Taken By, (c) = Cosigned By      Initials Name Provider Type    ES Carly Mistry, PT Physical Therapist                   Goals/Plan    No documentation.                  Clinical Impression       Row Name 01/12/24 1511          Pain    Pretreatment Pain Rating 0/10 - no pain  -ES     Posttreatment Pain Rating 0/10 - no pain  -ES     Pre/Posttreatment Pain Comment tolerated  -ES     Pain Intervention(s) Repositioned;Ambulation/increased activity  -ES       Row Name 01/12/24 1511          Plan of Care Review    Plan of Care Reviewed With patient;spouse  -ES     Progress improving  -ES     Outcome Evaluation Pt limited by increased fatigue this date but overall gave good effort with therapy. Will continue to progress as able to address functional limitations and promote return to PLOF. PT rec SNF at d/c.  -ES       Row Name 01/12/24 1511          Therapy Assessment/Plan (PT)    Rehab Potential (PT) good, to achieve stated therapy goals  -ES     Criteria for Skilled Interventions Met (PT) yes;meets criteria;skilled treatment is necessary  -ES     Therapy Frequency (PT) daily  -ES       Row Name 01/12/24 1511          Vital Signs    Pre Systolic BP Rehab --  non-tele. cleared by RN.  -ES     O2 Delivery Pre Treatment room air  -ES     O2 Delivery Intra Treatment room air  -ES     O2 Delivery Post Treatment room air  -ES     Pre Patient Position Sitting  -ES     Intra Patient Position Sitting  -ES     Post Patient Position Sitting  -ES       Row Name  01/12/24 1511          Positioning and Restraints    Pre-Treatment Position sitting in chair/recliner  -ES     Post Treatment Position chair  -ES     In Chair notified nsg;reclined;sitting;call light within reach;encouraged to call for assist;exit alarm on;with family/caregiver;on mechanical lift sling;waffle cushion;legs elevated;heels elevated  -ES               User Key  (r) = Recorded By, (t) = Taken By, (c) = Cosigned By      Initials Name Provider Type    Carly Carson PT Physical Therapist                   Outcome Measures       Row Name 01/12/24 1513 01/12/24 0800       How much help from another person do you currently need...    Turning from your back to your side while in flat bed without using bedrails? 2  -ES 1  -LG    Moving from lying on back to sitting on the side of a flat bed without bedrails? 2  -ES 1  -LG    Moving to and from a bed to a chair (including a wheelchair)? 1  -ES 1  -LG    Standing up from a chair using your arms (e.g., wheelchair, bedside chair)? 1  -ES 1  -LG    Climbing 3-5 steps with a railing? 1  -ES 1  -LG    To walk in hospital room? 1  -ES 1  -LG    AM-PAC 6 Clicks Score (PT) 8  -ES 6  -LG    Highest Level of Mobility Goal 3 --> Sit at edge of bed  -ES 2 --> Bed activities/dependent transfer  -LG      Row Name 01/12/24 1513 01/12/24 1433       Functional Assessment    Outcome Measure Options AM-PAC 6 Clicks Basic Mobility (PT)  -ES AM-PAC 6 Clicks Daily Activity (OT)  -CS              User Key  (r) = Recorded By, (t) = Taken By, (c) = Cosigned By      Initials Name Provider Type    LG Afua Gomez RN Registered Nurse    Eduarda Lizarraga OT Occupational Therapist    Carly Carson PT Physical Therapist                                 Physical Therapy Education       Title: PT OT SLP Therapies (In Progress)       Topic: Physical Therapy (In Progress)       Point: Mobility training (In Progress)       Learning Progress Summary             Patient Acceptance,  E, NR by AE at 1/10/2024 0919    Acceptance, E, NR by AE at 1/3/2024 1337    Acceptance, E, NR by KE at 12/29/2023 1131    Acceptance, E, NR by KE at 12/27/2023 1045                         Point: Home exercise program (Not Started)       Learner Progress:  Not documented in this visit.              Point: Body mechanics (In Progress)       Learning Progress Summary             Patient Acceptance, E, NR by AE at 1/10/2024 0919    Acceptance, E, NR by AE at 1/3/2024 1337    Acceptance, E, NR by KE at 12/29/2023 1131    Acceptance, E, NR by KE at 12/27/2023 1045                         Point: Precautions (In Progress)       Learning Progress Summary             Patient Acceptance, E, NR by AE at 1/10/2024 0919    Acceptance, E, NR by AE at 1/3/2024 1337    Acceptance, E, NR by KE at 12/29/2023 1131    Acceptance, E, NR by KE at 12/27/2023 1045                                         User Key       Initials Effective Dates Name Provider Type Discipline    AE 09/21/21 -  Domo Desir, PT Physical Therapist PT    KE 11/16/23 -  Petty Mckoy, PT Physical Therapist PT                  PT Recommendation and Plan     Plan of Care Reviewed With: patient, spouse  Progress: improving  Outcome Evaluation: Pt limited by increased fatigue this date but overall gave good effort with therapy. Will continue to progress as able to address functional limitations and promote return to PLOF. PT rec SNF at d/c.     Time Calculation:         PT Charges       Row Name 01/12/24 1513             Time Calculation    Start Time 1419  -ES      PT Received On 01/12/24  -ES      PT Goal Re-Cert Due Date 01/20/24  -ES         Time Calculation- PT    Total Timed Code Minutes- PT 24 minute(s)  -ES         Timed Charges    15538 - PT Therapeutic Exercise Minutes 15  -ES      24314 - PT Therapeutic Activity Minutes 9  -ES         Total Minutes    Timed Charges Total Minutes 24  -ES       Total Minutes 24  -ES                User Key  (r) =  Recorded By, (t) = Taken By, (c) = Cosigned By      Initials Name Provider Type     Carly Mistry, CHE[place] Physical Therapist                  Therapy Charges for Today       Code Description Service Date Service Provider Modifiers Qty    83666737810  PT THER PROC EA 15 MIN 1/12/2024 Carly Mistry, PT GP 1    54114181907 HC PT THERAPEUTIC ACT EA 15 MIN 1/12/2024 Carly Mistry PT GP 1            PT G-Codes  Outcome Measure Options: AM-PAC 6 Clicks Basic Mobility (PT)  AM-PAC 6 Clicks Score (PT): 8  AM-PAC 6 Clicks Score (OT): 10  PT Discharge Summary  Anticipated Discharge Disposition (PT): skilled nursing facility    Carly Mistry PT  1/12/2024

## 2024-01-12 NOTE — PLAN OF CARE
Goal Outcome Evaluation:   Pt rested well throughout shift. Pt became agitated when receiving care during shift. VSS on RA.  at bedside. No other concerns noted at this time.

## 2024-01-12 NOTE — PROGRESS NOTES
[facility] Medicine Services  PROGRESS NOTE    Patient Name: Omar Mendoza  : 1940  MRN: [id number]    Date of Admission: 2023  Primary Care Physician: Varun Pa MD    Subjective   Subjective     CC:  Weakness      HPI:  Patient seems to be doing better each day.  Her family is at bedside.  The plan is for her to go to long-term care with referrals pending.  Patient denies chest pain shortness of breath abdominal pain nausea vomiting or cough.      Objective   Objective     Vital Signs:   Temp:  [98.1 °F (36.7 °C)-98.7 °F (37.1 °C)] 98.7 °F (37.1 °C)  Heart Rate:  [56-91] 56  Resp:  [16] 16  BP: (115-131)/(58-80) 131/80     Physical Exam:  Constitutional: No acute distress, awake, alert  HENT: NCAT, mucous membranes moist  Respiratory: Clear to auscultation bilaterally, respiratory effort normal   Cardiovascular: RRR  Gastrointestinal: Positive bowel sounds, soft, nontender, nondistended  Musculoskeletal: Generally weak, sitting up in bed  Psychiatric: Appropriate affect, cooperative  Neurologic: Oriented to person place and family members in the room, generally weak, Cranial Nerves grossly intact to confrontation, speech clear  Skin: No rashes        Results Reviewed:  LAB RESULTS:                                    Brief Urine Lab Results  (Last result in the past 365 days)        Color   Clarity   Blood   Leuk Est   Nitrite   Protein   CREAT   Urine HCG        24 0053 Yellow   Clear   Negative   Trace   Negative   Negative                   Microbiology Results Abnormal       Procedure Component Value - Date/Time    COVID-19 RAPID AG,VERITOR,COR/JAMARI/PAD/NUPUR/ASHA/LAG/BRITTANY/ IN-HOUSE,DRY SWAB, 1 HR TAT - Swab, Nasal Cavity [[id number]]  (Normal) Collected: 24 0004    Lab Status: Final result Specimen: Swab from Nasal Cavity Updated: 24 0022     COVID19 Presumptive Negative    Narrative:      Fact sheets for providers:  https://www.fda.gov/media/200139/download    Fact sheets for patients: https://www.fda.gov/media/988497/download    Blood Culture - Blood, Blood, PICC Line [[id number]]  (Normal) Collected: 12/27/23 1400    Lab Status: Final result Specimen: Blood, PICC Line Updated: 01/01/24 1531     Blood Culture No growth at 5 days    Blood Culture - Blood, Arm, Right [[id number]]  (Normal) Collected: 12/27/23 0431    Lab Status: Final result Specimen: Blood from Arm, Right Updated: 01/01/24 0515     Blood Culture No growth at 5 days            No radiology results from the last 24 hrs    Results for orders placed during the hospital encounter of 12/26/23    Adult Transthoracic Echo Complete W/ Cont if Necessary Per Protocol    Interpretation Summary    Left ventricular systolic function is normal. Calculated left ventricular EF = 57% Left ventricular ejection fraction appears to be 56 - 60%.    Left ventricular wall thickness is consistent with mild to moderate concentric hypertrophy.    Left ventricular diastolic function was normal.    Estimated right ventricular systolic pressure from tricuspid regurgitation is mildly elevated (35-45 mmHg).    There is a trivial pericardial effusion.    Mild aortic valve regurgitation.      Current medications:  Scheduled Meds:mirtazapine, 15 mg, Oral, Nightly  rivastigmine, 1 patch, Transdermal, Daily  sodium chloride, 10 mL, Intravenous, Q12H      Continuous Infusions:   PRN Meds:.  acetaminophen **OR** acetaminophen **OR** acetaminophen    senna-docusate sodium **AND** polyethylene glycol **AND** bisacodyl **AND** bisacodyl    Calcium Replacement - Follow Nurse / BPA Driven Protocol    dextrose    dextrose    glucagon (human recombinant)    glycopyrrolate    hydrOXYzine    Magnesium Standard Dose Replacement - Follow Nurse / BPA Driven Protocol    melatonin    Morphine    nitroglycerin    ondansetron    Phosphorus Replacement - Follow Nurse / BPA Driven Protocol    Potassium Replacement -  Follow Nurse / BPA Driven Protocol    sodium chloride    ziprasidone    Assessment & Plan   Assessment & Plan     Active Hospital Problems    Diagnosis  POA    **UTI (urinary tract infection) [N39.0]  Yes    Acute constipation [K59.00]  Unknown    Acute urinary retention [R33.8]  Unknown    Uterine prolapse [N81.4]  Unknown    Bradycardia [R00.1]  Unknown    Pressure injury of buttock, unstageable [L89.300]  Unknown    Type 2 diabetes mellitus with ketoacidosis, without long-term current use of insulin [E11.10]  Unknown    Disorientation [R41.0]  Unknown    Pleural effusion [J90]  Unknown    Severe malnutrition [E43]  Yes    Edema of lower extremity [R60.0]  Yes    Bullous pemphigoid [L12.0]  Yes    Dyslipidemia [E78.5]  Yes    Essential hypertension [I10]  Yes      Resolved Hospital Problems   No resolved problems to display.            Brief Hospital Course to date:  Omar Mendoza is a 83 y.o. female with history of type 2 diabetes, hypertension, history of uterine prolapse, urinary retention with Schultz catheter in place, history of bullous pemphigoid  (s/p Dupixent 10/2023, prednisone/doxycycline 11/2023) CAD, hyperlipidemia, presumed dementia who presented to the ED with generalized weakness and bullous pemphigoid blister on the heel.  Patient developed significant encephalopathy, and paranoia during her hospitalization.  Neurology was consulted and determined patient to be suffering from dementia complicated by depression and paranoia.  Patient refused to eat and drink.  Goals of care discussion had with family, palliative care consulted, pursuing hospice at this time.    This patient's problems and plans were partially entered by my partner and updated as appropriate by me 01/12/24.    All problems are new to me today.    Acute metabolic encephalopathy  Dementia with suspected Alzheimer's disease  Major depression with paranoia  Generalized weakness  - Family pursuing placement and then transition to hospice,  desire comfort, minimizing any interventions on the patient and will not force any medical therapies. Plan will be discharge to LTC with eventual transition to hospice and she currently does not meet inpatient criteria  - neurology adjusted medications, patient is not always agreeable to taking these     Dysphagia  - comfort diet given patient is comfort measures only     Bilateral pleural effusion  L>R  Bibasilar airspace disease versus pneumonia  -Concern for aspiration on 12/30, Completed 5 days of Rocephin and doxycycline     Anemia  -suspect anemia of chronic disease, stable with no active signs of bleeding     Poor venous access  -Removed PICC overnight 1/3     Sinus bradycardia  - hold amiodarone, decreased metoprolol to 50 mg BID w/ hold parameters, patient not really taking meds      BLE edema  Decreased pulses LLE  - CTA abd showing small right pleural effusion, bilateral basilar atelectasis  - CTA w/ runoff showing normal right sided runoff; left side delayed runoff possibly r/t venous stasis changes, no evidence of arterial occlusion or significant stenosis  -Echo revealed EF 57% with normal diastolic function      Uncontrolled Type 2 diabetes  -Hgb A1C 12.10 on 12/27     Constipation  -Continue aggressive bowel regimen, s/p soapsuds enema x 1  -s/p Milk of mag x1, Miralax daily, suppository x1   -Continue prn regimen     Uterine prolapse  -Gyn Dr. Zhou followed outpatient  -may be contributing to chronic urine retention     Bullous pemphigoid  -Has developed new blister on right heel  -Completed doxycycline  -Wound care following      Pressure ulcer  -Wound care following      L adrenal adenoma  - incidental finding on CTA abdomen      Severe malnutrition     Expected Discharge Location and Transportation: LTC with outpatient hospice  Expected Discharge   Expected Discharge Date: 1/15/2024; Expected Discharge Time:      DVT prophylaxis:  No DVT prophylaxis order currently exists.     AM-PAC 6 Clicks  Score (PT): 6 (01/12/24 0800)    CODE STATUS:   Code Status and Medical Interventions:   Ordered at: 01/07/24 1143     Code Status (Patient has no pulse and is not breathing):    No CPR (Do Not Attempt to Resuscitate)     Medical Interventions (Patient has pulse or is breathing):    Comfort Measures       Trudy Doll MD  01/12/24

## 2024-01-12 NOTE — PLAN OF CARE
Goal Outcome Evaluation:  Plan of Care Reviewed With: patient        Progress: improving  Outcome Evaluation: Pt demo improved independence by performing self-feeding tasks w/ Set-up and tolerated bed/chair t/f w/ mechanical lift. Pt conts to be limited d/t significant generalized weakness and lethargy warranting cont skilled IPOT POC to promote return to PLOF. Recommend pt DC to SNF.      Anticipated Discharge Disposition (OT): skilled nursing facility

## 2024-01-13 PROCEDURE — 99232 SBSQ HOSP IP/OBS MODERATE 35: CPT | Performed by: FAMILY MEDICINE

## 2024-01-13 RX ORDER — GUAIFENESIN 200 MG/10ML
200 LIQUID ORAL EVERY 4 HOURS PRN
Status: DISCONTINUED | OUTPATIENT
Start: 2024-01-13 | End: 2024-01-13

## 2024-01-13 RX ORDER — GUAIFENESIN 600 MG/1
600 TABLET, EXTENDED RELEASE ORAL 2 TIMES DAILY PRN
Status: DISCONTINUED | OUTPATIENT
Start: 2024-01-13 | End: 2024-02-01 | Stop reason: HOSPADM

## 2024-01-13 RX ADMIN — RIVASTIGMINE TRANSDERMAL SYSTEM 1 PATCH: 9.5 PATCH, EXTENDED RELEASE TRANSDERMAL at 08:28

## 2024-01-13 RX ADMIN — GUAIFENESIN 600 MG: 600 TABLET, EXTENDED RELEASE ORAL at 14:22

## 2024-01-13 RX ADMIN — GUAIFENESIN 200 MG: 200 SOLUTION ORAL at 13:10

## 2024-01-13 NOTE — PROGRESS NOTES
[facility] Medicine Services  PROGRESS NOTE    Patient Name: Omar Mendoza  : 1940  MRN: [id number]    Date of Admission: 2023  Primary Care Physician: Varun Pa MD    Subjective   Subjective     CC:  Weakness      HPI:   - Patient seems to be doing better each day.  Her family is at bedside.  The plan is for her to go to long-term care with referrals pending.  Patient denies chest pain shortness of breath abdominal pain nausea vomiting or cough.     -patient and family reports she has been coughing a little bit for the past couple of days.  Seems to have some secretions.  Lungs sound clear.  Will try Robitussin.      Objective   Objective     Vital Signs:   Temp:  [97.1 °F (36.2 °C)-98.7 °F (37.1 °C)] 97.1 °F (36.2 °C)  Heart Rate:  [86-91] 91  Resp:  [18] 18  BP: (117-132)/(72-84) 132/84     Physical Exam:  Constitutional: No acute distress, awake, alert  HENT: NCAT, mucous membranes moist  Respiratory: Clear to auscultation bilaterally, respiratory effort normal   Cardiovascular: RRR  Gastrointestinal: Positive bowel sounds, soft, nontender, nondistended  Musculoskeletal: Generally weak, lying in bed  Psychiatric: Appropriate affect, cooperative  Neurologic: Oriented to person place and family members in the room, generally weak, Cranial Nerves grossly intact to confrontation, speech clear        Results Reviewed:  LAB RESULTS:                                    Brief Urine Lab Results  (Last result in the past 365 days)        Color   Clarity   Blood   Leuk Est   Nitrite   Protein   CREAT   Urine HCG        24 0053 Yellow   Clear   Negative   Trace   Negative   Negative                   Microbiology Results Abnormal       Procedure Component Value - Date/Time    COVID-19 RAPID AG,VERITOR,COR/JAMARI/PAD/NUPUR/ASHA/LAG/BRITTANY/ IN-HOUSE,DRY SWAB, 1 HR TAT - Swab, Nasal Cavity [[id number]]  (Normal) Collected: 24 0004    Lab Status: Final result Specimen:  Swab from Nasal Cavity Updated: 01/04/24 0022     COVID19 Presumptive Negative    Narrative:      Fact sheets for providers: https://www.fda.gov/media/822664/download    Fact sheets for patients: https://www.fda.gov/media/408835/download    Blood Culture - Blood, Blood, PICC Line [[id number]]  (Normal) Collected: 12/27/23 1400    Lab Status: Final result Specimen: Blood, PICC Line Updated: 01/01/24 1531     Blood Culture No growth at 5 days    Blood Culture - Blood, Arm, Right [[id number]]  (Normal) Collected: 12/27/23 0431    Lab Status: Final result Specimen: Blood from Arm, Right Updated: 01/01/24 0515     Blood Culture No growth at 5 days            No radiology results from the last 24 hrs    Results for orders placed during the hospital encounter of 12/26/23    Adult Transthoracic Echo Complete W/ Cont if Necessary Per Protocol    Interpretation Summary    Left ventricular systolic function is normal. Calculated left ventricular EF = 57% Left ventricular ejection fraction appears to be 56 - 60%.    Left ventricular wall thickness is consistent with mild to moderate concentric hypertrophy.    Left ventricular diastolic function was normal.    Estimated right ventricular systolic pressure from tricuspid regurgitation is mildly elevated (35-45 mmHg).    There is a trivial pericardial effusion.    Mild aortic valve regurgitation.      Current medications:  Scheduled Meds:mirtazapine, 15 mg, Oral, Nightly  rivastigmine, 1 patch, Transdermal, Daily  sodium chloride, 10 mL, Intravenous, Q12H      Continuous Infusions:   PRN Meds:.  acetaminophen **OR** acetaminophen **OR** acetaminophen    senna-docusate sodium **AND** polyethylene glycol **AND** bisacodyl **AND** bisacodyl    Calcium Replacement - Follow Nurse / BPA Driven Protocol    dextrose    dextrose    glucagon (human recombinant)    glycopyrrolate    hydrOXYzine    Magnesium Standard Dose Replacement - Follow Nurse / BPA Driven Protocol    melatonin     Morphine    nitroglycerin    ondansetron    Phosphorus Replacement - Follow Nurse / BPA Driven Protocol    Potassium Replacement - Follow Nurse / BPA Driven Protocol    sodium chloride    ziprasidone    Assessment & Plan   Assessment & Plan     Active Hospital Problems    Diagnosis  POA    **UTI (urinary tract infection) [N39.0]  Yes    Acute constipation [K59.00]  Unknown    Acute urinary retention [R33.8]  Unknown    Uterine prolapse [N81.4]  Unknown    Bradycardia [R00.1]  Unknown    Pressure injury of buttock, unstageable [L89.300]  Unknown    Type 2 diabetes mellitus with ketoacidosis, without long-term current use of insulin [E11.10]  Unknown    Disorientation [R41.0]  Unknown    Pleural effusion [J90]  Unknown    Severe malnutrition [E43]  Yes    Edema of lower extremity [R60.0]  Yes    Bullous pemphigoid [L12.0]  Yes    Dyslipidemia [E78.5]  Yes    Essential hypertension [I10]  Yes      Resolved Hospital Problems   No resolved problems to display.            Brief Hospital Course to date:  Omar Mendoza is a 83 y.o. female with history of type 2 diabetes, hypertension, history of uterine prolapse, urinary retention with Schultz catheter in place, history of bullous pemphigoid  (s/p Dupixent 10/2023, prednisone/doxycycline 11/2023) CAD, hyperlipidemia, presumed dementia who presented to the ED with generalized weakness and bullous pemphigoid blister on the heel.  Patient developed significant encephalopathy, and paranoia during her hospitalization.  Neurology was consulted and determined patient to be suffering from dementia complicated by depression and paranoia.  Patient refused to eat and drink.  Goals of care discussion had with family, palliative care consulted, pursuing hospice at this time.    This patient's problems and plans were partially entered by my partner and updated as appropriate by me 01/13/24.    All problems are new to me today.    Acute metabolic encephalopathy  Dementia with suspected  Alzheimer's disease  Major depression with paranoia  Generalized weakness  - Family pursuing placement and then transition to hospice, desire comfort, minimizing any interventions on the patient and will not force any medical therapies. Plan will be discharge to LTC with eventual transition to hospice and she currently does not meet inpatient criteria  - neurology adjusted medications, patient is not always agreeable to taking these     Dysphagia  - comfort diet given patient is comfort measures only     Bilateral pleural effusion  L>R  Bibasilar airspace disease versus pneumonia  -Concern for aspiration on 12/30, Completed 5 days of Rocephin and doxycycline  -Robitussin as needed for cough     Anemia  -suspect anemia of chronic disease, stable with no active signs of bleeding     Poor venous access  -Removed PICC overnight 1/3     Sinus bradycardia  - hold amiodarone, decreased metoprolol to 50 mg BID w/ hold parameters, patient not really taking meds      BLE edema  Decreased pulses LLE  - CTA abd showing small right pleural effusion, bilateral basilar atelectasis  - CTA w/ runoff showing normal right sided runoff; left side delayed runoff possibly r/t venous stasis changes, no evidence of arterial occlusion or significant stenosis  -Echo revealed EF 57% with normal diastolic function      Uncontrolled Type 2 diabetes  -Hgb A1C 12.10 on 12/27     Constipation  -Continue aggressive bowel regimen, s/p soapsuds enema x 1  -s/p Milk of mag x1, Miralax daily, suppository x1   -Continue prn regimen     Uterine prolapse  -Gyn Dr. Zhou followed outpatient  -may be contributing to chronic urine retention     Bullous pemphigoid  -Has developed new blister on right heel  -Completed doxycycline  -Wound care following      Pressure ulcer  -Wound care following      L adrenal adenoma  - incidental finding on CTA abdomen      Severe malnutrition     Expected Discharge Location and Transportation: LTC with outpatient  hospice  Expected Discharge   Expected Discharge Date: 1/15/2024; Expected Discharge Time:      DVT prophylaxis:  No DVT prophylaxis order currently exists.     AM-PAC 6 Clicks Score (PT): 6 (01/13/24 0830)    CODE STATUS:   Code Status and Medical Interventions:   Ordered at: 01/07/24 1143     Code Status (Patient has no pulse and is not breathing):    No CPR (Do Not Attempt to Resuscitate)     Medical Interventions (Patient has pulse or is breathing):    Comfort Measures       Trudy Doll MD  01/13/24

## 2024-01-13 NOTE — PLAN OF CARE
Goal Outcome Evaluation:Tolerating PO foods and fluids.  Urine output is adequate per external catheter., without large PVR. Continue with skin care and turns off affected areas.

## 2024-01-13 NOTE — PLAN OF CARE
Goal Outcome Evaluation:   Pt pleasantly confused during shift, rested well. VSS on RA. Awaiting placement. Assisted w a lift. No other concerns noted at this time.

## 2024-01-14 PROCEDURE — 99231 SBSQ HOSP IP/OBS SF/LOW 25: CPT | Performed by: FAMILY MEDICINE

## 2024-01-14 RX ADMIN — MIRTAZAPINE 15 MG: 15 TABLET, FILM COATED ORAL at 18:40

## 2024-01-14 RX ADMIN — RIVASTIGMINE TRANSDERMAL SYSTEM 1 PATCH: 9.5 PATCH, EXTENDED RELEASE TRANSDERMAL at 09:22

## 2024-01-14 NOTE — PROGRESS NOTES
[facility] Medicine Services  PROGRESS NOTE    Patient Name: Omar Mendoza  : 1940  MRN: [id number]    Date of Admission: 2023  Primary Care Physician: Varun Pa MD    Subjective   Subjective     CC:  Weakness      HPI:   - Patient seems to be doing better each day.  Her family is at bedside.  The plan is for her to go to long-term care with referrals pending.  Patient denies chest pain shortness of breath abdominal pain nausea vomiting or cough.     -patient and family reports she has been coughing a little bit for the past couple of days.  Seems to have some secretions.  Lungs sound clear.  Will try Robitussin.     -patient's cough has improved with Mucinex.  She is resting in bed.  No new concerns from family at bedside.      Objective   Objective     Vital Signs:   Temp:  [98.4 °F (36.9 °C)-98.5 °F (36.9 °C)] 98.5 °F (36.9 °C)  Heart Rate:  [] 88  Resp:  [16-18] 18  BP: (103)/(59-60) 103/60     Physical Exam:  Constitutional: No acute distress, awake, alert  HENT: NCAT, mucous membranes moist  Respiratory: Clear to auscultation bilaterally, respiratory effort normal   Cardiovascular: RRR  Gastrointestinal: Positive bowel sounds, soft, nontender, nondistended  Musculoskeletal: Generally weak, lying in bed  Psychiatric: Appropriate affect, cooperative  Neurologic: Oriented to person place and family members in the room, generally weak, Cranial Nerves grossly intact to confrontation, speech clear        Results Reviewed:  LAB RESULTS:                                    Brief Urine Lab Results  (Last result in the past 365 days)        Color   Clarity   Blood   Leuk Est   Nitrite   Protein   CREAT   Urine HCG        24 0053 Yellow   Clear   Negative   Trace   Negative   Negative                   Microbiology Results Abnormal       Procedure Component Value - Date/Time    COVID-19 RAPID AG,VERITOR,COR/JAMARI/PAD/NUPUR/ASHA/LAG/BRITTANY/ IN-HOUSE,DRY  SWAB, 1 HR TAT - Swab, Nasal Cavity [[id number]]  (Normal) Collected: 01/04/24 0004    Lab Status: Final result Specimen: Swab from Nasal Cavity Updated: 01/04/24 0022     COVID19 Presumptive Negative    Narrative:      Fact sheets for providers: https://www.fda.gov/media/967998/download    Fact sheets for patients: https://www.fda.gov/media/992343/download    Blood Culture - Blood, Blood, PICC Line [[id number]]  (Normal) Collected: 12/27/23 1400    Lab Status: Final result Specimen: Blood, PICC Line Updated: 01/01/24 1531     Blood Culture No growth at 5 days    Blood Culture - Blood, Arm, Right [[id number]]  (Normal) Collected: 12/27/23 0431    Lab Status: Final result Specimen: Blood from Arm, Right Updated: 01/01/24 0515     Blood Culture No growth at 5 days            No radiology results from the last 24 hrs    Results for orders placed during the hospital encounter of 12/26/23    Adult Transthoracic Echo Complete W/ Cont if Necessary Per Protocol    Interpretation Summary    Left ventricular systolic function is normal. Calculated left ventricular EF = 57% Left ventricular ejection fraction appears to be 56 - 60%.    Left ventricular wall thickness is consistent with mild to moderate concentric hypertrophy.    Left ventricular diastolic function was normal.    Estimated right ventricular systolic pressure from tricuspid regurgitation is mildly elevated (35-45 mmHg).    There is a trivial pericardial effusion.    Mild aortic valve regurgitation.      Current medications:  Scheduled Meds:mirtazapine, 15 mg, Oral, Nightly  rivastigmine, 1 patch, Transdermal, Daily  sodium chloride, 10 mL, Intravenous, Q12H      Continuous Infusions:   PRN Meds:.  acetaminophen **OR** acetaminophen **OR** acetaminophen    senna-docusate sodium **AND** polyethylene glycol **AND** bisacodyl **AND** bisacodyl    Calcium Replacement - Follow Nurse / BPA Driven Protocol    dextrose    dextrose    glucagon (human recombinant)     glycopyrrolate    guaiFENesin    hydrOXYzine    Magnesium Standard Dose Replacement - Follow Nurse / BPA Driven Protocol    melatonin    Morphine    nitroglycerin    ondansetron    Phosphorus Replacement - Follow Nurse / BPA Driven Protocol    Potassium Replacement - Follow Nurse / BPA Driven Protocol    sodium chloride    ziprasidone    Assessment & Plan   Assessment & Plan     Active Hospital Problems    Diagnosis  POA    **UTI (urinary tract infection) [N39.0]  Yes    Acute constipation [K59.00]  Unknown    Acute urinary retention [R33.8]  Unknown    Uterine prolapse [N81.4]  Unknown    Bradycardia [R00.1]  Unknown    Pressure injury of buttock, unstageable [L89.300]  Unknown    Type 2 diabetes mellitus with ketoacidosis, without long-term current use of insulin [E11.10]  Unknown    Disorientation [R41.0]  Unknown    Pleural effusion [J90]  Unknown    Severe malnutrition [E43]  Yes    Edema of lower extremity [R60.0]  Yes    Bullous pemphigoid [L12.0]  Yes    Dyslipidemia [E78.5]  Yes    Essential hypertension [I10]  Yes      Resolved Hospital Problems   No resolved problems to display.            Brief Hospital Course to date:  Omar Mendoza is a 83 y.o. female with history of type 2 diabetes, hypertension, history of uterine prolapse, urinary retention with Schultz catheter in place, history of bullous pemphigoid  (s/p Dupixent 10/2023, prednisone/doxycycline 11/2023) CAD, hyperlipidemia, presumed dementia who presented to the ED with generalized weakness and bullous pemphigoid blister on the heel.  Patient developed significant encephalopathy, and paranoia during her hospitalization.  Neurology was consulted and determined patient to be suffering from dementia complicated by depression and paranoia.  Patient refused to eat and drink.  Goals of care discussion had with family, palliative care consulted, pursuing hospice at this time.    This patient's problems and plans were partially entered by my partner and  updated as appropriate by me 01/14/24.    Acute metabolic encephalopathy  Dementia with suspected Alzheimer's disease  Major depression with paranoia  Generalized weakness  - Family pursuing placement and then transition to hospice, desire comfort, minimizing any interventions on the patient and will not force any medical therapies. Plan will be discharge to LTC with eventual transition to hospice and she currently does not meet inpatient hospice criteria  - neurology adjusted medications, patient is not always agreeable to taking these     Dysphagia  - comfort diet given patient is comfort measures only     Bilateral pleural effusion  L>R  Bibasilar airspace disease versus pneumonia  -Concern for aspiration on 12/30, Completed 5 days of Rocephin and doxycycline  -Mucinex/Robinul as needed for cough     Anemia  -suspect anemia of chronic disease, stable with no active signs of bleeding     Poor venous access  -Removed PICC overnight 1/3     Sinus bradycardia  - hold amiodarone, decreased metoprolol to 50 mg BID w/ hold parameters, patient not really taking meds      BLE edema  Decreased pulses LLE  - CTA abd showing small right pleural effusion, bilateral basilar atelectasis  - CTA w/ runoff showing normal right sided runoff; left side delayed runoff possibly r/t venous stasis changes, no evidence of arterial occlusion or significant stenosis  -Echo revealed EF 57% with normal diastolic function      Uncontrolled Type 2 diabetes  -Hgb A1C 12.10 on 12/27     Constipation  -Continue aggressive bowel regimen, s/p soapsuds enema x 1  -s/p Milk of mag x1, Miralax daily, suppository x1   -Continue prn regimen     Uterine prolapse  -Gyn Dr. Zhou followed outpatient  -may be contributing to chronic urine retention  -Has external catheter     Bullous pemphigoid  -Has developed new blister on right heel  -Completed doxycycline  -Wound care following      Pressure ulcer  -Wound care following      L adrenal adenoma  -  incidental finding on CTA abdomen      Severe malnutrition     Expected Discharge Location and Transportation: LTC with outpatient hospice  Expected Discharge   Expected Discharge Date: 1/15/2024; Expected Discharge Time:      DVT prophylaxis:  No DVT prophylaxis order currently exists.     AM-PAC 6 Clicks Score (PT): 6 (01/13/24 2000)    CODE STATUS:   Code Status and Medical Interventions:   Ordered at: 01/07/24 1143     Code Status (Patient has no pulse and is not breathing):    No CPR (Do Not Attempt to Resuscitate)     Medical Interventions (Patient has pulse or is breathing):    Comfort Measures       Trudy Doll MD  01/14/24

## 2024-01-14 NOTE — PLAN OF CARE
Goal Outcome Evaluation: Pt has been comfortable this shift and pleasant. Refuses most turns and skin interventions. Patent purewick in place to low wall suction. Fair oral intake with adequate UOP noted. Pt has rested between care much of shift. Family remains at bedside attentive to pt. Continuing POC and reporting as needed.

## 2024-01-15 PROCEDURE — 97530 THERAPEUTIC ACTIVITIES: CPT

## 2024-01-15 PROCEDURE — 97110 THERAPEUTIC EXERCISES: CPT

## 2024-01-15 PROCEDURE — 99232 SBSQ HOSP IP/OBS MODERATE 35: CPT | Performed by: NURSE PRACTITIONER

## 2024-01-15 RX ADMIN — ACETAMINOPHEN 650 MG: 325 TABLET ORAL at 20:35

## 2024-01-15 RX ADMIN — MIRTAZAPINE 15 MG: 15 TABLET, FILM COATED ORAL at 20:29

## 2024-01-15 RX ADMIN — RIVASTIGMINE TRANSDERMAL SYSTEM 1 PATCH: 9.5 PATCH, EXTENDED RELEASE TRANSDERMAL at 08:57

## 2024-01-15 RX ADMIN — Medication 5 MG: at 20:35

## 2024-01-15 RX ADMIN — HYDROXYZINE HYDROCHLORIDE 25 MG: 25 TABLET, FILM COATED ORAL at 20:35

## 2024-01-15 NOTE — PLAN OF CARE
Goal Outcome Evaluation:         Pt slept well. Spouse at bedside. Pure wick in place, monitoring UOP. Awaiting placement. Will continue to monitor.

## 2024-01-15 NOTE — PROGRESS NOTES
Continued Stay Note  [facility]     Patient Name: Omar Mendoza  MRN: [id number]  Today's Date: 1/15/2024    Admit Date: 12/26/2023    Plan: Placement   Discharge Plan       Row Name 01/15/24 1824       Plan    Plan Placement    Plan Comments Hospice liaison continues to follow on the periphery while placement is found. Please call [phone] if can be of further assistance.                   Discharge Codes    No documentation.                 Expected Discharge Date and Time       Expected Discharge Date Expected Discharge Time    Freddy 15, 2024               Sarah Farris RN

## 2024-01-15 NOTE — THERAPY PROGRESS REPORT/RE-CERT
Patient Name: Omar Mendoza  : 1940    MRN: [id number]                              Today's Date: 1/15/2024       Admit Date: 2023    Visit Dx:     ICD-10-CM ICD-9-CM   1. Generalized weakness  R53.1 780.79   2. Acute UTI  N39.0 599.0   3. Bullous pemphigoid  L12.0 694.5   4. Venous insufficiency  I87.2 459.81   5. Dysphagia, unspecified type  R13.10 787.20     Patient Active Problem List   Diagnosis    UTI (urinary tract infection)    Bullous pemphigoid    Essential hypertension    Dyslipidemia    Edema of lower extremity    Cholestatic hepatitis    Acute constipation    Acute urinary retention    Uterine prolapse    Bradycardia    Pressure injury of buttock, unstageable    Type 2 diabetes mellitus with ketoacidosis, without long-term current use of insulin    Disorientation    Pleural effusion    Severe malnutrition     Past Medical History:   Diagnosis Date    CAD (coronary artery disease)     Dementia     Diabetes mellitus     Hyperlipemia     Hypertension      History reviewed. No pertinent surgical history.   General Information       Row Name 01/15/24 1140          OT Time and Intention    Document Type progress note/recertification  -DILIP     Mode of Treatment occupational therapy  -       Row Name 01/15/24 1140          General Information    Patient Profile Reviewed yes  -DILIP     Existing Precautions/Restrictions fall;seizures;other (see comments)  wound R heel and buttock, h/o Alz. dementia  -DILIP     Barriers to Rehab medically complex;previous functional deficit;cognitive status  -DILIP       Row Name 01/15/24 1146          Cognition    Orientation Status (Cognition) oriented to;person;disoriented to;place;situation;time  -DILIP       Row Name 01/15/24 114          Safety Issues, Functional Mobility    Safety Issues Affecting Function (Mobility) awareness of need for assistance;insight into deficits/self-awareness;sequencing abilities;safety precautions follow-through/compliance;safety precaution  awareness  -     Impairments Affecting Function (Mobility) balance;cognition;endurance/activity tolerance;postural/trunk control;strength;range of motion (ROM)  -     Cognitive Impairments, Mobility Safety/Performance awareness, need for assistance;insight into deficits/self-awareness;safety precaution follow-through;safety precaution awareness;sequencing abilities  -               User Key  (r) = Recorded By, (t) = Taken By, (c) = Cosigned By      Initials Name Provider Type    DILIP Rayna Wall, OT Occupational Therapist                     Mobility/ADL's       Row Name 01/15/24 1141          Bed Mobility    Rolling Left [place] (Bed Mobility) maximum assist (25% patient effort);1 person assist;nonverbal cues (demo/gesture);verbal cues  -     Rolling Right [place] (Bed Mobility) moderate assist (50% patient effort);nonverbal cues (demo/gesture);verbal cues  -     Bed Mobility, Safety Issues cognitive deficits limit understanding;decreased use of arms for pushing/pulling;decreased use of legs for bridging/pushing;impaired trunk control for bed mobility  -     Assistive Device (Bed Mobility) bed rails;draw sheet  -     Comment, (Bed Mobility) cues to sequence movement and reach for railing, pt. rolled to clean BM, nurse to change buttock dressing and for sling placement  -       Row Name 01/15/24 1141          Bed-Chair Transfer    Bed-Chair [place] (Transfers) dependent (less than 25% patient effort);2 person assist;verbal cues  -     Assistive Device (Bed-Chair Transfers) lift device  -       Row Name 01/15/24 1141          Sit-Stand Transfer    Sit-Stand [place] (Transfers) moderate assist (50% patient effort);2 person assist;nonverbal cues (demo/gesture);verbal cues  -     Assistive Device (Sit-Stand Transfers) other (see comments)  -     Comment, (Sit-Stand Transfer) did bed standing with RUE support at elbows and LUE on arm rest, pt. stood twice for grossly 15-20  seconds, some trunk flexion, cues to improve posture  -[facility] Name 01/15/24 1141          Functional Mobility    Functional Mobility- Ind. Level unable to perform  -[facility] Name 01/15/24 1141          Activities of Daily Living    BADL Assessment/Intervention toileting;upper body dressing;lower body dressing  -[facility] Name 01/15/24 1141          Lower Body Dressing Assessment/Training    [place] Level (Lower Body Dressing) don;socks;dependent (less than 25% patient effort)  -     Position (Lower Body Dressing) supine  -[facility] Name 01/15/24 1141          Self-Feeding Assessment/Training    Comment, (Feeding) declined water  -[facility] Name 01/15/24 1141          Grooming Assessment/Training    Comment, (Grooming) declined at this time  -[facility] Name 01/15/24 1141          Toileting Assessment/Training    [place] Level (Toileting) change pad/brief;perform perineal hygiene;dependent (less than 25% patient effort)  -DILIP     Position (Toileting) supine  -[facility] Name 01/15/24 1141          Upper Body Dressing Assessment/Training    [place] Level (Upper Body Dressing) don;moderate assist (50% patient effort)  -DILIP     Position (Upper Body Dressing) sitting up in bed  -     Comment, (Upper Body Dressing) pt.'s sleeves out of gown and gown twisted, re-donned  -               User Key  (r) = Recorded By, (t) = Taken By, (c) = Cosigned By      Initials Name Provider Type    Rayna Zamora, OT Occupational Therapist                   Obj/Interventions       [facility] Name 01/15/24 1144          Shoulder (Therapeutic Exercise)    Shoulder AROM (Therapeutic Exercise) bilateral;flexion;extension;horizontal aBduction/aDduction;sitting;10 repetitions  -[facility] Name 01/15/24 1144          Elbow/Forearm (Therapeutic Exercise)    Elbow/Forearm (Therapeutic Exercise) strengthening exercise  -     Elbow/Forearm Strengthening (Therapeutic Exercise)  bilateral;flexion;extension;sitting;10 repetitions  min manual resistance given, mod cues needed  -       Row Name 01/15/24 1144          Motor Skills    Therapeutic Exercise shoulder;elbow/forearm  -       Row Name 01/15/24 1144          Balance    Static Sitting Balance standby assist  -DILIP     Dynamic Sitting Balance contact guard  -DILIP     Position, Sitting Balance unsupported;sitting in chair  -DILIP     Static Standing Balance moderate assist;2-person assist;non-verbal cues (demo/gesture);verbal cues  -DILIP     Position/Device Used, Standing Balance supported;other (see comments)  UE support  -DILIP     Balance Interventions sit to stand;sitting  -DILIP               User Key  (r) = Recorded By, (t) = Taken By, (c) = Cosigned By      Initials Name Provider Type    Rayna Zamora OT Occupational Therapist                   Goals/Plan       Row Name 01/15/24 1149          Bed Mobility Goal 1 (OT)    Activity/Assistive Device (Bed Mobility Goal 1, OT) rolling to left;rolling to right;sit to supine;supine to sit  -DILIP     [place] Level/Cues Needed (Bed Mobility Goal 1, OT) verbal cues required;nonverbal cues (demo/gesture) required;moderate assist (50-74% patient effort)  -DILIP     Time Frame (Bed Mobility Goal 1, OT) long term goal (LTG);10 days  -DILIP     Progress/Outcomes (Bed Mobility Goal 1, OT) goal revised this date;goal ongoing;continuing progress toward goal  -       Row Name 01/15/24 1149          Transfer Goal 1 (OT)    Activity/Assistive Device (Transfer Goal 1, OT) bed-to-chair/chair-to-bed;toilet;walker, rolling;sit-to-stand/stand-to-sit  -DILIP     [place] Level/Cues Needed (Transfer Goal 1, OT) verbal cues required;nonverbal cues (demo/gesture) required;moderate assist (50-74% patient effort)  -DILIP     Time Frame (Transfer Goal 1, OT) long term goal (LTG);10 days  -DILIP     Progress/Outcome (Transfer Goal 1, OT) goal ongoing;continuing progress toward goal;goal revised this date  -       Row Name  01/15/24 1149          Grooming Goal 1 (OT)    Progress/Outcome (Grooming Goal 1, OT) goal ongoing  -DILIP       Row Name 01/15/24 1149          Problem Specific Goal 1 (OT)    Problem Specific Goal 1 (OT) Pt. will stand one minute in rw wx with min A to support ADL independence and caregiver care.  -DILIP     Time Frame (Problem Specific Goal 1, OT) long term goal (LTG);10 days  -DILIP     Progress/Outcome (Problem Specific Goal 1, OT) new goal  -DILIP               User Key  (r) = Recorded By, (t) = Taken By, (c) = Cosigned By      Initials Name Provider Type    Rayna Zamora, OT Occupational Therapist                   Clinical Impression       Row Name 01/15/24 1146          Pain Assessment    Pretreatment Pain Rating 0/10 - no pain  -DILIP     Posttreatment Pain Rating 0/10 - no pain  -DILIP       Row Name 01/15/24 1146          Plan of Care Review    Plan of Care Reviewed With patient;spouse  -DILIP     Progress improving  -DILIP     Outcome Evaluation Pt. with some improvement with bed mobility, sit to stand assist level and UE TE.  Pt. remains disoriented except to self and needs initial encouragement to work due to she does not like to be cold.  Pt. remains below baseline independence warranting continued skilled OT services.  -       Row Name 01/15/24 1146          Therapy Assessment/Plan (OT)    Rehab Potential (OT) good, to achieve stated therapy goals  -     Criteria for Skilled Therapeutic Interventions Met (OT) yes;skilled treatment is necessary  -DILIP     Therapy Frequency (OT) daily  -DILIP       Row Name 01/15/24 1146          Therapy Plan Review/Discharge Plan (OT)    Anticipated Discharge Disposition (OT) skilled nursing facility  -       Row Name 01/15/24 1146          Vital Signs    Pre Systolic BP Rehab --  nurse cleared for treatment session  -DILIP     O2 Delivery Pre Treatment room air  -DILIP     O2 Delivery Intra Treatment room air  -DILIP     O2 Delivery Post Treatment room air  -DILIP     Pre Patient Position Supine   -DILIP     Intra Patient Position Standing  -DILIP     Post Patient Position Sitting  -DILIP       Row Name 01/15/24 1146          Positioning and Restraints    Pre-Treatment Position in bed  -DILIP     Post Treatment Position chair  -DILIP     In Chair notified nsg;reclined;call light within reach;encouraged to call for assist;exit alarm on;with family/caregiver;LUE elevated;legs elevated;R waffle boot  -DILIP               User Key  (r) = Recorded By, (t) = Taken By, (c) = Cosigned By      Initials Name Provider Type    Rayna Zamora OT Occupational Therapist                   Outcome Measures       Row Name 01/15/24 1151          How much help from another is currently needed...    Putting on and taking off regular lower body clothing? 1  -DILIP     Bathing (including washing, rinsing, and drying) 2  -DILIP     Toileting (which includes using toilet bed pan or urinal) 1  -DILIP     Putting on and taking off regular upper body clothing 2  -DILIP     Taking care of personal grooming (such as brushing teeth) 3  -DILIP     Eating meals 3  -DILIP     AM-PAC 6 Clicks Score (OT) 12  -DILIP       Row Name 01/15/24 1151          Functional Assessment    Outcome Measure Options AM-PAC 6 Clicks Daily Activity (OT)  -DILIP               User Key  (r) = Recorded By, (t) = Taken By, (c) = Cosigned By      Initials Name Provider Type    Rayna Zamora OT Occupational Therapist                    Occupational Therapy Education       Title: PT OT SLP Therapies (In Progress)       Topic: Occupational Therapy (In Progress)       Point: ADL training (In Progress)       Description:   Instruct learner(s) on proper safety adaptation and remediation techniques during self care or transfers.   Instruct in proper use of assistive devices.                  Learning Progress Summary             Patient Acceptance, E,D, NR by DILIP at 1/15/2024 1151    Comment: re-orientation, bed mobility sequencing, UE TE, transfer technique, posture    Acceptance, E, NR by CS at  1/12/2024 1434    Acceptance, E, NR by AC at 1/10/2024 1023    Nonacceptance, E, NL by AC at 1/5/2024 1044    Acceptance, E, NR by AC at 1/3/2024 1435    Acceptance, E, VU,NR by DILIP at 1/1/2024 0920    Comment: oriented to date, need to move and position RLE, ADL progression, sequencing bed mobililty    Acceptance, E, NR by AC at 12/27/2023 1135   Family Acceptance, E,D, NR by DILIP at 1/15/2024 1151    Comment: re-orientation, bed mobility sequencing, UE TE, transfer technique, posture    Acceptance, E, VU,NR by DILIP at 1/1/2024 0920    Comment: oriented to date, need to move and position RLE, ADL progression, sequencing bed mobililty                         Point: Home exercise program (In Progress)       Description:   Instruct learner(s) on appropriate technique for monitoring, assisting and/or progressing therapeutic exercises/activities.                  Learning Progress Summary             Patient Acceptance, E,D, NR by DILIP at 1/15/2024 1151    Comment: re-orientation, bed mobility sequencing, UE TE, transfer technique, posture   Family Acceptance, E,D, NR by DILIP at 1/15/2024 1151    Comment: re-orientation, bed mobility sequencing, UE TE, transfer technique, posture                         Point: Precautions (In Progress)       Description:   Instruct learner(s) on prescribed precautions during self-care and functional transfers.                  Learning Progress Summary             Patient Acceptance, E,D, NR by DILIP at 1/15/2024 1151    Comment: re-orientation, bed mobility sequencing, UE TE, transfer technique, posture    Acceptance, E, NR by  at 1/12/2024 1434    Acceptance, E, VU,NR by DILIP at 1/1/2024 0920    Comment: oriented to date, need to move and position RLE, ADL progression, sequencing bed mobililty   Family Acceptance, E,D, NR by DILIP at 1/15/2024 1151    Comment: re-orientation, bed mobility sequencing, UE TE, transfer technique, posture    Acceptance, E, VU,NR by DILIP at 1/1/2024 0920    Comment:  oriented to date, need to move and position RLE, ADL progression, sequencing bed mobililty                         Point: Body mechanics (In Progress)       Description:   Instruct learner(s) on proper positioning and spine alignment during self-care, functional mobility activities and/or exercises.                  Learning Progress Summary             Patient Acceptance, E,D, NR by  at 1/15/2024 1151    Comment: re-orientation, bed mobility sequencing, UE TE, transfer technique, posture    Acceptance, E, NR by  at 1/12/2024 1434    Acceptance, E, VU,NR by DILIP at 1/1/2024 0920    Comment: oriented to date, need to move and position RLE, ADL progression, sequencing bed mobililty   Family Acceptance, E,D, NR by  at 1/15/2024 1151    Comment: re-orientation, bed mobility sequencing, UE TE, transfer technique, posture    Acceptance, E, VU,NR by DILIP at 1/1/2024 0920    Comment: oriented to date, need to move and position RLE, ADL progression, sequencing bed mobililty                                         User Key       Initials Effective Dates Name Provider Type Discipline     07/11/23 -  Rayna Wall, OT Occupational Therapist OT    AC 02/03/23 -  Sepideh Feldman OT Occupational Therapist OT     09/02/21 -  Eduarda Avelar, OT Occupational Therapist OT                  OT Recommendation and Plan  Therapy Frequency (OT): daily  Plan of Care Review  Plan of Care Reviewed With: patient, spouse  Progress: improving  Outcome Evaluation: Pt. with some improvement with bed mobility, sit to stand assist level and UE TE.  Pt. remains disoriented except to self and needs initial encouragement to work due to she does not like to be cold.  Pt. remains below baseline independence warranting continued skilled OT services.     Time Calculation:         Time Calculation- OT       Row Name 01/15/24 1152             Time Calculation- OT    OT Start Time 1101  -DILIP      OT Received On 01/15/24  -      OT Goal Re-Cert Due  Date 01/25/24  -DILIP         Timed Charges    80459 - OT Therapeutic Exercise Minutes 6  -DILIP      78719 - OT Therapeutic Activity Minutes 5  -DILIP      20315 - OT Self Care/Mgmt Minutes 6  -DILIP         Total Minutes    Timed Charges Total Minutes 17  -DILIP       Total Minutes 17  -DILIP                User Key  (r) = Recorded By, (t) = Taken By, (c) = Cosigned By      Initials Name Provider Type    Rayna Zamora, OT Occupational Therapist                  Therapy Charges for Today       Code Description Service Date Service Provider Modifiers Qty    [id number]  OT THER PROC EA 15 MIN 1/15/2024 Rayna Wall OT GO 1                 Rayna Wall OT  1/15/2024

## 2024-01-15 NOTE — DISCHARGE PLACEMENT REQUEST
" Contact Olivia Chatterjee  [phone]  Seeking Medicaid pending bed.    Omar Mendoza (83 y.o. Female)       Date of Birth   1940    Social Security Number       Address   [address] [address]    Home Phone   [phone]    MRN   [id number]       Jain   None    Marital Status                               Admission Date   23    Admission Type   Emergency    Admitting Provider   Honey Varela MD    Attending Provider   Honey Varela MD    Department, Room/Bed   [facility] 5B, N546/1       Discharge Date       Discharge Disposition       Discharge Destination                                 Attending Provider: Honey Varela MD    Allergies: No Known Allergies    Isolation: None   Infection: None   Code Status: No CPR    Ht: 170.2 cm (67.01\")   Wt: 99 kg (218 lb 4.1 oz)    Admission Cmt: None   Principal Problem: UTI (urinary tract infection) [N39.0]                   Active Insurance as of 2023       Primary Coverage       Payor Plan Insurance Group Employer/Plan Group    ANTH MEDICARE REPLACEMENT ANTH MEDICARE ADVANTAGE [id number]       Payor Plan Address Payor Plan Phone Number Payor Plan Fax Number Effective Dates    [address] [phone]  2022 - None Entered    [facility] [address]         Subscriber Name Subscriber Birth Date Member ID       OMAR MENDOZA 1940 [id number]                     Emergency Contacts        (Rel.) Home Phone Work Phone Mobile Phone    JO (POA)DESTIN (Son) [phone] -- [phone]    KODYDINO WAGNER (Daughter) [phone] -- [phone]    JODALLAS (Spouse) [phone] -- [phone]                 Physician Progress Notes (most recent note)        Josephine Chatterjee, APRN at 01/15/24 [facility]              [facility] Medicine Services  PROGRESS NOTE    Patient Name: Omar Mendoza  : 1940  MRN: [id number]    Date of Admission: " 12/26/2023  Primary Care Physician: Varun Pa MD    Subjective   Subjective     CC:  Weakness      HPI:   at bed. States patient having a good day. Patient converses and has not complaints. Cooperative with exam      Objective   Objective     Vital Signs:   Temp:  [97.8 °F (36.6 °C)-97.9 °F (36.6 °C)] 97.9 °F (36.6 °C)  Heart Rate:  [79] 79  Resp:  [16-17] 17  BP: (126-152)/(87-98) 152/98     Physical Exam:  Constitutional: No acute distress, awake, alert  HENT: NCAT, mucous membranes moist  Respiratory: rales bilaterally, respiratory effort normal   Cardiovascular: RRR  Gastrointestinal: Positive bowel sounds, soft, nontender, nondistended  Musculoskeletal: mild ankle edema bilaterally  Psychiatric: calm affect, cooperative  Neurologic: able to answer ROS, ALMA, speech clear        Results Reviewed:  LAB RESULTS:                                    Brief Urine Lab Results  (Last result in the past 365 days)        Color   Clarity   Blood   Leuk Est   Nitrite   Protein   CREAT   Urine HCG        01/04/24 0053 Yellow   Clear   Negative   Trace   Negative   Negative                   Microbiology Results Abnormal       Procedure Component Value - Date/Time    COVID-19 RAPID AG,VERITOR,COR/JAMARI/PAD/NUPUR/ASHA/LAG/BRITTANY/ IN-HOUSE,DRY SWAB, 1 HR TAT - Swab, Nasal Cavity [[id number]]  (Normal) Collected: 01/04/24 0004    Lab Status: Final result Specimen: Swab from Nasal Cavity Updated: 01/04/24 0022     COVID19 Presumptive Negative    Narrative:      Fact sheets for providers: https://www.fda.gov/media/768828/download    Fact sheets for patients: https://www.fda.gov/media/245113/download    Blood Culture - Blood, Blood, PICC Line [[id number]]  (Normal) Collected: 12/27/23 1400    Lab Status: Final result Specimen: Blood, PICC Line Updated: 01/01/24 1531     Blood Culture No growth at 5 days    Blood Culture - Blood, Arm, Right [[id number]]  (Normal) Collected: 12/27/23 0431    Lab Status: Final result Specimen:  Blood from Arm, Right Updated: 01/01/24 0515     Blood Culture No growth at 5 days            No radiology results from the last 24 hrs    Results for orders placed during the hospital encounter of 12/26/23    Adult Transthoracic Echo Complete W/ Cont if Necessary Per Protocol    Interpretation Summary    Left ventricular systolic function is normal. Calculated left ventricular EF = 57% Left ventricular ejection fraction appears to be 56 - 60%.    Left ventricular wall thickness is consistent with mild to moderate concentric hypertrophy.    Left ventricular diastolic function was normal.    Estimated right ventricular systolic pressure from tricuspid regurgitation is mildly elevated (35-45 mmHg).    There is a trivial pericardial effusion.    Mild aortic valve regurgitation.      Current medications:  Scheduled Meds:mirtazapine, 15 mg, Oral, Nightly  rivastigmine, 1 patch, Transdermal, Daily  sodium chloride, 10 mL, Intravenous, Q12H      Continuous Infusions:   PRN Meds:.  acetaminophen **OR** acetaminophen **OR** acetaminophen    senna-docusate sodium **AND** polyethylene glycol **AND** bisacodyl **AND** bisacodyl    Calcium Replacement - Follow Nurse / BPA Driven Protocol    dextrose    dextrose    glucagon (human recombinant)    glycopyrrolate    guaiFENesin    hydrOXYzine    Magnesium Standard Dose Replacement - Follow Nurse / BPA Driven Protocol    melatonin    Morphine    nitroglycerin    ondansetron    Phosphorus Replacement - Follow Nurse / BPA Driven Protocol    Potassium Replacement - Follow Nurse / BPA Driven Protocol    sodium chloride    ziprasidone    Assessment & Plan   Assessment & Plan     Active Hospital Problems    Diagnosis  POA    **UTI (urinary tract infection) [N39.0]  Yes    Acute constipation [K59.00]  Unknown    Acute urinary retention [R33.8]  Unknown    Uterine prolapse [N81.4]  Unknown    Bradycardia [R00.1]  Unknown    Pressure injury of buttock, unstageable [L89.300]  Unknown    Type  2 diabetes mellitus with ketoacidosis, without long-term current use of insulin [E11.10]  Unknown    Disorientation [R41.0]  Unknown    Pleural effusion [J90]  Unknown    Severe malnutrition [E43]  Yes    Edema of lower extremity [R60.0]  Yes    Bullous pemphigoid [L12.0]  Yes    Dyslipidemia [E78.5]  Yes    Essential hypertension [I10]  Yes      Resolved Hospital Problems   No resolved problems to display.            Brief Hospital Course to date:  Omar Mendoza is a 83 y.o. female with history of type 2 diabetes, hypertension, history of uterine prolapse, urinary retention with Schultz catheter in place, history of bullous pemphigoid  (s/p Dupixent 10/2023, prednisone/doxycycline 11/2023) CAD, hyperlipidemia, presumed dementia who presented to the ED with generalized weakness and bullous pemphigoid blister on the heel.  Patient developed significant encephalopathy, and paranoia during her hospitalization.  Neurology was consulted and determined patient to be suffering from dementia complicated by depression and paranoia.  Patient refused to eat and drink.  Goals of care discussion had with family, palliative care consulted, pursuing hospice at this time.  Medications have been simplified to remeron and exelon patch and seems to be doing well.    This patient's problems and plans were partially entered by my partner and updated as appropriate by me 01/15/24.    Acute metabolic encephalopathy  Dementia with suspected Alzheimer's disease  Major depression with paranoia  Generalized weakness  - Family pursuing placement and then transition to hospice, desire comfort, minimizing any interventions on the patient and will not force any medical therapies. Plan will be discharge to LTC with eventual transition to hospice and she currently does not meet inpatient hospice criteria  - neurology adjusted medications, patient is not always agreeable to taking these     Dysphagia  - comfort diet given patient is comfort measures  only     Bilateral pleural effusion  L>R  Bibasilar airspace disease versus pneumonia  -Concern for aspiration on 12/30, Completed 5 days of Rocephin and doxycycline  -Mucinex/Robinul as needed for cough     Anemia  -suspect anemia of chronic disease, stable with no active signs of bleeding     Poor venous access  -Removed PICC overnight 1/3     Sinus bradycardia  - medications discontinued due to GOC, stable vs     BLE edema  Decreased pulses LLE  - CTA abd showing small right pleural effusion, bilateral basilar atelectasis  - CTA w/ runoff showing normal right sided runoff; left side delayed runoff possibly r/t venous stasis changes, no evidence of arterial occlusion or significant stenosis  -Echo revealed EF 57% with normal diastolic function      Uncontrolled Type 2 diabetes  -Hgb A1C 12.10 on 12/27     Constipation  -s/p aggressive bowel regimen  -Continue prn regimen     Uterine prolapse  -Gyn Dr. Zhou followed outpatient  -may be contributing to chronic urine retention  -Has external catheter     Bullous pemphigoid  -Has developed new blister on right heel  -Completed doxycycline  -Wound care following      Pressure ulcer  -Wound care following      L adrenal adenoma  - incidental finding on CTA abdomen      Severe malnutrition     Expected Discharge Location and Transportation: LTC with outpatient hospice  Expected Discharge   Expected Discharge Date: 1/15/2024; Expected Discharge Time:      DVT prophylaxis:  No DVT prophylaxis order currently exists.     AM-PAC 6 Clicks Score (PT): 6 (01/14/24 2000)    CODE STATUS:   Code Status and Medical Interventions:   Ordered at: 01/07/24 1143     Code Status (Patient has no pulse and is not breathing):    No CPR (Do Not Attempt to Resuscitate)     Medical Interventions (Patient has pulse or is breathing):    Comfort Measures       LITA Root  01/15/24        Electronically signed by Josephine Chatterjee APRN at 01/15/24 1116          Physical Therapy  Notes (most recent note)        Carly Mistry, PT at 24 1419  Version 1 of 1         Patient Name: Omar Mendoza  : 1940    MRN: [id number]                              Today's Date: 2024       Admit Date: 2023    Visit Dx:     ICD-10-CM ICD-9-CM   1. Generalized weakness  R53.1 780.79   2. Acute UTI  N39.0 599.0   3. Bullous pemphigoid  L12.0 694.5   4. Venous insufficiency  I87.2 459.81   5. Dysphagia, unspecified type  R13.10 787.20     Patient Active Problem List   Diagnosis    UTI (urinary tract infection)    Bullous pemphigoid    Essential hypertension    Dyslipidemia    Edema of lower extremity    Cholestatic hepatitis    Acute constipation    Acute urinary retention    Uterine prolapse    Bradycardia    Pressure injury of buttock, unstageable    Type 2 diabetes mellitus with ketoacidosis, without long-term current use of insulin    Disorientation    Pleural effusion    Severe malnutrition     Past Medical History:   Diagnosis Date    CAD (coronary artery disease)     Dementia     Diabetes mellitus     Hyperlipemia     Hypertension      History reviewed. No pertinent surgical history.   General Information       Row Name 24 1509          Physical Therapy Time and Intention    Document Type therapy note (daily note)  -ES     Mode of Treatment physical therapy  -ES       Row Name 24 1509          General Information    Patient Profile Reviewed yes  -ES     Existing Precautions/Restrictions fall;seizures  -ES     Barriers to Rehab medically complex;cognitive status  -ES       Row Name 24 1509          Cognition    Orientation Status (Cognition) oriented to;person;disoriented to;place;time  -ES       Row Name 24 1509          Safety Issues, Functional Mobility    Safety Issues Affecting Function (Mobility) awareness of need for assistance;insight into deficits/self-awareness;safety precaution awareness;safety precautions follow-through/compliance;sequencing  abilities  -ES     Impairments Affecting Function (Mobility) balance;cognition;endurance/activity tolerance;postural/trunk control;strength  -ES     Cognitive Impairments, Mobility Safety/Performance awareness, need for assistance;insight into deficits/self-awareness;problem-solving/reasoning;safety precaution awareness;safety precaution follow-through;sequencing abilities  -ES               User Key  (r) = Recorded By, (t) = Taken By, (c) = Cosigned By      Initials Name Provider Type    Carly Carson PT Physical Therapist                   Mobility       Row Name 01/12/24 1510          Bed Mobility    Comment, (Bed Mobility) Received UIC  -ES       Row Name 01/12/24 1510          Sit-Stand Transfer    Sit-Stand [place] (Transfers) unable to assess  -ES     Comment, (Sit-Stand Transfer) Unable to assess due to increased fatigue  -ES       Row Name 01/12/24 1510          Gait/Stairs (Locomotion)    Comment, (Gait/Stairs) Unable to assess due to increased fatigue  -ES               User Key  (r) = Recorded By, (t) = Taken By, (c) = Cosigned By      Initials Name Provider Type    Carly Carson PT Physical Therapist                   Obj/Interventions       Row Name 01/12/24 1510          Motor Skills    Therapeutic Exercise ankle;hip;knee  -ES       Row Name 01/12/24 1510          Hip (Therapeutic Exercise)    Hip (Therapeutic Exercise) strengthening exercise  -ES     Hip Strengthening (Therapeutic Exercise) bilateral;flexion;aBduction;aDduction;10 repetitions  -ES       Row Name 01/12/24 1510          Knee (Therapeutic Exercise)    Knee (Therapeutic Exercise) strengthening exercise  -ES     Knee Strengthening (Therapeutic Exercise) bilateral;heel slides;marching while seated;LAQ (long arc quad);10 repetitions  -ES       Row Name 01/12/24 1510          Ankle (Therapeutic Exercise)    Ankle (Therapeutic Exercise) AROM (active range of motion)  -ES     Ankle AROM (Therapeutic Exercise)  bilateral;dorsiflexion;plantarflexion;10 repetitions  -ES       Row Name 01/12/24 1510          Balance    Balance Assessment sitting static balance;sitting dynamic balance  -ES     Static Sitting Balance contact guard  -ES     Dynamic Sitting Balance moderate assist;verbal cues  -ES     Position, Sitting Balance supported;sitting in chair  -ES     Position/Device Used, Standing Balance supported  -ES     Balance Interventions sitting;supported;dynamic;static;occupation based/functional task  -ES     Comment, Balance no LOB  -ES               User Key  (r) = Recorded By, (t) = Taken By, (c) = Cosigned By      Initials Name Provider Type    ES Carly Mistry, PT Physical Therapist                   Goals/Plan    No documentation.                  Clinical Impression       Row Name 01/12/24 1511          Pain    Pretreatment Pain Rating 0/10 - no pain  -ES     Posttreatment Pain Rating 0/10 - no pain  -ES     Pre/Posttreatment Pain Comment tolerated  -ES     Pain Intervention(s) Repositioned;Ambulation/increased activity  -ES       Row Name 01/12/24 1511          Plan of Care Review    Plan of Care Reviewed With patient;spouse  -ES     Progress improving  -ES     Outcome Evaluation Pt limited by increased fatigue this date but overall gave good effort with therapy. Will continue to progress as able to address functional limitations and promote return to PLOF. PT rec SNF at d/c.  -ES       Row Name 01/12/24 1511          Therapy Assessment/Plan (PT)    Rehab Potential (PT) good, to achieve stated therapy goals  -ES     Criteria for Skilled Interventions Met (PT) yes;meets criteria;skilled treatment is necessary  -ES     Therapy Frequency (PT) daily  -ES       Row Name 01/12/24 1511          Vital Signs    Pre Systolic BP Rehab --  non-tele. cleared by RN.  -ES     O2 Delivery Pre Treatment room air  -ES     O2 Delivery Intra Treatment room air  -ES     O2 Delivery Post Treatment room air  -ES     Pre Patient Position  Sitting  -ES     Intra Patient Position Sitting  -ES     Post Patient Position Sitting  -ES       Row Name 01/12/24 1511          Positioning and Restraints    Pre-Treatment Position sitting in chair/recliner  -ES     Post Treatment Position chair  -ES     In Chair notified nsg;reclined;sitting;call light within reach;encouraged to call for assist;exit alarm on;with family/caregiver;on mechanical lift sling;waffle cushion;legs elevated;heels elevated  -ES               User Key  (r) = Recorded By, (t) = Taken By, (c) = Cosigned By      Initials Name Provider Type    Carly Carson PT Physical Therapist                   Outcome Measures       Row Name 01/12/24 1513 01/12/24 0800       How much help from another person do you currently need...    Turning from your back to your side while in flat bed without using bedrails? 2  -ES 1  -LG    Moving from lying on back to sitting on the side of a flat bed without bedrails? 2  -ES 1  -LG    Moving to and from a bed to a chair (including a wheelchair)? 1  -ES 1  -LG    Standing up from a chair using your arms (e.g., wheelchair, bedside chair)? 1  -ES 1  -LG    Climbing 3-5 steps with a railing? 1  -ES 1  -LG    To walk in hospital room? 1  -ES 1  -LG    AM-PAC 6 Clicks Score (PT) 8  -ES 6  -LG    Highest Level of Mobility Goal 3 --> Sit at edge of bed  -ES 2 --> Bed activities/dependent transfer  -LG      Row Name 01/12/24 1513 01/12/24 1433       Functional Assessment    Outcome Measure Options AM-PAC 6 Clicks Basic Mobility (PT)  -ES AM-PAC 6 Clicks Daily Activity (OT)  -CS              User Key  (r) = Recorded By, (t) = Taken By, (c) = Cosigned By      Initials Name Provider Type    LG Afua Gomez, RN Registered Nurse    Eduarda Lizarraga OT Occupational Therapist    Carly Carson PT Physical Therapist                                 Physical Therapy Education       Title: PT OT SLP Therapies (In Progress)       Topic: Physical Therapy (In Progress)        Point: Mobility training (In Progress)       Learning Progress Summary             Patient Acceptance, E, NR by AE at 1/10/2024 0919    Acceptance, E, NR by AE at 1/3/2024 1337    Acceptance, E, NR by KE at 12/29/2023 1131    Acceptance, E, NR by KE at 12/27/2023 1045                         Point: Home exercise program (Not Started)       Learner Progress:  Not documented in this visit.              Point: Body mechanics (In Progress)       Learning Progress Summary             Patient Acceptance, E, NR by AE at 1/10/2024 0919    Acceptance, E, NR by AE at 1/3/2024 1337    Acceptance, E, NR by KE at 12/29/2023 1131    Acceptance, E, NR by KE at 12/27/2023 1045                         Point: Precautions (In Progress)       Learning Progress Summary             Patient Acceptance, E, NR by AE at 1/10/2024 0919    Acceptance, E, NR by AE at 1/3/2024 1337    Acceptance, E, NR by KE at 12/29/2023 1131    Acceptance, E, NR by KE at 12/27/2023 1045                                         User Key       Initials Effective Dates Name Provider Type Discipline    AE 09/21/21 -  Domo Desir, PT Physical Therapist PT    KE 11/16/23 -  Petty Mckoy, PT Physical Therapist PT                  PT Recommendation and Plan     Plan of Care Reviewed With: patient, spouse  Progress: improving  Outcome Evaluation: Pt limited by increased fatigue this date but overall gave good effort with therapy. Will continue to progress as able to address functional limitations and promote return to PLOF. PT rec SNF at d/c.     Time Calculation:         PT Charges       Row Name 01/12/24 1513             Time Calculation    Start Time 1419  -ES      PT Received On 01/12/24  -ES      PT Goal Re-Cert Due Date 01/20/24  -ES         Time Calculation- PT    Total Timed Code Minutes- PT 24 minute(s)  -ES         Timed Charges    43106 - PT Therapeutic Exercise Minutes 15  -ES      10340 - PT Therapeutic Activity Minutes 9  -ES         Total  Minutes    Timed Charges Total Minutes 24  -ES       Total Minutes 24  -ES                User Key  (r) = Recorded By, (t) = Taken By, (c) = Cosigned By      Initials Name Provider Type    ES Carly Mistry PT Physical Therapist                  Therapy Charges for Today       Code Description Service Date Service Provider Modifiers Qty    41879756399  PT THER PROC EA 15 MIN 2024 Carly Mistry, PT GP 1    67062205513 HC PT THERAPEUTIC ACT EA 15 MIN 2024 Carly Mistry PT GP 1            PT G-Codes  Outcome Measure Options: AM-PAC 6 Clicks Basic Mobility (PT)  AM-PAC 6 Clicks Score (PT): 8  AM-PAC 6 Clicks Score (OT): 10  PT Discharge Summary  Anticipated Discharge Disposition (PT): skilled nursing facility    Carly Mistry PT  2024      Electronically signed by Carly Mistry PT at 24 1513          Occupational Therapy Notes (most recent note)        Rayna Wall, OT at 01/15/24 1101          Patient Name: Omar Mendoza  : 1940    MRN: [id number]                              Today's Date: 1/15/2024       Admit Date: 2023    Visit Dx:     ICD-10-CM ICD-9-CM   1. Generalized weakness  R53.1 780.79   2. Acute UTI  N39.0 599.0   3. Bullous pemphigoid  L12.0 694.5   4. Venous insufficiency  I87.2 459.81   5. Dysphagia, unspecified type  R13.10 787.20     Patient Active Problem List   Diagnosis    UTI (urinary tract infection)    Bullous pemphigoid    Essential hypertension    Dyslipidemia    Edema of lower extremity    Cholestatic hepatitis    Acute constipation    Acute urinary retention    Uterine prolapse    Bradycardia    Pressure injury of buttock, unstageable    Type 2 diabetes mellitus with ketoacidosis, without long-term current use of insulin    Disorientation    Pleural effusion    Severe malnutrition     Past Medical History:   Diagnosis Date    CAD (coronary artery disease)     Dementia     Diabetes mellitus     Hyperlipemia     Hypertension      History  reviewed. No pertinent surgical history.   General Information       Row Name 01/15/24 1140          OT Time and Intention    Document Type progress note/recertification  -     Mode of Treatment occupational therapy  -       Row Name 01/15/24 1140          General Information    Patient Profile Reviewed yes  -DILIP     Existing Precautions/Restrictions fall;seizures;other (see comments)  wound R heel and buttock, h/o Alz. dementia  -DILIP     Barriers to Rehab medically complex;previous functional deficit;cognitive status  -       Row Name 01/15/24 1140          Cognition    Orientation Status (Cognition) oriented to;person;disoriented to;place;situation;time  -       Row Name 01/15/24 1140          Safety Issues, Functional Mobility    Safety Issues Affecting Function (Mobility) awareness of need for assistance;insight into deficits/self-awareness;sequencing abilities;safety precautions follow-through/compliance;safety precaution awareness  -     Impairments Affecting Function (Mobility) balance;cognition;endurance/activity tolerance;postural/trunk control;strength;range of motion (ROM)  -DILIP     Cognitive Impairments, Mobility Safety/Performance awareness, need for assistance;insight into deficits/self-awareness;safety precaution follow-through;safety precaution awareness;sequencing abilities  -               User Key  (r) = Recorded By, (t) = Taken By, (c) = Cosigned By      Initials Name Provider Type    DILIP Rayna Wall, OT Occupational Therapist                     Mobility/ADL's       Row Name 01/15/24 1141          Bed Mobility    Rolling Left [place] (Bed Mobility) maximum assist (25% patient effort);1 person assist;nonverbal cues (demo/gesture);verbal cues  -DILIP     Rolling Right [place] (Bed Mobility) moderate assist (50% patient effort);nonverbal cues (demo/gesture);verbal cues  -DILIP     Bed Mobility, Safety Issues cognitive deficits limit understanding;decreased use of arms for  pushing/pulling;decreased use of legs for bridging/pushing;impaired trunk control for bed mobility  -     Assistive Device (Bed Mobility) bed rails;draw sheet  -DILIP     Comment, (Bed Mobility) cues to sequence movement and reach for railing, pt. rolled to clean BM, nurse to change buttock dressing and for sling placement  -       Row Name 01/15/24 1141          Bed-Chair Transfer    Bed-Chair [place] (Transfers) dependent (less than 25% patient effort);2 person assist;verbal cues  -     Assistive Device (Bed-Chair Transfers) lift device  -       Row Name 01/15/24 1141          Sit-Stand Transfer    Sit-Stand [place] (Transfers) moderate assist (50% patient effort);2 person assist;nonverbal cues (demo/gesture);verbal cues  -     Assistive Device (Sit-Stand Transfers) other (see comments)  -DILIP     Comment, (Sit-Stand Transfer) did bed standing with RUE support at elbows and LUE on arm rest, pt. stood twice for grossly 15-20 seconds, some trunk flexion, cues to improve posture  -       Row Name 01/15/24 1141          Functional Mobility    Functional Mobility- Ind. Level unable to perform  -[facility] Name 01/15/24 1141          Activities of Daily Living    BADL Assessment/Intervention toileting;upper body dressing;lower body dressing  -       Row Name 01/15/24 1141          Lower Body Dressing Assessment/Training    [place] Level (Lower Body Dressing) don;socks;dependent (less than 25% patient effort)  -DILIP     Position (Lower Body Dressing) supine  -       Row Name 01/15/24 1141          Self-Feeding Assessment/Training    Comment, (Feeding) declined water  -       Row Name 01/15/24 1141          Grooming Assessment/Training    Comment, (Grooming) declined at this time  -       Row Name 01/15/24 1141          Toileting Assessment/Training    [place] Level (Toileting) change pad/brief;perform perineal hygiene;dependent (less than 25% patient effort)  -DILIP     Position  (Toileting) supine  -       Row Name 01/15/24 1141          Upper Body Dressing Assessment/Training    [place] Level (Upper Body Dressing) don;moderate assist (50% patient effort)  -DILIP     Position (Upper Body Dressing) sitting up in bed  -DILIP     Comment, (Upper Body Dressing) pt.'s sleeves out of gown and gown twisted, re-donned  -               User Key  (r) = Recorded By, (t) = Taken By, (c) = Cosigned By      Initials Name Provider Type    Rayna Zamora OT Occupational Therapist                   Obj/Interventions       [facility] Name 01/15/24 1144          Shoulder (Therapeutic Exercise)    Shoulder AROM (Therapeutic Exercise) bilateral;flexion;extension;horizontal aBduction/aDduction;sitting;10 repetitions  -[facility] Name 01/15/24 1144          Elbow/Forearm (Therapeutic Exercise)    Elbow/Forearm (Therapeutic Exercise) strengthening exercise  -     Elbow/Forearm Strengthening (Therapeutic Exercise) bilateral;flexion;extension;sitting;10 repetitions  min manual resistance given, mod cues needed  -[facility] Name 01/15/24 1144          Motor Skills    Therapeutic Exercise shoulder;elbow/forearm  -[facility] Name 01/15/24 1144          Balance    Static Sitting Balance standby assist  -     Dynamic Sitting Balance contact guard  -DILIP     Position, Sitting Balance unsupported;sitting in chair  -DILIP     Static Standing Balance moderate assist;2-person assist;non-verbal cues (demo/gesture);verbal cues  -DILIP     Position/Device Used, Standing Balance supported;other (see comments)  UE support  -DILIP     Balance Interventions sit to stand;sitting  -               User Key  (r) = Recorded By, (t) = Taken By, (c) = Cosigned By      Initials Name Provider Type    Rayna Zamora OT Occupational Therapist                   Goals/Plan       Row Name 01/15/24 1149          Bed Mobility Goal 1 (OT)    Activity/Assistive Device (Bed Mobility Goal 1, OT) rolling to left;rolling to right;sit to  supine;supine to sit  -DILIP     [place] Level/Cues Needed (Bed Mobility Goal 1, OT) verbal cues required;nonverbal cues (demo/gesture) required;moderate assist (50-74% patient effort)  -DILIP     Time Frame (Bed Mobility Goal 1, OT) long term goal (LTG);10 days  -DILIP     Progress/Outcomes (Bed Mobility Goal 1, OT) goal revised this date;goal ongoing;continuing progress toward goal  -DILIP       Row Name 01/15/24 1149          Transfer Goal 1 (OT)    Activity/Assistive Device (Transfer Goal 1, OT) bed-to-chair/chair-to-bed;toilet;walker, rolling;sit-to-stand/stand-to-sit  -DILIP     [place] Level/Cues Needed (Transfer Goal 1, OT) verbal cues required;nonverbal cues (demo/gesture) required;moderate assist (50-74% patient effort)  -DILIP     Time Frame (Transfer Goal 1, OT) long term goal (LTG);10 days  -DILIP     Progress/Outcome (Transfer Goal 1, OT) goal ongoing;continuing progress toward goal;goal revised this date  -DILIP       Row Name 01/15/24 1149          Grooming Goal 1 (OT)    Progress/Outcome (Grooming Goal 1, OT) goal ongoing  -DILIP       Row Name 01/15/24 1149          Problem Specific Goal 1 (OT)    Problem Specific Goal 1 (OT) Pt. will stand one minute in rw wx with min A to support ADL independence and caregiver care.  -DILIP     Time Frame (Problem Specific Goal 1, OT) long term goal (LTG);10 days  -DILIP     Progress/Outcome (Problem Specific Goal 1, OT) new goal  -DILIP               User Key  (r) = Recorded By, (t) = Taken By, (c) = Cosigned By      Initials Name Provider Type    Rayna Zamora, OT Occupational Therapist                   Clinical Impression       Row Name 01/15/24 1146          Pain Assessment    Pretreatment Pain Rating 0/10 - no pain  -DILIP     Posttreatment Pain Rating 0/10 - no pain  -DILIP       Row Name 01/15/24 1146          Plan of Care Review    Plan of Care Reviewed With patient;spouse  -DILIP     Progress improving  -DILIP     Outcome Evaluation Pt. with some improvement with bed mobility, sit to  stand assist level and UE TE.  Pt. remains disoriented except to self and needs initial encouragement to work due to she does not like to be cold.  Pt. remains below baseline independence warranting continued skilled OT services.  -DILIP       Row Name 01/15/24 1146          Therapy Assessment/Plan (OT)    Rehab Potential (OT) good, to achieve stated therapy goals  -DILIP     Criteria for Skilled Therapeutic Interventions Met (OT) yes;skilled treatment is necessary  -DILIP     Therapy Frequency (OT) daily  -DILIP       Row Name 01/15/24 1146          Therapy Plan Review/Discharge Plan (OT)    Anticipated Discharge Disposition (OT) [facility] nursing facility  -DILIP       Row Name 01/15/24 1146          Vital Signs    Pre Systolic BP Rehab --  nurse cleared for treatment session  -DILIP     O2 Delivery Pre Treatment room air  -DILIP     O2 Delivery Intra Treatment room air  -DILIP     O2 Delivery Post Treatment room air  -DILIP     Pre Patient Position Supine  -DILIP     Intra Patient Position Standing  -DILIP     Post Patient Position Sitting  -DILIP       Row Name 01/15/24 1146          Positioning and Restraints    Pre-Treatment Position in bed  -DILIP     Post Treatment Position chair  -DILIP     In Chair notified nsg;reclined;call light within reach;encouraged to call for assist;exit alarm on;with family/caregiver;LUE elevated;legs elevated;R waffle boot  -DILIP               User Key  (r) = Recorded By, (t) = Taken By, (c) = Cosigned By      Initials Name Provider Type    Rayna Zamora, OT Occupational Therapist                   Outcome Measures       Row Name 01/15/24 1151          How much help from another is currently needed...    Putting on and taking off regular lower body clothing? 1  -DILIP     Bathing (including washing, rinsing, and drying) 2  -DILIP     Toileting (which includes using toilet bed pan or urinal) 1  -DILIP     Putting on and taking off regular upper body clothing 2  -DILIP     Taking care of personal grooming (such as brushing teeth) 3   -DILIP     Eating meals 3  -DILIP     AM-PAC 6 Clicks Score (OT) 12  -DILIP       Row Name 01/15/24 1151          Functional Assessment    Outcome Measure Options AM-PAC 6 Clicks Daily Activity (OT)  -DILIP               User Key  (r) = Recorded By, (t) = Taken By, (c) = Cosigned By      Initials Name Provider Type    Rayna Zamora, OT Occupational Therapist                    Occupational Therapy Education       Title: PT OT SLP Therapies (In Progress)       Topic: Occupational Therapy (In Progress)       Point: ADL training (In Progress)       Description:   Instruct learner(s) on proper safety adaptation and remediation techniques during self care or transfers.   Instruct in proper use of assistive devices.                  Learning Progress Summary             Patient Acceptance, E,D, NR by  at 1/15/2024 1151    Comment: re-orientation, bed mobility sequencing, UE TE, transfer technique, posture    Acceptance, E, NR by  at 1/12/2024 1434    Acceptance, E, NR by  at 1/10/2024 1023    Nonacceptance, E, NL by  at 1/5/2024 1044    Acceptance, E, NR by  at 1/3/2024 1435    Acceptance, E, VU,NR by DILIP at 1/1/2024 0920    Comment: oriented to date, need to move and position RLE, ADL progression, sequencing bed mobililty    Acceptance, E, NR by  at 12/27/2023 1135   Family Acceptance, E,D, NR by  at 1/15/2024 1151    Comment: re-orientation, bed mobility sequencing, UE TE, transfer technique, posture    Acceptance, E, VU,NR by  at 1/1/2024 0920    Comment: oriented to date, need to move and position RLE, ADL progression, sequencing bed mobililty                         Point: Home exercise program (In Progress)       Description:   Instruct learner(s) on appropriate technique for monitoring, assisting and/or progressing therapeutic exercises/activities.                  Learning Progress Summary             Patient Acceptance, E,D, NR by  at 1/15/2024 1151    Comment: re-orientation, bed mobility sequencing,  UE TE, transfer technique, posture   Family Acceptance, E,D, NR by DILIP at 1/15/2024 1151    Comment: re-orientation, bed mobility sequencing, UE TE, transfer technique, posture                         Point: Precautions (In Progress)       Description:   Instruct learner(s) on prescribed precautions during self-care and functional transfers.                  Learning Progress Summary             Patient Acceptance, E,D, NR by DILIP at 1/15/2024 1151    Comment: re-orientation, bed mobility sequencing, UE TE, transfer technique, posture    Acceptance, E, NR by CS at 1/12/2024 1434    Acceptance, E, VU,NR by DILIP at 1/1/2024 0920    Comment: oriented to date, need to move and position RLE, ADL progression, sequencing bed mobililty   Family Acceptance, E,D, NR by DILIP at 1/15/2024 1151    Comment: re-orientation, bed mobility sequencing, UE TE, transfer technique, posture    Acceptance, E, VU,NR by DILIP at 1/1/2024 0920    Comment: oriented to date, need to move and position RLE, ADL progression, sequencing bed mobililty                         Point: Body mechanics (In Progress)       Description:   Instruct learner(s) on proper positioning and spine alignment during self-care, functional mobility activities and/or exercises.                  Learning Progress Summary             Patient Acceptance, E,D, NR by DILIP at 1/15/2024 1151    Comment: re-orientation, bed mobility sequencing, UE TE, transfer technique, posture    Acceptance, E, NR by CS at 1/12/2024 1434    Acceptance, E, VU,NR by DILIP at 1/1/2024 0920    Comment: oriented to date, need to move and position RLE, ADL progression, sequencing bed mobililty   Family Acceptance, E,D, NR by DILIP at 1/15/2024 1151    Comment: re-orientation, bed mobility sequencing, UE TE, transfer technique, posture    Acceptance, E, VU,NR by DILIP at 1/1/2024 0920    Comment: oriented to date, need to move and position RLE, ADL progression, sequencing bed mobililty                                          User Key       Initials Effective Dates Name Provider Type Discipline    DILIP 07/11/23 -  Rayna Wall, OT Occupational Therapist OT    AC 02/03/23 -  Sepideh Feldman OT Occupational Therapist OT    CS 09/02/21 -  Eduarda Avelar OT Occupational Therapist OT                  OT Recommendation and Plan  Therapy Frequency (OT): daily  Plan of Care Review  Plan of Care Reviewed With: patient, spouse  Progress: improving  Outcome Evaluation: Pt. with some improvement with bed mobility, sit to stand assist level and UE TE.  Pt. remains disoriented except to self and needs initial encouragement to work due to she does not like to be cold.  Pt. remains below baseline independence warranting continued skilled OT services.     Time Calculation:         Time Calculation- OT       Row Name 01/15/24 1152             Time Calculation- OT    OT Start Time 1101  -DILIP      OT Received On 01/15/24  -DILIP      OT Goal Re-Cert Due Date 01/25/24  -DILIP         Timed Charges    34560 - OT Therapeutic Exercise Minutes 6  -DILIP      57922 - OT Therapeutic Activity Minutes 5  -DILIP      08682 - OT Self Care/Mgmt Minutes 6  -DILIP         Total Minutes    Timed Charges Total Minutes 17  -DILIP       Total Minutes 17  -DILIP                User Key  (r) = Recorded By, (t) = Taken By, (c) = Cosigned By      Initials Name Provider Type    DILIP Rayna Wall OT Occupational Therapist                  Therapy Charges for Today       Code Description Service Date Service Provider Modifiers Qty    02815274368  OT THER PROC EA 15 MIN 1/15/2024 Rayna Wall OT GO 1                 Rayna Wall OT  1/15/2024    Electronically signed by Rayna Wall OT at 01/15/24 1157

## 2024-01-15 NOTE — PLAN OF CARE
Goal Outcome Evaluation:  Plan of Care Reviewed With: patient, spouse        Progress: improving  Outcome Evaluation: Pt. with some improvement with bed mobility, sit to stand assist level and UE TE.  Pt. remains disoriented except to self and needs initial encouragement to work due to she does not like to be cold.  Pt. remains below baseline independence warranting continued skilled OT services.      Anticipated Discharge Disposition (OT): skilled nursing facility

## 2024-01-15 NOTE — THERAPY TREATMENT NOTE
Patient Name: Omar Mendoza  : 1940    MRN: [id number]                              Today's Date: 1/15/2024       Admit Date: 2023    Visit Dx:     ICD-10-CM ICD-9-CM   1. Generalized weakness  R53.1 780.79   2. Acute UTI  N39.0 599.0   3. Bullous pemphigoid  L12.0 694.5   4. Venous insufficiency  I87.2 459.81   5. Dysphagia, unspecified type  R13.10 787.20     Patient Active Problem List   Diagnosis    UTI (urinary tract infection)    Bullous pemphigoid    Essential hypertension    Dyslipidemia    Edema of lower extremity    Cholestatic hepatitis    Acute constipation    Acute urinary retention    Uterine prolapse    Bradycardia    Pressure injury of buttock, unstageable    Type 2 diabetes mellitus with ketoacidosis, without long-term current use of insulin    Disorientation    Pleural effusion    Severe malnutrition     Past Medical History:   Diagnosis Date    CAD (coronary artery disease)     Dementia     Diabetes mellitus     Hyperlipemia     Hypertension      History reviewed. No pertinent surgical history.   General Information       Row Name 01/15/24 1309          Physical Therapy Time and Intention    Document Type therapy note (daily note)  -     Mode of Treatment physical therapy  -       Row Name 01/15/24 1309          General Information    Patient Profile Reviewed yes  -ML     Existing Precautions/Restrictions fall;seizures;other (see comments)  wound R heel and buttock, h/o Alz. dementia  -     Barriers to Rehab medically complex;previous functional deficit;cognitive status  -       Row Name 01/15/24 130          Cognition    Orientation Status (Cognition) oriented to;person;disoriented to;place;situation;time  -       Row Name 01/15/24 1303          Safety Issues, Functional Mobility    Safety Issues Affecting Function (Mobility) awareness of need for assistance;friction/shear risk;insight into deficits/self-awareness;safety precaution awareness;safety precautions  follow-through/compliance;sequencing abilities  -ML     Impairments Affecting Function (Mobility) balance;cognition;endurance/activity tolerance;postural/trunk control;strength;range of motion (ROM)  -ML     Cognitive Impairments, Mobility Safety/Performance awareness, need for assistance;insight into deficits/self-awareness;safety precaution awareness;safety precaution follow-through;sequencing abilities  -ML               User Key  (r) = Recorded By, (t) = Taken By, (c) = Cosigned By      Initials Name Provider Type     Carin Villegas Physical Therapist                   Mobility       Row Name 01/15/24 1310          Bed Mobility    Bed Mobility rolling left;rolling right  -ML     Rolling Left [place] (Bed Mobility) maximum assist (25% patient effort);1 person assist;nonverbal cues (demo/gesture);verbal cues  -ML     Rolling Right [place] (Bed Mobility) moderate assist (50% patient effort);nonverbal cues (demo/gesture);verbal cues;1 person assist  -ML     Assistive Device (Bed Mobility) bed rails;draw sheet  -       Row Name 01/15/24 1310          Bed-Chair Transfer    Bed-Chair [place] (Transfers) dependent (less than 25% patient effort);2 person assist;verbal cues  -ML     Assistive Device (Bed-Chair Transfers) lift device  -ML       Row Name 01/15/24 1310          Sit-Stand Transfer    Sit-Stand [place] (Transfers) moderate assist (50% patient effort);2 person assist;nonverbal cues (demo/gesture);verbal cues  -ML     Assistive Device (Sit-Stand Transfers) other (see comments)  BUE support  -ML     Comment, (Sit-Stand Transfer) patient completed x2 sit to stand transfers from chair, patient tolerated static standing for approx 15-20 seconds, cues for upright posture patient able to correct  -ML       Row Name 01/15/24 1310          Gait/Stairs (Locomotion)    [place] Level (Gait) unable to assess  -ML               User Key  (r) = Recorded By, (t) = Taken By, (c) = Cosigned By       Initials Name Provider Type     Carin Villegas Physical Therapist                   Obj/Interventions       Row Name 01/15/24 1311          Motor Skills    Therapeutic Exercise knee  -ML       Row Name 01/15/24 1311          Knee (Therapeutic Exercise)    Knee (Therapeutic Exercise) strengthening exercise  -ML     Knee AROM (Therapeutic Exercise) bilateral;LAQ (long arc quad);10 repetitions  -ML       Row Name 01/15/24 1311          Balance    Balance Assessment sitting static balance;sitting dynamic balance;sit to stand dynamic balance;standing static balance  -ML     Static Sitting Balance standby assist  -ML     Dynamic Sitting Balance contact guard  -ML     Position, Sitting Balance unsupported;sitting in chair  -ML     Sit to Stand Dynamic Balance verbal cues;moderate assist;2-person assist  -ML     Static Standing Balance verbal cues;non-verbal cues (demo/gesture);moderate assist;2-person assist  -ML     Position/Device Used, Standing Balance supported  -ML     Balance Interventions sitting;standing;sit to stand;supported  -ML               User Key  (r) = Recorded By, (t) = Taken By, (c) = Cosigned By      Initials Name Provider Type    ML Carin Villegas Physical Therapist                   Goals/Plan    No documentation.                  Clinical Impression       Row Name 01/15/24 1313          Pain    Pretreatment Pain Rating 0/10 - no pain  -ML     Posttreatment Pain Rating 0/10 - no pain  -ML       Row Name 01/15/24 1313          Plan of Care Review    Plan of Care Reviewed With patient;spouse  -ML     Progress improving  -ML     Outcome Evaluation Patient progressed with transfers compared to previous treatment session. Patient continues to present below baseline for mobility and would continue to benefit from skilled PT to address strength, balance and activity tolerance deficits. Continue current PT POC.  -ML       Row Name 01/15/24 1313          Vital Signs    Pre Patient Position Supine  -ML     Intra  Patient Position Standing  -ML     Post Patient Position Sitting  -ML       Row Name 01/15/24 1313          Positioning and Restraints    Pre-Treatment Position in bed  -ML     Post Treatment Position chair  -ML     In Chair notified nsg;reclined;call light within reach;encouraged to call for assist;exit alarm on;waffle cushion;on mechanical lift sling;with family/caregiver;legs elevated;heels elevated;R waffle boot  -ML               User Key  (r) = Recorded By, (t) = Taken By, (c) = Cosigned By      Initials Name Provider Type    Carin Zurita Physical Therapist                   Outcome Measures       Row Name 01/15/24 1314          How much help from another person do you currently need...    Turning from your back to your side while in flat bed without using bedrails? 2  -ML     Moving from lying on back to sitting on the side of a flat bed without bedrails? 2  -ML     Moving to and from a bed to a chair (including a wheelchair)? 2  -ML     Standing up from a chair using your arms (e.g., wheelchair, bedside chair)? 2  -ML     Climbing 3-5 steps with a railing? 1  -ML     To walk in hospital room? 1  -ML     AM-PAC 6 Clicks Score (PT) 10  -ML     Highest Level of Mobility Goal 4 --> Transfer to chair/commode  -ML       Row Name 01/15/24 1314 01/15/24 1151       Functional Assessment    Outcome Measure Options AM-PAC 6 Clicks Basic Mobility (PT)  -ML AM-PAC 6 Clicks Daily Activity (OT)  -DILIP              User Key  (r) = Recorded By, (t) = Taken By, (c) = Cosigned By      Initials Name Provider Type    Rayna Zamora, OT Occupational Therapist    Carin Zurita Physical Therapist                                 Physical Therapy Education       Title: PT OT SLP Therapies (In Progress)       Topic: Physical Therapy (In Progress)       Point: Mobility training (Done)       Learning Progress Summary             Patient Acceptance, E, VU,NR by LAUREEN at 1/15/2024 1314    Acceptance, E, NR by DENTON at 1/10/2024 0919     Acceptance, E, NR by AE at 1/3/2024 1337    Acceptance, E, NR by KE at 12/29/2023 1131    Acceptance, E, NR by KE at 12/27/2023 1045   Family Acceptance, E, VU,NR by ML at 1/15/2024 1314                         Point: Home exercise program (Not Started)       Learner Progress:  Not documented in this visit.              Point: Body mechanics (Done)       Learning Progress Summary             Patient Acceptance, E, VU,NR by ML at 1/15/2024 1314    Acceptance, E, NR by AE at 1/10/2024 0919    Acceptance, E, NR by AE at 1/3/2024 1337    Acceptance, E, NR by KE at 12/29/2023 1131    Acceptance, E, NR by KE at 12/27/2023 1045   Family Acceptance, E, VU,NR by ML at 1/15/2024 1314                         Point: Precautions (Done)       Learning Progress Summary             Patient Acceptance, E, VU,NR by ML at 1/15/2024 1314    Acceptance, E, NR by AE at 1/10/2024 0919    Acceptance, E, NR by AE at 1/3/2024 1337    Acceptance, E, NR by KE at 12/29/2023 1131    Acceptance, E, NR by KE at 12/27/2023 1045   Family Acceptance, E, VU,NR by ML at 1/15/2024 1314                                         User Key       Initials Effective Dates Name Provider Type Discipline    ML 04/22/21 -  Carin Villegas Physical Therapist PT    AE 09/21/21 -  Domo Desir, PT Physical Therapist PT    KE 11/16/23 -  Petty Mckoy, PT Physical Therapist PT                  PT Recommendation and Plan     Plan of Care Reviewed With: patient, spouse  Progress: improving  Outcome Evaluation: Patient progressed with transfers compared to previous treatment session. Patient continues to present below baseline for mobility and would continue to benefit from skilled PT to address strength, balance and activity tolerance deficits. Continue current PT POC.     Time Calculation:         PT Charges       Row Name 01/15/24 1315             Time Calculation    Start Time 1102  -ML      PT Received On 01/15/24  -ML         Timed Charges    49029 - PT  Therapeutic Activity Minutes 17  -ML         Total Minutes    Timed Charges Total Minutes 17  -ML       Total Minutes 17  -ML                User Key  (r) = Recorded By, (t) = Taken By, (c) = Cosigned By      Initials Name Provider Type    Carin Zurita Physical Therapist                  Therapy Charges for Today       Code Description Service Date Service Provider Modifiers Qty    24433293386 HC PT THERAPEUTIC ACT EA 15 MIN 1/15/2024 Carin Villegas GP 1            PT G-Codes  Outcome Measure Options: AM-PAC 6 Clicks Basic Mobility (PT)  AM-PAC 6 Clicks Score (PT): 10  AM-PAC 6 Clicks Score (OT): 12  PT Discharge Summary  Anticipated Discharge Disposition (PT): skilled nursing facility    Carin Villegas  1/15/2024

## 2024-01-15 NOTE — CASE MANAGEMENT/SOCIAL WORK
Continued Stay Note  [facility]     Patient Name: Omar Mendoza  MRN: [id number]  Today's Date: 1/15/2024    Admit Date: 12/26/2023    Plan: Seeking Medicaid pending bed.   Discharge Plan       Row Name 01/15/24 1211       Plan    Plan Seeking Medicaid pending bed.    [facility] and  at bedside. [facility] spoke with [facility] rep Amber who explains they have no beds available to check back Thursday. [facility] spoke with [facility] rep who explained they have no Medicaid pending beds available. [facility] with [facility], left message.  [facility] with [facility] Amita for  [facility]. [facility] will [facility]. Plan is Medicaid pending bed.                   Discharge Codes    No documentation.                 Expected Discharge Date and Time       Expected Discharge Date Expected Discharge Time    Freddy 15, 2024               MARIOLA Cheek (Kay)

## 2024-01-15 NOTE — PROGRESS NOTES
[facility] Medicine Services  PROGRESS NOTE    Patient Name: Omar Mendoza  : 1940  MRN: [id number]    Date of Admission: 2023  Primary Care Physician: Varun Pa MD    Subjective   Subjective     CC:  Weakness      HPI:   at bed. States patient having a good day. Patient converses and has not complaints. Cooperative with exam      Objective   Objective     Vital Signs:   Temp:  [97.8 °F (36.6 °C)-97.9 °F (36.6 °C)] 97.9 °F (36.6 °C)  Heart Rate:  [79] 79  Resp:  [16-17] 17  BP: (126-152)/(87-98) 152/98     Physical Exam:  Constitutional: No acute distress, awake, alert  HENT: NCAT, mucous membranes moist  Respiratory: rales bilaterally, respiratory effort normal   Cardiovascular: RRR  Gastrointestinal: Positive bowel sounds, soft, nontender, nondistended  Musculoskeletal: mild ankle edema bilaterally  Psychiatric: calm affect, cooperative  Neurologic: able to answer ROS, LAMA, speech clear        Results Reviewed:  LAB RESULTS:                                    Brief Urine Lab Results  (Last result in the past 365 days)        Color   Clarity   Blood   Leuk Est   Nitrite   Protein   CREAT   Urine HCG        24 0053 Yellow   Clear   Negative   Trace   Negative   Negative                   Microbiology Results Abnormal       Procedure Component Value - Date/Time    COVID-19 RAPID AG,VERITOR,COR/JAMARI/PAD/NUPUR/ASHA/LAG/BRITTANY/ IN-HOUSE,DRY SWAB, 1 HR TAT - Swab, Nasal Cavity [[id number]]  (Normal) Collected: 24 0004    Lab Status: Final result Specimen: Swab from Nasal Cavity Updated: 24 0022     COVID19 Presumptive Negative    Narrative:      Fact sheets for providers: https://www.fda.gov/media/835063/download    Fact sheets for patients: https://www.fda.gov/media/802506/download    Blood Culture - Blood, Blood, PICC Line [[id number]]  (Normal) Collected: 23 1400    Lab Status: Final result Specimen: Blood, PICC Line Updated: 24 1531      Blood Culture No growth at 5 days    Blood Culture - Blood, Arm, Right [[id number]]  (Normal) Collected: 12/27/23 0431    Lab Status: Final result Specimen: Blood from Arm, Right Updated: 01/01/24 0515     Blood Culture No growth at 5 days            No radiology results from the last 24 hrs    Results for orders placed during the hospital encounter of 12/26/23    Adult Transthoracic Echo Complete W/ Cont if Necessary Per Protocol    Interpretation Summary    Left ventricular systolic function is normal. Calculated left ventricular EF = 57% Left ventricular ejection fraction appears to be 56 - 60%.    Left ventricular wall thickness is consistent with mild to moderate concentric hypertrophy.    Left ventricular diastolic function was normal.    Estimated right ventricular systolic pressure from tricuspid regurgitation is mildly elevated (35-45 mmHg).    There is a trivial pericardial effusion.    Mild aortic valve regurgitation.      Current medications:  Scheduled Meds:mirtazapine, 15 mg, Oral, Nightly  rivastigmine, 1 patch, Transdermal, Daily  sodium chloride, 10 mL, Intravenous, Q12H      Continuous Infusions:   PRN Meds:.  acetaminophen **OR** acetaminophen **OR** acetaminophen    senna-docusate sodium **AND** polyethylene glycol **AND** bisacodyl **AND** bisacodyl    Calcium Replacement - Follow Nurse / BPA Driven Protocol    dextrose    dextrose    glucagon (human recombinant)    glycopyrrolate    guaiFENesin    hydrOXYzine    Magnesium Standard Dose Replacement - Follow Nurse / BPA Driven Protocol    melatonin    Morphine    nitroglycerin    ondansetron    Phosphorus Replacement - Follow Nurse / BPA Driven Protocol    Potassium Replacement - Follow Nurse / BPA Driven Protocol    sodium chloride    ziprasidone    Assessment & Plan   Assessment & Plan     Active Hospital Problems    Diagnosis  POA    **UTI (urinary tract infection) [N39.0]  Yes    Acute constipation [K59.00]  Unknown    Acute urinary retention  [R33.8]  Unknown    Uterine prolapse [N81.4]  Unknown    Bradycardia [R00.1]  Unknown    Pressure injury of buttock, unstageable [L89.300]  Unknown    Type 2 diabetes mellitus with ketoacidosis, without long-term current use of insulin [E11.10]  Unknown    Disorientation [R41.0]  Unknown    Pleural effusion [J90]  Unknown    Severe malnutrition [E43]  Yes    Edema of lower extremity [R60.0]  Yes    Bullous pemphigoid [L12.0]  Yes    Dyslipidemia [E78.5]  Yes    Essential hypertension [I10]  Yes      Resolved Hospital Problems   No resolved problems to display.            Brief Hospital Course to date:  Omar Mendoza is a 83 y.o. female with history of type 2 diabetes, hypertension, history of uterine prolapse, urinary retention with Schultz catheter in place, history of bullous pemphigoid  (s/p Dupixent 10/2023, prednisone/doxycycline 11/2023) CAD, hyperlipidemia, presumed dementia who presented to the ED with generalized weakness and bullous pemphigoid blister on the heel.  Patient developed significant encephalopathy, and paranoia during her hospitalization.  Neurology was consulted and determined patient to be suffering from dementia complicated by depression and paranoia.  Patient refused to eat and drink.  Goals of care discussion had with family, palliative care consulted, pursuing hospice at this time.  Medications have been simplified to remeron and exelon patch and seems to be doing well.    This patient's problems and plans were partially entered by my partner and updated as appropriate by me 01/15/24.    Acute metabolic encephalopathy  Dementia with suspected Alzheimer's disease  Major depression with paranoia  Generalized weakness  - Family pursuing placement and then transition to hospice, desire comfort, minimizing any interventions on the patient and will not force any medical therapies. Plan will be discharge to LTC with eventual transition to hospice and she currently does not meet inpatient hospice  criteria  - neurology adjusted medications, patient is not always agreeable to taking these     Dysphagia  - comfort diet given patient is comfort measures only     Bilateral pleural effusion  L>R  Bibasilar airspace disease versus pneumonia  -Concern for aspiration on 12/30, Completed 5 days of Rocephin and doxycycline  -Mucinex/Robinul as needed for cough     Anemia  -suspect anemia of chronic disease, stable with no active signs of bleeding     Poor venous access  -Removed PICC overnight 1/3     Sinus bradycardia  - medications discontinued due to GOC, stable vs     BLE edema  Decreased pulses LLE  - CTA abd showing small right pleural effusion, bilateral basilar atelectasis  - CTA w/ runoff showing normal right sided runoff; left side delayed runoff possibly r/t venous stasis changes, no evidence of arterial occlusion or significant stenosis  -Echo revealed EF 57% with normal diastolic function      Uncontrolled Type 2 diabetes  -Hgb A1C 12.10 on 12/27     Constipation  -s/p aggressive bowel regimen  -Continue prn regimen     Uterine prolapse  -Gyn Dr. Zhou followed outpatient  -may be contributing to chronic urine retention  -Has external catheter     Bullous pemphigoid  -Has developed new blister on right heel  -Completed doxycycline  -Wound care following      Pressure ulcer  -Wound care following      L adrenal adenoma  - incidental finding on CTA abdomen      Severe malnutrition     Expected Discharge Location and Transportation: LTC with outpatient hospice  Expected Discharge   Expected Discharge Date: 1/15/2024; Expected Discharge Time:      DVT prophylaxis:  No DVT prophylaxis order currently exists.     AM-PAC 6 Clicks Score (PT): 6 (01/14/24 2000)    CODE STATUS:   Code Status and Medical Interventions:   Ordered at: 01/07/24 1143     Code Status (Patient has no pulse and is not breathing):    No CPR (Do Not Attempt to Resuscitate)     Medical Interventions (Patient has pulse or is breathing):     Comfort Measures       Josephine Chatterjee, APRN  01/15/24

## 2024-01-15 NOTE — PLAN OF CARE
Goal Outcome Evaluation:  Plan of Care Reviewed With: patient, spouse        Progress: improving  Outcome Evaluation: Patient progressed with transfers compared to previous treatment session. Patient continues to present below baseline for mobility and would continue to benefit from skilled PT to address strength, balance and activity tolerance deficits. Continue current PT POC.      Anticipated Discharge Disposition (PT): skilled nursing facility

## 2024-01-16 PROCEDURE — 99232 SBSQ HOSP IP/OBS MODERATE 35: CPT | Performed by: NURSE PRACTITIONER

## 2024-01-16 RX ADMIN — GUAIFENESIN 600 MG: 600 TABLET, EXTENDED RELEASE ORAL at 13:05

## 2024-01-16 RX ADMIN — RIVASTIGMINE TRANSDERMAL SYSTEM 1 PATCH: 9.5 PATCH, EXTENDED RELEASE TRANSDERMAL at 09:46

## 2024-01-16 RX ADMIN — HYDROXYZINE HYDROCHLORIDE 25 MG: 25 TABLET, FILM COATED ORAL at 13:05

## 2024-01-16 RX ADMIN — Medication 5 MG: at 19:32

## 2024-01-16 RX ADMIN — MIRTAZAPINE 15 MG: 15 TABLET, FILM COATED ORAL at 19:32

## 2024-01-16 NOTE — PLAN OF CARE
Goal Outcome Evaluation:                 Comfort measures ongoing. Patient denies pain. Prn atarax x1 for anxiety, restlessness, patient expressed fear that her family was missing. 250mL UOP via purewick, q4 bladder scans. 2 Bms this shift. Skin care provided. Coccyx pressure injury and greater trochanter PI dressings changed. Turned q2 with positioning wedge. Tolerating diet. Patient is alert & oriented to self. Patient is angry with family for not being at bedside; redirection provided. Care ongoing, continue to monitor.

## 2024-01-16 NOTE — PROGRESS NOTES
"    [facility] Medicine Services  PROGRESS NOTE    Patient Name: Omar Mendoza  : 1940  MRN: [id number]    Date of Admission: 2023  Primary Care Physician: Varun Pa MD    Subjective   Subjective     CC:  Weakness      HPI:  No new issues overnight  Pt states she wants to go \"[facility] home\" as she is \"sick of this place\"      Objective   Objective     Vital Signs:   Temp:  [98.1 °F (36.7 °C)-98.2 °F (36.8 °C)] 98.1 °F (36.7 °C)  Heart Rate:  [50-93] 50  Resp:  [16-18] 18  BP: (107-127)/(70-84) 124/83     Physical Exam:  Constitutional: No acute distress, awake, alert  HENT: NCAT, mucous membranes moist  Respiratory: Decreased in bases, respiratory effort normal   Cardiovascular: RRR, no murmurs, rubs, or gallops  Gastrointestinal: Positive bowel sounds, soft, nontender, nondistended  Musculoskeletal: No bilateral ankle edema  Psychiatric: Appropriate affect, cooperative  Neurologic: Oriented x 2, LAMA, speech clear  Skin: No rashes noted    Results Reviewed:  LAB RESULTS:    Brief Urine Lab Results  (Last result in the past 365 days)        Color   Clarity   Blood   Leuk Est   Nitrite   Protein   CREAT   Urine HCG        24 0053 Yellow   Clear   Negative   Trace   Negative   Negative                   Microbiology Results Abnormal       Procedure Component Value - Date/Time    COVID-19 RAPID AG,VERITOR,COR/JAMARI/PAD/NUPUR/ASHA/LAG/BRITTANY/ IN-HOUSE,DRY SWAB, 1 HR TAT - Swab, Nasal Cavity [[id number]]  (Normal) Collected: 24 0004    Lab Status: Final result Specimen: Swab from Nasal Cavity Updated: 24 0022     COVID19 Presumptive Negative    Narrative:      Fact sheets for providers: https://www.fda.gov/media/978261/download    Fact sheets for patients: https://www.fda.gov/media/593695/download    Blood Culture - Blood, Blood, PICC Line [[id number]]  (Normal) Collected: 23 1400    Lab Status: Final result Specimen: Blood, PICC Line Updated: 24 1531     " Blood Culture No growth at 5 days    Blood Culture - Blood, Arm, Right [[id number]]  (Normal) Collected: 12/27/23 0431    Lab Status: Final result Specimen: Blood from Arm, Right Updated: 01/01/24 0515     Blood Culture No growth at 5 days            No radiology results from the last 24 hrs    Results for orders placed during the hospital encounter of 12/26/23    Adult Transthoracic Echo Complete W/ Cont if Necessary Per Protocol    Interpretation Summary    Left ventricular systolic function is normal. Calculated left ventricular EF = 57% Left ventricular ejection fraction appears to be 56 - 60%.    Left ventricular wall thickness is consistent with mild to moderate concentric hypertrophy.    Left ventricular diastolic function was normal.    Estimated right ventricular systolic pressure from tricuspid regurgitation is mildly elevated (35-45 mmHg).    There is a trivial pericardial effusion.    Mild aortic valve regurgitation.      Current medications:  Scheduled Meds:mirtazapine, 15 mg, Oral, Nightly  rivastigmine, 1 patch, Transdermal, Daily  sodium chloride, 10 mL, Intravenous, Q12H      Continuous Infusions:   PRN Meds:.  acetaminophen **OR** acetaminophen **OR** acetaminophen    senna-docusate sodium **AND** polyethylene glycol **AND** bisacodyl **AND** bisacodyl    Calcium Replacement - Follow Nurse / BPA Driven Protocol    dextrose    dextrose    glucagon (human recombinant)    glycopyrrolate    guaiFENesin    hydrOXYzine    Magnesium Standard Dose Replacement - Follow Nurse / BPA Driven Protocol    melatonin    Morphine    nitroglycerin    ondansetron    Phosphorus Replacement - Follow Nurse / BPA Driven Protocol    Potassium Replacement - Follow Nurse / BPA Driven Protocol    sodium chloride    ziprasidone    Assessment & Plan   Assessment & Plan     Active Hospital Problems    Diagnosis  POA    **UTI (urinary tract infection) [N39.0]  Yes    Acute constipation [K59.00]  Unknown    Acute urinary retention  [R33.8]  Unknown    Uterine prolapse [N81.4]  Unknown    Bradycardia [R00.1]  Unknown    Pressure injury of buttock, unstageable [L89.300]  Unknown    Type 2 diabetes mellitus with ketoacidosis, without long-term current use of insulin [E11.10]  Unknown    Disorientation [R41.0]  Unknown    Pleural effusion [J90]  Unknown    Severe malnutrition [E43]  Yes    Edema of lower extremity [R60.0]  Yes    Bullous pemphigoid [L12.0]  Yes    Dyslipidemia [E78.5]  Yes    Essential hypertension [I10]  Yes      Resolved Hospital Problems   No resolved problems to display.        Brief Hospital Course to date:  Omar Mendoza is a 83 y.o. female with history of type 2 diabetes, hypertension, history of uterine prolapse, urinary retention with Schultz catheter in place, history of bullous pemphigoid  (s/p Dupixent 10/2023, prednisone/doxycycline 11/2023) CAD, hyperlipidemia, presumed dementia who presented to the ED with generalized weakness and bullous pemphigoid blister on the heel.  Patient developed significant encephalopathy, and paranoia during her hospitalization.  Neurology was consulted and determined patient to be suffering from dementia complicated by depression and paranoia.  Patient refused to eat and drink.  Goals of care discussion had with family, palliative care consulted, pursuing hospice at this time.  Medications have been simplified to remeron and exelon patch and seems to be doing well.    This patient's problems and plans were partially entered by my partner and updated as appropriate by me 01/16/24.    Acute metabolic encephalopathy  Dementia with suspected Alzheimer's disease  Major depression with paranoia  Generalized weakness  - Family pursuing placement and then transition to hospice, desire comfort, minimizing any interventions on the patient and will not force any medical therapies. Plan will be discharge to LTC with eventual transition to hospice and she currently does not meet inpatient hospice  criteria  - neurology adjusted medications, patient is not always agreeable to taking these     Dysphagia  - comfort diet given patient is comfort measures only     Bilateral pleural effusion  L>R  Bibasilar airspace disease versus pneumonia  -Concern for aspiration on 12/30, Completed 5 days of Rocephin and doxycycline  -Mucinex/Robinul as needed for cough     Anemia  -suspect anemia of chronic disease, stable with no active signs of bleeding     Poor venous access  -Removed PICC overnight 1/3     Sinus bradycardia  - medications discontinued due to GOC, stable VS     BLE edema  Decreased pulses LLE  - CTA abd showing small right pleural effusion, bilateral basilar atelectasis  - CTA w/ runoff showing normal right sided runoff; left side delayed runoff possibly r/t venous stasis changes, no evidence of arterial occlusion or significant stenosis  -Echo revealed EF 57% with normal diastolic function      Uncontrolled Type 2 diabetes  -Hgb A1C 12.10 on 12/27     Constipation  -s/p aggressive bowel regimen  -Continue prn regimen     Uterine prolapse  -Gyn Dr. Zhou followed outpatient  -may be contributing to chronic urine retention  -Has external catheter     Bullous pemphigoid  -Has developed new blister on right heel  -Completed doxycycline  -Wound care following      Pressure ulcer  -Wound care following      L adrenal adenoma  - incidental finding on CTA abdomen      Severe malnutrition     Expected Discharge Location and Transportation: LTC with outpatient hospice  Expected Discharge   Expected Discharge Date: 1/15/2024; Expected Discharge Time:      DVT prophylaxis:  No DVT prophylaxis order currently exists.     AM-PAC 6 Clicks Score (PT): 8 (01/16/24 0800)    CODE STATUS:   Code Status and Medical Interventions:   Ordered at: 01/07/24 1143     Code Status (Patient has no pulse and is not breathing):    No CPR (Do Not Attempt to Resuscitate)     Medical Interventions (Patient has pulse or is breathing):     Comfort Measures       Aarti Davis, APRN  01/16/24

## 2024-01-16 NOTE — PLAN OF CARE
Goal Outcome Evaluation:  Plan of Care Reviewed With: patient        Progress: improving  Outcome Evaluation: VSS on RA. Spouse at bedside. Pt slept well.  Awaiting placement. Pain controlled with oral meds. Will continue to monitor.

## 2024-01-17 PROCEDURE — 99231 SBSQ HOSP IP/OBS SF/LOW 25: CPT | Performed by: NURSE PRACTITIONER

## 2024-01-17 RX ADMIN — RIVASTIGMINE TRANSDERMAL SYSTEM 1 PATCH: 9.5 PATCH, EXTENDED RELEASE TRANSDERMAL at 09:30

## 2024-01-17 RX ADMIN — MIRTAZAPINE 15 MG: 15 TABLET, FILM COATED ORAL at 18:12

## 2024-01-17 NOTE — PROGRESS NOTES
[facility] Medicine Services  PROGRESS NOTE    Patient Name: Omar Mendoza  : 1940  MRN: [id number]    Date of Admission: 2023  Primary Care Physician: Varun Pa MD    Subjective   Subjective     CC:  Weakness      HPI:  No needs from staff. Patient is sleeping and I did not wake.       Objective   Objective     Vital Signs:   Temp:  [98 °F (36.7 °C)] 98 °F (36.7 °C)  Heart Rate:  [93] 93  Resp:  [16] 16  BP: (110)/(69) 110/69     Physical Exam:  Constitutional: No acute distress, sleeping   HENT: NCAT, MMM   Respiratory: Good effort, nonlabored respirations on RA  Musculoskeletal: No edema, normal muscle tone and mass for age  Psychiatric: sleeping   Neurologic: sleeping   Skin: No visible rashes       Results Reviewed:  LAB RESULTS:    Brief Urine Lab Results  (Last result in the past 365 days)        Color   Clarity   Blood   Leuk Est   Nitrite   Protein   CREAT   Urine HCG        24 0053 Yellow   Clear   Negative   Trace   Negative   Negative                   Microbiology Results Abnormal       Procedure Component Value - Date/Time    COVID-19 RAPID AG,VERITOR,COR/JAMARI/PAD/NUPUR/ASHA/LAG/BRITTANY/ IN-HOUSE,DRY SWAB, 1 HR TAT - Swab, Nasal Cavity [[id number]]  (Normal) Collected: 24 0004    Lab Status: Final result Specimen: Swab from Nasal Cavity Updated: 24 0022     COVID19 Presumptive Negative    Narrative:      Fact sheets for providers: https://www.fda.gov/media/337201/download    Fact sheets for patients: https://www.fda.gov/media/411272/download    Blood Culture - Blood, Blood, PICC Line [[id number]]  (Normal) Collected: 23 1400    Lab Status: Final result Specimen: Blood, PICC Line Updated: 24 1531     Blood Culture No growth at 5 days    Blood Culture - Blood, Arm, Right [[id number]]  (Normal) Collected: 23 0431    Lab Status: Final result Specimen: Blood from Arm, Right Updated: 24 0515     Blood Culture No growth at 5 days             No radiology results from the last 24 hrs    Results for orders placed during the hospital encounter of 12/26/23    Adult Transthoracic Echo Complete W/ Cont if Necessary Per Protocol    Interpretation Summary    Left ventricular systolic function is normal. Calculated left ventricular EF = 57% Left ventricular ejection fraction appears to be 56 - 60%.    Left ventricular wall thickness is consistent with mild to moderate concentric hypertrophy.    Left ventricular diastolic function was normal.    Estimated right ventricular systolic pressure from tricuspid regurgitation is mildly elevated (35-45 mmHg).    There is a trivial pericardial effusion.    Mild aortic valve regurgitation.      Current medications:  Scheduled Meds:mirtazapine, 15 mg, Oral, Nightly  rivastigmine, 1 patch, Transdermal, Daily      Continuous Infusions:   PRN Meds:.  acetaminophen **OR** acetaminophen **OR** acetaminophen    senna-docusate sodium **AND** polyethylene glycol **AND** bisacodyl **AND** bisacodyl    Calcium Replacement - Follow Nurse / BPA Driven Protocol    dextrose    dextrose    glucagon (human recombinant)    glycopyrrolate    guaiFENesin    hydrOXYzine    Magnesium Standard Dose Replacement - Follow Nurse / BPA Driven Protocol    melatonin    nitroglycerin    ondansetron    Phosphorus Replacement - Follow Nurse / BPA Driven Protocol    Potassium Replacement - Follow Nurse / BPA Driven Protocol    sodium chloride    ziprasidone    Assessment & Plan   Assessment & Plan     Active Hospital Problems    Diagnosis  POA    **UTI (urinary tract infection) [N39.0]  Yes    Acute constipation [K59.00]  Unknown    Acute urinary retention [R33.8]  Unknown    Uterine prolapse [N81.4]  Unknown    Bradycardia [R00.1]  Unknown    Pressure injury of buttock, unstageable [L89.300]  Unknown    Type 2 diabetes mellitus with ketoacidosis, without long-term current use of insulin [E11.10]  Unknown    Disorientation [R41.0]  Unknown     Pleural effusion [J90]  Unknown    Severe malnutrition [E43]  Yes    Edema of lower extremity [R60.0]  Yes    Bullous pemphigoid [L12.0]  Yes    Dyslipidemia [E78.5]  Yes    Essential hypertension [I10]  Yes      Resolved Hospital Problems   No resolved problems to display.        Brief Hospital Course to date:  Omar Mendoza is a 83 y.o. female with history of type 2 diabetes, hypertension, history of uterine prolapse, urinary retention with Schultz catheter in place, history of bullous pemphigoid  (s/p Dupixent 10/2023, prednisone/doxycycline 11/2023) CAD, hyperlipidemia, presumed dementia who presented to the ED with generalized weakness and bullous pemphigoid blister on the heel.  Patient developed significant encephalopathy, and paranoia during her hospitalization.  Neurology was consulted and determined patient to be suffering from dementia complicated by depression and paranoia.  Patient refused to eat and drink.  Goals of care discussion had with family, palliative care consulted, pursuing hospice at this time.  Medications have been simplified to remeron and exelon patch and seems to be doing well.    This patient's problems and plans were partially entered by my partner and updated as appropriate by me 01/17/24.    Acute metabolic encephalopathy  Dementia with suspected Alzheimer's disease  Major depression with paranoia  Generalized weakness  - Family pursuing placement and then transition to hospice, desire comfort, minimizing any interventions on the patient and will not force any medical therapies. Plan will be discharge to LTC with eventual transition to hospice and she currently does not meet inpatient hospice criteria  - neurology adjusted medications, patient is not always agreeable to taking these     Dysphagia  - comfort diet given patient is comfort measures only     Bilateral pleural effusion  L>R  Bibasilar airspace disease versus pneumonia  -Concern for aspiration on 12/30, Completed 5 days of  Rocephin and doxycycline  -Mucinex/Robinul as needed for cough     Anemia  -suspect anemia of chronic disease, stable with no active signs of bleeding     Poor venous access  -Removed PICC overnight 1/3     Sinus bradycardia  - medications discontinued due to GOC, stable VS     BLE edema  Decreased pulses LLE  - CTA abd showing small right pleural effusion, bilateral basilar atelectasis  - CTA w/ runoff showing normal right sided runoff; left side delayed runoff possibly r/t venous stasis changes, no evidence of arterial occlusion or significant stenosis  -Echo revealed EF 57% with normal diastolic function      Uncontrolled Type 2 diabetes  -Hgb A1C 12.10 on 12/27     Constipation  -s/p aggressive bowel regimen  -Continue prn regimen     Uterine prolapse  -Gyn Dr. Zhou followed outpatient  -may be contributing to chronic urine retention  -Has external catheter     Bullous pemphigoid  -Has developed new blister on right heel  -Completed doxycycline  -Wound care following      Pressure ulcer  -Wound care following      L adrenal adenoma  - incidental finding on CTA abdomen      Severe malnutrition     Expected Discharge Location and Transportation: LTC with outpatient hospice  Expected Discharge   Expected Discharge Date: 1/15/2024; Expected Discharge Time:      DVT prophylaxis:  No DVT prophylaxis order currently exists.     AM-PAC 6 Clicks Score (PT): 8 (01/17/24 0800)    CODE STATUS:   Code Status and Medical Interventions:   Ordered at: 01/07/24 1143     Code Status (Patient has no pulse and is not breathing):    No CPR (Do Not Attempt to Resuscitate)     Medical Interventions (Patient has pulse or is breathing):    Comfort Measures       Laura Sanchez, APRN  01/17/24

## 2024-01-17 NOTE — PLAN OF CARE
Goal Outcome Evaluation:         No change, resting well, no c/o, dressings clean dry and intact, awaiting placement

## 2024-01-17 NOTE — PLAN OF CARE
"Goal Outcome Evaluation:  Plan of Care Reviewed With: patient        Progress: no change  Outcome Evaluation: VSS, on RA. Pt slept well. Pt would like to be \"left alone\" and not turned Q2 hours, staff was only able to successfully turn her when she would allow us. UOP-WNL. Awaiting placement. Will continue POC .                               "

## 2024-01-18 PROCEDURE — 97530 THERAPEUTIC ACTIVITIES: CPT

## 2024-01-18 PROCEDURE — 97535 SELF CARE MNGMENT TRAINING: CPT | Performed by: OCCUPATIONAL THERAPIST

## 2024-01-18 PROCEDURE — 99232 SBSQ HOSP IP/OBS MODERATE 35: CPT | Performed by: NURSE PRACTITIONER

## 2024-01-18 RX ADMIN — HYDROXYZINE HYDROCHLORIDE 25 MG: 25 TABLET, FILM COATED ORAL at 11:21

## 2024-01-18 RX ADMIN — RIVASTIGMINE TRANSDERMAL SYSTEM 1 PATCH: 9.5 PATCH, EXTENDED RELEASE TRANSDERMAL at 11:21

## 2024-01-18 RX ADMIN — MIRTAZAPINE 15 MG: 15 TABLET, FILM COATED ORAL at 18:02

## 2024-01-18 NOTE — PROGRESS NOTES
[facility] Medicine Services  PROGRESS NOTE    Patient Name: Omar Mendoza  : 1940  MRN: [id number]    Date of Admission: 2023  Primary Care Physician: Varun Pa MD    Subjective   Subjective     CC:  Weakness      HPI:  Up in chair, granddaughter in room. States she is being treated like a dog. Asking for her  and children. She is hungry and asking for lunch. States she is fed up with being here. States she has no needs when I ask what she needs from her doctor.       Objective   Objective     Vital Signs:   Temp:  [97.2 °F (36.2 °C)-97.9 °F (36.6 °C)] 97.9 °F (36.6 °C)  Heart Rate:  [72-87] 87  Resp:  [16-18] 18  BP: (123-138)/() 138/100  Flow (L/min):  [2] 2     Physical Exam:  Constitutional: No acute distress, awake/ alert up in chair   HENT: NCAT, MMM   Respiratory: Good effort, nonlabored respirations on RA  Musculoskeletal: No edema, normal muscle tone and mass for age  Psychiatric: depressed/ agitated affect, cooperative   Neurologic: oriented to person/ hospital, LAMA, follows commands, speech clear   Skin: No visible rashes       Results Reviewed:  LAB RESULTS:    Brief Urine Lab Results  (Last result in the past 365 days)        Color   Clarity   Blood   Leuk Est   Nitrite   Protein   CREAT   Urine HCG        24 0053 Yellow   Clear   Negative   Trace   Negative   Negative                   Microbiology Results Abnormal       Procedure Component Value - Date/Time    COVID-19 RAPID AG,VERITOR,COR/JAMARI/PAD/NUPUR/ASHA/LAG/BRITTANY/ IN-HOUSE,DRY SWAB, 1 HR TAT - Swab, Nasal Cavity [[id number]]  (Normal) Collected: 24 0004    Lab Status: Final result Specimen: Swab from Nasal Cavity Updated: 24 0022     COVID19 Presumptive Negative    Narrative:      Fact sheets for providers: https://www.fda.gov/media/877651/download    Fact sheets for patients: https://www.fda.gov/media/753548/download    Blood Culture - Blood, Blood, PICC Line [[id number]]   (Normal) Collected: 12/27/23 1400    Lab Status: Final result Specimen: Blood, PICC Line Updated: 01/01/24 1531     Blood Culture No growth at 5 days    Blood Culture - Blood, Arm, Right [[id number]]  (Normal) Collected: 12/27/23 0431    Lab Status: Final result Specimen: Blood from Arm, Right Updated: 01/01/24 0515     Blood Culture No growth at 5 days            No radiology results from the last 24 hrs    Results for orders placed during the hospital encounter of 12/26/23    Adult Transthoracic Echo Complete W/ Cont if Necessary Per Protocol    Interpretation Summary    Left ventricular systolic function is normal. Calculated left ventricular EF = 57% Left ventricular ejection fraction appears to be 56 - 60%.    Left ventricular wall thickness is consistent with mild to moderate concentric hypertrophy.    Left ventricular diastolic function was normal.    Estimated right ventricular systolic pressure from tricuspid regurgitation is mildly elevated (35-45 mmHg).    There is a trivial pericardial effusion.    Mild aortic valve regurgitation.      Current medications:  Scheduled Meds:mirtazapine, 15 mg, Oral, Nightly  rivastigmine, 1 patch, Transdermal, Daily      Continuous Infusions:   PRN Meds:.  acetaminophen **OR** acetaminophen **OR** acetaminophen    senna-docusate sodium **AND** polyethylene glycol **AND** bisacodyl **AND** bisacodyl    Calcium Replacement - Follow Nurse / BPA Driven Protocol    dextrose    dextrose    glucagon (human recombinant)    glycopyrrolate    guaiFENesin    hydrOXYzine    Magnesium Standard Dose Replacement - Follow Nurse / BPA Driven Protocol    melatonin    nitroglycerin    ondansetron    Phosphorus Replacement - Follow Nurse / BPA Driven Protocol    Potassium Replacement - Follow Nurse / BPA Driven Protocol    sodium chloride    ziprasidone    Assessment & Plan   Assessment & Plan     Active Hospital Problems    Diagnosis  POA    **UTI (urinary tract infection) [N39.0]  Yes     Acute constipation [K59.00]  Unknown    Acute urinary retention [R33.8]  Unknown    Uterine prolapse [N81.4]  Unknown    Bradycardia [R00.1]  Unknown    Pressure injury of buttock, unstageable [L89.300]  Unknown    Type 2 diabetes mellitus with ketoacidosis, without long-term current use of insulin [E11.10]  Unknown    Disorientation [R41.0]  Unknown    Pleural effusion [J90]  Unknown    Severe malnutrition [E43]  Yes    Edema of lower extremity [R60.0]  Yes    Bullous pemphigoid [L12.0]  Yes    Dyslipidemia [E78.5]  Yes    Essential hypertension [I10]  Yes      Resolved Hospital Problems   No resolved problems to display.        Brief Hospital Course to date:  Omar Mendoza is a 83 y.o. female with history of type 2 diabetes, hypertension, history of uterine prolapse, urinary retention with Schultz catheter in place, history of bullous pemphigoid  (s/p Dupixent 10/2023, prednisone/doxycycline 11/2023) CAD, hyperlipidemia, presumed dementia who presented to the ED with generalized weakness and bullous pemphigoid blister on the heel.  Patient developed significant encephalopathy, and paranoia during her hospitalization.  Neurology was consulted and determined patient to be suffering from dementia complicated by depression and paranoia.  Patient refused to eat and drink.  Goals of care discussion had with family, palliative care consulted, pursuing hospice at this time.  Medications have been simplified to remeron and exelon patch and seems to be doing well.    This patient's problems and plans were partially entered by my partner and updated as appropriate by me 01/18/24.    Acute metabolic encephalopathy  Dementia with suspected Alzheimer's disease  Major depression with paranoia  Generalized weakness  - Family pursuing placement and then transition to hospice, desire comfort, minimizing any interventions on the patient and will not force any medical therapies. Plan will be discharge to LTC with eventual transition to  hospice and she currently does not meet inpatient hospice criteria  - neurology adjusted medications, patient is not always agreeable to taking these     Dysphagia  - comfort diet given patient is comfort measures only     Bilateral pleural effusion  L>R  Bibasilar airspace disease versus pneumonia  -Concern for aspiration on 12/30, Completed 5 days of Rocephin and doxycycline  -Mucinex/Robinul as needed for cough     Anemia  -suspect anemia of chronic disease, stable with no active signs of bleeding     Poor venous access  -Removed PICC overnight 1/3     Sinus bradycardia  - medications discontinued due to GOC, stable VS     BLE edema  Decreased pulses LLE  - CTA abd showing small right pleural effusion, bilateral basilar atelectasis  - CTA w/ runoff showing normal right sided runoff; left side delayed runoff possibly r/t venous stasis changes, no evidence of arterial occlusion or significant stenosis  -Echo revealed EF 57% with normal diastolic function      Uncontrolled Type 2 diabetes  -Hgb A1C 12.10 on 12/27     Constipation  -s/p aggressive bowel regimen  -Continue prn regimen     Uterine prolapse  -Gyn Dr. Zhou followed outpatient  -may be contributing to chronic urine retention  -Has external catheter     Bullous pemphigoid  -Has developed new blister on right heel  -Completed doxycycline  -Wound care following      Pressure ulcer  -Wound care following      L adrenal adenoma  - incidental finding on CTA abdomen      Severe malnutrition     Expected Discharge Location and Transportation: LTC with outpatient hospice  Expected Discharge   Expected Discharge Date: 1/19/2024; Expected Discharge Time:      DVT prophylaxis:  No DVT prophylaxis order currently exists.     AM-PAC 6 Clicks Score (PT): 13 (01/18/24 0942)    CODE STATUS:   Code Status and Medical Interventions:   Ordered at: 01/07/24 1140     Code Status (Patient has no pulse and is not breathing):    No CPR (Do Not Attempt to Resuscitate)      Medical Interventions (Patient has pulse or is breathing):    Comfort Measures       Laura Sanchez, APRN  01/18/24

## 2024-01-18 NOTE — PLAN OF CARE
Problem: Adult Inpatient Plan of Care  Goal: Plan of Care Review  Recent Flowsheet Documentation  Taken 1/18/2024 0922 by Mignon Mattson, OT  Progress: improving  Plan of Care Reviewed With: patient  Outcome Evaluation: Pt. with improving functional mobility, requiring less assist in STS t/f and bed to chair t/f. Pt. very upset & c/o ways that she has not been taken care of properly. Pt. required max v/c's for encouragement to participate & stay on task. OT skilled services continue to be warranted to return to PLOF. Recommend SNF upon d/c.   Goal Outcome Evaluation:  Plan of Care Reviewed With: patient        Progress: improving  Outcome Evaluation: Pt. with improving functional mobility, requiring less assist in STS t/f and bed to chair t/f. Pt. very upset & c/o ways that she has not been taken care of properly. Pt. required max v/c's for encouragement to participate & stay on task. OT skilled services continue to be warranted to return to PLOF. Recommend SNF upon d/c.      Anticipated Discharge Disposition (OT): skilled nursing facility

## 2024-01-18 NOTE — CASE MANAGEMENT/SOCIAL WORK
Continued Stay Note  [facility]     Patient Name: Omar Mendoza  MRN: [id number]  Today's Date: 1/17/2024    Admit Date: 12/26/2023    Plan: SNF/LTC   Discharge Plan       Row Name 01/17/24 1818       Plan    Plan SNF/LTC    Plan Comments Case Management continuing to follow for SNF placement with plan to transition into LTC/MC pending.  No bed offers to date.  I spoke with Iris Quick, liaison with [facility], and asked her to reassess for possible placement at a [facility] facility.  Will continue to follow, facilitate placement.  Discussed in unit multidisciplinary rounds this morning.  Mae William, Ext. [phone]    Final Discharge Disposition Code 30 - still a patient                   Discharge Codes    No documentation.                 Expected Discharge Date and Time       Expected Discharge Date Expected Discharge Time    Freddy 15, 2024               MARIOLA Jerry

## 2024-01-18 NOTE — PLAN OF CARE
Goal Outcome Evaluation:                                    VSS. Resting well this evening. Oriented to self. PW in place. Sites CDI. Bladder scan Q4. Pills crushed in pudding.

## 2024-01-18 NOTE — THERAPY TREATMENT NOTE
Patient Name: Omar Mendoza  : 1940    MRN: [id number]                              Today's Date: 2024       Admit Date: 2023    Visit Dx:     ICD-10-CM ICD-9-CM   1. Generalized weakness  R53.1 780.79   2. Acute UTI  N39.0 599.0   3. Bullous pemphigoid  L12.0 694.5   4. Venous insufficiency  I87.2 459.81   5. Dysphagia, unspecified type  R13.10 787.20     Patient Active Problem List   Diagnosis    UTI (urinary tract infection)    Bullous pemphigoid    Essential hypertension    Dyslipidemia    Edema of lower extremity    Cholestatic hepatitis    Acute constipation    Acute urinary retention    Uterine prolapse    Bradycardia    Pressure injury of buttock, unstageable    Type 2 diabetes mellitus with ketoacidosis, without long-term current use of insulin    Disorientation    Pleural effusion    Severe malnutrition     Past Medical History:   Diagnosis Date    CAD (coronary artery disease)     Dementia     Diabetes mellitus     Hyperlipemia     Hypertension      History reviewed. No pertinent surgical history.   General Information       Row Name 24 0938          Physical Therapy Time and Intention    Document Type therapy note (daily note)  -KG     Mode of Treatment physical therapy  -KG       Row Name 24 0938          General Information    Patient Profile Reviewed yes  -KG     Existing Precautions/Restrictions fall;seizures;other (see comments)  R heel & buttock wound, h/o Alz. dementia  -KG       Row Name 24 0938          Cognition    Orientation Status (Cognition) oriented to;person;disoriented to;place;situation;time  -KG       Row Name 24 0938          Safety Issues, Functional Mobility    Safety Issues Affecting Function (Mobility) awareness of need for assistance;friction/shear risk;insight into deficits/self-awareness;safety precaution awareness;problem-solving;safety precautions follow-through/compliance;sequencing abilities  -KG     Impairments Affecting Function  (Mobility) balance;cognition;endurance/activity tolerance;strength;postural/trunk control  -KG               User Key  (r) = Recorded By, (t) = Taken By, (c) = Cosigned By      Initials Name Provider Type    MILAD Carolyn Herrera Physical Therapist                   Mobility       Row Name 01/18/24 0939          Bed Mobility    Bed Mobility rolling right;scooting/bridging;supine-sit  -KG     Rolling Right [place] (Bed Mobility) moderate assist (50% patient effort);verbal cues;nonverbal cues (demo/gesture);1 person assist  -KG     Scooting/Bridging [place] (Bed Mobility) maximum assist (25% patient effort);2 person assist;verbal cues;nonverbal cues (demo/gesture)  -KG     Supine-Sit [place] (Bed Mobility) maximum assist (25% patient effort);2 person assist;verbal cues;nonverbal cues (demo/gesture)  -KG     Assistive Device (Bed Mobility) bed rails;draw sheet;head of bed elevated  -KG       Row Name 01/18/24 0939          Transfers    Comment, (Transfers) mod-A x2, FWW  -KG       Row Name 01/18/24 0939          Bed-Chair Transfer    Bed-Chair [place] (Transfers) minimum assist (75% patient effort);2 person assist;verbal cues;nonverbal cues (demo/gesture)  -KG     Assistive Device (Bed-Chair Transfers) walker, front-wheeled  -KG       Row Name 01/18/24 0939          Sit-Stand Transfer    Sit-Stand [place] (Transfers) moderate assist (50% patient effort);2 person assist;verbal cues;nonverbal cues (demo/gesture)  -KG     Assistive Device (Sit-Stand Transfers) walker, front-wheeled  -KG       Row Name 01/18/24 0939          Gait/Stairs (Locomotion)    [place] Level (Gait) unable to assess  -KG               User Key  (r) = Recorded By, (t) = Taken By, (c) = Cosigned By      Initials Name Provider Type    MILAD Carolyn Herrera Physical Therapist                   Obj/Interventions       Row Name 01/18/24 0956          Strength Comprehensive (MMT)    General Manual Muscle Testing (MMT) Assessment  lower extremity strength deficits identified  -KG       Row Name 01/18/24 0940          Balance    Dynamic Standing Balance moderate assist;2-person assist  -KG     Position/Device Used, Standing Balance supported;walker, rolling  -KG               User Key  (r) = Recorded By, (t) = Taken By, (c) = Cosigned By      Initials Name Provider Type    Carolyn Gillis Physical Therapist                   Goals/Plan    No documentation.                  Clinical Impression       Row Name 01/18/24 0941          Pain    Pretreatment Pain Rating 0/10 - no pain  -KG     Posttreatment Pain Rating 0/10 - no pain  -KG       Row Name 01/18/24 0941          Plan of Care Review    Plan of Care Reviewed With patient  -KG     Progress improving  -KG     Outcome Evaluation pt required max encouragement to participate, confusion.  Mod-A x2 STS, min-A x2 to take steps to the chair, FWW.  -KG       Row Name 01/18/24 0941          Positioning and Restraints    Pre-Treatment Position in bed  -KG     Post Treatment Position chair  -KG     In Chair notified nsg;call light within reach;encouraged to call for assist;exit alarm on;waffle cushion;legs elevated  -KG               User Key  (r) = Recorded By, (t) = Taken By, (c) = Cosigned By      Initials Name Provider Type    Carolyn Gillis Physical Therapist                   Outcome Measures       Row Name 01/18/24 0942          How much help from another person do you currently need...    Turning from your back to your side while in flat bed without using bedrails? 3  -KG     Moving from lying on back to sitting on the side of a flat bed without bedrails? 2  -KG     Moving to and from a bed to a chair (including a wheelchair)? 3  -KG     Standing up from a chair using your arms (e.g., wheelchair, bedside chair)? 2  -KG     Climbing 3-5 steps with a railing? 1  -KG     To walk in hospital room? 2  -KG     AM-PAC 6 Clicks Score (PT) 13  -KG     Highest Level of Mobility Goal 4 -->  Transfer to chair/commode  -KG       Row Name 01/18/24 0942 01/18/24 0849       Functional Assessment    Outcome Measure Options AM-PAC 6 Clicks Basic Mobility (PT)  -KG AM-PAC 6 Clicks Daily Activity (OT)  -SD              User Key  (r) = Recorded By, (t) = Taken By, (c) = Cosigned By      Initials Name Provider Type    Mignon Swain, OT Occupational Therapist    Carolyn Gillis Physical Therapist                                 Physical Therapy Education       Title: PT OT SLP Therapies (In Progress)       Topic: Physical Therapy (In Progress)       Point: Mobility training (In Progress)       Learning Progress Summary             Patient Acceptance, E, NR by KG at 1/18/2024 0944    Acceptance, E, VU,NR by ML at 1/15/2024 1314    Acceptance, E, NR by AE at 1/10/2024 0919    Acceptance, E, NR by AE at 1/3/2024 1337    Acceptance, E, NR by KE at 12/29/2023 1131    Acceptance, E, NR by KE at 12/27/2023 1045   Family Acceptance, E, VU,NR by ML at 1/15/2024 1314                         Point: Home exercise program (In Progress)       Learning Progress Summary             Patient Acceptance, E, NR by KG at 1/18/2024 0944                         Point: Body mechanics (In Progress)       Learning Progress Summary             Patient Acceptance, E, NR by KG at 1/18/2024 0944    Acceptance, E, VU,NR by ML at 1/15/2024 1314    Acceptance, E, NR by AE at 1/10/2024 0919    Acceptance, E, NR by AE at 1/3/2024 1337    Acceptance, E, NR by KE at 12/29/2023 1131    Acceptance, E, NR by KE at 12/27/2023 1045   Family Acceptance, E, VU,NR by ML at 1/15/2024 1314                         Point: Precautions (In Progress)       Learning Progress Summary             Patient Acceptance, E, NR by KG at 1/18/2024 0944    Acceptance, E, VU,NR by ML at 1/15/2024 1314    Acceptance, E, NR by AE at 1/10/2024 0919    Acceptance, E, NR by AE at 1/3/2024 1337    Acceptance, E, NR by KE at 12/29/2023 1131    Acceptance, E, NR by KE  at 12/27/2023 1045   Family Acceptance, E, VU,NR by ML at 1/15/2024 1314                                         User Key       Initials Effective Dates Name Provider Type Discipline    KG 01/04/23 -  Carolyn Herrera Physical Therapist PT    ML 04/22/21 -  Carin Villegas Physical Therapist PT    AE 09/21/21 -  Domo Desir, PT Physical Therapist PT    KE 11/16/23 -  Petty Mckoy, PT Physical Therapist PT                  PT Recommendation and Plan     Plan of Care Reviewed With: patient  Progress: improving  Outcome Evaluation: pt required max encouragement to participate, confusion.  Mod-A x2 STS, min-A x2 to take steps to the chair, FWW.     Time Calculation:         PT Charges       Row Name 01/18/24 0946             Time Calculation    Start Time 0852  -KG      PT Received On 01/18/24  -KG         Timed Charges    38433 - PT Therapeutic Activity Minutes 15  -KG         Total Minutes    Timed Charges Total Minutes 15  -KG       Total Minutes 15  -KG                User Key  (r) = Recorded By, (t) = Taken By, (c) = Cosigned By      Initials Name Provider Type    KG Carolyn Herrera Physical Therapist                  Therapy Charges for Today       Code Description Service Date Service Provider Modifiers Qty    17673780095 HC PT THERAPEUTIC ACT EA 15 MIN 1/18/2024 Carolyn Herrera GP 1            PT G-Codes  Outcome Measure Options: AM-PAC 6 Clicks Basic Mobility (PT)  AM-PAC 6 Clicks Score (PT): 13  AM-PAC 6 Clicks Score (OT): 13       Carolyn Herrear  1/18/2024

## 2024-01-18 NOTE — THERAPY TREATMENT NOTE
Patient Name: Omar Mendoza  : 1940    MRN: [id number]                              Today's Date: 2024       Admit Date: 2023    Visit Dx:     ICD-10-CM ICD-9-CM   1. Generalized weakness  R53.1 780.79   2. Acute UTI  N39.0 599.0   3. Bullous pemphigoid  L12.0 694.5   4. Venous insufficiency  I87.2 459.81   5. Dysphagia, unspecified type  R13.10 787.20     Patient Active Problem List   Diagnosis    UTI (urinary tract infection)    Bullous pemphigoid    Essential hypertension    Dyslipidemia    Edema of lower extremity    Cholestatic hepatitis    Acute constipation    Acute urinary retention    Uterine prolapse    Bradycardia    Pressure injury of buttock, unstageable    Type 2 diabetes mellitus with ketoacidosis, without long-term current use of insulin    Disorientation    Pleural effusion    Severe malnutrition     Past Medical History:   Diagnosis Date    CAD (coronary artery disease)     Dementia     Diabetes mellitus     Hyperlipemia     Hypertension      History reviewed. No pertinent surgical history.   General Information       Row Name 24 0849          OT Time and Intention    Document Type therapy note (daily note)  -SD     Mode of Treatment occupational therapy  -SD       Row Name 24 0849          General Information    Patient Profile Reviewed yes  -SD     Existing Precautions/Restrictions fall;seizures;other (see comments)  R heel & buttock wound, h/o Alz. dementia  -SD     Barriers to Rehab medically complex;previous functional deficit;cognitive status  -SD       Row Name 24 0849          Cognition    Orientation Status (Cognition) oriented to;person;disoriented to;place;situation;time  -SD       Row Name 24 0849          Safety Issues, Functional Mobility    Safety Issues Affecting Function (Mobility) awareness of need for assistance;friction/shear risk;insight into deficits/self-awareness;safety precaution awareness;safety precautions  follow-through/compliance;sequencing abilities  -SD     Impairments Affecting Function (Mobility) balance;cognition;endurance/activity tolerance;strength;postural/trunk control  -SD     Cognitive Impairments, Mobility Safety/Performance awareness, need for assistance;insight into deficits/self-awareness;safety precaution awareness;safety precaution follow-through;sequencing abilities  -SD               User Key  (r) = Recorded By, (t) = Taken By, (c) = Cosigned By      Initials Name Provider Type    Mignon Swain OT Occupational Therapist                     Mobility/ADL's       Row Name 01/18/24 0919          Bed Mobility    Bed Mobility rolling right;scooting/bridging;supine-sit  -SD     Rolling Right [place] (Bed Mobility) moderate assist (50% patient effort);verbal cues;nonverbal cues (demo/gesture);1 person assist  -SD     Scooting/Bridging [place] (Bed Mobility) maximum assist (25% patient effort);2 person assist;verbal cues;nonverbal cues (demo/gesture)  -SD     Supine-Sit [place] (Bed Mobility) maximum assist (25% patient effort);2 person assist;verbal cues;nonverbal cues (demo/gesture)  -SD     Bed Mobility, Safety Issues cognitive deficits limit understanding;decreased use of arms for pushing/pulling;decreased use of legs for bridging/pushing;impaired trunk control for bed mobility  -SD     Assistive Device (Bed Mobility) bed rails;draw sheet;head of bed elevated  -SD       Row Name 01/18/24 0919          Transfers    Transfers sit-stand transfer;bed-chair transfer;stand-sit transfer  -SD       Row Name 01/18/24 0919          Bed-Chair Transfer    Bed-Chair [place] (Transfers) minimum assist (75% patient effort);2 person assist;verbal cues;nonverbal cues (demo/gesture)  -SD     Assistive Device (Bed-Chair Transfers) walker, front-wheeled  -SD       Row Name 01/18/24 0919          Sit-Stand Transfer    Sit-Stand [place] (Transfers) moderate assist (50% patient effort);2  person assist;verbal cues;nonverbal cues (demo/gesture)  -SD     Assistive Device (Sit-Stand Transfers) walker, front-wheeled  -SD       Row Name 01/18/24 0919          Stand-Sit Transfer    Stand-Sit [place] (Transfers) minimum assist (75% patient effort);2 person assist;verbal cues;nonverbal cues (demo/gesture)  -SD     Assistive Device (Stand-Sit Transfers) walker, front-wheeled  -SD       Row Name 01/18/24 0919          Activities of Daily Living    BADL Assessment/Intervention grooming;feeding;upper body dressing;lower body dressing  -SD       Row Name 01/18/24 0919          Lower Body Dressing Assessment/Training    [place] Level (Lower Body Dressing) don;socks;dependent (less than 25% patient effort)  -SD     Position (Lower Body Dressing) sitting up in bed  -SD       Row Name 01/18/24 0919          Self-Feeding Assessment/Training    [place] Level (Feeding) liquids to mouth;scoop food and bring to mouth;set up  -SD     Position (Self-Feeding) sitting up in bed;supported sitting  -SD       Row Name 01/18/24 0919          Grooming Assessment/Training    [place] Level (Grooming) wash face, hands;hair care, combing/brushing;set up  -SD     Position (Grooming) edge of bed sitting;supported sitting;sitting up in bed  -SD       Row Name 01/18/24 0919          Upper Body Dressing Assessment/Training    [place] Level (Upper Body Dressing) don;pajama/robe;minimum assist (75% patient effort);doff  -SD     Position (Upper Body Dressing) edge of bed sitting  -SD               User Key  (r) = Recorded By, (t) = Taken By, (c) = Cosigned By      Initials Name Provider Type    Mignon Swain OT Occupational Therapist                   Obj/Interventions       Row Name 01/18/24 0921          Balance    Static Sitting Balance supervision  -SD     Dynamic Sitting Balance contact guard  -SD     Position, Sitting Balance unsupported;sitting edge of bed  -SD     Static Standing Balance minimal  assist;2-person assist  -SD     Dynamic Standing Balance moderate assist;2-person assist  -SD     Position/Device Used, Standing Balance supported;walker, front-wheeled  -SD               User Key  (r) = Recorded By, (t) = Taken By, (c) = Cosigned By      Initials Name Provider Type    Mignon Swain, ARNOLD Occupational Therapist                   Goals/Plan    No documentation.                  Clinical Impression       Row Name 01/18/24 0922          Pain Assessment    Pretreatment Pain Rating 0/10 - no pain  -SD     Posttreatment Pain Rating 0/10 - no pain  -SD       Row Name 01/18/24 0922          Plan of Care Review    Plan of Care Reviewed With patient  -SD     Progress improving  -SD     Outcome Evaluation Pt. with improving functional mobility, requiring less assist in STS t/f and bed to chair t/f. Pt. very upset & c/o ways that she has not been taken care of properly. Pt. required max v/c's for encouragement to participate & stay on task. OT skilled services continue to be warranted to return to PLOF. Recommend SNF upon d/c.  -SD       Row Name 01/18/24 0922          Therapy Assessment/Plan (OT)    Rehab Potential (OT) good, to achieve stated therapy goals  -SD     Criteria for Skilled Therapeutic Interventions Met (OT) yes;meets criteria;skilled treatment is necessary  -SD     Therapy Frequency (OT) daily  -SD       Row Name 01/18/24 0922          Therapy Plan Review/Discharge Plan (OT)    Anticipated Discharge Disposition (OT) skilled nursing facility  -SD       Row Name 01/18/24 0922          Vital Signs    O2 Delivery Pre Treatment room air  -SD     O2 Delivery Intra Treatment room air  -SD     O2 Delivery Post Treatment room air  -SD     Pre Patient Position Supine  -SD     Intra Patient Position Standing  -SD     Post Patient Position Sitting  -SD       Row Name 01/18/24 0922          Positioning and Restraints    Pre-Treatment Position in bed  -SD     Post Treatment Position chair  -SD     In  Chair notified nsg;reclined;call light within reach;encouraged to call for assist;exit alarm on;waffle cushion;on mechanical lift sling;legs elevated  -SD               User Key  (r) = Recorded By, (t) = Taken By, (c) = Cosigned By      Initials Name Provider Type    Mignon Swain OT Occupational Therapist                   Outcome Measures       Row Name 01/18/24 0849          How much help from another is currently needed...    Putting on and taking off regular lower body clothing? 1  -SD     Bathing (including washing, rinsing, and drying) 2  -SD     Toileting (which includes using toilet bed pan or urinal) 1  -SD     Putting on and taking off regular upper body clothing 3  -SD     Taking care of personal grooming (such as brushing teeth) 3  -SD     Eating meals 3  -SD     AM-PAC 6 Clicks Score (OT) 13  -SD       Row Name 01/18/24 0849          Functional Assessment    Outcome Measure Options AM-PAC 6 Clicks Daily Activity (OT)  -SD               User Key  (r) = Recorded By, (t) = Taken By, (c) = Cosigned By      Initials Name Provider Type    Mignon Swain OT Occupational Therapist                    Occupational Therapy Education       Title: PT OT SLP Therapies (In Progress)       Topic: Occupational Therapy (In Progress)       Point: ADL training (In Progress)       Description:   Instruct learner(s) on proper safety adaptation and remediation techniques during self care or transfers.   Instruct in proper use of assistive devices.                  Learning Progress Summary             Patient Acceptance, E,D, NR by DILIP at 1/15/2024 1151    Comment: re-orientation, bed mobility sequencing, UE TE, transfer technique, posture    Acceptance, E, NR by CS at 1/12/2024 1434    Acceptance, E, NR by AC at 1/10/2024 1023    Nonacceptance, E, NL by AC at 1/5/2024 1044    Acceptance, E, NR by AC at 1/3/2024 1435    Acceptance, E, VU,NR by DILIP at 1/1/2024 0920    Comment: oriented to date, need to  move and position RLE, ADL progression, sequencing bed mobililty    Acceptance, E, NR by AC at 12/27/2023 1135   Family Acceptance, E,D, NR by DILIP at 1/15/2024 1151    Comment: re-orientation, bed mobility sequencing, UE TE, transfer technique, posture    Acceptance, E, VU,NR by DILIP at 1/1/2024 0920    Comment: oriented to date, need to move and position RLE, ADL progression, sequencing bed mobililty                         Point: Home exercise program (In Progress)       Description:   Instruct learner(s) on appropriate technique for monitoring, assisting and/or progressing therapeutic exercises/activities.                  Learning Progress Summary             Patient Acceptance, E,D, NR by DILIP at 1/15/2024 1151    Comment: re-orientation, bed mobility sequencing, UE TE, transfer technique, posture   Family Acceptance, E,D, NR by DILIP at 1/15/2024 1151    Comment: re-orientation, bed mobility sequencing, UE TE, transfer technique, posture                         Point: Precautions (In Progress)       Description:   Instruct learner(s) on prescribed precautions during self-care and functional transfers.                  Learning Progress Summary             Patient Acceptance, E,D, NR by DILIP at 1/15/2024 1151    Comment: re-orientation, bed mobility sequencing, UE TE, transfer technique, posture    Acceptance, E, NR by CS at 1/12/2024 1434    Acceptance, E, VU,NR by DILIP at 1/1/2024 0920    Comment: oriented to date, need to move and position RLE, ADL progression, sequencing bed mobililty   Family Acceptance, E,D, NR by DILIP at 1/15/2024 1151    Comment: re-orientation, bed mobility sequencing, UE TE, transfer technique, posture    Acceptance, E, VU,NR by DILIP at 1/1/2024 0920    Comment: oriented to date, need to move and position RLE, ADL progression, sequencing bed mobililty                         Point: Body mechanics (In Progress)       Description:   Instruct learner(s) on proper positioning and spine alignment during  self-care, functional mobility activities and/or exercises.                  Learning Progress Summary             Patient Acceptance, E,D, NR by DILIP at 1/15/2024 1151    Comment: re-orientation, bed mobility sequencing, UE TE, transfer technique, posture    Acceptance, E, NR by  at 1/12/2024 1434    Acceptance, E, VU,NR by DILIP at 1/1/2024 0920    Comment: oriented to date, need to move and position RLE, ADL progression, sequencing bed mobililty   Family Acceptance, E,D, NR by DILIP at 1/15/2024 1151    Comment: re-orientation, bed mobility sequencing, UE TE, transfer technique, posture    Acceptance, E, VU,NR by DILIP at 1/1/2024 0920    Comment: oriented to date, need to move and position RLE, ADL progression, sequencing bed mobililty                                         User Key       Initials Effective Dates Name Provider Type Discipline     07/11/23 -  Rayna Wall, OT Occupational Therapist OT     02/03/23 -  Sepideh Feldman OT Occupational Therapist OT     09/02/21 -  Eduarda Avelar, OT Occupational Therapist OT                  OT Recommendation and Plan  Therapy Frequency (OT): daily  Plan of Care Review  Plan of Care Reviewed With: patient  Progress: improving  Outcome Evaluation: Pt. with improving functional mobility, requiring less assist in STS t/f and bed to chair t/f. Pt. very upset & c/o ways that she has not been taken care of properly. Pt. required max v/c's for encouragement to participate & stay on task. OT skilled services continue to be warranted to return to PLOF. Recommend SNF upon d/c.     Time Calculation:         Time Calculation- OT       Row Name 01/18/24 0925             Time Calculation- OT    OT Received On 01/18/24  -SD      OT Goal Re-Cert Due Date 01/25/24  -SD         Timed Charges    50831 - OT Therapeutic Activity Minutes 5  -SD      61096 - OT Self Care/Mgmt Minutes 10  -SD         Total Minutes    Timed Charges Total Minutes 15  -SD       Total Minutes 15  -SD                 User Key  (r) = Recorded By, (t) = Taken By, (c) = Cosigned By      Initials Name Provider Type    Mignon Swain OT Occupational Therapist                  Therapy Charges for Today       Code Description Service Date Service Provider Modifiers Qty    72184865993 HC OT SELF CARE/MGMT/TRAIN EA 15 MIN 1/18/2024 Mignon Mattson OT GO 1                 Mignon Mattson OT  1/18/2024

## 2024-01-18 NOTE — PLAN OF CARE
Goal Outcome Evaluation:  Plan of Care Reviewed With: patient        Progress: improving  Outcome Evaluation: pt required max encouragement to participate, confusion.  Mod-A x2 STS, min-A x2 to take steps to the chair, FWW.

## 2024-01-19 PROCEDURE — 97530 THERAPEUTIC ACTIVITIES: CPT

## 2024-01-19 PROCEDURE — 99231 SBSQ HOSP IP/OBS SF/LOW 25: CPT | Performed by: NURSE PRACTITIONER

## 2024-01-19 PROCEDURE — 97110 THERAPEUTIC EXERCISES: CPT

## 2024-01-19 RX ADMIN — MIRTAZAPINE 15 MG: 15 TABLET, FILM COATED ORAL at 21:26

## 2024-01-19 RX ADMIN — RIVASTIGMINE TRANSDERMAL SYSTEM 1 PATCH: 9.5 PATCH, EXTENDED RELEASE TRANSDERMAL at 08:30

## 2024-01-19 NOTE — PROGRESS NOTES
[facility] Medicine Services  PROGRESS NOTE    Patient Name: Omar Mendoza  : 1940  MRN: [id number]    Date of Admission: 2023  Primary Care Physician: Varun Pa MD    Subjective   Subjective     CC:  Weakness      HPI:  Sleeping, did not wake. No family in room. No new needs from nursing.       Objective   Objective     Vital Signs:   Temp:  [98.5 °F (36.9 °C)-98.6 °F (37 °C)] 98.5 °F (36.9 °C)  Heart Rate:  [86-90] 86  Resp:  [16-18] 18  BP: (108-136)/(68-76) 136/76     Physical Exam:  Constitutional: No acute distress, sleeping in bed   HENT: NCAT, MMM   Respiratory: Good effort, nonlabored respirations on RA  Musculoskeletal: No edema, normal muscle tone and mass for age  Psychiatric: sleeping    Neurologic: TRUDY  Skin: No visible rashes       Results Reviewed:  LAB RESULTS:    Brief Urine Lab Results  (Last result in the past 365 days)        Color   Clarity   Blood   Leuk Est   Nitrite   Protein   CREAT   Urine HCG        24 0053 Yellow   Clear   Negative   Trace   Negative   Negative                   Microbiology Results Abnormal       Procedure Component Value - Date/Time    COVID-19 RAPID AG,VERITOR,COR/JAMARI/PAD/NUPUR/ASHA/LAG/BRITTANY/ IN-HOUSE,DRY SWAB, 1 HR TAT - Swab, Nasal Cavity [[id number]]  (Normal) Collected: 24 0004    Lab Status: Final result Specimen: Swab from Nasal Cavity Updated: 24 0022     COVID19 Presumptive Negative    Narrative:      Fact sheets for providers: https://www.fda.gov/media/477567/download    Fact sheets for patients: https://www.fda.gov/media/058652/download    Blood Culture - Blood, Blood, PICC Line [[id number]]  (Normal) Collected: 23 1400    Lab Status: Final result Specimen: Blood, PICC Line Updated: 24 1531     Blood Culture No growth at 5 days    Blood Culture - Blood, Arm, Right [[id number]]  (Normal) Collected: 23 0431    Lab Status: Final result Specimen: Blood from Arm, Right Updated:  01/01/24 0515     Blood Culture No growth at 5 days            No radiology results from the last 24 hrs    Results for orders placed during the hospital encounter of 12/26/23    Adult Transthoracic Echo Complete W/ Cont if Necessary Per Protocol    Interpretation Summary    Left ventricular systolic function is normal. Calculated left ventricular EF = 57% Left ventricular ejection fraction appears to be 56 - 60%.    Left ventricular wall thickness is consistent with mild to moderate concentric hypertrophy.    Left ventricular diastolic function was normal.    Estimated right ventricular systolic pressure from tricuspid regurgitation is mildly elevated (35-45 mmHg).    There is a trivial pericardial effusion.    Mild aortic valve regurgitation.      Current medications:  Scheduled Meds:mirtazapine, 15 mg, Oral, Nightly  rivastigmine, 1 patch, Transdermal, Daily      Continuous Infusions:   PRN Meds:.  acetaminophen **OR** acetaminophen **OR** acetaminophen    senna-docusate sodium **AND** polyethylene glycol **AND** bisacodyl **AND** bisacodyl    Calcium Replacement - Follow Nurse / BPA Driven Protocol    dextrose    dextrose    glucagon (human recombinant)    glycopyrrolate    guaiFENesin    hydrOXYzine    Magnesium Standard Dose Replacement - Follow Nurse / BPA Driven Protocol    melatonin    nitroglycerin    ondansetron    Phosphorus Replacement - Follow Nurse / BPA Driven Protocol    Potassium Replacement - Follow Nurse / BPA Driven Protocol    sodium chloride    ziprasidone    Assessment & Plan   Assessment & Plan     Active Hospital Problems    Diagnosis  POA    **UTI (urinary tract infection) [N39.0]  Yes    Acute constipation [K59.00]  Unknown    Acute urinary retention [R33.8]  Unknown    Uterine prolapse [N81.4]  Unknown    Bradycardia [R00.1]  Unknown    Pressure injury of buttock, unstageable [L89.300]  Unknown    Type 2 diabetes mellitus with ketoacidosis, without long-term current use of insulin  [E11.10]  Unknown    Disorientation [R41.0]  Unknown    Pleural effusion [J90]  Unknown    Severe malnutrition [E43]  Yes    Edema of lower extremity [R60.0]  Yes    Bullous pemphigoid [L12.0]  Yes    Dyslipidemia [E78.5]  Yes    Essential hypertension [I10]  Yes      Resolved Hospital Problems   No resolved problems to display.        Brief Hospital Course to date:  Omar Mendoza is a 83 y.o. female with history of type 2 diabetes, hypertension, history of uterine prolapse, urinary retention with Schultz catheter in place, history of bullous pemphigoid  (s/p Dupixent 10/2023, prednisone/doxycycline 11/2023) CAD, hyperlipidemia, presumed dementia who presented to the ED with generalized weakness and bullous pemphigoid blister on the heel.  Patient developed significant encephalopathy, and paranoia during her hospitalization.  Neurology was consulted and determined patient to be suffering from dementia complicated by depression and paranoia.  Patient refused to eat and drink.  Goals of care discussion had with family, palliative care consulted, pursuing hospice at this time.  Medications have been simplified to remeron and exelon patch and seems to be doing well.    This patient's problems and plans were partially entered by my partner and updated as appropriate by me 01/19/24.    Acute metabolic encephalopathy  Dementia with suspected Alzheimer's disease  Major depression with paranoia  Generalized weakness  - Family pursuing placement and then transition to hospice, desire comfort, minimizing any interventions on the patient and will not force any medical therapies. Plan will be discharge to LTC with eventual transition to hospice and she currently does not meet inpatient hospice criteria  - neurology adjusted medications, patient is not always agreeable to taking these     Dysphagia  - comfort diet given patient is comfort measures only     Bilateral pleural effusion  L>R  Bibasilar airspace disease versus  pneumonia  -Concern for aspiration on 12/30, Completed 5 days of Rocephin and doxycycline  -Mucinex/Robinul as needed for cough     Anemia  -suspect anemia of chronic disease, stable with no active signs of bleeding     Poor venous access  -Removed PICC overnight 1/3     Sinus bradycardia  - medications discontinued due to GOC, stable VS     BLE edema  Decreased pulses LLE  - CTA abd showing small right pleural effusion, bilateral basilar atelectasis  - CTA w/ runoff showing normal right sided runoff; left side delayed runoff possibly r/t venous stasis changes, no evidence of arterial occlusion or significant stenosis  -Echo revealed EF 57% with normal diastolic function      Uncontrolled Type 2 diabetes  -Hgb A1C 12.10 on 12/27     Constipation  -s/p aggressive bowel regimen  -Continue prn regimen     Uterine prolapse  -Gyn Dr. Zhou followed outpatient  -may be contributing to chronic urine retention  -Has external catheter     Bullous pemphigoid  -Has developed new blister on right heel  -Completed doxycycline  -Wound care following      Pressure ulcer  -Wound care following      L adrenal adenoma  - incidental finding on CTA abdomen      Severe malnutrition     Expected Discharge Location and Transportation: LTC with outpatient hospice  Expected Discharge   Expected Discharge Date: 1/19/2024; Expected Discharge Time:      DVT prophylaxis:  No DVT prophylaxis order currently exists.     AM-PAC 6 Clicks Score (PT): 11 (01/19/24 4433)    CODE STATUS:   Code Status and Medical Interventions:   Ordered at: 01/07/24 1143     Code Status (Patient has no pulse and is not breathing):    No CPR (Do Not Attempt to Resuscitate)     Medical Interventions (Patient has pulse or is breathing):    Comfort Measures       Laura Sanchez, APRN  01/19/24

## 2024-01-19 NOTE — PLAN OF CARE
Goal Outcome Evaluation:  Plan of Care Reviewed With: patient, spouse, son        Progress: improving  Outcome Evaluation: Pt VSS, no complaints of this shift, up to chair with PT, back to bed with lift. Pt  at bedside, will cont to monitor.

## 2024-01-19 NOTE — PROGRESS NOTES
Continued Stay Note  [facility]     Patient Name: Omar Mendoza  MRN: [id number]  Today's Date: 1/19/2024    Admit Date: 12/26/2023    Plan: TBD   Discharge Plan       Row Name 01/19/24 1052       Plan    Plan TBD    Plan Comments Hospice liaison will continue to follow peripherally while placement is sought. Please call [phone] for any questions or concerns.                   Discharge Codes    No documentation.                 Expected Discharge Date and Time       Expected Discharge Date Expected Discharge Time    Jan 19, 2024               Laquata Hume, RN

## 2024-01-19 NOTE — THERAPY TREATMENT NOTE
Patient Name: Omar Mendoza  : 1940    MRN: [id number]                              Today's Date: 2024       Admit Date: 2023    Visit Dx:     ICD-10-CM ICD-9-CM   1. Generalized weakness  R53.1 780.79   2. Acute UTI  N39.0 599.0   3. Bullous pemphigoid  L12.0 694.5   4. Venous insufficiency  I87.2 459.81   5. Dysphagia, unspecified type  R13.10 787.20     Patient Active Problem List   Diagnosis    UTI (urinary tract infection)    Bullous pemphigoid    Essential hypertension    Dyslipidemia    Edema of lower extremity    Cholestatic hepatitis    Acute constipation    Acute urinary retention    Uterine prolapse    Bradycardia    Pressure injury of buttock, unstageable    Type 2 diabetes mellitus with ketoacidosis, without long-term current use of insulin    Disorientation    Pleural effusion    Severe malnutrition     Past Medical History:   Diagnosis Date    CAD (coronary artery disease)     Dementia     Diabetes mellitus     Hyperlipemia     Hypertension      History reviewed. No pertinent surgical history.   General Information       Row Name 24 1327          Physical Therapy Time and Intention    Document Type therapy note (daily note)  -NS     Mode of Treatment physical therapy  -NS       Row Name 24 1327          General Information    Patient Profile Reviewed yes  -NS     Existing Precautions/Restrictions fall;seizures;other (see comments)  R heel and buttock wound; dementia  -NS       Row Name 24 1327          Cognition    Orientation Status (Cognition) oriented to;person;disoriented to;place;situation;time  -NS       Row Name 24 1327          Safety Issues, Functional Mobility    Safety Issues Affecting Function (Mobility) insight into deficits/self-awareness;judgment;problem-solving;safety precaution awareness;safety precautions follow-through/compliance;sequencing abilities;impulsivity  -NS     Impairments Affecting Function (Mobility)  balance;cognition;coordination;endurance/activity tolerance;motor planning;strength;postural/trunk control;range of motion (ROM)  -NS     Cognitive Impairments, Mobility Safety/Performance problem-solving/reasoning;safety precaution awareness;safety precaution follow-through;sequencing abilities;judgment  -NS     Comment, Safety Issues/Impairments (Mobility) Tremors following exertion  -NS               User Key  (r) = Recorded By, (t) = Taken By, (c) = Cosigned By      Initials Name Provider Type    Traci Vences PT Physical Therapist                   Mobility       Row Name 01/19/24 1327          Bed Mobility    Bed Mobility supine-sit  -NS     Supine-Sit [place] (Bed Mobility) moderate assist (50% patient effort);1 person assist;verbal cues  -NS     Assistive Device (Bed Mobility) bed rails;draw sheet;head of bed elevated  -NS     Comment, (Bed Mobility) Cues for sequencing, less assist requried today  -NS       Row Name 01/19/24 1327          Transfers    Comment, (Transfers) Cues for hand placement. Pt stood with RW, demonstrating flexed posture and difficulty obtaining full upright standing. Difficulty advancing LEs to transfer to the chair. Increased shakiness following that pt states is new as of yesterday 1/18.  -NS       Row Name 01/19/24 1327          Bed-Chair Transfer    Bed-Chair [place] (Transfers) maximum assist (25% patient effort);2 person assist  -NS     Assistive Device (Bed-Chair Transfers) other (see comments)  BUE Support  -NS       Row Name 01/19/24 1327          Sit-Stand Transfer    Sit-Stand [place] (Transfers) moderate assist (50% patient effort);2 person assist  -NS     Assistive Device (Sit-Stand Transfers) walker, front-wheeled  -NS               User Key  (r) = Recorded By, (t) = Taken By, (c) = Cosigned By      Initials Name Provider Type    Traci Vences PT Physical Therapist                   Obj/Interventions       Row Name 01/19/24 1321          Motor  Skills    Therapeutic Exercise hip;knee;ankle  -NS       Row Name 01/19/24 1327          Hip (Therapeutic Exercise)    Hip (Therapeutic Exercise) strengthening exercise  -NS     Hip Strengthening (Therapeutic Exercise) bilateral;aBduction;aDduction;marching while seated;5 repetitions  -NS       Row Name 01/19/24 1327          Knee (Therapeutic Exercise)    Knee (Therapeutic Exercise) strengthening exercise  -NS     Knee Strengthening (Therapeutic Exercise) bilateral;heel slides;LAQ (long arc quad);SLR (straight leg raise);10 repetitions  -NS       Row Name 01/19/24 1327          Ankle (Therapeutic Exercise)    Ankle (Therapeutic Exercise) AROM (active range of motion)  -NS     Ankle AROM (Therapeutic Exercise) bilateral;dorsiflexion;plantarflexion;10 repetitions  -NS       Row Name 01/19/24 1327          Balance    Balance Assessment sitting static balance;sitting dynamic balance;standing static balance;standing dynamic balance  -NS     Static Sitting Balance standby assist  -NS     Dynamic Sitting Balance contact guard  -NS     Position, Sitting Balance unsupported;sitting edge of bed  -NS     Static Standing Balance moderate assist;2-person assist  -NS     Dynamic Standing Balance maximum assist;2-person assist  -NS     Position/Device Used, Standing Balance supported  -NS               User Key  (r) = Recorded By, (t) = Taken By, (c) = Cosigned By      Initials Name Provider Type    Traci Vences PT Physical Therapist                   Goals/Plan    No documentation.                  Clinical Impression       [facility] Name 01/19/24 1327          Pain    Pretreatment Pain Rating 0/10 - no pain  -NS     Posttreatment Pain Rating 0/10 - no pain  -NS       [facility] Name 01/19/24 1327          Plan of Care Review    Plan of Care Reviewed With patient;spouse;son  -NS     Progress improving  -NS     Outcome Evaluation Patient required less assist for bed mobility this session but required increased assist for transferring  today, demonstrating flexed posture and increased shakiness following activity. Will continue to progress as tolerated. Recommend SNF at d/c.  -NS       Row Name 01/19/24 1327          Vital Signs    Pre Patient Position Supine  -NS     Intra Patient Position Standing  -NS     Post Patient Position Sitting  -NS       Row Name 01/19/24 1327          Positioning and Restraints    Pre-Treatment Position in bed  -NS     Post Treatment Position chair  -NS     In Chair notified nsg;reclined;call light within reach;encouraged to call for assist;exit alarm on;on mechanical lift sling;waffle cushion;legs elevated;heels elevated;with family/caregiver  -NS               User Key  (r) = Recorded By, (t) = Taken By, (c) = Cosigned By      Initials Name Provider Type    Traci Vences PT Physical Therapist                   Outcome Measures       Row Name 01/19/24 1327 01/19/24 0800       How much help from another person do you currently need...    Turning from your back to your side while in flat bed without using bedrails? 3  -NS 3  -NC    Moving from lying on back to sitting on the side of a flat bed without bedrails? 2  -NS 3  -NC    Moving to and from a bed to a chair (including a wheelchair)? 2  -NS 2  -NC    Standing up from a chair using your arms (e.g., wheelchair, bedside chair)? 2  -NS 2  -NC    Climbing 3-5 steps with a railing? 1  -NS 1  -NC    To walk in hospital room? 1  -NS 1  -NC    AM-PAC 6 Clicks Score (PT) 11  -NS 12  -NC    Highest Level of Mobility Goal 4 --> Transfer to chair/commode  -NS 4 --> Transfer to chair/commode  -NC      Row Name 01/19/24 1327          Functional Assessment    Outcome Measure Options AM-PAC 6 Clicks Basic Mobility (PT)  -NS               User Key  (r) = Recorded By, (t) = Taken By, (c) = Cosigned By      Initials Name Provider Type    Debi Elena RN Registered Nurse    Traci Vences PT Physical Therapist                                 Physical Therapy Education        Title: PT OT SLP Therapies (In Progress)       Topic: Physical Therapy (Done)       Point: Mobility training (Done)       Learning Progress Summary             Patient Acceptance, E, VU,NR by NS at 1/19/2024 1432    Acceptance, E, NR by KG at 1/18/2024 0944    Acceptance, E, VU,NR by ML at 1/15/2024 1314    Acceptance, E, NR by AE at 1/10/2024 0919    Acceptance, E, NR by AE at 1/3/2024 1337    Acceptance, E, NR by KE at 12/29/2023 1131    Acceptance, E, NR by KE at 12/27/2023 1045   Family Acceptance, E, VU,NR by ML at 1/15/2024 1314                         Point: Home exercise program (Done)       Learning Progress Summary             Patient Acceptance, E, VU,NR by NS at 1/19/2024 1432    Acceptance, E, NR by KG at 1/18/2024 0944                         Point: Body mechanics (Done)       Learning Progress Summary             Patient Acceptance, E, VU,NR by NS at 1/19/2024 1432    Acceptance, E, NR by KG at 1/18/2024 0944    Acceptance, E, VU,NR by ML at 1/15/2024 1314    Acceptance, E, NR by AE at 1/10/2024 0919    Acceptance, E, NR by AE at 1/3/2024 1337    Acceptance, E, NR by KE at 12/29/2023 1131    Acceptance, E, NR by KE at 12/27/2023 1045   Family Acceptance, E, VU,NR by ML at 1/15/2024 1314                         Point: Precautions (Done)       Learning Progress Summary             Patient Acceptance, E, VU,NR by NS at 1/19/2024 1432    Acceptance, E, NR by KG at 1/18/2024 0944    Acceptance, E, VU,NR by ML at 1/15/2024 1314    Acceptance, E, NR by AE at 1/10/2024 0919    Acceptance, E, NR by AE at 1/3/2024 1337    Acceptance, E, NR by KE at 12/29/2023 1131    Acceptance, E, NR by KE at 12/27/2023 1045   Family Acceptance, E, VU,NR by ML at 1/15/2024 1314                                         User Key       Initials Effective Dates Name Provider Type Discipline    KG 01/04/23 -  Carolyn Herrera Physical Therapist PT    NS 06/16/21 -  Traci Avelar PT Physical Therapist PT    ML 04/22/21 -   Carin Villegas Physical Therapist PT    AE 09/21/21 -  Domo Desir, PT Physical Therapist PT    KE 11/16/23 -  Petty Mckoy, PT Physical Therapist PT                  PT Recommendation and Plan     Plan of Care Reviewed With: patient, spouse, son  Progress: improving  Outcome Evaluation: Patient required less assist for bed mobility this session but required increased assist for transferring today, demonstrating flexed posture and increased shakiness following activity. Will continue to progress as tolerated. Recommend SNF at d/c.     Time Calculation:         PT Charges       Row Name 01/19/24 1327             Time Calculation    Start Time 1327  -NS      PT Received On 01/19/24  -NS      PT Goal Re-Cert Due Date 01/20/24  -NS         Timed Charges    04170 - PT Therapeutic Exercise Minutes 12  -NS      88253 - PT Therapeutic Activity Minutes 26  -NS         Total Minutes    Timed Charges Total Minutes 38  -NS       Total Minutes 38  -NS                User Key  (r) = Recorded By, (t) = Taken By, (c) = Cosigned By      Initials Name Provider Type    NS Traci Avelar, PT Physical Therapist                  Therapy Charges for Today       Code Description Service Date Service Provider Modifiers Qty    80204811444 HC PT THER PROC EA 15 MIN 1/19/2024 Traci Avelar, PT GP 1    13646739186 HC PT THERAPEUTIC ACT EA 15 MIN 1/19/2024 Traci Avelar, PT GP 2            PT G-Codes  Outcome Measure Options: AM-PAC 6 Clicks Basic Mobility (PT)  AM-PAC 6 Clicks Score (PT): 11  AM-PAC 6 Clicks Score (OT): 13  PT Discharge Summary  Anticipated Discharge Disposition (PT): skilled nursing facility    Traci Avelar PT  1/19/2024

## 2024-01-19 NOTE — CASE MANAGEMENT/SOCIAL WORK
Continued Stay Note  [facility]     Patient Name: Omar Mendoza  MRN: [id number]  Today's Date: 1/19/2024    Admit Date: 12/26/2023    Plan: Seeking LTC   Discharge Plan       Row Name 01/19/24 1442       Plan    Plan Seeking LTC    Plan Comments SW'er met with patient,  and son at bedside. Discussed expending search for LTC bed (Medicaid pending). Discussed the options of returning to [facility]; son explained he wasn't impressed with the care (may reconsider). SW'er follow-up with referrals no beds available for LTC Medicaid pending. Plan is LTC                   Discharge Codes    No documentation.                 Expected Discharge Date and Time       Expected Discharge Date Expected Discharge Time    Jan 19, 2024               MARIOLA Cheek (Kay)

## 2024-01-19 NOTE — PLAN OF CARE
Goal Outcome Evaluation:  Plan of Care Reviewed With: patient, spouse, son        Progress: improving  Outcome Evaluation: Patient required less assist for bed mobility this session but required increased assist for transferring today, demonstrating flexed posture and increased shakiness following activity. Will continue to progress as tolerated. Recommend SNF at d/c.      Anticipated Discharge Disposition (PT): skilled nursing facility

## 2024-01-19 NOTE — PLAN OF CARE
Goal Outcome Evaluation:                                       VSS. Patient resting well this evening. Purewick in place. Sacral dressing changed. Bladder scan Q4. Comfort measures ongoing.

## 2024-01-19 NOTE — PLAN OF CARE
Goal Outcome Evaluation:               VSS on RA. Denies pain. Prn atarax x1 for restlessness. Sacral spine PI changed. Large BM this shift. Adequate UOP, bladder scanned. PT got patient up in chair via lift for much of shift. Care ongoing, continue to monitor.

## 2024-01-20 PROCEDURE — 99231 SBSQ HOSP IP/OBS SF/LOW 25: CPT | Performed by: FAMILY MEDICINE

## 2024-01-20 RX ADMIN — RIVASTIGMINE TRANSDERMAL SYSTEM 1 PATCH: 9.5 PATCH, EXTENDED RELEASE TRANSDERMAL at 08:33

## 2024-01-20 RX ADMIN — MIRTAZAPINE 15 MG: 15 TABLET, FILM COATED ORAL at 20:12

## 2024-01-20 NOTE — PROGRESS NOTES
[facility] Medicine Services  PROGRESS NOTE    Patient Name: Omar Mendoza  : 1940  MRN: [id number]    Date of Admission: 2023  Primary Care Physician: Varun Pa MD    Subjective   Subjective     CC:  F/U Weakness      HPI:  Patient seen and examined. Family at bedside. Ate her entire breakfast tray. No complaints. Daughter inquires about why BP meds and insulin discontinued, discussed Dc'd by Palliative as focusing on comfort. Expressed understanding.     Objective   Objective     Vital Signs:   Temp:  [97.6 °F (36.4 °C)] 97.6 °F (36.4 °C)  Heart Rate:  [82] 82  Resp:  [16] 16  BP: (117)/(71) 117/71     Physical Exam:  Constitutional: No acute distress, awake, alert, sitting up in bed, ate entire tray   HENT: NCAT, mucous membranes moist   Respiratory: CTA B/L, nonlabored respirations on RA  Musculoskeletal: No edema  Psychiatric: calm and cooperative currently    Neurologic: No focal deficits  Skin: No visible rashes     Results Reviewed:  LAB RESULTS:    Brief Urine Lab Results  (Last result in the past 365 days)        Color   Clarity   Blood   Leuk Est   Nitrite   Protein   CREAT   Urine HCG        24 0053 Yellow   Clear   Negative   Trace   Negative   Negative                   Microbiology Results Abnormal       Procedure Component Value - Date/Time    COVID-19 RAPID AG,VERITOR,COR/JAMARI/PAD/NUPUR/ASHA/LAG/BRITTANY/ IN-HOUSE,DRY SWAB, 1 HR TAT - Swab, Nasal Cavity [[id number]]  (Normal) Collected: 24 0004    Lab Status: Final result Specimen: Swab from Nasal Cavity Updated: 24 0022     COVID19 Presumptive Negative    Narrative:      Fact sheets for providers: https://www.fda.gov/media/158492/download    Fact sheets for patients: https://www.fda.gov/media/915211/download    Blood Culture - Blood, Blood, PICC Line [[id number]]  (Normal) Collected: 23 1400    Lab Status: Final result Specimen: Blood, PICC Line Updated: 24 1531     Blood Culture  No growth at 5 days    Blood Culture - Blood, Arm, Right [[id number]]  (Normal) Collected: 12/27/23 0431    Lab Status: Final result Specimen: Blood from Arm, Right Updated: 01/01/24 0515     Blood Culture No growth at 5 days            No radiology results from the last 24 hrs    Results for orders placed during the hospital encounter of 12/26/23    Adult Transthoracic Echo Complete W/ Cont if Necessary Per Protocol    Interpretation Summary    Left ventricular systolic function is normal. Calculated left ventricular EF = 57% Left ventricular ejection fraction appears to be 56 - 60%.    Left ventricular wall thickness is consistent with mild to moderate concentric hypertrophy.    Left ventricular diastolic function was normal.    Estimated right ventricular systolic pressure from tricuspid regurgitation is mildly elevated (35-45 mmHg).    There is a trivial pericardial effusion.    Mild aortic valve regurgitation.      Current medications:  Scheduled Meds:mirtazapine, 15 mg, Oral, Nightly  rivastigmine, 1 patch, Transdermal, Daily      Continuous Infusions:   PRN Meds:.  acetaminophen **OR** acetaminophen **OR** acetaminophen    senna-docusate sodium **AND** polyethylene glycol **AND** bisacodyl **AND** bisacodyl    Calcium Replacement - Follow Nurse / BPA Driven Protocol    dextrose    dextrose    glucagon (human recombinant)    glycopyrrolate    guaiFENesin    hydrOXYzine    Magnesium Standard Dose Replacement - Follow Nurse / BPA Driven Protocol    melatonin    nitroglycerin    ondansetron    Phosphorus Replacement - Follow Nurse / BPA Driven Protocol    Potassium Replacement - Follow Nurse / BPA Driven Protocol    sodium chloride    ziprasidone    Assessment & Plan   Assessment & Plan     Active Hospital Problems    Diagnosis  POA    **UTI (urinary tract infection) [N39.0]  Yes    Acute constipation [K59.00]  Unknown    Acute urinary retention [R33.8]  Unknown    Uterine prolapse [N81.4]  Unknown    Bradycardia  [R00.1]  Unknown    Pressure injury of buttock, unstageable [L89.300]  Unknown    Type 2 diabetes mellitus with ketoacidosis, without long-term current use of insulin [E11.10]  Unknown    Disorientation [R41.0]  Unknown    Pleural effusion [J90]  Unknown    Severe malnutrition [E43]  Yes    Edema of lower extremity [R60.0]  Yes    Bullous pemphigoid [L12.0]  Yes    Dyslipidemia [E78.5]  Yes    Essential hypertension [I10]  Yes      Resolved Hospital Problems   No resolved problems to display.        Brief Hospital Course to date:  Omar Mendoza is a 83 y.o. female with history of type 2 diabetes, hypertension, history of uterine prolapse, urinary retention with Schultz catheter in place, history of bullous pemphigoid  (s/p Dupixent 10/2023, prednisone/doxycycline 11/2023) CAD, hyperlipidemia, presumed dementia who presented to the ED with generalized weakness and bullous pemphigoid blister on the heel.  Patient developed significant encephalopathy, and paranoia during her hospitalization.  Neurology was consulted and determined patient to be suffering from dementia complicated by depression and paranoia.  Patient refused to eat and drink.  Goals of care discussion had with family, palliative care consulted, pursuing hospice at this time.  Medications have been simplified to remeron and exelon patch and seems to be doing well.    This patient's problems and plans were partially entered by my partner and updated as appropriate by me 01/20/24.  All problems are new to me today    Acute metabolic encephalopathy  Dementia with suspected Alzheimer's disease  Major depression with paranoia  Generalized weakness  - Family pursuing placement and then transition to hospice, desire comfort, minimizing any interventions on the patient and will not force any medical therapies. Plan will be discharge to LTC with eventual transition to hospice and she currently does not meet inpatient hospice criteria  - neurology adjusted  medications, patient is not always agreeable to taking these     Dysphagia  - comfort diet, tolerating well      Bilateral pleural effusion  L>R  Bibasilar airspace disease versus pneumonia  -Concern for aspiration on 12/30, Completed 5 days of Rocephin and doxycycline  -Mucinex/Robinul as needed for cough     Anemia  -suspect anemia of chronic disease, comfort measures so no further lab draws      Poor venous access  -Removed PICC overnight 1/3     Sinus bradycardia  - medications discontinued due to GOC  - VSS      BLE edema  Decreased pulses LLE  - CTA abd showing small right pleural effusion, bilateral basilar atelectasis  - CTA w/ runoff showing normal right sided runoff; left side delayed runoff possibly r/t venous stasis changes, no evidence of arterial occlusion or significant stenosis  -Echo revealed EF 57% with normal diastolic function      Uncontrolled Type 2 diabetes  -Hgb A1C 12.10 on 12/27  -Accu-checks/insulin discontinued by Palliative MD 1/7 due to comfort measures      Constipation  -s/p aggressive bowel regimen  -Continue prn regimen     Uterine prolapse  -Gyn Dr. Zhou followed outpatient  -may be contributing to chronic urine retention  -Has external catheter     Bullous pemphigoid  -Completed doxycycline  -Wound care following      Pressure ulcer  -Wound care following      L adrenal adenoma  - incidental finding on CTA abdomen      Severe malnutrition     Expected Discharge Location and Transportation: LTC with outpatient hospice  Expected Discharge   Expected Discharge Date: 1/22/2024; Expected Discharge Time:      DVT prophylaxis:  No DVT prophylaxis order currently exists.     AM-PAC 6 Clicks Score (PT): 11 (01/19/24 2020)    CODE STATUS:   Code Status and Medical Interventions:   Ordered at: 01/07/24 1143     Code Status (Patient has no pulse and is not breathing):    No CPR (Do Not Attempt to Resuscitate)     Medical Interventions (Patient has pulse or is breathing):    Comfort  Measures       Stefania Borja,   01/20/24

## 2024-01-20 NOTE — PLAN OF CARE
Goal Outcome Evaluation:      Patient up in chair at start of shift. Back to bed via lift at 2130 per husbands request. Patient responding, following commands and turning over in bed on her own.  spending the night at bedside with patient. No complaints of pain, patient remained calm, and appropriate throughout shift. Will continue to monitor.

## 2024-01-21 PROCEDURE — 99231 SBSQ HOSP IP/OBS SF/LOW 25: CPT | Performed by: FAMILY MEDICINE

## 2024-01-21 RX ADMIN — MIRTAZAPINE 15 MG: 15 TABLET, FILM COATED ORAL at 18:10

## 2024-01-21 RX ADMIN — RIVASTIGMINE TRANSDERMAL SYSTEM 1 PATCH: 9.5 PATCH, EXTENDED RELEASE TRANSDERMAL at 09:55

## 2024-01-21 RX ADMIN — GUAIFENESIN 600 MG: 600 TABLET, EXTENDED RELEASE ORAL at 09:54

## 2024-01-21 NOTE — PLAN OF CARE
Goal Outcome Evaluation:  Plan of Care Reviewed With: spouse, patient, daughter        Progress: improving  Outcome Evaluation: Pt VSS, no complaints of pain. Alert to self only. Pt up in chair with lift, tolerated well. Pt calm and cooperative. Family at bedside throughout the day. Will cont to monitor.

## 2024-01-21 NOTE — PROGRESS NOTES
[facility] Medicine Services  PROGRESS NOTE    Patient Name: Omar Mendoza  : 1940  MRN: [id number]    Date of Admission: 2023  Primary Care Physician: Varun Pa MD    Subjective   Subjective     CC:  F/U Weakness      HPI:  Patient seen and examined. No family at bedside this morning. Pt asleep. Did not wake her. Awaits placement.     Objective   Objective     Vital Signs:   Temp:  [98 °F (36.7 °C)] 98 °F (36.7 °C)  Heart Rate:  [78] 78  Resp:  [14] 14  BP: (135)/(75) 135/75     Physical Exam:  Constitutional: No acute distress, resting comfortably   HENT: NCAT, mucous membranes moist   Respiratory: CTA B/L, nonlabored respirations on RA  Musculoskeletal: No edema  Psychiatric: [place]  Neurologic: No focal deficits  Skin: No visible rashes     Results Reviewed:  LAB RESULTS:    Brief Urine Lab Results  (Last result in the past 365 days)        Color   Clarity   Blood   Leuk Est   Nitrite   Protein   CREAT   Urine HCG        24 0053 Yellow   Clear   Negative   Trace   Negative   Negative                   Microbiology Results Abnormal       Procedure Component Value - Date/Time    COVID-19 RAPID AG,VERITOR,COR/JAMARI/PAD/NUPUR/ASHA/LAG/BRITTANY/ IN-HOUSE,DRY SWAB, 1 HR TAT - Swab, Nasal Cavity [[id number]]  (Normal) Collected: 24 0004    Lab Status: Final result Specimen: Swab from Nasal Cavity Updated: 24 0022     COVID19 Presumptive Negative    Narrative:      Fact sheets for providers: https://www.fda.gov/media/941396/download    Fact sheets for patients: https://www.fda.gov/media/571207/download    Blood Culture - Blood, Blood, PICC Line [[id number]]  (Normal) Collected: 23 1400    Lab Status: Final result Specimen: Blood, PICC Line Updated: 24 1531     Blood Culture No growth at 5 days    Blood Culture - Blood, Arm, Right [[id number]]  (Normal) Collected: 23 0431    Lab Status: Final result Specimen: Blood from Arm, Right Updated: 24  0515     Blood Culture No growth at 5 days            No radiology results from the last 24 hrs    Results for orders placed during the hospital encounter of 12/26/23    Adult Transthoracic Echo Complete W/ Cont if Necessary Per Protocol    Interpretation Summary    Left ventricular systolic function is normal. Calculated left ventricular EF = 57% Left ventricular ejection fraction appears to be 56 - 60%.    Left ventricular wall thickness is consistent with mild to moderate concentric hypertrophy.    Left ventricular diastolic function was normal.    Estimated right ventricular systolic pressure from tricuspid regurgitation is mildly elevated (35-45 mmHg).    There is a trivial pericardial effusion.    Mild aortic valve regurgitation.      Current medications:  Scheduled Meds:mirtazapine, 15 mg, Oral, Nightly  rivastigmine, 1 patch, Transdermal, Daily      Continuous Infusions:   PRN Meds:.  acetaminophen **OR** acetaminophen **OR** acetaminophen    senna-docusate sodium **AND** polyethylene glycol **AND** bisacodyl **AND** bisacodyl    Calcium Replacement - Follow Nurse / BPA Driven Protocol    dextrose    dextrose    glucagon (human recombinant)    glycopyrrolate    guaiFENesin    hydrOXYzine    Magnesium Standard Dose Replacement - Follow Nurse / BPA Driven Protocol    melatonin    nitroglycerin    ondansetron    Phosphorus Replacement - Follow Nurse / BPA Driven Protocol    Potassium Replacement - Follow Nurse / BPA Driven Protocol    sodium chloride    ziprasidone    Assessment & Plan   Assessment & Plan     Active Hospital Problems    Diagnosis  POA    **UTI (urinary tract infection) [N39.0]  Yes    Acute constipation [K59.00]  Unknown    Acute urinary retention [R33.8]  Unknown    Uterine prolapse [N81.4]  Unknown    Bradycardia [R00.1]  Unknown    Pressure injury of buttock, unstageable [L89.300]  Unknown    Type 2 diabetes mellitus with ketoacidosis, without long-term current use of insulin [E11.10]   Unknown    Disorientation [R41.0]  Unknown    Pleural effusion [J90]  Unknown    Severe malnutrition [E43]  Yes    Edema of lower extremity [R60.0]  Yes    Bullous pemphigoid [L12.0]  Yes    Dyslipidemia [E78.5]  Yes    Essential hypertension [I10]  Yes      Resolved Hospital Problems   No resolved problems to display.        Brief Hospital Course to date:  Omar Mendoza is a 83 y.o. female with history of type 2 diabetes, hypertension, history of uterine prolapse, urinary retention with Schultz catheter in place, history of bullous pemphigoid  (s/p Dupixent 10/2023, prednisone/doxycycline 11/2023) CAD, hyperlipidemia, presumed dementia who presented to the ED with generalized weakness and bullous pemphigoid blister on the heel.  Patient developed significant encephalopathy, and paranoia during her hospitalization.  Neurology was consulted and determined patient to be suffering from dementia complicated by depression and paranoia.  Patient refused to eat and drink.  Goals of care discussion had with family, palliative care consulted, pursuing hospice at this time.  Medications have been simplified to remeron and exelon patch and seems to be doing well.    This patient's problems and plans were partially entered by my partner and updated as appropriate by me 01/21/24.    Acute metabolic encephalopathy  Dementia with suspected Alzheimer's disease  Major depression with paranoia  Generalized weakness  - Family pursuing placement and then transition to hospice, desire comfort, minimizing any interventions on the patient and will not force any medical therapies. Plan will be discharge to LTC with eventual transition to hospice and she currently does not meet inpatient hospice criteria  - neurology adjusted medications, patient is not always agreeable to taking these     Dysphagia  - comfort diet, tolerating well      Bilateral pleural effusion  L>R  Bibasilar airspace disease versus pneumonia  -Concern for aspiration on  12/30, Completed 5 days of Rocephin and doxycycline  -Mucinex/Robinul as needed for cough     Anemia  -suspect anemia of chronic disease, comfort measures so no further lab draws      Poor venous access  -Removed PICC overnight 1/3     Sinus bradycardia  - medications discontinued due to GOC  - VSS      BLE edema  Decreased pulses LLE  - CTA abd showing small right pleural effusion, bilateral basilar atelectasis  - CTA w/ runoff showing normal right sided runoff; left side delayed runoff possibly r/t venous stasis changes, no evidence of arterial occlusion or significant stenosis  -Echo revealed EF 57% with normal diastolic function      Uncontrolled Type 2 diabetes  -Hgb A1C 12.10 on 12/27  -Accu-checks/insulin discontinued by Palliative MD 1/7 due to comfort measures      Constipation  -s/p aggressive bowel regimen  -Continue prn regimen     Uterine prolapse  -Gyn Dr. Zhou followed outpatient  -may be contributing to chronic urine retention  -Has external catheter     Bullous pemphigoid  -Completed doxycycline  -Wound care following      Pressure ulcer  -Wound care following      L adrenal adenoma  - incidental finding on CTA abdomen      Severe malnutrition     Expected Discharge Location and Transportation: LTC with outpatient hospice  Expected Discharge   Expected Discharge Date: 1/22/2024; Expected Discharge Time:      DVT prophylaxis:  No DVT prophylaxis order currently exists.     AM-PAC 6 Clicks Score (PT): 12 (01/20/24 2012)    CODE STATUS:   Code Status and Medical Interventions:   Ordered at: 01/07/24 1143     Code Status (Patient has no pulse and is not breathing):    No CPR (Do Not Attempt to Resuscitate)     Medical Interventions (Patient has pulse or is breathing):    Comfort Measures       Stefania Borja, DO  01/21/24

## 2024-01-22 PROCEDURE — 97535 SELF CARE MNGMENT TRAINING: CPT

## 2024-01-22 PROCEDURE — 97530 THERAPEUTIC ACTIVITIES: CPT

## 2024-01-22 PROCEDURE — 97110 THERAPEUTIC EXERCISES: CPT

## 2024-01-22 RX ADMIN — RIVASTIGMINE TRANSDERMAL SYSTEM 1 PATCH: 9.5 PATCH, EXTENDED RELEASE TRANSDERMAL at 07:58

## 2024-01-22 RX ADMIN — MIRTAZAPINE 15 MG: 15 TABLET, FILM COATED ORAL at 18:46

## 2024-01-22 NOTE — PROGRESS NOTES
Continued Stay Note  [facility]     Patient Name: Omar Mendoza  MRN: [id number]  Today's Date: 1/22/2024    Admit Date: 12/26/2023    Plan: Placement   Discharge Plan       Row Name 01/22/24 1449       Plan    Plan Placement    Plan Comments Hospice liaison continues to follow while placement is found. Please call [phone] if can be of further assistance.                   Discharge Codes    No documentation.                 Expected Discharge Date and Time       Expected Discharge Date Expected Discharge Time    Jan 22, 2024               Sarah Farris RN

## 2024-01-22 NOTE — PLAN OF CARE
Goal Outcome Evaluation:  Plan of Care Reviewed With: patient        Progress: no change  Outcome Evaluation: Pt completed grooming seated in chair with Enoc, Dep for toileting tasks in standing following bowel incontinence, and gave good effort for UE TE with active assist to initiate. Continue to recommend SNF at discharge.      Anticipated Discharge Disposition (OT): skilled nursing facility

## 2024-01-22 NOTE — THERAPY TREATMENT NOTE
Patient Name: Omar Mendoza  : 1940    MRN: [id number]                              Today's Date: 2024       Admit Date: 2023    Visit Dx:     ICD-10-CM ICD-9-CM   1. Generalized weakness  R53.1 780.79   2. Acute UTI  N39.0 599.0   3. Bullous pemphigoid  L12.0 694.5   4. Venous insufficiency  I87.2 459.81   5. Dysphagia, unspecified type  R13.10 787.20     Patient Active Problem List   Diagnosis    UTI (urinary tract infection)    Bullous pemphigoid    Essential hypertension    Dyslipidemia    Edema of lower extremity    Cholestatic hepatitis    Acute constipation    Acute urinary retention    Uterine prolapse    Bradycardia    Pressure injury of buttock, unstageable    Type 2 diabetes mellitus with ketoacidosis, without long-term current use of insulin    Disorientation    Pleural effusion    Severe malnutrition     Past Medical History:   Diagnosis Date    CAD (coronary artery disease)     Dementia     Diabetes mellitus     Hyperlipemia     Hypertension      History reviewed. No pertinent surgical history.   General Information       Row Name 24 1604          OT Time and Intention    Document Type therapy note (daily note)  -     Mode of Treatment occupational therapy  -       Row Name 24 1604          General Information    Patient Profile Reviewed yes  -DILIP     Existing Precautions/Restrictions fall;seizures  hx dementia; R heel and buttock wound  -     Barriers to Rehab medically complex;previous functional deficit;cognitive status  -       Row Name 24 1604          Cognition    Orientation Status (Cognition) oriented to;person;time;verbal cues/prompts needed for orientation;place  -       Row Name 24 1600          Safety Issues, Functional Mobility    Safety Issues Affecting Function (Mobility) awareness of need for assistance;friction/shear risk;insight into deficits/self-awareness;judgment;problem-solving;safety precaution awareness;safety precautions  follow-through/compliance;sequencing abilities  -DILIP     Impairments Affecting Function (Mobility) balance;cognition;coordination;endurance/activity tolerance;motor planning;postural/trunk control;strength  -DILIP     Cognitive Impairments, Mobility Safety/Performance awareness, need for assistance;insight into deficits/self-awareness;judgment;problem-solving/reasoning;safety precaution awareness;safety precaution follow-through;sequencing abilities  -DILIP               User Key  (r) = Recorded By, (t) = Taken By, (c) = Cosigned By      Initials Name Provider Type    DILIP Bhavani Galan OT Occupational Therapist                     Mobility/ADL's       Row Name 01/22/24 1605          Bed Mobility    Bed Mobility supine-sit  -DILIP     Supine-Sit [place] (Bed Mobility) dependent (less than 25% patient effort);2 person assist;verbal cues;nonverbal cues (demo/gesture)  -     Bed Mobility, Safety Issues cognitive deficits limit understanding  -     Assistive Device (Bed Mobility) bed rails;draw sheet;head of bed elevated  -     Comment, (Bed Mobility) cues for sequencing  -       Row Name 01/22/24 1605          Transfers    Transfers sit-stand transfer;bed-chair transfer  -DILIP     Comment, (Transfers) cues for optimal upright posture in standing  -       Row Name 01/22/24 1605          Bed-Chair Transfer    Bed-Chair [place] (Transfers) moderate assist (50% patient effort);2 person assist;verbal cues;nonverbal cues (demo/gesture)  -     Assistive Device (Bed-Chair Transfers) other (see comments)  BUE support  -DILIP     Comment, (Bed-Chair Transfer) Pt took steps over to chair with BUE support, cues sequencing  -       Row Name 01/22/24 1605          Sit-Stand Transfer    Sit-Stand [place] (Transfers) moderate assist (50% patient effort);2 person assist;verbal cues;nonverbal cues (demo/gesture)  -       Row Name 01/22/24 1605          Activities of Daily Living    BADL Assessment/Intervention  grooming;toileting  -DILIP       Row Name 01/22/24 1605          Lower Body Dressing Assessment/Training    [place] Level (Lower Body Dressing) don;socks;dependent (less than 25% patient effort)  -DILIP     Position (Lower Body Dressing) sitting up in bed  -DILIP       Row Name 01/22/24 1605          Self-Feeding Assessment/Training    [place] Level (Feeding) liquids to mouth;finger foods;set up  -DILIP     Position (Self-Feeding) supported sitting  -DILIP     Comment, (Feeding) assist to manage containers on tray table  -DILIP       Row Name 01/22/24 1605          Grooming Assessment/Training    [place] Level (Grooming) wash face, hands;hair care, combing/brushing;minimum assist (75% patient effort)  -DILIP     Position (Grooming) unsupported sitting  -DILIP     Comment, (Grooming) declined oral care; face/hands washed with BOJORQUEZ; assist to comb out tangled hair in back  -DILIP       Row Name 01/22/24 1605          Toileting Assessment/Training    [place] Level (Toileting) adjust/manage clothing;perform perineal hygiene;dependent (less than 25% patient effort)  -DILIP     Position (Toileting) supported standing  -DILIP     Comment, (Toileting) assist of 2 for hygiene in standing following bowel incontinence  -DILIP               User Key  (r) = Recorded By, (t) = Taken By, (c) = Cosigned By      Initials Name Provider Type    Bhavani Child OT Occupational Therapist                   Obj/Interventions       Row Name 01/22/24 1610          Shoulder (Therapeutic Exercise)    Shoulder AROM (Therapeutic Exercise) bilateral;flexion;extension;horizontal aBduction/aDduction;sitting;10 repetitions  -DILIP       Row Name 01/22/24 1610          Motor Skills    Therapeutic Exercise shoulder  -DILIP               User Key  (r) = Recorded By, (t) = Taken By, (c) = Cosigned By      Initials Name Provider Type    Bhavani Child OT Occupational Therapist                   Goals/Plan    No documentation.                  Clinical Impression       Row  Name 01/22/24 1610          Pain Assessment    Pain Intervention(s) Ambulation/increased activity;Repositioned;Rest  -DILIP       Row Name 01/22/24 1610          Pain Scale: FACES Pre/Post-Treatment    Pain: FACES Scale, Pretreatment 0-->no hurt  -DILIP     Posttreatment Pain Rating 2-->hurts little bit  -DILIP     Pain Location generalized  -DILIP       Row Name 01/22/24 1610          Plan of Care Review    Plan of Care Reviewed With patient  -DILIP     Progress no change  -DILIP     Outcome Evaluation Pt completed grooming seated in chair with Enoc, Dep for toileting tasks in standing following bowel incontinence, and gave good effort for UE TE with active assist to initiate. Continue to recommend SNF at discharge.  -DILIP       Row Name 01/22/24 1610          Therapy Plan Review/Discharge Plan (OT)    Anticipated Discharge Disposition (OT) skilled nursing facility  -       Row Name 01/22/24 1610          Vital Signs    O2 Delivery Pre Treatment room air  -DILIP     O2 Delivery Intra Treatment room air  -DILIP     O2 Delivery Post Treatment room air  -DILIP     Pre Patient Position Supine  -DILIP     Intra Patient Position Standing  -DILIP     Post Patient Position Sitting  -DILIP       Row Name 01/22/24 1610          Positioning and Restraints    Pre-Treatment Position in bed  -DILIP     Post Treatment Position chair  -DILIP     In Chair notified nsg;reclined;call light within reach;encouraged to call for assist;exit alarm on;waffle cushion;on mechanical lift sling;legs elevated;heels elevated  -DILIP               User Key  (r) = Recorded By, (t) = Taken By, (c) = Cosigned By      Initials Name Provider Type    Bhavani Child OT Occupational Therapist                   Outcome Measures       Row Name 01/22/24 1614          How much help from another is currently needed...    Putting on and taking off regular lower body clothing? 2  -DILIP     Bathing (including washing, rinsing, and drying) 2  -DILIP     Toileting (which includes using toilet bed pan or  urinal) 1  -DILIP     Putting on and taking off regular upper body clothing 3  -DILIP     Taking care of personal grooming (such as brushing teeth) 3  -DILIP     Eating meals 3  -DILIP     AM-PAC 6 Clicks Score (OT) 14  -DILIP       Row Name 01/22/24 1144          How much help from another person do you currently need...    Turning from your back to your side while in flat bed without using bedrails? 2  -ML     Moving from lying on back to sitting on the side of a flat bed without bedrails? 2  -ML     Moving to and from a bed to a chair (including a wheelchair)? 2  -ML     Standing up from a chair using your arms (e.g., wheelchair, bedside chair)? 2  -ML     Climbing 3-5 steps with a railing? 1  -ML     To walk in hospital room? 2  -ML     AM-PAC 6 Clicks Score (PT) 11  -ML     Highest Level of Mobility Goal 4 --> Transfer to chair/commode  -       Row Name 01/22/24 1614 01/22/24 1144       Functional Assessment    Outcome Measure Options AM-PAC 6 Clicks Daily Activity (OT)  -DILIP AM-PAC 6 Clicks Basic Mobility (PT)  -              User Key  (r) = Recorded By, (t) = Taken By, (c) = Cosigned By      Initials Name Provider Type    Bhavani Child, ARNOLD Occupational Therapist    Carin Zurita Physical Therapist                    Occupational Therapy Education       Title: PT OT SLP Therapies (In Progress)       Topic: Occupational Therapy (In Progress)       Point: ADL training (In Progress)       Description:   Instruct learner(s) on proper safety adaptation and remediation techniques during self care or transfers.   Instruct in proper use of assistive devices.                  Learning Progress Summary             Patient Acceptance, E, DU,NR by DILIP at 1/22/2024 1615    Comment: OT POC; BADL's; orientation; UE HEP    Acceptance, E,D, NR by JB1 at 1/15/2024 1151    Comment: re-orientation, bed mobility sequencing, UE TE, transfer technique, posture    Acceptance, E, NR by CS at 1/12/2024 1434    Acceptance, E, NR by AC at  1/10/2024 1023    Nonacceptance, E, NL by  at 1/5/2024 1044    Acceptance, E, NR by  at 1/3/2024 1435    Acceptance, E, VU,NR by JB at 1/1/2024 0920    Comment: oriented to date, need to move and position RLE, ADL progression, sequencing bed mobililty    Acceptance, E, NR by  at 12/27/2023 1135   Family Acceptance, E,D, NR by JB at 1/15/2024 1151    Comment: re-orientation, bed mobility sequencing, UE TE, transfer technique, posture    Acceptance, E, VU,NR by JB at 1/1/2024 0920    Comment: oriented to date, need to move and position RLE, ADL progression, sequencing bed mobililty                         Point: Home exercise program (In Progress)       Description:   Instruct learner(s) on appropriate technique for monitoring, assisting and/or progressing therapeutic exercises/activities.                  Learning Progress Summary             Patient Acceptance, E, DU,NR by DILIP at 1/22/2024 1615    Comment: OT POC; BADL's; orientation; UE HEP    Acceptance, E,D, NR by [facility] at 1/15/2024 1151    Comment: re-orientation, bed mobility sequencing, UE TE, transfer technique, posture   Family Acceptance, E,D, NR by [facility] at 1/15/2024 1151    Comment: re-orientation, bed mobility sequencing, UE TE, transfer technique, posture                         Point: Precautions (In Progress)       Description:   Instruct learner(s) on prescribed precautions during self-care and functional transfers.                  Learning Progress Summary             Patient Acceptance, E,D, NR by [facility] at 1/15/2024 1151    Comment: re-orientation, bed mobility sequencing, UE TE, transfer technique, posture    Acceptance, E, NR by  at 1/12/2024 1434    Acceptance, E, VU,NR by JB at 1/1/2024 0920    Comment: oriented to date, need to move and position RLE, ADL progression, sequencing bed mobililty   Family Acceptance, E,D, NR by JB at 1/15/2024 1151    Comment: re-orientation, bed mobility sequencing, UE TE, transfer technique, posture     Acceptance, E, VU,NR by [facility] at 1/1/2024 0920    Comment: oriented to date, need to move and position RLE, ADL progression, sequencing bed mobililty                         Point: Body mechanics (In Progress)       Description:   Instruct learner(s) on proper positioning and spine alignment during self-care, functional mobility activities and/or exercises.                  Learning Progress Summary             Patient Acceptance, E,D, NR by [facility] at 1/15/2024 1151    Comment: re-orientation, bed mobility sequencing, UE TE, transfer technique, posture    Acceptance, E, NR by  at 1/12/2024 1434    Acceptance, E, VU,NR by [facility] at 1/1/2024 0920    Comment: oriented to date, need to move and position RLE, ADL progression, sequencing bed mobililty   Family Acceptance, E,D, NR by [facility] at 1/15/2024 1151    Comment: re-orientation, bed mobility sequencing, UE TE, transfer technique, posture    Acceptance, E, VU,NR by [facility] at 1/1/2024 0920    Comment: oriented to date, need to move and position RLE, ADL progression, sequencing bed mobililty                                         User Key       Initials Effective Dates Name Provider Type Discipline    [facility] 07/11/23 -  Rayna Wall OT Occupational Therapist OT     02/03/23 -  Sepideh Feldman OT Occupational Therapist OT     09/02/21 -  Eduarda Avelar OT Occupational Therapist OT     06/16/21 -  Bhavani Galan OT Occupational Therapist OT                  OT Recommendation and Plan     Plan of Care Review  Plan of Care Reviewed With: patient  Progress: no change  Outcome Evaluation: Pt completed grooming seated in chair with Enoc Dep for toileting tasks in standing following bowel incontinence, and gave good effort for UE TE with active assist to initiate. Continue to recommend SNF at discharge.     Time Calculation:         Time Calculation- OT       Row Name 01/22/24 1030             Time Calculation- OT    OT Start Time 1030  -DILIP      OT Received On 01/22/24  -DILIP          Timed Charges    10510 - OT Therapeutic Exercise Minutes 10  -DILIP      72412 - OT Self Care/Mgmt Minutes 15  -DILIP         Total Minutes    Timed Charges Total Minutes 25  -DILIP       Total Minutes 25  -DILIP                User Key  (r) = Recorded By, (t) = Taken By, (c) = Cosigned By      Initials Name Provider Type    Bhavani Child OT Occupational Therapist                  Therapy Charges for Today       Code Description Service Date Service Provider Modifiers Qty    01171598009 HC OT THER PROC EA 15 MIN 1/22/2024 Bhavani Galan OT GO 1    92775367783 HC OT SELF CARE/MGMT/TRAIN EA 15 MIN 1/22/2024 Bhavani Galan OT GO 1                 Bhavani Galan OT  1/22/2024

## 2024-01-22 NOTE — PROGRESS NOTES
[facility] Medicine Services  PROGRESS NOTE    Patient Name: Omar Mendoza  : 1940  MRN: [id number]    Date of Admission: 2023  Primary Care Physician: Varun Pa MD    Subjective   Subjective     CC:  F/U Weakness      HPI:  Patient seen sitting up in chair. No complaints     Objective   Objective     Vital Signs:   Temp:  [98 °F (36.7 °C)-98.6 °F (37 °C)] 98.6 °F (37 °C)  Heart Rate:  [82-96] 82  Resp:  [18] 18  BP: (122-127)/(84) 122/84     Physical Exam:  Constitutional: No acute distress,  HENT: NCAT, mucous membranes moist, disconjugate eye gaze   Gastrointestinal: nondistended  Musculoskeletal: No bilateral ankle edema  Psychiatric: Appropriate affect, cooperative  Neurologic: speech clear  Skin: No rashes      Results Reviewed:  LAB RESULTS:    Brief Urine Lab Results  (Last result in the past 365 days)        Color   Clarity   Blood   Leuk Est   Nitrite   Protein   CREAT   Urine HCG        24 0053 Yellow   Clear   Negative   Trace   Negative   Negative                   Microbiology Results Abnormal       Procedure Component Value - Date/Time    COVID-19 RAPID AG,VERITOR,COR/JAMARI/PAD/NUPUR/ASHA/LAG/BRITTANY/ IN-HOUSE,DRY SWAB, 1 HR TAT - Swab, Nasal Cavity [[id number]]  (Normal) Collected: 24 0004    Lab Status: Final result Specimen: Swab from Nasal Cavity Updated: 24 0022     COVID19 Presumptive Negative    Narrative:      Fact sheets for providers: https://www.fda.gov/media/804875/download    Fact sheets for patients: https://www.fda.gov/media/710179/download    Blood Culture - Blood, Blood, PICC Line [[id number]]  (Normal) Collected: 23 1400    Lab Status: Final result Specimen: Blood, PICC Line Updated: 24 1531     Blood Culture No growth at 5 days    Blood Culture - Blood, Arm, Right [[id number]]  (Normal) Collected: 23 0431    Lab Status: Final result Specimen: Blood from Arm, Right Updated: 24 0515     Blood Culture No  growth at 5 days            No radiology results from the last 24 hrs    Results for orders placed during the hospital encounter of 12/26/23    Adult Transthoracic Echo Complete W/ Cont if Necessary Per Protocol    Interpretation Summary    Left ventricular systolic function is normal. Calculated left ventricular EF = 57% Left ventricular ejection fraction appears to be 56 - 60%.    Left ventricular wall thickness is consistent with mild to moderate concentric hypertrophy.    Left ventricular diastolic function was normal.    Estimated right ventricular systolic pressure from tricuspid regurgitation is mildly elevated (35-45 mmHg).    There is a trivial pericardial effusion.    Mild aortic valve regurgitation.      Current medications:  Scheduled Meds:mirtazapine, 15 mg, Oral, Nightly  rivastigmine, 1 patch, Transdermal, Daily      Continuous Infusions:   PRN Meds:.  acetaminophen **OR** acetaminophen **OR** acetaminophen    senna-docusate sodium **AND** polyethylene glycol **AND** bisacodyl **AND** bisacodyl    Calcium Replacement - Follow Nurse / BPA Driven Protocol    dextrose    dextrose    glucagon (human recombinant)    glycopyrrolate    guaiFENesin    hydrOXYzine    Magnesium Standard Dose Replacement - Follow Nurse / BPA Driven Protocol    melatonin    nitroglycerin    ondansetron    Phosphorus Replacement - Follow Nurse / BPA Driven Protocol    Potassium Replacement - Follow Nurse / BPA Driven Protocol    sodium chloride    ziprasidone    Assessment & Plan   Assessment & Plan     Active Hospital Problems    Diagnosis  POA    **UTI (urinary tract infection) [N39.0]  Yes    Acute constipation [K59.00]  Unknown    Acute urinary retention [R33.8]  Unknown    Uterine prolapse [N81.4]  Unknown    Bradycardia [R00.1]  Unknown    Pressure injury of buttock, unstageable [L89.300]  Unknown    Type 2 diabetes mellitus with ketoacidosis, without long-term current use of insulin [E11.10]  Unknown    Disorientation  [R41.0]  Unknown    Pleural effusion [J90]  Unknown    Severe malnutrition [E43]  Yes    Edema of lower extremity [R60.0]  Yes    Bullous pemphigoid [L12.0]  Yes    Dyslipidemia [E78.5]  Yes    Essential hypertension [I10]  Yes      Resolved Hospital Problems   No resolved problems to display.        Brief Hospital Course to date:  Omar Mendoza is a 83 y.o. female with history of type 2 diabetes, hypertension, history of uterine prolapse, urinary retention with Schultz catheter in place, history of bullous pemphigoid  (s/p Dupixent 10/2023, prednisone/doxycycline 11/2023) CAD, hyperlipidemia, presumed dementia who presented to the ED with generalized weakness and bullous pemphigoid blister on the heel.  Patient developed significant encephalopathy, and paranoia during her hospitalization.  Neurology was consulted and determined patient to be suffering from dementia complicated by depression and paranoia.  Patient refused to eat and drink.  Goals of care discussion had with family, palliative care consulted, pursuing hospice at this time.  Medications have been simplified to remeron and exelon patch and seems to be doing well.    This patient's problems and plans were partially entered by my partner and updated as appropriate by me 01/22/24.    Acute metabolic encephalopathy  Dementia with suspected Alzheimer's disease  Major depression with paranoia  Generalized weakness  - Family pursuing placement and then transition to hospice, desire comfort, minimizing any interventions on the patient and will not force any medical therapies. Plan will be discharge to LTC with eventual transition to hospice and she currently does not meet inpatient hospice criteria  - neurology adjusted medications, patient is not always agreeable to taking these     Dysphagia  - comfort diet, tolerating well      Bilateral pleural effusion  L>R  Bibasilar airspace disease versus pneumonia  -Concern for aspiration on 12/30, Completed 5 days of  Rocephin and doxycycline  -Mucinex/Robinul as needed for cough     Anemia  -suspect anemia of chronic disease, comfort measures so no further lab draws      Poor venous access  -Removed PICC overnight 1/3     Sinus bradycardia  - medications discontinued due to GOC  - VSS      BLE edema  Decreased pulses LLE  - CTA abd showing small right pleural effusion, bilateral basilar atelectasis  - CTA w/ runoff showing normal right sided runoff; left side delayed runoff possibly r/t venous stasis changes, no evidence of arterial occlusion or significant stenosis  -Echo revealed EF 57% with normal diastolic function      Uncontrolled Type 2 diabetes  -Hgb A1C 12.10 on 12/27  -Accu-checks/insulin discontinued by Palliative MD 1/7 due to comfort measures      Constipation  -s/p aggressive bowel regimen  -Continue prn regimen     Uterine prolapse  -Gyn Dr. Zhou followed outpatient  -may be contributing to chronic urine retention  -Has external catheter     Bullous pemphigoid  -Completed doxycycline  -Wound care following      Pressure ulcer  -Wound care following      L adrenal adenoma  - incidental finding on CTA abdomen      Severe malnutrition     Expected Discharge Location and Transportation: LTC with outpatient hospice  Expected Discharge   Expected Discharge Date: 1/22/2024; Expected Discharge Time:      DVT prophylaxis:  No DVT prophylaxis order currently exists.     AM-PAC 6 Clicks Score (PT): 11 (01/22/24 1144)    CODE STATUS:   Code Status and Medical Interventions:   Ordered at: 01/07/24 1143     Code Status (Patient has no pulse and is not breathing):    No CPR (Do Not Attempt to Resuscitate)     Medical Interventions (Patient has pulse or is breathing):    Comfort Measures       Yoselin Burt, DO  01/22/24

## 2024-01-22 NOTE — PLAN OF CARE
Goal Outcome Evaluation:                      VSS on RA. Patient denies pain. Prn mucinex x1 for congested cough. No other prns administered. Respirations unlabored. Slept through much of shift. Care ongoing, continue to monitor.

## 2024-01-22 NOTE — PLAN OF CARE
Goal Outcome Evaluation:  Plan of Care Reviewed With: patient        Progress: no change  Outcome Evaluation: Patient required 2 person assist to complete sit to stand and bed to chair transfer, however, less assistance compared to previous treatment session. Patient continues to present below baseline for mobility and would continue to benefit from skilled PT to address strength, balance and activity tolerance deficits. Re-certification complete and goals updated. Continue to recommend SNF at discharge.      Anticipated Discharge Disposition (PT): skilled nursing facility

## 2024-01-22 NOTE — DISCHARGE PLACEMENT REQUEST
" Contact Olivia Chatterjee [phone]  Skilled to [facility]    Omar Mendoza (83 y.o. Female)       Date of Birth   1940    Social Security Number       Address   [address] [address]    Home Phone   [phone]    MRN   [id number]       Sabianism   None    Marital Status                               Admission Date   23    Admission Type   Emergency    Admitting Provider   Yoselin Burt DO    Attending Provider   Yoselin Burt DO    Department, Room/Bed   [facility] 5B, N546/1       Discharge Date       Discharge Disposition       Discharge Destination                                 Attending Provider: Yoselin Burt DO    Allergies: No Known Allergies    Isolation: None   Infection: None   Code Status: No CPR    Ht: 170.2 cm (67.01\")   Wt: 99 kg (218 lb 4.1 oz)    Admission Cmt: None   Principal Problem: UTI (urinary tract infection) [N39.0]                   Active Insurance as of 2023       Primary Coverage       Payor Plan Insurance Group Employer/Plan Group    ANTHEM MEDICARE REPLACEMENT ANTHEM MEDICARE ADVANTAGE [id number]       Payor Plan Address Payor Plan Phone Number Payor Plan Fax Number Effective Dates    [address] [phone]  2022 - None Entered    [facility] [address]         Subscriber Name Subscriber Birth Date Member ID       OMAR MENDOZA 1940 [id number]                     Emergency Contacts        (Rel.) Home Phone Work Phone Mobile Phone    TRENTJAIRON (POA)DESTIN (Son) [phone] -- [phone]    KODYDINO WAGNER (Daughter) [phone] -- [phone]    JODALLAS (Spouse) [phone] -- [phone]                 Physician Progress Notes (most recent note)        Stefania Borja DO at 24 [facility]              [facility] Medicine Services  PROGRESS NOTE    Patient Name: Omar Mendoza  : 1940  MRN: [id number]    Date of Admission: " 12/26/2023  Primary Care Physician: Varun Pa MD    Subjective   Subjective     CC:  F/U Weakness      HPI:  Patient seen and examined. No family at bedside this morning. Pt asleep. Did not wake her. Awaits placement.     Objective   Objective     Vital Signs:   Temp:  [98 °F (36.7 °C)] 98 °F (36.7 °C)  Heart Rate:  [78] 78  Resp:  [14] 14  BP: (135)/(75) 135/75     Physical Exam:  Constitutional: No acute distress, resting comfortably   HENT: NCAT, mucous membranes moist   Respiratory: CTA B/L, nonlabored respirations on RA  Musculoskeletal: No edema  Psychiatric: [place]  Neurologic: No focal deficits  Skin: No visible rashes     Results Reviewed:  LAB RESULTS:    Brief Urine Lab Results  (Last result in the past 365 days)        Color   Clarity   Blood   Leuk Est   Nitrite   Protein   CREAT   Urine HCG        01/04/24 0053 Yellow   Clear   Negative   Trace   Negative   Negative                   Microbiology Results Abnormal       Procedure Component Value - Date/Time    COVID-19 RAPID AG,VERITOR,COR/JAMARI/PAD/NUPUR/ASHA/LAG/BRITTANY/ IN-HOUSE,DRY SWAB, 1 HR TAT - Swab, Nasal Cavity [[id number]]  (Normal) Collected: 01/04/24 0004    Lab Status: Final result Specimen: Swab from Nasal Cavity Updated: 01/04/24 0022     COVID19 Presumptive Negative    Narrative:      Fact sheets for providers: https://www.fda.gov/media/405204/download    Fact sheets for patients: https://www.fda.gov/media/362400/download    Blood Culture - Blood, Blood, PICC Line [[id number]]  (Normal) Collected: 12/27/23 1400    Lab Status: Final result Specimen: Blood, PICC Line Updated: 01/01/24 1531     Blood Culture No growth at 5 days    Blood Culture - Blood, Arm, Right [[id number]]  (Normal) Collected: 12/27/23 0431    Lab Status: Final result Specimen: Blood from Arm, Right Updated: 01/01/24 0515     Blood Culture No growth at 5 days            No radiology results from the last 24 hrs    Results for orders placed during the hospital encounter of  12/26/23    Adult Transthoracic Echo Complete W/ Cont if Necessary Per Protocol    Interpretation Summary    Left ventricular systolic function is normal. Calculated left ventricular EF = 57% Left ventricular ejection fraction appears to be 56 - 60%.    Left ventricular wall thickness is consistent with mild to moderate concentric hypertrophy.    Left ventricular diastolic function was normal.    Estimated right ventricular systolic pressure from tricuspid regurgitation is mildly elevated (35-45 mmHg).    There is a trivial pericardial effusion.    Mild aortic valve regurgitation.      Current medications:  Scheduled Meds:mirtazapine, 15 mg, Oral, Nightly  rivastigmine, 1 patch, Transdermal, Daily      Continuous Infusions:   PRN Meds:.  acetaminophen **OR** acetaminophen **OR** acetaminophen    senna-docusate sodium **AND** polyethylene glycol **AND** bisacodyl **AND** bisacodyl    Calcium Replacement - Follow Nurse / BPA Driven Protocol    dextrose    dextrose    glucagon (human recombinant)    glycopyrrolate    guaiFENesin    hydrOXYzine    Magnesium Standard Dose Replacement - Follow Nurse / BPA Driven Protocol    melatonin    nitroglycerin    ondansetron    Phosphorus Replacement - Follow Nurse / BPA Driven Protocol    Potassium Replacement - Follow Nurse / BPA Driven Protocol    sodium chloride    ziprasidone    Assessment & Plan   Assessment & Plan     Active Hospital Problems    Diagnosis  POA    **UTI (urinary tract infection) [N39.0]  Yes    Acute constipation [K59.00]  Unknown    Acute urinary retention [R33.8]  Unknown    Uterine prolapse [N81.4]  Unknown    Bradycardia [R00.1]  Unknown    Pressure injury of buttock, unstageable [L89.300]  Unknown    Type 2 diabetes mellitus with ketoacidosis, without long-term current use of insulin [E11.10]  Unknown    Disorientation [R41.0]  Unknown    Pleural effusion [J90]  Unknown    Severe malnutrition [E43]  Yes    Edema of lower extremity [R60.0]  Yes    Bullous  pemphigoid [L12.0]  Yes    Dyslipidemia [E78.5]  Yes    Essential hypertension [I10]  Yes      Resolved Hospital Problems   No resolved problems to display.        Brief Hospital Course to date:  Omar Mendoza is a 83 y.o. female with history of type 2 diabetes, hypertension, history of uterine prolapse, urinary retention with Schultz catheter in place, history of bullous pemphigoid  (s/p Dupixent 10/2023, prednisone/doxycycline 11/2023) CAD, hyperlipidemia, presumed dementia who presented to the ED with generalized weakness and bullous pemphigoid blister on the heel.  Patient developed significant encephalopathy, and paranoia during her hospitalization.  Neurology was consulted and determined patient to be suffering from dementia complicated by depression and paranoia.  Patient refused to eat and drink.  Goals of care discussion had with family, palliative care consulted, pursuing hospice at this time.  Medications have been simplified to remeron and exelon patch and seems to be doing well.    This patient's problems and plans were partially entered by my partner and updated as appropriate by me 01/21/24.    Acute metabolic encephalopathy  Dementia with suspected Alzheimer's disease  Major depression with paranoia  Generalized weakness  - Family pursuing placement and then transition to hospice, desire comfort, minimizing any interventions on the patient and will not force any medical therapies. Plan will be discharge to LTC with eventual transition to hospice and she currently does not meet inpatient hospice criteria  - neurology adjusted medications, patient is not always agreeable to taking these     Dysphagia  - comfort diet, tolerating well      Bilateral pleural effusion  L>R  Bibasilar airspace disease versus pneumonia  -Concern for aspiration on 12/30, Completed 5 days of Rocephin and doxycycline  -Mucinex/Robinul as needed for cough     Anemia  -suspect anemia of chronic disease, comfort measures so no  further lab draws      Poor venous access  -Removed PICC overnight 1/3     Sinus bradycardia  - medications discontinued due to GOC  - VSS      BLE edema  Decreased pulses LLE  - CTA abd showing small right pleural effusion, bilateral basilar atelectasis  - CTA w/ runoff showing normal right sided runoff; left side delayed runoff possibly r/t venous stasis changes, no evidence of arterial occlusion or significant stenosis  -Echo revealed EF 57% with normal diastolic function      Uncontrolled Type 2 diabetes  -Hgb A1C 12.10 on   -Accu-checks/insulin discontinued by Palliative MD  due to comfort measures      Constipation  -s/p aggressive bowel regimen  -Continue prn regimen     Uterine prolapse  -Gyn Dr. Zhou followed outpatient  -may be contributing to chronic urine retention  -Has external catheter     Bullous pemphigoid  -Completed doxycycline  -Wound care following      Pressure ulcer  -Wound care following      L adrenal adenoma  - incidental finding on CTA abdomen      Severe malnutrition     Expected Discharge Location and Transportation: LTC with outpatient hospice  Expected Discharge   Expected Discharge Date: 2024; Expected Discharge Time:      DVT prophylaxis:  No DVT prophylaxis order currently exists.     AM-PAC 6 Clicks Score (PT): 12 (24)    CODE STATUS:   Code Status and Medical Interventions:   Ordered at: 24 1143     Code Status (Patient has no pulse and is not breathing):    No CPR (Do Not Attempt to Resuscitate)     Medical Interventions (Patient has pulse or is breathing):    Comfort Measures       Stefania Borja DO  24        Electronically signed by Stefania Borja DO at 24 1057          Physical Therapy Notes (most recent note)        Carin Villegas at 24 1020  Version 1 of 1         Patient Name: Omar Mendoza  : 1940    MRN: [id number]                              Today's Date: 2024       Admit Date: 2023    Visit  Dx:     ICD-10-CM ICD-9-CM   1. Generalized weakness  R53.1 780.79   2. Acute UTI  N39.0 599.0   3. Bullous pemphigoid  L12.0 694.5   4. Venous insufficiency  I87.2 459.81   5. Dysphagia, unspecified type  R13.10 787.20     Patient Active Problem List   Diagnosis    UTI (urinary tract infection)    Bullous pemphigoid    Essential hypertension    Dyslipidemia    Edema of lower extremity    Cholestatic hepatitis    Acute constipation    Acute urinary retention    Uterine prolapse    Bradycardia    Pressure injury of buttock, unstageable    Type 2 diabetes mellitus with ketoacidosis, without long-term current use of insulin    Disorientation    Pleural effusion    Severe malnutrition     Past Medical History:   Diagnosis Date    CAD (coronary artery disease)     Dementia     Diabetes mellitus     Hyperlipemia     Hypertension      History reviewed. No pertinent surgical history.   General Information       [facility] Name 01/22/24 1045          Physical Therapy Time and Intention    Document Type progress note/recertification  -     Mode of Treatment physical therapy  -       Row Name 01/22/24 1045          General Information    Patient Profile Reviewed yes  -ML     Existing Precautions/Restrictions fall;seizures;other (see comments)  R heel and buttock wound; dementia  -     Barriers to Rehab medically complex;previous functional deficit;cognitive status  -       Row Name 01/22/24 1045          Cognition    Orientation Status (Cognition) oriented to;person;time;verbal cues/prompts needed for orientation;place;disoriented to;situation  -       Row Name 01/22/24 1045          Safety Issues, Functional Mobility    Safety Issues Affecting Function (Mobility) awareness of need for assistance;friction/shear risk;insight into deficits/self-awareness;judgment;problem-solving;safety precaution awareness;safety precautions follow-through/compliance;sequencing abilities  -     Impairments Affecting Function (Mobility)  balance;cognition;coordination;endurance/activity tolerance;motor planning;strength;postural/trunk control;range of motion (ROM)  -ML     Cognitive Impairments, Mobility Safety/Performance awareness, need for assistance;insight into deficits/self-awareness;judgment;problem-solving/reasoning;safety precaution awareness;safety precaution follow-through;sequencing abilities  -ML               User Key  (r) = Recorded By, (t) = Taken By, (c) = Cosigned By      Initials Name Provider Type    Carin Zurita Physical Therapist                   Mobility       Row Name 01/22/24 1048          Bed Mobility    Bed Mobility supine-sit  -ML     Supine-Sit Howes (Bed Mobility) verbal cues;nonverbal cues (demo/gesture);dependent (less than 25% patient effort);2 person assist  -ML     Assistive Device (Bed Mobility) bed rails;draw sheet;head of bed elevated  -ML       Row Name 01/22/24 1048          Bed-Chair Transfer    Bed-Chair Howes (Transfers) verbal cues;nonverbal cues (demo/gesture);moderate assist (50% patient effort);2 person assist  -ML     Assistive Device (Bed-Chair Transfers) other (see comments)  BUE support  -ML       Row Name 01/22/24 1048          Sit-Stand Transfer    Sit-Stand Howes (Transfers) verbal cues;nonverbal cues (demo/gesture);moderate assist (50% patient effort);minimum assist (75% patient effort);2 person assist  -ML     Assistive Device (Sit-Stand Transfers) walker, front-wheeled  -ML     Comment, (Sit-Stand Transfer) patient completed 1 sit to stand transfer from EOB with modx2, patient completed x2 sit to stand transfers from chair with RW requiring minAx2  -ML               User Key  (r) = Recorded By, (t) = Taken By, (c) = Cosigned By      Initials Name Provider Type    Carin Zurita Physical Therapist                   Obj/Interventions       Row Name 01/22/24 1138          Range of Motion Comprehensive    General Range of Motion bilateral lower extremity ROM WFL  -ML        Row Name 01/22/24 1138          Strength Comprehensive (MMT)    General Manual Muscle Testing (MMT) Assessment lower extremity strength deficits identified  -ML     Comment, General Manual Muscle Testing (MMT) Assessment unable to complete formal MMT due to cognitive status, observed grossly 3+/5 with functional mobility  -ML       Row Name 01/22/24 1138          Balance    Balance Assessment sitting static balance;sitting dynamic balance;sit to stand dynamic balance;standing static balance;standing dynamic balance  -ML     Static Sitting Balance standby assist  -ML     Dynamic Sitting Balance contact guard;minimal assist;moderate assist  varying levels of assist  -ML     Position, Sitting Balance unsupported;sitting edge of bed  -ML     Sit to Stand Dynamic Balance verbal cues;non-verbal cues (demo/gesture);moderate assist;minimal assist;2-person assist;other (see comments)  varying levels of assist  -ML     Static Standing Balance verbal cues;minimal assist;2-person assist  -ML     Dynamic Standing Balance verbal cues;non-verbal cues (demo/gesture);moderate assist;2-person assist  -ML     Position/Device Used, Standing Balance supported;walker, front-wheeled  -ML     Balance Interventions sitting;standing;sit to stand;supported;weight shifting activity  -ML       Row Name 01/22/24 1138          Sensory Assessment (Somatosensory)    Sensory Assessment (Somatosensory) unable/difficult to assess  -ML               User Key  (r) = Recorded By, (t) = Taken By, (c) = Cosigned By      Initials Name Provider Type    ML Carin Villegas Physical Therapist                   Goals/Plan       Row Name 01/22/24 1143          Bed Mobility Goal 1 (PT)    Activity/Assistive Device (Bed Mobility Goal 1, PT) sit to supine/supine to sit  -ML     [place] Level/Cues Needed (Bed Mobility Goal 1, PT) moderate assist (50-74% patient effort)  -ML     Time Frame (Bed Mobility Goal 1, PT) long term goal (LTG);10 days  -ML      Progress/Outcomes (Bed Mobility Goal 1, PT) goal ongoing  -[facility] Name 01/22/24 1143          Transfer Goal 1 (PT)    Activity/Assistive Device (Transfer Goal 1, PT) bed-to-chair/chair-to-bed;sit-to-stand/stand-to-sit  -ML     [place] Level/Cues Needed (Transfer Goal 1, PT) minimum assist (75% or more patient effort)  -ML     Time Frame (Transfer Goal 1, PT) long term goal (LTG);10 days  -ML     Progress/Outcome (Transfer Goal 1, PT) goal met;new goal  -ML       Row Name 01/22/24 1143          Gait Training Goal 1 (PT)    Activity/Assistive Device (Gait Training Goal 1, PT) gait (walking locomotion);assistive device use  -ML     [place] Level (Gait Training Goal 1, PT) maximum assist (25-49% patient effort)  -ML     Distance (Gait Training Goal 1, PT) 5ft  -ML     Time Frame (Gait Training Goal 1, PT) long term goal (LTG);10 days  -ML     Progress/Outcome (Gait Training Goal 1, PT) goal ongoing  -ML       Row Name 01/22/24 1143          Therapy Assessment/Plan (PT)    Planned Therapy Interventions (PT) balance training;bed mobility training;gait training;home exercise program;neuromuscular re-education;patient/family education;postural re-education;ROM (range of motion);strengthening;stretching;transfer training  -               User Key  (r) = Recorded By, (t) = Taken By, (c) = Cosigned By      Initials Name Provider Type     Carin Villegas Physical Therapist                   Clinical Impression       Row Name 01/22/24 1139          Pain    Pain Intervention(s) Repositioned;Ambulation/increased activity  -     Additional Documentation Pain Scale: FACES Pre/Post-Treatment (Group)  -ML       Row Name 01/22/24 1139          Pain Scale: FACES Pre/Post-Treatment    Pain: FACES Scale, Pretreatment 2-->hurts little bit  -ML     Posttreatment Pain Rating 2-->hurts little bit  -ML     Pain Location generalized  -ML       Row Name 01/22/24 1135          Plan of Care Review    Plan of Care Reviewed With  patient  -ML     Progress no change  -ML     Outcome Evaluation Patient required 2 person assist to complete sit to stand and bed to chair transfer, however, less assistance compared to previous treatment session. Patient continues to present below baseline for mobility and would continue to benefit from skilled PT to address strength, balance and activity tolerance deficits. Re-certification complete and goals updated. Continue to recommend SNF at discharge.  -ML       Row Name 01/22/24 1139          Therapy Assessment/Plan (PT)    Rehab Potential (PT) fair, will monitor progress closely  -ML     Criteria for Skilled Interventions Met (PT) yes;meets criteria;skilled treatment is necessary  -ML     Therapy Frequency (PT) daily  -ML       Row Name 01/22/24 1139          Vital Signs    Pre Patient Position Supine  -ML     Intra Patient Position Standing  -ML     Post Patient Position Sitting  -ML       Row Name 01/22/24 1139          Positioning and Restraints    Pre-Treatment Position in bed  -ML     Post Treatment Position chair  -ML     In Chair notified nsg;call light within reach;sitting;encouraged to call for assist;exit alarm on;waffle cushion;on mechanical lift sling;with OT  -ML               User Key  (r) = Recorded By, (t) = Taken By, (c) = Cosigned By      Initials Name Provider Type    ML Carin Villegas Physical Therapist                   Outcome Measures       Row Name 01/22/24 1144          How much help from another person do you currently need...    Turning from your back to your side while in flat bed without using bedrails? 2  -ML     Moving from lying on back to sitting on the side of a flat bed without bedrails? 2  -ML     Moving to and from a bed to a chair (including a wheelchair)? 2  -ML     Standing up from a chair using your arms (e.g., wheelchair, bedside chair)? 2  -ML     Climbing 3-5 steps with a railing? 1  -ML     To walk in hospital room? 2  -ML     AM-PAC 6 Clicks Score (PT) 11  -ML      Highest Level of Mobility Goal 4 --> Transfer to chair/commode  -       Row Name 01/22/24 1144          Functional Assessment    Outcome Measure Options AM-PAC 6 Clicks Basic Mobility (PT)  -ML               User Key  (r) = Recorded By, (t) = Taken By, (c) = Cosigned By      Initials Name Provider Type    Carin Zurita Physical Therapist                                 Physical Therapy Education       Title: PT OT SLP Therapies (In Progress)       Topic: Physical Therapy (In Progress)       Point: Mobility training (In Progress)       Learning Progress Summary             Patient Acceptance, E, NR by ML at 1/22/2024 1144    Acceptance, E, VU,NR by NS at 1/19/2024 1432    Acceptance, E, NR by KG at 1/18/2024 0944    Acceptance, E, VU,NR by ML at 1/15/2024 1314    Acceptance, E, NR by AE at 1/10/2024 0919    Acceptance, E, NR by AE at 1/3/2024 1337    Acceptance, E, NR by KE at 12/29/2023 1131    Acceptance, E, NR by KE at 12/27/2023 1045   Family Acceptance, E, VU,NR by ML at 1/15/2024 1314                         Point: Home exercise program (Done)       Learning Progress Summary             Patient Acceptance, E, VU,NR by NS at 1/19/2024 1432    Acceptance, E, NR by KG at 1/18/2024 0944                         Point: Body mechanics (Done)       Learning Progress Summary             Patient Acceptance, E, VU,NR by NS at 1/19/2024 1432    Acceptance, E, NR by KG at 1/18/2024 0944    Acceptance, E, VU,NR by ML at 1/15/2024 1314    Acceptance, E, NR by AE at 1/10/2024 0919    Acceptance, E, NR by AE at 1/3/2024 1337    Acceptance, E, NR by KE at 12/29/2023 1131    Acceptance, E, NR by KE at 12/27/2023 1045   Family Acceptance, E, VU,NR by ML at 1/15/2024 1314                         Point: Precautions (In Progress)       Learning Progress Summary             Patient Acceptance, E, NR by ML at 1/22/2024 1144    Acceptance, E, VU,NR by NS at 1/19/2024 1432    Acceptance, E, NR by KG at 1/18/2024 0908     Acceptance, E, VU,NR by ML at 1/15/2024 1314    Acceptance, E, NR by AE at 1/10/2024 0919    Acceptance, E, NR by AE at 1/3/2024 1337    Acceptance, E, NR by KE at 12/29/2023 1131    Acceptance, E, NR by KE at 12/27/2023 1045   Family Acceptance, E, VU,NR by ML at 1/15/2024 1314                                         User Key       Initials Effective Dates Name Provider Type Discipline    KG 01/04/23 -  Carolyn Herrera Physical Therapist PT    NS 06/16/21 -  Traci Avelar, PT Physical Therapist PT    ML 04/22/21 -  Carin Villegas Physical Therapist PT    AE 09/21/21 -  Domo Desir, PT Physical Therapist PT    KE 11/16/23 -  Petty Mckoy, PT Physical Therapist PT                  PT Recommendation and Plan  Planned Therapy Interventions (PT): balance training, bed mobility training, gait training, home exercise program, neuromuscular re-education, patient/family education, postural re-education, ROM (range of motion), strengthening, stretching, transfer training  Plan of Care Reviewed With: patient  Progress: no change  Outcome Evaluation: Patient required 2 person assist to complete sit to stand and bed to chair transfer, however, less assistance compared to previous treatment session. Patient continues to present below baseline for mobility and would continue to benefit from skilled PT to address strength, balance and activity tolerance deficits. Re-certification complete and goals updated. Continue to recommend SNF at discharge.     Time Calculation:         PT Charges       Row Name 01/22/24 1145             Time Calculation    Start Time 1020  -ML      PT Received On 01/22/24  -ML      PT Goal Re-Cert Due Date 02/01/24  -ML         Timed Charges    64066 - PT Therapeutic Activity Minutes 18  -ML         Total Minutes    Timed Charges Total Minutes 18  -ML       Total Minutes 18  -ML                User Key  (r) = Recorded By, (t) = Taken By, (c) = Cosigned By      Initials Name Provider Type    ML  Carin Villegas Physical Therapist                  Therapy Charges for Today       Code Description Service Date Service Provider Modifiers Qty    [id number]  PT THERAPEUTIC ACT EA 15 MIN 2024 Carin Villegas GP 1            PT G-Codes  Outcome Measure Options: AM-PAC 6 Clicks Basic Mobility (PT)  AM-PAC 6 Clicks Score (PT): 11  AM-PAC 6 Clicks Score (OT): 13  PT Discharge Summary  Anticipated Discharge Disposition (PT): skilled nursing facility    Carin Villegas  2024      Electronically signed by Carin Villegas at 24 1145          Occupational Therapy Notes (most recent note)        Mignon Mattson, OT at 24 0926          Patient Name: Omar Mendoza  : 1940    MRN: [id number]                              Today's Date: 2024       Admit Date: 2023    Visit Dx:     ICD-10-CM ICD-9-CM   1. Generalized weakness  R53.1 780.79   2. Acute UTI  N39.0 599.0   3. Bullous pemphigoid  L12.0 694.5   4. Venous insufficiency  I87.2 459.81   5. Dysphagia, unspecified type  R13.10 787.20     Patient Active Problem List   Diagnosis    UTI (urinary tract infection)    Bullous pemphigoid    Essential hypertension    Dyslipidemia    Edema of lower extremity    Cholestatic hepatitis    Acute constipation    Acute urinary retention    Uterine prolapse    Bradycardia    Pressure injury of buttock, unstageable    Type 2 diabetes mellitus with ketoacidosis, without long-term current use of insulin    Disorientation    Pleural effusion    Severe malnutrition     Past Medical History:   Diagnosis Date    CAD (coronary artery disease)     Dementia     Diabetes mellitus     Hyperlipemia     Hypertension      History reviewed. No pertinent surgical history.   General Information       Row Name 24 0849          OT Time and Intention    Document Type therapy note (daily note)  -SD     Mode of Treatment occupational therapy  -SD       Row Name 24 0849          General Information    Patient  Profile Reviewed yes  -SD     Existing Precautions/Restrictions fall;seizures;other (see comments)  R heel & buttock wound, h/o Alz. dementia  -SD     Barriers to Rehab medically complex;previous functional deficit;cognitive status  -SD       Row Name 01/18/24 0849          Cognition    Orientation Status (Cognition) oriented to;person;disoriented to;place;situation;time  -SD       Row Name 01/18/24 0848          Safety Issues, Functional Mobility    Safety Issues Affecting Function (Mobility) awareness of need for assistance;friction/shear risk;insight into deficits/self-awareness;safety precaution awareness;safety precautions follow-through/compliance;sequencing abilities  -SD     Impairments Affecting Function (Mobility) balance;cognition;endurance/activity tolerance;strength;postural/trunk control  -SD     Cognitive Impairments, Mobility Safety/Performance awareness, need for assistance;insight into deficits/self-awareness;safety precaution awareness;safety precaution follow-through;sequencing abilities  -SD               User Key  (r) = Recorded By, (t) = Taken By, (c) = Cosigned By      Initials Name Provider Type    Mignon Swain OT Occupational Therapist                     Mobility/ADL's       Row Name 01/18/24 0921          Bed Mobility    Bed Mobility rolling right;scooting/bridging;supine-sit  -SD     Rolling Right [place] (Bed Mobility) moderate assist (50% patient effort);verbal cues;nonverbal cues (demo/gesture);1 person assist  -SD     Scooting/Bridging [place] (Bed Mobility) maximum assist (25% patient effort);2 person assist;verbal cues;nonverbal cues (demo/gesture)  -SD     Supine-Sit [place] (Bed Mobility) maximum assist (25% patient effort);2 person assist;verbal cues;nonverbal cues (demo/gesture)  -SD     Bed Mobility, Safety Issues cognitive deficits limit understanding;decreased use of arms for pushing/pulling;decreased use of legs for bridging/pushing;impaired  trunk control for bed mobility  -SD     Assistive Device (Bed Mobility) bed rails;draw sheet;head of bed elevated  -SD       Row Name 01/18/24 0919          Transfers    Transfers sit-stand transfer;bed-chair transfer;stand-sit transfer  -SD       Row Name 01/18/24 0919          Bed-Chair Transfer    Bed-Chair [place] (Transfers) minimum assist (75% patient effort);2 person assist;verbal cues;nonverbal cues (demo/gesture)  -SD     Assistive Device (Bed-Chair Transfers) walker, front-wheeled  -SD       Row Name 01/18/24 0919          Sit-Stand Transfer    Sit-Stand [place] (Transfers) moderate assist (50% patient effort);2 person assist;verbal cues;nonverbal cues (demo/gesture)  -SD     Assistive Device (Sit-Stand Transfers) walker, front-wheeled  -SD       Row Name 01/18/24 0919          Stand-Sit Transfer    Stand-Sit [place] (Transfers) minimum assist (75% patient effort);2 person assist;verbal cues;nonverbal cues (demo/gesture)  -SD     Assistive Device (Stand-Sit Transfers) walker, front-wheeled  -SD       Row Name 01/18/24 0919          Activities of Daily Living    BADL Assessment/Intervention grooming;feeding;upper body dressing;lower body dressing  -SD       Row Name 01/18/24 0919          Lower Body Dressing Assessment/Training    [place] Level (Lower Body Dressing) don;socks;dependent (less than 25% patient effort)  -SD     Position (Lower Body Dressing) sitting up in bed  -SD       Row Name 01/18/24 0919          Self-Feeding Assessment/Training    [place] Level (Feeding) liquids to mouth;scoop food and bring to mouth;set up  -SD     Position (Self-Feeding) sitting up in bed;supported sitting  -SD       Row Name 01/18/24 0919          Grooming Assessment/Training    [place] Level (Grooming) wash face, hands;hair care, combing/brushing;set up  -SD     Position (Grooming) edge of bed sitting;supported sitting;sitting up in bed  -SD       Row Name 01/18/24 0919          Upper  Body Dressing Assessment/Training    [place] Level (Upper Body Dressing) don;pajama/robe;minimum assist (75% patient effort);doff  -SD     Position (Upper Body Dressing) edge of bed sitting  -SD               User Key  (r) = Recorded By, (t) = Taken By, (c) = Cosigned By      Initials Name Provider Type    Mignon Swain OT Occupational Therapist                   Obj/Interventions       Row Name 01/18/24 0921          Balance    Static Sitting Balance supervision  -SD     Dynamic Sitting Balance contact guard  -SD     Position, Sitting Balance unsupported;sitting edge of bed  -SD     Static Standing Balance minimal assist;2-person assist  -SD     Dynamic Standing Balance moderate assist;2-person assist  -SD     Position/Device Used, Standing Balance supported;walker, front-wheeled  -SD               User Key  (r) = Recorded By, (t) = Taken By, (c) = Cosigned By      Initials Name Provider Type    Mignon Swain OT Occupational Therapist                   Goals/Plan    No documentation.                  Clinical Impression       Row Name 01/18/24 0922          Pain Assessment    Pretreatment Pain Rating 0/10 - no pain  -SD     Posttreatment Pain Rating 0/10 - no pain  -SD       Row Name 01/18/24 0922          Plan of Care Review    Plan of Care Reviewed With patient  -SD     Progress improving  -SD     Outcome Evaluation Pt. with improving functional mobility, requiring less assist in STS t/f and bed to chair t/f. Pt. very upset & c/o ways that she has not been taken care of properly. Pt. required max v/c's for encouragement to participate & stay on task. OT skilled services continue to be warranted to return to [facility]. Recommend SNF upon d/c.  -SD       Row Name 01/18/24 0922          Therapy Assessment/Plan (OT)    Rehab Potential (OT) good, to achieve stated therapy goals  -SD     Criteria for Skilled Therapeutic Interventions Met (OT) yes;meets criteria;skilled treatment is necessary   -SD     Therapy Frequency (OT) daily  -SD       Row Name 01/18/24 0922          Therapy Plan Review/Discharge Plan (OT)    Anticipated Discharge Disposition (OT) skilled nursing facility  -SD       Row Name 01/18/24 0922          Vital Signs    O2 Delivery Pre Treatment room air  -SD     O2 Delivery Intra Treatment room air  -SD     O2 Delivery Post Treatment room air  -SD     Pre Patient Position Supine  -SD     Intra Patient Position Standing  -SD     Post Patient Position Sitting  -SD       Row Name 01/18/24 0922          Positioning and Restraints    Pre-Treatment Position in bed  -SD     Post Treatment Position chair  -SD     In Chair notified nsg;reclined;call light within reach;encouraged to call for assist;exit alarm on;waffle cushion;on mechanical lift sling;legs elevated  -SD               User Key  (r) = Recorded By, (t) = Taken By, (c) = Cosigned By      Initials Name Provider Type    Mignon Swain OT Occupational Therapist                   Outcome Measures       Row Name 01/18/24 0849          How much help from another is currently needed...    Putting on and taking off regular lower body clothing? 1  -SD     Bathing (including washing, rinsing, and drying) 2  -SD     Toileting (which includes using toilet bed pan or urinal) 1  -SD     Putting on and taking off regular upper body clothing 3  -SD     Taking care of personal grooming (such as brushing teeth) 3  -SD     Eating meals 3  -SD     AM-PAC 6 Clicks Score (OT) 13  -SD       Row Name 01/18/24 0849          Functional Assessment    Outcome Measure Options AM-PAC 6 Clicks Daily Activity (OT)  -SD               User Key  (r) = Recorded By, (t) = Taken By, (c) = Cosigned By      Initials Name Provider Type    Mignon Swain OT Occupational Therapist                    Occupational Therapy Education       Title: PT OT SLP Therapies (In Progress)       Topic: Occupational Therapy (In Progress)       Point: ADL training (In  Progress)       Description:   Instruct learner(s) on proper safety adaptation and remediation techniques during self care or transfers.   Instruct in proper use of assistive devices.                  Learning Progress Summary             Patient Acceptance, E,D, NR by DILIP at 1/15/2024 1151    Comment: re-orientation, bed mobility sequencing, UE TE, transfer technique, posture    Acceptance, E, NR by CS at 1/12/2024 1434    Acceptance, E, NR by AC at 1/10/2024 1023    Nonacceptance, E, NL by AC at 1/5/2024 1044    Acceptance, E, NR by AC at 1/3/2024 1435    Acceptance, E, VU,NR by DILIP at 1/1/2024 0920    Comment: oriented to date, need to move and position RLE, ADL progression, sequencing bed mobililty    Acceptance, E, NR by  at 12/27/2023 1135   Family Acceptance, E,D, NR by DILIP at 1/15/2024 1151    Comment: re-orientation, bed mobility sequencing, UE TE, transfer technique, posture    Acceptance, E, VU,NR by DILIP at 1/1/2024 0920    Comment: oriented to date, need to move and position RLE, ADL progression, sequencing bed mobililty                         Point: Home exercise program (In Progress)       Description:   Instruct learner(s) on appropriate technique for monitoring, assisting and/or progressing therapeutic exercises/activities.                  Learning Progress Summary             Patient Acceptance, E,D, NR by DILIP at 1/15/2024 1151    Comment: re-orientation, bed mobility sequencing, UE TE, transfer technique, posture   Family Acceptance, E,D, NR by DILIP at 1/15/2024 1151    Comment: re-orientation, bed mobility sequencing, UE TE, transfer technique, posture                         Point: Precautions (In Progress)       Description:   Instruct learner(s) on prescribed precautions during self-care and functional transfers.                  Learning Progress Summary             Patient Acceptance, E,D, NR by DILIP at 1/15/2024 1151    Comment: re-orientation, bed mobility sequencing, UE TE, transfer technique,  posture    Acceptance, E, NR by  at 1/12/2024 1434    Acceptance, E, VU,NR by  at 1/1/2024 0920    Comment: oriented to date, need to move and position RLE, ADL progression, sequencing bed mobililty   Family Acceptance, E,D, NR by DILIP at 1/15/2024 1151    Comment: re-orientation, bed mobility sequencing, UE TE, transfer technique, posture    Acceptance, E, VU,NR by DILIP at 1/1/2024 0920    Comment: oriented to date, need to move and position RLE, ADL progression, sequencing bed mobililty                         Point: Body mechanics (In Progress)       Description:   Instruct learner(s) on proper positioning and spine alignment during self-care, functional mobility activities and/or exercises.                  Learning Progress Summary             Patient Acceptance, E,D, NR by  at 1/15/2024 1151    Comment: re-orientation, bed mobility sequencing, UE TE, transfer technique, posture    Acceptance, E, NR by  at 1/12/2024 1434    Acceptance, E, VU,NR by DILIP at 1/1/2024 0920    Comment: oriented to date, need to move and position RLE, ADL progression, sequencing bed mobililty   Family Acceptance, E,D, NR by DILIP at 1/15/2024 1151    Comment: re-orientation, bed mobility sequencing, UE TE, transfer technique, posture    Acceptance, E, VU,NR by DILIP at 1/1/2024 0920    Comment: oriented to date, need to move and position RLE, ADL progression, sequencing bed mobililty                                         User Key       Initials Effective Dates Name Provider Type Discipline     07/11/23 -  Rayna Wall, OT Occupational Therapist OT    AC 02/03/23 -  Sepideh Feldman OT Occupational Therapist OT     09/02/21 -  Eduarda Avelar OT Occupational Therapist OT                  OT Recommendation and Plan  Therapy Frequency (OT): daily  Plan of Care Review  Plan of Care Reviewed With: patient  Progress: improving  Outcome Evaluation: Pt. with improving functional mobility, requiring less assist in STS t/f and bed to chair  t/f. Pt. very upset & c/o ways that she has not been taken care of properly. Pt. required max v/c's for encouragement to participate & stay on task. OT skilled services continue to be warranted to return to [facility]. Recommend SNF upon d/c.     Time Calculation:         Time Calculation- OT       Row Name 01/18/24 0925             Time Calculation- OT    OT Received On 01/18/24  -SD      OT Goal Re-Cert Due Date 01/25/24  -SD         Timed Charges    44573 - OT Therapeutic Activity Minutes 5  -SD      55815 - OT Self Care/Mgmt Minutes 10  -SD         Total Minutes    Timed Charges Total Minutes 15  -SD       Total Minutes 15  -SD                User Key  (r) = Recorded By, (t) = Taken By, (c) = Cosigned By      Initials Name Provider Type    Mignon Swain OT Occupational Therapist                  Therapy Charges for Today       Code Description Service Date Service Provider Modifiers Qty    [id number] HC OT SELF CARE/MGMT/TRAIN EA 15 MIN 1/18/2024 Mignon Mattson OT GO 1                 Mignon Mattson OT  1/18/2024    Electronically signed by Mignon Mattson OT at 01/18/24 0968

## 2024-01-22 NOTE — CASE MANAGEMENT/SOCIAL WORK
Continued Stay Note  [facility]     Patient Name: Omar Mendoza  MRN: [id number]  Today's Date: 1/22/2024    Admit Date: 12/26/2023    Plan: Seeking LTC Medicaid bed.   Discharge Plan       Row Name 01/22/24 1554       Plan    Plan Seeking [facility] Medicaid bed.    [facility]  [facility]. [facility] to [facility] and [facility]. [facility] Akua [phone], [facility] Becky [facility] [phone], and Timbo Tracey with [facility] and rehab [phone] who are reviewing patient. [facility] for [facility] however family's unsure about returning. Attempted to call patients son, no answer, awaiting return call. Plan is LTC Medicaid bed.                Discharge Codes    No documentation.                 Expected Discharge Date and Time       Expected Discharge Date Expected Discharge Time    Jan 22, 2024               MARIOLA Cheek (Kay)

## 2024-01-22 NOTE — THERAPY PROGRESS REPORT/RE-CERT
Patient Name: Omar Mendoza  : 1940    MRN: [id number]                              Today's Date: 2024       Admit Date: 2023    Visit Dx:     ICD-10-CM ICD-9-CM   1. Generalized weakness  R53.1 780.79   2. Acute UTI  N39.0 599.0   3. Bullous pemphigoid  L12.0 694.5   4. Venous insufficiency  I87.2 459.81   5. Dysphagia, unspecified type  R13.10 787.20     Patient Active Problem List   Diagnosis    UTI (urinary tract infection)    Bullous pemphigoid    Essential hypertension    Dyslipidemia    Edema of lower extremity    Cholestatic hepatitis    Acute constipation    Acute urinary retention    Uterine prolapse    Bradycardia    Pressure injury of buttock, unstageable    Type 2 diabetes mellitus with ketoacidosis, without long-term current use of insulin    Disorientation    Pleural effusion    Severe malnutrition     Past Medical History:   Diagnosis Date    CAD (coronary artery disease)     Dementia     Diabetes mellitus     Hyperlipemia     Hypertension      History reviewed. No pertinent surgical history.   General Information       Row Name 24 1045          Physical Therapy Time and Intention    Document Type progress note/recertification  -     Mode of Treatment physical therapy  -       Row Name 24 1045          General Information    Patient Profile Reviewed yes  -ML     Existing Precautions/Restrictions fall;seizures;other (see comments)  R heel and buttock wound; dementia  -     Barriers to Rehab medically complex;previous functional deficit;cognitive status  -       Row Name 24 1045          Cognition    Orientation Status (Cognition) oriented to;person;time;verbal cues/prompts needed for orientation;place;disoriented to;situation  -       Row Name 24 1045          Safety Issues, Functional Mobility    Safety Issues Affecting Function (Mobility) awareness of need for assistance;friction/shear risk;insight into  deficits/self-awareness;judgment;problem-solving;safety precaution awareness;safety precautions follow-through/compliance;sequencing abilities  -ML     Impairments Affecting Function (Mobility) balance;cognition;coordination;endurance/activity tolerance;motor planning;strength;postural/trunk control;range of motion (ROM)  -ML     Cognitive Impairments, Mobility Safety/Performance awareness, need for assistance;insight into deficits/self-awareness;judgment;problem-solving/reasoning;safety precaution awareness;safety precaution follow-through;sequencing abilities  -ML               User Key  (r) = Recorded By, (t) = Taken By, (c) = Cosigned By      Initials Name Provider Type    Carin Zurita Physical Therapist                   Mobility       Row Name 01/22/24 1048          Bed Mobility    Bed Mobility supine-sit  -ML     Supine-Sit [place] (Bed Mobility) verbal cues;nonverbal cues (demo/gesture);dependent (less than 25% patient effort);2 person assist  -ML     Assistive Device (Bed Mobility) bed rails;draw sheet;head of bed elevated  -ML       Row Name 01/22/24 1048          Bed-Chair Transfer    Bed-Chair Terrell (Transfers) verbal cues;nonverbal cues (demo/gesture);moderate assist (50% patient effort);2 person assist  -ML     Assistive Device (Bed-Chair Transfers) other (see comments)  BUE support  -ML       Row Name 01/22/24 1048          Sit-Stand Transfer    Sit-Stand [place]rell (Transfers) verbal cues;nonverbal cues (demo/gesture);moderate assist (50% patient effort);minimum assist (75% patient effort);2 person assist  -ML     Assistive Device (Sit-Stand Transfers) walker, front-wheeled  -ML     Comment, (Sit-Stand Transfer) patient completed 1 sit to stand transfer from EOB with modx2, patient completed x2 sit to stand transfers from chair with RW requiring minAx2  -ML               User Key  (r) = Recorded By, (t) = Taken By, (c) = Cosigned By      Initials Name Provider Type    Carin Zurita  Physical Therapist                   Obj/Interventions       Row Name 01/22/24 1138          Range of Motion Comprehensive    General Range of Motion bilateral lower extremity ROM WFL  -ML       Row Name 01/22/24 1138          Strength Comprehensive (MMT)    General Manual Muscle Testing (MMT) Assessment lower extremity strength deficits identified  -ML     Comment, General Manual Muscle Testing (MMT) Assessment unable to complete formal MMT due to cognitive status, observed grossly 3+/5 with functional mobility  -ML       Row Name 01/22/24 1138          Balance    Balance Assessment sitting static balance;sitting dynamic balance;sit to stand dynamic balance;standing static balance;standing dynamic balance  -ML     Static Sitting Balance standby assist  -ML     Dynamic Sitting Balance contact guard;minimal assist;moderate assist  varying levels of assist  -ML     Position, Sitting Balance unsupported;sitting edge of bed  -ML     Sit to Stand Dynamic Balance verbal cues;non-verbal cues (demo/gesture);moderate assist;minimal assist;2-person assist;other (see comments)  varying levels of assist  -ML     Static Standing Balance verbal cues;minimal assist;2-person assist  -ML     Dynamic Standing Balance verbal cues;non-verbal cues (demo/gesture);moderate assist;2-person assist  -ML     Position/Device Used, Standing Balance supported;walker, front-wheeled  -ML     Balance Interventions sitting;standing;sit to stand;supported;weight shifting activity  -ML       Row Name 01/22/24 1138          Sensory Assessment (Somatosensory)    Sensory Assessment (Somatosensory) unable/difficult to assess  -ML               User Key  (r) = Recorded By, (t) = Taken By, (c) = Cosigned By      Initials Name Provider Type    ML Carin Villegas Physical Therapist                   Goals/Plan       Row Name 01/22/24 1143          Bed Mobility Goal 1 (PT)    Activity/Assistive Device (Bed Mobility Goal 1, PT) sit to supine/supine to sit  -ML      [place] Level/Cues Needed (Bed Mobility Goal 1, PT) moderate assist (50-74% patient effort)  -ML     Time Frame (Bed Mobility Goal 1, PT) long term goal (LTG);10 days  -ML     Progress/Outcomes (Bed Mobility Goal 1, PT) goal ongoing  -[facility] Name 01/22/24 1143          Transfer Goal 1 (PT)    Activity/Assistive Device (Transfer Goal 1, PT) bed-to-chair/chair-to-bed;sit-to-stand/stand-to-sit  -ML     [place] Level/Cues Needed (Transfer Goal 1, PT) minimum assist (75% or more patient effort)  -ML     Time Frame (Transfer Goal 1, PT) long term goal (LTG);10 days  -ML     Progress/Outcome (Transfer Goal 1, PT) goal met;new goal  -[facility] Name 01/22/24 1143          Gait Training Goal 1 (PT)    Activity/Assistive Device (Gait Training Goal 1, PT) gait (walking locomotion);assistive device use  -ML     [place] Level (Gait Training Goal 1, PT) maximum assist (25-49% patient effort)  -ML     Distance (Gait Training Goal 1, PT) 5ft  -ML     Time Frame (Gait Training Goal 1, PT) long term goal (LTG);10 days  -ML     Progress/Outcome (Gait Training Goal 1, PT) goal ongoing  -[facility] Name 01/22/24 1143          Therapy Assessment/Plan (PT)    Planned Therapy Interventions (PT) balance training;bed mobility training;gait training;home exercise program;neuromuscular re-education;patient/family education;postural re-education;ROM (range of motion);strengthening;stretching;transfer training  -               User Key  (r) = Recorded By, (t) = Taken By, (c) = Cosigned By      Initials Name Provider Type    ML Carin Villegas Physical Therapist                   Clinical Impression       Row Name 01/22/24 1139          Pain    Pain Intervention(s) Repositioned;Ambulation/increased activity  -     Additional Documentation Pain Scale: FACES Pre/Post-Treatment (Group)  -       Row Name 01/22/24 1139          Pain Scale: FACES Pre/Post-Treatment    Pain: FACES Scale, Pretreatment 2-->hurts little bit  -ML      Posttreatment Pain Rating 2-->hurts little bit  -ML     Pain Location generalized  -ML       Row Name 01/22/24 1139          Plan of Care Review    Plan of Care Reviewed With patient  -ML     Progress no change  -ML     Outcome Evaluation Patient required 2 person assist to complete sit to stand and bed to chair transfer, however, less assistance compared to previous treatment session. Patient continues to present below baseline for mobility and would continue to benefit from skilled PT to address strength, balance and activity tolerance deficits. Re-certification complete and goals updated. Continue to recommend SNF at discharge.  -ML       Row Name 01/22/24 1139          Therapy Assessment/Plan (PT)    Rehab Potential (PT) fair, will monitor progress closely  -ML     Criteria for Skilled Interventions Met (PT) yes;meets criteria;skilled treatment is necessary  -ML     Therapy Frequency (PT) daily  -ML       Row Name 01/22/24 1139          Vital Signs    Pre Patient Position Supine  -ML     Intra Patient Position Standing  -ML     Post Patient Position Sitting  -ML       Row Name 01/22/24 1139          Positioning and Restraints    Pre-Treatment Position in bed  -ML     Post Treatment Position chair  -ML     In Chair notified nsg;call light within reach;sitting;encouraged to call for assist;exit alarm on;waffle cushion;on mechanical lift sling;with OT  -ML               User Key  (r) = Recorded By, (t) = Taken By, (c) = Cosigned By      Initials Name Provider Type    ML Carin Villegas Physical Therapist                   Outcome Measures       Row Name 01/22/24 1140          How much help from another person do you currently need...    Turning from your back to your side while in flat bed without using bedrails? 2  -ML     Moving from lying on back to sitting on the side of a flat bed without bedrails? 2  -ML     Moving to and from a bed to a chair (including a wheelchair)? 2  -ML     Standing up from a chair  using your arms (e.g., wheelchair, bedside chair)? 2  -ML     Climbing 3-5 steps with a railing? 1  -ML     To walk in hospital room? 2  -ML     AM-PAC 6 Clicks Score (PT) 11  -ML     Highest Level of Mobility Goal 4 --> Transfer to chair/commode  -ML       Row Name 01/22/24 1144          Functional Assessment    Outcome Measure Options AM-PAC 6 Clicks Basic Mobility (PT)  -ML               User Key  (r) = Recorded By, (t) = Taken By, (c) = Cosigned By      Initials Name Provider Type    Carin Zurita Physical Therapist                                 Physical Therapy Education       Title: PT OT SLP Therapies (In Progress)       Topic: Physical Therapy (In Progress)       Point: Mobility training (In Progress)       Learning Progress Summary             Patient Acceptance, E, NR by ML at 1/22/2024 1144    Acceptance, E, VU,NR by NS at 1/19/2024 1432    Acceptance, E, NR by KG at 1/18/2024 0944    Acceptance, E, VU,NR by ML at 1/15/2024 1314    Acceptance, E, NR by AE at 1/10/2024 0919    Acceptance, E, NR by AE at 1/3/2024 1337    Acceptance, E, NR by KE at 12/29/2023 1131    Acceptance, E, NR by KE at 12/27/2023 1045   Family Acceptance, E, VU,NR by ML at 1/15/2024 1314                         Point: Home exercise program (Done)       Learning Progress Summary             Patient Acceptance, E, VU,NR by NS at 1/19/2024 1432    Acceptance, E, NR by KG at 1/18/2024 0944                         Point: Body mechanics (Done)       Learning Progress Summary             Patient Acceptance, E, VU,NR by NS at 1/19/2024 1432    Acceptance, E, NR by KG at 1/18/2024 0944    Acceptance, E, VU,NR by ML at 1/15/2024 1314    Acceptance, E, NR by AE at 1/10/2024 0919    Acceptance, E, NR by AE at 1/3/2024 1337    Acceptance, E, NR by KE at 12/29/2023 1131    Acceptance, E, NR by KE at 12/27/2023 1045   Family Acceptance, E, VU,NR by ML at 1/15/2024 1314                         Point: Precautions (In Progress)       Learning  Progress Summary             Patient Acceptance, E, NR by ML at 1/22/2024 1144    Acceptance, E, VU,NR by NS at 1/19/2024 1432    Acceptance, E, NR by KG at 1/18/2024 0944    Acceptance, E, VU,NR by ML at 1/15/2024 1314    Acceptance, E, NR by AE at 1/10/2024 0919    Acceptance, E, NR by AE at 1/3/2024 1337    Acceptance, E, NR by KE at 12/29/2023 1131    Acceptance, E, NR by KE at 12/27/2023 1045   Family Acceptance, E, VU,NR by ML at 1/15/2024 1314                                         User Key       Initials Effective Dates Name Provider Type Discipline    KG 01/04/23 -  Carolyn Herrera Physical Therapist PT    NS 06/16/21 -  Traci Avelar, PT Physical Therapist PT    ML 04/22/21 -  Carin Villegas Physical Therapist PT    AE 09/21/21 -  Domo Desir, PT Physical Therapist PT    KE 11/16/23 -  Petty Mckoy, PT Physical Therapist PT                  PT Recommendation and Plan  Planned Therapy Interventions (PT): balance training, bed mobility training, gait training, home exercise program, neuromuscular re-education, patient/family education, postural re-education, ROM (range of motion), strengthening, stretching, transfer training  Plan of Care Reviewed With: patient  Progress: no change  Outcome Evaluation: Patient required 2 person assist to complete sit to stand and bed to chair transfer, however, less assistance compared to previous treatment session. Patient continues to present below baseline for mobility and would continue to benefit from skilled PT to address strength, balance and activity tolerance deficits. Re-certification complete and goals updated. Continue to recommend SNF at discharge.     Time Calculation:         PT Charges       Row Name 01/22/24 1145             Time Calculation    Start Time 1020  -ML      PT Received On 01/22/24  -ML      PT Goal Re-Cert Due Date 02/01/24  -ML         Timed Charges    31797 - PT Therapeutic Activity Minutes 18  -ML         Total Minutes    Timed  Charges Total Minutes 18  -ML       Total Minutes 18  -ML                User Key  (r) = Recorded By, (t) = Taken By, (c) = Cosigned By      Initials Name Provider Type    Carin Zurita Physical Therapist                  Therapy Charges for Today       Code Description Service Date Service Provider Modifiers Qty    88016362487  PT THERAPEUTIC ACT EA 15 MIN 1/22/2024 Carin Villegas GP 1            PT G-Codes  Outcome Measure Options: AM-PAC 6 Clicks Basic Mobility (PT)  AM-PAC 6 Clicks Score (PT): 11  AM-PAC 6 Clicks Score (OT): 13  PT Discharge Summary  Anticipated Discharge Disposition (PT): skilled nursing facility    Carin Villegas  1/22/2024

## 2024-01-23 PROCEDURE — 99232 SBSQ HOSP IP/OBS MODERATE 35: CPT | Performed by: INTERNAL MEDICINE

## 2024-01-23 RX ORDER — DEXTROMETHORPHAN POLISTIREX 30 MG/5ML
30 SUSPENSION ORAL 2 TIMES DAILY PRN
Status: DISCONTINUED | OUTPATIENT
Start: 2024-01-23 | End: 2024-02-01 | Stop reason: HOSPADM

## 2024-01-23 RX ADMIN — MIRTAZAPINE 15 MG: 15 TABLET, FILM COATED ORAL at 19:50

## 2024-01-23 RX ADMIN — RIVASTIGMINE TRANSDERMAL SYSTEM 1 PATCH: 9.5 PATCH, EXTENDED RELEASE TRANSDERMAL at 09:01

## 2024-01-23 NOTE — PROGRESS NOTES
[facility] Medicine Services  PROGRESS NOTE    Patient Name: Omar Mendoza  : 1940  MRN: [id number]    Date of Admission: 2023  Primary Care Physician: Varun Pa MD    Subjective   Subjective     CC:  Dementia f/u    HPI:  Saw patient with daughter bedside while getting changed  Patient without complaints - reports having recent [place] dinner w family that was nice and talks about good mashed potatoes that were had there  [place]      Objective   Objective     Vital Signs:   Temp:  [97.7 °F (36.5 °C)-98.8 °F (37.1 °C)] 97.7 °F (36.5 °C)  Heart Rate:  [56-91] 56  Resp:  [16-18] 18  BP: (131)/(77-87) 131/77     Physical Exam:  Constitutional: No acute distress, awake, alert female lying flat in bed, getting changed  HENT: NCAT, mucous membranes moist  Respiratory: respiratory effort normal on room air  Gastrointestinal: Soft, nontender, nondistended  Musculoskeletal: Muscle tone within normal limits, no joint effusions appreciated  Psychiatric: Appropriate affect, cooperative  Neurologic: Alert and oriented to self but not to situation or recent events, facial movements symmetric and spontaneous movement of all 4 extremities grossly equal bilaterally, speech clear  Skin: No rashes    Results Reviewed:  LAB RESULTS:                              Brief Urine Lab Results  (Last result in the past 365 days)        Color   Clarity   Blood   Leuk Est   Nitrite   Protein   CREAT   Urine HCG        24 0053 Yellow   Clear   Negative   Trace   Negative   Negative                   Microbiology Results Abnormal       Procedure Component Value - Date/Time    COVID-19 RAPID AG,VERITOR,COR/JAMARI/PAD/NUPUR/ASHA/LAG/BRITTANY/ IN-HOUSE,DRY SWAB, 1 HR TAT - Swab, Nasal Cavity [[id number]]  (Normal) Collected: 24 0004    Lab Status: Final result Specimen: Swab from Nasal Cavity Updated: 24 0022     COVID19 Presumptive  Negative    Narrative:      Fact sheets for providers: https://www.fda.gov/media/523325/download    Fact sheets for patients: https://www.fda.gov/media/418957/download    Blood Culture - Blood, Blood, PICC Line [[id number]]  (Normal) Collected: 12/27/23 1400    Lab Status: Final result Specimen: Blood, PICC Line Updated: 01/01/24 1531     Blood Culture No growth at 5 days    Blood Culture - Blood, Arm, Right [[id number]]  (Normal) Collected: 12/27/23 0431    Lab Status: Final result Specimen: Blood from Arm, Right Updated: 01/01/24 0515     Blood Culture No growth at 5 days            No radiology results from the last 24 hrs    Results for orders placed during the hospital encounter of 12/26/23    Adult Transthoracic Echo Complete W/ Cont if Necessary Per Protocol    Interpretation Summary    Left ventricular systolic function is normal. Calculated left ventricular EF = 57% Left ventricular ejection fraction appears to be 56 - 60%.    Left ventricular wall thickness is consistent with mild to moderate concentric hypertrophy.    Left ventricular diastolic function was normal.    Estimated right ventricular systolic pressure from tricuspid regurgitation is mildly elevated (35-45 mmHg).    There is a trivial pericardial effusion.    Mild aortic valve regurgitation.      Current medications:  Scheduled Meds:mirtazapine, 15 mg, Oral, Nightly  rivastigmine, 1 patch, Transdermal, Daily      Continuous Infusions:   PRN Meds:.  acetaminophen **OR** acetaminophen **OR** acetaminophen    senna-docusate sodium **AND** polyethylene glycol **AND** bisacodyl **AND** bisacodyl    Calcium Replacement - Follow Nurse / BPA Driven Protocol    dextromethorphan polistirex ER    dextrose    dextrose    glucagon (human recombinant)    glycopyrrolate    guaiFENesin    hydrOXYzine    Magnesium Standard Dose Replacement - Follow Nurse / BPA Driven Protocol    melatonin    nitroglycerin    ondansetron    Phosphorus Replacement - Follow Nurse /  BPA Driven Protocol    Potassium Replacement - Follow Nurse / BPA Driven Protocol    sodium chloride    ziprasidone    Assessment & Plan   Assessment & Plan     Active Hospital Problems    Diagnosis  POA    **UTI (urinary tract infection) [N39.0]  Yes    Acute constipation [K59.00]  Unknown    Acute urinary retention [R33.8]  Unknown    Uterine prolapse [N81.4]  Unknown    Bradycardia [R00.1]  Unknown    Pressure injury of buttock, unstageable [L89.300]  Unknown    Type 2 diabetes mellitus with ketoacidosis, without long-term current use of insulin [E11.10]  Unknown    Disorientation [R41.0]  Unknown    Pleural effusion [J90]  Unknown    Severe malnutrition [E43]  Yes    Edema of lower extremity [R60.0]  Yes    Bullous pemphigoid [L12.0]  Yes    Dyslipidemia [E78.5]  Yes    Essential hypertension [I10]  Yes      Resolved Hospital Problems   No resolved problems to display.        Brief Hospital Course to date:  Omar Mendoza is a 83 y.o. female w DMII, bullous pemphigoid who presented w bullous pemphigoid lesion on heel on 12/26. Patient with worsening mental status throughout stay and evaluated by neurology with diagnosis of dementia c/b depression and paranoia    Dementia c/b depression and paranoia  -med regimen simplified to exelon patch + remeron w good control    Oropharyngeal dysphagia - comfort diet  Aspiration PNA - s/p rocephin + doxycycline  Chronic anemia - no f/u labs  Uncontrolled DMII - 2/2 comfort measures no interventions  Constipation - bowel regimen PRN  Chronic uterine prolapse  Bullous pemphigoid  BMI 34    Comfort care plan with long-term placement pending, applications out    Expected Discharge Location and Transportation: med ready - pending [facility]  Expected Discharge 1/24 (Discharge date is tentative pending patient's medical condition and is subject to change)  Expected Discharge Date: 1/24/2024; Expected Discharge Time:      DVT prophylaxis:  No DVT prophylaxis order currently exists.          AM-PAC 6 Clicks Score (PT): 8 (01/22/24 2000)    CODE STATUS:   Code Status and Medical Interventions:   Ordered at: 01/07/24 1143     Code Status (Patient has no pulse and is not breathing):    No CPR (Do Not Attempt to Resuscitate)     Medical Interventions (Patient has pulse or is breathing):    Comfort Measures       Niecy Gunter MD  01/23/24

## 2024-01-23 NOTE — PLAN OF CARE
Goal Outcome Evaluation:  Plan of Care Reviewed With: patient        Progress: no change  Outcome Evaluation: Pt awake and alert at start of shift. Pt denies any pain. VSS on room air. Pt is Q4 bladder scans for retention. Pt appears to be sleeping comfortably during most of shift. Will continue to monitor and provide comfort measures.

## 2024-01-24 PROCEDURE — 99232 SBSQ HOSP IP/OBS MODERATE 35: CPT | Performed by: NURSE PRACTITIONER

## 2024-01-24 RX ORDER — POLYETHYLENE GLYCOL 3350 17 G/17G
17 POWDER, FOR SOLUTION ORAL DAILY PRN
Status: DISCONTINUED | OUTPATIENT
Start: 2024-01-24 | End: 2024-01-30

## 2024-01-24 RX ORDER — BISACODYL 10 MG
10 SUPPOSITORY, RECTAL RECTAL DAILY PRN
Status: DISCONTINUED | OUTPATIENT
Start: 2024-01-24 | End: 2024-01-30

## 2024-01-24 RX ORDER — BISACODYL 5 MG/1
5 TABLET, DELAYED RELEASE ORAL DAILY PRN
Status: DISCONTINUED | OUTPATIENT
Start: 2024-01-24 | End: 2024-01-30

## 2024-01-24 RX ORDER — AMOXICILLIN 250 MG
2 CAPSULE ORAL 2 TIMES DAILY
Status: DISCONTINUED | OUTPATIENT
Start: 2024-01-24 | End: 2024-01-30

## 2024-01-24 RX ADMIN — RIVASTIGMINE TRANSDERMAL SYSTEM 1 PATCH: 9.5 PATCH, EXTENDED RELEASE TRANSDERMAL at 10:20

## 2024-01-24 RX ADMIN — MIRTAZAPINE 15 MG: 15 TABLET, FILM COATED ORAL at 20:09

## 2024-01-24 RX ADMIN — Medication 5 MG: at 20:10

## 2024-01-24 RX ADMIN — DEXTROMETHORPHAN POLISTIREX 30 MG: 30 SUSPENSION ORAL at 20:12

## 2024-01-24 RX ADMIN — SENNOSIDES AND DOCUSATE SODIUM 2 TABLET: 8.6; 5 TABLET ORAL at 20:10

## 2024-01-24 NOTE — PROGRESS NOTES
[facility] Medicine Services  PROGRESS NOTE    Patient Name: Omar Mendoza  : 1940  MRN: [id number]    Date of Admission: 2023  Primary Care Physician: Varun Pa MD    Subjective   Subjective     CC:  Dementia F/U     HPI:  Patient is resting in bed in NAD. She states she is hungry and would like snacks during the day. Also states bowels aren't moving a regularly as she likes.       Objective   Objective     Vital Signs:   Temp:  [98.2 °F (36.8 °C)-98.6 °F (37 °C)] 98.2 °F (36.8 °C)  Heart Rate:  [84-88] 84  Resp:  [17-18] 17  BP: (110-138)/(71-84) 138/84     Physical Exam:  Constitutional: No acute distress, awake, alert  HENT: NCAT, mucous membranes moist  Respiratory: Clear to auscultation bilaterally, respiratory effort normal room air   Cardiovascular: RRR, no murmurs, rubs, or gallops  Gastrointestinal: Positive bowel sounds, soft, nontender, nondistended  Musculoskeletal: No bilateral ankle edema  Psychiatric: Appropriate affect, cooperative  Neurologic: Oriented x 1, strength symmetric in all extremities, Cranial Nerves grossly intact to confrontation, speech clear  Skin: No rashes      Results Reviewed:  LAB RESULTS:                              Brief Urine Lab Results  (Last result in the past 365 days)        Color   Clarity   Blood   Leuk Est   Nitrite   Protein   CREAT   Urine HCG        24 0053 Yellow   Clear   Negative   Trace   Negative   Negative                   Microbiology Results Abnormal       Procedure Component Value - Date/Time    COVID-19 RAPID AG,VERITOR,COR/JAAMRI/PAD/NUPUR/ASHA/LAG/BRITTANY/ IN-HOUSE,DRY SWAB, 1 HR TAT - Swab, Nasal Cavity [[id number]]  (Normal) Collected: 24 0004    Lab Status: Final result Specimen: Swab from Nasal Cavity Updated: 24 0022     COVID19 Presumptive Negative    Narrative:      Fact sheets for providers: https://www.fda.gov/media/405291/download    Fact sheets for patients:  https://www.fda.gov/media/455416/download    Blood Culture - Blood, Blood, PICC Line [[id number]]  (Normal) Collected: 12/27/23 1400    Lab Status: Final result Specimen: Blood, PICC Line Updated: 01/01/24 1531     Blood Culture No growth at 5 days    Blood Culture - Blood, Arm, Right [[id number]]  (Normal) Collected: 12/27/23 0431    Lab Status: Final result Specimen: Blood from Arm, Right Updated: 01/01/24 0515     Blood Culture No growth at 5 days            No radiology results from the last 24 hrs    Results for orders placed during the hospital encounter of 12/26/23    Adult Transthoracic Echo Complete W/ Cont if Necessary Per Protocol    Interpretation Summary    Left ventricular systolic function is normal. Calculated left ventricular EF = 57% Left ventricular ejection fraction appears to be 56 - 60%.    Left ventricular wall thickness is consistent with mild to moderate concentric hypertrophy.    Left ventricular diastolic function was normal.    Estimated right ventricular systolic pressure from tricuspid regurgitation is mildly elevated (35-45 mmHg).    There is a trivial pericardial effusion.    Mild aortic valve regurgitation.      Current medications:  Scheduled Meds:mirtazapine, 15 mg, Oral, Nightly  rivastigmine, 1 patch, Transdermal, Daily  senna-docusate sodium, 2 tablet, Oral, BID      Continuous Infusions:   PRN Meds:.  acetaminophen **OR** acetaminophen **OR** acetaminophen    senna-docusate sodium **AND** polyethylene glycol **AND** bisacodyl **AND** bisacodyl    Calcium Replacement - Follow Nurse / BPA Driven Protocol    dextromethorphan polistirex ER    dextrose    dextrose    glucagon (human recombinant)    glycopyrrolate    guaiFENesin    hydrOXYzine    Magnesium Standard Dose Replacement - Follow Nurse / BPA Driven Protocol    melatonin    nitroglycerin    ondansetron    Phosphorus Replacement - Follow Nurse / BPA Driven Protocol    Potassium Replacement - Follow Nurse / BPA Driven  Protocol    sodium chloride    ziprasidone    Assessment & Plan   Assessment & Plan     Active Hospital Problems    Diagnosis  POA    **UTI (urinary tract infection) [N39.0]  Yes    Acute constipation [K59.00]  Unknown    Acute urinary retention [R33.8]  Unknown    Uterine prolapse [N81.4]  Unknown    Bradycardia [R00.1]  Unknown    Pressure injury of buttock, unstageable [L89.300]  Unknown    Type 2 diabetes mellitus with ketoacidosis, without long-term current use of insulin [E11.10]  Unknown    Disorientation [R41.0]  Unknown    Pleural effusion [J90]  Unknown    Severe malnutrition [E43]  Yes    Edema of lower extremity [R60.0]  Yes    Bullous pemphigoid [L12.0]  Yes    Dyslipidemia [E78.5]  Yes    Essential hypertension [I10]  Yes      Resolved Hospital Problems   No resolved problems to display.        Brief Hospital Course to date:  Omar Mendoza is a 83 y.o. female w DMII, bullous pemphigoid who presented w bullous pemphigoid lesion on heel on 12/26. Patient with worsening mental status throughout stay and evaluated by neurology with diagnosis of dementia c/b depression and paranoia    Plan is partially entered by my partner and I have reviewed and updated as appropriate on 1/24/24     Dementia c/b depression and paranoia  -med regimen simplified to exelon patch + remeron w good control  -- hospice following      Oropharyngeal dysphagia - comfort diet  Aspiration PNA - s/p rocephin + doxycycline  Chronic anemia - no f/u labs  Uncontrolled DMII - 2/2 comfort measures no interventions  Constipation - bowel regimen PRN  Chronic uterine prolapse  Bullous pemphigoid  BMI 34     Comfort care plan with long-term placement pending, applications out    Expected Discharge Location and Transportation: LTC   Expected Discharge   Expected Discharge Date: 1/26/2024; Expected Discharge Time:      DVT prophylaxis:  No DVT prophylaxis order currently exists.         AM-PAC 6 Clicks Score (PT): 12 (01/23/24 2000)    CODE  STATUS:   Code Status and Medical Interventions:   Ordered at: 01/07/24 1143     Code Status (Patient has no pulse and is not breathing):    No CPR (Do Not Attempt to Resuscitate)     Medical Interventions (Patient has pulse or is breathing):    Comfort Measures       Radha Jose, APRN  01/24/24

## 2024-01-24 NOTE — PLAN OF CARE
Goal Outcome Evaluation:  Plan of Care Reviewed With: patient        Progress: no change  Outcome Evaluation: Pt awake and alert at start of shift. VSS on room air. Patient denies pain. Pt appears to be sleeping comfortably during shift, making frequent changes in position independently. Will continue to monitor and provide comfort measures.

## 2024-01-25 PROCEDURE — 97530 THERAPEUTIC ACTIVITIES: CPT

## 2024-01-25 PROCEDURE — 97535 SELF CARE MNGMENT TRAINING: CPT

## 2024-01-25 PROCEDURE — 99232 SBSQ HOSP IP/OBS MODERATE 35: CPT | Performed by: NURSE PRACTITIONER

## 2024-01-25 RX ADMIN — MIRTAZAPINE 15 MG: 15 TABLET, FILM COATED ORAL at 18:09

## 2024-01-25 RX ADMIN — RIVASTIGMINE TRANSDERMAL SYSTEM 1 PATCH: 9.5 PATCH, EXTENDED RELEASE TRANSDERMAL at 10:43

## 2024-01-25 RX ADMIN — SENNOSIDES AND DOCUSATE SODIUM 2 TABLET: 8.6; 5 TABLET ORAL at 10:55

## 2024-01-25 NOTE — PLAN OF CARE
Goal Outcome Evaluation:  Plan of Care Reviewed With: patient        Progress: improving  Outcome Evaluation: Pt continuing to progress toward IPOT goals. Pt's ADL independence limited d/t generalized weakness, decreased functional endurance, and balance deficits. Will continue to progress pt as tolerated per OT POC. Rec SNF at d/c.      Anticipated Discharge Disposition (OT): skilled nursing facility

## 2024-01-25 NOTE — PROGRESS NOTES
[facility] Medicine Services  PROGRESS NOTE    Patient Name: Omar Mendoza  : 1940  MRN: [id number]    Date of Admission: 2023  Primary Care Physician: Varun Pa MD    Subjective   Subjective     CC:  Dementia F/U     HPI:  Sleeping, I did not wake. Per RN, no needs.       Objective   Objective     Vital Signs:   Temp:  [98.1 °F (36.7 °C)-98.4 °F (36.9 °C)] 98.1 °F (36.7 °C)  Heart Rate:  [88-96] 88  Resp:  [18] 18  BP: (106-124)/(68-73) 124/73     Physical Exam:  Constitutional: sleeping   HENT: NCAT, mucous membranes moist  Respiratory: Clear to auscultation bilaterally, respiratory effort normal room air   Cardiovascular: RRR   Gastrointestinal: Positive bowel sounds, soft, nontender, nondistended  Musculoskeletal: No bilateral ankle edema  Psychiatric: sleeping   Neurologic: sleeping   Skin: No rashes      Results Reviewed:  LAB RESULTS:                              Brief Urine Lab Results  (Last result in the past 365 days)        Color   Clarity   Blood   Leuk Est   Nitrite   Protein   CREAT   Urine HCG        24 0053 Yellow   Clear   Negative   Trace   Negative   Negative                   Microbiology Results Abnormal       Procedure Component Value - Date/Time    COVID-19 RAPID AG,VERITOR,COR/JAMARI/PAD/NUPUR/ASHA/LAG/BRITTANY/ IN-HOUSE,DRY SWAB, 1 HR TAT - Swab, Nasal Cavity [[id number]]  (Normal) Collected: 24 0004    Lab Status: Final result Specimen: Swab from Nasal Cavity Updated: 24 0022     COVID19 Presumptive Negative    Narrative:      Fact sheets for providers: https://www.fda.gov/media/949198/download    Fact sheets for patients: https://www.fda.gov/media/144931/download    Blood Culture - Blood, Blood, PICC Line [[id number]]  (Normal) Collected: 23 1400    Lab Status: Final result Specimen: Blood, PICC Line Updated: 24 1531     Blood Culture No growth at 5 days    Blood Culture - Blood, Arm, Right [[id number]]  (Normal) Collected:  12/27/23 0431    Lab Status: Final result Specimen: Blood from Arm, Right Updated: 01/01/24 0515     Blood Culture No growth at 5 days            No radiology results from the last 24 hrs    Results for orders placed during the hospital encounter of 12/26/23    Adult Transthoracic Echo Complete W/ Cont if Necessary Per Protocol    Interpretation Summary    Left ventricular systolic function is normal. Calculated left ventricular EF = 57% Left ventricular ejection fraction appears to be 56 - 60%.    Left ventricular wall thickness is consistent with mild to moderate concentric hypertrophy.    Left ventricular diastolic function was normal.    Estimated right ventricular systolic pressure from tricuspid regurgitation is mildly elevated (35-45 mmHg).    There is a trivial pericardial effusion.    Mild aortic valve regurgitation.      Current medications:  Scheduled Meds:mirtazapine, 15 mg, Oral, Nightly  rivastigmine, 1 patch, Transdermal, Daily  senna-docusate sodium, 2 tablet, Oral, BID      Continuous Infusions:   PRN Meds:.  acetaminophen **OR** acetaminophen **OR** acetaminophen    senna-docusate sodium **AND** polyethylene glycol **AND** bisacodyl **AND** bisacodyl    Calcium Replacement - Follow Nurse / BPA Driven Protocol    dextromethorphan polistirex ER    dextrose    dextrose    glucagon (human recombinant)    glycopyrrolate    guaiFENesin    hydrOXYzine    Magnesium Standard Dose Replacement - Follow Nurse / BPA Driven Protocol    melatonin    nitroglycerin    ondansetron    Phosphorus Replacement - Follow Nurse / BPA Driven Protocol    Potassium Replacement - Follow Nurse / BPA Driven Protocol    sodium chloride    ziprasidone    Assessment & Plan   Assessment & Plan     Active Hospital Problems    Diagnosis  POA    **UTI (urinary tract infection) [N39.0]  Yes    Acute constipation [K59.00]  Unknown    Acute urinary retention [R33.8]  Unknown    Uterine prolapse [N81.4]  Unknown    Bradycardia [R00.1]   Unknown    Pressure injury of buttock, unstageable [L89.300]  Unknown    Type 2 diabetes mellitus with ketoacidosis, without long-term current use of insulin [E11.10]  Unknown    Disorientation [R41.0]  Unknown    Pleural effusion [J90]  Unknown    Severe malnutrition [E43]  Yes    Edema of lower extremity [R60.0]  Yes    Bullous pemphigoid [L12.0]  Yes    Dyslipidemia [E78.5]  Yes    Essential hypertension [I10]  Yes      Resolved Hospital Problems   No resolved problems to display.        Brief Hospital Course to date:  Omar Mendoza is a 83 y.o. female w DMII, bullous pemphigoid who presented w bullous pemphigoid lesion on heel on 12/26. Patient with worsening mental status throughout stay and evaluated by neurology with diagnosis of dementia c/b depression and paranoia    Plan is partially entered by my partner and I have reviewed and updated as appropriate on 1/24/24     Dementia c/b depression and paranoia  -med regimen simplified to exelon patch + remeron w good control  -- hospice following      Oropharyngeal dysphagia - comfort diet  Aspiration PNA - s/p rocephin + doxycycline  Chronic anemia - no f/u labs  Uncontrolled DMII - 2/2 comfort measures no interventions  Constipation - bowel regimen PRN  Chronic uterine prolapse  Bullous pemphigoid  BMI 34     Comfort care plan with long-term placement pending, applications out    Expected Discharge Location and Transportation: LTC   Expected Discharge   Expected Discharge Date: 1/26/2024; Expected Discharge Time:      DVT prophylaxis:  No DVT prophylaxis order currently exists.         AM-PAC 6 Clicks Score (PT): 10 (01/25/24 5987)    CODE STATUS:   Code Status and Medical Interventions:   Ordered at: 01/07/24 1143     Code Status (Patient has no pulse and is not breathing):    No CPR (Do Not Attempt to Resuscitate)     Medical Interventions (Patient has pulse or is breathing):    Comfort Measures       Laura Sanchez, LITA  01/25/24

## 2024-01-25 NOTE — PLAN OF CARE
Problem: Adult Inpatient Plan of Care  Goal: Plan of Care Review  Recent Flowsheet Documentation  Taken 1/25/2024 1454 by Renetta Regan OT  Progress: improving  Plan of Care Reviewed With: patient  Outcome Evaluation: Pt continuing to progress toward IPOT goals. Pt's ADL independence limited d/t generalized weakness, decreased functional endurance, and balance deficits. Will continue to progress pt as tolerated per OT POC. Rec SNF at d/c.

## 2024-01-25 NOTE — THERAPY TREATMENT NOTE
Patient Name: Omar Mendoza  : 1940    MRN: [id number]                              Today's Date: 2024       Admit Date: 2023    Visit Dx:     ICD-10-CM ICD-9-CM   1. Generalized weakness  R53.1 780.79   2. Acute UTI  N39.0 599.0   3. Bullous pemphigoid  L12.0 694.5   4. Venous insufficiency  I87.2 459.81   5. Dysphagia, unspecified type  R13.10 787.20     Patient Active Problem List   Diagnosis    UTI (urinary tract infection)    Bullous pemphigoid    Essential hypertension    Dyslipidemia    Edema of lower extremity    Cholestatic hepatitis    Acute constipation    Acute urinary retention    Uterine prolapse    Bradycardia    Pressure injury of buttock, unstageable    Type 2 diabetes mellitus with ketoacidosis, without long-term current use of insulin    Disorientation    Pleural effusion    Severe malnutrition     Past Medical History:   Diagnosis Date    CAD (coronary artery disease)     Dementia     Diabetes mellitus     Hyperlipemia     Hypertension      History reviewed. No pertinent surgical history.   General Information       Row Name 24 1447          OT Time and Intention    Document Type therapy note (daily note)  -     Mode of Treatment occupational therapy  -       Row Name 24 1447          General Information    Patient Profile Reviewed yes  -     Existing Precautions/Restrictions fall;seizures;other (see comments)  dementia, R heel wound, sacral pressure ulcer, L hip pressure ulcer  -     Barriers to Rehab medically complex;previous functional deficit;cognitive status  -       Row Name 24 1447          Cognition    Orientation Status (Cognition) oriented to;person;time;verbal cues/prompts needed for orientation;place  -       Row Name 24 1447          Safety Issues, Functional Mobility    Safety Issues Affecting Function (Mobility) awareness of need for assistance;insight into deficits/self-awareness;safety precaution awareness;safety precautions  follow-through/compliance;sequencing abilities  -     Impairments Affecting Function (Mobility) balance;cognition;coordination;endurance/activity tolerance;motor planning;postural/trunk control;strength  -     Cognitive Impairments, Mobility Safety/Performance awareness, need for assistance;insight into deficits/self-awareness;safety precaution awareness;safety precaution follow-through;sequencing abilities  -               User Key  (r) = Recorded By, (t) = Taken By, (c) = Cosigned By      Initials Name Provider Type     Renetta Regan OT Occupational Therapist                     Mobility/ADL's       Row Name 01/25/24 1448          Bed Mobility    Bed Mobility supine-sit  -     Supine-Sit [place] (Bed Mobility) minimum assist (75% patient effort);2 person assist;verbal cues  -     Bed Mobility, Safety Issues decreased use of arms for pushing/pulling;decreased use of legs for bridging/pushing  -     Assistive Device (Bed Mobility) bed rails;draw sheet;head of bed elevated  -       Row Name 01/25/24 1448          Transfers    Transfers sit-stand transfer;stand-sit transfer;bed-chair transfer  -     Comment, (Transfers) Pt performed STSx3 w/ mod Ax2, initially BUE support, then w/ RW, SPT from bed-to-chair w/ mod Ax2 & RW  -       Row Name 01/25/24 1448          Bed-Chair Transfer    Bed-Chair [place] (Transfers) moderate assist (50% patient effort);2 person assist;verbal cues  -     Assistive Device (Bed-Chair Transfers) walker, front-wheeled  -       Row Name 01/25/24 1448          Sit-Stand Transfer    Sit-Stand [place] (Transfers) moderate assist (50% patient effort);2 person assist;verbal cues  -     Assistive Device (Sit-Stand Transfers) walker, front-wheeled;other (see comments)  -       Row Name 01/25/24 1448          Stand-Sit Transfer    Stand-Sit [place] (Transfers) moderate assist (50% patient effort);2 person assist;verbal cues  -     Assistive  Device (Stand-Sit Transfers) walker, front-wheeled;other (see comments)  -       Row Name 01/25/24 1448          Activities of Daily Living    BADL Assessment/Intervention lower body dressing;upper body dressing;feeding;bathing;grooming  -[facility] Name 01/25/24 1448          Lower Body Dressing Assessment/Training    [place] Level (Lower Body Dressing) don;socks;dependent (less than 25% patient effort);verbal cues  -     Position (Lower Body Dressing) supine  -       Row Name 01/25/24 1448          Self-Feeding Assessment/Training    [place] Level (Feeding) liquids to mouth;set up  -     Position (Self-Feeding) supported sitting  -[facility] Name 01/25/24 1448          Grooming Assessment/Training    [place] Level (Grooming) set up;other (see comments)  -     Comment, (Grooming) applying deodorant  -[facility] Name 01/25/24 1448          Upper Body Dressing Assessment/Training    [place] Level (Upper Body Dressing) don;doff;other (see comments);minimum assist (75% patient effort);verbal cues  hosp gown  -     Position (Upper Body Dressing) sitting up in bed  -[facility] Name 01/25/24 1448          Bathing Assessment/Intervention    [place] Level (Bathing) lower body;distal lower extremities/feet;proximal lower extremities;perineal area;dependent (less than 25% patient effort);upper body;upper extremities;chest/trunk;set up  -     Position (Bathing) sitting up in bed  -               User Key  (r) = Recorded By, (t) = Taken By, (c) = Cosigned By      Initials Name Provider Type     Renetta Regan OT Occupational Therapist                   Obj/Interventions       Row Name 01/25/24 1453          Shoulder (Therapeutic Exercise)    Shoulder (Therapeutic Exercise) AROM (active range of motion)  -     Shoulder AROM (Therapeutic Exercise) bilateral;flexion;extension;aBduction;aDduction;10 repetitions;sitting  -[facility] Name 01/25/24 145          Elbow/Forearm  (Therapeutic Exercise)    Elbow/Forearm (Therapeutic Exercise) AROM (active range of motion)  -     Elbow/Forearm AROM (Therapeutic Exercise) bilateral;flexion;extension;10 repetitions;sitting  -       Row Name 01/25/24 1453          Motor Skills    Therapeutic Exercise shoulder;elbow/forearm  -       Row Name 01/25/24 1456          Balance    Balance Assessment sitting static balance;sitting dynamic balance;sit to stand dynamic balance;standing static balance;standing dynamic balance  -     Static Sitting Balance standby assist  -     Dynamic Sitting Balance minimal assist;verbal cues  -     Position, Sitting Balance unsupported;sitting edge of bed;sitting in chair  -     Sit to Stand Dynamic Balance moderate assist;2-person assist;verbal cues  -     Static Standing Balance moderate assist;2-person assist;verbal cues  -     Dynamic Standing Balance moderate assist;2-person assist;verbal cues  -     Position/Device Used, Standing Balance supported;walker, front-wheeled;other (see comments)  -     Balance Interventions sitting;sit to stand;occupation based/functional task  -               User Key  (r) = Recorded By, (t) = Taken By, (c) = Cosigned By      Initials Name Provider Type     Renetta Regan OT Occupational Therapist                   Goals/Plan    No documentation.                  Clinical Impression       Row Name 01/25/24 1454          Pain Assessment    Pretreatment Pain Rating 0/10 - no pain  -     Posttreatment Pain Rating 0/10 - no pain  -       Row Name 01/25/24 1454          Plan of Care Review    Plan of Care Reviewed With patient  -     Progress improving  -     Outcome Evaluation Pt continuing to progress toward IPOT goals. Pt's ADL independence limited d/t generalized weakness, decreased functional endurance, and balance deficits. Will continue to progress pt as tolerated per OT POC. Rec SNF at d/c.  -       Row Name 01/25/24 1458          Therapy  Assessment/Plan (OT)    Rehab Potential (OT) good, to achieve stated therapy goals  -     Criteria for Skilled Therapeutic Interventions Met (OT) yes;meets criteria;skilled treatment is necessary  -     Therapy Frequency (OT) daily  -       Row Name 01/25/24 1454          Therapy Plan Review/Discharge Plan (OT)    Anticipated Discharge Disposition (OT) skilled nursing facility  -       Row Name 01/25/24 1454          Vital Signs    O2 Delivery Pre Treatment room air  -     O2 Delivery Intra Treatment room air  -     O2 Delivery Post Treatment room air  -     Pre Patient Position Supine  -     Intra Patient Position Standing  -     Post Patient Position Sitting  -       Row Name 01/25/24 1454          Positioning and Restraints    Pre-Treatment Position in bed  -     Post Treatment Position chair  -     In Chair notified nsg;reclined;call light within reach;encouraged to call for assist;exit alarm on;waffle cushion;on mechanical lift sling;legs elevated  -               User Key  (r) = Recorded By, (t) = Taken By, (c) = Cosigned By      Initials Name Provider Type    Renetta Baker, ARNOLD Occupational Therapist                   Outcome Measures       Row Name 01/25/24 1455          How much help from another is currently needed...    Putting on and taking off regular lower body clothing? 2  -MC     Bathing (including washing, rinsing, and drying) 2  -MC     Toileting (which includes using toilet bed pan or urinal) 2  -MC     Putting on and taking off regular upper body clothing 3  -MC     Taking care of personal grooming (such as brushing teeth) 3  -MC     Eating meals 3  -MC     AM-PAC 6 Clicks Score (OT) 15  -       Row Name 01/25/24 1447 01/25/24 0800       How much help from another person do you currently need...    Turning from your back to your side while in flat bed without using bedrails? 2  -SD 2  -KF    Moving from lying on back to sitting on the side of a flat bed without  bedrails? 2  -SD 2  -KF    Moving to and from a bed to a chair (including a wheelchair)? 2  -SD 2  -KF    Standing up from a chair using your arms (e.g., wheelchair, bedside chair)? 2  -SD 2  -KF    Climbing 3-5 steps with a railing? 1  -SD 1  -KF    To walk in hospital room? 1  -SD 1  -KF    AM-PAC 6 Clicks Score (PT) 10  -SD 10  -KF    Highest Level of Mobility Goal 4 --> Transfer to chair/commode  -SD 4 --> Transfer to chair/commode  -      Row Name 01/25/24 1455 01/25/24 1447       Functional Assessment    Outcome Measure Options AM-PAC 6 Clicks Daily Activity (OT)  - AM-PAC 6 Clicks Basic Mobility (PT)  -SD              User Key  (r) = Recorded By, (t) = Taken By, (c) = Cosigned By      Initials Name Provider Type    Trish Perez, PT Physical Therapist    Renetta Baker OT Occupational Therapist    Alicia Raygoza, RN Registered Nurse                    Occupational Therapy Education       Title: PT OT SLP Therapies (In Progress)       Topic: Occupational Therapy (In Progress)       Point: ADL training (In Progress)       Description:   Instruct learner(s) on proper safety adaptation and remediation techniques during self care or transfers.   Instruct in proper use of assistive devices.                  Learning Progress Summary             Patient Acceptance, E, NR by  at 1/25/2024 1456    Acceptance, E, DU,NR by  at 1/22/2024 1615    Comment: OT POC; BADL's; orientation; UE HEP    Acceptance, E,D, NR by [facility] at 1/15/2024 1151    Comment: re-orientation, bed mobility sequencing, UE TE, transfer technique, posture    Acceptance, E, NR by  at 1/12/2024 1434    Acceptance, E, NR by  at 1/10/2024 1023    Nonacceptance, E, NL by  at 1/5/2024 1044    Acceptance, E, NR by  at 1/3/2024 1435    Acceptance, E, VU,NR by [facility] at 1/1/2024 0920    Comment: oriented to date, need to move and position RLE, ADL progression, sequencing bed mobililty    Acceptance, E, NR by  at 12/27/2023 1135    Family Acceptance, E,D, NR by JB at 1/15/2024 1151    Comment: re-orientation, bed mobility sequencing, UE TE, transfer technique, posture    Acceptance, E, VU,NR by JB at 1/1/2024 0920    Comment: oriented to date, need to move and position RLE, ADL progression, sequencing bed mobililty                         Point: Home exercise program (In Progress)       Description:   Instruct learner(s) on appropriate technique for monitoring, assisting and/or progressing therapeutic exercises/activities.                  Learning Progress Summary             Patient Acceptance, E, NR by  at 1/25/2024 1456    Acceptance, E, DU,NR by [facility] at 1/22/2024 1615    Comment: OT POC; BADL's; orientation; UE HEP    Acceptance, E,D, NR by [facility] at 1/15/2024 1151    Comment: re-orientation, bed mobility sequencing, UE TE, transfer technique, posture   Family Acceptance, E,D, NR by [facility] at 1/15/2024 1151    Comment: re-orientation, bed mobility sequencing, UE TE, transfer technique, posture                         Point: Precautions (In Progress)       Description:   Instruct learner(s) on prescribed precautions during self-care and functional transfers.                  Learning Progress Summary             Patient Acceptance, E, NR by  at 1/25/2024 1456    Acceptance, E,D, NR by [facility] at 1/15/2024 1151    Comment: re-orientation, bed mobility sequencing, UE TE, transfer technique, posture    Acceptance, E, NR by  at 1/12/2024 1434    Acceptance, E, VU,NR by [facility] at 1/1/2024 0920    Comment: oriented to date, need to move and position RLE, ADL progression, sequencing bed mobililty   Family Acceptance, E,D, NR by [facility] at 1/15/2024 1151    Comment: re-orientation, bed mobility sequencing, UE TE, transfer technique, posture    Acceptance, E, VU,NR by [facility] at 1/1/2024 0920    Comment: oriented to date, need to move and position RLE, ADL progression, sequencing bed mobililty                         Point: Body mechanics (In Progress)        Description:   Instruct learner(s) on proper positioning and spine alignment during self-care, functional mobility activities and/or exercises.                  Learning Progress Summary             Patient Acceptance, E, NR by  at 1/25/2024 1456    Acceptance, E,D, NR by [facility] at 1/15/2024 1151    Comment: re-orientation, bed mobility sequencing, UE TE, transfer technique, posture    Acceptance, E, NR by  at 1/12/2024 1434    Acceptance, E, VU,NR by [facility] at 1/1/2024 0920    Comment: oriented to date, need to move and position RLE, ADL progression, sequencing bed mobililty   Family Acceptance, E,D, NR by [facility] at 1/15/2024 1151    Comment: re-orientation, bed mobility sequencing, UE TE, transfer technique, posture    Acceptance, E, VU,NR by [facility] at 1/1/2024 0920    Comment: oriented to date, need to move and position RLE, ADL progression, sequencing bed mobililty                                         User Key       Initials Effective Dates Name Provider Type Discipline    [facility] 07/11/23 -  Rayna Wall, OT Occupational Therapist OT     02/03/23 -  Sepideh Feldman OT Occupational Therapist OT     09/02/21 -  Eduarda Avelar, OT Occupational Therapist OT     06/16/21 -  Bhavani Galan, OT Occupational Therapist OT     10/14/22 -  Renetta Regan OT Occupational Therapist OT                  OT Recommendation and Plan  Therapy Frequency (OT): daily  Plan of Care Review  Plan of Care Reviewed With: patient  Progress: improving  Outcome Evaluation: Pt continuing to progress toward IPOT goals. Pt's ADL independence limited d/t generalized weakness, decreased functional endurance, and balance deficits. Will continue to progress pt as tolerated per OT POC. Rec SNF at d/c.     Time Calculation:         Time Calculation- OT       Row Name 01/25/24 1456             Time Calculation- OT    OT Start Time 1120  -      OT Received On 01/25/24  -      OT Goal Re-Cert Due Date 02/04/24  -         Timed Charges     92757 - OT Therapeutic Exercise Minutes 5  -      13850 - OT Therapeutic Activity Minutes 12  -      86955 - OT Self Care/Mgmt Minutes 25  -         Total Minutes    Timed Charges Total Minutes 42  -       Total Minutes 42  -MC                User Key  (r) = Recorded By, (t) = Taken By, (c) = Cosigned By      Initials Name Provider Type     Renetta Regan OT Occupational Therapist                  Therapy Charges for Today       Code Description Service Date Service Provider Modifiers Qty    54776481057  OT THERAPEUTIC ACT EA 15 MIN 1/25/2024 Renetta Regan OT GO 1    [id number]  OT SELF CARE/MGMT/TRAIN EA 15 MIN 1/25/2024 Renetta Regan OT GO 2                 Renetta Regan OT  1/25/2024

## 2024-01-25 NOTE — THERAPY PROGRESS REPORT/RE-CERT
Patient Name: Omar Mendoza  : 1940    MRN: [id number]                              Today's Date: 2024       Admit Date: 2023    Visit Dx:     ICD-10-CM ICD-9-CM   1. Generalized weakness  R53.1 780.79   2. Acute UTI  N39.0 599.0   3. Bullous pemphigoid  L12.0 694.5   4. Venous insufficiency  I87.2 459.81   5. Dysphagia, unspecified type  R13.10 787.20     Patient Active Problem List   Diagnosis    UTI (urinary tract infection)    Bullous pemphigoid    Essential hypertension    Dyslipidemia    Edema of lower extremity    Cholestatic hepatitis    Acute constipation    Acute urinary retention    Uterine prolapse    Bradycardia    Pressure injury of buttock, unstageable    Type 2 diabetes mellitus with ketoacidosis, without long-term current use of insulin    Disorientation    Pleural effusion    Severe malnutrition     Past Medical History:   Diagnosis Date    CAD (coronary artery disease)     Dementia     Diabetes mellitus     Hyperlipemia     Hypertension      History reviewed. No pertinent surgical history.   General Information       Row Name 24 1447          OT Time and Intention    Document Type progress note/recertification  -     Mode of Treatment occupational therapy  -       Row Name 24 1447          General Information    Patient Profile Reviewed yes  -     Existing Precautions/Restrictions fall;seizures;other (see comments)  dementia, R heel wound, sacral pressure ulcer, L hip pressure ulcer  -     Barriers to Rehab medically complex;previous functional deficit;cognitive status  -       Row Name 24 1447          Cognition    Orientation Status (Cognition) oriented to;person;time;verbal cues/prompts needed for orientation;place  -       Row Name 24 1447          Safety Issues, Functional Mobility    Safety Issues Affecting Function (Mobility) awareness of need for assistance;insight into deficits/self-awareness;safety precaution awareness;safety  precautions follow-through/compliance;sequencing abilities  -     Impairments Affecting Function (Mobility) balance;cognition;coordination;endurance/activity tolerance;motor planning;postural/trunk control;strength  -     Cognitive Impairments, Mobility Safety/Performance awareness, need for assistance;insight into deficits/self-awareness;safety precaution awareness;safety precaution follow-through;sequencing abilities  -               User Key  (r) = Recorded By, (t) = Taken By, (c) = Cosigned By      Initials Name Provider Type     Renetta eRgan OT Occupational Therapist                     Mobility/ADL's       Row Name 01/25/24 1448          Bed Mobility    Bed Mobility supine-sit  -     Supine-Sit [place] (Bed Mobility) minimum assist (75% patient effort);2 person assist;verbal cues  -     Bed Mobility, Safety Issues decreased use of arms for pushing/pulling;decreased use of legs for bridging/pushing  -     Assistive Device (Bed Mobility) bed rails;draw sheet;head of bed elevated  -       Row Name 01/25/24 1448          Transfers    Transfers sit-stand transfer;stand-sit transfer;bed-chair transfer  -     Comment, (Transfers) Pt performed STSx3 w/ mod Ax2, initially BUE support, then w/ RW, SPT from bed-to-chair w/ mod Ax2 & RW  -       Row Name 01/25/24 1448          Bed-Chair Transfer    Bed-Chair [place] (Transfers) moderate assist (50% patient effort);2 person assist;verbal cues  -     Assistive Device (Bed-Chair Transfers) walker, front-wheeled  -       Row Name 01/25/24 1448          Sit-Stand Transfer    Sit-Stand [place] (Transfers) moderate assist (50% patient effort);2 person assist;verbal cues  -     Assistive Device (Sit-Stand Transfers) walker, front-wheeled;other (see comments)  -       Row Name 01/25/24 1448          Stand-Sit Transfer    Stand-Sit [place] (Transfers) moderate assist (50% patient effort);2 person assist;verbal cues  [place]      Assistive Device (Stand-Sit Transfers) walker, front-wheeled;other (see comments)  -       Row Name 01/25/24 1448          Activities of Daily Living    BADL Assessment/Intervention lower body dressing;upper body dressing;feeding;bathing;grooming  -[facility] Name 01/25/24 1448          Lower Body Dressing Assessment/Training    [place] Level (Lower Body Dressing) don;socks;dependent (less than 25% patient effort);verbal cues  -     Position (Lower Body Dressing) supine  -[facility] Name 01/25/24 1448          Self-Feeding Assessment/Training    [place] Level (Feeding) liquids to mouth;set up  -     Position (Self-Feeding) supported sitting  -[facility] Name 01/25/24 1448          Grooming Assessment/Training    [place] Level (Grooming) set up;other (see comments)  -     Comment, (Grooming) applying deodorant  -[facility] Name 01/25/24 1448          Upper Body Dressing Assessment/Training    [place] Level (Upper Body Dressing) don;doff;other (see comments);minimum assist (75% patient effort);verbal cues  hosp gown  -     Position (Upper Body Dressing) sitting up in bed  -[facility] Name 01/25/24 1448          Bathing Assessment/Intervention    [place] Level (Bathing) lower body;distal lower extremities/feet;proximal lower extremities;perineal area;dependent (less than 25% patient effort);upper body;upper extremities;chest/trunk;set up  -     Position (Bathing) sitting up in bed  -               User Key  (r) = Recorded By, (t) = Taken By, (c) = Cosigned By      Initials Name Provider Type     Renetta Regan OT Occupational Therapist                   Obj/Interventions       [facility] Name 01/25/24 1451          Shoulder (Therapeutic Exercise)    Shoulder (Therapeutic Exercise) AROM (active range of motion)  -     Shoulder AROM (Therapeutic Exercise) bilateral;flexion;extension;aBduction;aDduction;10 repetitions;sitting  -[facility] Name 01/25/24 0259           Elbow/Forearm (Therapeutic Exercise)    Elbow/Forearm (Therapeutic Exercise) AROM (active range of motion)  -     Elbow/Forearm AROM (Therapeutic Exercise) bilateral;flexion;extension;10 repetitions;sitting  -       Row Name 01/25/24 1453          Motor Skills    Therapeutic Exercise shoulder;elbow/forearm  -       Row Name 01/25/24 1453          Balance    Balance Assessment sitting static balance;sitting dynamic balance;sit to stand dynamic balance;standing static balance;standing dynamic balance  -     Static Sitting Balance standby assist  -     Dynamic Sitting Balance minimal assist;verbal cues  -     Position, Sitting Balance unsupported;sitting edge of bed;sitting in chair  -     Sit to Stand Dynamic Balance moderate assist;2-person assist;verbal cues  -     Static Standing Balance moderate assist;2-person assist;verbal cues  -     Dynamic Standing Balance moderate assist;2-person assist;verbal cues  -     Position/Device Used, Standing Balance supported;walker, front-wheeled;other (see comments)  -     Balance Interventions sitting;sit to stand;occupation based/functional task  -               User Key  (r) = Recorded By, (t) = Taken By, (c) = Cosigned By      Initials Name Provider Type     Renetta Regan OT Occupational Therapist                   Goals/Plan       Row Name 01/25/24 1458          Bed Mobility Goal 1 (OT)    Activity/Assistive Device (Bed Mobility Goal 1, OT) rolling to left;rolling to right;sit to supine;supine to sit  -     [place] Level/Cues Needed (Bed Mobility Goal 1, OT) contact guard required;verbal cues required  -     Time Frame (Bed Mobility Goal 1, OT) long term goal (LTG);10 days  -     Progress/Outcomes (Bed Mobility Goal 1, OT) continuing progress toward goal;goal revised this date  -       Row Name 01/25/24 1456          Transfer Goal 1 (OT)    Activity/Assistive Device (Transfer Goal 1, OT) bed-to-chair/chair-to-bed;toilet;walker,  rolling;sit-to-stand/stand-to-sit  -     [place] Level/Cues Needed (Transfer Goal 1, OT) minimum assist (75% or more patient effort);verbal cues required  -     Time Frame (Transfer Goal 1, OT) long term goal (LTG);10 days  -     Progress/Outcome (Transfer Goal 1, OT) continuing progress toward goal;goal revised this date  -       Row Name 01/25/24 1458          Grooming Goal 1 (OT)    Activity/Device (Grooming Goal 1, OT) oral care;wash face, hands  -     [place] (Grooming Goal 1, OT) verbal cues required;nonverbal cues (demo/gesture) required;minimum assist (75% or more patient effort)  -     Time Frame (Grooming Goal 1, OT) long term goal (LTG);10 days  -     Strategies/Barriers (Grooming Goal 1, OT) unsupported sitting  -     Progress/Outcome (Grooming Goal 1, OT) goal revised this date;continuing progress toward goal  -       Row Name 01/25/24 1458          Problem Specific Goal 1 (OT)    Problem Specific Goal 1 (OT) Pt. will stand one minute in rw wx with min A to support ADL independence and caregiver care.  -     Time Frame (Problem Specific Goal 1, OT) long term goal (LTG);10 days  -     Progress/Outcome (Problem Specific Goal 1, OT) continuing progress toward goal  -               User Key  (r) = Recorded By, (t) = Taken By, (c) = Cosigned By      Initials Name Provider Type     Renetta Regan, OT Occupational Therapist                   Clinical Impression       Row Name 01/25/24 1454          Pain Assessment    Pretreatment Pain Rating 0/10 - no pain  -     Posttreatment Pain Rating 0/10 - no pain  -       Row Name 01/25/24 1454          Plan of Care Review    Plan of Care Reviewed With patient  -     Progress improving  -     Outcome Evaluation Pt continuing to progress toward IPOT goals. Pt's ADL independence limited d/t generalized weakness, decreased functional endurance, and balance deficits. Will continue to progress pt as tolerated per OT POC. Rec  SNF at d/c.  -       Row Name 01/25/24 1454          Therapy Assessment/Plan (OT)    Rehab Potential (OT) good, to achieve stated therapy goals  -     Criteria for Skilled Therapeutic Interventions Met (OT) yes;meets criteria;skilled treatment is necessary  -     Therapy Frequency (OT) daily  -       Row Name 01/25/24 1454          Therapy Plan Review/Discharge Plan (OT)    Anticipated Discharge Disposition (OT) skilled nursing facility  -       Row Name 01/25/24 1454          Vital Signs    O2 Delivery Pre Treatment room air  -     O2 Delivery Intra Treatment room air  -     O2 Delivery Post Treatment room air  -     Pre Patient Position Supine  -     Intra Patient Position Standing  -     Post Patient Position Sitting  -       Row Name 01/25/24 1454          Positioning and Restraints    Pre-Treatment Position in bed  -     Post Treatment Position chair  -     In Chair notified nsg;reclined;call light within reach;encouraged to call for assist;exit alarm on;waffle cushion;on mechanical lift sling;legs elevated  -               User Key  (r) = Recorded By, (t) = Taken By, (c) = Cosigned By      Initials Name Provider Type    Renetta Baker, ARNOLD Occupational Therapist                   Outcome Measures       Row Name 01/25/24 1455          How much help from another is currently needed...    Putting on and taking off regular lower body clothing? 2  -MC     Bathing (including washing, rinsing, and drying) 2  -MC     Toileting (which includes using toilet bed pan or urinal) 2  -MC     Putting on and taking off regular upper body clothing 3  -MC     Taking care of personal grooming (such as brushing teeth) 3  -MC     Eating meals 3  -MC     AM-PAC 6 Clicks Score (OT) 15  -       Row Name 01/25/24 1447 01/25/24 0800       How much help from another person do you currently need...    Turning from your back to your side while in flat bed without using bedrails? 2  -SD 2  -KF    Moving  from lying on back to sitting on the side of a flat bed without bedrails? 2  -SD 2  -KF    Moving to and from a bed to a chair (including a wheelchair)? 2  -SD 2  -KF    Standing up from a chair using your arms (e.g., wheelchair, bedside chair)? 2  -SD 2  -KF    Climbing 3-5 steps with a railing? 1  -SD 1  -KF    To walk in hospital room? 1  -SD 1  -KF    AM-PAC 6 Clicks Score (PT) 10  -SD 10  -KF    Highest Level of Mobility Goal 4 --> Transfer to chair/commode  -SD 4 --> Transfer to chair/commode  -KF      Row Name 01/25/24 1455 01/25/24 1447       Functional Assessment    Outcome Measure Options AM-PAC 6 Clicks Daily Activity (OT)  - AM-PAC 6 Clicks Basic Mobility (PT)  -SD              User Key  (r) = Recorded By, (t) = Taken By, (c) = Cosigned By      Initials Name Provider Type    Trish Perez, PT Physical Therapist    Renetta Baker, OT Occupational Therapist    Alicia Raygoza, RN Registered Nurse                    Occupational Therapy Education       Title: PT OT SLP Therapies (In Progress)       Topic: Occupational Therapy (In Progress)       Point: ADL training (In Progress)       Description:   Instruct learner(s) on proper safety adaptation and remediation techniques during self care or transfers.   Instruct in proper use of assistive devices.                  Learning Progress Summary             Patient Acceptance, E, NR by  at 1/25/2024 1456    Acceptance, E, DU,NR by  at 1/22/2024 1615    Comment: OT POC; BADL's; orientation; UE HEP    Acceptance, E,D, NR by [facility] at 1/15/2024 1151    Comment: re-orientation, bed mobility sequencing, UE TE, transfer technique, posture    Acceptance, E, NR by  at 1/12/2024 1434    Acceptance, E, NR by  at 1/10/2024 1023    Nonacceptance, E, NL by  at 1/5/2024 1044    Acceptance, E, NR by  at 1/3/2024 1435    Acceptance, E, VU,NR by [facility] at 1/1/2024 0920    Comment: oriented to date, need to move and position RLE, ADL progression,  sequencing bed mobililty    Acceptance, E, NR by  at 12/27/2023 1135   Family Acceptance, E,D, NR by JB1 at 1/15/2024 1151    Comment: re-orientation, bed mobility sequencing, UE TE, transfer technique, posture    Acceptance, E, VU,NR by JB at 1/1/2024 0920    Comment: oriented to date, need to move and position RLE, ADL progression, sequencing bed mobililty                         Point: Home exercise program (In Progress)       Description:   Instruct learner(s) on appropriate technique for monitoring, assisting and/or progressing therapeutic exercises/activities.                  Learning Progress Summary             Patient Acceptance, E, NR by  at 1/25/2024 1456    Acceptance, E, DU,NR by DILIP at 1/22/2024 1615    Comment: OT POC; BADL's; orientation; UE HEP    Acceptance, E,D, NR by [facility] at 1/15/2024 1151    Comment: re-orientation, bed mobility sequencing, UE TE, transfer technique, posture   Family Acceptance, E,D, NR by [facility] at 1/15/2024 1151    Comment: re-orientation, bed mobility sequencing, UE TE, transfer technique, posture                         Point: Precautions (In Progress)       Description:   Instruct learner(s) on prescribed precautions during self-care and functional transfers.                  Learning Progress Summary             Patient Acceptance, E, NR by  at 1/25/2024 1456    Acceptance, E,D, NR by [facility] at 1/15/2024 1151    Comment: re-orientation, bed mobility sequencing, UE TE, transfer technique, posture    Acceptance, E, NR by  at 1/12/2024 1434    Acceptance, E, VU,NR by JB at 1/1/2024 0920    Comment: oriented to date, need to move and position RLE, ADL progression, sequencing bed mobililty   Family Acceptance, E,D, NR by JB at 1/15/2024 1151    Comment: re-orientation, bed mobility sequencing, UE TE, transfer technique, posture    Acceptance, E, VU,NR by JB at 1/1/2024 0920    Comment: oriented to date, need to move and position RLE, ADL progression, sequencing bed  mobililty                         Point: Body mechanics (In Progress)       Description:   Instruct learner(s) on proper positioning and spine alignment during self-care, functional mobility activities and/or exercises.                  Learning Progress Summary             Patient Acceptance, E, NR by  at 1/25/2024 1456    Acceptance, E,D, NR by [facility] at 1/15/2024 1151    Comment: re-orientation, bed mobility sequencing, UE TE, transfer technique, posture    Acceptance, E, NR by  at 1/12/2024 1434    Acceptance, E, VU,NR by [facility] at 1/1/2024 0920    Comment: oriented to date, need to move and position RLE, ADL progression, sequencing bed mobililty   Family Acceptance, E,D, NR by [facility] at 1/15/2024 1151    Comment: re-orientation, bed mobility sequencing, UE TE, transfer technique, posture    Acceptance, E, VU,NR by [facility] at 1/1/2024 0920    Comment: oriented to date, need to move and position RLE, ADL progression, sequencing bed mobililty                                         User Key       Initials Effective Dates Name Provider Type Discipline    [facility] 07/11/23 -  Rayna Wall, OT Occupational Therapist OT    [facility] 02/03/23 -  Sepideh Feldman, OT Occupational Therapist OT     09/02/21 -  Eduarda Avelar, OT Occupational Therapist OT     06/16/21 -  Bhavani Galan, OT Occupational Therapist OT     10/14/22 -  Renetta Regan OT Occupational Therapist OT                  OT Recommendation and Plan  Therapy Frequency (OT): daily  Plan of Care Review  Plan of Care Reviewed With: patient  Progress: improving  Outcome Evaluation: Pt continuing to progress toward IPOT goals. Pt's ADL independence limited d/t generalized weakness, decreased functional endurance, and balance deficits. Will continue to progress pt as tolerated per OT POC. Rec SNF at d/c.     Time Calculation:         Time Calculation- OT       Row Name 01/25/24 1456             Time Calculation- OT    OT Start Time 1120  -      OT Received On 01/25/24   -      OT Goal Re-Cert Due Date 02/04/24  -         Timed Charges    01766 - OT Therapeutic Exercise Minutes 5  -      92404 - OT Therapeutic Activity Minutes 12  -      74967 - OT Self Care/Mgmt Minutes 25  -         Total Minutes    Timed Charges Total Minutes 42  -       Total Minutes 42  -                User Key  (r) = Recorded By, (t) = Taken By, (c) = Cosigned By      Initials Name Provider Type     Renetta Regan OT Occupational Therapist                  Therapy Charges for Today       Code Description Service Date Service Provider Modifiers Qty    [id number]  OT THERAPEUTIC ACT EA 15 MIN 1/25/2024 Renetta Regan OT GO 1    99561698349 HC OT SELF CARE/MGMT/TRAIN EA 15 MIN 1/25/2024 Renetta Regan OT GO 2                 Renetta Regan OT  1/25/2024

## 2024-01-25 NOTE — THERAPY TREATMENT NOTE
Patient Name: Omar Mendoza  : 1940    MRN: [id number]                              Today's Date: 2024       Admit Date: 2023    Visit Dx:     ICD-10-CM ICD-9-CM   1. Generalized weakness  R53.1 780.79   2. Acute UTI  N39.0 599.0   3. Bullous pemphigoid  L12.0 694.5   4. Venous insufficiency  I87.2 459.81   5. Dysphagia, unspecified type  R13.10 787.20     Patient Active Problem List   Diagnosis    UTI (urinary tract infection)    Bullous pemphigoid    Essential hypertension    Dyslipidemia    Edema of lower extremity    Cholestatic hepatitis    Acute constipation    Acute urinary retention    Uterine prolapse    Bradycardia    Pressure injury of buttock, unstageable    Type 2 diabetes mellitus with ketoacidosis, without long-term current use of insulin    Disorientation    Pleural effusion    Severe malnutrition     Past Medical History:   Diagnosis Date    CAD (coronary artery disease)     Dementia     Diabetes mellitus     Hyperlipemia     Hypertension      History reviewed. No pertinent surgical history.   General Information       Row Name 24 1404          Physical Therapy Time and Intention    Document Type therapy note (daily note)  -SD     Mode of Treatment physical therapy  -SD       Row Name 24 1404          General Information    Existing Precautions/Restrictions fall;seizures  dementia, R heel wound, sacral pressure ulcer, L hip pressure ulcer  -SD       Row Name 24 1404          Cognition    Orientation Status (Cognition) oriented to;person;time;verbal cues/prompts needed for orientation;place  -SD       Row Name 24 1404          Safety Issues, Functional Mobility    Safety Issues Affecting Function (Mobility) awareness of need for assistance;friction/shear risk;insight into deficits/self-awareness;judgment;sequencing abilities;safety precautions follow-through/compliance;safety precaution awareness;problem-solving  -SD     Impairments Affecting Function  (Mobility) balance;cognition;coordination;endurance/activity tolerance;motor planning;postural/trunk control;strength  -SD               User Key  (r) = Recorded By, (t) = Taken By, (c) = Cosigned By      Initials Name Provider Type    Trish Perez PT Physical Therapist                   Mobility       Row Name 01/25/24 1443          Bed Mobility    Bed Mobility rolling left;rolling right  -SD     Rolling Left [place] (Bed Mobility) maximum assist (25% patient effort);1 person assist;nonverbal cues (demo/gesture);verbal cues  -SD     Rolling Right [place] (Bed Mobility) moderate assist (50% patient effort);verbal cues;nonverbal cues (demo/gesture);1 person assist  -SD     Assistive Device (Bed Mobility) bed rails;draw sheet;head of bed elevated  -SD     Comment, (Bed Mobility) cues for hand placement and sequencing  -SD       Row Name 01/25/24 1443          Transfers    Comment, (Transfers) pt practiced sit <> stand x3 reps from recliner with maxA x2 progressing to modA x2 with max cues for hand placement on walker. Pt incontinent of bowels with standing.  -SD       Row Name 01/25/24 1443          Bed-Chair Transfer    Bed-Chair [place] (Transfers) dependent (less than 25% patient effort);2 person assist;verbal cues  -SD     Assistive Device (Bed-Chair Transfers) lift device  -SD       Row Name 01/25/24 1443          Sit-Stand Transfer    Sit-Stand [place] (Transfers) moderate assist (50% patient effort);2 person assist;verbal cues;nonverbal cues (demo/gesture)  -SD     Assistive Device (Sit-Stand Transfers) walker, front-wheeled  -SD       Row Name 01/25/24 1443          Gait/Stairs (Locomotion)    [place] Level (Gait) unable to assess  -SD               User Key  (r) = Recorded By, (t) = Taken By, (c) = Cosigned By      Initials Name Provider Type    Trish Perez PT Physical Therapist                   Obj/Interventions       Row Name 01/25/24 1445          Balance     Balance Assessment sitting static balance;standing static balance  -SD     Static Sitting Balance standby assist  -SD     Position, Sitting Balance unsupported;sitting in chair  -SD     Static Standing Balance moderate assist;2-person assist  -SD     Position/Device Used, Standing Balance supported;walker, rolling  -SD               User Key  (r) = Recorded By, (t) = Taken By, (c) = Cosigned By      Initials Name Provider Type    Trish Perez, CHELO Physical Therapist                   Goals/Plan    No documentation.                  Clinical Impression       Row Name 01/25/24 1445          Pain    Pretreatment Pain Rating 0/10 - no pain  -SD     Posttreatment Pain Rating 0/10 - no pain  -SD       Row Name 01/25/24 1445          Plan of Care Review    Plan of Care Reviewed With patient  -SD     Progress improving  -SD     Outcome Evaluation pt practiced sit <> stand x3 reps from recliner with maxA x2 progressing to modA x2 with max cues for hand placement on walker. Pt incontinent of bowels with standing. Pt remains below baseline level of function for mobility and IPPT services continue to be warranted at this time.  -SD       Row Name 01/25/24 1445          Positioning and Restraints    Pre-Treatment Position sitting in chair/recliner  -SD     Post Treatment Position bed  -SD     In Bed notified nsg;side lying right;call light within reach;encouraged to call for assist;exit alarm on;heels elevated;pillow between legs  -SD               User Key  (r) = Recorded By, (t) = Taken By, (c) = Cosigned By      Initials Name Provider Type    Trish Perez PT Physical Therapist                   Outcome Measures       Row Name 01/25/24 1447          How much help from another person do you currently need...    Turning from your back to your side while in flat bed without using bedrails? 2  -SD     Moving from lying on back to sitting on the side of a flat bed without bedrails? 2  -SD     Moving to and  from a bed to a chair (including a wheelchair)? 2  -SD     Standing up from a chair using your arms (e.g., wheelchair, bedside chair)? 2  -SD     Climbing 3-5 steps with a railing? 1  -SD     To walk in hospital room? 1  -SD     AM-PAC 6 Clicks Score (PT) 10  -SD     Highest Level of Mobility Goal 4 --> Transfer to chair/commode  -SD       Row Name 01/25/24 1447          Functional Assessment    Outcome Measure Options AM-PAC 6 Clicks Basic Mobility (PT)  -SD               User Key  (r) = Recorded By, (t) = Taken By, (c) = Cosigned By      Initials Name Provider Type    Trish Perez PT Physical Therapist                                 Physical Therapy Education       Title: PT OT SLP Therapies (In Progress)       Topic: Physical Therapy (In Progress)       Point: Mobility training (In Progress)       Learning Progress Summary             Patient Acceptance, E, NR by SD at 1/25/2024 1447    Acceptance, E, NR by ML at 1/22/2024 1144    Acceptance, E, VU,NR by NS at 1/19/2024 1432    Acceptance, E, NR by KG at 1/18/2024 0944    Acceptance, E, VU,NR by ML at 1/15/2024 1314    Acceptance, E, NR by AE at 1/10/2024 0919    Acceptance, E, NR by AE at 1/3/2024 1337    Acceptance, E, NR by KE at 12/29/2023 1131    Acceptance, E, NR by KE at 12/27/2023 1045   Family Acceptance, E, VU,NR by ML at 1/15/2024 1314                         Point: Home exercise program (In Progress)       Learning Progress Summary             Patient Acceptance, E, NR by SD at 1/25/2024 1447    Acceptance, E, VU,NR by NS at 1/19/2024 1432    Acceptance, E, NR by KG at 1/18/2024 0944                         Point: Body mechanics (In Progress)       Learning Progress Summary             Patient Acceptance, E, NR by SD at 1/25/2024 1447    Acceptance, E, VU,NR by NS at 1/19/2024 1432    Acceptance, E, NR by KG at 1/18/2024 0944    Acceptance, E, VU,NR by ML at 1/15/2024 1314    Acceptance, E, NR by AE at 1/10/2024 0919    Acceptance, E, NR  by AE at 1/3/2024 1337    Acceptance, E, NR by KE at 12/29/2023 1131    Acceptance, E, NR by KE at 12/27/2023 1045   Family Acceptance, E, VU,NR by ML at 1/15/2024 1314                         Point: Precautions (In Progress)       Learning Progress Summary             Patient Acceptance, E, NR by SD at 1/25/2024 1447    Acceptance, E, NR by ML at 1/22/2024 1144    Acceptance, E, VU,NR by NS at 1/19/2024 1432    Acceptance, E, NR by KG at 1/18/2024 0944    Acceptance, E, VU,NR by ML at 1/15/2024 1314    Acceptance, E, NR by AE at 1/10/2024 0919    Acceptance, E, NR by AE at 1/3/2024 1337    Acceptance, E, NR by KE at 12/29/2023 1131    Acceptance, E, NR by KE at 12/27/2023 1045   Family Acceptance, E, VU,NR by ML at 1/15/2024 1314                                         User Key       Initials Effective Dates Name Provider Type Discipline    SD 03/13/23 -  Trish Luis, PT Physical Therapist PT    KG 01/04/23 -  Carolyn Herrera Physical Therapist PT    NS 06/16/21 -  Traci Avelar, PT Physical Therapist PT    ML 04/22/21 -  Carin Villegas Physical Therapist PT    AE 09/21/21 -  Domo Desir, PT Physical Therapist PT    KE 11/16/23 -  Petty Mckoy, PT Physical Therapist PT                  PT Recommendation and Plan     Plan of Care Reviewed With: patient  Progress: improving  Outcome Evaluation: pt practiced sit <> stand x3 reps from recliner with maxA x2 progressing to modA x2 with max cues for hand placement on walker. Pt incontinent of bowels with standing. Pt remains below baseline level of function for mobility and IPPT services continue to be warranted at this time.     Time Calculation:         PT Charges       Row Name 01/25/24 1448             Time Calculation    Start Time 1404  -SD      PT Received On 01/25/24  -SD      PT Goal Re-Cert Due Date 02/01/24  -SD         Time Calculation- PT    Total Timed Code Minutes- PT 23 minute(s)  -SD         Timed Charges    96524 - PT Therapeutic  Activity Minutes 23  -SD         Total Minutes    Timed Charges Total Minutes 23  -SD       Total Minutes 23  -SD                User Key  (r) = Recorded By, (t) = Taken By, (c) = Cosigned By      Initials Name Provider Type    Trish Perez, [place] Physical Therapist                  Therapy Charges for Today       Code Description Service Date Service Provider Modifiers Qty    24971917680  PT THERAPEUTIC ACT EA 15 MIN 1/25/2024 Trish Luis, PT GP 2    17975599676 HC PT THER SUPP EA 15 MIN 1/25/2024 Trish Luis, PT GP 2            PT G-Codes  Outcome Measure Options: AM-PAC 6 Clicks Basic Mobility (PT)  AM-PAC 6 Clicks Score (PT): 10  AM-PAC 6 Clicks Score (OT): 14       Trish Luis PT  1/25/2024

## 2024-01-25 NOTE — CASE MANAGEMENT/SOCIAL WORK
Continued Stay Note  [facility]     Patient Name: Omar Mendoza  MRN: [id number]  Today's Date: 1/24/2024    Admit Date: 12/26/2023    Plan: SNF/LTC   Discharge Plan       Row Name 01/24/24 1930       Plan    Plan SNF/LTC    Patient/Family in Agreement with Plan yes  Daughter, Jessi Naidu    Plan Comments I met with daughter, Jessi Naidu, at bedside yesterday.  Discharge plan continues to be skilled rehab transitioning into LTC.  No bed offers to date, daughter agreeable to referrals to [facility], [facility] and [facility].  Case Management will continue search for bed.  Mae William, Ext. [phone]    Final Discharge Disposition Code 03 - skilled nursing facility (SNF)                   Discharge Codes    No documentation.                 Expected Discharge Date and Time       Expected Discharge Date Expected Discharge Time    Jan 26, 2024               MARIOLA Jerry

## 2024-01-25 NOTE — PLAN OF CARE
Problem: Adult Inpatient Plan of Care  Goal: Optimal Comfort and Wellbeing  Intervention: Provide Person-Centered Care  Recent Flowsheet Documentation  Taken 1/24/2024 2010 by Molly Martinez RN  Trust Relationship/Rapport:   care explained   choices provided   reassurance provided   thoughts/feelings acknowledged   Goal Outcome Evaluation:   Continued comfort care with no PRN's or acute events noted.

## 2024-01-25 NOTE — PLAN OF CARE
Goal Outcome Evaluation:      Resting well, no c/o, up in chair some of day, turned q2-4h, dressing on hips and coccyx changed, no prns given, comfort measures

## 2024-01-25 NOTE — PROGRESS NOTES
Continued Stay Note  [facility]     Patient Name: Omar Mendoza  MRN: [id number]  Today's Date: 1/25/2024    Admit Date: 12/26/2023    Plan: SNF to LTC   Discharge Plan       Row Name 01/25/24 1003       Plan    Plan SNF to LTC    Plan Comments Hospice liaison continues to follow on the periphery while placement is found. Please call [phone] if can be of further assistance.                   Discharge Codes    No documentation.                 Expected Discharge Date and Time       Expected Discharge Date Expected Discharge Time    Jan 26, 2024               Sarah Farris RN

## 2024-01-26 PROCEDURE — 99232 SBSQ HOSP IP/OBS MODERATE 35: CPT | Performed by: NURSE PRACTITIONER

## 2024-01-26 PROCEDURE — 97530 THERAPEUTIC ACTIVITIES: CPT

## 2024-01-26 RX ADMIN — RIVASTIGMINE TRANSDERMAL SYSTEM 1 PATCH: 9.5 PATCH, EXTENDED RELEASE TRANSDERMAL at 08:13

## 2024-01-26 RX ADMIN — MIRTAZAPINE 15 MG: 15 TABLET, FILM COATED ORAL at 18:18

## 2024-01-26 NOTE — PLAN OF CARE
Goal Outcome Evaluation:                      VSS on room air. Patient resting well throughout night. No pain reported. PW in place-- adequate UOP. Comfort measures ongoing.

## 2024-01-26 NOTE — THERAPY TREATMENT NOTE
Patient Name: Omar Mendoza  : 1940    MRN: [id number]                              Today's Date: 2024       Admit Date: 2023    Visit Dx:     ICD-10-CM ICD-9-CM   1. Generalized weakness  R53.1 780.79   2. Acute UTI  N39.0 599.0   3. Bullous pemphigoid  L12.0 694.5   4. Venous insufficiency  I87.2 459.81   5. Dysphagia, unspecified type  R13.10 787.20     Patient Active Problem List   Diagnosis    UTI (urinary tract infection)    Bullous pemphigoid    Essential hypertension    Dyslipidemia    Edema of lower extremity    Cholestatic hepatitis    Acute constipation    Acute urinary retention    Uterine prolapse    Bradycardia    Pressure injury of buttock, unstageable    Type 2 diabetes mellitus with ketoacidosis, without long-term current use of insulin    Disorientation    Pleural effusion    Severe malnutrition     Past Medical History:   Diagnosis Date    CAD (coronary artery disease)     Dementia     Diabetes mellitus     Hyperlipemia     Hypertension      History reviewed. No pertinent surgical history.   General Information       Row Name 24 1506          Physical Therapy Time and Intention    Document Type therapy note (daily note)  -     Mode of Treatment physical therapy  -       Row Name 24 1506          General Information    Patient Profile Reviewed yes  -DILIP     Existing Precautions/Restrictions fall;seizures;other (see comments)  dementia, R heel wound, sacral wound, hip wound.  -       Row Name 24 1506          Cognition    Orientation Status (Cognition) oriented to;person;place  -       Row Name 24 1506          Safety Issues, Functional Mobility    Impairments Affecting Function (Mobility) balance;cognition;coordination;endurance/activity tolerance;motor planning;postural/trunk control;strength  -               User Key  (r) = Recorded By, (t) = Taken By, (c) = Cosigned By      Initials Name Provider Type    Renata Rob PT Physical  Therapist                   Mobility       Row Name 01/26/24 1507          Bed Mobility    Bed Mobility rolling left;rolling right  -DILIP     Rolling Left [place] (Bed Mobility) moderate assist (50% patient effort);verbal cues;1 person assist  -DILIP     Rolling Right [place] (Bed Mobility) moderate assist (50% patient effort);1 person assist;verbal cues  -DILIP     Assistive Device (Bed Mobility) bed rails;draw sheet  -DILIP     Comment, (Bed Mobility) completed rolling to L x2. Pt had bowel movement, required multiple position changes for hygiene. Pt utlized bed rails to assist with rolling.  -DILIP       Row Name 01/26/24 1507          Transfers    Comment, (Transfers) After toileting hygiene and lift to chair, pt too fatigued to attempt sit to stands.  -       Row Name 01/26/24 1507          Bed-Chair Transfer    Bed-Chair [place] (Transfers) dependent (less than 25% patient effort);2 person assist  -DILIP     Assistive Device (Bed-Chair Transfers) lift device  -DILIP     Comment, (Bed-Chair Transfer) Completed lift to chair due to fatigue after toileting hygiene and bed mobility.  -       Row Name 01/26/24 1507          Sit-Stand Transfer    Sit-Stand [place] (Transfers) unable to assess  -               User Key  (r) = Recorded By, (t) = Taken By, (c) = Cosigned By      Initials Name Provider Type    Renata Rob PT Physical Therapist                   Obj/Interventions       Row Name 01/26/24 1513          Motor Skills    Therapeutic Exercise ankle  -       Row Name 01/26/24 1513          Ankle (Therapeutic Exercise)    Ankle (Therapeutic Exercise) AROM (active range of motion)  -DILIP     Ankle AROM (Therapeutic Exercise) bilateral;dorsiflexion;plantarflexion;10 repetitions;sitting  -       Row Name 01/26/24 1513          Balance    Balance Assessment sitting static balance  -DILIP     Static Sitting Balance standby assist  -DILIP     Position, Sitting Balance supported;sitting in chair  -DILIP      Balance Interventions sitting  -DILIP               User Key  (r) = Recorded By, (t) = Taken By, (c) = Cosigned By      Initials Name Provider Type    Renata Rob, PT Physical Therapist                   Goals/Plan    No documentation.                  Clinical Impression       Row Name 01/26/24 1513          Pain    Pretreatment Pain Rating 0/10 - no pain  -DILIP     Posttreatment Pain Rating 0/10 - no pain  -DILIP       Row Name 01/26/24 1513          Plan of Care Review    Plan of Care Reviewed With patient  -DILIP     Progress improving  -DILIP     Outcome Evaluation Pt agreeable to transfer to chair, however, became fatigued after bed mobility and toileting hygiene. Mod A to roll with use of bed rails, lift to chair. Cont to progress, rec SNF upon DC.  -DILIP       Row Name 01/26/24 1513          Therapy Assessment/Plan (PT)    Rehab Potential (PT) fair, will monitor progress closely  -DILIP     Criteria for Skilled Interventions Met (PT) yes;meets criteria;skilled treatment is necessary  -DILIP     Therapy Frequency (PT) daily  -DILIP       Row Name 01/26/24 1513          Vital Signs    O2 Delivery Pre Treatment room air  -DILIP     O2 Delivery Intra Treatment room air  -DILIP     O2 Delivery Post Treatment room air  -DILIP     Pre Patient Position Supine  -DILIP     Intra Patient Position Sitting  -DILIP     Post Patient Position Supine  -DILIP       Row Name 01/26/24 1513          Positioning and Restraints    Pre-Treatment Position in bed  -DILIP     Post Treatment Position chair  -DILIP     In Chair notified nsg;reclined;call light within reach;with family/caregiver;exit alarm on;encouraged to call for assist;heels elevated;on mechanical lift sling;waffle cushion  -DILIP               User Key  (r) = Recorded By, (t) = Taken By, (c) = Cosigned By      Initials Name Provider Type    Renata Rob, PT Physical Therapist                   Outcome Measures       Row Name 01/26/24 1516 01/26/24 0800       How much help from another person do you  currently need...    Turning from your back to your side while in flat bed without using bedrails? 3  -DILIP 3  -KF    Moving from lying on back to sitting on the side of a flat bed without bedrails? 2  -DILIP 2  -KF    Moving to and from a bed to a chair (including a wheelchair)? 2  -DILIP 2  -KF    Standing up from a chair using your arms (e.g., wheelchair, bedside chair)? 2  -DILIP 2  -KF    Climbing 3-5 steps with a railing? 1  -DILIP 1  -KF    To walk in hospital room? 1  -DILIP 1  -KF    AM-PAC 6 Clicks Score (PT) 11  -DILIP 11  -KF    Highest Level of Mobility Goal 4 --> Transfer to chair/commode  -DILIP 4 --> Transfer to chair/commode  -KF      Row Name 01/26/24 1516          Functional Assessment    Outcome Measure Options AM-PAC 6 Clicks Basic Mobility (PT)  -DILIP               User Key  (r) = Recorded By, (t) = Taken By, (c) = Cosigned By      Initials Name Provider Type    Renata Rob, PT Physical Therapist    Alicia Raygoaz, RN Registered Nurse                                 Physical Therapy Education       Title: PT OT SLP Therapies (In Progress)       Topic: Physical Therapy (Done)       Point: Mobility training (Done)       Learning Progress Summary             Patient Acceptance, E, VU,NR by DILIP at 1/26/2024 1516    Acceptance, E, NR by SD at 1/25/2024 1447    Acceptance, E, NR by ML at 1/22/2024 1144    Acceptance, E, VU,NR by NS at 1/19/2024 1432    Acceptance, E, NR by KG at 1/18/2024 0944    Acceptance, E, VU,NR by ML at 1/15/2024 1314    Acceptance, E, NR by AE at 1/10/2024 0919    Acceptance, E, NR by AE at 1/3/2024 1337    Acceptance, E, NR by KE at 12/29/2023 1131    Acceptance, E, NR by KE at 12/27/2023 1045   Family Acceptance, E, VU,NR by ML at 1/15/2024 1314                         Point: Home exercise program (Done)       Learning Progress Summary             Patient Acceptance, E, VU,NR by DILIP at 1/26/2024 1516    Acceptance, E, NR by SD at 1/25/2024 1447    Acceptance, E, PHILIP,NR by NS at 1/19/2024 1432     Acceptance, E, NR by KG at 1/18/2024 0944                         Point: Body mechanics (Done)       Learning Progress Summary             Patient Acceptance, E, VU,NR by DILIP at 1/26/2024 1516    Acceptance, E, NR by SD at 1/25/2024 1447    Acceptance, E, VU,NR by NS at 1/19/2024 1432    Acceptance, E, NR by KG at 1/18/2024 0944    Acceptance, E, VU,NR by ML at 1/15/2024 1314    Acceptance, E, NR by AE at 1/10/2024 0919    Acceptance, E, NR by AE at 1/3/2024 1337    Acceptance, E, NR by KE at 12/29/2023 1131    Acceptance, E, NR by KE at 12/27/2023 1045   Family Acceptance, E, VU,NR by ML at 1/15/2024 1314                         Point: Precautions (Done)       Learning Progress Summary             Patient Acceptance, E, VU,NR by DILIP at 1/26/2024 1516    Acceptance, E, NR by SD at 1/25/2024 1447    Acceptance, E, NR by ML at 1/22/2024 1144    Acceptance, E, VU,NR by NS at 1/19/2024 1432    Acceptance, E, NR by KG at 1/18/2024 0944    Acceptance, E, VU,NR by ML at 1/15/2024 1314    Acceptance, E, NR by AE at 1/10/2024 0919    Acceptance, E, NR by AE at 1/3/2024 1337    Acceptance, E, NR by KE at 12/29/2023 1131    Acceptance, E, NR by KE at 12/27/2023 1045   Family Acceptance, E, VU,NR by ML at 1/15/2024 1314                                         User Key       Initials Effective Dates Name Provider Type Discipline    SD 03/13/23 -  Trish Luis, PT Physical Therapist PT    DILIP 06/16/21 -  Renata Hines, PT Physical Therapist PT    KG 01/04/23 -  Carolyn Herrera Physical Therapist PT    NS 06/16/21 -  Traci Avelar, PT Physical Therapist PT    ML 04/22/21 -  Carin iVllegas Physical Therapist PT    AE 09/21/21 -  Domo Desir, PT Physical Therapist PT    KE 11/16/23 -  Petty Mckoy, PT Physical Therapist PT                  PT Recommendation and Plan     Plan of Care Reviewed With: patient  Progress: improving  Outcome Evaluation: Pt agreeable to transfer to chair, however, became fatigued after  bed mobility and toileting hygiene. Mod A to roll with use of bed rails, lift to chair. Cont to progress, rec SNF upon DC.     Time Calculation:         PT Charges       Row Name 01/26/24 1517             Time Calculation    Start Time 1426  -DILIP      PT Received On 01/26/24  -DILIP      PT Goal Re-Cert Due Date 02/01/24  -DILIP         Time Calculation- PT    Total Timed Code Minutes- PT 29 minute(s)  -DILIP         Timed Charges    68853 - PT Therapeutic Exercise Minutes 5  -DILIP      18663 - PT Therapeutic Activity Minutes 24  -DILIP         Total Minutes    Timed Charges Total Minutes 29  -DILIP       Total Minutes 29  -DILIP                User Key  (r) = Recorded By, (t) = Taken By, (c) = Cosigned By      Initials Name Provider Type    Renata Rob, PT Physical Therapist                  Therapy Charges for Today       Code Description Service Date Service Provider Modifiers Qty    03705650167 HC PT THERAPEUTIC ACT EA 15 MIN 1/26/2024 Renata Hines, PT GP 2            PT G-Codes  Outcome Measure Options: AM-PAC 6 Clicks Basic Mobility (PT)  AM-PAC 6 Clicks Score (PT): 11  AM-PAC 6 Clicks Score (OT): 15  PT Discharge Summary  Anticipated Discharge Disposition (PT): skilled nursing facility    Renata Hines PT  1/26/2024

## 2024-01-26 NOTE — PLAN OF CARE
Goal Outcome Evaluation:  Plan of Care Reviewed With: patient        Progress: improving  Outcome Evaluation: Pt agreeable to transfer to chair, however, became fatigued after bed mobility and toileting hygiene. Mod A to roll with use of bed rails, lift to chair. Cont to progress, rec SNF upon DC.      Anticipated Discharge Disposition (PT): skilled nursing facility

## 2024-01-26 NOTE — CASE MANAGEMENT/SOCIAL WORK
Continued Stay Note  [facility]     Patient Name: Omar Mendoza  MRN: [id number]  Today's Date: 1/26/2024    Admit Date: 12/26/2023    Plan: LTC: [place]   Discharge Plan       Row Name 01/26/24 1056       Plan    Plan LTC: [place]    Plan Comments SW'er met with patient at bedside. Patient is being reviewed for [facility] for SNF/LTC.  Rep is contacting patient's daughter to discuss financials. Awaiting confirmation from [facility] rep. Plan is LTC.                   Discharge Codes    No documentation.                 Expected Discharge Date and Time       Expected Discharge Date Expected Discharge Time    Jan 26, 2024               MARIOLA Cheek (Kay)

## 2024-01-26 NOTE — PROGRESS NOTES
[facility] Medicine Services  PROGRESS NOTE    Patient Name: Omar Mendoza  : 1940  MRN: [id number]    Date of Admission: 2023  Primary Care Physician: Varun Pa MD    Subjective   Subjective     CC:  Follow-up dementia    HPI:  Patient seen resting in bed no apparent distress.  No acute events overnight per nursing.  Patient expresses readiness to leave the hospital.  States that the food here is not good.     Objective   Objective     Vital Signs:   Temp:  [97.9 °F (36.6 °C)-98.4 °F (36.9 °C)] 97.9 °F (36.6 °C)  Heart Rate:  [79-93] 79  Resp:  [16-18] 18  BP: (124-131)/(70-83) 129/70     Physical Exam:  Constitutional: No acute distress, awake, alert, frail, chronically ill-appearing  HENT: NCAT, mucous membranes moist  Respiratory: Clear to auscultation bilaterally, respiratory effort normal   Cardiovascular: RRR, no murmurs, cap refill brisk   Gastrointestinal: Positive bowel sounds, soft, nontender, nondistended  Musculoskeletal: No BLE edema   Psychiatric: Flat affect, cooperative  Neurologic: Alert, moves all extremities, speech clear  Skin: warm, dry, no visible rash     Results Reviewed:  LAB RESULTS:                              Brief Urine Lab Results  (Last result in the past 365 days)        Color   Clarity   Blood   Leuk Est   Nitrite   Protein   CREAT   Urine HCG        24 0053 Yellow   Clear   Negative   Trace   Negative   Negative                   Microbiology Results Abnormal       Procedure Component Value - Date/Time    COVID-19 RAPID AG,VERITOR,COR/JAMARI/PAD/NUPUR/ASHA/LAG/BRITTANY/ IN-HOUSE,DRY SWAB, 1 HR TAT - Swab, Nasal Cavity [[id number]]  (Normal) Collected: 24 0004    Lab Status: Final result Specimen: Swab from Nasal Cavity Updated: 24 0022     COVID19 Presumptive Negative    Narrative:      Fact sheets for providers: https://www.fda.gov/media/695313/download    Fact sheets for patients: https://www.fda.gov/media/847068/download     Blood Culture - Blood, Blood, PICC Line [[id number]]  (Normal) Collected: 12/27/23 1400    Lab Status: Final result Specimen: Blood, PICC Line Updated: 01/01/24 1531     Blood Culture No growth at 5 days    Blood Culture - Blood, Arm, Right [[id number]]  (Normal) Collected: 12/27/23 0431    Lab Status: Final result Specimen: Blood from Arm, Right Updated: 01/01/24 0515     Blood Culture No growth at 5 days            No radiology results from the last 24 hrs    Results for orders placed during the hospital encounter of 12/26/23    Adult Transthoracic Echo Complete W/ Cont if Necessary Per Protocol    Interpretation Summary    Left ventricular systolic function is normal. Calculated left ventricular EF = 57% Left ventricular ejection fraction appears to be 56 - 60%.    Left ventricular wall thickness is consistent with mild to moderate concentric hypertrophy.    Left ventricular diastolic function was normal.    Estimated right ventricular systolic pressure from tricuspid regurgitation is mildly elevated (35-45 mmHg).    There is a trivial pericardial effusion.    Mild aortic valve regurgitation.      Current medications:  Scheduled Meds:mirtazapine, 15 mg, Oral, Nightly  rivastigmine, 1 patch, Transdermal, Daily  senna-docusate sodium, 2 tablet, Oral, BID      Continuous Infusions:   PRN Meds:.  acetaminophen **OR** acetaminophen **OR** acetaminophen    senna-docusate sodium **AND** polyethylene glycol **AND** bisacodyl **AND** bisacodyl    Calcium Replacement - Follow Nurse / BPA Driven Protocol    dextromethorphan polistirex ER    dextrose    dextrose    glucagon (human recombinant)    glycopyrrolate    guaiFENesin    hydrOXYzine    Magnesium Standard Dose Replacement - Follow Nurse / BPA Driven Protocol    melatonin    nitroglycerin    ondansetron    Phosphorus Replacement - Follow Nurse / BPA Driven Protocol    Potassium Replacement - Follow Nurse / BPA Driven Protocol    sodium chloride     ziprasidone    Assessment & Plan   Assessment & Plan     Active Hospital Problems    Diagnosis  POA    **UTI (urinary tract infection) [N39.0]  Yes    Acute constipation [K59.00]  Unknown    Acute urinary retention [R33.8]  Unknown    Uterine prolapse [N81.4]  Unknown    Bradycardia [R00.1]  Unknown    Pressure injury of buttock, unstageable [L89.300]  Unknown    Type 2 diabetes mellitus with ketoacidosis, without long-term current use of insulin [E11.10]  Unknown    Disorientation [R41.0]  Unknown    Pleural effusion [J90]  Unknown    Severe malnutrition [E43]  Yes    Edema of lower extremity [R60.0]  Yes    Bullous pemphigoid [L12.0]  Yes    Dyslipidemia [E78.5]  Yes    Essential hypertension [I10]  Yes      Resolved Hospital Problems   No resolved problems to display.        Brief Hospital Course to date:  Omar Mendoza is a 83 y.o. female w DMII, bullous pemphigoid who presented w bullous pemphigoid lesion on heel on 12/26. Patient was noted to have worsening mental status throughout stay. She was evaluated by neurology with diagnosis of dementia c/b depression and paranoia. CM is following for placement.      This patient's problems and plans were partially entered by my partner and updated as appropriate by me 01/26/24.     Dementia c/b depression and paranoia  -med regimen simplified to exelon patch + remeron w good control  -hospice following      Oropharyngeal dysphagia - comfort diet  Aspiration PNA - s/p rocephin + doxycycline  Chronic anemia - no f/u labs  Uncontrolled DMII - 2/2 comfort measures no interventions  Constipation - bowel regimen PRN  Chronic uterine prolapse  Bullous pemphigoid  BMI 34     POC:   Comfort care with long-term placement pending, referrals pending      Expected Discharge Location and Transportation: LTC   Expected Discharge   Expected Discharge Date: 1/27/2024; Expected Discharge Time:       DVT prophylaxis:  No DVT prophylaxis order currently exists.         AM-PAC 6 Clicks  Score (PT): 10 (01/25/24 2000)    CODE STATUS:   Code Status and Medical Interventions:   Ordered at: 01/07/24 1143     Code Status (Patient has no pulse and is not breathing):    No CPR (Do Not Attempt to Resuscitate)     Medical Interventions (Patient has pulse or is breathing):    Comfort Measures       Frantz Martin, APRN  01/26/24

## 2024-01-27 PROCEDURE — 99231 SBSQ HOSP IP/OBS SF/LOW 25: CPT | Performed by: INTERNAL MEDICINE

## 2024-01-27 RX ADMIN — RIVASTIGMINE TRANSDERMAL SYSTEM 1 PATCH: 9.5 PATCH, EXTENDED RELEASE TRANSDERMAL at 08:21

## 2024-01-27 RX ADMIN — Medication 5 MG: at 20:35

## 2024-01-27 RX ADMIN — SENNOSIDES AND DOCUSATE SODIUM 2 TABLET: 8.6; 5 TABLET ORAL at 08:21

## 2024-01-27 RX ADMIN — HYDROXYZINE HYDROCHLORIDE 25 MG: 25 TABLET, FILM COATED ORAL at 20:37

## 2024-01-27 RX ADMIN — MIRTAZAPINE 15 MG: 15 TABLET, FILM COATED ORAL at 20:35

## 2024-01-27 RX ADMIN — SENNOSIDES AND DOCUSATE SODIUM 2 TABLET: 8.6; 5 TABLET ORAL at 20:35

## 2024-01-27 NOTE — PROGRESS NOTES
[facility] Medicine Services  PROGRESS NOTE    Patient Name: Omar Mendoza  : 1940  MRN: [id number]    Date of Admission: 2023  Primary Care Physician: Varun Pa MD    Subjective   Subjective     CC:  Dementia    HPI:  No acute events overnight.  Patient sleeping      Objective   Objective     Vital Signs:   Temp:  [97.9 °F (36.6 °C)-98 °F (36.7 °C)] 98 °F (36.7 °C)  Heart Rate:  [79-88] 88  Resp:  [16-18] 16  BP: (115-129)/(68-70) 115/68     Physical Exam:  Constitutional: No acute distress, sleeping, malnourished  Respiratory: Normal respiratory effort on room air  Skin: No rashes      Results Reviewed:  LAB RESULTS:                              Brief Urine Lab Results  (Last result in the past 365 days)        Color   Clarity   Blood   Leuk Est   Nitrite   Protein   CREAT   Urine HCG        24 0053 Yellow   Clear   Negative   Trace   Negative   Negative                   Microbiology Results Abnormal       Procedure Component Value - Date/Time    COVID-19 RAPID AG,VERITOR,COR/JAMARI/PAD/NUPUR/ASHA/LAG/BRITTANY/ IN-HOUSE,DRY SWAB, 1 HR TAT - Swab, Nasal Cavity [[id number]]  (Normal) Collected: 24 0004    Lab Status: Final result Specimen: Swab from Nasal Cavity Updated: 24 0022     COVID19 Presumptive Negative    Narrative:      Fact sheets for providers: https://www.fda.gov/media/037124/download    Fact sheets for patients: https://www.fda.gov/media/907687/download    Blood Culture - Blood, Blood, PICC Line [[id number]]  (Normal) Collected: 23 1400    Lab Status: Final result Specimen: Blood, PICC Line Updated: 24 1531     Blood Culture No growth at 5 days    Blood Culture - Blood, Arm, Right [[id number]]  (Normal) Collected: 23 0431    Lab Status: Final result Specimen: Blood from Arm, Right Updated: 24 0515     Blood Culture No growth at 5 days            No radiology results from the last 24 hrs    Results for orders placed during  the hospital encounter of 12/26/23    Adult Transthoracic Echo Complete W/ Cont if Necessary Per Protocol    Interpretation Summary    Left ventricular systolic function is normal. Calculated left ventricular EF = 57% Left ventricular ejection fraction appears to be 56 - 60%.    Left ventricular wall thickness is consistent with mild to moderate concentric hypertrophy.    Left ventricular diastolic function was normal.    Estimated right ventricular systolic pressure from tricuspid regurgitation is mildly elevated (35-45 mmHg).    There is a trivial pericardial effusion.    Mild aortic valve regurgitation.      Current medications:  Scheduled Meds:mirtazapine, 15 mg, Oral, Nightly  rivastigmine, 1 patch, Transdermal, Daily  senna-docusate sodium, 2 tablet, Oral, BID      Continuous Infusions:   PRN Meds:.  acetaminophen **OR** acetaminophen **OR** acetaminophen    senna-docusate sodium **AND** polyethylene glycol **AND** bisacodyl **AND** bisacodyl    Calcium Replacement - Follow Nurse / BPA Driven Protocol    dextromethorphan polistirex ER    dextrose    dextrose    glucagon (human recombinant)    glycopyrrolate    guaiFENesin    hydrOXYzine    Magnesium Standard Dose Replacement - Follow Nurse / BPA Driven Protocol    melatonin    nitroglycerin    ondansetron    Phosphorus Replacement - Follow Nurse / BPA Driven Protocol    Potassium Replacement - Follow Nurse / BPA Driven Protocol    sodium chloride    ziprasidone    Assessment & Plan   Assessment & Plan     Active Hospital Problems    Diagnosis  POA    **UTI (urinary tract infection) [N39.0]  Yes    Acute constipation [K59.00]  Unknown    Acute urinary retention [R33.8]  Unknown    Uterine prolapse [N81.4]  Unknown    Bradycardia [R00.1]  Unknown    Pressure injury of buttock, unstageable [L89.300]  Unknown    Type 2 diabetes mellitus with ketoacidosis, without long-term current use of insulin [E11.10]  Unknown    Disorientation [R41.0]  Unknown    Pleural  effusion [J90]  Unknown    Severe malnutrition [E43]  Yes    Edema of lower extremity [R60.0]  Yes    Bullous pemphigoid [L12.0]  Yes    Dyslipidemia [E78.5]  Yes    Essential hypertension [I10]  Yes      Resolved Hospital Problems   No resolved problems to display.        Brief Hospital Course to date:  Omar Mendoza is a 83 y.o. female w DMII, bullous pemphigoid who presented w bullous pemphigoid lesion on heel on 12/26. Patient was noted to have worsening mental status throughout stay. She was evaluated by neurology with diagnosis of dementia c/b depression and paranoia. CM is following for placement.      Dementia c/b depression and paranoia  -med regimen simplified to exelon patch + remeron w good control  -hospice following      Oropharyngeal dysphagia - comfort diet  Aspiration PNA - s/p rocephin + doxycycline  Chronic anemia - no f/u labs  Uncontrolled DMII - 2/2 comfort measures no interventions  Constipation - bowel regimen PRN  Chronic uterine prolapse  Bullous pemphigoid  BMI 34     POC:   Comfort care with long-term placement pending, referrals pending      Expected Discharge Location and Transportation: LTC   Expected Discharge   Expected Discharge Date: 1/26/2024; Expected Discharge Time:      DVT prophylaxis:  No DVT prophylaxis order currently exists.         AM-PAC 6 Clicks Score (PT): 11 (01/26/24 2000)    CODE STATUS:   Code Status and Medical Interventions:   Ordered at: 01/07/24 1143     Code Status (Patient has no pulse and is not breathing):    No CPR (Do Not Attempt to Resuscitate)     Medical Interventions (Patient has pulse or is breathing):    Comfort Measures     This patient's problems and plans were partially entered by my partner and updated as appropriate by me 01/27/24. Today is my first day evaluating this patient's active medical problems. I Personally reviewed chart and adjusted note to reflect daily changes in management/clinical condition. Copied text in this note has been  reviewed and is accurate as of  01/27/24      Marianela Dwyer MD  01/27/24

## 2024-01-27 NOTE — PLAN OF CARE
Goal Outcome Evaluation:                                            VSS on room air. Back to bed this shift and resting well. PW in place with dark yellow UOP. Patient pleasant and following commands. Awaiting placement.

## 2024-01-27 NOTE — PLAN OF CARE
Goal Outcome Evaluation:More awake and verbally engaging today.  Confusion has waned and she's able to communicate wants & needs.  Bladder scans show no need for catheterization, voids easily using purewick

## 2024-01-28 PROCEDURE — 99231 SBSQ HOSP IP/OBS SF/LOW 25: CPT | Performed by: INTERNAL MEDICINE

## 2024-01-28 RX ADMIN — MIRTAZAPINE 15 MG: 15 TABLET, FILM COATED ORAL at 19:57

## 2024-01-28 RX ADMIN — SENNOSIDES AND DOCUSATE SODIUM 2 TABLET: 8.6; 5 TABLET ORAL at 20:01

## 2024-01-28 RX ADMIN — RIVASTIGMINE TRANSDERMAL SYSTEM 1 PATCH: 9.5 PATCH, EXTENDED RELEASE TRANSDERMAL at 10:08

## 2024-01-28 RX ADMIN — Medication 5 MG: at 19:57

## 2024-01-28 NOTE — PROGRESS NOTES
[facility] Medicine Services  PROGRESS NOTE    Patient Name: Omar Mendoza  : 1940  MRN: [id number]    Date of Admission: 2023  Primary Care Physician: Varun Pa MD    Subjective   Subjective     CC:  Dementia    HPI:  Patient is doing well this morning.   at the bedside.  Patient ate her breakfast and wants some oatmeal.  No complaints.      Objective   Objective     Vital Signs:   Temp:  [97.3 °F (36.3 °C)-97.4 °F (36.3 °C)] 97.4 °F (36.3 °C)  Heart Rate:  [80-90] 90  Resp:  [17-18] 18  BP: (115-124)/(73) 115/73     Physical Exam:  Constitutional: No acute distress, awake, alert, thin  Respiratory: Clear to auscultation bilaterally, respiratory effort normal   Cardiovascular: RRR, no murmurs, rubs, or gallops  Gastrointestinal: Positive bowel sounds, soft, nontender, nondistended  Musculoskeletal: No bilateral ankle edema  Psychiatric: Flat affect  Neurologic: Oriented to person      Results Reviewed:  LAB RESULTS:                              Brief Urine Lab Results  (Last result in the past 365 days)        Color   Clarity   Blood   Leuk Est   Nitrite   Protein   CREAT   Urine HCG        24 0053 Yellow   Clear   Negative   Trace   Negative   Negative                   Microbiology Results Abnormal       Procedure Component Value - Date/Time    COVID-19 RAPID AG,VERITOR,COR/JAMARI/PAD/NUPUR/ASHA/LAG/BRITTANY/ IN-HOUSE,DRY SWAB, 1 HR TAT - Swab, Nasal Cavity [[id number]]  (Normal) Collected: 24 0004    Lab Status: Final result Specimen: Swab from Nasal Cavity Updated: 24 0022     COVID19 Presumptive Negative    Narrative:      Fact sheets for providers: https://www.fda.gov/media/958549/download    Fact sheets for patients: https://www.fda.gov/media/754959/download    Blood Culture - Blood, Blood, PICC Line [[id number]]  (Normal) Collected: 23 1400    Lab Status: Final result Specimen: Blood, PICC Line Updated: 24 1531     Blood Culture No  growth at 5 days    Blood Culture - Blood, Arm, Right [[id number]]  (Normal) Collected: 12/27/23 0431    Lab Status: Final result Specimen: Blood from Arm, Right Updated: 01/01/24 0515     Blood Culture No growth at 5 days            No radiology results from the last 24 hrs    Results for orders placed during the hospital encounter of 12/26/23    Adult Transthoracic Echo Complete W/ Cont if Necessary Per Protocol    Interpretation Summary    Left ventricular systolic function is normal. Calculated left ventricular EF = 57% Left ventricular ejection fraction appears to be 56 - 60%.    Left ventricular wall thickness is consistent with mild to moderate concentric hypertrophy.    Left ventricular diastolic function was normal.    Estimated right ventricular systolic pressure from tricuspid regurgitation is mildly elevated (35-45 mmHg).    There is a trivial pericardial effusion.    Mild aortic valve regurgitation.      Current medications:  Scheduled Meds:mirtazapine, 15 mg, Oral, Nightly  rivastigmine, 1 patch, Transdermal, Daily  senna-docusate sodium, 2 tablet, Oral, BID      Continuous Infusions:   PRN Meds:.  acetaminophen **OR** acetaminophen **OR** acetaminophen    senna-docusate sodium **AND** polyethylene glycol **AND** bisacodyl **AND** bisacodyl    Calcium Replacement - Follow Nurse / BPA Driven Protocol    dextromethorphan polistirex ER    dextrose    dextrose    glucagon (human recombinant)    glycopyrrolate    guaiFENesin    hydrOXYzine    Magnesium Standard Dose Replacement - Follow Nurse / BPA Driven Protocol    melatonin    nitroglycerin    ondansetron    Phosphorus Replacement - Follow Nurse / BPA Driven Protocol    Potassium Replacement - Follow Nurse / BPA Driven Protocol    sodium chloride    ziprasidone    Assessment & Plan   Assessment & Plan     Active Hospital Problems    Diagnosis  POA    **UTI (urinary tract infection) [N39.0]  Yes    Acute constipation [K59.00]  Unknown    Acute urinary  retention [R33.8]  Unknown    Uterine prolapse [N81.4]  Unknown    Bradycardia [R00.1]  Unknown    Pressure injury of buttock, unstageable [L89.300]  Unknown    Type 2 diabetes mellitus with ketoacidosis, without long-term current use of insulin [E11.10]  Unknown    Disorientation [R41.0]  Unknown    Pleural effusion [J90]  Unknown    Severe malnutrition [E43]  Yes    Edema of lower extremity [R60.0]  Yes    Bullous pemphigoid [L12.0]  Yes    Dyslipidemia [E78.5]  Yes    Essential hypertension [I10]  Yes      Resolved Hospital Problems   No resolved problems to display.        Brief Hospital Course to date:  Omar Mendoza is a 83 y.o. female w DMII, bullous pemphigoid who presented w bullous pemphigoid lesion on heel on 12/26. Patient was noted to have worsening mental status throughout stay. She was evaluated by neurology with diagnosis of dementia c/b depression and paranoia. CM is following for placement.      Dementia c/b depression and paranoia  -med regimen simplified to exelon patch + remeron w good control  -hospice following      Oropharyngeal dysphagia - comfort diet  Aspiration PNA - s/p rocephin + doxycycline  Chronic anemia - no f/u labs  Uncontrolled DMII - 2/2 comfort measures no interventions  Constipation - bowel regimen PRN  Chronic uterine prolapse  Bullous pemphigoid  BMI 34     POC:   Comfort care with long-term placement pending, referrals pending      Expected Discharge Location and Transportation: LTC   Expected Discharge   Expected Discharge Date: 1/26/2024; Expected Discharge Time:      DVT prophylaxis:  No DVT prophylaxis order currently exists.         AM-PAC 6 Clicks Score (PT): 8 (01/27/24 2035)    CODE STATUS:   Code Status and Medical Interventions:   Ordered at: 01/07/24 1143     Code Status (Patient has no pulse and is not breathing):    No CPR (Do Not Attempt to Resuscitate)     Medical Interventions (Patient has pulse or is breathing):    Comfort Measures     I have prepared this  progress note with copied portions of the prior day's progress note of my own authorship to preserve accuracy and maintain consistency of documentation. I have reviewed these portions and edited them for correctness. I verify that the above documentation accurately and truly represents the evaluation and management performed on today's date.       Marianela Dwyer MD  01/28/24

## 2024-01-28 NOTE — PLAN OF CARE
Problem: Adult Inpatient Plan of Care  Goal: Optimal Comfort and Wellbeing  Intervention: Provide Person-Centered Care  Recent Flowsheet Documentation  Taken 1/27/2024 2035 by Molly Martinez RN  Trust Relationship/Rapport:   care explained   choices provided   reassurance provided   thoughts/feelings acknowledged   Goal Outcome Evaluation:   Pt is on her 33rd day of admission with VSS and no c/o pain or discomfort. She appears to have slept well most of the shift with spouse at bedside. No acute events noted, CM working on DC plans to LTC. Will CMT and provide care.

## 2024-01-29 PROCEDURE — 99232 SBSQ HOSP IP/OBS MODERATE 35: CPT | Performed by: STUDENT IN AN ORGANIZED HEALTH CARE EDUCATION/TRAINING PROGRAM

## 2024-01-29 RX ADMIN — MIRTAZAPINE 15 MG: 15 TABLET, FILM COATED ORAL at 18:20

## 2024-01-29 RX ADMIN — RIVASTIGMINE TRANSDERMAL SYSTEM 1 PATCH: 9.5 PATCH, EXTENDED RELEASE TRANSDERMAL at 08:35

## 2024-01-29 RX ADMIN — ACETAMINOPHEN 650 MG: 650 SOLUTION ORAL at 09:13

## 2024-01-29 RX ADMIN — SENNOSIDES AND DOCUSATE SODIUM 2 TABLET: 8.6; 5 TABLET ORAL at 21:53

## 2024-01-29 RX ADMIN — Medication 5 MG: at 21:53

## 2024-01-29 NOTE — PROGRESS NOTES
Continued Stay Note  [facility]     Patient Name: Omar Mendoza  MRN: [id number]  Today's Date: 1/29/2024    Admit Date: 12/26/2023    Plan: SNF   Discharge Plan       Row Name 01/29/24 1742       Plan    Plan SNF    Plan Comments Hospice liaison continues to follow while placement is found. Please call [phone] if can be of further assistance.                   Discharge Codes    No documentation.                 Expected Discharge Date and Time       Expected Discharge Date Expected Discharge Time    Jan 31, 2024               Sarah Farris RN

## 2024-01-29 NOTE — PLAN OF CARE
Problem: Adult Inpatient Plan of Care  Goal: Absence of Hospital-Acquired Illness or Injury  Intervention: Prevent Skin Injury  Recent Flowsheet Documentation  Taken 1/29/2024 0400 by Molly Martinez RN  Skin Protection: adhesive use limited  Taken 1/29/2024 0200 by Molly Martinez RN  Skin Protection: adhesive use limited  Taken 1/29/2024 0000 by Molly Martinez RN  Skin Protection:   adhesive use limited   tubing/devices free from skin contact  Taken 1/28/2024 2200 by Molly Martinez RN  Skin Protection:   adhesive use limited   tubing/devices free from skin contact  Taken 1/28/2024 2000 by Molly Martinez RN  Skin Protection:   adhesive use limited   tubing/devices free from skin contact   Goal Outcome Evaluation:      Pt is on her 33rd day of admission with VSS and no c/o pain or discomfort. She appears to have slept well most of the shift with turns Q2 hr. No acute events noted this shift, CM working on DC plans to LTC. Will CMT and provide care.

## 2024-01-29 NOTE — CASE MANAGEMENT/SOCIAL WORK
Continued Stay Note  [facility]     Patient Name: Omar Mendoza  MRN: [id number]  Today's Date: 1/29/2024    Admit Date: 12/26/2023    Plan: Seeking LTC: [facility]   Discharge Plan       Row Name 01/29/24 1312       Plan    Plan Seeking LTC: [facility]    Plan Comments SW'er met with patient and  at bedside. [facility] on [facility]. Awaiting confirmation from [facility] rep[facility]for SNF/LTC. CM continue to follow for discharge planning needs. SW'er contacted patient's daughter no answer awaiting return call. Plan is LTC.                   Discharge Codes    No documentation.                 Expected Discharge Date and Time       Expected Discharge Date Expected Discharge Time    Jan 26, 2024               MARIOLA Cheek (Kay)

## 2024-01-29 NOTE — PROGRESS NOTES
[facility] Medicine Services  PROGRESS NOTE    Patient Name: Omar Mendoza  : 1940  MRN: [id number]    Date of Admission: 2023  Primary Care Physician: Varun Pa MD    Subjective   Subjective     CC:  Dementia     HPI:  Patient with no complaints this morning, states she feels well.      Objective   Objective     Vital Signs:   Temp:  [98.2 °F (36.8 °C)-98.5 °F (36.9 °C)] 98.5 °F (36.9 °C)  Heart Rate:  [80-84] 84  Resp:  [18] 18  BP: (120-133)/(79) 133/79     Physical Exam:  General appearance: alert, awake, no acute distress   Cardiovascular: RRR, no murmurs or rubs, radial pulse full 2/4 BL   Respiratory: Oxygenating well on room air, no respiratory distress   Neuro/CNS: alert  normal speech    Results Reviewed:  LAB RESULTS:                              Brief Urine Lab Results  (Last result in the past 365 days)        Color   Clarity   Blood   Leuk Est   Nitrite   Protein   CREAT   Urine HCG        24 0053 Yellow   Clear   Negative   Trace   Negative   Negative                   Microbiology Results Abnormal       Procedure Component Value - Date/Time    COVID-19 RAPID AG,VERITOR,COR/JAMARI/PAD/NUPUR/ASHA/LAG/BRITTANY/ IN-HOUSE,DRY SWAB, 1 HR TAT - Swab, Nasal Cavity [[id number]]  (Normal) Collected: 24 0004    Lab Status: Final result Specimen: Swab from Nasal Cavity Updated: 24 0022     COVID19 Presumptive Negative    Narrative:      Fact sheets for providers: https://www.fda.gov/media/058996/download    Fact sheets for patients: https://www.fda.gov/media/690811/download    Blood Culture - Blood, Blood, PICC Line [[id number]]  (Normal) Collected: 23 1400    Lab Status: Final result Specimen: Blood, PICC Line Updated: 24 1531     Blood Culture No growth at 5 days    Blood Culture - Blood, Arm, Right [[id number]]  (Normal) Collected: 23 0431    Lab Status: Final result Specimen: Blood from Arm, Right Updated: 24 0515     Blood Culture  No growth at 5 days            No radiology results from the last 24 hrs    Results for orders placed during the hospital encounter of 12/26/23    Adult Transthoracic Echo Complete W/ Cont if Necessary Per Protocol    Interpretation Summary    Left ventricular systolic function is normal. Calculated left ventricular EF = 57% Left ventricular ejection fraction appears to be 56 - 60%.    Left ventricular wall thickness is consistent with mild to moderate concentric hypertrophy.    Left ventricular diastolic function was normal.    Estimated right ventricular systolic pressure from tricuspid regurgitation is mildly elevated (35-45 mmHg).    There is a trivial pericardial effusion.    Mild aortic valve regurgitation.      Current medications:  Scheduled Meds:mirtazapine, 15 mg, Oral, Nightly  rivastigmine, 1 patch, Transdermal, Daily  senna-docusate sodium, 2 tablet, Oral, BID      Continuous Infusions:   PRN Meds:.  acetaminophen **OR** acetaminophen **OR** acetaminophen    senna-docusate sodium **AND** polyethylene glycol **AND** bisacodyl **AND** bisacodyl    Calcium Replacement - Follow Nurse / BPA Driven Protocol    dextromethorphan polistirex ER    dextrose    dextrose    glucagon (human recombinant)    glycopyrrolate    guaiFENesin    hydrOXYzine    Magnesium Standard Dose Replacement - Follow Nurse / BPA Driven Protocol    melatonin    nitroglycerin    ondansetron    Phosphorus Replacement - Follow Nurse / BPA Driven Protocol    Potassium Replacement - Follow Nurse / BPA Driven Protocol    sodium chloride    ziprasidone    Assessment & Plan   Assessment & Plan     Active Hospital Problems    Diagnosis  POA    **UTI (urinary tract infection) [N39.0]  Yes    Acute constipation [K59.00]  Unknown    Acute urinary retention [R33.8]  Unknown    Uterine prolapse [N81.4]  Unknown    Bradycardia [R00.1]  Unknown    Pressure injury of buttock, unstageable [L89.300]  Unknown    Type 2 diabetes mellitus with ketoacidosis,  without long-term current use of insulin [E11.10]  Unknown    Disorientation [R41.0]  Unknown    Pleural effusion [J90]  Unknown    Severe malnutrition [E43]  Yes    Edema of lower extremity [R60.0]  Yes    Bullous pemphigoid [L12.0]  Yes    Dyslipidemia [E78.5]  Yes    Essential hypertension [I10]  Yes      Resolved Hospital Problems   No resolved problems to display.     This patient's assessments and plans were partially entered by my partner and updated as appropriate by me on 1/29/2024    Brief Hospital Course to date:  Omar Mendoza is a 83 y.o. female w DMII, bullous pemphigoid who presented w bullous pemphigoid lesion on heel on 12/26. Patient was noted to have worsening mental status throughout stay. She was evaluated by neurology with diagnosis of dementia complicated by depression and paranoia. Elected to transition to comfort care. Case management assisting with LTC with hospice placement.      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  -continue exelon patch + remeron   -hospice following   -analgesics as needed      Oropharyngeal dysphagia - comfort diet  Aspiration PNA - s/p rocephin + doxycycline  Chronic anemia - no f/u labs  Uncontrolled DMII - 2/2 comfort measures no interventions  Constipation - bowel regimen PRN  Chronic uterine prolapse  Bullous pemphigoid  BMI 34     POC:   Discharge pending LTC w hospice placement     Expected Discharge Location and Transportation: LTC hospice   Expected Discharge   Expected Discharge Date: 1/31/2024; Expected Discharge Time:      DVT prophylaxis:  No DVT prophylaxis order currently exists.         AM-PAC 6 Clicks Score (PT): 8 (01/29/24 0800)    CODE STATUS:   Code Status and Medical Interventions:   Ordered at: 01/07/24 1143     Code Status (Patient has no pulse and is not breathing):    No CPR (Do Not Attempt to Resuscitate)     Medical Interventions (Patient has pulse or is breathing):    Comfort Measures       Hugo Yee, DO  01/29/24

## 2024-01-30 PROCEDURE — 99232 SBSQ HOSP IP/OBS MODERATE 35: CPT | Performed by: PHYSICIAN ASSISTANT

## 2024-01-30 PROCEDURE — 63710000001 PREDNISONE PER 5 MG: Performed by: PHYSICIAN ASSISTANT

## 2024-01-30 RX ORDER — CLOBETASOL PROPIONATE 0.5 MG/G
1 CREAM TOPICAL EVERY 12 HOURS SCHEDULED
Status: DISCONTINUED | OUTPATIENT
Start: 2024-01-30 | End: 2024-01-30

## 2024-01-30 RX ORDER — POLYETHYLENE GLYCOL 3350 17 G/17G
17 POWDER, FOR SOLUTION ORAL DAILY PRN
Status: DISCONTINUED | OUTPATIENT
Start: 2024-01-30 | End: 2024-02-01 | Stop reason: HOSPADM

## 2024-01-30 RX ORDER — BISACODYL 10 MG
10 SUPPOSITORY, RECTAL RECTAL DAILY PRN
Status: DISCONTINUED | OUTPATIENT
Start: 2024-01-30 | End: 2024-02-01 | Stop reason: HOSPADM

## 2024-01-30 RX ORDER — AMOXICILLIN 250 MG
2 CAPSULE ORAL NIGHTLY
Status: DISCONTINUED | OUTPATIENT
Start: 2024-01-30 | End: 2024-02-01 | Stop reason: HOSPADM

## 2024-01-30 RX ORDER — PREDNISONE 5 MG/1
10 TABLET ORAL
Status: DISCONTINUED | OUTPATIENT
Start: 2024-01-30 | End: 2024-02-01 | Stop reason: HOSPADM

## 2024-01-30 RX ORDER — BISACODYL 5 MG/1
5 TABLET, DELAYED RELEASE ORAL DAILY PRN
Status: DISCONTINUED | OUTPATIENT
Start: 2024-01-30 | End: 2024-02-01 | Stop reason: HOSPADM

## 2024-01-30 RX ORDER — HYDROXYZINE HYDROCHLORIDE 25 MG/1
12.5 TABLET, FILM COATED ORAL 3 TIMES DAILY
Status: DISCONTINUED | OUTPATIENT
Start: 2024-01-30 | End: 2024-02-01 | Stop reason: HOSPADM

## 2024-01-30 RX ADMIN — RIVASTIGMINE TRANSDERMAL SYSTEM 1 PATCH: 9.5 PATCH, EXTENDED RELEASE TRANSDERMAL at 08:45

## 2024-01-30 RX ADMIN — HYDROXYZINE HYDROCHLORIDE 12.5 MG: 25 TABLET, FILM COATED ORAL at 21:49

## 2024-01-30 RX ADMIN — HYDROXYZINE HYDROCHLORIDE 12.5 MG: 25 TABLET, FILM COATED ORAL at 14:10

## 2024-01-30 RX ADMIN — PREDNISONE 10 MG: 5 TABLET ORAL at 14:10

## 2024-01-30 RX ADMIN — MIRTAZAPINE 15 MG: 15 TABLET, FILM COATED ORAL at 21:49

## 2024-01-30 NOTE — CASE MANAGEMENT/SOCIAL WORK
Continued Stay Note  [facility]     Patient Name: Omar Mendoza  MRN: [id number]  Today's Date: 1/30/2024    Admit Date: 12/26/2023    Plan: [facility]   Discharge Plan       Row Name 01/30/24 1518       Plan    Plan [facility]    Patient/Family in Agreement with Plan yes    Plan Comments Plan is [facility] on Thu. 2/1. [facility] is scheduled for Thu. 2/1 at 0900. PCS is in dropbox    Final Discharge Disposition Code 03 - skilled nursing facility (SNF)                   Discharge Codes    No documentation.                 Expected Discharge Date and Time       Expected Discharge Date Expected Discharge Time    Feb 1, 2024               Hugo Sanchez RN

## 2024-01-30 NOTE — PROGRESS NOTES
Continued Stay Note  [facility]     Patient Name: Omar Mendoza  MRN: [id number]  Today's Date: 1/30/2024    Admit Date: 12/26/2023    Plan: SNF   Discharge Plan       Row Name 01/30/24 1645       Plan    Plan SNF    Patient/Family in Agreement with Plan yes    Plan Comments Pt has been accepted to [facility] & will d/c on 2/1/24. Hospice will plan a 2 week post hospital phone call w/ son.                                                       Discharge Codes    No documentation.                 Expected Discharge Date and Time       Expected Discharge Date Expected Discharge Time    Feb 1, 2024               Claudia Park

## 2024-01-30 NOTE — PROGRESS NOTES
"    [facility] Medicine Services  PROGRESS NOTE    Patient Name: Omar Mendoza  : 1940  MRN: [id number]    Date of Admission: 2023  Primary Care Physician: Varun Pa MD    Subjective     CC: f/u dementia, bullous pemphigoid    HPI:  In bed, complains that she is \"itching all over my body.\" Says being so itchy makes her feel nauseated. Asks for oatmeal because she thinks it may help settle her stomach    Objective     Vital Signs:   Temp:  [97.9 °F (36.6 °C)-98.7 °F (37.1 °C)] 97.9 °F (36.6 °C)  Heart Rate:  [72-84] 73  Resp:  [16-18] 16  BP: (117-133)/(78-80) 126/80     Physical Exam:  Constitutional: No acute distress, awake, alert and conversational. Laying in bed   HENT: NCAT, mucous membranes moist  Respiratory: Clear to auscultation bilaterally, respiratory effort normal   Cardiovascular: RRR, no murmurs, rubs, or gallops  Gastrointestinal: Positive bowel sounds, soft, nontender, nondistended  Musculoskeletal: No bilateral ankle edema  Psychiatric: Flat affect, cooperative  Neurologic: I did not ask orientation questions. Moves all extremities spontaneously, speech clear    Results Reviewed:  LAB RESULTS:                              Brief Urine Lab Results  (Last result in the past 365 days)        Color   Clarity   Blood   Leuk Est   Nitrite   Protein   CREAT   Urine HCG        24 0053 Yellow   Clear   Negative   Trace   Negative   Negative                 Microbiology Results Abnormal       Procedure Component Value - Date/Time    COVID-19 RAPID AG,VERITOR,COR/JAMARI/PAD/NUPUR/ASHA/LAG/BRITTANY/ IN-HOUSE,DRY SWAB, 1 HR TAT - Swab, Nasal Cavity [[id number]]  (Normal) Collected: 24 0004    Lab Status: Final result Specimen: Swab from Nasal Cavity Updated: 24 0022     COVID19 Presumptive Negative    Narrative:      Fact sheets for providers: https://www.fda.gov/media/695159/download    Fact sheets for patients: https://www.fda.gov/media/328008/download    " Blood Culture - Blood, Blood, PICC Line [[id number]]  (Normal) Collected: 12/27/23 1400    Lab Status: Final result Specimen: Blood, PICC Line Updated: 01/01/24 1531     Blood Culture No growth at 5 days    Blood Culture - Blood, Arm, Right [[id number]]  (Normal) Collected: 12/27/23 0431    Lab Status: Final result Specimen: Blood from Arm, Right Updated: 01/01/24 0515     Blood Culture No growth at 5 days          No radiology results from the last 24 hrs    Results for orders placed during the hospital encounter of 12/26/23    Adult Transthoracic Echo Complete W/ Cont if Necessary Per Protocol    Interpretation Summary    Left ventricular systolic function is normal. Calculated left ventricular EF = 57% Left ventricular ejection fraction appears to be 56 - 60%.    Left ventricular wall thickness is consistent with mild to moderate concentric hypertrophy.    Left ventricular diastolic function was normal.    Estimated right ventricular systolic pressure from tricuspid regurgitation is mildly elevated (35-45 mmHg).    There is a trivial pericardial effusion.    Mild aortic valve regurgitation.    Current medications:  Scheduled Meds:mirtazapine, 15 mg, Oral, Nightly  rivastigmine, 1 patch, Transdermal, Daily  senna-docusate sodium, 2 tablet, Oral, Nightly      Continuous Infusions:   PRN Meds:.  acetaminophen **OR** acetaminophen **OR** acetaminophen    senna-docusate sodium **AND** polyethylene glycol **AND** bisacodyl **AND** bisacodyl    Calcium Replacement - Follow Nurse / BPA Driven Protocol    dextromethorphan polistirex ER    dextrose    dextrose    glucagon (human recombinant)    glycopyrrolate    guaiFENesin    hydrOXYzine    Magnesium Standard Dose Replacement - Follow Nurse / BPA Driven Protocol    melatonin    nitroglycerin    ondansetron    Phosphorus Replacement - Follow Nurse / BPA Driven Protocol    Potassium Replacement - Follow Nurse / BPA Driven Protocol    sodium chloride     ziprasidone    Assessment & Plan     Active Hospital Problems    Diagnosis  POA    **UTI (urinary tract infection) [N39.0]  Yes    Acute constipation [K59.00]  Unknown    Acute urinary retention [R33.8]  Unknown    Uterine prolapse [N81.4]  Unknown    Bradycardia [R00.1]  Unknown    Pressure injury of buttock, unstageable [L89.300]  Unknown    Type 2 diabetes mellitus with ketoacidosis, without long-term current use of insulin [E11.10]  Unknown    Disorientation [R41.0]  Unknown    Pleural effusion [J90]  Unknown    Severe malnutrition [E43]  Yes    Edema of lower extremity [R60.0]  Yes    Bullous pemphigoid [L12.0]  Yes    Dyslipidemia [E78.5]  Yes    Essential hypertension [I10]  Yes      Resolved Hospital Problems   No resolved problems to display.     Brief Hospital Course to date:  Omar Mendoza is an 83 y.o. female with PMH significant for HTN, HLD, CAD, DMII and bullous pemphigoid (followed by Dr. Napier of [facility] - s/p Dupixent 10/23, on chronic prednisone/Doxycycline. She was brought to [facility] ED on 12/26/23 for evaluation of confusion and R heel lesion. Neurology has evaluated patient and diagnosed her with dementia with depression/paranoia. CM working to arrange long-term care placement. Hospice following with plans for outpatient hospice services following DC.     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 Medication regimen simplified to Exelon patch + Mirtazapine with good symptom management  - Family have opted to pursue LTC placement with outpatient hospice services following DC    Bullous pemphigoid with pruritis  - Followed by Dr. Napier of [facility] - received Dupixent 10/23. More recently, was on Doxycycline 100mg BID / Niacin 500mg BID and prednisone (Dr. Napier was beginning to taper from 10mg to 7.5mg when patient last seen on 12/5/23)  - Patient complains of itching all over   - Resume daily  prednisone at 10mg sheri. DC if develops significant side effect and consider topical steroid    Constipation, continue bowel regimen  Oropharyngeal dysphagia - comfort diet  Aspiration PNA - s/p rocephin + doxycycline  Chronic anemia - no f/u labs  Uncontrolled DMII - 2/2 comfort measures, no interventions  Chronic uterine prolapse/urinary retention, s/p Schultz catheter. Now voiding spontaneously     Expected Discharge Location and Transportation: LTC  Expected Discharge Expected Discharge Date: 1/31/2024; Expected Discharge Time:      DVT prophylaxis: No DVT prophylaxis order currently exists.    AM-PAC 6 Clicks Score (PT): 8 (01/29/24 2000)    CODE STATUS:   Code Status and Medical Interventions:   Ordered at: 01/07/24 1143     Code Status (Patient has no pulse and is not breathing):    No CPR (Do Not Attempt to Resuscitate)     Medical Interventions (Patient has pulse or is breathing):    Comfort Measures     Maryam Davis PA-C  01/30/24

## 2024-01-31 PROCEDURE — 97110 THERAPEUTIC EXERCISES: CPT

## 2024-01-31 PROCEDURE — 97535 SELF CARE MNGMENT TRAINING: CPT

## 2024-01-31 PROCEDURE — 97530 THERAPEUTIC ACTIVITIES: CPT

## 2024-01-31 PROCEDURE — 63710000001 PREDNISONE PER 5 MG: Performed by: PHYSICIAN ASSISTANT

## 2024-01-31 PROCEDURE — 99232 SBSQ HOSP IP/OBS MODERATE 35: CPT | Performed by: NURSE PRACTITIONER

## 2024-01-31 RX ADMIN — MIRTAZAPINE 15 MG: 15 TABLET, FILM COATED ORAL at 20:25

## 2024-01-31 RX ADMIN — PREDNISONE 10 MG: 5 TABLET ORAL at 08:25

## 2024-01-31 RX ADMIN — SENNOSIDES AND DOCUSATE SODIUM 2 TABLET: 8.6; 5 TABLET ORAL at 20:25

## 2024-01-31 RX ADMIN — RIVASTIGMINE TRANSDERMAL SYSTEM 1 PATCH: 9.5 PATCH, EXTENDED RELEASE TRANSDERMAL at 08:25

## 2024-01-31 RX ADMIN — HYDROXYZINE HYDROCHLORIDE 12.5 MG: 25 TABLET, FILM COATED ORAL at 20:25

## 2024-01-31 RX ADMIN — HYDROXYZINE HYDROCHLORIDE 12.5 MG: 25 TABLET, FILM COATED ORAL at 08:25

## 2024-01-31 RX ADMIN — HYDROXYZINE HYDROCHLORIDE 12.5 MG: 25 TABLET, FILM COATED ORAL at 16:13

## 2024-01-31 NOTE — PROGRESS NOTES
[facility] Medicine Services  PROGRESS NOTE    Patient Name: Omar Mendoza  : 1940  MRN: [id number]    Date of Admission: 2023  Primary Care Physician: Varun Pa MD    Subjective   Subjective     CC:  F/u dementia, bullous pemphigoid    HPI:  Patient is sitting up in chair in NAD and  at bedside. She has been tolerating diet.       Objective   Objective     Vital Signs:   Temp:  [97.5 °F (36.4 °C)-98.2 °F (36.8 °C)] 98.2 °F (36.8 °C)  Heart Rate:  [80-85] 80  Resp:  [17-22] 17  BP: (122-136)/(69-87) 136/87     Physical Exam:  Constitutional: No acute distress, awake, alert  HENT: NCAT, mucous membranes moist  Respiratory: Clear to auscultation bilaterally, respiratory effort normal room air   Cardiovascular: RRR, no murmurs, rubs, or gallops  Gastrointestinal: Positive bowel sounds, soft, nontender, nondistended  Musculoskeletal: No bilateral ankle edema  Psychiatric: Appropriate affect, cooperative  Neurologic: Oriented x 1, strength symmetric in all extremities, Cranial Nerves grossly intact to confrontation, speech clear  Skin: No rashes      Results Reviewed:  LAB RESULTS:                              Brief Urine Lab Results  (Last result in the past 365 days)        Color   Clarity   Blood   Leuk Est   Nitrite   Protein   CREAT   Urine HCG        24 0053 Yellow   Clear   Negative   Trace   Negative   Negative                   Microbiology Results Abnormal       Procedure Component Value - Date/Time    COVID-19 RAPID AG,VERITOR,COR/JAMARI/PAD/NUPUR/ASHA/LAG/BRITTANY/ IN-HOUSE,DRY SWAB, 1 HR TAT - Swab, Nasal Cavity [[id number]]  (Normal) Collected: 24 0004    Lab Status: Final result Specimen: Swab from Nasal Cavity Updated: 24 0022     COVID19 Presumptive Negative    Narrative:      Fact sheets for providers: https://www.fda.gov/media/425510/download    Fact sheets for patients: https://www.fda.gov/media/122925/download    Blood Culture - Blood,  Blood, PICC Line [[id number]]  (Normal) Collected: 12/27/23 1400    Lab Status: Final result Specimen: Blood, PICC Line Updated: 01/01/24 1531     Blood Culture No growth at 5 days    Blood Culture - Blood, Arm, Right [[id number]]  (Normal) Collected: 12/27/23 0431    Lab Status: Final result Specimen: Blood from Arm, Right Updated: 01/01/24 0515     Blood Culture No growth at 5 days            No radiology results from the last 24 hrs    Results for orders placed during the hospital encounter of 12/26/23    Adult Transthoracic Echo Complete W/ Cont if Necessary Per Protocol    Interpretation Summary    Left ventricular systolic function is normal. Calculated left ventricular EF = 57% Left ventricular ejection fraction appears to be 56 - 60%.    Left ventricular wall thickness is consistent with mild to moderate concentric hypertrophy.    Left ventricular diastolic function was normal.    Estimated right ventricular systolic pressure from tricuspid regurgitation is mildly elevated (35-45 mmHg).    There is a trivial pericardial effusion.    Mild aortic valve regurgitation.      Current medications:  Scheduled Meds:hydrOXYzine, 12.5 mg, Oral, TID  mirtazapine, 15 mg, Oral, Nightly  predniSONE, 10 mg, Oral, Daily With Breakfast  rivastigmine, 1 patch, Transdermal, Daily  senna-docusate sodium, 2 tablet, Oral, Nightly      Continuous Infusions:   PRN Meds:.  acetaminophen **OR** acetaminophen **OR** acetaminophen    senna-docusate sodium **AND** polyethylene glycol **AND** bisacodyl **AND** bisacodyl    Calcium Replacement - Follow Nurse / BPA Driven Protocol    dextromethorphan polistirex ER    dextrose    dextrose    glucagon (human recombinant)    glycopyrrolate    guaiFENesin    Magnesium Standard Dose Replacement - Follow Nurse / BPA Driven Protocol    melatonin    nitroglycerin    ondansetron    Phosphorus Replacement - Follow Nurse / BPA Driven Protocol    Potassium Replacement - Follow Nurse / BPA Driven  Protocol    sodium chloride    ziprasidone    Assessment & Plan   Assessment & Plan     Active Hospital Problems    Diagnosis  POA    **UTI (urinary tract infection) [N39.0]  Yes    Acute constipation [K59.00]  Unknown    Acute urinary retention [R33.8]  Unknown    Uterine prolapse [N81.4]  Unknown    Bradycardia [R00.1]  Unknown    Pressure injury of buttock, unstageable [L89.300]  Unknown    Type 2 diabetes mellitus with ketoacidosis, without long-term current use of insulin [E11.10]  Unknown    Disorientation [R41.0]  Unknown    Pleural effusion [J90]  Unknown    Severe malnutrition [E43]  Yes    Edema of lower extremity [R60.0]  Yes    Bullous pemphigoid [L12.0]  Yes    Dyslipidemia [E78.5]  Yes    Essential hypertension [I10]  Yes      Resolved Hospital Problems   No resolved problems to display.        Brief Hospital Course to date:  Omar Mendoza is a 83 y.o. female  with PMH significant for HTN, HLD, CAD, DMII and bullous pemphigoid ([facility] - s/p Dupixent 10/23, on chronic prednisone/Doxycycline. She was brought to [facility] ED on 12/26/23 for evaluation of confusion and R heel lesion. Neurology has evaluated patient and diagnosed her with dementia with depression/paranoia. CM working to arrange long-term care placement. Hospice following with plans for outpatient hospice services following DC.     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 Medication regimen simplified to Exelon patch + Mirtazapine with good symptom management  - Family have opted to pursue LTC placement with outpatient hospice services following DC     Bullous pemphigoid with pruritis  - [facility] - received Dupixent 10/23. More recently, was on Doxycycline 100mg BID / Niacin 500mg BID and  prednisone (Dr. Napier was beginning to taper from 10mg to 7.5mg when patient last seen on 12/5/23)  - Patient complains of itching all over   - Resume daily prednisone at 10mg sheri. DC if develops significant side effect and consider topical steroid     Constipation, continue bowel regimen  Oropharyngeal dysphagia - comfort diet  Aspiration PNA - s/p rocephin + doxycycline  Chronic anemia - no f/u labs  Uncontrolled DMII - 2/2 comfort measures, no interventions  Chronic uterine prolapse/urinary retention, s/p Schultz catheter. Now voiding spontaneously        Expected Discharge Location and Transportation: rehab   Expected Discharge   Expected Discharge Date: 2/1/2024; Expected Discharge Time:      DVT prophylaxis:  No DVT prophylaxis order currently exists.         AM-PAC 6 Clicks Score (PT): 14 (01/31/24 0922)    CODE STATUS:   Code Status and Medical Interventions:   Ordered at: 01/07/24 1143     Code Status (Patient has no pulse and is not breathing):    No CPR (Do Not Attempt to Resuscitate)     Medical Interventions (Patient has pulse or is breathing):    Comfort Measures       Radha Jose, APRN  01/31/24

## 2024-01-31 NOTE — THERAPY TREATMENT NOTE
Patient Name: Omar Mendoza  : 1940    MRN: [id number]                              Today's Date: 2024       Admit Date: 2023    Visit Dx:     ICD-10-CM ICD-9-CM   1. Generalized weakness  R53.1 780.79   2. Acute UTI  N39.0 599.0   3. Bullous pemphigoid  L12.0 694.5   4. Venous insufficiency  I87.2 459.81   5. Dysphagia, unspecified type  R13.10 787.20     Patient Active Problem List   Diagnosis    UTI (urinary tract infection)    Bullous pemphigoid    Essential hypertension    Dyslipidemia    Edema of lower extremity    Cholestatic hepatitis    Acute constipation    Acute urinary retention    Uterine prolapse    Bradycardia    Pressure injury of buttock, unstageable    Type 2 diabetes mellitus with ketoacidosis, without long-term current use of insulin    Disorientation    Pleural effusion    Severe malnutrition     Past Medical History:   Diagnosis Date    CAD (coronary artery disease)     Dementia     Diabetes mellitus     Hyperlipemia     Hypertension      History reviewed. No pertinent surgical history.   General Information       Row Name 24 1031          OT Time and Intention    Document Type therapy note (daily note)  -KF     Mode of Treatment occupational therapy  -       Row Name 24 1031          General Information    Patient Profile Reviewed yes  -KF     Existing Precautions/Restrictions fall;seizures;other (see comments)  dementia, R heel wound, sacral wound, hip wound  -KF     Barriers to Rehab medically complex;previous functional deficit;cognitive status  -       Row Name 24 1031          Cognition    Orientation Status (Cognition) oriented to;person;place  -       Row Name 24 1031          Safety Issues, Functional Mobility    Safety Issues Affecting Function (Mobility) awareness of need for assistance;insight into deficits/self-awareness;judgment;positioning of assistive device;problem-solving;safety precaution awareness;safety precautions  follow-through/compliance;sequencing abilities  -KF     Impairments Affecting Function (Mobility) balance;cognition;coordination;endurance/activity tolerance;motor planning;postural/trunk control;strength  -KF     Cognitive Impairments, Mobility Safety/Performance awareness, need for assistance;insight into deficits/self-awareness;judgment;problem-solving/reasoning;safety precaution awareness;safety precaution follow-through;sequencing abilities  -KF               User Key  (r) = Recorded By, (t) = Taken By, (c) = Cosigned By      Initials Name Provider Type    KF Lauren Randle OT Occupational Therapist                     Mobility/ADL's       Row Name 01/31/24 1032          Bed Mobility    Comment, (Bed Mobility) Pt found sitting EOB with PT, left up in chair.  -       Row Name 01/31/24 1032          Transfers    Transfers bed-chair transfer;sit-stand transfer;stand-sit transfer  -     Comment, (Transfers) STS performed from the EOB and took steps to chair using a RWx with Tino x2, verbal cues for hand placement and upright posture.  -       Row Name 01/31/24 1032          Bed-Chair Transfer    Bed-Chair [place] (Transfers) minimum assist (75% patient effort);2 person assist;verbal cues  -     Assistive Device (Bed-Chair Transfers) walker, front-wheeled  -       Row Name 01/31/24 1032          Sit-Stand Transfer    Sit-Stand [place] (Transfers) minimum assist (75% patient effort);2 person assist  -     Assistive Device (Sit-Stand Transfers) walker, front-wheeled  -       Row Name 01/31/24 1032          Stand-Sit Transfer    Stand-Sit [place] (Transfers) minimum assist (75% patient effort);2 person assist;verbal cues  -     Assistive Device (Stand-Sit Transfers) walker, front-wheeled  -       Row Name 01/31/24 1032          Activities of Daily Living    BADL Assessment/Intervention grooming;feeding;toileting  -       Row Name 01/31/24 1032          Self-Feeding  Assessment/Training    [place] Level (Feeding) prepare tray/open items;minimum assist (75% patient effort);liquids to mouth;set up  -KF       Row Name 01/31/24 1032          Grooming Assessment/Training    [place] Level (Grooming) hair care, combing/brushing;moderate assist (50% patient effort);wash face, hands;set up  -KF     Position (Grooming) supported sitting  -KF     Comment, (Grooming) Increased assistance needed in hair care today due to tangles  -KF       Row Name 01/31/24 1032          Toileting Assessment/Training    [place] Level (Toileting) perform perineal hygiene;dependent (less than 25% patient effort)  -KF     Position (Toileting) supported standing  -KF               User Key  (r) = Recorded By, (t) = Taken By, (c) = Cosigned By      Initials Name Provider Type    Lauren Magdaleno OT Occupational Therapist                   Obj/Interventions       Row Name 01/31/24 1033          Balance    Balance Assessment sitting static balance;sitting dynamic balance;sit to stand dynamic balance;standing static balance;standing dynamic balance  -KF     Static Sitting Balance standby assist  -KF     Dynamic Sitting Balance standby assist  -KF     Position, Sitting Balance unsupported;sitting edge of bed  -KF     Sit to Stand Dynamic Balance minimal assist;2-person assist  -KF     Static Standing Balance minimal assist;1-person assist  -KF     Dynamic Standing Balance minimal assist;2-person assist  -KF     Position/Device Used, Standing Balance supported;walker, front-wheeled  -KF     Balance Interventions sitting;standing;sit to stand;supported;static;dynamic;occupation based/functional task  -KF               User Key  (r) = Recorded By, (t) = Taken By, (c) = Cosigned By      Initials Name Provider Type    Lauren Magdaleno OT Occupational Therapist                   Goals/Plan    No documentation.                  Clinical Impression       Row Name 01/31/24 1034          Pain Assessment     Pretreatment Pain Rating 0/10 - no pain  -KF     Posttreatment Pain Rating 0/10 - no pain  -KF     Pain Intervention(s) Repositioned;Ambulation/increased activity  -       Row Name 01/31/24 1034          Plan of Care Review    Plan of Care Reviewed With patient  -     Progress improving  -     Outcome Evaluation Pt with good participation and progress toward goals during OT session today. The pt performed STS and took steps to the chair using a RWx with Tino, verbal cues provided for hand placement and upright posture. Grooming ADLs completed once pt sitting up in chair with assistance. The pt remains below her functional baseline with generalized weakness, decreased activity tolerance, and balance deficits. Pt will benefit from continued IP OT services to address deficits listed. If deemed medically appropriate, continue to recommend a d/c to SNF for best outcome.  -       Row Name 01/31/24 1034          Therapy Assessment/Plan (OT)    Rehab Potential (OT) good, to achieve stated therapy goals  -     Criteria for Skilled Therapeutic Interventions Met (OT) yes;meets criteria;skilled treatment is necessary  -     Therapy Frequency (OT) daily  -       Row Name 01/31/24 1034          Therapy Plan Review/Discharge Plan (OT)    Anticipated Discharge Disposition (OT) skilled nursing facility  -       Row Name 01/31/24 1034          Vital Signs    Pre Patient Position Sitting  -KF     Intra Patient Position Standing  -KF     Post Patient Position Sitting  -       Row Name 01/31/24 1034          Positioning and Restraints    Pre-Treatment Position in bed  -KF     Post Treatment Position chair  -KF     In Chair notified nsg;reclined;call light within reach;encouraged to call for assist;exit alarm on;legs elevated;waffle cushion;on mechanical lift sling  -               User Key  (r) = Recorded By, (t) = Taken By, (c) = Cosigned By      Initials Name Provider Type    Lauren Magdaleno, OT Occupational  Therapist                   Outcome Measures       Row Name 01/31/24 1035          How much help from another is currently needed...    Putting on and taking off regular lower body clothing? 2  -KF     Bathing (including washing, rinsing, and drying) 2  -KF     Toileting (which includes using toilet bed pan or urinal) 2  -KF     Putting on and taking off regular upper body clothing 3  -KF     Taking care of personal grooming (such as brushing teeth) 3  -KF     Eating meals 3  -KF     AM-PAC 6 Clicks Score (OT) 15  -KF       Row Name 01/31/24 0922          How much help from another person do you currently need...    Turning from your back to your side while in flat bed without using bedrails? 3  -KG     Moving from lying on back to sitting on the side of a flat bed without bedrails? 3  -KG     Moving to and from a bed to a chair (including a wheelchair)? 2  -KG     Standing up from a chair using your arms (e.g., wheelchair, bedside chair)? 3  -KG     Climbing 3-5 steps with a railing? 1  -KG     To walk in hospital room? 2  -KG     AM-PAC 6 Clicks Score (PT) 14  -KG     Highest Level of Mobility Goal 4 --> Transfer to chair/commode  -KG       Row Name 01/31/24 1035 01/31/24 0922       Functional Assessment    Outcome Measure Options AM-PAC 6 Clicks Daily Activity (OT)  -KF AM-PAC 6 Clicks Basic Mobility (PT)  -KG              User Key  (r) = Recorded By, (t) = Taken By, (c) = Cosigned By      Initials Name Provider Type    KG Carolyn Herrera Physical Therapist    KF Lauren Randle OT Occupational Therapist                    Occupational Therapy Education       Title: PT OT SLP Therapies (In Progress)       Topic: Occupational Therapy (In Progress)       Point: ADL training (In Progress)       Description:   Instruct learner(s) on proper safety adaptation and remediation techniques during self care or transfers.   Instruct in proper use of assistive devices.                  Learning Progress Summary              Patient Acceptance, E,TB, VU,DU by  at 1/31/2024 0846    Acceptance, E, NR by  at 1/25/2024 1456    Acceptance, E, DU,NR by DILIP at 1/22/2024 1615    Comment: OT POC; BADL's; orientation; UE HEP    Acceptance, E,D, NR by JB1 at 1/15/2024 1151    Comment: re-orientation, bed mobility sequencing, UE TE, transfer technique, posture    Acceptance, E, NR by  at 1/12/2024 1434    Acceptance, E, NR by  at 1/10/2024 1023    Nonacceptance, E, NL by  at 1/5/2024 1044    Acceptance, E, NR by  at 1/3/2024 1435    Acceptance, E, VU,NR by JB at 1/1/2024 0920    Comment: oriented to date, need to move and position RLE, ADL progression, sequencing bed mobililty    Acceptance, E, NR by  at 12/27/2023 1135   Family Acceptance, E,D, NR by JB at 1/15/2024 1151    Comment: re-orientation, bed mobility sequencing, UE TE, transfer technique, posture    Acceptance, E, VU,NR by JB1 at 1/1/2024 0920    Comment: oriented to date, need to move and position RLE, ADL progression, sequencing bed mobililty                         Point: Home exercise program (In Progress)       Description:   Instruct learner(s) on appropriate technique for monitoring, assisting and/or progressing therapeutic exercises/activities.                  Learning Progress Summary             Patient Acceptance, E, NR by  at 1/25/2024 1456    Acceptance, E, DU,NR by DILIP at 1/22/2024 1615    Comment: OT POC; BADL's; orientation; UE HEP    Acceptance, E,D, NR by JB1 at 1/15/2024 1151    Comment: re-orientation, bed mobility sequencing, UE TE, transfer technique, posture   Family Acceptance, E,D, NR by JB at 1/15/2024 1151    Comment: re-orientation, bed mobility sequencing, UE TE, transfer technique, posture                         Point: Precautions (In Progress)       Description:   Instruct learner(s) on prescribed precautions during self-care and functional transfers.                  Learning Progress Summary             Patient Acceptance, E,TB,  VU,DU by  at 1/31/2024 0846    Acceptance, E, NR by  at 1/25/2024 1456    Acceptance, E,D, NR by [facility] at 1/15/2024 1151    Comment: re-orientation, bed mobility sequencing, UE TE, transfer technique, posture    Acceptance, E, NR by  at 1/12/2024 1434    Acceptance, E, VU,NR by 1 at 1/1/2024 0920    Comment: oriented to date, need to move and position RLE, ADL progression, sequencing bed mobililty   Family Acceptance, E,D, NR by JB1 at 1/15/2024 1151    Comment: re-orientation, bed mobility sequencing, UE TE, transfer technique, posture    Acceptance, E, VU,NR by [facility] at 1/1/2024 0920    Comment: oriented to date, need to move and position RLE, ADL progression, sequencing bed mobililty                         Point: Body mechanics (In Progress)       Description:   Instruct learner(s) on proper positioning and spine alignment during self-care, functional mobility activities and/or exercises.                  Learning Progress Summary             Patient Acceptance, E,TB, VU,DU by  at 1/31/2024 0846    Acceptance, E, NR by  at 1/25/2024 1456    Acceptance, E,D, NR by [facility] at 1/15/2024 1151    Comment: re-orientation, bed mobility sequencing, UE TE, transfer technique, posture    Acceptance, E, NR by  at 1/12/2024 1434    Acceptance, E, VU,NR by [facility] at 1/1/2024 0920    Comment: oriented to date, need to move and position RLE, ADL progression, sequencing bed mobililty   Family Acceptance, E,D, NR by [facility] at 1/15/2024 1151    Comment: re-orientation, bed mobility sequencing, UE TE, transfer technique, posture    Acceptance, E, VU,NR by [facility] at 1/1/2024 0920    Comment: oriented to date, need to move and position RLE, ADL progression, sequencing bed mobililty                                         User Key       Initials Effective Dates Name Provider Type Discipline    [facility] 07/11/23 -  Rayna Wall, OT Occupational Therapist OT    [facility] 02/03/23 -  Sepideh Feldman OT Occupational Therapist OT    [facility] 09/02/21 -   Eduarda Avelar, OT Occupational Therapist OT    [facility] 06/16/21 -  Bhavani Galan OT Occupational Therapist OT     10/14/22 -  Renetta Regan OT Occupational Therapist OT     08/09/23 -  Lauren Randle OT Occupational Therapist OT                  OT Recommendation and Plan  Therapy Frequency (OT): daily  Plan of Care Review  Plan of Care Reviewed With: patient  Progress: improving  Outcome Evaluation: Pt with good participation and progress toward goals during OT session today. The pt performed STS and took steps to the chair using a RWx with Tino, verbal cues provided for hand placement and upright posture. Grooming ADLs completed once pt sitting up in chair with assistance. The pt remains below her functional baseline with generalized weakness, decreased activity tolerance, and balance deficits. Pt will benefit from continued IP OT services to address deficits listed. If deemed medically appropriate, continue to recommend a d/c to SNF for best outcome.     Time Calculation:         Time Calculation- OT       Row Name 01/31/24 1036             Time Calculation- OT    OT Start Time 0846  -KF      OT Received On 01/31/24  -KF      OT Goal Re-Cert Due Date 02/04/24  -KF         Timed Charges    23039 - OT Therapeutic Activity Minutes 8  -KF      05024 - OT Self Care/Mgmt Minutes 15  -KF         Total Minutes    Timed Charges Total Minutes 23  -KF       Total Minutes 23  -KF                User Key  (r) = Recorded By, (t) = Taken By, (c) = Cosigned By      Initials Name Provider Type    KF Lauren Randle OT Occupational Therapist                  Therapy Charges for Today       Code Description Service Date Service Provider Modifiers Qty    94253721918 HC OT THERAPEUTIC ACT EA 15 MIN 1/31/2024 Lauren Randle OT GO 1    89059660538 HC OT SELF CARE/MGMT/TRAIN EA 15 MIN 1/31/2024 Lauren Randle OT GO 1                 Lauren Randle OT  1/31/2024

## 2024-01-31 NOTE — THERAPY TREATMENT NOTE
Patient Name: Omar Mendoza  : 1940    MRN: [id number]                              Today's Date: 2024       Admit Date: 2023    Visit Dx:     ICD-10-CM ICD-9-CM   1. Generalized weakness  R53.1 780.79   2. Acute UTI  N39.0 599.0   3. Bullous pemphigoid  L12.0 694.5   4. Venous insufficiency  I87.2 459.81   5. Dysphagia, unspecified type  R13.10 787.20     Patient Active Problem List   Diagnosis    UTI (urinary tract infection)    Bullous pemphigoid    Essential hypertension    Dyslipidemia    Edema of lower extremity    Cholestatic hepatitis    Acute constipation    Acute urinary retention    Uterine prolapse    Bradycardia    Pressure injury of buttock, unstageable    Type 2 diabetes mellitus with ketoacidosis, without long-term current use of insulin    Disorientation    Pleural effusion    Severe malnutrition     Past Medical History:   Diagnosis Date    CAD (coronary artery disease)     Dementia     Diabetes mellitus     Hyperlipemia     Hypertension      History reviewed. No pertinent surgical history.   General Information       Row Name 24 0915          Physical Therapy Time and Intention    Document Type therapy note (daily note)  -KG     Mode of Treatment physical therapy  -KG       Row Name 24 0915          General Information    Patient Profile Reviewed yes  -KG     Existing Precautions/Restrictions fall;seizures;other (see comments)  dementia, R heel wound, sacral wound, hip wound  -KG       Row Name 2415          Cognition    Orientation Status (Cognition) oriented to;person;place  -KG       Row Name 2415          Safety Issues, Functional Mobility    Safety Issues Affecting Function (Mobility) insight into deficits/self-awareness;awareness of need for assistance;judgment;positioning of assistive device;problem-solving;safety precaution awareness;safety precautions follow-through/compliance;sequencing abilities  -KG     Impairments Affecting Function  (Mobility) balance;cognition;coordination;endurance/activity tolerance;motor planning;postural/trunk control;strength  -KG               User Key  (r) = Recorded By, (t) = Taken By, (c) = Cosigned By      Initials Name Provider Type    Carolyn Gillis Physical Therapist                   Mobility       Row Name 01/31/24 0916          Bed Mobility    Bed Mobility supine-sit  -KG     Supine-Sit [place] (Bed Mobility) 2 person assist;verbal cues;moderate assist (50% patient effort)  -KG     Assistive Device (Bed Mobility) bed rails;draw sheet  -KG       Row Name 01/31/24 0916          Transfers    Comment, (Transfers) min-A to t/f bed to chair with FWW, improved transfer ability today but required signficant assist with AD  -KG       Row Name 01/31/24 0916          Bed-Chair Transfer    Bed-Chair [place] (Transfers) minimum assist (75% patient effort);2 person assist  -KG     Assistive Device (Bed-Chair Transfers) walker, front-wheeled  -KG       Row Name 01/31/24 0916          Sit-Stand Transfer    Sit-Stand [place] (Transfers) minimum assist (75% patient effort);2 person assist  -KG     Assistive Device (Sit-Stand Transfers) walker, front-wheeled  -KG               User Key  (r) = Recorded By, (t) = Taken By, (c) = Cosigned By      Initials Name Provider Type    Carolyn Gillis Physical Therapist                   Obj/Interventions       Row Name 01/31/24 0920          Motor Skills    Therapeutic Exercise other (see comments)  ankle pumps, heel slides, LAQ x10  -KG       Row Name 01/31/24 0920          Balance    Dynamic Standing Balance minimal assist;2-person assist  -KG     Position/Device Used, Standing Balance supported;walker, front-wheeled  -KG     Balance Interventions standing;sit to stand  -KG               User Key  (r) = Recorded By, (t) = Taken By, (c) = Cosigned By      Initials Name Provider Type    Carolyn Gillis Physical Therapist                   Goals/Plan    No  documentation.                  Clinical Impression       Row Name 01/31/24 0921          Pain    Pretreatment Pain Rating 0/10 - no pain  -KG     Posttreatment Pain Rating 0/10 - no pain  -KG       Row Name 01/31/24 0921          Plan of Care Review    Plan of Care Reviewed With patient  -KG     Progress improving  -KG     Outcome Evaluation min-A to t/f bed to chair with FWW, improved transfer ability today but required signficant assist with AD, continued recommendation SNF  -KG       Row Name 01/31/24 0921          Positioning and Restraints    Pre-Treatment Position in bed  -KG     Post Treatment Position chair  -KG     In Chair notified nsg;call light within reach;encouraged to call for assist;exit alarm on;waffle cushion;legs elevated  -KG               User Key  (r) = Recorded By, (t) = Taken By, (c) = Cosigned By      Initials Name Provider Type    Carolyn Gillis Physical Therapist                   Outcome Measures       Row Name 01/31/24 0922          How much help from another person do you currently need...    Turning from your back to your side while in flat bed without using bedrails? 3  -KG     Moving from lying on back to sitting on the side of a flat bed without bedrails? 3  -KG     Moving to and from a bed to a chair (including a wheelchair)? 2  -KG     Standing up from a chair using your arms (e.g., wheelchair, bedside chair)? 3  -KG     Climbing 3-5 steps with a railing? 1  -KG     To walk in hospital room? 2  -KG     AM-PAC 6 Clicks Score (PT) 14  -KG     Highest Level of Mobility Goal 4 --> Transfer to chair/commode  -KG       Row Name 01/31/24 0922          Functional Assessment    Outcome Measure Options AM-PAC 6 Clicks Basic Mobility (PT)  -KG               User Key  (r) = Recorded By, (t) = Taken By, (c) = Cosigned By      Initials Name Provider Type    Carolyn Gillis Physical Therapist                                 Physical Therapy Education       Title: PT OT SLP Therapies  (In Progress)       Topic: Physical Therapy (In Progress)       Point: Mobility training (In Progress)       Learning Progress Summary             Patient Acceptance, E, NR by KG at 1/31/2024 0923    Acceptance, E, VU,NR by DILIP at 1/26/2024 1516    Acceptance, E, NR by SD at 1/25/2024 1447    Acceptance, E, NR by ML at 1/22/2024 1144    Acceptance, E, VU,NR by NS at 1/19/2024 1432    Acceptance, E, NR by KG at 1/18/2024 0944    Acceptance, E, VU,NR by ML at 1/15/2024 1314    Acceptance, E, NR by AE at 1/10/2024 0919    Acceptance, E, NR by AE at 1/3/2024 1337    Acceptance, E, NR by KE at 12/29/2023 1131    Acceptance, E, NR by KE at 12/27/2023 1045   Family Acceptance, E, VU,NR by ML at 1/15/2024 1314                         Point: Home exercise program (In Progress)       Learning Progress Summary             Patient Acceptance, E, NR by KG at 1/31/2024 0923    Acceptance, E, VU,NR by DILIP at 1/26/2024 1516    Acceptance, E, NR by SD at 1/25/2024 1447    Acceptance, E, VU,NR by NS at 1/19/2024 1432    Acceptance, E, NR by KG at 1/18/2024 0944                         Point: Body mechanics (In Progress)       Learning Progress Summary             Patient Acceptance, E, NR by KG at 1/31/2024 0923    Acceptance, E, VU,NR by DILIP at 1/26/2024 1516    Acceptance, E, NR by SD at 1/25/2024 1447    Acceptance, E, VU,NR by NS at 1/19/2024 1432    Acceptance, E, NR by KG at 1/18/2024 0944    Acceptance, E, VU,NR by ML at 1/15/2024 1314    Acceptance, E, NR by AE at 1/10/2024 0919    Acceptance, E, NR by AE at 1/3/2024 1337    Acceptance, E, NR by KE at 12/29/2023 1131    Acceptance, E, NR by KE at 12/27/2023 1045   Family Acceptance, E, VU,NR by ML at 1/15/2024 1314                         Point: Precautions (In Progress)       Learning Progress Summary             Patient Acceptance, E, NR by KG at 1/31/2024 0923    Acceptance, E, VU,NR by DILIP at 1/26/2024 1516    Acceptance, E, NR by SD at 1/25/2024 1447    Acceptance, E, NR by  ML at 1/22/2024 1144    Acceptance, E, VU,NR by NS at 1/19/2024 1432    Acceptance, E, NR by KG at 1/18/2024 0944    Acceptance, E, VU,NR by ML at 1/15/2024 1314    Acceptance, E, NR by AE at 1/10/2024 0919    Acceptance, E, NR by AE at 1/3/2024 1337    Acceptance, E, NR by KE at 12/29/2023 1131    Acceptance, E, NR by KE at 12/27/2023 1045   Family Acceptance, E, VU,NR by ML at 1/15/2024 1314                                         User Key       Initials Effective Dates Name Provider Type Discipline    SD 03/13/23 -  Trish Luis, PT Physical Therapist PT    DILIP 06/16/21 -  Renata Hines, PT Physical Therapist PT    KG 01/04/23 -  Carolyn Herrera Physical Therapist PT    NS 06/16/21 -  Traci Avelar, PT Physical Therapist PT    ML 04/22/21 -  Carin Villegas Physical Therapist PT    AE 09/21/21 -  Domo Desir, PT Physical Therapist PT    KE 11/16/23 -  Petty Mckoy, PT Physical Therapist PT                  PT Recommendation and Plan     Plan of Care Reviewed With: patient  Progress: improving  Outcome Evaluation: min-A to t/f bed to chair with FWW, improved transfer ability today but required signficant assist with AD, continued recommendation SNF     Time Calculation:         PT Charges       Row Name 01/31/24 0924             Time Calculation    Start Time 0843  -KG      PT Received On 01/31/24  -KG         Timed Charges    20321 - PT Therapeutic Exercise Minutes 10  -KG      68517 - PT Therapeutic Activity Minutes 13  -KG         Total Minutes    Timed Charges Total Minutes 23  -KG       Total Minutes 23  -KG                User Key  (r) = Recorded By, (t) = Taken By, (c) = Cosigned By      Initials Name Provider Type    KG Carolyn Herrera Physical Therapist                  Therapy Charges for Today       Code Description Service Date Service Provider Modifiers Qty    5531942 HC PT THER PROC EA 15 MIN 1/31/2024 Carolyn Herrera GP 1    57614809252 HC PT THERAPEUTIC ACT EA 15 MIN  1/31/2024 Carolyn Herrera GP 1            PT G-Codes  Outcome Measure Options: AM-PAC 6 Clicks Basic Mobility (PT)  AM-PAC 6 Clicks Score (PT): 14  AM-PAC 6 Clicks Score (OT): 15  PT Discharge Summary  Anticipated Discharge Disposition (PT): skilled nursing facility    Carolyn Herrera  1/31/2024

## 2024-01-31 NOTE — PLAN OF CARE
Goal Outcome Evaluation:  Plan of Care Reviewed With: patient        Progress: improving  Outcome Evaluation: Pt with good participation and progress toward goals during OT session today. The pt performed STS and took steps to the chair using a RWx with Tino, verbal cues provided for hand placement and upright posture. Grooming ADLs completed once pt sitting up in chair with assistance. The pt remains below her functional baseline with generalized weakness, decreased activity tolerance, and balance deficits. Pt will benefit from continued IP OT services to address deficits listed. If deemed medically appropriate, continue to recommend a d/c to SNF for best outcome.      Anticipated Discharge Disposition (OT): skilled nursing facility

## 2024-01-31 NOTE — PLAN OF CARE
Goal Outcome Evaluation:   Pt rested well throughout the shift. No c/o pain or nausea/vomiting. Pt calm and pleasant. Discharge planned to [facility] for February 1st. No other concerns noted at this time.

## 2024-01-31 NOTE — CASE MANAGEMENT/SOCIAL WORK
"Continued Stay Note  [facility]     Patient Name: Omar Mendoza  MRN: [id number]  Today's Date: 1/31/2024    Admit Date: 12/26/2023    Plan: SNF/[facility]   Discharge Plan       Row Name 01/31/24 1730       Plan    Plan SNF/[facility]    Patient/Family in Agreement with Plan yes  Daughter, Jessi Naidu @ [phone]    Plan Comments Arrangements in place for patient to transfer to [facility] tomorrow, 2/1/24.  [facility] ambulance to transport at 9:00am.  RN may call report to #[phone].  Facility will obtain d/c summary from Epic.  i spoke with Mrs. Mendoza's daughter, Jessi Naidu, this morning.  Family in agreement with plan, Jessi will update her brother and ask him to complete admission paperwork at facility today.  Discussed in unit multidisciplinary rounds this morning, updated LITA Desai.  Mae William, Ext. [phone]    Final Discharge Disposition Code 03 - skilled nursing facility (SNF)      Row Name 01/31/24 1726       Plan    Plan SNF[facility]    Patient/Family in Agreement with Plan     Plan Comments       Row Name 01/31/24 1510       Plan    Plan SNF    Plan Comments Visit made to pt, pt appeared to be sleeping, pt's  present.  stated pt is discharging to [facility] tomorrow for skilled rehab. Telephone call to pt's daughter Jessi regarding hospice follow up. Jessi stated pt \"has turned the corner and doing better\" and has asked for palliative care to follow pt at the facility. Jessi stated at this time does not want a follow up call from hospice, the family will call hospice in the future if needed. Jessi stated has the hospice number. Will close the hospice referral. Please call [phone] if can be of further assistance.                   Discharge Codes    No documentation.                 Expected Discharge Date and Time       Expected Discharge Date Expected Discharge Time    Feb 1, 2024               MARIOLA Jerry    "

## 2024-01-31 NOTE — PROGRESS NOTES
"Continued Stay Note  [facility]     Patient Name: Omar Mendoza  MRN: [id number]  Today's Date: 1/31/2024    Admit Date: 12/26/2023    Plan: SNF   Discharge Plan       Row Name 01/31/24 1510       Plan    Plan SNF    Plan Comments Visit made to pt, pt appeared to be sleeping, pt's  present.  stated pt is discharging to [facility] tomorrow for skilled rehab. Telephone call to pt's daughter Jessi regarding hospice follow up. Jessi stated pt \"has turned the corner and doing better\" and has asked for palliative care to follow pt at the facility. Jessi stated at this time does not want a follow up call from hospice, the family will call hospice in the future if needed. Jessi stated has the hospice number. Will close the hospice referral. Please call [phone] if can be of further assistance.                   Discharge Codes    No documentation.                 Expected Discharge Date and Time       Expected Discharge Date Expected Discharge Time    Feb 1, 2024               Sarah Farris RN    "

## 2024-01-31 NOTE — PLAN OF CARE
Goal Outcome Evaluation:  Plan of Care Reviewed With: patient        Progress: improving  Outcome Evaluation: min-A to t/f bed to chair with FWW, improved transfer ability today but required signficant assist with AD, continued recommendation SNF      Anticipated Discharge Disposition (PT): skilled nursing facility

## 2024-02-01 VITALS
SYSTOLIC BLOOD PRESSURE: 132 MMHG | WEIGHT: 218.26 LBS | DIASTOLIC BLOOD PRESSURE: 85 MMHG | HEIGHT: 67 IN | BODY MASS INDEX: 34.26 KG/M2 | TEMPERATURE: 98.1 F | HEART RATE: 88 BPM | OXYGEN SATURATION: 95 % | RESPIRATION RATE: 17 BRPM

## 2024-02-01 PROBLEM — R33.8 ACUTE URINARY RETENTION: Status: RESOLVED | Noted: 2023-12-27 | Resolved: 2024-02-01

## 2024-02-01 PROBLEM — K59.00 ACUTE CONSTIPATION: Status: RESOLVED | Noted: 2023-12-27 | Resolved: 2024-02-01

## 2024-02-01 PROBLEM — N39.0 UTI (URINARY TRACT INFECTION): Status: RESOLVED | Noted: 2023-12-27 | Resolved: 2024-02-01

## 2024-02-01 PROCEDURE — 99239 HOSP IP/OBS DSCHRG MGMT >30: CPT | Performed by: NURSE PRACTITIONER

## 2024-02-01 PROCEDURE — 63710000001 PREDNISONE PER 5 MG: Performed by: PHYSICIAN ASSISTANT

## 2024-02-01 RX ORDER — POLYETHYLENE GLYCOL 3350 17 G/17G
17 POWDER, FOR SOLUTION ORAL DAILY PRN
Start: 2024-02-01

## 2024-02-01 RX ORDER — MIRTAZAPINE 15 MG/1
15 TABLET, FILM COATED ORAL NIGHTLY
Start: 2024-02-01

## 2024-02-01 RX ORDER — GUAIFENESIN 600 MG/1
600 TABLET, EXTENDED RELEASE ORAL 2 TIMES DAILY PRN
Start: 2024-02-01

## 2024-02-01 RX ORDER — DEXTROMETHORPHAN POLISTIREX 30 MG/5ML
30 SUSPENSION ORAL 2 TIMES DAILY PRN
Start: 2024-02-01

## 2024-02-01 RX ORDER — RIVASTIGMINE 9.5 MG/24H
1 PATCH, EXTENDED RELEASE TRANSDERMAL DAILY
Start: 2024-02-01

## 2024-02-01 RX ORDER — HYDROXYZINE HYDROCHLORIDE 25 MG/1
12.5 TABLET, FILM COATED ORAL 3 TIMES DAILY
Start: 2024-02-01

## 2024-02-01 RX ORDER — AMOXICILLIN 250 MG
2 CAPSULE ORAL NIGHTLY
Start: 2024-02-01

## 2024-02-01 RX ORDER — PREDNISONE 10 MG/1
10 TABLET ORAL
Start: 2024-02-01

## 2024-02-01 RX ORDER — CHOLECALCIFEROL (VITAMIN D3) 125 MCG
5 CAPSULE ORAL NIGHTLY PRN
Start: 2024-02-01

## 2024-02-01 RX ADMIN — PREDNISONE 10 MG: 5 TABLET ORAL at 08:57

## 2024-02-01 RX ADMIN — RIVASTIGMINE TRANSDERMAL SYSTEM 1 PATCH: 9.5 PATCH, EXTENDED RELEASE TRANSDERMAL at 08:59

## 2024-02-01 RX ADMIN — HYDROXYZINE HYDROCHLORIDE 12.5 MG: 25 TABLET, FILM COATED ORAL at 08:57

## 2024-02-01 NOTE — PLAN OF CARE
Goal Outcome Evaluation:   Pt rested well throughout the shift. No c/o pain or nausea/vomiting. Pt calm and pleasant. Discharge planned to [facility] for February 1st @ 0900 vis EMS. No other concerns noted at this time.

## 2024-02-01 NOTE — DISCHARGE SUMMARY
[facility] Medicine Services  TRANSFER SUMMARY    Patient Name: Omar Mendoza  : 1940  MRN: [id number]    Date of Admission: 2023  Date of Discharge:  2024  Length of Stay: 28  Primary Care Physician: Varun Pa MD    Consults       Date and Time Order Name Status Description    2024  9:39 AM Inpatient Palliative Care MD Consult Completed     2024  2:33 PM Inpatient Neurology Consult General Completed             Hospital Course     Presenting Problem:   UTI (urinary tract infection) [N39.0]    Active Hospital Problems    Diagnosis  POA    Uterine prolapse [N81.4]  Unknown    Bradycardia [R00.1]  Unknown    Pressure injury of buttock, unstageable [L89.300]  Unknown    Type 2 diabetes mellitus with ketoacidosis, without long-term current use of insulin [E11.10]  Unknown    Disorientation [R41.0]  Unknown    Pleural effusion [J90]  Unknown    Severe malnutrition [E43]  Yes    Edema of lower extremity [R60.0]  Yes    Bullous pemphigoid [L12.0]  Yes    Dyslipidemia [E78.5]  Yes    Essential hypertension [I10]  Yes      Resolved Hospital Problems    Diagnosis Date Resolved POA    **UTI (urinary tract infection) [N39.0] 2024 Yes    Acute constipation [K59.00] 2024 Unknown    Acute urinary retention [R33.8] 2024 Unknown          Hospital Course:  Omar Mendoza is a 83 y.o. female with PMH significant for HTN, HLD, CAD, DMII and bullous pemphigoid (followed by Dr. Napier of [facility] - s/p Dupixent 10/23, on chronic prednisone/Doxycycline. She was brought to [facility] ED on 23 for evaluation of confusion and R heel lesion. Neurology has evaluated patient and diagnosed her with dementia with depression/paranoia. CM working to arrange long-term care placement. Hospice following with plans for outpatient hospice services following DC.      Dementia complicated by depression / paranoia  - Appreciate neurology  evaluation. Medication regimen simplified to Exelon patch + Mirtazapine with good symptom management  - Family have opted to pursue LTC placement with outpatient hospice services following DC  -- Palliative care to follow facility     Bullous pemphigoid with pruritis  - Followed by Dr. Napier of [facility] - received Dupixent 10/23. More recently, was on Doxycycline 100mg BID / Niacin 500mg BID and prednisone (Dr. Napier was beginning to taper from 10mg to 7.5mg when patient last seen on 12/5/23)  - Patient complains of itching all over   - Resumed daily prednisone at 10mg sheri. DC if develops significant side effect and consider topical steroid     Constipation, continue bowel regimen.  Improved  Oropharyngeal dysphagia - comfort diet    Aspiration PNA - s/p rocephin + doxycycline    Chronic anemia - no f/u labs    Uncontrolled DMII - 2/2 comfort measures, no interventions    Abnormal UA on admit  Chronic uterine prolapse/urinary retention, s/p Schultz catheter  removed.  Now voiding spontaneously   -- Initially treated as possible UTI on presentation with abnormal UA.  However negative for nitrites or bacteria.  Started on IV Rocephin.  Ultimately discontinued as culture grew only yeast colonization.        Patient was seen resting back in bed comfortably.  Appears in no acute distress.  Hemodynamically stable and afebrile.  Transferring to [facility] facility today.  Palliative care to follow.  Meds and follow-up as below.      Discharge Follow Up Recommendations for outpatient labs/diagnostics:  Patient cleared for transfer to skilled nursing facility today.  Palliative care to follow.    --To be seen by provider at facility on arrival, PCP 1 week of discharge  -- Palliative care to follow at facility    Day of Discharge     HPI:   Patient was seen resting back in bed in no acute distress.  Drowsy but awake.  Was able to tell me her first name.  Denied pain.  Appears comfortable.  " No new issues per staff.  Transferring this morning to skilled nursing facility.    Review of Systems  Unable to accurately assess due to dementia    Vital Signs:   Temp:  [97.3 °F (36.3 °C)-98 °F (36.7 °C)] 98 °F (36.7 °C)  Heart Rate:  [76-85] 76  Resp:  [14-16] 14  BP: (114-138)/(74-78) 138/78     Physical Exam:  Constitutional: No acute distress, awake but drowsy.  Resting back in bed.  No visitors at bedside.  HENT: NCAT, mucous membranes moist  Respiratory: Clear to auscultation bilaterally, respiratory effort normal on room air.  Cardiovascular: RRR, no murmurs, rubs, or gallops  Gastrointestinal: Positive bowel sounds, soft, nontender, nondistended.  Musculoskeletal: No bilateral ankle edema  Psychiatric: flat affect, cooperative  Neurologic: Oriented x 1, strength symmetric in all extremities but generally weak, Cranial Nerves grossly intact to confrontation, speech clear      Pertinent Results               Invalid input(s): \"PROT\", \"LABALBU\"        Invalid input(s): \"TG\", \"LDLCALC\", \"LDLREALC\"      Brief Urine Lab Results  (Last result in the past 365 days)        Color   Clarity   Blood   Leuk Est   Nitrite   Protein   CREAT   Urine HCG        01/04/24 0053 Yellow   Clear   Negative   Trace   Negative   Negative                   Microbiology Results Abnormal       Procedure Component Value - Date/Time    COVID-19 RAPID AG,VERITOR,COR/JAMARI/PAD/NUPUR/ASHA/LAG/BRITTANY/ IN-HOUSE,DRY SWAB, 1 HR TAT - Swab, Nasal Cavity [[id number]]  (Normal) Collected: 01/04/24 0004    Lab Status: Final result Specimen: Swab from Nasal Cavity Updated: 01/04/24 0022     COVID19 Presumptive Negative    Narrative:      Fact sheets for providers: https://www.fda.gov/media/097340/download    Fact sheets for patients: https://www.fda.gov/media/078546/download    Blood Culture - Blood, Blood, PICC Line [[id number]]  (Normal) Collected: 12/27/23 1400    Lab Status: Final result Specimen: Blood, PICC Line Updated: 01/01/24 1531     Blood " Culture No growth at 5 days    Blood Culture - Blood, Arm, Right [[id number]]  (Normal) Collected: 12/27/23 0431    Lab Status: Final result Specimen: Blood from Arm, Right Updated: 01/01/24 0515     Blood Culture No growth at 5 days            Imaging Results (All)       Procedure Component Value Units Date/Time    XR Chest 1 View [[id number]] Collected: 01/04/24 1556     Updated: 01/04/24 1604    Narrative:      XR CHEST 1 VW    COMPARISON: 12/30/2023    HISTORY: Oxygen desaturation. .    FINDINGS:  Technical adequacy: Adequate. Shallow depth of inspiration  Central airways: Unremarkable  Heart: Stable cardiomegaly  Great vessels: Stable unfolded atherosclerotic thoracic aorta  Mediastinum: Unremarkable  Lungs: Bilateral perihilar interstitial infiltrates with peribronchial cuffing and subpleural edema  Pulmonary aliza: As above  Pleura: Unremarkable  Skeletal structures: Degenerative changes  Extra thoracic soft tissues:Unremarkable  Additional comments: The previously seen PICC line is no longer visualized. The fluffy airspace infiltrates on both sides have resolved in the interval.      Impression:      Impression: The findings suggest interstitial pulmonary edema, likely cardiogenic in origin. Clinical correlation.    Electronically Signed: Chandler Alberto MD    1/4/2024 4:01 PM EST    Workstation ID: [id number]    CT Angiogram Head w AI Analysis of LVO [[id number]] Collected: 01/03/24 1945     Updated: 01/03/24 2004    Narrative:      CT ANGIOGRAM HEAD W AI ANALYSIS OF LVO, CT ANGIOGRAM NECK    Date of Exam: 1/3/2024 7:32 PM EST    Indication: Stroke, follow up.    Comparison: None available.    Technique: CTA of the head and neck was performed after the uneventful intravenous administration of 100 cc Isovue-370. Reconstructed coronal and sagittal images were also obtained. In addition, a 3-D volume rendered image was created for interpretation.   Automated exposure control and iterative reconstruction methods were  used.      Findings:    Contrast opacification: Excellent    Aortic Arch: Unremarkable    Right:    Innominate: Patent  Subclavian: Patent  Common Carotid: Patent  External Carotid:Patent  Internal Carotid: Patent    Intracranial ICA: Patent  MCA:Patent  BETO: Patent  PCA: Patent  Vertebral: Patent    Left:    Subclavian: Patent  Common Carotid: Patent  External Carotid:Patent  Internal Carotid: Patent    Intracranial ICA: Patent  MCA:Patent  BETO: Patent  PCA: Patent  Vertebral: Patent      Basilar: Patent      Soft Tissue: Unremarkable  Lungs: Mild dependent atelectasis. Otherwise the visualized lungs are unremarkable.  Bones: No acute osseous abnormality.      Impression:      Impression:  No acute arterial abnormality of the head and neck.            Electronically Signed: Jonnathan Skelton DO    1/3/2024 8:01 PM EST    Workstation ID: [id number]    CT Angiogram Neck [[id number]] Collected: 01/03/24 1945     Updated: 01/03/24 2004    Narrative:      CT ANGIOGRAM HEAD W AI ANALYSIS OF LVO, CT ANGIOGRAM NECK    Date of Exam: 1/3/2024 7:32 PM EST    Indication: Stroke, follow up.    Comparison: None available.    Technique: CTA of the head and neck was performed after the uneventful intravenous administration of 100 cc Isovue-370. Reconstructed coronal and sagittal images were also obtained. In addition, a 3-D volume rendered image was created for interpretation.   Automated exposure control and iterative reconstruction methods were used.      Findings:    Contrast opacification: Excellent    Aortic Arch: Unremarkable    Right:    Innominate: Patent  Subclavian: Patent  Common Carotid: Patent  External Carotid:Patent  Internal Carotid: Patent    Intracranial ICA: Patent  MCA:Patent  BETO: Patent  PCA: Patent  Vertebral: Patent    Left:    Subclavian: Patent  Common Carotid: Patent  External Carotid:Patent  Internal Carotid: Patent    Intracranial ICA: Patent  MCA:Patent  BETO: Patent  PCA: Patent  Vertebral:  Patent      Basilar: Patent      Soft Tissue: Unremarkable  Lungs: Mild dependent atelectasis. Otherwise the visualized lungs are unremarkable.  Bones: No acute osseous abnormality.      Impression:      Impression:  No acute arterial abnormality of the head and neck.            Electronically Signed: Jonnathan Skelton DO    1/3/2024 8:01 PM EST    Workstation ID: [id number]    CT CEREBRAL PERFUSION WITH & WITHOUT CONTRAST [[id number]] Collected: 01/03/24 1943     Updated: 01/03/24 1948    Narrative:      CT CEREBRAL PERFUSION W WO CONTRAST    Date of Exam: 1/3/2024 7:32 PM EST    Indication: Neuro deficit, acute, stroke suspected.     Comparison: Same day head CT    Technique: Axial CT images of the brain were obtained prior to and after the administration of 100 cc Isovue 370. Core blood volume, core blood flow, mean transit time, and Tmax images were obtained utilizing the Rapid software protocol. A limited CT   angiogram of the head was also performed to measure the blood vessel density.    The radiation dose reduction device was turned on for each scan per the ALARA (As Low as Reasonably Achievable) protocol.      Findings:  Adequate perfusion images were obtained. Adequate ROIs obtained.  Study is mildly degraded by motion, most significantly in the z-axis.    CBF less than 30%: 0 mL.  Tmax greater than 6 seconds: 0 mL    Mismatch volume: 0 mL  Mismatch ratio: None      Impression:      Impression:  Mildly motion degraded study. No evidence of an acute infarct or ischemia        Electronically Signed: Jonnathan Skelton DO    1/3/2024 7:45 PM EST    Workstation ID: [id number]    CT Head Without Contrast Stroke Protocol [[id number]] Collected: 01/03/24 1935     Updated: 01/03/24 1943    Narrative:      CT HEAD WO CONTRAST STROKE PROTOCOL    Date of Exam: 1/3/2024 7:22 PM EST    Indication: Neuro deficit, acute, stroke suspected.    Comparison: None available.    Technique: Axial CT images were obtained of the head  without contrast administration.  Reconstructed coronal images were also obtained. Automated exposure control and iterative construction methods were used.    Scan Time: 7:28 p.m.  Results discussed with stroke team at 7:39 p.m.      Findings:  Motion degraded study.    No large territory infarct.    There is no evidence of hemorrhage.  No mass effect, edema or midline shift    Mild to moderate periventricular and subcortical white matter hypodensities, nonspecific but most likely represents chronic small vessel ischemic changes.    No extra-axial fluid collection.      Prominent ventricular system secondary to chronic parenchymal volume loss.      Disconjugate gaze, nonspecific.  The visualized paranasal sinuses and mastoid air cells are clear.    The visualized soft tissues are unremarkable.  No acute osseous abnormality.      Impression:      Impression:  No definite acute intracranial hemorrhage or large territorial infarct. Motion degraded study.        Electronically Signed: Jonnathan Skelton DO    1/3/2024 7:40 PM EST    Workstation ID: [id number]    XR Chest 1 View [[id number]] Collected: 12/30/23 1400     Updated: 12/30/23 1405    Narrative:      XR CHEST 1 VW    Date of Exam: 12/30/2023 1:42 PM EST    Indication: aspiration event    Comparison: 12/26/2023    Findings:  Interval placement of a right-sided PICC line. Heart size and pulmonary vasculature are stable there is some increased airspace disease in the right lung base. There is stable strandy airspace disease in the left lung base. There is mild blunting of both   costophrenic angles      Impression:      Impression:  Increased right basilar airspace disease that could be atelectasis or aspiration. Stable left basilar atelectasis noted. There are small bilateral pleural effusions      Electronically Signed: Lavon Urban    12/30/2023 2:02 PM EST    Workstation ID: [id number]    CT Angio Abdominal Aorta Bilateral Iliofem Runoff [[id number]] Collected:  12/27/23 0111     Updated: 12/27/23 0121    Narrative:      CT ANGIO ABDOMINAL AORTA BILAT ILIOFEM RUNOFF    Date of Exam: 12/26/2023 11:36 PM EST    Indication: Rash, temperature difference in lower extremities, swelling, include phase as well.    Comparison: None available.    Technique: CTA of the abdomen, pelvis and both lower extremities was performed after the uneventful intravenous administration of 125 mL Isovue-370. Reconstructed coronal and sagittal images were also obtained. In addition, a 3-D volume rendered image   was created for interpretation. Automated exposure control and iterative reconstruction methods were used.    Findings:  Non--- vascular:  There is a small right pleural effusion. There is bilateral basilar atelectasis. There is cholelithiasis. The arterial enhanced images of the liver, right adrenal gland, bilateral kidneys, pancreas and spleen are normal. There is adrenal adenoma on the   left. There is a moderate stool burden with a moderate amount of stool in the rectosigmoid possibly from fecal impaction. There is anasarca.    Vascular:  There is tortuosity of the thoracic aorta. There is no aneurysmal dilatation or aortic stenosis. The bilateral common iliac, internal and external neck arteries are widely patent.    There is a normal right-sided runoff with three-vessel to the ankle. On the left side, there is delayed runoff but the delayed images demonstrate a normal three-vessel runoff to the left ankle despite the slight delay which is of uncertain etiology.   Venous stasis would be in the differential for the sluggish flow on the left. There is diffuse lower extremity edema.      Impression:      Impression:  No acute abnormality. No evidence of arterial occlusion or significant stenosis. There is delayed runoff on the left perhaps related to venous stasis changes. Please see above discussion for other findings. There is diffuse anasarca and lower extremity    edema.        Electronically Signed: Oni Quesada MD    12/27/2023 1:18 AM EST    Workstation ID: [id number]    XR Chest 1 View [[id number]] Collected: 12/26/23 2137     Updated: 12/26/23 2141    Narrative:      XR CHEST 1 VW    Date of Exam: 12/26/2023 9:10 PM EST    Indication: Chest Pain Triage Protocol    Comparison: None available.    Findings:  Patient is rotated to the left. There is bibasilar airspace disease left greater than right with small left pleural effusion. Negative for pneumothorax. Heart size within normal limits for technique. Osseous structures grossly intact.      Impression:      Impression:  Bibasilar airspace disease left greater than right which may relate to atelectasis and/or pneumonia with small left pleural effusion.        Electronically Signed: Cyril Reina MD    12/26/2023 9:38 PM EST    Workstation ID: [id number]            Results for orders placed during the hospital encounter of 12/26/23    Adult Transthoracic Echo Complete W/ Cont if Necessary Per Protocol    Interpretation Summary    Left ventricular systolic function is normal. Calculated left ventricular EF = 57% Left ventricular ejection fraction appears to be 56 - 60%.    Left ventricular wall thickness is consistent with mild to moderate concentric hypertrophy.    Left ventricular diastolic function was normal.    Estimated right ventricular systolic pressure from tricuspid regurgitation is mildly elevated (35-45 mmHg).    There is a trivial pericardial effusion.    Mild aortic valve regurgitation.          Discharge Details        Discharge Medications        New Medications        Instructions Start Date   dextromethorphan polistirex ER 30 MG/5ML Suspension Extended Release oral suspension  Commonly known as: DELSYM   30 mg, Oral, 2 Times Daily PRN      guaiFENesin 600 MG 12 hr tablet  Commonly known as: MUCINEX   600 mg, Oral, 2 Times Daily PRN      hydrOXYzine 25 MG tablet  Commonly known as: ATARAX   12.5 mg, Oral, 3  "times daily      melatonin 5 MG tablet tablet   5 mg, Oral, Nightly PRN      mirtazapine 15 MG tablet  Commonly known as: REMERON   15 mg, Oral, Nightly      polyethylene glycol 17 g packet  Commonly known as: MIRALAX   17 g, Oral, Daily PRN      predniSONE 10 MG tablet  Commonly known as: DELTASONE   10 mg, Oral, Daily With Breakfast      rivastigmine 9.5 MG/24HR patch  Commonly known as: EXELON   1 patch, Transdermal, Daily      sennosides-docusate 8.6-50 MG per tablet  Commonly known as: PERICOLACE   2 tablets, Oral, Nightly             Continue These Medications        Instructions Start Date   Insulin Syringe-Needle U-100 25G X 1\" 1 ML misc   Does not apply             Stop These Medications      amiodarone 200 MG tablet  Commonly known as: PACERONE     amLODIPine 5 MG tablet  Commonly known as: NORVASC     aspirin 81 MG chewable tablet     atorvastatin 80 MG tablet  Commonly known as: LIPITOR     insulin glargine 100 UNIT/ML injection  Commonly known as: LANTUS, SEMGLEE     metoprolol tartrate 100 MG tablet  Commonly known as: LOPRESSOR     nystatin 100,000 unit/mL suspension  Commonly known as: MYCOSTATIN     spironolactone 50 MG tablet  Commonly known as: ALDACTONE     triamcinolone 0.1 % cream  Commonly known as: KENALOG              No Known Allergies      Discharge Disposition:  Skilled Nursing Facility (DC - External)    Discharge Diet:  Diet Order   Procedures    Diet: Diabetic Diets; Consistent Carbohydrate; Texture: Regular Texture (IDDSI 7); Fluid Consistency: Thin (IDDSI 0)       Discharge Activity:  Activity Instructions       Activity as Tolerated                CODE STATUS:    Code Status and Medical Interventions:   Ordered at: 01/07/24 1143     Code Status (Patient has no pulse and is not breathing):    No CPR (Do Not Attempt to Resuscitate)     Medical Interventions (Patient has pulse or is breathing):    Comfort Measures         Future Appointments   Date Time Provider Department Center "   2/1/2024  9:00 AM [facility]       Additional Instructions for the Follow-ups that You Need to Schedule       Discharge Follow-up with PCP   As directed       Currently Documented PCP:    Varun Pa MD    PCP Phone Number:    [phone]     Follow Up Details: to be seen by provider at facility on arrival, PCP 1 week of dc        Discharge Follow-up with Specialty: Palliative care to follow at facility   As directed      Specialty: Palliative care to follow at facility                Electronically signed by LITA Hightower, 02/01/24, 8:09 AM EST.      Time Spent on Discharge: I spent 40 minutes on this discharge activity which included: face-to-face encounter with the patient, reviewing the data in the system, coordination of the care with the nursing staff as well as consultants, documentation, and entering orders.

## 2024-02-01 NOTE — PAYOR COMM NOTE
"[facility]# [id number]   Discharge Summary    Utilization Review  Phone [phone]  Fax [phone]    [place] [id number]             Omar Mendoza (83 y.o. Female)       Date of Birth   1940    Social Security Number       Address   [address] [place] [address]    Home Phone   [phone]    MRN   [id number]       Religious   None    Marital Status                               Admission Date   12/26/23    Admission Type   Emergency    Admitting Provider   Trudy Doll MD    Attending Provider       Department, Room/Bed   [facility] 5B, N546/1       Discharge Date   2/1/2024    Discharge Disposition   Skilled Nursing Facility (DC - External)    Discharge Destination                                 Attending Provider: (none)   Allergies: No Known Allergies    Isolation: None   Infection: None   Code Status: No CPR    Ht: 170.2 cm (67.01\")   Wt: 99 kg (218 lb 4.1 oz)    Admission Cmt: None   Principal Problem: UTI (urinary tract infection) [N39.0]                   Active Insurance as of 12/26/2023       Primary Coverage       Payor Plan Insurance Group Employer/Plan Group    ANTHEM MEDICARE REPLACEMENT ANTHEM MEDICARE ADVANTAGE [id number]       Payor Plan Address Payor Plan Phone Number Payor Plan Fax Number Effective Dates    [address] [phone]  1/1/2022 - None Entered    [facility] [address]         Subscriber Name Subscriber Birth Date Member ID       OMAR MENDOZA 1940 [id number]                     Emergency Contacts        (Rel.) Home Phone Work Phone Mobile Phone    JO (POA)DESTIN (Son) [phone] -- [phone]    DINO GATES (Daughter) [phone] -- [phone]    DALLAS MENDOZA (Spouse) [phone] -- [phone]                 Discharge Summary        Shanti Prince APRN at 02/01/24 0808              [facility] Medicine " Services  TRANSFER SUMMARY    Patient Name: Omar Mendoza  : 1940  MRN: [id number]    Date of Admission: 2023  Date of Discharge:  2024  Length of Stay: 28  Primary Care Physician: Varun Pa MD    Consults       Date and Time Order Name Status Description    2024  9:39 AM Inpatient Palliative Care MD Consult Completed     2024  2:33 PM Inpatient Neurology Consult General Completed             Hospital Course     Presenting Problem:   UTI (urinary tract infection) [N39.0]    Active Hospital Problems    Diagnosis  POA    Uterine prolapse [N81.4]  Unknown    Bradycardia [R00.1]  Unknown    Pressure injury of buttock, unstageable [L89.300]  Unknown    Type 2 diabetes mellitus with ketoacidosis, without long-term current use of insulin [E11.10]  Unknown    Disorientation [R41.0]  Unknown    Pleural effusion [J90]  Unknown    Severe malnutrition [E43]  Yes    Edema of lower extremity [R60.0]  Yes    Bullous pemphigoid [L12.0]  Yes    Dyslipidemia [E78.5]  Yes    Essential hypertension [I10]  Yes      Resolved Hospital Problems    Diagnosis Date Resolved POA    **UTI (urinary tract infection) [N39.0] 2024 Yes    Acute constipation [K59.00] 2024 Unknown    Acute urinary retention [R33.8] 2024 Unknown          Hospital Course:  Omar Mendoza is a 83 y.o. female with PMH significant for HTN, HLD, CAD, DMII and bullous pemphigoid (followed by Dr. Napier of [facility] - s/p Dupixent 10/23, on chronic prednisone/Doxycycline. She was brought to [facility] ED on 23 for evaluation of confusion and R heel lesion. Neurology has evaluated patient and diagnosed her with dementia with depression/paranoia. CM working to arrange long-term care placement. Hospice following with plans for outpatient hospice services following DC.      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 Medication regimen simplified to  Exelon patch + Mirtazapine with good symptom management  - Family have opted to pursue LTC placement with outpatient hospice services following DC  -- Palliative care to follow facility     Bullous pemphigoid with pruritis  - Followed by Dr. Napier of [facility] - received Dupixent 10/23. More recently, was on Doxycycline 100mg BID / Niacin 500mg BID and prednisone (Dr. Napier was beginning to taper from 10mg to 7.5mg when patient last seen on 12/5/23)  - Patient complains of itching all over   - Resumed daily prednisone at 10mg sheri. DC if develops significant side effect and consider topical steroid     Constipation, continue bowel regimen.  Improved  Oropharyngeal dysphagia - comfort diet    Aspiration PNA - s/p rocephin + doxycycline    Chronic anemia - no f/u labs    Uncontrolled DMII - 2/2 comfort measures, no interventions    Chronic uterine prolapse/urinary retention, s/p Schultz catheter. Now voiding spontaneously         Patient was seen resting back in bed comfortably.  Appears in no acute distress.  Hemodynamically stable and afebrile.  Transferring to [facility] skilled nursing facility today.  Palliative care to follow.  Meds and follow-up as below.      Discharge Follow Up Recommendations for outpatient labs/diagnostics:  Patient cleared for transfer to skilled nursing facility today.  Palliative care to follow.    --To be seen by provider at facility on arrival, PCP 1 week of discharge  -- Palliative care to follow at facility    Day of Discharge     HPI:   Patient was seen resting back in bed in no acute distress.  Drowsy but awake.  Was able to tell me her first name.  Denied pain.  Appears comfortable.  No new issues per staff.  Transferring this morning to skilled nursing facility.    Review of Systems  Unable to accurately assess due to dementia    Vital Signs:   Temp:  [97.3 °F (36.3 °C)-98 °F (36.7 °C)] 98 °F (36.7 °C)  Heart Rate:  [76-85] 76  Resp:  [14-16] 14  BP:  "(114-138)/(74-78) 138/78     Physical Exam:  Constitutional: No acute distress, awake but drowsy.  Resting back in bed.  No visitors at bedside.  HENT: NCAT, mucous membranes moist  Respiratory: Clear to auscultation bilaterally, respiratory effort normal on room air.  Cardiovascular: RRR, no murmurs, rubs, or gallops  Gastrointestinal: Positive bowel sounds, soft, nontender, nondistended.  Musculoskeletal: No bilateral ankle edema  Psychiatric: flat affect, cooperative  Neurologic: Oriented x 1, strength symmetric in all extremities but generally weak, Cranial Nerves grossly intact to confrontation, speech clear      Pertinent Results               Invalid input(s): \"PROT\", \"LABALBU\"        Invalid input(s): \"TG\", \"LDLCALC\", \"LDLREALC\"      Brief Urine Lab Results  (Last result in the past 365 days)        Color   Clarity   Blood   Leuk Est   Nitrite   Protein   CREAT   Urine HCG        01/04/24 0053 Yellow   Clear   Negative   Trace   Negative   Negative                   Microbiology Results Abnormal       Procedure Component Value - Date/Time    COVID-19 RAPID AG,VERITOR,COR/JAMARI/PAD/NUPUR/ASHA/LAG/BRITTANY/ IN-HOUSE,DRY SWAB, 1 HR TAT - Swab, Nasal Cavity [[id number]]  (Normal) Collected: 01/04/24 0004    Lab Status: Final result Specimen: Swab from Nasal Cavity Updated: 01/04/24 0022     COVID19 Presumptive Negative    Narrative:      Fact sheets for providers: https://www.fda.gov/media/444919/download    Fact sheets for patients: https://www.fda.gov/media/281911/download    Blood Culture - Blood, Blood, PICC Line [[id number]]  (Normal) Collected: 12/27/23 1400    Lab Status: Final result Specimen: Blood, PICC Line Updated: 01/01/24 1531     Blood Culture No growth at 5 days    Blood Culture - Blood, Arm, Right [[id number]]  (Normal) Collected: 12/27/23 0431    Lab Status: Final result Specimen: Blood from Arm, Right Updated: 01/01/24 0515     Blood Culture No growth at 5 days            Imaging Results (All)       " Procedure Component Value Units Date/Time    XR Chest 1 View [[id number]] Collected: 01/04/24 1556     Updated: 01/04/24 1604    Narrative:      XR CHEST 1 VW    COMPARISON: 12/30/2023    HISTORY: Oxygen desaturation. .    FINDINGS:  Technical adequacy: Adequate. Shallow depth of inspiration  Central airways: Unremarkable  Heart: Stable cardiomegaly  Great vessels: Stable unfolded atherosclerotic thoracic aorta  Mediastinum: Unremarkable  Lungs: Bilateral perihilar interstitial infiltrates with peribronchial cuffing and subpleural edema  Pulmonary aliza: As above  Pleura: Unremarkable  Skeletal structures: Degenerative changes  Extra thoracic soft tissues:Unremarkable  Additional comments: The previously seen PICC line is no longer visualized. The fluffy airspace infiltrates on both sides have resolved in the interval.      Impression:      Impression: The findings suggest interstitial pulmonary edema, likely cardiogenic in origin. Clinical correlation.    Electronically Signed: Chandler Alberto MD    1/4/2024 4:01 PM EST    Workstation ID: [id number]    CT Angiogram Head w AI Analysis of LVO [[id number]] Collected: 01/03/24 1945     Updated: 01/03/24 2004    Narrative:      CT ANGIOGRAM HEAD W AI ANALYSIS OF LVO, CT ANGIOGRAM NECK    Date of Exam: 1/3/2024 7:32 PM EST    Indication: Stroke, follow up.    Comparison: None available.    Technique: CTA of the head and neck was performed after the uneventful intravenous administration of 100 cc Isovue-370. Reconstructed coronal and sagittal images were also obtained. In addition, a 3-D volume rendered image was created for interpretation.   Automated exposure control and iterative reconstruction methods were used.      Findings:    Contrast opacification: Excellent    Aortic Arch: Unremarkable    Right:    Innominate: Patent  Subclavian: Patent  Common Carotid: Patent  External Carotid:Patent  Internal Carotid: Patent    Intracranial ICA: Patent  MCA:Patent  BETO: Patent  PCA:  Patent  Vertebral: Patent    Left:    Subclavian: Patent  Common Carotid: Patent  External Carotid:Patent  Internal Carotid: Patent    Intracranial ICA: Patent  MCA:Patent  BETO: Patent  PCA: Patent  Vertebral: Patent      Basilar: Patent      Soft Tissue: Unremarkable  Lungs: Mild dependent atelectasis. Otherwise the visualized lungs are unremarkable.  Bones: No acute osseous abnormality.      Impression:      Impression:  No acute arterial abnormality of the head and neck.            Electronically Signed: Jonnathan Skelton DO    1/3/2024 8:01 PM EST    Workstation ID: [id number]    CT Angiogram Neck [[id number]] Collected: 01/03/24 1945     Updated: 01/03/24 2004    Narrative:      CT ANGIOGRAM HEAD W AI ANALYSIS OF LVO, CT ANGIOGRAM NECK    Date of Exam: 1/3/2024 7:32 PM EST    Indication: Stroke, follow up.    Comparison: None available.    Technique: CTA of the head and neck was performed after the uneventful intravenous administration of 100 cc Isovue-370. Reconstructed coronal and sagittal images were also obtained. In addition, a 3-D volume rendered image was created for interpretation.   Automated exposure control and iterative reconstruction methods were used.      Findings:    Contrast opacification: Excellent    Aortic Arch: Unremarkable    Right:    Innominate: Patent  Subclavian: Patent  Common Carotid: Patent  External Carotid:Patent  Internal Carotid: Patent    Intracranial ICA: Patent  MCA:Patent  BETO: Patent  PCA: Patent  Vertebral: Patent    Left:    Subclavian: Patent  Common Carotid: Patent  External Carotid:Patent  Internal Carotid: Patent    Intracranial ICA: Patent  MCA:Patent  BETO: Patent  PCA: Patent  Vertebral: Patent      Basilar: Patent      Soft Tissue: Unremarkable  Lungs: Mild dependent atelectasis. Otherwise the visualized lungs are unremarkable.  Bones: No acute osseous abnormality.      Impression:      Impression:  No acute arterial abnormality of the head and  neck.            Electronically Signed: Jonnathan Skelton,     1/3/2024 8:01 PM EST    Workstation ID: [id number]    CT CEREBRAL PERFUSION WITH & WITHOUT CONTRAST [[id number]] Collected: 01/03/24 1943     Updated: 01/03/24 1948    Narrative:      CT CEREBRAL PERFUSION W WO CONTRAST    Date of Exam: 1/3/2024 7:32 PM EST    Indication: Neuro deficit, acute, stroke suspected.     Comparison: Same day head CT    Technique: Axial CT images of the brain were obtained prior to and after the administration of 100 cc Isovue 370. Core blood volume, core blood flow, mean transit time, and Tmax images were obtained utilizing the Rapid software protocol. A limited CT   angiogram of the head was also performed to measure the blood vessel density.    The radiation dose reduction device was turned on for each scan per the ALARA (As Low as Reasonably Achievable) protocol.      Findings:  Adequate perfusion images were obtained. Adequate ROIs obtained.  Study is mildly degraded by motion, most significantly in the z-axis.    CBF less than 30%: 0 mL.  Tmax greater than 6 seconds: 0 mL    Mismatch volume: 0 mL  Mismatch ratio: None      Impression:      Impression:  Mildly motion degraded study. No evidence of an acute infarct or ischemia        Electronically Signed: Jonnathan Skelton,     1/3/2024 7:45 PM EST    Workstation ID: [id number]    CT Head Without Contrast Stroke Protocol [[id number]] Collected: 01/03/24 1935     Updated: 01/03/24 1943    Narrative:      CT HEAD WO CONTRAST STROKE PROTOCOL    Date of Exam: 1/3/2024 7:22 PM EST    Indication: Neuro deficit, acute, stroke suspected.    Comparison: None available.    Technique: Axial CT images were obtained of the head without contrast administration.  Reconstructed coronal images were also obtained. Automated exposure control and iterative construction methods were used.    Scan Time: 7:28 p.m.  Results discussed with stroke team at 7:39 p.m.      Findings:  Motion degraded  study.    No large territory infarct.    There is no evidence of hemorrhage.  No mass effect, edema or midline shift    Mild to moderate periventricular and subcortical white matter hypodensities, nonspecific but most likely represents chronic small vessel ischemic changes.    No extra-axial fluid collection.      Prominent ventricular system secondary to chronic parenchymal volume loss.      Disconjugate gaze, nonspecific.  The visualized paranasal sinuses and mastoid air cells are clear.    The visualized soft tissues are unremarkable.  No acute osseous abnormality.      Impression:      Impression:  No definite acute intracranial hemorrhage or large territorial infarct. Motion degraded study.        Electronically Signed: Jonnathan Skelton DO    1/3/2024 7:40 PM EST    Workstation ID: [id number]    XR Chest 1 View [[id number]] Collected: 12/30/23 1400     Updated: 12/30/23 1405    Narrative:      XR CHEST 1 VW    Date of Exam: 12/30/2023 1:42 PM EST    Indication: aspiration event    Comparison: 12/26/2023    Findings:  Interval placement of a right-sided PICC line. Heart size and pulmonary vasculature are stable there is some increased airspace disease in the right lung base. There is stable strandy airspace disease in the left lung base. There is mild blunting of both   costophrenic angles      Impression:      Impression:  Increased right basilar airspace disease that could be atelectasis or aspiration. Stable left basilar atelectasis noted. There are small bilateral pleural effusions      Electronically Signed: Lavon Urban    12/30/2023 2:02 PM EST    Workstation ID: [id number]    CT Angio Abdominal Aorta Bilateral Iliofem Runoff [[id number]] Collected: 12/27/23 0111     Updated: 12/27/23 0121    Narrative:      CT ANGIO ABDOMINAL AORTA BILAT ILIOFEM RUNOFF    Date of Exam: 12/26/2023 11:36 PM EST    Indication: Rash, temperature difference in lower extremities, swelling, include phase as well.    Comparison:  None available.    Technique: CTA of the abdomen, pelvis and both lower extremities was performed after the uneventful intravenous administration of 125 mL Isovue-370. Reconstructed coronal and sagittal images were also obtained. In addition, a 3-D volume rendered image   was created for interpretation. Automated exposure control and iterative reconstruction methods were used.    Findings:  Non--- vascular:  There is a small right pleural effusion. There is bilateral basilar atelectasis. There is cholelithiasis. The arterial enhanced images of the liver, right adrenal gland, bilateral kidneys, pancreas and spleen are normal. There is adrenal adenoma on the   left. There is a moderate stool burden with a moderate amount of stool in the rectosigmoid possibly from fecal impaction. There is anasarca.    Vascular:  There is tortuosity of the thoracic aorta. There is no aneurysmal dilatation or aortic stenosis. The bilateral common iliac, internal and external neck arteries are widely patent.    There is a normal right-sided runoff with three-vessel to the ankle. On the left side, there is delayed runoff but the delayed images demonstrate a normal three-vessel runoff to the left ankle despite the slight delay which is of uncertain etiology.   Venous stasis would be in the differential for the sluggish flow on the left. There is diffuse lower extremity edema.      Impression:      Impression:  No acute abnormality. No evidence of arterial occlusion or significant stenosis. There is delayed runoff on the left perhaps related to venous stasis changes. Please see above discussion for other findings. There is diffuse anasarca and lower extremity   edema.        Electronically Signed: Oni Quesada MD    12/27/2023 1:18 AM EST    Workstation ID: [id number]    XR Chest 1 View [[id number]] Collected: 12/26/23 2137     Updated: 12/26/23 2141    Narrative:      XR CHEST 1 VW    Date of Exam: 12/26/2023 9:10 PM EST    Indication: Chest  Pain Triage Protocol    Comparison: None available.    Findings:  Patient is rotated to the left. There is bibasilar airspace disease left greater than right with small left pleural effusion. Negative for pneumothorax. Heart size within normal limits for technique. Osseous structures grossly intact.      Impression:      Impression:  Bibasilar airspace disease left greater than right which may relate to atelectasis and/or pneumonia with small left pleural effusion.        Electronically Signed: Cyril Reina MD    12/26/2023 9:38 PM EST    Workstation ID: [id number]            Results for orders placed during the hospital encounter of 12/26/23    Adult Transthoracic Echo Complete W/ Cont if Necessary Per Protocol    Interpretation Summary    Left ventricular systolic function is normal. Calculated left ventricular EF = 57% Left ventricular ejection fraction appears to be 56 - 60%.    Left ventricular wall thickness is consistent with mild to moderate concentric hypertrophy.    Left ventricular diastolic function was normal.    Estimated right ventricular systolic pressure from tricuspid regurgitation is mildly elevated (35-45 mmHg).    There is a trivial pericardial effusion.    Mild aortic valve regurgitation.          Discharge Details        Discharge Medications        New Medications        Instructions Start Date   dextromethorphan polistirex ER 30 MG/5ML Suspension Extended Release oral suspension  Commonly known as: DELSYM   30 mg, Oral, 2 Times Daily PRN      guaiFENesin 600 MG 12 hr tablet  Commonly known as: MUCINEX   600 mg, Oral, 2 Times Daily PRN      hydrOXYzine 25 MG tablet  Commonly known as: ATARAX   12.5 mg, Oral, 3 times daily      melatonin 5 MG tablet tablet   5 mg, Oral, Nightly PRN      mirtazapine 15 MG tablet  Commonly known as: REMERON   15 mg, Oral, Nightly      polyethylene glycol 17 g packet  Commonly known as: MIRALAX   17 g, Oral, Daily PRN      predniSONE 10 MG tablet  Commonly known  "as: DELTASONE   10 mg, Oral, Daily With Breakfast      rivastigmine 9.5 MG/24HR patch  Commonly known as: EXELON   1 patch, Transdermal, Daily      sennosides-docusate 8.6-50 MG per tablet  Commonly known as: PERICOLACE   2 tablets, Oral, Nightly             Continue These Medications        Instructions Start Date   Insulin Syringe-Needle U-100 25G X 1\" 1 ML misc   Does not apply             Stop These Medications      amiodarone 200 MG tablet  Commonly known as: PACERONE     amLODIPine 5 MG tablet  Commonly known as: NORVASC     aspirin 81 MG chewable tablet     atorvastatin 80 MG tablet  Commonly known as: LIPITOR     insulin glargine 100 UNIT/ML injection  Commonly known as: LANTUS, SEMGLEE     metoprolol tartrate 100 MG tablet  Commonly known as: LOPRESSOR     nystatin 100,000 unit/mL suspension  Commonly known as: MYCOSTATIN     spironolactone 50 MG tablet  Commonly known as: ALDACTONE     triamcinolone 0.1 % cream  Commonly known as: KENALOG              No Known Allergies      Discharge Disposition:  Skilled Nursing Facility (DC - External)    Discharge Diet:  Diet Order   Procedures    Diet: Diabetic Diets; Consistent Carbohydrate; Texture: Regular Texture (IDDSI 7); Fluid Consistency: Thin (IDDSI 0)       Discharge Activity:  Activity Instructions       Activity as Tolerated                CODE STATUS:    Code Status and Medical Interventions:   Ordered at: 01/07/24 1143     Code Status (Patient has no pulse and is not breathing):    No CPR (Do Not Attempt to Resuscitate)     Medical Interventions (Patient has pulse or is breathing):    Comfort Measures         Future Appointments   Date Time Provider Department Center   2/1/2024  9:00 AM MED 11  [facility]       Additional Instructions for the Follow-ups that You Need to Schedule       Discharge Follow-up with PCP   As directed       Currently Documented PCP:    Varun Pa MD    PCP Phone Number:    [phone]     Follow Up Details: to be " seen by provider at facility on arrival, PCP 1 week of dc        Discharge Follow-up with Specialty: Palliative care to follow at facility   As directed      Specialty: Palliative care to follow at facility                Electronically signed by LITA Hightower, 02/01/24, 8:09 AM EST.      Time Spent on Discharge: I spent 40 minutes on this discharge activity which included: face-to-face encounter with the patient, reviewing the data in the system, coordination of the care with the nursing staff as well as consultants, documentation, and entering orders.        Electronically signed by Shanti Prince APRN at 02/01/24 0835       Discharge Order (From admission, onward)       Start     Ordered    02/01/24 0805  Discharge patient  Once        Expected Discharge Date: 02/01/24   Discharge Disposition: Skilled Nursing Facility (DC - External)   Physician of Record for Attribution - Please select from Treatment Team: MARTA FUENTES [[id number]]   Review needed by CMO to determine Physician of Record: No      Question Answer Comment   Physician of Record for Attribution - Please select from Treatment Team MARTA FUENTES    Review needed by CMO to determine Physician of Record No        02/01/24 0808

## 2024-05-12 ENCOUNTER — APPOINTMENT (OUTPATIENT)
Dept: GENERAL RADIOLOGY | Facility: HOSPITAL | Age: 84
End: 2024-05-12
Payer: MEDICARE

## 2024-05-12 ENCOUNTER — APPOINTMENT (OUTPATIENT)
Dept: CT IMAGING | Facility: HOSPITAL | Age: 84
End: 2024-05-12
Payer: MEDICARE

## 2024-05-12 ENCOUNTER — HOSPITAL ENCOUNTER (EMERGENCY)
Facility: HOSPITAL | Age: 84
Discharge: NURSING, REHAB OR LONG-TERM CARE | End: 2024-05-12
Attending: EMERGENCY MEDICINE | Admitting: EMERGENCY MEDICINE
Payer: MEDICARE

## 2024-05-12 VITALS
DIASTOLIC BLOOD PRESSURE: 110 MMHG | HEIGHT: 65 IN | WEIGHT: 141 LBS | OXYGEN SATURATION: 93 % | HEART RATE: 89 BPM | SYSTOLIC BLOOD PRESSURE: 146 MMHG | TEMPERATURE: 98 F | BODY MASS INDEX: 23.49 KG/M2 | RESPIRATION RATE: 24 BRPM

## 2024-05-12 DIAGNOSIS — I31.39 PERICARDIAL EFFUSION: ICD-10-CM

## 2024-05-12 DIAGNOSIS — E87.6 HYPOKALEMIA: Primary | ICD-10-CM

## 2024-05-12 DIAGNOSIS — Z86.59 HISTORY OF DEMENTIA: ICD-10-CM

## 2024-05-12 LAB
ALBUMIN SERPL-MCNC: 3.9 G/DL (ref 3.5–5.2)
ALBUMIN/GLOB SERPL: 1.4 G/DL
ALP SERPL-CCNC: 125 U/L (ref 39–117)
ALT SERPL W P-5'-P-CCNC: 37 U/L (ref 1–33)
AMMONIA BLD-SCNC: 16 UMOL/L (ref 11–51)
AMPHET+METHAMPHET UR QL: NEGATIVE
AMPHETAMINES UR QL: NEGATIVE
ANION GAP SERPL CALCULATED.3IONS-SCNC: 15 MMOL/L (ref 5–15)
APAP SERPL-MCNC: <5 MCG/ML (ref 0–30)
ARTERIAL PATENCY WRIST A: ABNORMAL
AST SERPL-CCNC: 26 U/L (ref 1–32)
ATMOSPHERIC PRESS: ABNORMAL MM[HG]
BACTERIA UR QL AUTO: ABNORMAL /HPF
BARBITURATES UR QL SCN: NEGATIVE
BASE EXCESS BLDA CALC-SCNC: -1.3 MMOL/L (ref 0–2)
BASOPHILS # BLD AUTO: 0.01 10*3/MM3 (ref 0–0.2)
BASOPHILS NFR BLD AUTO: 0.2 % (ref 0–1.5)
BDY SITE: ABNORMAL
BENZODIAZ UR QL SCN: NEGATIVE
BILIRUB SERPL-MCNC: 1.2 MG/DL (ref 0–1.2)
BILIRUB UR QL STRIP: NEGATIVE
BODY TEMPERATURE: 37
BUN SERPL-MCNC: 21 MG/DL (ref 8–23)
BUN/CREAT SERPL: 21.4 (ref 7–25)
BUPRENORPHINE SERPL-MCNC: NEGATIVE NG/ML
CALCIUM SPEC-SCNC: 9 MG/DL (ref 8.6–10.5)
CANNABINOIDS SERPL QL: NEGATIVE
CHLORIDE SERPL-SCNC: 110 MMOL/L (ref 98–107)
CLARITY UR: ABNORMAL
CO2 BLDA-SCNC: 22.6 MMOL/L (ref 22–33)
CO2 SERPL-SCNC: 24 MMOL/L (ref 22–29)
COCAINE UR QL: NEGATIVE
COHGB MFR BLD: 1.8 % (ref 0–2)
COLOR UR: ABNORMAL
CREAT SERPL-MCNC: 0.98 MG/DL (ref 0.57–1)
D DIMER PPP FEU-MCNC: 3.29 MCGFEU/ML (ref 0–0.81)
DEPRECATED RDW RBC AUTO: 64.3 FL (ref 37–54)
EGFRCR SERPLBLD CKD-EPI 2021: 58.1 ML/MIN/1.73
EOSINOPHIL # BLD AUTO: 0.02 10*3/MM3 (ref 0–0.4)
EOSINOPHIL NFR BLD AUTO: 0.3 % (ref 0.3–6.2)
EPAP: 0
ERYTHROCYTE [DISTWIDTH] IN BLOOD BY AUTOMATED COUNT: 19.9 % (ref 12.3–15.4)
ETHANOL BLD-MCNC: <10 MG/DL (ref 0–10)
FENTANYL UR-MCNC: NEGATIVE NG/ML
FINE GRAN CASTS URNS QL MICRO: ABNORMAL /LPF
FLUAV RNA RESP QL NAA+PROBE: NOT DETECTED
FLUBV RNA RESP QL NAA+PROBE: NOT DETECTED
GLOBULIN UR ELPH-MCNC: 2.8 GM/DL
GLUCOSE SERPL-MCNC: 156 MG/DL (ref 65–99)
GLUCOSE UR STRIP-MCNC: NEGATIVE MG/DL
HCO3 BLDA-SCNC: 21.7 MMOL/L (ref 20–26)
HCT VFR BLD AUTO: 34.8 % (ref 34–46.6)
HCT VFR BLD CALC: 33.4 % (ref 38–51)
HGB BLD-MCNC: 11.1 G/DL (ref 12–15.9)
HGB BLDA-MCNC: 10.9 G/DL (ref 14–18)
HGB UR QL STRIP.AUTO: NEGATIVE
HYALINE CASTS UR QL AUTO: ABNORMAL /LPF
IMM GRANULOCYTES # BLD AUTO: 0.02 10*3/MM3 (ref 0–0.05)
IMM GRANULOCYTES NFR BLD AUTO: 0.3 % (ref 0–0.5)
INHALED O2 CONCENTRATION: 28 %
IPAP: 0
KETONES UR QL STRIP: ABNORMAL
LEUKOCYTE ESTERASE UR QL STRIP.AUTO: ABNORMAL
LIPASE SERPL-CCNC: 29 U/L (ref 13–60)
LYMPHOCYTES # BLD AUTO: 0.59 10*3/MM3 (ref 0.7–3.1)
LYMPHOCYTES NFR BLD AUTO: 9 % (ref 19.6–45.3)
MAGNESIUM SERPL-MCNC: 1.9 MG/DL (ref 1.6–2.4)
MCH RBC QN AUTO: 28.8 PG (ref 26.6–33)
MCHC RBC AUTO-ENTMCNC: 31.9 G/DL (ref 31.5–35.7)
MCV RBC AUTO: 90.2 FL (ref 79–97)
METHADONE UR QL SCN: NEGATIVE
METHGB BLD QL: 0.2 % (ref 0–1.5)
MODALITY: ABNORMAL
MONOCYTES # BLD AUTO: 0.5 10*3/MM3 (ref 0.1–0.9)
MONOCYTES NFR BLD AUTO: 7.6 % (ref 5–12)
MUCOUS THREADS URNS QL MICRO: ABNORMAL /HPF
NEUTROPHILS NFR BLD AUTO: 5.41 10*3/MM3 (ref 1.7–7)
NEUTROPHILS NFR BLD AUTO: 82.6 % (ref 42.7–76)
NITRITE UR QL STRIP: NEGATIVE
NRBC BLD AUTO-RTO: 0.5 /100 WBC (ref 0–0.2)
NT-PROBNP SERPL-MCNC: ABNORMAL PG/ML (ref 0–1800)
OPIATES UR QL: NEGATIVE
OXYCODONE UR QL SCN: NEGATIVE
OXYHGB MFR BLDV: 97.5 % (ref 94–99)
PAW @ PEAK INSP FLOW SETTING VENT: 0 CMH2O
PCO2 BLDA: 29.8 MM HG (ref 35–45)
PCO2 TEMP ADJ BLD: 29.8 MM HG (ref 35–45)
PCP UR QL SCN: NEGATIVE
PH BLDA: 7.47 PH UNITS (ref 7.35–7.45)
PH UR STRIP.AUTO: <=5 [PH] (ref 5–8)
PH, TEMP CORRECTED: 7.47 PH UNITS
PLATELET # BLD AUTO: 274 10*3/MM3 (ref 140–450)
PMV BLD AUTO: 10 FL (ref 6–12)
PO2 BLDA: 110 MM HG (ref 83–108)
PO2 TEMP ADJ BLD: 110 MM HG (ref 83–108)
POTASSIUM SERPL-SCNC: 3.2 MMOL/L (ref 3.5–5.2)
PROT SERPL-MCNC: 6.7 G/DL (ref 6–8.5)
PROT UR QL STRIP: ABNORMAL
QT INTERVAL: 460 MS
QTC INTERVAL: 559 MS
RBC # BLD AUTO: 3.86 10*6/MM3 (ref 3.77–5.28)
RBC # UR STRIP: ABNORMAL /HPF
REF LAB TEST METHOD: ABNORMAL
SALICYLATES SERPL-MCNC: <0.3 MG/DL
SARS-COV-2 RNA RESP QL NAA+PROBE: NOT DETECTED
SODIUM SERPL-SCNC: 149 MMOL/L (ref 136–145)
SP GR UR STRIP: 1.03 (ref 1–1.03)
SQUAMOUS #/AREA URNS HPF: ABNORMAL /HPF
T4 FREE SERPL-MCNC: 1.15 NG/DL (ref 0.92–1.68)
TOTAL RATE: 0 BREATHS/MINUTE
TRICYCLICS UR QL SCN: NEGATIVE
TROPONIN T SERPL HS-MCNC: 42 NG/L
TSH SERPL DL<=0.05 MIU/L-ACNC: 1.57 UIU/ML (ref 0.27–4.2)
UROBILINOGEN UR QL STRIP: ABNORMAL
WBC # UR STRIP: ABNORMAL /HPF
WBC NRBC COR # BLD AUTO: 6.55 10*3/MM3 (ref 3.4–10.8)

## 2024-05-12 PROCEDURE — 25510000001 IOPAMIDOL PER 1 ML: Performed by: EMERGENCY MEDICINE

## 2024-05-12 PROCEDURE — 84439 ASSAY OF FREE THYROXINE: CPT | Performed by: EMERGENCY MEDICINE

## 2024-05-12 PROCEDURE — 80307 DRUG TEST PRSMV CHEM ANLYZR: CPT | Performed by: EMERGENCY MEDICINE

## 2024-05-12 PROCEDURE — 93005 ELECTROCARDIOGRAM TRACING: CPT | Performed by: EMERGENCY MEDICINE

## 2024-05-12 PROCEDURE — 83880 ASSAY OF NATRIURETIC PEPTIDE: CPT | Performed by: EMERGENCY MEDICINE

## 2024-05-12 PROCEDURE — 85379 FIBRIN DEGRADATION QUANT: CPT | Performed by: EMERGENCY MEDICINE

## 2024-05-12 PROCEDURE — 80053 COMPREHEN METABOLIC PANEL: CPT | Performed by: EMERGENCY MEDICINE

## 2024-05-12 PROCEDURE — 80179 DRUG ASSAY SALICYLATE: CPT | Performed by: EMERGENCY MEDICINE

## 2024-05-12 PROCEDURE — 83735 ASSAY OF MAGNESIUM: CPT | Performed by: EMERGENCY MEDICINE

## 2024-05-12 PROCEDURE — 99285 EMERGENCY DEPT VISIT HI MDM: CPT

## 2024-05-12 PROCEDURE — 84484 ASSAY OF TROPONIN QUANT: CPT | Performed by: EMERGENCY MEDICINE

## 2024-05-12 PROCEDURE — 82375 ASSAY CARBOXYHB QUANT: CPT

## 2024-05-12 PROCEDURE — 84443 ASSAY THYROID STIM HORMONE: CPT | Performed by: EMERGENCY MEDICINE

## 2024-05-12 PROCEDURE — 87636 SARSCOV2 & INF A&B AMP PRB: CPT | Performed by: EMERGENCY MEDICINE

## 2024-05-12 PROCEDURE — 36415 COLL VENOUS BLD VENIPUNCTURE: CPT

## 2024-05-12 PROCEDURE — 70450 CT HEAD/BRAIN W/O DYE: CPT

## 2024-05-12 PROCEDURE — 71275 CT ANGIOGRAPHY CHEST: CPT

## 2024-05-12 PROCEDURE — 71045 X-RAY EXAM CHEST 1 VIEW: CPT

## 2024-05-12 PROCEDURE — 82140 ASSAY OF AMMONIA: CPT | Performed by: EMERGENCY MEDICINE

## 2024-05-12 PROCEDURE — P9612 CATHETERIZE FOR URINE SPEC: HCPCS

## 2024-05-12 PROCEDURE — 36600 WITHDRAWAL OF ARTERIAL BLOOD: CPT

## 2024-05-12 PROCEDURE — 85025 COMPLETE CBC W/AUTO DIFF WBC: CPT | Performed by: EMERGENCY MEDICINE

## 2024-05-12 PROCEDURE — 82805 BLOOD GASES W/O2 SATURATION: CPT

## 2024-05-12 PROCEDURE — 87086 URINE CULTURE/COLONY COUNT: CPT | Performed by: EMERGENCY MEDICINE

## 2024-05-12 PROCEDURE — 83050 HGB METHEMOGLOBIN QUAN: CPT

## 2024-05-12 PROCEDURE — 80143 DRUG ASSAY ACETAMINOPHEN: CPT | Performed by: EMERGENCY MEDICINE

## 2024-05-12 PROCEDURE — 81001 URINALYSIS AUTO W/SCOPE: CPT | Performed by: EMERGENCY MEDICINE

## 2024-05-12 PROCEDURE — 82077 ASSAY SPEC XCP UR&BREATH IA: CPT | Performed by: EMERGENCY MEDICINE

## 2024-05-12 PROCEDURE — 83690 ASSAY OF LIPASE: CPT | Performed by: EMERGENCY MEDICINE

## 2024-05-12 RX ORDER — POTASSIUM CHLORIDE 1.5 G/1.58G
20 POWDER, FOR SOLUTION ORAL ONCE
Status: COMPLETED | OUTPATIENT
Start: 2024-05-12 | End: 2024-05-12

## 2024-05-12 RX ORDER — POTASSIUM CHLORIDE 750 MG/1
10 TABLET, FILM COATED, EXTENDED RELEASE ORAL 2 TIMES DAILY
Qty: 6 TABLET | Refills: 0 | Status: SHIPPED | OUTPATIENT
Start: 2024-05-12 | End: 2024-05-16

## 2024-05-12 RX ADMIN — POTASSIUM CHLORIDE 20 MEQ: 1.5 POWDER, FOR SOLUTION ORAL at 11:29

## 2024-05-12 RX ADMIN — IOPAMIDOL 85 ML: 755 INJECTION, SOLUTION INTRAVENOUS at 12:14

## 2024-05-12 NOTE — ED PROVIDER NOTES
Subjective   History of Present Illness  83-year-old female with a history of dementia presents for evaluation of confusion and generalized weakness.  She presents via EMS from her nursing home.  According to staff, the patient has had progressively worsening confusion and generalized weakness when compared to her baseline over the past week.  She reportedly has a history of electrolyte abnormalities and there was concern that this could potentially be the etiology of her confusion.  On EMS arrival, the patient was noted to have oxygen saturations in the upper 80s.  She is not typically oxygen dependent per EMS personnel.  She is not hypoglycemic.  The patient denies any pain.  Awaiting arrival of family to further corroborate the patient's history and give us a better idea of her baseline.      Review of Systems   Unable to perform ROS: Dementia   Neurological:  Positive for weakness.   Psychiatric/Behavioral:  Positive for confusion.        Past Medical History:   Diagnosis Date    CAD (coronary artery disease)     Dementia     Diabetes mellitus     Hyperlipemia     Hypertension        No Known Allergies    No past surgical history on file.    No family history on file.    Social History     Socioeconomic History    Marital status:    Tobacco Use    Smoking status: Never    Smokeless tobacco: Never   Substance and Sexual Activity    Alcohol use: Never    Drug use: Defer    Sexual activity: Defer           Objective   Physical Exam  Vitals and nursing note reviewed.   Constitutional:       Appearance: Normal appearance. She is well-developed. She is not diaphoretic.      Comments: Nontoxic-appearing elderly female, pleasantly demented   HENT:      Head: Normocephalic and atraumatic.   Eyes:      Pupils: Pupils are equal, round, and reactive to light.   Neck:      Comments: No meningeal signs or nuchal rigidity  Cardiovascular:      Rate and Rhythm: Normal rate and regular rhythm.      Heart sounds: Normal  heart sounds. No murmur heard.     No friction rub. No gallop.   Pulmonary:      Effort: Pulmonary effort is normal. No respiratory distress.      Breath sounds: Normal breath sounds. No wheezing or rales.   Abdominal:      General: Bowel sounds are normal. There is no distension.      Palpations: Abdomen is soft. There is no mass.      Tenderness: There is no abdominal tenderness. There is no guarding or rebound.      Comments: No focal abdominal tenderness, no peritoneal signs, no pain out of proportion to exam   Musculoskeletal:         General: Normal range of motion.      Cervical back: Neck supple.   Skin:     General: Skin is warm and dry.      Findings: No erythema or rash.   Neurological:      Mental Status: She is alert and oriented to person, place, and time.      Comments: Awake, alert, and oriented x 3, answering questions appropriately, following simple commands, moving all 4 extremities   Psychiatric:         Mood and Affect: Mood normal.         Procedures           ED Course  ED Course as of 05/12/24 1242   Sun May 12, 2024   0819 83-year-old female presents for evaluation of confusion and generalized weakness.  She was sent here from her nursing home.  According to staff, she has had progressively worsening confusion over the past week.  On arrival to the ED, the patient is pleasantly demented.  She is alert to person, place, and year.  She is following simple commands and answering questions appropriately.  Differential diagnosis is quite broad.  We will obtain labs and imaging, and we will reassess following initial interventions. [DD]   0933 I personally and independently reviewed the patient's CT images and findings, and I am in agreement with the radiologist regarding CT interpretation--particularly there is no emergent/surgical intracranial process noted. [DD]   0933 I personally and independently viewed the patient's x-ray images myself, and I am in agreement with the radiologist's reading  for final interpretation, particularly regarding potential CHF. [DD]   1032 Labs remarkable for elevated BNP, elevated high-sensitivity troponin that is flat when compared to priors, mild hypokalemia, and elevated D-dimer.  Oral potassium replacement initiated.  We will obtain a chest CTA. [DD]   1229 I personally and independently reviewed the patient's CT images and findings, and I am in agreement with the radiologist regarding CT interpretation--particularly there is no pulmonary embolism noted.  The patient has cardiomegaly and a small pleural effusion present. [DD]   1235 Upon reevaluation, the patient looks and feels well.  Normal work of breathing.  I spoke at length with the family at bedside.  They feel comfortable with the patient being discharged back to her facility, and I feel that this is in her best interest as well.  Prescription for oral potassium replacement.  I have referred the patient to our heart failure clinic for an outpatient echocardiogram given her small pericardial effusion.  She will follow-up within the next 72 hours.  Agreeable with plan and given appropriate strict return precautions. [DD]      ED Course User Index  [DD] Peter Marquez MD                                        Recent Results (from the past 24 hour(s))   ECG 12 Lead Altered Mental Status    Collection Time: 05/12/24  8:27 AM   Result Value Ref Range    QT Interval 460 ms    QTC Interval 559 ms   COVID-19 and FLU A/B PCR, 1 HR TAT - Swab, Nasopharynx    Collection Time: 05/12/24  8:50 AM    Specimen: Nasopharynx; Swab   Result Value Ref Range    COVID19 Not Detected Not Detected - Ref. Range    Influenza A PCR Not Detected Not Detected    Influenza B PCR Not Detected Not Detected   Blood Gas, Arterial With Co-Ox    Collection Time: 05/12/24  9:25 AM    Specimen: Arterial Blood   Result Value Ref Range    Site Right Brachial     Carlos's Test N/A     pH, Arterial 7.470 (H) 7.350 - 7.450 pH units    pCO2, Arterial  29.8 (L) 35.0 - 45.0 mm Hg    pO2, Arterial 110.0 (H) 83.0 - 108.0 mm Hg    HCO3, Arterial 21.7 20.0 - 26.0 mmol/L    Base Excess, Arterial -1.3 (L) 0.0 - 2.0 mmol/L    Hemoglobin, Blood Gas 10.9 (L) 14 - 18 g/dL    Hematocrit, Blood Gas 33.4 (L) 38.0 - 51.0 %    Oxyhemoglobin 97.5 94 - 99 %    Methemoglobin 0.20 0.00 - 1.50 %    Carboxyhemoglobin 1.8 0 - 2 %    CO2 Content 22.6 22 - 33 mmol/L    Temperature 37.0     Barometric Pressure for Blood Gas      Modality Nasal Cannula     FIO2 28 %    Rate 0 Breaths/minute    PIP 0 cmH2O    IPAP 0     EPAP 0     pH, Temp Corrected 7.470 pH Units    pCO2, Temperature Corrected 29.8 (L) 35 - 45 mm Hg    pO2, Temperature Corrected 110 (H) 83 - 108 mm Hg   Comprehensive Metabolic Panel    Collection Time: 05/12/24  9:31 AM    Specimen: Blood   Result Value Ref Range    Glucose 156 (H) 65 - 99 mg/dL    BUN 21 8 - 23 mg/dL    Creatinine 0.98 0.57 - 1.00 mg/dL    Sodium 149 (H) 136 - 145 mmol/L    Potassium 3.2 (L) 3.5 - 5.2 mmol/L    Chloride 110 (H) 98 - 107 mmol/L    CO2 24.0 22.0 - 29.0 mmol/L    Calcium 9.0 8.6 - 10.5 mg/dL    Total Protein 6.7 6.0 - 8.5 g/dL    Albumin 3.9 3.5 - 5.2 g/dL    ALT (SGPT) 37 (H) 1 - 33 U/L    AST (SGOT) 26 1 - 32 U/L    Alkaline Phosphatase 125 (H) 39 - 117 U/L    Total Bilirubin 1.2 0.0 - 1.2 mg/dL    Globulin 2.8 gm/dL    A/G Ratio 1.4 g/dL    BUN/Creatinine Ratio 21.4 7.0 - 25.0    Anion Gap 15.0 5.0 - 15.0 mmol/L    eGFR 58.1 (L) >60.0 mL/min/1.73   Lipase    Collection Time: 05/12/24  9:31 AM    Specimen: Blood   Result Value Ref Range    Lipase 29 13 - 60 U/L   T4, Free    Collection Time: 05/12/24  9:31 AM    Specimen: Blood   Result Value Ref Range    Free T4 1.15 0.92 - 1.68 ng/dL   TSH    Collection Time: 05/12/24  9:31 AM    Specimen: Blood   Result Value Ref Range    TSH 1.570 0.270 - 4.200 uIU/mL   Magnesium    Collection Time: 05/12/24  9:31 AM    Specimen: Blood   Result Value Ref Range    Magnesium 1.9 1.6 - 2.4 mg/dL   Ammonia     Collection Time: 05/12/24  9:31 AM    Specimen: Blood   Result Value Ref Range    Ammonia 16 11 - 51 umol/L   Salicylate Level    Collection Time: 05/12/24  9:31 AM    Specimen: Blood   Result Value Ref Range    Salicylate <0.3 <=30.0 mg/dL   Ethanol    Collection Time: 05/12/24  9:31 AM    Specimen: Blood   Result Value Ref Range    Ethanol <10 0 - 10 mg/dL   Acetaminophen Level    Collection Time: 05/12/24  9:31 AM    Specimen: Blood   Result Value Ref Range    Acetaminophen <5.0 0.0 - 30.0 mcg/mL   CBC Auto Differential    Collection Time: 05/12/24  9:31 AM    Specimen: Blood   Result Value Ref Range    WBC 6.55 3.40 - 10.80 10*3/mm3    RBC 3.86 3.77 - 5.28 10*6/mm3    Hemoglobin 11.1 (L) 12.0 - 15.9 g/dL    Hematocrit 34.8 34.0 - 46.6 %    MCV 90.2 79.0 - 97.0 fL    MCH 28.8 26.6 - 33.0 pg    MCHC 31.9 31.5 - 35.7 g/dL    RDW 19.9 (H) 12.3 - 15.4 %    RDW-SD 64.3 (H) 37.0 - 54.0 fl    MPV 10.0 6.0 - 12.0 fL    Platelets 274 140 - 450 10*3/mm3    Neutrophil % 82.6 (H) 42.7 - 76.0 %    Lymphocyte % 9.0 (L) 19.6 - 45.3 %    Monocyte % 7.6 5.0 - 12.0 %    Eosinophil % 0.3 0.3 - 6.2 %    Basophil % 0.2 0.0 - 1.5 %    Immature Grans % 0.3 0.0 - 0.5 %    Neutrophils, Absolute 5.41 1.70 - 7.00 10*3/mm3    Lymphocytes, Absolute 0.59 (L) 0.70 - 3.10 10*3/mm3    Monocytes, Absolute 0.50 0.10 - 0.90 10*3/mm3    Eosinophils, Absolute 0.02 0.00 - 0.40 10*3/mm3    Basophils, Absolute 0.01 0.00 - 0.20 10*3/mm3    Immature Grans, Absolute 0.02 0.00 - 0.05 10*3/mm3    nRBC 0.5 (H) 0.0 - 0.2 /100 WBC   Single High Sensitivity Troponin T    Collection Time: 05/12/24  9:31 AM    Specimen: Blood   Result Value Ref Range    HS Troponin T 42 (H) <14 ng/L   BNP    Collection Time: 05/12/24  9:31 AM    Specimen: Blood   Result Value Ref Range    proBNP 20,558.0 (H) 0.0 - 1,800.0 pg/mL   D-dimer, Quantitative    Collection Time: 05/12/24  9:31 AM    Specimen: Blood   Result Value Ref Range    D-Dimer, Quantitative 3.29 (H) 0.00 - 0.81  MCGFEU/mL   Urinalysis With Culture If Indicated - Straight Cath    Collection Time: 05/12/24 11:42 AM    Specimen: Straight Cath; Urine   Result Value Ref Range    Color, UA Dark Yellow (A) Yellow, Straw    Appearance, UA Cloudy (A) Clear    pH, UA <=5.0 5.0 - 8.0    Specific Gravity, UA 1.027 1.001 - 1.030    Glucose, UA Negative Negative    Ketones, UA Trace (A) Negative    Bilirubin, UA Negative Negative    Blood, UA Negative Negative    Protein, UA >=300 mg/dL (3+) (A) Negative    Leuk Esterase, UA Trace (A) Negative    Nitrite, UA Negative Negative    Urobilinogen, UA 1.0 E.U./dL 0.2 - 1.0 E.U./dL   Urinalysis, Microscopic Only - Straight Cath    Collection Time: 05/12/24 11:42 AM    Specimen: Straight Cath; Urine   Result Value Ref Range    RBC, UA 0-2 None Seen, 0-2 /HPF    WBC, UA 6-10 (A) None Seen, 0-2 /HPF    Bacteria, UA None Seen None Seen, Trace /HPF    Squamous Epithelial Cells, UA 7-12 (A) None Seen, 0-2 /HPF    Hyaline Casts, UA 7-12 0 - 6 /LPF    Fine Granular Casts, UA 3-6 None Seen /LPF    Mucus, UA Trace None Seen, Trace /HPF    Methodology Manual Light Microscopy    Urine Drug Screen - Urine, Clean Catch    Collection Time: 05/12/24 11:43 AM    Specimen: Urine, Clean Catch   Result Value Ref Range    THC, Screen, Urine Negative Negative    Phencyclidine (PCP), Urine Negative Negative    Cocaine Screen, Urine Negative Negative    Methamphetamine, Ur Negative Negative    Opiate Screen Negative Negative    Amphetamine Screen, Urine Negative Negative    Benzodiazepine Screen, Urine Negative Negative    Tricyclic Antidepressants Screen Negative Negative    Methadone Screen, Urine Negative Negative    Barbiturates Screen, Urine Negative Negative    Oxycodone Screen, Urine Negative Negative    Buprenorphine, Screen, Urine Negative Negative   Fentanyl, Urine - Urine, Clean Catch    Collection Time: 05/12/24 11:43 AM    Specimen: Urine, Clean Catch   Result Value Ref Range    Fentanyl, Urine Negative  "Negative     Note: In addition to lab results from this visit, the labs listed above may include labs taken at another facility or during a different encounter within the last 24 hours. Please correlate lab times with ED admission and discharge times for further clarification of the services performed during this visit.    CT Angiogram Chest   Final Result   Impression:      1. No evidence for pulmonary embolus.      2. Cardiomegaly with pericardial effusion. There is pulmonary artery prominence and evidence for right sided heart dysfunction.      3. Bibasilar atelectasis and pleural effusions right greater than left.            Electronically Signed: Deb Cotter MD     5/12/2024 12:23 PM EDT     Workstation ID: [id number]      CT Head Without Contrast   Final Result   Impression:   No acute intracranial findings.      Chronic and senescent changes as above.            Electronically Signed: Alfie Rivera MD     5/12/2024 9:14 AM EDT     Workstation ID: [id number]      XR Chest 1 View   Final Result   Impression:   Findings suspicious for CHF exacerbation including cardiomegaly, mild interstitial edema and likely trace effusions. Mild retrocardiac opacities could reflect atelectasis versus infection in the right clinical setting.         Electronically Signed: Eamon Santos MD     5/12/2024 8:40 AM EDT     Workstation ID: [id number]        Vitals:    05/12/24 0820 05/12/24 0831 05/12/24 0833   BP:  146/97 146/97   BP Location:  Right arm    Patient Position:  Lying    Pulse: 94 81    Resp:  24    Temp:   98 °F (36.7 °C)   TempSrc:   Oral   SpO2: (!) 89% 98%    Weight:  64 kg (141 lb)    Height:  165.1 cm (65\")      Medications   potassium chloride (KLOR-CON) packet 20 mEq (20 mEq Oral Given 5/12/24 1129)   iopamidol (ISOVUE-370) 76 % injection 100 mL (85 mL Intravenous Given 5/12/24 1214)     ECG/EMG Results (last 24 hours)       Procedure Component Value Units Date/Time    ECG 12 Lead Altered Mental Status " [[id number]] Collected: 05/12/24 0827     Updated: 05/12/24 1237     QT Interval 460 ms      QTC Interval 559 ms     Narrative:      Test Reason : Altered Mental Status  Blood Pressure :   */*   mmHG  Vent. Rate :  89 BPM     Atrial Rate :  89 BPM     P-R Int : 152 ms          QRS Dur : 148 ms      QT Int : 460 ms       P-R-T Axes :  26 -21 132 degrees     QTc Int : 559 ms    Sinus rhythm with occasional premature ventricular complexes and premature atrial complexes  Left bundle branch block  Abnormal ECG  Confirmed by MD Marquez Michael ([id number]) on 5/12/2024 12:37:34 PM    Referred By: FABI           Confirmed By: Thor Marquez MD          ECG 12 Lead Altered Mental Status   Final Result   Test Reason : Altered Mental Status   Blood Pressure :   */*   mmHG   Vent. Rate :  89 BPM     Atrial Rate :  89 BPM      P-R Int : 152 ms          QRS Dur : 148 ms       QT Int : 460 ms       P-R-T Axes :  26 -21 132 degrees      QTc Int : 559 ms      Sinus rhythm with occasional premature ventricular complexes and premature    atrial complexes   Left bundle branch block   Abnormal ECG   Confirmed by MD Marquez Michael ([id number]) on 5/12/2024 12:37:34 PM      Referred By: FABI           Confirmed By: Thor Marquez MD                 Medical Decision Making  Problems Addressed:  History of dementia: complicated acute illness or injury  Hypokalemia: complicated acute illness or injury  Pericardial effusion: complicated acute illness or injury    Amount and/or Complexity of Data Reviewed  Labs: ordered.  Radiology: ordered.  ECG/medicine tests: ordered.    Risk  Prescription drug management.        Final diagnoses:   Hypokalemia   History of dementia   Pericardial effusion       ED Disposition  ED Disposition       ED Disposition   Discharge    Condition   Stable    Comment   --               Varun Pa MD  [address] [place] [address] [address] [place] [address]  [phone]    In 1 week      [facility] CARDIOLOGY  [address]  [facility] [address]  [facility] [address]  [phone]  In 3 days           Medication List        New Prescriptions      potassium chloride 10 MEQ CR tablet  Take 1 tablet by mouth 2 (Two) Times a Day for 3 days.               Where to Get Your Medications        These medications were sent to [facility] - [place] [address], [place] [address]      Hours: Monday to Friday 7 AM to 5:30 PM, Saturday & Sunday 8 AM to 4:30 PM Phone: [phone]   potassium chloride 10 MEQ CR tablet            Peter Marquez MD  05/12/24 7891

## 2024-05-13 LAB — BACTERIA SPEC AEROBE CULT: NO GROWTH

## 2024-05-16 ENCOUNTER — OFFICE VISIT (OUTPATIENT)
Dept: CARDIOLOGY | Facility: HOSPITAL | Age: 84
End: 2024-05-16
Payer: MEDICARE

## 2024-05-16 VITALS
WEIGHT: 151.25 LBS | DIASTOLIC BLOOD PRESSURE: 79 MMHG | OXYGEN SATURATION: 90 % | HEART RATE: 81 BPM | RESPIRATION RATE: 20 BRPM | TEMPERATURE: 98.4 F | BODY MASS INDEX: 25.2 KG/M2 | HEIGHT: 65 IN | SYSTOLIC BLOOD PRESSURE: 117 MMHG

## 2024-05-16 DIAGNOSIS — R06.02 SHORTNESS OF BREATH: ICD-10-CM

## 2024-05-16 DIAGNOSIS — R60.0 EDEMA LEG: ICD-10-CM

## 2024-05-16 DIAGNOSIS — E87.6 HYPOKALEMIA: ICD-10-CM

## 2024-05-16 DIAGNOSIS — J90 PLEURAL EFFUSION: ICD-10-CM

## 2024-05-16 DIAGNOSIS — R05.9 COUGH, UNSPECIFIED TYPE: ICD-10-CM

## 2024-05-16 DIAGNOSIS — I31.39 PERICARDIAL EFFUSION: ICD-10-CM

## 2024-05-16 RX ORDER — SPIRONOLACTONE 25 MG/1
25 TABLET ORAL DAILY
Qty: 30 TABLET | Refills: 3 | Status: SHIPPED | OUTPATIENT
Start: 2024-05-16

## 2024-05-16 NOTE — PROGRESS NOTES
"[facility]    Chief Complaint  Establish Care    Subjective    History of Present Illness {CC  Problem List  Visit  Diagnosis   Encounters  Notes  Medications  Labs  Result Review Imaging  Media :23}     Omar Mendoza presents to [facility] CARDIOLOGY for   History of Present Illness     83-year-old female with history of dementia    Patient presented to [facility] ED on 5/12/2024 with confusion, generalized weakness.  Presented via EMS from her nursing home.    During ER visit she was alert to person place and year.  Following commands.    Patient had elevated BNP, flat troponin when compared to previous troponins.  Mild hypokalemia.  Elevated D-dimer.  No PE found.  Small pleural effusion.  Potassium replaced.    Has been present today.  Reports cough.  Intermittent throughout the day.  Moist cough fairly nonproductive.  No chest pain, pressure.  Intermittent dyspnea palpitations with activity.    Objective     Vital Signs:   Vitals:    05/16/24 1453   BP: 117/79   BP Location: Left arm   Patient Position: Sitting   Cuff Size: Adult   Pulse: 81   Resp: 20   Temp: 98.4 °F (36.9 °C)   TempSrc: Temporal   SpO2: 90%   Weight: 68.6 kg (151 lb 4 oz)   Height: 165.1 cm (65\")     Body mass index is 25.17 kg/m².  Physical Exam  Vitals reviewed.   Constitutional:       General: She is not in acute distress.     Appearance: Normal appearance.   Cardiovascular:      Rate and Rhythm: Normal rate and regular rhythm.      Pulses:           Radial pulses are 2+ on the right side and 2+ on the left side.        Dorsalis pedis pulses are 2+ on the right side and 2+ on the left side.        Posterior tibial pulses are 2+ on the right side and 2+ on the left side.      Heart sounds: Normal heart sounds.   Pulmonary:      Effort: Pulmonary effort is normal.      Breath sounds: Rales (bases) present.   Musculoskeletal:      Right lower leg: Edema " "present.      Left lower leg: Edema present.   Skin:     General: Skin is warm and dry.   Neurological:      Mental Status: She is alert.   Psychiatric:         Mood and Affect: Mood normal.         Behavior: Behavior is cooperative.              Result Review  Data Reviewed:{ Labs  Result Review  Imaging  Med Tab  Media :23}   CT angiogram of chest 5/12/2024: No PE.  Cardiomegaly with pericardial effusion, right-sided heart dysfunction.  Bibasilar atelectasis mild right pleural effusion, trace left pleural effusion.    EKG 5/12/2024: Sinus rhythm with occasional PVCs and PACs, 89 bpm, left bundle branch block    Echocardiogram 12/27/2023: EF 56 to 60%, trivial pericardial effusion, mild aortic regurgitation              Assessment and Plan {CC Problem List  Visit Diagnosis  ROS  Review (Popup)  Health Maintenance  Quality  BestPractice  Medications  SmartSets  SnapShot Encounters  Media :23}   1. Shortness of breath  Add\"  - spironolactone (ALDACTONE) 25 MG tablet; Take 1 tablet by mouth Daily.  Dispense: 30 tablet; Refill: 3    Repeat echo due to worsening s/s    - Adult Transthoracic Echo Complete W/ Cont if Necessary Per Protocol; Future    - Basic Metabolic Panel; 2 weeks    2. Edema leg  mild  - spironolactone (ALDACTONE) 25 MG tablet; Take 1 tablet by mouth Daily.  Dispense: 30 tablet; Refill: 3  - Adult Transthoracic Echo Complete W/ Cont if Necessary Per Protocol; Future  - Basic Metabolic Panel; Future    3. Hypokalemia    - spironolactone (ALDACTONE) 25 MG tablet; Take 1 tablet by mouth Daily.  Dispense: 30 tablet; Refill: 3    - Basic Metabolic Panel; Future    4. Pleural effusion  Small per CT  - spironolactone (ALDACTONE) 25 MG tablet; Take 1 tablet by mouth Daily.  Dispense: 30 tablet; Refill: 3  - Adult Transthoracic Echo Complete W/ Cont if Necessary Per Protocol; Future  - Basic Metabolic Panel; Future    5. Pericardial effusion  Small noted on echo 2023  - spironolactone " (ALDACTONE) 25 MG tablet; Take 1 tablet by mouth Daily.  Dispense: 30 tablet; Refill: 3  - Adult Transthoracic Echo Complete W/ Cont if Necessary Per Protocol; Future  - Basic Metabolic Panel; Future    6. Cough, unspecified type  Discussed to monitor for aspiration or cough noted after meals.   Continue to monitor.  - spironolactone (ALDACTONE) 25 MG tablet; Take 1 tablet by mouth Daily.  Dispense: 30 tablet; Refill: 3          Follow Up {Instructions Charge Capture  Follow-up Communications :23}   Return in about 4 weeks (around 6/13/2024), or if symptoms worsen or fail to improve, for dyspnea.    Patient was given instructions and counseling regarding her condition or for health maintenance advice. Please see specific information pulled into the AVS if appropriate.  Patient was instructed to call the [facility] with any questions, concerns, or worsening symptoms.

## 2024-05-23 ENCOUNTER — HOSPITAL ENCOUNTER (OUTPATIENT)
Dept: CARDIOLOGY | Facility: HOSPITAL | Age: 84
Discharge: HOME | End: 2024-05-23
Admitting: NURSE PRACTITIONER
Payer: MEDICARE

## 2024-05-23 ENCOUNTER — TELEPHONE (OUTPATIENT)
Dept: CARDIOLOGY | Facility: HOSPITAL | Age: 84
End: 2024-05-23
Payer: MEDICARE

## 2024-05-23 DIAGNOSIS — R06.02 SHORTNESS OF BREATH: ICD-10-CM

## 2024-05-23 DIAGNOSIS — R60.0 EDEMA LEG: ICD-10-CM

## 2024-05-23 DIAGNOSIS — J90 PLEURAL EFFUSION: ICD-10-CM

## 2024-05-23 DIAGNOSIS — I31.39 PERICARDIAL EFFUSION: ICD-10-CM

## 2024-05-23 LAB
BH CV ECHO MEAS - AO MAX PG: 3.7 MMHG
BH CV ECHO MEAS - AO MEAN PG: 1.75 MMHG
BH CV ECHO MEAS - AO ROOT DIAM: 3.5 CM
BH CV ECHO MEAS - AO V2 MAX: 95.6 CM/SEC
BH CV ECHO MEAS - AO V2 VTI: 12.8 CM
BH CV ECHO MEAS - AVA(I,D): 2.5 CM2
BH CV ECHO MEAS - EDV(CUBED): 239.5 ML
BH CV ECHO MEAS - EDV(MOD-SP2): 145 ML
BH CV ECHO MEAS - EDV(MOD-SP4): 158 ML
BH CV ECHO MEAS - EF(MOD-BP): 24 %
BH CV ECHO MEAS - EF(MOD-SP2): 28.3 %
BH CV ECHO MEAS - EF(MOD-SP4): 22.2 %
BH CV ECHO MEAS - ESV(CUBED): 161.9 ML
BH CV ECHO MEAS - ESV(MOD-SP2): 104 ML
BH CV ECHO MEAS - ESV(MOD-SP4): 123 ML
BH CV ECHO MEAS - FS: 12.2 %
BH CV ECHO MEAS - IVS/LVPW: 0.98 CM
BH CV ECHO MEAS - IVSD: 1.04 CM
BH CV ECHO MEAS - LA DIMENSION: 4.1 CM
BH CV ECHO MEAS - LAT PEAK E' VEL: 2.9 CM/SEC
BH CV ECHO MEAS - LV MASS(C)D: 278.8 GRAMS
BH CV ECHO MEAS - LV MAX PG: 1.48 MMHG
BH CV ECHO MEAS - LV MEAN PG: 1 MMHG
BH CV ECHO MEAS - LV V1 MAX: 60.9 CM/SEC
BH CV ECHO MEAS - LV V1 VTI: 9.3 CM
BH CV ECHO MEAS - LVIDD: 6.2 CM
BH CV ECHO MEAS - LVIDS: 5.5 CM
BH CV ECHO MEAS - LVOT AREA: 3.5 CM2
BH CV ECHO MEAS - LVOT DIAM: 2.1 CM
BH CV ECHO MEAS - LVPWD: 1.06 CM
BH CV ECHO MEAS - MV A MAX VEL: 84.4 CM/SEC
BH CV ECHO MEAS - MV E MAX VEL: 72.1 CM/SEC
BH CV ECHO MEAS - MV E/A: 0.85
BH CV ECHO MEAS - MV MAX PG: 3.5 MMHG
BH CV ECHO MEAS - MV MEAN PG: 1.72 MMHG
BH CV ECHO MEAS - MV V2 VTI: 20.7 CM
BH CV ECHO MEAS - MVA(VTI): 1.56 CM2
BH CV ECHO MEAS - RAP SYSTOLE: 15 MMHG
BH CV ECHO MEAS - RVSP: 57 MMHG
BH CV ECHO MEAS - SV(LVOT): 32.3 ML
BH CV ECHO MEAS - SV(MOD-SP2): 41 ML
BH CV ECHO MEAS - SV(MOD-SP4): 35 ML
BH CV ECHO MEAS - TAPSE (>1.6): 0.94 CM
BH CV ECHO MEAS - TR MAX PG: 42.1 MMHG
BH CV ECHO MEAS - TR MAX VEL: 324.4 CM/SEC
BH CV XLRA - RV BASE: 4.4 CM
BH CV XLRA - RV LENGTH: 6.4 CM
BH CV XLRA - RV MID: 3 CM
LEFT ATRIUM VOLUME INDEX: 56.8 ML/M2

## 2024-05-23 PROCEDURE — 93306 TTE W/DOPPLER COMPLETE: CPT

## 2024-05-23 NOTE — TELEPHONE ENCOUNTER
Received echocardiogram results today.  Patient with notably depressed ejection fraction 24%, with valvular heart disease.  History of CAD, severe malnutrition, recent pleural effusions, dementia.    Patient on spironolactone.  Reviewed results with power of , patient's son.  Referral completed for [facility] for continued monitoring and management.

## 2024-05-23 NOTE — PROGRESS NOTES
I called and reviewed the ultrasound results with patient's son who is the power of .  Due to worsening valvular disease and a decreased ejection fraction we will schedule a follow-up visit with [place] cardiology.    Recently added spironolactone.  Son reports patient's dyspnea and edema has improved.  Will request recent lab results for review.

## 2024-05-29 ENCOUNTER — OFFICE VISIT (OUTPATIENT)
Dept: CARDIOLOGY | Facility: CLINIC | Age: 84
End: 2024-05-29
Payer: MEDICARE

## 2024-05-29 VITALS
SYSTOLIC BLOOD PRESSURE: 104 MMHG | DIASTOLIC BLOOD PRESSURE: 66 MMHG | WEIGHT: 153.6 LBS | OXYGEN SATURATION: 96 % | BODY MASS INDEX: 25.59 KG/M2 | HEIGHT: 65 IN | HEART RATE: 93 BPM

## 2024-05-29 DIAGNOSIS — I50.21 ACUTE SYSTOLIC CONGESTIVE HEART FAILURE: Primary | ICD-10-CM

## 2024-05-29 DIAGNOSIS — F03.90 DEMENTIA, UNSPECIFIED DEMENTIA SEVERITY, UNSPECIFIED DEMENTIA TYPE, UNSPECIFIED WHETHER BEHAVIORAL, PSYCHOTIC, OR MOOD DISTURBANCE OR ANXIETY: ICD-10-CM

## 2024-05-29 DIAGNOSIS — I34.0 SEVERE MITRAL REGURGITATION: ICD-10-CM

## 2024-05-29 DIAGNOSIS — I07.1 SEVERE TRICUSPID REGURGITATION: ICD-10-CM

## 2024-05-29 RX ORDER — PSEUDOEPHED/ACETAMINOPH/DIPHEN 30MG-500MG
TABLET ORAL
COMMUNITY
Start: 2024-05-24

## 2024-05-29 RX ORDER — FUROSEMIDE 40 MG/1
40 TABLET ORAL DAILY
Qty: 3 TABLET | Refills: 0 | Status: SHIPPED | OUTPATIENT
Start: 2024-05-29 | End: 2024-06-01

## 2024-05-29 RX ORDER — TRIAMCINOLONE ACETONIDE 1 MG/G
CREAM TOPICAL
COMMUNITY
Start: 2024-05-21

## 2024-05-29 RX ORDER — POTASSIUM CHLORIDE 750 MG/1
TABLET, EXTENDED RELEASE ORAL
COMMUNITY
Start: 2024-05-12

## 2024-05-29 RX ORDER — VALSARTAN 40 MG/1
40 TABLET ORAL DAILY
Qty: 30 TABLET | Refills: 0 | Status: SHIPPED | OUTPATIENT
Start: 2024-05-29

## 2024-05-29 RX ORDER — POTASSIUM CHLORIDE 20 MEQ/1
40 TABLET, EXTENDED RELEASE ORAL DAILY
Qty: 6 TABLET | Refills: 0 | Status: SHIPPED | OUTPATIENT
Start: 2024-05-29 | End: 2024-06-01

## 2024-05-29 NOTE — PROGRESS NOTES
[facility] CARDIOLOGY    New Patient Office Visit    Patient Name: Omar Mendoza  : 1940   MRN: [id number]   Care Team: Patient Care Team:  Varun Pa MD as PCP - General (Internal Medicine)  Tanner Engle MD as Cardiologist (Cardiology)  Ron Braga APRN as Referring Physician (Cardiology)    Chief Complaint   Patient presents with    Acute HFrEF (heart failure with reduced ejection fraction)     NP     HPI: Omar Mendoza is a 83 y.o. female with a history of coronary artery disease, hypertension, hyperlipidemia, diabetes, dementia, bullous pemphigoid who presents today to establish care with cardiology for heart failure.  She had been admitted to [facility] from 23-24 with confusion and eventually diagnosed with dementia.  Since that time she has been in long-term care.  She was referred to the emergency department on May 12 with confusion and generalized weakness and CT PE was negative for VTE but did show pericardial effusion as well as prominent PA and evidence of right-sided heart dysfunction and bilateral pleural effusions.  She was then seen in the heart valve clinic and underwent transthoracic echocardiography which showed severely depressed EF as well as severe MR and TR.    She has had issues with hypokalemia at times and is not currently on scheduled loop diuretics according to her MAR.  She was started on spironolactone recently by Katia Braga.  Today, she has ongoing significant leg swelling which is worse than baseline as well as productive cough.    Subjective   Review of Systems    Past Medical History:   Diagnosis Date    CAD (coronary artery disease)     Dementia     Diabetes mellitus     Hyperlipemia     Hypertension      History reviewed. No pertinent surgical history.  Social History     Socioeconomic History    Marital status:    Tobacco Use    Smoking status: Never     Passive exposure: Past ( many years ago)    Smokeless tobacco:  "Never   Vaping Use    Vaping status: Never Used   Substance and Sexual Activity    Alcohol use: Never    Drug use: Never    Sexual activity: Defer     History reviewed. No pertinent family history.      Current Outpatient Medications:     Acetaminophen Extra Strength 500 MG tablet, , Disp: , Rfl:     Insulin Syringe-Needle U-100 25G X 1\" 1 ML misc, Use., Disp: , Rfl:     melatonin 5 MG tablet tablet, Take 1 tablet by mouth At Night As Needed (insomnia)., Disp: , Rfl:     mirtazapine (REMERON) 15 MG tablet, Take 1 tablet by mouth Every Night., Disp: , Rfl:     potassium chloride (KLOR-CON M10) 10 MEQ CR tablet, , Disp: , Rfl:     rivastigmine (EXELON) 9.5 MG/24HR patch, Place 1 patch on the skin as directed by provider Daily., Disp: , Rfl:     sennosides-docusate (PERICOLACE) 8.6-50 MG per tablet, Take 2 tablets by mouth Every Night., Disp: , Rfl:     spironolactone (ALDACTONE) 25 MG tablet, Take 1 tablet by mouth Daily., Disp: 30 tablet, Rfl: 3    triamcinolone (KENALOG) 0.1 % cream, , Disp: , Rfl:     furosemide (LASIX) 40 MG tablet, Take 1 tablet by mouth Daily for 3 days., Disp: 3 tablet, Rfl: 0    potassium chloride (KLOR-CON M20) 20 MEQ CR tablet, Take 2 tablets by mouth Daily for 3 days., Disp: 6 tablet, Rfl: 0    valsartan (DIOVAN) 40 MG tablet, Take 1 tablet by mouth Daily., Disp: 30 tablet, Rfl: 0  No Known Allergies    Objective     Vitals:    05/29/24 0909   BP: 104/66   BP Location: Left arm   Patient Position: Sitting   Cuff Size: Adult   Pulse: 93   SpO2: 96%   Weight: 69.7 kg (153 lb 9.6 oz)   Height: 165.1 cm (65\")     Body mass index is 25.56 kg/m².  Gen: well developed, older black female, sitting up in wheelchair, frail appearing  HEENT: MMM, sclera anicteric, conjunctiva normal, no carotid bruits  CV: regular rate, regular rhythm, III/VI systolic murmurs at base and apex  Pulm: NC oxygen, decreased air movement in bases bilaterally, no wheezes  Abd: soft, non-tender, non-distended  Ext: normal " tone, 2-3+ dependent edema bilaterally  Neuro: alert, oriented, face symmetrical, moving all extremities well    Most recent PCP note, imaging tests, and labs reviewed.    Labs:  Lab Results   Component Value Date    WBC 6.55 05/12/2024    HGB 11.1 (L) 05/12/2024    HCT 34.8 05/12/2024    MCV 90.2 05/12/2024     05/12/2024     Lab Results   Component Value Date    GLUCOSE 156 (H) 05/12/2024    BUN 21 05/12/2024    CREATININE 0.98 05/12/2024    BCR 21.4 05/12/2024    K 3.2 (L) 05/12/2024    CO2 24.0 05/12/2024    CALCIUM 9.0 05/12/2024    ALBUMIN 3.9 05/12/2024    AST 26 05/12/2024    ALT 37 (H) 05/12/2024     Lab Results   Component Value Date    HGBA1C 10.70 (H) 01/04/2024     Lab Results   Component Value Date    CHOL 94 01/04/2024    TRIG 120 01/04/2024    HDL 33 (L) 01/04/2024    LDL 39 01/04/2024       Results for orders placed during the hospital encounter of 05/23/24    Adult Transthoracic Echo Complete W/ Cont if Necessary Per Protocol    Interpretation Summary    Left ventricular wall thickness is consistent with borderline concentric hypertrophy.    Moderately reduced right ventricular systolic function noted.    Left ventricular systolic function is severely decreased. Calculated left ventricular EF = 24% The left ventricular cavity is moderate to severely dilated. Left ventricular wall thickness is consistent with borderline concentric hypertrophy. There is left ventricular global hypokinesis noted.    The right ventricular cavity is dilated. Moderately reduced right ventricular systolic function noted.    Left atrial volume is severely increased    The right atrial cavity is dilated    There is mild calcification of the aortic valve. Mild to moderate aortic valve regurgitation is present. No hemodynamically significant aortic valve stenosis is present.    Mitral annular calcification is present. Severe mitral valve regurgitation is present. No significant mitral valve stenosis is present.     Severe tricuspid valve regurgitation is present. Estimated right ventricular systolic pressure from tricuspid regurgitation is markedly elevated (>55 mmHg).    Compared to echocardiogram from Dec 2023 there has been a significant worsening of the systolic function and mitral / tricuspid regurgitation.      Procedures    Advance Care Planning   ACP discussion was held with the patient during this visit. Patient has an advance directive (not in EMR), copy requested.       Assessment & Plan       ICD-10-CM ICD-9-CM   1. Acute systolic congestive heart failure  I50.21 428.21     428.0   2. Severe mitral regurgitation  I34.0 424.0   3. Severe tricuspid regurgitation  I07.1 397.0   4. Dementia, unspecified dementia severity, unspecified dementia type, unspecified whether behavioral, psychotic, or mood disturbance or anxiety  F03.90 294.20     Acute on chronic HFrEF  Severe MR  Severe TR  Mild-mod AR   - Doing well with spironolactone, will add low-dose valsartan   - Since the patient does have a history of hypokalemia will add furosemide 40 mg for only 3 days with 40 mill equivalents of potassium daily and check BMP in 4 to 5 days    Diagnosis of dementia   - Would recommend cessation of PRN antihistamines    Return in about 4 weeks (around 6/26/2024).    IHSAN Engle MD  05/29/24    [facility] Cardiology  [address] [address] [phone]

## 2024-06-13 ENCOUNTER — OFFICE VISIT (OUTPATIENT)
Dept: CARDIOLOGY | Facility: HOSPITAL | Age: 84
End: 2024-06-13
Payer: MEDICARE

## 2024-06-13 ENCOUNTER — LAB (OUTPATIENT)
Dept: LAB | Facility: HOSPITAL | Age: 84
End: 2024-06-13
Payer: MEDICARE

## 2024-06-13 VITALS
BODY MASS INDEX: 25.56 KG/M2 | OXYGEN SATURATION: 99 % | DIASTOLIC BLOOD PRESSURE: 58 MMHG | RESPIRATION RATE: 16 BRPM | SYSTOLIC BLOOD PRESSURE: 95 MMHG | HEART RATE: 79 BPM | HEIGHT: 65 IN | TEMPERATURE: 97.7 F

## 2024-06-13 DIAGNOSIS — I50.21 ACUTE HFREF (HEART FAILURE WITH REDUCED EJECTION FRACTION): ICD-10-CM

## 2024-06-13 DIAGNOSIS — E87.6 HYPOKALEMIA: ICD-10-CM

## 2024-06-13 DIAGNOSIS — I50.22 CHRONIC HFREF (HEART FAILURE WITH REDUCED EJECTION FRACTION): ICD-10-CM

## 2024-06-13 DIAGNOSIS — I95.2 HYPOTENSION DUE TO DRUGS: ICD-10-CM

## 2024-06-13 DIAGNOSIS — I38 VHD (VALVULAR HEART DISEASE): ICD-10-CM

## 2024-06-13 DIAGNOSIS — R60.0 EDEMA LEG: Primary | ICD-10-CM

## 2024-06-13 LAB
ANION GAP SERPL CALCULATED.3IONS-SCNC: 14.8 MMOL/L (ref 5–15)
BASOPHILS # BLD AUTO: 0.01 10*3/MM3 (ref 0–0.2)
BASOPHILS NFR BLD AUTO: 0.2 % (ref 0–1.5)
BUN SERPL-MCNC: 19 MG/DL (ref 8–23)
BUN/CREAT SERPL: 25.3 (ref 7–25)
CALCIUM SPEC-SCNC: 8.6 MG/DL (ref 8.6–10.5)
CHLORIDE SERPL-SCNC: 101 MMOL/L (ref 98–107)
CO2 SERPL-SCNC: 24.2 MMOL/L (ref 22–29)
CREAT SERPL-MCNC: 0.75 MG/DL (ref 0.57–1)
DEPRECATED RDW RBC AUTO: 52.7 FL (ref 37–54)
EGFRCR SERPLBLD CKD-EPI 2021: 80.1 ML/MIN/1.73
EOSINOPHIL # BLD AUTO: 0.17 10*3/MM3 (ref 0–0.4)
EOSINOPHIL NFR BLD AUTO: 2.6 % (ref 0.3–6.2)
ERYTHROCYTE [DISTWIDTH] IN BLOOD BY AUTOMATED COUNT: 16.6 % (ref 12.3–15.4)
GLUCOSE SERPL-MCNC: 158 MG/DL (ref 65–99)
HCT VFR BLD AUTO: 37.8 % (ref 34–46.6)
HGB BLD-MCNC: 12.5 G/DL (ref 12–15.9)
IMM GRANULOCYTES # BLD AUTO: 0.02 10*3/MM3 (ref 0–0.05)
IMM GRANULOCYTES NFR BLD AUTO: 0.3 % (ref 0–0.5)
LYMPHOCYTES # BLD AUTO: 1.08 10*3/MM3 (ref 0.7–3.1)
LYMPHOCYTES NFR BLD AUTO: 16.4 % (ref 19.6–45.3)
MCH RBC QN AUTO: 29 PG (ref 26.6–33)
MCHC RBC AUTO-ENTMCNC: 33.1 G/DL (ref 31.5–35.7)
MCV RBC AUTO: 87.7 FL (ref 79–97)
MONOCYTES # BLD AUTO: 0.62 10*3/MM3 (ref 0.1–0.9)
MONOCYTES NFR BLD AUTO: 9.4 % (ref 5–12)
NEUTROPHILS NFR BLD AUTO: 4.67 10*3/MM3 (ref 1.7–7)
NEUTROPHILS NFR BLD AUTO: 71.1 % (ref 42.7–76)
NRBC BLD AUTO-RTO: 0 /100 WBC (ref 0–0.2)
PLATELET # BLD AUTO: 266 10*3/MM3 (ref 140–450)
PMV BLD AUTO: 10.3 FL (ref 6–12)
POTASSIUM SERPL-SCNC: 3.4 MMOL/L (ref 3.5–5.2)
RBC # BLD AUTO: 4.31 10*6/MM3 (ref 3.77–5.28)
SODIUM SERPL-SCNC: 140 MMOL/L (ref 136–145)
WBC NRBC COR # BLD AUTO: 6.57 10*3/MM3 (ref 3.4–10.8)

## 2024-06-13 PROCEDURE — 85025 COMPLETE CBC W/AUTO DIFF WBC: CPT

## 2024-06-13 PROCEDURE — 80048 BASIC METABOLIC PNL TOTAL CA: CPT

## 2024-06-13 PROCEDURE — 36415 COLL VENOUS BLD VENIPUNCTURE: CPT

## 2024-06-13 RX ORDER — HYDROXYZINE HYDROCHLORIDE 25 MG/1
12.5 TABLET, FILM COATED ORAL DAILY
COMMUNITY

## 2024-06-13 NOTE — PROGRESS NOTES
"[facility]    Chief Complaint  Congestive Heart Failure    Subjective    History of Present Illness {CC  Problem List  Visit  Diagnosis   Encounters  Notes  Medications  Labs  Result Review Imaging  Media :23}     Omar Mendoza presents to [facility] CARDIOLOGY for   History of Present Illness     83-year-old female with history of dementia, pericardial effusion, electrolyte imbalances, lower extremity edema, hypertension     Patient presented to [facility] ED on 5/12/2024 with confusion, generalized weakness.  Presented via EMS from her nursing home.     Echocardiogram 5/23/2024: EF 24%, severe MR, mild to moderate AR, severe TR with RVSP greater than 55 mmHg, moderately reduced RV function.    Patient recently found to have new heart failure.  Currently on valsartan, Lasix, spironolactone    Dyspnea has improved.  No chest pain or pressure.  No dizziness, near-syncope, syncope, palpitations.  Edema present, but improved.  Patient does not wear compression stockings.  Feet are dangling most of the day.    Increased sleepiness.  Confusion at baseline which is overall improved  Objective     Vital Signs:   Vitals:    06/13/24 1442   BP: 95/58   BP Location: Right arm   Patient Position: Sitting   Cuff Size: Adult   Pulse: 79   Resp: 16   Temp: 97.7 °F (36.5 °C)   TempSrc: Temporal   SpO2: 99%   Height: 165.1 cm (65\")     Body mass index is 25.56 kg/m².  Physical Exam  Vitals reviewed.   Constitutional:       General: She is not in acute distress.  Cardiovascular:      Rate and Rhythm: Normal rate and regular rhythm.   Pulmonary:      Effort: Pulmonary effort is normal.      Breath sounds: Normal breath sounds.   Musculoskeletal:      Right lower leg: Edema (1+ bilateral edema) present.      Left lower leg: Edema (left worse then right) present.   Skin:     Coloration: Skin is not pale.   Neurological:      Mental Status: She is " alert.   Psychiatric:         Mood and Affect: Mood normal.         Behavior: Behavior normal. Behavior is cooperative.              Result Review  Data Reviewed:{ Labs  Result Review  Imaging  Med Tab  Media :23}   Echocardiogram 5/23/2024: EF 24% severe TR with RVSP greater than 55 mmHg, moderately reduced RV function.    CT angiogram of chest 5/12/2024: No PE.  Cardiomegaly with pericardial effusion, right-sided heart dysfunction.  Bibasilar atelectasis mild right pleural effusion, trace left pleural effusion.    EKG 5/12/2024: Sinus rhythm with occasional PVCs and PACs, 89 bpm, left bundle branch block     Echocardiogram 12/27/2023: EF 56 to 60%, trivial pericardial effusion, mild aortic regurgitation    BMP 5/31/2024: Glucose 109, sodium 143, potassium 4.3, chloride 109, carbon oxide 25, BUN 16, creatinine 0.9, estimated GFR 73              Assessment and Plan {CC Problem List  Visit Diagnosis  ROS  Review (Popup)  Health Maintenance  Quality  BestPractice  Medications  SmartSets  SnapShot Encounters  Media :23}   1. Chronic HFrEF (heart failure with reduced ejection fraction)  Losartan, spironolactone (max tolerated doses due to hypotension)    Euvolemic.    - Basic Metabolic Panel; Future  - CBC & Differential; Future    2. VHD (valvular heart disease)    - Basic Metabolic Panel; Future  - CBC & Differential; Future    3. Edema leg  Improved  Encouraged compression stockings    4.  Hypotension, due to medication  Marginal low blood pressure.  Patient asymptomatic at this time.  Will continue to monitor.          Follow Up {Instructions Charge Capture  Follow-up Communications :23}   Return in about 6 months (around 12/13/2024), or if symptoms worsen or fail to improve, for HF.    Patient was given instructions and counseling regarding her condition or for health maintenance advice. Please see specific information pulled into the AVS if appropriate.  Patient was instructed to call the [facility] with any questions, concerns, or worsening symptoms.

## 2024-06-14 RX ORDER — POTASSIUM CHLORIDE 750 MG/1
10 TABLET, FILM COATED, EXTENDED RELEASE ORAL DAILY
Qty: 30 TABLET | Refills: 3 | Status: SHIPPED | OUTPATIENT
Start: 2024-06-14

## 2024-06-14 NOTE — PROGRESS NOTES
Pts. Potassium level is slightly low.  I want her to add KCL 10 meq 1 tab daily to her normal medications.  Repeat labs in 2 weeks.  Please call her SNF to discuss fax orders.  Please sent a copy of the labs their records.

## 2024-06-19 ENCOUNTER — TELEPHONE (OUTPATIENT)
Dept: CARDIOLOGY | Facility: HOSPITAL | Age: 84
End: 2024-06-19
Payer: MEDICARE

## 2024-06-19 NOTE — TELEPHONE ENCOUNTER
----- Message from Ron Braga sent at 6/19/2024  9:11 AM EDT -----    ----- Message -----  From: Amber Herbert CMA  Sent: 6/17/2024  11:23 AM EDT  To: LITA Nava      ----- Message -----  From: Ron Braga APRN  Sent: 6/14/2024  10:36 AM EDT  To: Ava Zaragoza MA    Pts. Potassium level is slightly low.  I want her to add KCL 10 meq 1 tab daily to her normal medications.  Repeat labs in 2 weeks.  Please call her SNF to discuss fax orders.  Please sent a copy of the labs their records.

## 2024-06-19 NOTE — TELEPHONE ENCOUNTER
Spoke to patient's son. Will contact [facility] with orders. Attempted to contact but nurse was unavailable.

## 2024-08-08 DIAGNOSIS — E87.6 HYPOKALEMIA: ICD-10-CM

## 2024-08-08 DIAGNOSIS — I50.22 CHRONIC HFREF (HEART FAILURE WITH REDUCED EJECTION FRACTION): ICD-10-CM

## 2024-08-14 RX ORDER — POTASSIUM CHLORIDE 750 MG/1
10 TABLET, FILM COATED, EXTENDED RELEASE ORAL DAILY
Qty: 90 TABLET | Refills: 2 | Status: SHIPPED | OUTPATIENT
Start: 2024-08-14

## 2024-08-14 NOTE — TELEPHONE ENCOUNTER
Will refill KCL.  Pt has ordered to repeat bmp.  I do not see that we received those labs please f/u on results.

## 2024-10-25 ENCOUNTER — OFFICE VISIT (OUTPATIENT)
Dept: CARDIOLOGY | Facility: CLINIC | Age: 84
End: 2024-10-25
Payer: MEDICARE

## 2024-10-25 VITALS
WEIGHT: 153 LBS | SYSTOLIC BLOOD PRESSURE: 100 MMHG | HEIGHT: 65 IN | DIASTOLIC BLOOD PRESSURE: 72 MMHG | OXYGEN SATURATION: 100 % | BODY MASS INDEX: 25.49 KG/M2 | HEART RATE: 85 BPM

## 2024-10-25 DIAGNOSIS — I50.22 CHRONIC SYSTOLIC CONGESTIVE HEART FAILURE: ICD-10-CM

## 2024-10-25 DIAGNOSIS — K75.89 CHOLESTATIC HEPATITIS: Primary | ICD-10-CM
